# Patient Record
Sex: MALE | Race: WHITE | Employment: OTHER | URBAN - NONMETROPOLITAN AREA
[De-identification: names, ages, dates, MRNs, and addresses within clinical notes are randomized per-mention and may not be internally consistent; named-entity substitution may affect disease eponyms.]

---

## 2017-01-25 ENCOUNTER — OFFICE VISIT (OUTPATIENT)
Dept: ORTHOPEDIC SURGERY | Age: 62
End: 2017-01-25

## 2017-01-25 VITALS
HEIGHT: 69 IN | DIASTOLIC BLOOD PRESSURE: 89 MMHG | WEIGHT: 210 LBS | BODY MASS INDEX: 31.1 KG/M2 | HEART RATE: 94 BPM | SYSTOLIC BLOOD PRESSURE: 109 MMHG

## 2017-01-25 DIAGNOSIS — S46.211S BICEPS RUPTURE, DISTAL, RIGHT, SEQUELA: Primary | ICD-10-CM

## 2017-01-25 PROCEDURE — 99024 POSTOP FOLLOW-UP VISIT: CPT | Performed by: ORTHOPAEDIC SURGERY

## 2017-03-08 ENCOUNTER — OFFICE VISIT (OUTPATIENT)
Dept: ORTHOPEDIC SURGERY | Age: 62
End: 2017-03-08

## 2017-03-08 VITALS
SYSTOLIC BLOOD PRESSURE: 118 MMHG | DIASTOLIC BLOOD PRESSURE: 78 MMHG | WEIGHT: 210 LBS | HEIGHT: 69 IN | BODY MASS INDEX: 31.1 KG/M2 | HEART RATE: 96 BPM

## 2017-03-08 DIAGNOSIS — S46.211S BICEPS RUPTURE, PROXIMAL, RIGHT, SEQUELA: Primary | ICD-10-CM

## 2017-03-08 PROCEDURE — 99213 OFFICE O/P EST LOW 20 MIN: CPT | Performed by: ORTHOPAEDIC SURGERY

## 2017-03-14 ENCOUNTER — OFFICE VISIT (OUTPATIENT)
Dept: FAMILY MEDICINE CLINIC | Age: 62
End: 2017-03-14
Payer: COMMERCIAL

## 2017-03-14 VITALS
WEIGHT: 203 LBS | HEART RATE: 84 BPM | HEIGHT: 69 IN | BODY MASS INDEX: 30.07 KG/M2 | DIASTOLIC BLOOD PRESSURE: 64 MMHG | SYSTOLIC BLOOD PRESSURE: 132 MMHG

## 2017-03-14 DIAGNOSIS — S46.211D BICEPS TENDON RUPTURE, PROXIMAL, RIGHT, SUBSEQUENT ENCOUNTER: ICD-10-CM

## 2017-03-14 DIAGNOSIS — I10 ESSENTIAL HYPERTENSION: Primary | ICD-10-CM

## 2017-03-14 DIAGNOSIS — K42.9 UMBILICAL HERNIA WITHOUT OBSTRUCTION AND WITHOUT GANGRENE: ICD-10-CM

## 2017-03-14 DIAGNOSIS — S13.9XXD SPRAIN OF NECK, SUBSEQUENT ENCOUNTER: ICD-10-CM

## 2017-03-14 DIAGNOSIS — Z12.5 SCREENING PSA (PROSTATE SPECIFIC ANTIGEN): ICD-10-CM

## 2017-03-14 DIAGNOSIS — R73.01 IMPAIRED FASTING BLOOD SUGAR: ICD-10-CM

## 2017-03-14 DIAGNOSIS — N40.1 BENIGN NON-NODULAR PROSTATIC HYPERPLASIA WITH LOWER URINARY TRACT SYMPTOMS: ICD-10-CM

## 2017-03-14 PROCEDURE — G8427 DOCREV CUR MEDS BY ELIG CLIN: HCPCS | Performed by: FAMILY MEDICINE

## 2017-03-14 PROCEDURE — 1036F TOBACCO NON-USER: CPT | Performed by: FAMILY MEDICINE

## 2017-03-14 PROCEDURE — G8484 FLU IMMUNIZE NO ADMIN: HCPCS | Performed by: FAMILY MEDICINE

## 2017-03-14 PROCEDURE — 99214 OFFICE O/P EST MOD 30 MIN: CPT | Performed by: FAMILY MEDICINE

## 2017-03-14 PROCEDURE — G8417 CALC BMI ABV UP PARAM F/U: HCPCS | Performed by: FAMILY MEDICINE

## 2017-03-14 PROCEDURE — 3017F COLORECTAL CA SCREEN DOC REV: CPT | Performed by: FAMILY MEDICINE

## 2017-03-14 RX ORDER — MELOXICAM 15 MG/1
15 TABLET ORAL DAILY
Qty: 30 TABLET | Refills: 3 | Status: SHIPPED | OUTPATIENT
Start: 2017-03-14 | End: 2017-08-11 | Stop reason: ALTCHOICE

## 2017-03-14 RX ORDER — MELOXICAM 15 MG/1
15 TABLET ORAL DAILY
COMMUNITY
End: 2017-03-14 | Stop reason: SDUPTHER

## 2017-03-14 ASSESSMENT — ENCOUNTER SYMPTOMS
RESPIRATORY NEGATIVE: 1
ALLERGIC/IMMUNOLOGIC NEGATIVE: 1
GASTROINTESTINAL NEGATIVE: 1
EYES NEGATIVE: 1

## 2017-08-11 ENCOUNTER — OFFICE VISIT (OUTPATIENT)
Dept: PRIMARY CARE CLINIC | Age: 62
End: 2017-08-11
Payer: COMMERCIAL

## 2017-08-11 VITALS
HEIGHT: 69 IN | WEIGHT: 211.6 LBS | HEART RATE: 74 BPM | DIASTOLIC BLOOD PRESSURE: 74 MMHG | SYSTOLIC BLOOD PRESSURE: 126 MMHG | OXYGEN SATURATION: 98 % | TEMPERATURE: 98.3 F | BODY MASS INDEX: 31.34 KG/M2

## 2017-08-11 DIAGNOSIS — M25.512 ACUTE PAIN OF LEFT SHOULDER: ICD-10-CM

## 2017-08-11 DIAGNOSIS — S46.912A LEFT SHOULDER STRAIN, INITIAL ENCOUNTER: Primary | ICD-10-CM

## 2017-08-11 PROCEDURE — 99214 OFFICE O/P EST MOD 30 MIN: CPT | Performed by: FAMILY MEDICINE

## 2017-08-11 PROCEDURE — 3017F COLORECTAL CA SCREEN DOC REV: CPT | Performed by: FAMILY MEDICINE

## 2017-08-11 PROCEDURE — G8427 DOCREV CUR MEDS BY ELIG CLIN: HCPCS | Performed by: FAMILY MEDICINE

## 2017-08-11 PROCEDURE — G8417 CALC BMI ABV UP PARAM F/U: HCPCS | Performed by: FAMILY MEDICINE

## 2017-08-11 PROCEDURE — 1036F TOBACCO NON-USER: CPT | Performed by: FAMILY MEDICINE

## 2017-08-11 RX ORDER — NAPROXEN 500 MG/1
500 TABLET ORAL 2 TIMES DAILY WITH MEALS
Status: ON HOLD | COMMUNITY
End: 2018-07-09 | Stop reason: HOSPADM

## 2017-08-11 RX ORDER — PREDNISONE 20 MG/1
TABLET ORAL
Qty: 15 TABLET | Refills: 0 | Status: SHIPPED | OUTPATIENT
Start: 2017-08-11 | End: 2017-09-14 | Stop reason: ALTCHOICE

## 2017-08-11 ASSESSMENT — ENCOUNTER SYMPTOMS
EYES NEGATIVE: 1
GASTROINTESTINAL NEGATIVE: 1
BACK PAIN: 0
RESPIRATORY NEGATIVE: 1

## 2017-09-08 ENCOUNTER — HOSPITAL ENCOUNTER (OUTPATIENT)
Dept: LAB | Age: 62
Discharge: HOME | End: 2017-09-08
Payer: COMMERCIAL

## 2017-09-08 DIAGNOSIS — Z12.5 SCREENING PSA (PROSTATE SPECIFIC ANTIGEN): ICD-10-CM

## 2017-09-08 DIAGNOSIS — I10 ESSENTIAL HYPERTENSION: ICD-10-CM

## 2017-09-08 LAB
ABSOLUTE EOS #: 0.4 K/UL (ref 0–0.4)
ABSOLUTE LYMPH #: 3.7 K/UL (ref 1–4.8)
ABSOLUTE MONO #: 0.6 K/UL (ref 0.1–1.2)
ANION GAP SERPL CALCULATED.3IONS-SCNC: 13 MMOL/L (ref 9–17)
BASOPHILS # BLD: 1 %
BASOPHILS ABSOLUTE: 0.1 K/UL (ref 0–0.2)
BUN BLDV-MCNC: 23 MG/DL (ref 8–23)
BUN/CREAT BLD: 25 (ref 9–20)
CALCIUM SERPL-MCNC: 8.9 MG/DL (ref 8.6–10.4)
CHLORIDE BLD-SCNC: 99 MMOL/L (ref 98–107)
CO2: 23 MMOL/L (ref 20–31)
CREAT SERPL-MCNC: 0.93 MG/DL (ref 0.7–1.2)
DIFFERENTIAL TYPE: ABNORMAL
EOSINOPHILS RELATIVE PERCENT: 5 %
GFR AFRICAN AMERICAN: >60 ML/MIN
GFR NON-AFRICAN AMERICAN: >60 ML/MIN
GFR SERPL CREATININE-BSD FRML MDRD: ABNORMAL ML/MIN/{1.73_M2}
GFR SERPL CREATININE-BSD FRML MDRD: ABNORMAL ML/MIN/{1.73_M2}
GLUCOSE BLD-MCNC: 112 MG/DL (ref 70–99)
HCT VFR BLD CALC: 46.5 % (ref 41–53)
HEMOGLOBIN: 15.1 G/DL (ref 13.5–17.5)
LYMPHOCYTES # BLD: 45 %
MCH RBC QN AUTO: 27.1 PG (ref 26–34)
MCHC RBC AUTO-ENTMCNC: 32.6 G/DL (ref 31–37)
MCV RBC AUTO: 83.3 FL (ref 80–100)
MONOCYTES # BLD: 8 %
PDW BLD-RTO: 14.7 % (ref 11–14.5)
PLATELET # BLD: 294 K/UL (ref 140–450)
PLATELET ESTIMATE: ABNORMAL
PMV BLD AUTO: 8.7 FL (ref 6–12)
POTASSIUM SERPL-SCNC: 5.2 MMOL/L (ref 3.7–5.3)
PROSTATE SPECIFIC ANTIGEN: 0.57 UG/L
RBC # BLD: 5.58 M/UL (ref 4.5–5.9)
RBC # BLD: ABNORMAL 10*6/UL
SEG NEUTROPHILS: 41 %
SEGMENTED NEUTROPHILS ABSOLUTE COUNT: 3.3 K/UL (ref 1.8–7.7)
SODIUM BLD-SCNC: 135 MMOL/L (ref 135–144)
WBC # BLD: 8.1 K/UL (ref 3.5–11)
WBC # BLD: ABNORMAL 10*3/UL

## 2017-09-08 PROCEDURE — 85025 COMPLETE CBC W/AUTO DIFF WBC: CPT

## 2017-09-08 PROCEDURE — 36415 COLL VENOUS BLD VENIPUNCTURE: CPT

## 2017-09-08 PROCEDURE — G0103 PSA SCREENING: HCPCS

## 2017-09-08 PROCEDURE — 80048 BASIC METABOLIC PNL TOTAL CA: CPT

## 2017-09-10 DIAGNOSIS — I10 ESSENTIAL HYPERTENSION: ICD-10-CM

## 2017-09-11 RX ORDER — LISINOPRIL 10 MG/1
TABLET ORAL
Qty: 90 TABLET | Refills: 3 | Status: ON HOLD | OUTPATIENT
Start: 2017-09-11 | End: 2018-07-09 | Stop reason: HOSPADM

## 2017-09-14 ENCOUNTER — OFFICE VISIT (OUTPATIENT)
Dept: FAMILY MEDICINE CLINIC | Age: 62
End: 2017-09-14
Payer: COMMERCIAL

## 2017-09-14 VITALS
SYSTOLIC BLOOD PRESSURE: 128 MMHG | HEIGHT: 69 IN | WEIGHT: 211 LBS | HEART RATE: 80 BPM | DIASTOLIC BLOOD PRESSURE: 74 MMHG | BODY MASS INDEX: 31.25 KG/M2

## 2017-09-14 DIAGNOSIS — S46.211D BICEPS TENDON RUPTURE, PROXIMAL, RIGHT, SUBSEQUENT ENCOUNTER: ICD-10-CM

## 2017-09-14 DIAGNOSIS — K42.9 UMBILICAL HERNIA WITHOUT OBSTRUCTION AND WITHOUT GANGRENE: ICD-10-CM

## 2017-09-14 DIAGNOSIS — Z12.5 SCREENING PSA (PROSTATE SPECIFIC ANTIGEN): ICD-10-CM

## 2017-09-14 DIAGNOSIS — R73.01 IMPAIRED FASTING BLOOD SUGAR: ICD-10-CM

## 2017-09-14 DIAGNOSIS — N40.1 BENIGN NON-NODULAR PROSTATIC HYPERPLASIA WITH LOWER URINARY TRACT SYMPTOMS: ICD-10-CM

## 2017-09-14 DIAGNOSIS — I10 ESSENTIAL HYPERTENSION: Primary | ICD-10-CM

## 2017-09-14 DIAGNOSIS — M25.512 ACUTE PAIN OF LEFT SHOULDER: ICD-10-CM

## 2017-09-14 PROCEDURE — 1036F TOBACCO NON-USER: CPT | Performed by: FAMILY MEDICINE

## 2017-09-14 PROCEDURE — G8417 CALC BMI ABV UP PARAM F/U: HCPCS | Performed by: FAMILY MEDICINE

## 2017-09-14 PROCEDURE — 99214 OFFICE O/P EST MOD 30 MIN: CPT | Performed by: FAMILY MEDICINE

## 2017-09-14 PROCEDURE — G8427 DOCREV CUR MEDS BY ELIG CLIN: HCPCS | Performed by: FAMILY MEDICINE

## 2017-09-14 PROCEDURE — 3017F COLORECTAL CA SCREEN DOC REV: CPT | Performed by: FAMILY MEDICINE

## 2017-09-14 RX ORDER — CYCLOBENZAPRINE HCL 5 MG
TABLET ORAL
COMMUNITY
Start: 2017-06-26 | End: 2019-03-21 | Stop reason: ALTCHOICE

## 2017-09-14 RX ORDER — CYCLOBENZAPRINE HCL 10 MG
10 TABLET ORAL PRN
COMMUNITY
End: 2017-09-14 | Stop reason: SDUPTHER

## 2017-09-14 ASSESSMENT — ENCOUNTER SYMPTOMS
ALLERGIC/IMMUNOLOGIC NEGATIVE: 1
EYES NEGATIVE: 1
GASTROINTESTINAL NEGATIVE: 1
RESPIRATORY NEGATIVE: 1

## 2017-09-14 ASSESSMENT — PATIENT HEALTH QUESTIONNAIRE - PHQ9
1. LITTLE INTEREST OR PLEASURE IN DOING THINGS: 0
SUM OF ALL RESPONSES TO PHQ QUESTIONS 1-9: 0
SUM OF ALL RESPONSES TO PHQ9 QUESTIONS 1 & 2: 0
2. FEELING DOWN, DEPRESSED OR HOPELESS: 0

## 2018-02-19 ENCOUNTER — TELEPHONE (OUTPATIENT)
Dept: FAMILY MEDICINE CLINIC | Age: 63
End: 2018-02-19

## 2018-02-19 RX ORDER — OSELTAMIVIR PHOSPHATE 75 MG/1
75 CAPSULE ORAL 2 TIMES DAILY
Qty: 10 CAPSULE | Refills: 0 | Status: SHIPPED | OUTPATIENT
Start: 2018-02-19 | End: 2018-02-24

## 2018-02-19 NOTE — TELEPHONE ENCOUNTER
Per wife's chart (Rena Weisswith  1955), was given Tamiflu on 18 for influenza symptoms. This was never confirmed, as it was negative when she came in for appointment on 18.   Do you want to give him something, or have him come in?

## 2018-03-09 ENCOUNTER — HOSPITAL ENCOUNTER (OUTPATIENT)
Dept: LAB | Age: 63
Setting detail: SPECIMEN
Discharge: HOME | End: 2018-03-09
Payer: COMMERCIAL

## 2018-03-09 DIAGNOSIS — R73.01 IMPAIRED FASTING BLOOD SUGAR: ICD-10-CM

## 2018-03-09 DIAGNOSIS — I10 ESSENTIAL HYPERTENSION: ICD-10-CM

## 2018-03-09 LAB
ABSOLUTE EOS #: 0.3 K/UL (ref 0–0.4)
ABSOLUTE IMMATURE GRANULOCYTE: ABNORMAL K/UL (ref 0–0.3)
ABSOLUTE LYMPH #: 3.6 K/UL (ref 1–4.8)
ABSOLUTE MONO #: 0.6 K/UL (ref 0.1–1.2)
ANION GAP SERPL CALCULATED.3IONS-SCNC: 10 MMOL/L (ref 9–17)
BASOPHILS # BLD: 1 % (ref 0–2)
BASOPHILS ABSOLUTE: 0.1 K/UL (ref 0–0.2)
BUN BLDV-MCNC: 15 MG/DL (ref 8–23)
BUN/CREAT BLD: 19 (ref 9–20)
CALCIUM SERPL-MCNC: 9 MG/DL (ref 8.6–10.4)
CHLORIDE BLD-SCNC: 97 MMOL/L (ref 98–107)
CO2: 28 MMOL/L (ref 20–31)
CREAT SERPL-MCNC: 0.77 MG/DL (ref 0.7–1.2)
DIFFERENTIAL TYPE: ABNORMAL
EOSINOPHILS RELATIVE PERCENT: 3 % (ref 1–8)
ESTIMATED AVERAGE GLUCOSE: 120 MG/DL
GFR AFRICAN AMERICAN: >60 ML/MIN
GFR NON-AFRICAN AMERICAN: >60 ML/MIN
GFR SERPL CREATININE-BSD FRML MDRD: ABNORMAL ML/MIN/{1.73_M2}
GFR SERPL CREATININE-BSD FRML MDRD: ABNORMAL ML/MIN/{1.73_M2}
GLUCOSE BLD-MCNC: 112 MG/DL (ref 70–99)
HBA1C MFR BLD: 5.8 % (ref 4.8–5.9)
HCT VFR BLD CALC: 46 % (ref 41–53)
HEMOGLOBIN: 15.2 G/DL (ref 13.5–17.5)
IMMATURE GRANULOCYTES: ABNORMAL %
LYMPHOCYTES # BLD: 43 % (ref 15–43)
MCH RBC QN AUTO: 27 PG (ref 26–34)
MCHC RBC AUTO-ENTMCNC: 33.2 G/DL (ref 31–37)
MCV RBC AUTO: 81.5 FL (ref 80–100)
MONOCYTES # BLD: 8 % (ref 6–14)
NRBC AUTOMATED: ABNORMAL PER 100 WBC
PDW BLD-RTO: 15 % (ref 11–14.5)
PLATELET # BLD: 255 K/UL (ref 140–450)
PLATELET ESTIMATE: ABNORMAL
PMV BLD AUTO: 8.9 FL (ref 6–12)
POTASSIUM SERPL-SCNC: 5 MMOL/L (ref 3.7–5.3)
RBC # BLD: 5.64 M/UL (ref 4.5–5.9)
RBC # BLD: ABNORMAL 10*6/UL
SEG NEUTROPHILS: 45 % (ref 44–74)
SEGMENTED NEUTROPHILS ABSOLUTE COUNT: 3.8 K/UL (ref 1.8–7.7)
SODIUM BLD-SCNC: 135 MMOL/L (ref 135–144)
WBC # BLD: 8.5 K/UL (ref 3.5–11)
WBC # BLD: ABNORMAL 10*3/UL

## 2018-03-09 PROCEDURE — 36415 COLL VENOUS BLD VENIPUNCTURE: CPT

## 2018-03-09 PROCEDURE — 80048 BASIC METABOLIC PNL TOTAL CA: CPT

## 2018-03-09 PROCEDURE — 85025 COMPLETE CBC W/AUTO DIFF WBC: CPT

## 2018-03-09 PROCEDURE — 83036 HEMOGLOBIN GLYCOSYLATED A1C: CPT

## 2018-03-13 ENCOUNTER — OFFICE VISIT (OUTPATIENT)
Dept: FAMILY MEDICINE CLINIC | Age: 63
End: 2018-03-13
Payer: COMMERCIAL

## 2018-03-13 VITALS
DIASTOLIC BLOOD PRESSURE: 70 MMHG | WEIGHT: 214 LBS | SYSTOLIC BLOOD PRESSURE: 130 MMHG | HEIGHT: 69 IN | HEART RATE: 72 BPM | BODY MASS INDEX: 31.7 KG/M2

## 2018-03-13 DIAGNOSIS — M25.512 ACUTE PAIN OF LEFT SHOULDER: ICD-10-CM

## 2018-03-13 DIAGNOSIS — K42.9 UMBILICAL HERNIA WITHOUT OBSTRUCTION AND WITHOUT GANGRENE: ICD-10-CM

## 2018-03-13 DIAGNOSIS — Z12.5 SCREENING PSA (PROSTATE SPECIFIC ANTIGEN): ICD-10-CM

## 2018-03-13 DIAGNOSIS — N40.1 BENIGN NON-NODULAR PROSTATIC HYPERPLASIA WITH LOWER URINARY TRACT SYMPTOMS: ICD-10-CM

## 2018-03-13 DIAGNOSIS — S13.9XXD SPRAIN OF NECK, SUBSEQUENT ENCOUNTER: ICD-10-CM

## 2018-03-13 DIAGNOSIS — I10 ESSENTIAL HYPERTENSION: Primary | ICD-10-CM

## 2018-03-13 DIAGNOSIS — R73.01 IMPAIRED FASTING BLOOD SUGAR: ICD-10-CM

## 2018-03-13 DIAGNOSIS — F17.211 NICOTINE DEPENDENCE, CIGARETTES, IN REMISSION: ICD-10-CM

## 2018-03-13 DIAGNOSIS — S46.211D BICEPS TENDON RUPTURE, PROXIMAL, RIGHT, SUBSEQUENT ENCOUNTER: ICD-10-CM

## 2018-03-13 PROCEDURE — G8482 FLU IMMUNIZE ORDER/ADMIN: HCPCS | Performed by: FAMILY MEDICINE

## 2018-03-13 PROCEDURE — G8417 CALC BMI ABV UP PARAM F/U: HCPCS | Performed by: FAMILY MEDICINE

## 2018-03-13 PROCEDURE — 99214 OFFICE O/P EST MOD 30 MIN: CPT | Performed by: FAMILY MEDICINE

## 2018-03-13 PROCEDURE — G8427 DOCREV CUR MEDS BY ELIG CLIN: HCPCS | Performed by: FAMILY MEDICINE

## 2018-03-13 PROCEDURE — 3017F COLORECTAL CA SCREEN DOC REV: CPT | Performed by: FAMILY MEDICINE

## 2018-03-13 PROCEDURE — 1036F TOBACCO NON-USER: CPT | Performed by: FAMILY MEDICINE

## 2018-03-13 NOTE — PATIENT INSTRUCTIONS
Hospital Outpatient Visit on 03/09/2018   Component Date Value Ref Range Status    WBC 03/09/2018 8.5  3.5 - 11.0 k/uL Final    RBC 03/09/2018 5.64  4.5 - 5.9 m/uL Final    Hemoglobin 03/09/2018 15.2  13.5 - 17.5 g/dL Final    Hematocrit 03/09/2018 46.0  41 - 53 % Final    MCV 03/09/2018 81.5  80 - 100 fL Final    MCH 03/09/2018 27.0  26 - 34 pg Final    MCHC 03/09/2018 33.2  31 - 37 g/dL Final    RDW 03/09/2018 15.0* 11.0 - 14.5 % Final    Platelets 47/49/7612 255  140 - 450 k/uL Final    MPV 03/09/2018 8.9  6.0 - 12.0 fL Final    NRBC Automated 03/09/2018 NOT REPORTED  per 100 WBC Final    Differential Type 03/09/2018 NOT REPORTED   Final    Immature Granulocytes 03/09/2018 NOT REPORTED  0 % Final    Absolute Immature Granulocyte 03/09/2018 NOT REPORTED  0.00 - 0.30 k/uL Final    WBC Morphology 03/09/2018 NOT REPORTED   Final    RBC Morphology 03/09/2018 NOT REPORTED   Final    Platelet Estimate 85/32/9755 NOT REPORTED   Final    Seg Neutrophils 03/09/2018 45  44 - 74 % Final    Lymphocytes 03/09/2018 43  15 - 43 % Final    Monocytes 03/09/2018 8  6 - 14 % Final    Eosinophils % 03/09/2018 3  1 - 8 % Final    Basophils 03/09/2018 1  0 - 2 % Final    Segs Absolute 03/09/2018 3.80  1.8 - 7.7 k/uL Final    Absolute Lymph # 03/09/2018 3.60  1.0 - 4.8 k/uL Final    Absolute Mono # 03/09/2018 0.60  0.1 - 1.2 k/uL Final    Absolute Eos # 03/09/2018 0.30  0.0 - 0.4 k/uL Final    Basophils # 03/09/2018 0.10  0.0 - 0.2 k/uL Final    Comment: Performed at [facility] [address] [facility] [address] [place] [id number] [phone]      Glucose 03/09/2018 112* 70 - 99 mg/dL Final    BUN 03/09/2018 15  8 - 23 mg/dL Final    CREATININE 03/09/2018 0.77  0.70 - 1.20 mg/dL Final    Bun/Cre Ratio 03/09/2018 19  9 - 20 Final    Calcium 03/09/2018 9.0  8.6 - 10.4 mg/dL Final    Sodium 03/09/2018 135  135 - 144 mmol/L Final    Potassium 03/09/2018 5.0  3.7 - 5.3 mmol/L cancer risk. If you have other serious medical conditions (other cancers, congestive heart failure) that limit your life expectancy to less than 10 years, you should not undergo lung cancer screening with LDCT. The chance is 20%-60% that the LDCT result will show abnormalities. This would require additional testing which could include repeat imaging or even invasive procedures. Most (about 95%) of \"abnormal\" LDCT results are false in the sense that no lung cancer is ultimately found. Additionally, some (about 10%) of the cancers found would not affect your life expectancy, even if undetected and untreated. If you are still smoking, the single most important thing that you can do to reduce your risk of dying of lung cancer is to quit. For this screening to be covered by Medicare and most other insurers, strict criteria must be met. If you do not meet these criteria, but still wish to undergo LDCT testing, you will be required to sign a waiver indicating your willingness to pay for the scan.

## 2018-03-13 NOTE — PROGRESS NOTES
content normal.     /70 (Site: Right Arm, Position: Sitting, Cuff Size: Large Adult)   Pulse 72   Ht 5' 8.9\" (1.75 m)   Wt 214 lb (97.1 kg)   BMI 31.70 kg/m²       Results for orders placed or performed during the hospital encounter of 03/09/18   CBC Auto Differential   Result Value Ref Range    WBC 8.5 3.5 - 11.0 k/uL    RBC 5.64 4.5 - 5.9 m/uL    Hemoglobin 15.2 13.5 - 17.5 g/dL    Hematocrit 46.0 41 - 53 %    MCV 81.5 80 - 100 fL    MCH 27.0 26 - 34 pg    MCHC 33.2 31 - 37 g/dL    RDW 15.0 (H) 11.0 - 14.5 %    Platelets 231 557 - 078 k/uL    MPV 8.9 6.0 - 12.0 fL    NRBC Automated NOT REPORTED per 100 WBC    Differential Type NOT REPORTED     Immature Granulocytes NOT REPORTED 0 %    Absolute Immature Granulocyte NOT REPORTED 0.00 - 0.30 k/uL    WBC Morphology NOT REPORTED     RBC Morphology NOT REPORTED     Platelet Estimate NOT REPORTED     Seg Neutrophils 45 44 - 74 %    Lymphocytes 43 15 - 43 %    Monocytes 8 6 - 14 %    Eosinophils % 3 1 - 8 %    Basophils 1 0 - 2 %    Segs Absolute 3.80 1.8 - 7.7 k/uL    Absolute Lymph # 3.60 1.0 - 4.8 k/uL    Absolute Mono # 0.60 0.1 - 1.2 k/uL    Absolute Eos # 0.30 0.0 - 0.4 k/uL    Basophils # 0.10 0.0 - 0.2 k/uL   Basic Metabolic Panel   Result Value Ref Range    Glucose 112 (H) 70 - 99 mg/dL    BUN 15 8 - 23 mg/dL    CREATININE 0.77 0.70 - 1.20 mg/dL    Bun/Cre Ratio 19 9 - 20    Calcium 9.0 8.6 - 10.4 mg/dL    Sodium 135 135 - 144 mmol/L    Potassium 5.0 3.7 - 5.3 mmol/L    Chloride 97 (L) 98 - 107 mmol/L    CO2 28 20 - 31 mmol/L    Anion Gap 10 9 - 17 mmol/L    GFR Non-African American >60 >60 mL/min    GFR African American >60 >60 mL/min    GFR Comment          GFR Staging NOT REPORTED    Hemoglobin A1C   Result Value Ref Range    Hemoglobin A1C 5.8 4.8 - 5.9 %    Estimated Avg Glucose 120 mg/dL       Assessment:       Encounter Diagnoses   Name Primary?     Essential hypertension Yes    Impaired fasting blood sugar     Sprain of neck, subsequent encounter     Umbilical hernia without obstruction and without gangrene     Biceps tendon rupture, proximal, right, subsequent encounter     Benign non-nodular prostatic hyperplasia with lower urinary tract symptoms     Acute pain of left shoulder              Plan:      Htn: well controlled at present. Cont. Current dosing of Lisinopril. IFBS: low concentrated sweet diet, exercise and wt. Loss over next interval.   a1c reassuring at 5.8% last check  Cont. Same with rec. For wt. Loss over next interval.      Left neck and shoulder pain chronically since injury 1/18/14. Original shoulder and neck sprain have improved, but he has had persistent radicular pain into left axillary/inner arm, with paresthesias predominantly in fingers 2-5. Using neurontin at present. Injection trial seems to have helped at present,. MRI showing multilevel disc disease, but I suspect the foraminal impingement at the left C6-7 area on the left is responsible for the bulk of his symptoms. see 5/5/14 note for history. He has had some injection trials that have helped with the paresthesias quite a bit by report, through dr. Parviz Easton. Back to work at present. Umbilical hernia: reducible, mild to moderate with rare pain. Considering elective repair down the road. No interval issues. He is aware of incarceration risk and symptoms to report. Biceps tendon tear and rotator cuff tears on the right from [facility] claim from 10/6/15. MRI showing full thickness tear and mild retraction of the supraspinatus, shallow partial thickness tear of infraspinatus, mild subscapularis tendoniopathy, nonvisualization of the long head of the biceps tendon indicating likely rupture. .  Mild /diffuse labral degeneration. Surgery in April with mostly rotator cuff repair. Unable to re-attach long head of biceps tendon. Still was having some biceps spasms and pain, now s/p corrective surgery in November and doing much better.  Just returning to full duty at work at this time. Bph: doing a little better on saw palmetto at present. Will cont. Same. New left shoulder injury 8/7/17 pulling a heavy pallet keyla when it stopped suddenly. Negative xray in urgent care. Completed prednisone burst.  This helped, but starting to have recurrent symptoms and poor abduction at present. MRI and ortho consult as above. Has follow up with another ortho. Specialist out of [place] , Dr. Selma Hartley .  (sp.)  No information available on search of EMR and care everywhere attempt. Seen at [facility]. Low Dose CT (LDCT) Lung Screening criteria met   Age 50-69   Pack year smoking >30   Still smoking or less than 15 year since quit   No sign or symptoms of lung cancer   > 11 months since last LDCT     Risks and benefits of lung cancer screening with LDCT scans discussed:    Significance of positive screen - False-positive LDCT results often occur. 95% of all positive results do not lead to a diagnosis of cancer. Usually further imaging can resolve most false-positive results; however, some patients may require invasive procedures. Over diagnosis risk - 10% to 12% of screen-detected lung cancer cases are over diagnosedthat is, the cancer would not have been detected in the patient's lifetime without the screening. Need for follow up screens annually to continue lung cancer screening effectiveness     Risks associated with radiation from annual LDCT- Radiation exposure is about the same as for a mammogram, which is about 1/3 of the annual background radiation exposure from everyday life. Starting screening at age 54 is not likely to increase cancer risk from radiation exposure. Patients with comorbidities resulting in life expectancy of < 10 years, or that would preclude treatment of an abnormality identified on CT, should not be screened due to lack of benefit.     To obtain maximal benefit from this screening, smoking cessation and long-term abstinence from

## 2018-03-14 ENCOUNTER — TELEPHONE (OUTPATIENT)
Dept: ONCOLOGY | Age: 63
End: 2018-03-14

## 2018-04-28 ENCOUNTER — OFFICE VISIT (OUTPATIENT)
Dept: PRIMARY CARE CLINIC | Age: 63
End: 2018-04-28
Payer: COMMERCIAL

## 2018-04-28 VITALS
SYSTOLIC BLOOD PRESSURE: 128 MMHG | TEMPERATURE: 97.7 F | OXYGEN SATURATION: 97 % | BODY MASS INDEX: 31.4 KG/M2 | DIASTOLIC BLOOD PRESSURE: 68 MMHG | WEIGHT: 212 LBS | HEIGHT: 69 IN | HEART RATE: 82 BPM

## 2018-04-28 DIAGNOSIS — R05.9 COUGH: ICD-10-CM

## 2018-04-28 DIAGNOSIS — J01.41 ACUTE RECURRENT PANSINUSITIS: Primary | ICD-10-CM

## 2018-04-28 PROCEDURE — 1036F TOBACCO NON-USER: CPT | Performed by: PHYSICIAN ASSISTANT

## 2018-04-28 PROCEDURE — 3017F COLORECTAL CA SCREEN DOC REV: CPT | Performed by: PHYSICIAN ASSISTANT

## 2018-04-28 PROCEDURE — G8427 DOCREV CUR MEDS BY ELIG CLIN: HCPCS | Performed by: PHYSICIAN ASSISTANT

## 2018-04-28 PROCEDURE — 99213 OFFICE O/P EST LOW 20 MIN: CPT | Performed by: PHYSICIAN ASSISTANT

## 2018-04-28 PROCEDURE — G8417 CALC BMI ABV UP PARAM F/U: HCPCS | Performed by: PHYSICIAN ASSISTANT

## 2018-04-28 RX ORDER — BENZONATATE 200 MG/1
200 CAPSULE ORAL 3 TIMES DAILY PRN
Qty: 30 CAPSULE | Refills: 1 | Status: SHIPPED | OUTPATIENT
Start: 2018-04-28 | End: 2018-05-05

## 2018-04-28 RX ORDER — AZITHROMYCIN 250 MG/1
TABLET, FILM COATED ORAL
Qty: 1 PACKET | Refills: 0 | Status: SHIPPED | OUTPATIENT
Start: 2018-04-28 | End: 2018-05-02

## 2018-04-28 ASSESSMENT — ENCOUNTER SYMPTOMS
NAUSEA: 0
VOMITING: 0
DIARRHEA: 0
RHINORRHEA: 1
COUGH: 1
WHEEZING: 0
SORE THROAT: 0
SINUS PRESSURE: 0
CHEST TIGHTNESS: 0
SINUS PAIN: 0
SHORTNESS OF BREATH: 0

## 2018-05-18 ENCOUNTER — HOSPITAL ENCOUNTER (OUTPATIENT)
Dept: LAB | Age: 63
Setting detail: SPECIMEN
Discharge: HOME | End: 2018-05-18
Payer: COMMERCIAL

## 2018-05-18 ENCOUNTER — HOSPITAL ENCOUNTER (OUTPATIENT)
Dept: GENERAL RADIOLOGY | Age: 63
Discharge: HOME | End: 2018-05-20
Payer: COMMERCIAL

## 2018-05-18 ENCOUNTER — HOSPITAL ENCOUNTER (OUTPATIENT)
Dept: NON INVASIVE DIAGNOSTICS | Age: 63
Discharge: HOME | End: 2018-05-18
Payer: COMMERCIAL

## 2018-05-18 ENCOUNTER — OFFICE VISIT (OUTPATIENT)
Dept: FAMILY MEDICINE CLINIC | Age: 63
End: 2018-05-18
Payer: COMMERCIAL

## 2018-05-18 VITALS
WEIGHT: 212.2 LBS | BODY MASS INDEX: 31.43 KG/M2 | HEIGHT: 69 IN | HEART RATE: 83 BPM | TEMPERATURE: 97.4 F | DIASTOLIC BLOOD PRESSURE: 84 MMHG | RESPIRATION RATE: 12 BRPM | OXYGEN SATURATION: 97 % | SYSTOLIC BLOOD PRESSURE: 138 MMHG

## 2018-05-18 DIAGNOSIS — M75.102 TEAR OF LEFT ROTATOR CUFF, UNSPECIFIED TEAR EXTENT: ICD-10-CM

## 2018-05-18 DIAGNOSIS — Z01.818 PRE-OP EXAM: ICD-10-CM

## 2018-05-18 DIAGNOSIS — S46.012D STRAIN OF MUSCLE(S) AND TENDON(S) OF THE ROTATOR CUFF OF LEFT SHOULDER, SUBSEQUENT ENCOUNTER: ICD-10-CM

## 2018-05-18 DIAGNOSIS — R93.89 OPACITY NOTED ON IMAGING STUDY: ICD-10-CM

## 2018-05-18 DIAGNOSIS — R93.89 ABNORMAL CHEST X-RAY: Primary | ICD-10-CM

## 2018-05-18 DIAGNOSIS — Z01.818 PRE-OP EXAM: Primary | ICD-10-CM

## 2018-05-18 LAB
-: NORMAL
ABSOLUTE EOS #: 0.3 K/UL (ref 0–0.4)
ABSOLUTE IMMATURE GRANULOCYTE: ABNORMAL K/UL (ref 0–0.3)
ABSOLUTE LYMPH #: 5 K/UL (ref 1–4.8)
ABSOLUTE MONO #: 0.7 K/UL (ref 0.1–1.2)
ALBUMIN SERPL-MCNC: 4.2 G/DL (ref 3.5–5.2)
ALBUMIN/GLOBULIN RATIO: 1.1 (ref 1–2.5)
ALP BLD-CCNC: 140 U/L (ref 40–129)
ALT SERPL-CCNC: 16 U/L (ref 5–41)
AMORPHOUS: NORMAL
ANION GAP SERPL CALCULATED.3IONS-SCNC: 14 MMOL/L (ref 9–17)
AST SERPL-CCNC: 24 U/L
BACTERIA: NORMAL
BASOPHILS # BLD: 1 % (ref 0–2)
BASOPHILS ABSOLUTE: 0.2 K/UL (ref 0–0.2)
BILIRUB SERPL-MCNC: 0.4 MG/DL (ref 0.3–1.2)
BILIRUBIN URINE: NEGATIVE
BUN BLDV-MCNC: 18 MG/DL (ref 8–23)
BUN/CREAT BLD: 23 (ref 9–20)
CALCIUM SERPL-MCNC: 9.5 MG/DL (ref 8.6–10.4)
CASTS UA: NORMAL /LPF (ref 0–2)
CHLORIDE BLD-SCNC: 98 MMOL/L (ref 98–107)
CO2: 23 MMOL/L (ref 20–31)
COLOR: NORMAL
COMMENT UA: NORMAL
CREAT SERPL-MCNC: 0.78 MG/DL (ref 0.7–1.2)
CRYSTALS, UA: NORMAL /HPF
DIFFERENTIAL TYPE: ABNORMAL
EKG ATRIAL RATE: 89 BPM
EKG P AXIS: 56 DEGREES
EKG P-R INTERVAL: 158 MS
EKG Q-T INTERVAL: 364 MS
EKG QRS DURATION: 92 MS
EKG QTC CALCULATION (BAZETT): 442 MS
EKG R AXIS: 53 DEGREES
EKG T AXIS: 47 DEGREES
EKG VENTRICULAR RATE: 89 BPM
EOSINOPHILS RELATIVE PERCENT: 3 % (ref 1–8)
EPITHELIAL CELLS UA: NORMAL /HPF (ref 0–5)
GFR AFRICAN AMERICAN: >60 ML/MIN
GFR NON-AFRICAN AMERICAN: >60 ML/MIN
GFR SERPL CREATININE-BSD FRML MDRD: ABNORMAL ML/MIN/{1.73_M2}
GFR SERPL CREATININE-BSD FRML MDRD: ABNORMAL ML/MIN/{1.73_M2}
GLUCOSE BLD-MCNC: 101 MG/DL (ref 70–99)
GLUCOSE URINE: NEGATIVE
HCT VFR BLD CALC: 47 % (ref 41–53)
HEMOGLOBIN: 15.2 G/DL (ref 13.5–17.5)
IMMATURE GRANULOCYTES: ABNORMAL %
KETONES, URINE: NEGATIVE
LEUKOCYTE ESTERASE, URINE: NEGATIVE
LYMPHOCYTES # BLD: 43 % (ref 15–43)
MCH RBC QN AUTO: 27 PG (ref 26–34)
MCHC RBC AUTO-ENTMCNC: 32.4 G/DL (ref 31–37)
MCV RBC AUTO: 83.4 FL (ref 80–100)
MONOCYTES # BLD: 6 % (ref 6–14)
MUCUS: NORMAL
NITRITE, URINE: NEGATIVE
NRBC AUTOMATED: ABNORMAL PER 100 WBC
OTHER OBSERVATIONS UA: NORMAL
PDW BLD-RTO: 14.7 % (ref 11–14.5)
PH UA: 5.5 (ref 5–6)
PLATELET # BLD: 383 K/UL (ref 140–450)
PLATELET ESTIMATE: ABNORMAL
PMV BLD AUTO: 9.7 FL (ref 6–12)
POTASSIUM SERPL-SCNC: 4.5 MMOL/L (ref 3.7–5.3)
PROTEIN UA: NEGATIVE
RBC # BLD: 5.63 M/UL (ref 4.5–5.9)
RBC # BLD: ABNORMAL 10*6/UL
RBC UA: NORMAL /HPF (ref 0–4)
RENAL EPITHELIAL, UA: NORMAL /HPF
SEG NEUTROPHILS: 47 % (ref 44–74)
SEGMENTED NEUTROPHILS ABSOLUTE COUNT: 5.4 K/UL (ref 1.8–7.7)
SODIUM BLD-SCNC: 135 MMOL/L (ref 135–144)
SPECIFIC GRAVITY UA: 1.02 (ref 1.01–1.02)
TOTAL PROTEIN: 8 G/DL (ref 6.4–8.3)
TRICHOMONAS: NORMAL
TURBIDITY: NORMAL
URINE HGB: NEGATIVE
UROBILINOGEN, URINE: NORMAL
WBC # BLD: 12.2 K/UL (ref 3.5–11)
WBC # BLD: ABNORMAL 10*3/UL
WBC UA: NORMAL /HPF (ref 0–4)
YEAST: NORMAL

## 2018-05-18 PROCEDURE — 36415 COLL VENOUS BLD VENIPUNCTURE: CPT

## 2018-05-18 PROCEDURE — 71046 X-RAY EXAM CHEST 2 VIEWS: CPT

## 2018-05-18 PROCEDURE — 85025 COMPLETE CBC W/AUTO DIFF WBC: CPT

## 2018-05-18 PROCEDURE — 93005 ELECTROCARDIOGRAM TRACING: CPT

## 2018-05-18 PROCEDURE — 80053 COMPREHEN METABOLIC PANEL: CPT

## 2018-05-18 PROCEDURE — 81001 URINALYSIS AUTO W/SCOPE: CPT

## 2018-05-18 PROCEDURE — 99214 OFFICE O/P EST MOD 30 MIN: CPT | Performed by: NURSE PRACTITIONER

## 2018-05-18 ASSESSMENT — ENCOUNTER SYMPTOMS
COUGH: 0
ABDOMINAL PAIN: 0
VOMITING: 0
TROUBLE SWALLOWING: 0
EYES NEGATIVE: 1
CONSTIPATION: 0
ALLERGIC/IMMUNOLOGIC NEGATIVE: 1
GASTROINTESTINAL NEGATIVE: 1
CHEST TIGHTNESS: 0
SINUS PRESSURE: 0
SHORTNESS OF BREATH: 0
DIARRHEA: 0
NAUSEA: 0

## 2018-05-31 ENCOUNTER — HOSPITAL ENCOUNTER (OUTPATIENT)
Dept: CT IMAGING | Age: 63
Discharge: HOME | End: 2018-06-02
Payer: COMMERCIAL

## 2018-05-31 DIAGNOSIS — R93.89 OPACITY NOTED ON IMAGING STUDY: ICD-10-CM

## 2018-05-31 DIAGNOSIS — R93.89 ABNORMAL CHEST X-RAY: ICD-10-CM

## 2018-05-31 PROCEDURE — 71260 CT THORAX DX C+: CPT

## 2018-05-31 PROCEDURE — 6360000004 HC RX CONTRAST MEDICATION: Performed by: NURSE PRACTITIONER

## 2018-05-31 RX ADMIN — IOPAMIDOL 100 ML: 755 INJECTION, SOLUTION INTRAVENOUS at 09:08

## 2018-06-04 ENCOUNTER — OFFICE VISIT (OUTPATIENT)
Dept: CARDIOLOGY CLINIC | Age: 63
End: 2018-06-04
Payer: COMMERCIAL

## 2018-06-04 VITALS
BODY MASS INDEX: 30.96 KG/M2 | DIASTOLIC BLOOD PRESSURE: 82 MMHG | SYSTOLIC BLOOD PRESSURE: 142 MMHG | HEIGHT: 69 IN | WEIGHT: 209 LBS | HEART RATE: 106 BPM

## 2018-06-04 DIAGNOSIS — R94.31 ABNORMAL ECG: Primary | ICD-10-CM

## 2018-06-04 PROCEDURE — 99203 OFFICE O/P NEW LOW 30 MIN: CPT | Performed by: INTERNAL MEDICINE

## 2018-06-05 ENCOUNTER — TELEPHONE (OUTPATIENT)
Dept: CARDIOLOGY CLINIC | Age: 63
End: 2018-06-05

## 2018-06-05 DIAGNOSIS — J90 PLEURAL EFFUSION: Primary | ICD-10-CM

## 2018-06-21 ENCOUNTER — TELEPHONE (OUTPATIENT)
Dept: CARDIOLOGY | Age: 63
End: 2018-06-21

## 2018-06-21 LAB
LV EF: 67 %
LVEF MODALITY: NORMAL

## 2018-06-22 DIAGNOSIS — R94.31 ABNORMAL ECG: ICD-10-CM

## 2018-06-25 DIAGNOSIS — R94.39 ABNORMAL STRESS TEST: Primary | ICD-10-CM

## 2018-06-29 ENCOUNTER — TELEPHONE (OUTPATIENT)
Dept: CARDIOLOGY | Age: 63
End: 2018-06-29

## 2018-06-29 NOTE — TELEPHONE ENCOUNTER
Patient called wondering when he might hear when his heart cath is going to be scheduled. He has not heard anything from Sully Conklin.

## 2018-07-03 ENCOUNTER — HOSPITAL ENCOUNTER (INPATIENT)
Dept: CARDIAC CATH/INVASIVE PROCEDURES | Age: 63
LOS: 6 days | Discharge: HOME HEALTH | DRG: 234 | End: 2018-07-09
Attending: INTERNAL MEDICINE | Admitting: INTERNAL MEDICINE
Payer: MEDICARE

## 2018-07-03 DIAGNOSIS — Z95.1 S/P CABG (CORONARY ARTERY BYPASS GRAFT): Primary | ICD-10-CM

## 2018-07-03 DIAGNOSIS — I25.10 CAD IN NATIVE ARTERY: ICD-10-CM

## 2018-07-03 LAB
CHOLESTEROL/HDL RATIO: 4.4
CHOLESTEROL: 188 MG/DL
GFR NON-AFRICAN AMERICAN: >60 ML/MIN
GFR SERPL CREATININE-BSD FRML MDRD: >60 ML/MIN
GFR SERPL CREATININE-BSD FRML MDRD: NORMAL ML/MIN/{1.73_M2}
GLUCOSE BLD-MCNC: 99 MG/DL (ref 74–100)
HDLC SERPL-MCNC: 43 MG/DL
LDL CHOLESTEROL: 98 MG/DL (ref 0–130)
PLATELET # BLD: 327 K/UL (ref 138–453)
POC CHLORIDE: 106 MMOL/L (ref 98–107)
POC CREATININE: 0.76 MG/DL (ref 0.51–1.19)
POC HEMATOCRIT: 47 % (ref 41–53)
POC HEMOGLOBIN: 15.9 G/DL (ref 13.5–17.5)
POC POTASSIUM: 4.2 MMOL/L (ref 3.5–4.5)
POC SODIUM: 138 MMOL/L (ref 138–146)
TRIGL SERPL-MCNC: 233 MG/DL
VLDLC SERPL CALC-MCNC: ABNORMAL MG/DL (ref 1–30)

## 2018-07-03 PROCEDURE — 84295 ASSAY OF SERUM SODIUM: CPT

## 2018-07-03 PROCEDURE — 84132 ASSAY OF SERUM POTASSIUM: CPT

## 2018-07-03 PROCEDURE — 6370000000 HC RX 637 (ALT 250 FOR IP): Performed by: STUDENT IN AN ORGANIZED HEALTH CARE EDUCATION/TRAINING PROGRAM

## 2018-07-03 PROCEDURE — B2111ZZ FLUOROSCOPY OF MULTIPLE CORONARY ARTERIES USING LOW OSMOLAR CONTRAST: ICD-10-PCS | Performed by: INTERNAL MEDICINE

## 2018-07-03 PROCEDURE — 1200000000 HC SEMI PRIVATE

## 2018-07-03 PROCEDURE — 80061 LIPID PANEL: CPT

## 2018-07-03 PROCEDURE — 85014 HEMATOCRIT: CPT

## 2018-07-03 PROCEDURE — C1769 GUIDE WIRE: HCPCS

## 2018-07-03 PROCEDURE — 93458 L HRT ARTERY/VENTRICLE ANGIO: CPT | Performed by: INTERNAL MEDICINE

## 2018-07-03 PROCEDURE — B2151ZZ FLUOROSCOPY OF LEFT HEART USING LOW OSMOLAR CONTRAST: ICD-10-PCS | Performed by: INTERNAL MEDICINE

## 2018-07-03 PROCEDURE — 85049 AUTOMATED PLATELET COUNT: CPT

## 2018-07-03 PROCEDURE — 4A023N7 MEASUREMENT OF CARDIAC SAMPLING AND PRESSURE, LEFT HEART, PERCUTANEOUS APPROACH: ICD-10-PCS | Performed by: INTERNAL MEDICINE

## 2018-07-03 PROCEDURE — 36415 COLL VENOUS BLD VENIPUNCTURE: CPT

## 2018-07-03 PROCEDURE — 82565 ASSAY OF CREATININE: CPT

## 2018-07-03 PROCEDURE — 82435 ASSAY OF BLOOD CHLORIDE: CPT

## 2018-07-03 PROCEDURE — C1894 INTRO/SHEATH, NON-LASER: HCPCS

## 2018-07-03 PROCEDURE — 82947 ASSAY GLUCOSE BLOOD QUANT: CPT

## 2018-07-03 PROCEDURE — 2500000003 HC RX 250 WO HCPCS

## 2018-07-03 PROCEDURE — 6360000004 HC RX CONTRAST MEDICATION

## 2018-07-03 PROCEDURE — C1760 CLOSURE DEV, VASC: HCPCS

## 2018-07-03 PROCEDURE — C1725 CATH, TRANSLUMIN NON-LASER: HCPCS

## 2018-07-03 PROCEDURE — 2580000003 HC RX 258: Performed by: STUDENT IN AN ORGANIZED HEALTH CARE EDUCATION/TRAINING PROGRAM

## 2018-07-03 RX ORDER — SODIUM CHLORIDE 9 MG/ML
INJECTION, SOLUTION INTRAVENOUS CONTINUOUS
Status: DISCONTINUED | OUTPATIENT
Start: 2018-07-03 | End: 2018-07-06

## 2018-07-03 RX ORDER — SODIUM CHLORIDE 0.9 % (FLUSH) 0.9 %
10 SYRINGE (ML) INJECTION EVERY 12 HOURS SCHEDULED
Status: DISCONTINUED | OUTPATIENT
Start: 2018-07-03 | End: 2018-07-06

## 2018-07-03 RX ORDER — ASPIRIN 81 MG/1
81 TABLET, CHEWABLE ORAL DAILY
Status: DISCONTINUED | OUTPATIENT
Start: 2018-07-03 | End: 2018-07-06

## 2018-07-03 RX ORDER — NAPROXEN 250 MG/1
500 TABLET ORAL 2 TIMES DAILY WITH MEALS
Status: DISCONTINUED | OUTPATIENT
Start: 2018-07-03 | End: 2018-07-06

## 2018-07-03 RX ORDER — ONDANSETRON 2 MG/ML
4 INJECTION INTRAMUSCULAR; INTRAVENOUS EVERY 6 HOURS PRN
Status: DISCONTINUED | OUTPATIENT
Start: 2018-07-03 | End: 2018-07-06

## 2018-07-03 RX ORDER — SODIUM CHLORIDE 0.9 % (FLUSH) 0.9 %
10 SYRINGE (ML) INJECTION PRN
Status: DISCONTINUED | OUTPATIENT
Start: 2018-07-03 | End: 2018-07-06

## 2018-07-03 RX ORDER — ACETAMINOPHEN 325 MG/1
650 TABLET ORAL EVERY 4 HOURS PRN
Status: DISCONTINUED | OUTPATIENT
Start: 2018-07-03 | End: 2018-07-06

## 2018-07-03 RX ORDER — LISINOPRIL 10 MG/1
10 TABLET ORAL DAILY
Status: DISCONTINUED | OUTPATIENT
Start: 2018-07-03 | End: 2018-07-05

## 2018-07-03 RX ORDER — ATORVASTATIN CALCIUM 40 MG/1
40 TABLET, FILM COATED ORAL NIGHTLY
Status: DISCONTINUED | OUTPATIENT
Start: 2018-07-03 | End: 2018-07-06

## 2018-07-03 RX ORDER — CYCLOBENZAPRINE HCL 5 MG
5 TABLET ORAL 3 TIMES DAILY PRN
Status: DISCONTINUED | OUTPATIENT
Start: 2018-07-03 | End: 2018-07-06

## 2018-07-03 RX ORDER — GABAPENTIN 300 MG/1
300 CAPSULE ORAL 3 TIMES DAILY
Status: DISCONTINUED | OUTPATIENT
Start: 2018-07-03 | End: 2018-07-06

## 2018-07-03 RX ADMIN — GABAPENTIN 300 MG: 300 CAPSULE ORAL at 19:53

## 2018-07-03 RX ADMIN — Medication 10 ML: at 19:54

## 2018-07-03 RX ADMIN — SODIUM CHLORIDE: 9 INJECTION, SOLUTION INTRAVENOUS at 14:03

## 2018-07-03 RX ADMIN — DESMOPRESSIN ACETATE 40 MG: 0.2 TABLET ORAL at 19:53

## 2018-07-03 NOTE — PROCEDURES
[facility]    CARDIAC CATHETERIZATION    Date:   7/3/2018  Patient name: Rika Whipple  Date of admission:  No admission date for patient encounter. MRN:   [id number]  YOB: 1955    Operators:  Primary: Dr. Leeanne Pimentel        Pre Procedure Conscious Sedation Data:    ASA Class:    [] I [x] II [] III [] IV    Mallampati Class:  [] I [x] II [] III [] IV     Indication:  [] STEMI      [x] + Stress test  [] ACS      [] + EKG Changes  [] Non Q MI       [] Significant Risk Factors  [] Recurrent Angina             [] Diabetes Mellitus    [] New LBBB      [] Uncontrolled HTN. [] CHF / Low EF changes     [] Abnormal CTA / Ca Score    Procedure:  Access:  [x] Femoral  [] Radial  artery       [x] Right  [] Left    Procedure: After informed consent was obtained with explanation of the risks and benefits, patient was brought to the cath lab. The access area was prepped and draped in sterile fashion. 1% lidocaine was used for local block. The artery was cannulated with 6  Fr sheath with brisk arterial blood return. The side port was frequently flushed and aspirated with normal saline. Findings:    LMCA: Normal 0% stenosis. LAD: has heavy calcification in the proximal segment. The mid segment has  85% stenosis. LCx: has ostial eccentric 90% stenosis. The proximal segment has 90%  stenosis with bifurcation with OM1. The OM2 has mid 90% stenosis. The OM 3 has mid 90% stenosis. RCA: has mid-distal 99% stenosis and distal 95% stenosis. Ramus: has proximal 80% stenosis.     Coronary Tree      Dominance: Right         The LV gram was performed in the POOLE 30 position. LVEF: 55%. LV Wall Motion: Normal    Conclusions:   1 Multivessel coronary artery disease.   2 Normal LV systolic function.     Recommendation:   Medical therapy as needed.   Risk factor modification.   Routine Post Diagnostic Cath orders.   Urgent surgical CABG (required before discharge, patient stable with   medical

## 2018-07-03 NOTE — H&P
[facility]  Procedure History and Physical/Update          Patient Name:  America Perry  MRN:    [id number]  YOB: 1955  Date of evaluation:  7/3/2018       Please refer to the consult note / H&P completed by Dr. Kamron Cooper on 6/4/2018 in the medical record and note that:       [x] I have examined the patient and reviewed the H&P/Consult and there are no changes to be made to the assessment or plan. Pt is for clearance before the left shoulder arthroscopy   Stress test on 6/21 showed low duke score and no ischemic changes on EKG  But nuclear part showed inferior wall infarct with preserved EF    CATH Pertinent;  Negative for allergy to contrast, GI,  bleed in past  Former smoker, quit 11 years ago, before 2 pack a day for years. Never had cath before      Past Medical History:   Diagnosis Date    Abdominal pain 9/29/2014    Clostridium difficile diarrhea 10/2014    hospitalized in October 2014    Displacement of cervical intervertebral disc without myelopathy 6/10/2014    [facility], C6/C7     HTN (hypertension)     Impaired fasting blood sugar     Neck pain     chronic    Neuralgia, neuritis, and radiculitis, unspecified 6/10/2014    [facility], left C7     Shoulder region pain     Left   [facility] injury 01/08/2014    Sprain and strain of unspecified site of shoulder and upper arm 6/10/2014    [facility], left arm        Past Surgical History:   Procedure Laterality Date    BICEPS TENODESIS Right 11/23/2016    right proximal bicep tenodesis    OTHER SURGICAL HISTORY Left 8/12/14, 1/09/15    C7 TFE    OTHER SURGICAL HISTORY Left 09/23/2014    C7 TFE    OTHER SURGICAL HISTORY Left 7/17/2015 , 1/22/16    C7 TFE    SHOULDER ARTHROSCOPY Right 4/27/2016    scope decompressing, rotator cuff repair        reports that he quit smoking about 10 years ago. His smoking use included Cigarettes. He has a 50.00 pack-year smoking history.  He has never used smokeless tobacco. He reports that he does not

## 2018-07-03 NOTE — PROGRESS NOTES
Received post cardiac procedure to [facility] to room 10. Assessment obtained. Restrictions reviewed with patient. Post procedure pathway initiated. Right gron site soft , band aid dry and intact. No hematoma noted. Family at side. Patient without complaints. Head of bed flat with right leg straight.

## 2018-07-04 ENCOUNTER — APPOINTMENT (OUTPATIENT)
Dept: GENERAL RADIOLOGY | Age: 63
DRG: 234 | End: 2018-07-04
Attending: INTERNAL MEDICINE
Payer: MEDICARE

## 2018-07-04 LAB
ANION GAP SERPL CALCULATED.3IONS-SCNC: 11 MMOL/L (ref 9–17)
BILIRUBIN URINE: NEGATIVE
BUN BLDV-MCNC: 14 MG/DL (ref 8–23)
BUN/CREAT BLD: ABNORMAL (ref 9–20)
CALCIUM SERPL-MCNC: 8.8 MG/DL (ref 8.6–10.4)
CHLORIDE BLD-SCNC: 101 MMOL/L (ref 98–107)
CO2: 22 MMOL/L (ref 20–31)
COLOR: YELLOW
COMMENT UA: NORMAL
CREAT SERPL-MCNC: 0.67 MG/DL (ref 0.7–1.2)
GFR AFRICAN AMERICAN: >60 ML/MIN
GFR NON-AFRICAN AMERICAN: >60 ML/MIN
GFR SERPL CREATININE-BSD FRML MDRD: ABNORMAL ML/MIN/{1.73_M2}
GFR SERPL CREATININE-BSD FRML MDRD: ABNORMAL ML/MIN/{1.73_M2}
GLUCOSE BLD-MCNC: 116 MG/DL (ref 70–99)
GLUCOSE URINE: NEGATIVE
HCT VFR BLD CALC: 43.3 % (ref 40.7–50.3)
HCT VFR BLD CALC: 45 % (ref 40.7–50.3)
HEMOGLOBIN: 13.7 G/DL (ref 13–17)
HEMOGLOBIN: 14 G/DL (ref 13–17)
KETONES, URINE: NEGATIVE
LEUKOCYTE ESTERASE, URINE: NEGATIVE
MCH RBC QN AUTO: 25.4 PG (ref 25.2–33.5)
MCH RBC QN AUTO: 25.6 PG (ref 25.2–33.5)
MCHC RBC AUTO-ENTMCNC: 31.1 G/DL (ref 28.4–34.8)
MCHC RBC AUTO-ENTMCNC: 31.6 G/DL (ref 28.4–34.8)
MCV RBC AUTO: 80.3 FL (ref 82.6–102.9)
MCV RBC AUTO: 82.3 FL (ref 82.6–102.9)
MRSA, DNA, NASAL: NORMAL
NITRITE, URINE: NEGATIVE
NRBC AUTOMATED: 0 PER 100 WBC
NRBC AUTOMATED: 0 PER 100 WBC
PARTIAL THROMBOPLASTIN TIME: 26.6 SEC (ref 20.5–30.5)
PARTIAL THROMBOPLASTIN TIME: 30.2 SEC (ref 20.5–30.5)
PARTIAL THROMBOPLASTIN TIME: 33 SEC (ref 20.5–30.5)
PARTIAL THROMBOPLASTIN TIME: 34.7 SEC (ref 20.5–30.5)
PDW BLD-RTO: 14.4 % (ref 11.8–14.4)
PDW BLD-RTO: 14.4 % (ref 11.8–14.4)
PH UA: 5 (ref 5–8)
PLATELET # BLD: 325 K/UL (ref 138–453)
PLATELET # BLD: 328 K/UL (ref 138–453)
PMV BLD AUTO: 10.2 FL (ref 8.1–13.5)
PMV BLD AUTO: 10.3 FL (ref 8.1–13.5)
POTASSIUM SERPL-SCNC: 4.7 MMOL/L (ref 3.7–5.3)
PROTEIN UA: NEGATIVE
RBC # BLD: 5.39 M/UL (ref 4.21–5.77)
RBC # BLD: 5.47 M/UL (ref 4.21–5.77)
SODIUM BLD-SCNC: 134 MMOL/L (ref 135–144)
SPECIFIC GRAVITY UA: 1.02 (ref 1–1.03)
SPECIMEN DESCRIPTION: NORMAL
TURBIDITY: CLEAR
URINE HGB: NEGATIVE
UROBILINOGEN, URINE: NORMAL
WBC # BLD: 8.7 K/UL (ref 3.5–11.3)
WBC # BLD: 8.9 K/UL (ref 3.5–11.3)

## 2018-07-04 PROCEDURE — 81003 URINALYSIS AUTO W/O SCOPE: CPT

## 2018-07-04 PROCEDURE — 36415 COLL VENOUS BLD VENIPUNCTURE: CPT

## 2018-07-04 PROCEDURE — 7100000010 HC PHASE II RECOVERY - FIRST 15 MIN

## 2018-07-04 PROCEDURE — 6370000000 HC RX 637 (ALT 250 FOR IP): Performed by: STUDENT IN AN ORGANIZED HEALTH CARE EDUCATION/TRAINING PROGRAM

## 2018-07-04 PROCEDURE — 2580000003 HC RX 258: Performed by: STUDENT IN AN ORGANIZED HEALTH CARE EDUCATION/TRAINING PROGRAM

## 2018-07-04 PROCEDURE — 6360000002 HC RX W HCPCS: Performed by: INTERNAL MEDICINE

## 2018-07-04 PROCEDURE — 6370000000 HC RX 637 (ALT 250 FOR IP): Performed by: NURSE PRACTITIONER

## 2018-07-04 PROCEDURE — 1200000000 HC SEMI PRIVATE

## 2018-07-04 PROCEDURE — 85730 THROMBOPLASTIN TIME PARTIAL: CPT

## 2018-07-04 PROCEDURE — 87641 MR-STAPH DNA AMP PROBE: CPT

## 2018-07-04 PROCEDURE — 85027 COMPLETE CBC AUTOMATED: CPT

## 2018-07-04 PROCEDURE — 80048 BASIC METABOLIC PNL TOTAL CA: CPT

## 2018-07-04 PROCEDURE — 7100000011 HC PHASE II RECOVERY - ADDTL 15 MIN

## 2018-07-04 PROCEDURE — 99222 1ST HOSP IP/OBS MODERATE 55: CPT | Performed by: PHYSICIAN ASSISTANT

## 2018-07-04 PROCEDURE — 71046 X-RAY EXAM CHEST 2 VIEWS: CPT

## 2018-07-04 RX ORDER — HEPARIN SODIUM 1000 [USP'U]/ML
4000 INJECTION, SOLUTION INTRAVENOUS; SUBCUTANEOUS PRN
Status: DISCONTINUED | OUTPATIENT
Start: 2018-07-04 | End: 2018-07-06

## 2018-07-04 RX ORDER — HEPARIN SODIUM 1000 [USP'U]/ML
4000 INJECTION, SOLUTION INTRAVENOUS; SUBCUTANEOUS ONCE
Status: COMPLETED | OUTPATIENT
Start: 2018-07-04 | End: 2018-07-04

## 2018-07-04 RX ORDER — CARVEDILOL 6.25 MG/1
6.25 TABLET ORAL 2 TIMES DAILY WITH MEALS
Status: DISCONTINUED | OUTPATIENT
Start: 2018-07-04 | End: 2018-07-06

## 2018-07-04 RX ORDER — HEPARIN SODIUM 1000 [USP'U]/ML
2000 INJECTION, SOLUTION INTRAVENOUS; SUBCUTANEOUS PRN
Status: DISCONTINUED | OUTPATIENT
Start: 2018-07-04 | End: 2018-07-06

## 2018-07-04 RX ORDER — HEPARIN SODIUM 10000 [USP'U]/100ML
10.9 INJECTION, SOLUTION INTRAVENOUS CONTINUOUS
Status: DISCONTINUED | OUTPATIENT
Start: 2018-07-04 | End: 2018-07-06

## 2018-07-04 RX ORDER — DIPHENHYDRAMINE HCL 25 MG
25 TABLET ORAL EVERY 6 HOURS PRN
Status: DISCONTINUED | OUTPATIENT
Start: 2018-07-04 | End: 2018-07-06

## 2018-07-04 RX ADMIN — CYCLOBENZAPRINE HYDROCHLORIDE 5 MG: 5 TABLET, FILM COATED ORAL at 22:41

## 2018-07-04 RX ADMIN — Medication 10.9 UNITS/KG/HR: at 01:25

## 2018-07-04 RX ADMIN — GABAPENTIN 300 MG: 300 CAPSULE ORAL at 08:29

## 2018-07-04 RX ADMIN — NAPROXEN 500 MG: 250 TABLET ORAL at 17:30

## 2018-07-04 RX ADMIN — Medication 10 ML: at 08:29

## 2018-07-04 RX ADMIN — Medication 14.9 UNITS/KG/HR: at 22:42

## 2018-07-04 RX ADMIN — GABAPENTIN 300 MG: 300 CAPSULE ORAL at 17:30

## 2018-07-04 RX ADMIN — NAPROXEN 500 MG: 250 TABLET ORAL at 08:29

## 2018-07-04 RX ADMIN — HEPARIN SODIUM 4000 UNITS: 1000 INJECTION, SOLUTION INTRAVENOUS; SUBCUTANEOUS at 01:26

## 2018-07-04 RX ADMIN — HEPARIN SODIUM 2000 UNITS: 1000 INJECTION, SOLUTION INTRAVENOUS; SUBCUTANEOUS at 06:42

## 2018-07-04 RX ADMIN — CYCLOBENZAPRINE HYDROCHLORIDE 5 MG: 5 TABLET, FILM COATED ORAL at 08:29

## 2018-07-04 RX ADMIN — Medication 12.92 UNITS/KG/HR: at 17:30

## 2018-07-04 RX ADMIN — LISINOPRIL 10 MG: 10 TABLET ORAL at 08:28

## 2018-07-04 RX ADMIN — ASPIRIN 81 MG: 81 TABLET, CHEWABLE ORAL at 08:29

## 2018-07-04 RX ADMIN — CYCLOBENZAPRINE HYDROCHLORIDE 5 MG: 5 TABLET, FILM COATED ORAL at 01:30

## 2018-07-04 RX ADMIN — HEPARIN SODIUM 2000 UNITS: 1000 INJECTION, SOLUTION INTRAVENOUS; SUBCUTANEOUS at 22:42

## 2018-07-04 RX ADMIN — CARVEDILOL 6.25 MG: 6.25 TABLET, FILM COATED ORAL at 12:23

## 2018-07-04 RX ADMIN — DESMOPRESSIN ACETATE 40 MG: 0.2 TABLET ORAL at 22:41

## 2018-07-04 RX ADMIN — CARVEDILOL 6.25 MG: 6.25 TABLET, FILM COATED ORAL at 22:41

## 2018-07-04 RX ADMIN — GABAPENTIN 300 MG: 300 CAPSULE ORAL at 22:41

## 2018-07-04 ASSESSMENT — ENCOUNTER SYMPTOMS
CHEST TIGHTNESS: 0
SHORTNESS OF BREATH: 0
COUGH: 0
ABDOMINAL PAIN: 0
NAUSEA: 0
VOMITING: 0
WHEEZING: 0
DIARRHEA: 0
CONSTIPATION: 0

## 2018-07-04 NOTE — CONSULTS
PARESH Kc CNP    carvedilol (COREG) tablet 6.25 mg, 6.25 mg, Oral, BID WC, PARESH Ceballos CNP    0.9 % sodium chloride infusion, , Intravenous, Continuous, Efra Weiss MD, Last Rate: 75 mL/hr at 07/03/18 1403    aspirin chewable tablet 81 mg, 81 mg, Oral, Daily, Ximena Rendon MD, 81 mg at 07/04/18 0829    lisinopril (PRINIVIL;ZESTRIL) tablet 10 mg, 10 mg, Oral, Daily, Ximena Rendon MD, 10 mg at 07/04/18 5886    gabapentin (NEURONTIN) capsule 300 mg, 300 mg, Oral, TID, Ximena Rendon MD, 300 mg at 07/04/18 0829    cyclobenzaprine (FLEXERIL) tablet 5 mg, 5 mg, Oral, TID PRN, Ximena Rendon MD, 5 mg at 07/04/18 0829    naproxen (NAPROSYN) tablet 500 mg, 500 mg, Oral, BID WC, Ximena Rendon MD, 500 mg at 07/04/18 0829    sodium chloride flush 0.9 % injection 10 mL, 10 mL, Intravenous, 2 times per day, Ximena Rendon MD, 10 mL at 07/04/18 0829    sodium chloride flush 0.9 % injection 10 mL, 10 mL, Intravenous, PRN, Ximena Rendon MD    acetaminophen (TYLENOL) tablet 650 mg, 650 mg, Oral, Q4H PRN, Ximena Rendon MD    magnesium hydroxide (MILK OF MAGNESIA) 400 MG/5ML suspension 30 mL, 30 mL, Oral, Daily PRN, Ximena Rendon MD    ondansetron (ZOFRAN) injection 4 mg, 4 mg, Intravenous, Q6H PRN, Ximena Rendon MD    atorvastatin (LIPITOR) tablet 40 mg, 40 mg, Oral, Nightly, Ximena Rendon MD, 40 mg at 07/1955    Social Hx:    reports that he quit smoking about 10 years ago. His smoking use included Cigarettes. He has a 50.00 pack-year smoking history. He has never used smokeless tobacco.  Family Hx:  family history includes COPD in his mother; Colon Polyps in his brother; High Blood Pressure in his father. ROS:    Review of Systems   Constitutional: Negative for chills, diaphoresis, fatigue, fever and unexpected weight change. Respiratory: Negative for cough, chest tightness, shortness of breath and wheezing.     Cardiovascular: Negative for chest pain, palpitations and leg swelling. Gastrointestinal: Negative for abdominal pain, constipation, diarrhea, nausea and vomiting. Endocrine: Negative for polydipsia, polyphagia and polyuria. Genitourinary: Negative for dysuria and frequency. Musculoskeletal: Negative for arthralgias. Shoulder pain   Skin: Negative for pallor and rash. Neurological: Negative for dizziness, tremors, seizures, syncope, weakness, light-headedness and numbness. Psychiatric/Behavioral: Negative for confusion, sleep disturbance and suicidal ideas. Physical Examination    Vitals:  Vitals:    07/04/18 0824   BP: (!) 148/87   Pulse: 94   Resp:    Temp:    SpO2: 96%     Physical Exam   Constitutional: He is oriented to person, place, and time. He appears well-developed and well-nourished. No distress. HENT:   Head: Normocephalic and atraumatic. Eyes: EOM are normal. Pupils are equal, round, and reactive to light. No scleral icterus. Neck: Normal range of motion. Neck supple. No JVD present. No tracheal deviation present. No thyromegaly present. Cardiovascular: Normal rate, regular rhythm and intact distal pulses. Exam reveals no gallop and no friction rub. No murmur heard. Pulmonary/Chest: Effort normal and breath sounds normal. No respiratory distress. He has no wheezes. He has no rales. He exhibits no tenderness. Abdominal: Soft. Bowel sounds are normal. He exhibits no distension. There is no tenderness. There is no guarding. Musculoskeletal: He exhibits no edema or deformity. Neurological: He is alert and oriented to person, place, and time. No cranial nerve deficit. Skin: Skin is warm and dry. No rash noted. He is not diaphoretic. Psychiatric: He has a normal mood and affect. His behavior is normal. Judgment and thought content normal.   Nursing note and vitals reviewed.       Labs:   CBC:   Recent Labs      07/03/18   1346  07/04/18   0045  07/04/18   0559   WBC   --   8.7  8.9   HGB   --   13.7  14.0   HCT   --

## 2018-07-04 NOTE — PROGRESS NOTES
[facility]   Progress Note                   Date:   7/4/2018  Patient name: Saul Johns  Date of admission:  7/3/2018  6:29 PM  MRN:   [id number]  YOB: 1955  PCP: Anshul Rosales MD    Reason for Admission: CAD in native artery [I25.10]    Subjective:       Clinical Changes / Abnormalities: Pt seen and examined. Pt denies any chest pain or shortness of breath. Groin soft. NSR. Pt remains on heparin gtt. Medications:   Scheduled Meds:   aspirin  81 mg Oral Daily    lisinopril  10 mg Oral Daily    gabapentin  300 mg Oral TID    naproxen  500 mg Oral BID WC    sodium chloride flush  10 mL Intravenous 2 times per day    atorvastatin  40 mg Oral Nightly     Continuous Infusions:   heparin (porcine) 12.9 Units/kg/hr (07/04/18 0641)    sodium chloride 75 mL/hr at 07/03/18 1403     CBC:   Recent Labs      07/03/18   1346  07/04/18   0045  07/04/18   0559   WBC   --   8.7  8.9   HGB   --   13.7  14.0   PLT  327  328  325     BMP:    Recent Labs      07/03/18   1358  07/04/18   0559   NA   --   134*   K   --   4.7   CL   --   101   CO2   --   22   BUN   --   14   CREATININE  0.76  0.67*   GLUCOSE   --   116*     Hepatic: No results for input(s): AST, ALT, ALB, BILITOT, ALKPHOS in the last 72 hours. Troponin: No results for input(s): TROPONINI in the last 72 hours. BNP: No results for input(s): BNP in the last 72 hours. Lipids:   Recent Labs      07/03/18   1943   CHOL  188   HDL  43     INR: No results for input(s): INR in the last 72 hours. Objective:   Vitals: BP (!) 140/78   Pulse 97   Temp 97.7 °F (36.5 °C) (Oral)   Resp 21   Ht 5' 9\" (1.753 m)   Wt 206 lb (93.4 kg)   SpO2 96%   BMI 30.42 kg/m²   General appearance: alert and cooperative with exam  HEENT: Head: Normocephalic, no lesions, without obvious abnormality.   Neck: no JVD, trachea midline, no adenopathy  Lungs: Clear to auscultation  Heart: Regular rate and rhythm, s1/s2 auscultated, no

## 2018-07-05 ENCOUNTER — ANESTHESIA EVENT (OUTPATIENT)
Dept: OPERATING ROOM | Age: 63
DRG: 234 | End: 2018-07-05
Payer: MEDICARE

## 2018-07-05 LAB
EKG ATRIAL RATE: 74 BPM
EKG P AXIS: 36 DEGREES
EKG P-R INTERVAL: 164 MS
EKG Q-T INTERVAL: 420 MS
EKG QRS DURATION: 106 MS
EKG QTC CALCULATION (BAZETT): 466 MS
EKG R AXIS: 30 DEGREES
EKG T AXIS: 46 DEGREES
EKG VENTRICULAR RATE: 74 BPM
INR BLD: 0.9
LV EF: 55 %
LVEF MODALITY: NORMAL
PARTIAL THROMBOPLASTIN TIME: 51.2 SEC (ref 20.5–30.5)
PROTHROMBIN TIME: 10 SEC (ref 9–12)

## 2018-07-05 PROCEDURE — 94729 DIFFUSING CAPACITY: CPT

## 2018-07-05 PROCEDURE — 88738 HGB QUANT TRANSCUTANEOUS: CPT

## 2018-07-05 PROCEDURE — 93005 ELECTROCARDIOGRAM TRACING: CPT

## 2018-07-05 PROCEDURE — 94760 N-INVAS EAR/PLS OXIMETRY 1: CPT

## 2018-07-05 PROCEDURE — 93880 EXTRACRANIAL BILAT STUDY: CPT

## 2018-07-05 PROCEDURE — 36415 COLL VENOUS BLD VENIPUNCTURE: CPT

## 2018-07-05 PROCEDURE — 93306 TTE W/DOPPLER COMPLETE: CPT

## 2018-07-05 PROCEDURE — 94664 DEMO&/EVAL PT USE INHALER: CPT

## 2018-07-05 PROCEDURE — 94640 AIRWAY INHALATION TREATMENT: CPT

## 2018-07-05 PROCEDURE — 85610 PROTHROMBIN TIME: CPT

## 2018-07-05 PROCEDURE — 6370000000 HC RX 637 (ALT 250 FOR IP): Performed by: NURSE PRACTITIONER

## 2018-07-05 PROCEDURE — 94060 EVALUATION OF WHEEZING: CPT

## 2018-07-05 PROCEDURE — 1200000000 HC SEMI PRIVATE

## 2018-07-05 PROCEDURE — 6360000002 HC RX W HCPCS: Performed by: INTERNAL MEDICINE

## 2018-07-05 PROCEDURE — 6370000000 HC RX 637 (ALT 250 FOR IP): Performed by: STUDENT IN AN ORGANIZED HEALTH CARE EDUCATION/TRAINING PROGRAM

## 2018-07-05 PROCEDURE — 94726 PLETHYSMOGRAPHY LUNG VOLUMES: CPT

## 2018-07-05 PROCEDURE — 85730 THROMBOPLASTIN TIME PARTIAL: CPT

## 2018-07-05 PROCEDURE — 93970 EXTREMITY STUDY: CPT

## 2018-07-05 RX ADMIN — LISINOPRIL 10 MG: 10 TABLET ORAL at 10:39

## 2018-07-05 RX ADMIN — GABAPENTIN 300 MG: 300 CAPSULE ORAL at 10:40

## 2018-07-05 RX ADMIN — ASPIRIN 81 MG: 81 TABLET, CHEWABLE ORAL at 10:40

## 2018-07-05 RX ADMIN — Medication 13.7 ML/HR: at 12:11

## 2018-07-05 RX ADMIN — NAPROXEN 500 MG: 250 TABLET ORAL at 16:37

## 2018-07-05 RX ADMIN — CARVEDILOL 6.25 MG: 6.25 TABLET, FILM COATED ORAL at 10:40

## 2018-07-05 RX ADMIN — NAPROXEN 500 MG: 250 TABLET ORAL at 10:40

## 2018-07-05 RX ADMIN — DESMOPRESSIN ACETATE 40 MG: 0.2 TABLET ORAL at 22:45

## 2018-07-05 RX ADMIN — DIPHENHYDRAMINE HCL 25 MG: 25 TABLET ORAL at 23:41

## 2018-07-05 RX ADMIN — CYCLOBENZAPRINE HYDROCHLORIDE 5 MG: 5 TABLET, FILM COATED ORAL at 22:45

## 2018-07-05 RX ADMIN — GABAPENTIN 300 MG: 300 CAPSULE ORAL at 16:37

## 2018-07-05 RX ADMIN — GABAPENTIN 300 MG: 300 CAPSULE ORAL at 22:45

## 2018-07-05 RX ADMIN — CARVEDILOL 6.25 MG: 6.25 TABLET, FILM COATED ORAL at 16:37

## 2018-07-05 ASSESSMENT — PAIN SCALES - GENERAL
PAINLEVEL_OUTOF10: 0

## 2018-07-05 NOTE — ANESTHESIA PRE PROCEDURE
orders.   Urgent surgical CABG (required before discharge, patient stable with   medical management).      Signature      ----------------------------------------------------------------   Electronically signed by Zulema Carlson(Performing Physician)  Sophy Posada 07/05/2018 09:22   ----------------------------------------------------------------      Angiographic Findings      Cardiac Arteries and Lesion Findings     LMCA: Normal 0% stenosis. LAD: has heavy calcification in the proximal segment. The mid segment has  85% stenosis. LCx: has ostial eccentric 90% stenosis. The proximal segment has 90%  stenosis with bifurcation with OM1. The OM2 has mid 90% stenosis. The OM3  has mid 90% stenosis. RCA: has mid-distal 99% stenosis and distal 95% stenosis. Ramus: has proximal 80% stenosis.     Coronary Tree      Dominance: Right     LV Analysis  LV function assessed as:Normal.  Ejection Fraction  +----------------------------------------------------------------------+---+  ! Method                                                                !EF%! +----------------------------------------------------------------------+---+  ! LV gram                                                               !54 ! Anesthesia Plan      general     ASA 4       Induction: intravenous. arterial line, BIS, CVP, PA catheter and CHANI  MIPS: Postoperative ventilation. Anesthetic plan and risks discussed with patient. Use of blood products discussed with patient whom consented to blood products. Plan discussed with CRNA.                   Raisa Dorman MD   7/5/2018

## 2018-07-05 NOTE — PROGRESS NOTES
rate and rhythm, s1/s2 auscultated, no murmurs  Abdomen: soft, non-tender, bowel sounds active  Extremities: no edema  Neurologic: not done    Cardiac cath 7/3/18  LMCA: Normal 0% stenosis. LAD: has heavy calcification in the proximal segment. The mid segment has  85% stenosis. LCx: has ostial eccentric 90% stenosis. The proximal segment has 90%  stenosis with bifurcation with OM1. The OM2 has mid 90% stenosis. The OM 3 has mid 90% stenosis. RCA: has mid-distal 99% stenosis and distal 95% stenosis. Ramus: has proximal 80% stenosis.     Coronary Tree      Dominance: Right         The LV gram was performed in the POOLE 30 position. LVEF: 55%. LV Wall Motion: Normal     Conclusions:   1 Multivessel coronary artery disease.   2 Normal LV systolic function.     Recommendation:   Medical therapy as needed.   Risk factor modification.   Routine Post Diagnostic Cath orders.   Urgent surgical CABG (required before discharge, patient stable with   medical management). Discussed with Dr. Jayla Owens. Assessment / Acute Cardiac Problems:   1. CAD - MVD with cath as above  2. Hypertension    Patient Active Problem List:     Sprain and strain of unspecified site of shoulder and upper arm     Sprain of neck     Displacement of cervical intervertebral disc without myelopathy     Cervical radiculitis     HTN (hypertension)     Umbilical hernia     HTN (hypertension)     Impaired fasting blood sugar     Displacement of cervical intervertebral disc without myelopathy     Rotator cuff tear     CAD in native artery      Plan of Treatment:   1. Seen by CTS this am per patient and will go for surgery tomorrow. Remains CP free on Heparin gtt. Continue present POC. Appreciate CTS.     Electronically signed by PARESH Martinez CNP on 7/5/2018 at 8:54 AM  [address]  [phone]

## 2018-07-05 NOTE — PROGRESS NOTES
Pt seen. Chest pain free. On heparin gtt. Pre-op tests pending.  Surgery AM    Electronically signed by JENNIFER Hardy on 7/5/2018 at 12:58 PM

## 2018-07-05 NOTE — PLAN OF CARE
Attempted to pre-op pt for [facility] in am.  Pt away for testing PFT and vein mapping pt's wife states surgeon has not been in to talk with them yet. Explained  has been in cases all day and still remains in the cath lab but will be up to talk with them when he finishes.

## 2018-07-06 ENCOUNTER — ANESTHESIA (OUTPATIENT)
Dept: OPERATING ROOM | Age: 63
DRG: 234 | End: 2018-07-06
Payer: MEDICARE

## 2018-07-06 ENCOUNTER — APPOINTMENT (OUTPATIENT)
Dept: GENERAL RADIOLOGY | Age: 63
DRG: 234 | End: 2018-07-06
Attending: INTERNAL MEDICINE
Payer: MEDICARE

## 2018-07-06 VITALS — OXYGEN SATURATION: 98 % | DIASTOLIC BLOOD PRESSURE: 67 MMHG | SYSTOLIC BLOOD PRESSURE: 114 MMHG

## 2018-07-06 LAB
ALBUMIN SERPL-MCNC: 3.6 G/DL (ref 3.5–5.2)
ALBUMIN/GLOBULIN RATIO: 1.2 (ref 1–2.5)
ALLEN TEST: ABNORMAL
ALLEN TEST: NORMAL
ALP BLD-CCNC: 128 U/L (ref 40–129)
ALT SERPL-CCNC: 15 U/L (ref 5–41)
ANION GAP SERPL CALCULATED.3IONS-SCNC: 10 MMOL/L (ref 9–17)
ANION GAP SERPL CALCULATED.3IONS-SCNC: 6 MMOL/L (ref 9–17)
ANION GAP: 4 MMOL/L (ref 7–16)
AST SERPL-CCNC: 17 U/L
BILIRUB SERPL-MCNC: 0.22 MG/DL (ref 0.3–1.2)
BLOOD BANK SPECIMEN: NORMAL
BUN BLDV-MCNC: 13 MG/DL (ref 8–23)
BUN BLDV-MCNC: 20 MG/DL (ref 8–23)
BUN/CREAT BLD: ABNORMAL (ref 9–20)
BUN/CREAT BLD: ABNORMAL (ref 9–20)
CALCIUM SERPL-MCNC: 8.2 MG/DL (ref 8.6–10.4)
CALCIUM SERPL-MCNC: 8.8 MG/DL (ref 8.6–10.4)
CHLORIDE BLD-SCNC: 101 MMOL/L (ref 98–107)
CHLORIDE BLD-SCNC: 110 MMOL/L (ref 98–107)
CO2: 23 MMOL/L (ref 20–31)
CO2: 25 MMOL/L (ref 20–31)
CREAT SERPL-MCNC: 0.53 MG/DL (ref 0.7–1.2)
CREAT SERPL-MCNC: 0.88 MG/DL (ref 0.7–1.2)
ESTIMATED AVERAGE GLUCOSE: 120 MG/DL
FIBRINOGEN: 225 MG/DL (ref 140–420)
FIO2: 100
FIO2: 100
FIO2: 40
FIO2: 80
FIO2: ABNORMAL
GFR AFRICAN AMERICAN: >60 ML/MIN
GFR AFRICAN AMERICAN: >60 ML/MIN
GFR NON-AFRICAN AMERICAN: >60 ML/MIN
GFR SERPL CREATININE-BSD FRML MDRD: >60 ML/MIN
GFR SERPL CREATININE-BSD FRML MDRD: ABNORMAL ML/MIN/{1.73_M2}
GFR SERPL CREATININE-BSD FRML MDRD: NORMAL ML/MIN/{1.73_M2}
GLUCOSE BLD-MCNC: 105 MG/DL (ref 70–99)
GLUCOSE BLD-MCNC: 112 MG/DL (ref 74–100)
GLUCOSE BLD-MCNC: 112 MG/DL (ref 74–100)
GLUCOSE BLD-MCNC: 119 MG/DL (ref 70–99)
GLUCOSE BLD-MCNC: 119 MG/DL (ref 74–100)
GLUCOSE BLD-MCNC: 131 MG/DL (ref 74–100)
GLUCOSE BLD-MCNC: 131 MG/DL (ref 75–110)
GLUCOSE BLD-MCNC: 138 MG/DL (ref 74–100)
GLUCOSE BLD-MCNC: 149 MG/DL (ref 74–100)
GLUCOSE BLD-MCNC: 153 MG/DL (ref 74–100)
GLUCOSE BLD-MCNC: 159 MG/DL (ref 74–100)
GLUCOSE BLD-MCNC: 169 MG/DL (ref 74–100)
GLUCOSE BLD-MCNC: 199 MG/DL (ref 74–100)
HBA1C MFR BLD: 5.8 % (ref 4–6)
HCT VFR BLD CALC: 31.7 % (ref 40.7–50.3)
HCT VFR BLD CALC: 35.4 % (ref 40.7–50.3)
HCT VFR BLD CALC: 41.2 % (ref 40.7–50.3)
HEMOGLOBIN: 10 G/DL (ref 13–17)
HEMOGLOBIN: 11.2 G/DL (ref 13–17)
HEMOGLOBIN: 12.9 G/DL (ref 13–17)
INR BLD: 0.9
INR BLD: 1.1
INR BLD: 1.2
MAGNESIUM: 2.9 MG/DL (ref 1.6–2.6)
MCH RBC QN AUTO: 25.4 PG (ref 25.2–33.5)
MCH RBC QN AUTO: 25.8 PG (ref 25.2–33.5)
MCHC RBC AUTO-ENTMCNC: 31.3 G/DL (ref 28.4–34.8)
MCHC RBC AUTO-ENTMCNC: 31.6 G/DL (ref 28.4–34.8)
MCV RBC AUTO: 81.3 FL (ref 82.6–102.9)
MCV RBC AUTO: 81.6 FL (ref 82.6–102.9)
MODE: ABNORMAL
MODE: NORMAL
NEGATIVE BASE EXCESS, ART: 1 (ref 0–2)
NEGATIVE BASE EXCESS, ART: 2 (ref 0–2)
NEGATIVE BASE EXCESS, ART: 2 (ref 0–2)
NEGATIVE BASE EXCESS, ART: 3 (ref 0–2)
NEGATIVE BASE EXCESS, ART: 4 (ref 0–2)
NEGATIVE BASE EXCESS, ART: ABNORMAL (ref 0–2)
NEGATIVE BASE EXCESS, ART: NORMAL (ref 0–2)
NRBC AUTOMATED: 0 PER 100 WBC
NRBC AUTOMATED: 0 PER 100 WBC
O2 DEVICE/FLOW/%: ABNORMAL
O2 DEVICE/FLOW/%: NORMAL
PARTIAL THROMBOPLASTIN TIME: 25.6 SEC (ref 20.5–30.5)
PARTIAL THROMBOPLASTIN TIME: 26.3 SEC (ref 20.5–30.5)
PATIENT TEMP: ABNORMAL
PATIENT TEMP: NORMAL
PDW BLD-RTO: 14.2 % (ref 11.8–14.4)
PDW BLD-RTO: 14.3 % (ref 11.8–14.4)
PLATELET # BLD: 165 K/UL (ref 138–453)
PLATELET # BLD: 181 K/UL (ref 138–453)
PLATELET # BLD: 306 K/UL (ref 138–453)
PMV BLD AUTO: 10.5 FL (ref 8.1–13.5)
PMV BLD AUTO: 10.7 FL (ref 8.1–13.5)
POC CHLORIDE: 110 MMOL/L (ref 98–107)
POC CHLORIDE: 111 MMOL/L (ref 98–107)
POC CREATININE: 0.69 MG/DL (ref 0.51–1.19)
POC HCO3: 22.1 MMOL/L (ref 21–28)
POC HCO3: 22.8 MMOL/L (ref 21–28)
POC HCO3: 23.5 MMOL/L (ref 21–28)
POC HCO3: 23.8 MMOL/L (ref 21–28)
POC HCO3: 24.4 MMOL/L (ref 21–28)
POC HCO3: 25.5 MMOL/L (ref 21–28)
POC HCO3: 25.8 MMOL/L (ref 21–28)
POC HCO3: 26.7 MMOL/L (ref 21–28)
POC HCO3: 27.4 MMOL/L (ref 21–28)
POC HCO3: 28.6 MMOL/L (ref 21–28)
POC HEMATOCRIT: 23 % (ref 41–53)
POC HEMATOCRIT: 26 % (ref 41–53)
POC HEMATOCRIT: 27 % (ref 41–53)
POC HEMATOCRIT: 28 % (ref 41–53)
POC HEMATOCRIT: 31 % (ref 41–53)
POC HEMATOCRIT: 34 % (ref 41–53)
POC HEMATOCRIT: 36 % (ref 41–53)
POC HEMOGLOBIN: 10.7 G/DL (ref 13.5–17.5)
POC HEMOGLOBIN: 11.7 G/DL (ref 13.5–17.5)
POC HEMOGLOBIN: 12.2 G/DL (ref 13.5–17.5)
POC HEMOGLOBIN: 7.9 G/DL (ref 13.5–17.5)
POC HEMOGLOBIN: 8.8 G/DL (ref 13.5–17.5)
POC HEMOGLOBIN: 9.1 G/DL (ref 13.5–17.5)
POC HEMOGLOBIN: 9.5 G/DL (ref 13.5–17.5)
POC IONIZED CALCIUM: 0.91 MMOL/L (ref 1.15–1.33)
POC IONIZED CALCIUM: 0.95 MMOL/L (ref 1.15–1.33)
POC IONIZED CALCIUM: 1.15 MMOL/L (ref 1.15–1.33)
POC IONIZED CALCIUM: 1.17 MMOL/L (ref 1.15–1.33)
POC IONIZED CALCIUM: 1.2 MMOL/L (ref 1.15–1.33)
POC IONIZED CALCIUM: 1.21 MMOL/L (ref 1.15–1.33)
POC IONIZED CALCIUM: 1.23 MMOL/L (ref 1.15–1.33)
POC IONIZED CALCIUM: 1.38 MMOL/L (ref 1.15–1.33)
POC LACTIC ACID: 0.89 MMOL/L (ref 0.56–1.39)
POC O2 SATURATION: 100 % (ref 94–98)
POC O2 SATURATION: 93 % (ref 94–98)
POC O2 SATURATION: 95 % (ref 94–98)
POC O2 SATURATION: 96 % (ref 94–98)
POC O2 SATURATION: 97 % (ref 94–98)
POC PCO2 TEMP: ABNORMAL MM HG
POC PCO2 TEMP: NORMAL MM HG
POC PCO2: 40.3 MM HG (ref 35–48)
POC PCO2: 41.7 MM HG (ref 35–48)
POC PCO2: 42.3 MM HG (ref 35–48)
POC PCO2: 43.1 MM HG (ref 35–48)
POC PCO2: 43.3 MM HG (ref 35–48)
POC PCO2: 43.6 MM HG (ref 35–48)
POC PCO2: 43.7 MM HG (ref 35–48)
POC PCO2: 45.3 MM HG (ref 35–48)
POC PCO2: 50.4 MM HG (ref 35–48)
POC PCO2: 65.4 MM HG (ref 35–48)
POC PH TEMP: ABNORMAL
POC PH TEMP: NORMAL
POC PH: 7.25 (ref 7.35–7.45)
POC PH: 7.31 (ref 7.35–7.45)
POC PH: 7.33 (ref 7.35–7.45)
POC PH: 7.36 (ref 7.35–7.45)
POC PH: 7.37 (ref 7.35–7.45)
POC PH: 7.38 (ref 7.35–7.45)
POC PH: 7.41 (ref 7.35–7.45)
POC PH: 7.41 (ref 7.35–7.45)
POC PO2 TEMP: ABNORMAL MM HG
POC PO2 TEMP: NORMAL MM HG
POC PO2: 103.7 MM HG (ref 83–108)
POC PO2: 172.5 MM HG (ref 83–108)
POC PO2: 255.5 MM HG (ref 83–108)
POC PO2: 386.2 MM HG (ref 83–108)
POC PO2: 458.5 MM HG (ref 83–108)
POC PO2: 493.3 MM HG (ref 83–108)
POC PO2: 499.3 MM HG (ref 83–108)
POC PO2: 80.1 MM HG (ref 83–108)
POC PO2: 81 MM HG (ref 83–108)
POC PO2: 83.4 MM HG (ref 83–108)
POC POTASSIUM: 4.2 MMOL/L (ref 3.5–4.5)
POC POTASSIUM: 4.3 MMOL/L (ref 3.5–4.5)
POC POTASSIUM: 4.4 MMOL/L (ref 3.5–4.5)
POC POTASSIUM: 4.5 MMOL/L (ref 3.5–4.5)
POC POTASSIUM: 4.7 MMOL/L (ref 3.5–4.5)
POC POTASSIUM: 5.7 MMOL/L (ref 3.5–4.5)
POC SODIUM: 133 MMOL/L (ref 138–146)
POC SODIUM: 138 MMOL/L (ref 138–146)
POC SODIUM: 139 MMOL/L (ref 138–146)
POC SODIUM: 143 MMOL/L (ref 138–146)
POC SODIUM: 145 MMOL/L (ref 138–146)
POSITIVE BASE EXCESS, ART: 0 (ref 0–3)
POSITIVE BASE EXCESS, ART: 1 (ref 0–3)
POSITIVE BASE EXCESS, ART: 2 (ref 0–3)
POSITIVE BASE EXCESS, ART: ABNORMAL (ref 0–3)
POTASSIUM SERPL-SCNC: 4.1 MMOL/L (ref 3.7–5.3)
POTASSIUM SERPL-SCNC: 4.3 MMOL/L (ref 3.7–5.3)
POTASSIUM SERPL-SCNC: 4.6 MMOL/L (ref 3.7–5.3)
PROTHROMBIN TIME: 10 SEC (ref 9–12)
PROTHROMBIN TIME: 11.5 SEC (ref 9–12)
PROTHROMBIN TIME: 12.9 SEC (ref 9–12)
RBC # BLD: 4.34 M/UL (ref 4.21–5.77)
RBC # BLD: 5.07 M/UL (ref 4.21–5.77)
SAMPLE SITE: ABNORMAL
SAMPLE SITE: NORMAL
SODIUM BLD-SCNC: 134 MMOL/L (ref 135–144)
SODIUM BLD-SCNC: 141 MMOL/L (ref 135–144)
TCO2 (CALC), ART: 23 MMOL/L (ref 22–29)
TCO2 (CALC), ART: 24 MMOL/L (ref 22–29)
TCO2 (CALC), ART: 25 MMOL/L (ref 22–29)
TCO2 (CALC), ART: 25 MMOL/L (ref 22–29)
TCO2 (CALC), ART: 26 MMOL/L (ref 22–29)
TCO2 (CALC), ART: 27 MMOL/L (ref 22–29)
TCO2 (CALC), ART: 27 MMOL/L (ref 22–29)
TCO2 (CALC), ART: 28 MMOL/L (ref 22–29)
TCO2 (CALC), ART: 29 MMOL/L (ref 22–29)
TCO2 (CALC), ART: 31 MMOL/L (ref 22–29)
TOTAL PROTEIN: 6.6 G/DL (ref 6.4–8.3)
WBC # BLD: 13.5 K/UL (ref 3.5–11.3)
WBC # BLD: 8.9 K/UL (ref 3.5–11.3)

## 2018-07-06 PROCEDURE — 6360000002 HC RX W HCPCS: Performed by: NURSE PRACTITIONER

## 2018-07-06 PROCEDURE — 3700000001 HC ADD 15 MINUTES (ANESTHESIA): Performed by: THORACIC SURGERY (CARDIOTHORACIC VASCULAR SURGERY)

## 2018-07-06 PROCEDURE — 71045 X-RAY EXAM CHEST 1 VIEW: CPT

## 2018-07-06 PROCEDURE — 82803 BLOOD GASES ANY COMBINATION: CPT

## 2018-07-06 PROCEDURE — 94799 UNLISTED PULMONARY SVC/PX: CPT

## 2018-07-06 PROCEDURE — 85576 BLOOD PLATELET AGGREGATION: CPT

## 2018-07-06 PROCEDURE — P9041 ALBUMIN (HUMAN),5%, 50ML: HCPCS | Performed by: NURSE PRACTITIONER

## 2018-07-06 PROCEDURE — 2780000010 HC IMPLANT OTHER: Performed by: THORACIC SURGERY (CARDIOTHORACIC VASCULAR SURGERY)

## 2018-07-06 PROCEDURE — 06BP4ZZ EXCISION OF RIGHT SAPHENOUS VEIN, PERCUTANEOUS ENDOSCOPIC APPROACH: ICD-10-PCS | Performed by: THORACIC SURGERY (CARDIOTHORACIC VASCULAR SURGERY)

## 2018-07-06 PROCEDURE — 2580000003 HC RX 258: Performed by: THORACIC SURGERY (CARDIOTHORACIC VASCULAR SURGERY)

## 2018-07-06 PROCEDURE — 6360000002 HC RX W HCPCS: Performed by: NURSE ANESTHETIST, CERTIFIED REGISTERED

## 2018-07-06 PROCEDURE — 6360000002 HC RX W HCPCS: Performed by: THORACIC SURGERY (CARDIOTHORACIC VASCULAR SURGERY)

## 2018-07-06 PROCEDURE — 84295 ASSAY OF SERUM SODIUM: CPT

## 2018-07-06 PROCEDURE — 85347 COAGULATION TIME ACTIVATED: CPT

## 2018-07-06 PROCEDURE — 93005 ELECTROCARDIOGRAM TRACING: CPT

## 2018-07-06 PROCEDURE — 6370000000 HC RX 637 (ALT 250 FOR IP): Performed by: NURSE ANESTHETIST, CERTIFIED REGISTERED

## 2018-07-06 PROCEDURE — 2500000003 HC RX 250 WO HCPCS: Performed by: NURSE ANESTHETIST, CERTIFIED REGISTERED

## 2018-07-06 PROCEDURE — 02100Z9 BYPASS CORONARY ARTERY, ONE ARTERY FROM LEFT INTERNAL MAMMARY, OPEN APPROACH: ICD-10-PCS | Performed by: ANESTHESIOLOGY

## 2018-07-06 PROCEDURE — 86901 BLOOD TYPING SEROLOGIC RH(D): CPT

## 2018-07-06 PROCEDURE — B24BZZ4 ULTRASONOGRAPHY OF HEART WITH AORTA, TRANSESOPHAGEAL: ICD-10-PCS | Performed by: ANESTHESIOLOGY

## 2018-07-06 PROCEDURE — 83036 HEMOGLOBIN GLYCOSYLATED A1C: CPT

## 2018-07-06 PROCEDURE — 83605 ASSAY OF LACTIC ACID: CPT

## 2018-07-06 PROCEDURE — 85018 HEMOGLOBIN: CPT

## 2018-07-06 PROCEDURE — 36415 COLL VENOUS BLD VENIPUNCTURE: CPT

## 2018-07-06 PROCEDURE — 3600000008 HC SURGERY OHS BASE: Performed by: THORACIC SURGERY (CARDIOTHORACIC VASCULAR SURGERY)

## 2018-07-06 PROCEDURE — 80053 COMPREHEN METABOLIC PANEL: CPT

## 2018-07-06 PROCEDURE — 6370000000 HC RX 637 (ALT 250 FOR IP): Performed by: STUDENT IN AN ORGANIZED HEALTH CARE EDUCATION/TRAINING PROGRAM

## 2018-07-06 PROCEDURE — 85384 FIBRINOGEN ACTIVITY: CPT

## 2018-07-06 PROCEDURE — 021009W BYPASS CORONARY ARTERY, ONE ARTERY FROM AORTA WITH AUTOLOGOUS VENOUS TISSUE, OPEN APPROACH: ICD-10-PCS | Performed by: ANESTHESIOLOGY

## 2018-07-06 PROCEDURE — 94770 HC ETCO2 MONITOR DAILY: CPT

## 2018-07-06 PROCEDURE — 6370000000 HC RX 637 (ALT 250 FOR IP): Performed by: NURSE PRACTITIONER

## 2018-07-06 PROCEDURE — 6360000002 HC RX W HCPCS: Performed by: INTERNAL MEDICINE

## 2018-07-06 PROCEDURE — 80048 BASIC METABOLIC PNL TOTAL CA: CPT

## 2018-07-06 PROCEDURE — 7100000011 HC PHASE II RECOVERY - ADDTL 15 MIN

## 2018-07-06 PROCEDURE — 2100000001 HC CVICU R&B

## 2018-07-06 PROCEDURE — 84132 ASSAY OF SERUM POTASSIUM: CPT

## 2018-07-06 PROCEDURE — 86920 COMPATIBILITY TEST SPIN: CPT

## 2018-07-06 PROCEDURE — 85014 HEMATOCRIT: CPT

## 2018-07-06 PROCEDURE — 33533 CABG ARTERIAL SINGLE: CPT | Performed by: THORACIC SURGERY (CARDIOTHORACIC VASCULAR SURGERY)

## 2018-07-06 PROCEDURE — S0028 INJECTION, FAMOTIDINE, 20 MG: HCPCS | Performed by: NURSE PRACTITIONER

## 2018-07-06 PROCEDURE — 85730 THROMBOPLASTIN TIME PARTIAL: CPT

## 2018-07-06 PROCEDURE — 7100000000 HC PACU RECOVERY - FIRST 15 MIN

## 2018-07-06 PROCEDURE — 2720000010 HC SURG SUPPLY STERILE: Performed by: THORACIC SURGERY (CARDIOTHORACIC VASCULAR SURGERY)

## 2018-07-06 PROCEDURE — 85390 FIBRINOLYSINS SCREEN I&R: CPT

## 2018-07-06 PROCEDURE — 82565 ASSAY OF CREATININE: CPT

## 2018-07-06 PROCEDURE — 2500000003 HC RX 250 WO HCPCS: Performed by: NURSE PRACTITIONER

## 2018-07-06 PROCEDURE — 3600000018 HC SURGERY OHS ADDTL 15MIN: Performed by: THORACIC SURGERY (CARDIOTHORACIC VASCULAR SURGERY)

## 2018-07-06 PROCEDURE — 2580000003 HC RX 258: Performed by: NURSE PRACTITIONER

## 2018-07-06 PROCEDURE — 94762 N-INVAS EAR/PLS OXIMTRY CONT: CPT

## 2018-07-06 PROCEDURE — 85610 PROTHROMBIN TIME: CPT

## 2018-07-06 PROCEDURE — 82435 ASSAY OF BLOOD CHLORIDE: CPT

## 2018-07-06 PROCEDURE — 83735 ASSAY OF MAGNESIUM: CPT

## 2018-07-06 PROCEDURE — 86900 BLOOD TYPING SEROLOGIC ABO: CPT

## 2018-07-06 PROCEDURE — 99999 PR OFFICE/OUTPT VISIT,PROCEDURE ONLY: CPT | Performed by: THORACIC SURGERY (CARDIOTHORACIC VASCULAR SURGERY)

## 2018-07-06 PROCEDURE — 5A1221Z PERFORMANCE OF CARDIAC OUTPUT, CONTINUOUS: ICD-10-PCS | Performed by: THORACIC SURGERY (CARDIOTHORACIC VASCULAR SURGERY)

## 2018-07-06 PROCEDURE — 85027 COMPLETE CBC AUTOMATED: CPT

## 2018-07-06 PROCEDURE — 94002 VENT MGMT INPAT INIT DAY: CPT

## 2018-07-06 PROCEDURE — 85049 AUTOMATED PLATELET COUNT: CPT

## 2018-07-06 PROCEDURE — 33517 CABG ARTERY-VEIN SINGLE: CPT | Performed by: THORACIC SURGERY (CARDIOTHORACIC VASCULAR SURGERY)

## 2018-07-06 PROCEDURE — C1875 STENT, COATED/COV W/O DEL SY: HCPCS | Performed by: THORACIC SURGERY (CARDIOTHORACIC VASCULAR SURGERY)

## 2018-07-06 PROCEDURE — 3700000000 HC ANESTHESIA ATTENDED CARE: Performed by: THORACIC SURGERY (CARDIOTHORACIC VASCULAR SURGERY)

## 2018-07-06 PROCEDURE — 87086 URINE CULTURE/COLONY COUNT: CPT

## 2018-07-06 PROCEDURE — 86850 RBC ANTIBODY SCREEN: CPT

## 2018-07-06 PROCEDURE — P9045 ALBUMIN (HUMAN), 5%, 250 ML: HCPCS | Performed by: NURSE ANESTHETIST, CERTIFIED REGISTERED

## 2018-07-06 PROCEDURE — 2500000003 HC RX 250 WO HCPCS: Performed by: THORACIC SURGERY (CARDIOTHORACIC VASCULAR SURGERY)

## 2018-07-06 PROCEDURE — 2580000003 HC RX 258: Performed by: NURSE ANESTHETIST, CERTIFIED REGISTERED

## 2018-07-06 PROCEDURE — 33508 ENDOSCOPIC VEIN HARVEST: CPT | Performed by: THORACIC SURGERY (CARDIOTHORACIC VASCULAR SURGERY)

## 2018-07-06 PROCEDURE — 82947 ASSAY GLUCOSE BLOOD QUANT: CPT

## 2018-07-06 PROCEDURE — 82330 ASSAY OF CALCIUM: CPT

## 2018-07-06 RX ORDER — MAGNESIUM SULFATE 1 G/100ML
1 INJECTION INTRAVENOUS PRN
Status: DISCONTINUED | OUTPATIENT
Start: 2018-07-06 | End: 2018-07-09 | Stop reason: HOSPADM

## 2018-07-06 RX ORDER — DEXTROSE MONOHYDRATE 25 G/50ML
12.5 INJECTION, SOLUTION INTRAVENOUS PRN
Status: DISCONTINUED | OUTPATIENT
Start: 2018-07-06 | End: 2018-07-09 | Stop reason: HOSPADM

## 2018-07-06 RX ORDER — BISACODYL 10 MG
10 SUPPOSITORY, RECTAL RECTAL DAILY PRN
Status: DISCONTINUED | OUTPATIENT
Start: 2018-07-06 | End: 2018-07-09 | Stop reason: HOSPADM

## 2018-07-06 RX ORDER — ASPIRIN 81 MG/1
81 TABLET ORAL DAILY
Status: DISCONTINUED | OUTPATIENT
Start: 2018-07-06 | End: 2018-07-09 | Stop reason: HOSPADM

## 2018-07-06 RX ORDER — MEPERIDINE HYDROCHLORIDE 50 MG/ML
25 INJECTION INTRAMUSCULAR; INTRAVENOUS; SUBCUTANEOUS
Status: ACTIVE | OUTPATIENT
Start: 2018-07-06 | End: 2018-07-06

## 2018-07-06 RX ORDER — ONDANSETRON 2 MG/ML
4 INJECTION INTRAMUSCULAR; INTRAVENOUS EVERY 8 HOURS PRN
Status: DISCONTINUED | OUTPATIENT
Start: 2018-07-06 | End: 2018-07-09 | Stop reason: HOSPADM

## 2018-07-06 RX ORDER — SODIUM CHLORIDE 0.9 % (FLUSH) 0.9 %
10 SYRINGE (ML) INJECTION EVERY 12 HOURS SCHEDULED
Status: DISCONTINUED | OUTPATIENT
Start: 2018-07-06 | End: 2018-07-09 | Stop reason: HOSPADM

## 2018-07-06 RX ORDER — PROPOFOL 10 MG/ML
10 INJECTION, EMULSION INTRAVENOUS
Status: DISCONTINUED | OUTPATIENT
Start: 2018-07-06 | End: 2018-07-07

## 2018-07-06 RX ORDER — HEPARIN SODIUM 1000 [USP'U]/ML
INJECTION, SOLUTION INTRAVENOUS; SUBCUTANEOUS PRN
Status: DISCONTINUED | OUTPATIENT
Start: 2018-07-06 | End: 2018-07-06 | Stop reason: SDUPTHER

## 2018-07-06 RX ORDER — DIPHENHYDRAMINE HCL 25 MG
25 TABLET ORAL NIGHTLY PRN
Status: DISCONTINUED | OUTPATIENT
Start: 2018-07-07 | End: 2018-07-09 | Stop reason: HOSPADM

## 2018-07-06 RX ORDER — ATORVASTATIN CALCIUM 40 MG/1
40 TABLET, FILM COATED ORAL NIGHTLY
Status: DISCONTINUED | OUTPATIENT
Start: 2018-07-07 | End: 2018-07-09 | Stop reason: HOSPADM

## 2018-07-06 RX ORDER — ALBUMIN, HUMAN INJ 5% 5 %
25 SOLUTION INTRAVENOUS PRN
Status: DISCONTINUED | OUTPATIENT
Start: 2018-07-06 | End: 2018-07-09 | Stop reason: HOSPADM

## 2018-07-06 RX ORDER — SODIUM CHLORIDE 0.9 % (FLUSH) 0.9 %
10 SYRINGE (ML) INJECTION EVERY 12 HOURS SCHEDULED
Status: DISCONTINUED | OUTPATIENT
Start: 2018-07-06 | End: 2018-07-06

## 2018-07-06 RX ORDER — HYDRALAZINE HYDROCHLORIDE 20 MG/ML
5 INJECTION INTRAMUSCULAR; INTRAVENOUS EVERY 5 MIN PRN
Status: DISCONTINUED | OUTPATIENT
Start: 2018-07-06 | End: 2018-07-09 | Stop reason: HOSPADM

## 2018-07-06 RX ORDER — SODIUM CHLORIDE, SODIUM LACTATE, POTASSIUM CHLORIDE, CALCIUM CHLORIDE 600; 310; 30; 20 MG/100ML; MG/100ML; MG/100ML; MG/100ML
INJECTION, SOLUTION INTRAVENOUS CONTINUOUS PRN
Status: DISCONTINUED | OUTPATIENT
Start: 2018-07-06 | End: 2018-07-06 | Stop reason: SDUPTHER

## 2018-07-06 RX ORDER — CHLORHEXIDINE GLUCONATE 4 G/100ML
SOLUTION TOPICAL SEE ADMIN INSTRUCTIONS
Status: DISCONTINUED | OUTPATIENT
Start: 2018-07-06 | End: 2018-07-06 | Stop reason: HOSPADM

## 2018-07-06 RX ORDER — LISINOPRIL 2.5 MG/1
2.5 TABLET ORAL DAILY
Status: DISCONTINUED | OUTPATIENT
Start: 2018-07-07 | End: 2018-07-07

## 2018-07-06 RX ORDER — ASPIRIN 81 MG/1
81 TABLET ORAL
Status: DISCONTINUED | OUTPATIENT
Start: 2018-07-06 | End: 2018-07-06

## 2018-07-06 RX ORDER — NICOTINE POLACRILEX 4 MG
15 LOZENGE BUCCAL PRN
Status: DISCONTINUED | OUTPATIENT
Start: 2018-07-06 | End: 2018-07-09 | Stop reason: HOSPADM

## 2018-07-06 RX ORDER — CLOPIDOGREL BISULFATE 75 MG/1
75 TABLET ORAL DAILY
Status: DISCONTINUED | OUTPATIENT
Start: 2018-07-07 | End: 2018-07-09 | Stop reason: HOSPADM

## 2018-07-06 RX ORDER — AMIODARONE HYDROCHLORIDE 200 MG/1
200 TABLET ORAL 2 TIMES DAILY
Status: DISCONTINUED | OUTPATIENT
Start: 2018-07-06 | End: 2018-07-09 | Stop reason: HOSPADM

## 2018-07-06 RX ORDER — SODIUM CHLORIDE 9 MG/ML
INJECTION, SOLUTION INTRAVENOUS CONTINUOUS PRN
Status: DISCONTINUED | OUTPATIENT
Start: 2018-07-06 | End: 2018-07-06 | Stop reason: SDUPTHER

## 2018-07-06 RX ORDER — FENTANYL CITRATE 50 UG/ML
INJECTION, SOLUTION INTRAMUSCULAR; INTRAVENOUS PRN
Status: DISCONTINUED | OUTPATIENT
Start: 2018-07-06 | End: 2018-07-06 | Stop reason: SDUPTHER

## 2018-07-06 RX ORDER — ACETAMINOPHEN 325 MG/1
650 TABLET ORAL EVERY 4 HOURS PRN
Status: DISCONTINUED | OUTPATIENT
Start: 2018-07-06 | End: 2018-07-09 | Stop reason: HOSPADM

## 2018-07-06 RX ORDER — DEXTROSE MONOHYDRATE 25 G/50ML
INJECTION, SOLUTION INTRAVENOUS PRN
Status: DISCONTINUED | OUTPATIENT
Start: 2018-07-06 | End: 2018-07-06 | Stop reason: SDUPTHER

## 2018-07-06 RX ORDER — M-VIT,TX,IRON,MINS/CALC/FOLIC 27MG-0.4MG
1 TABLET ORAL
Status: DISCONTINUED | OUTPATIENT
Start: 2018-07-07 | End: 2018-07-09 | Stop reason: HOSPADM

## 2018-07-06 RX ORDER — FENTANYL CITRATE 50 UG/ML
50 INJECTION, SOLUTION INTRAMUSCULAR; INTRAVENOUS
Status: DISCONTINUED | OUTPATIENT
Start: 2018-07-06 | End: 2018-07-09 | Stop reason: HOSPADM

## 2018-07-06 RX ORDER — PROPOFOL 10 MG/ML
INJECTION, EMULSION INTRAVENOUS CONTINUOUS PRN
Status: DISCONTINUED | OUTPATIENT
Start: 2018-07-06 | End: 2018-07-06 | Stop reason: SDUPTHER

## 2018-07-06 RX ORDER — POTASSIUM CHLORIDE 29.8 MG/ML
20 INJECTION INTRAVENOUS PRN
Status: DISCONTINUED | OUTPATIENT
Start: 2018-07-06 | End: 2018-07-09 | Stop reason: HOSPADM

## 2018-07-06 RX ORDER — DOCUSATE SODIUM 100 MG/1
100 CAPSULE, LIQUID FILLED ORAL 2 TIMES DAILY
Status: DISCONTINUED | OUTPATIENT
Start: 2018-07-07 | End: 2018-07-09 | Stop reason: HOSPADM

## 2018-07-06 RX ORDER — PROPOFOL 10 MG/ML
INJECTION, EMULSION INTRAVENOUS PRN
Status: DISCONTINUED | OUTPATIENT
Start: 2018-07-06 | End: 2018-07-06 | Stop reason: SDUPTHER

## 2018-07-06 RX ORDER — ALBUMIN, HUMAN INJ 5% 5 %
SOLUTION INTRAVENOUS PRN
Status: DISCONTINUED | OUTPATIENT
Start: 2018-07-06 | End: 2018-07-06 | Stop reason: SDUPTHER

## 2018-07-06 RX ORDER — SODIUM CHLORIDE 9 MG/ML
INJECTION, SOLUTION INTRAVENOUS CONTINUOUS
Status: DISCONTINUED | OUTPATIENT
Start: 2018-07-06 | End: 2018-07-08

## 2018-07-06 RX ORDER — CHLORHEXIDINE GLUCONATE 0.12 MG/ML
15 RINSE ORAL 2 TIMES DAILY
Status: DISCONTINUED | OUTPATIENT
Start: 2018-07-06 | End: 2018-07-09 | Stop reason: HOSPADM

## 2018-07-06 RX ORDER — PROTAMINE SULFATE 10 MG/ML
INJECTION, SOLUTION INTRAVENOUS PRN
Status: DISCONTINUED | OUTPATIENT
Start: 2018-07-06 | End: 2018-07-06 | Stop reason: SDUPTHER

## 2018-07-06 RX ORDER — POLYETHYLENE GLYCOL 3350 17 G/17G
17 POWDER, FOR SOLUTION ORAL DAILY
Status: DISCONTINUED | OUTPATIENT
Start: 2018-07-07 | End: 2018-07-09 | Stop reason: HOSPADM

## 2018-07-06 RX ORDER — ROCURONIUM BROMIDE 10 MG/ML
INJECTION, SOLUTION INTRAVENOUS PRN
Status: DISCONTINUED | OUTPATIENT
Start: 2018-07-06 | End: 2018-07-06 | Stop reason: SDUPTHER

## 2018-07-06 RX ORDER — SODIUM CHLORIDE, SODIUM LACTATE, POTASSIUM CHLORIDE, CALCIUM CHLORIDE 600; 310; 30; 20 MG/100ML; MG/100ML; MG/100ML; MG/100ML
INJECTION, SOLUTION INTRAVENOUS CONTINUOUS
Status: DISCONTINUED | OUTPATIENT
Start: 2018-07-06 | End: 2018-07-06

## 2018-07-06 RX ORDER — SODIUM CHLORIDE 0.9 % (FLUSH) 0.9 %
10 SYRINGE (ML) INJECTION PRN
Status: DISCONTINUED | OUTPATIENT
Start: 2018-07-06 | End: 2018-07-06

## 2018-07-06 RX ORDER — CHLORHEXIDINE GLUCONATE 0.12 MG/ML
15 RINSE ORAL ONCE
Status: COMPLETED | OUTPATIENT
Start: 2018-07-06 | End: 2018-07-06

## 2018-07-06 RX ORDER — TRAMADOL HYDROCHLORIDE 50 MG/1
50 TABLET ORAL EVERY 6 HOURS PRN
Status: DISCONTINUED | OUTPATIENT
Start: 2018-07-06 | End: 2018-07-09 | Stop reason: HOSPADM

## 2018-07-06 RX ORDER — MIDAZOLAM HYDROCHLORIDE 1 MG/ML
INJECTION INTRAMUSCULAR; INTRAVENOUS PRN
Status: DISCONTINUED | OUTPATIENT
Start: 2018-07-06 | End: 2018-07-06 | Stop reason: SDUPTHER

## 2018-07-06 RX ORDER — PROTAMINE SULFATE 10 MG/ML
50 INJECTION, SOLUTION INTRAVENOUS
Status: ACTIVE | OUTPATIENT
Start: 2018-07-06 | End: 2018-07-06

## 2018-07-06 RX ORDER — FENTANYL CITRATE 50 UG/ML
25 INJECTION, SOLUTION INTRAMUSCULAR; INTRAVENOUS
Status: DISCONTINUED | OUTPATIENT
Start: 2018-07-06 | End: 2018-07-09 | Stop reason: HOSPADM

## 2018-07-06 RX ORDER — FAMOTIDINE 20 MG/1
20 TABLET, FILM COATED ORAL 2 TIMES DAILY
Status: DISCONTINUED | OUTPATIENT
Start: 2018-07-08 | End: 2018-07-09 | Stop reason: HOSPADM

## 2018-07-06 RX ORDER — CARVEDILOL 3.12 MG/1
3.12 TABLET ORAL ONCE
Status: DISCONTINUED | OUTPATIENT
Start: 2018-07-06 | End: 2018-07-06 | Stop reason: HOSPADM

## 2018-07-06 RX ORDER — AMIODARONE HYDROCHLORIDE 200 MG/1
200 TABLET ORAL 3 TIMES DAILY
Status: DISCONTINUED | OUTPATIENT
Start: 2018-07-06 | End: 2018-07-06

## 2018-07-06 RX ORDER — BACITRACIN 50000 [USP'U]/1
INJECTION, POWDER, LYOPHILIZED, FOR SOLUTION INTRAMUSCULAR PRN
Status: DISCONTINUED | OUTPATIENT
Start: 2018-07-06 | End: 2018-07-06 | Stop reason: HOSPADM

## 2018-07-06 RX ORDER — ACETAMINOPHEN 650 MG/1
650 SUPPOSITORY RECTAL EVERY 4 HOURS PRN
Status: DISCONTINUED | OUTPATIENT
Start: 2018-07-06 | End: 2018-07-09 | Stop reason: HOSPADM

## 2018-07-06 RX ORDER — LIDOCAINE HYDROCHLORIDE 10 MG/ML
INJECTION, SOLUTION EPIDURAL; INFILTRATION; INTRACAUDAL; PERINEURAL PRN
Status: DISCONTINUED | OUTPATIENT
Start: 2018-07-06 | End: 2018-07-06 | Stop reason: SDUPTHER

## 2018-07-06 RX ORDER — DEXTROSE MONOHYDRATE 50 MG/ML
100 INJECTION, SOLUTION INTRAVENOUS PRN
Status: DISCONTINUED | OUTPATIENT
Start: 2018-07-06 | End: 2018-07-09 | Stop reason: HOSPADM

## 2018-07-06 RX ORDER — SODIUM CHLORIDE 0.9 % (FLUSH) 0.9 %
10 SYRINGE (ML) INJECTION PRN
Status: DISCONTINUED | OUTPATIENT
Start: 2018-07-06 | End: 2018-07-09 | Stop reason: HOSPADM

## 2018-07-06 RX ORDER — CALCIUM CHLORIDE 100 MG/ML
INJECTION INTRAVENOUS; INTRAVENTRICULAR PRN
Status: DISCONTINUED | OUTPATIENT
Start: 2018-07-06 | End: 2018-07-06 | Stop reason: SDUPTHER

## 2018-07-06 RX ADMIN — MIDAZOLAM HYDROCHLORIDE 4 MG: 1 INJECTION, SOLUTION INTRAMUSCULAR; INTRAVENOUS at 07:16

## 2018-07-06 RX ADMIN — FAMOTIDINE 20 MG: 10 INJECTION, SOLUTION INTRAVENOUS at 20:15

## 2018-07-06 RX ADMIN — PHENYLEPHRINE HYDROCHLORIDE 200 MCG: 10 INJECTION INTRAVENOUS at 12:17

## 2018-07-06 RX ADMIN — Medication 14.9 UNITS/KG/HR: at 03:59

## 2018-07-06 RX ADMIN — AMIODARONE HYDROCHLORIDE 0.5 MG/MIN: 50 INJECTION, SOLUTION INTRAVENOUS at 17:38

## 2018-07-06 RX ADMIN — ALBUMIN (HUMAN) 12.5 G: 12.5 INJECTION, SOLUTION INTRAVENOUS at 12:33

## 2018-07-06 RX ADMIN — SODIUM CHLORIDE 0.4 MCG/KG/HR: 9 INJECTION, SOLUTION INTRAVENOUS at 17:21

## 2018-07-06 RX ADMIN — AMIODARONE HYDROCHLORIDE 200 MG: 200 TABLET ORAL at 06:22

## 2018-07-06 RX ADMIN — FENTANYL CITRATE 50 MCG: 50 INJECTION, SOLUTION INTRAMUSCULAR; INTRAVENOUS at 08:45

## 2018-07-06 RX ADMIN — DEXTROSE 1 MG/MIN: 5 SOLUTION INTRAVENOUS at 08:34

## 2018-07-06 RX ADMIN — FENTANYL CITRATE 100 MCG: 50 INJECTION, SOLUTION INTRAMUSCULAR; INTRAVENOUS at 08:13

## 2018-07-06 RX ADMIN — HEPARIN SODIUM 33000 UNITS: 1000 INJECTION, SOLUTION INTRAVENOUS; SUBCUTANEOUS at 09:21

## 2018-07-06 RX ADMIN — PROTAMINE SULFATE 250 MG: 10 INJECTION, SOLUTION INTRAVENOUS at 12:02

## 2018-07-06 RX ADMIN — SODIUM CHLORIDE: 900 INJECTION, SOLUTION INTRAVENOUS at 08:00

## 2018-07-06 RX ADMIN — Medication 2 G: at 12:00

## 2018-07-06 RX ADMIN — PHENYLEPHRINE HYDROCHLORIDE 200 MCG: 10 INJECTION INTRAVENOUS at 13:09

## 2018-07-06 RX ADMIN — EPINEPHRINE 0.02 MCG/KG/MIN: 1 INJECTION PARENTERAL at 11:37

## 2018-07-06 RX ADMIN — INSULIN HUMAN 5 UNITS: 100 INJECTION, SOLUTION PARENTERAL at 11:40

## 2018-07-06 RX ADMIN — AMIODARONE HYDROCHLORIDE 200 MG: 200 TABLET ORAL at 20:15

## 2018-07-06 RX ADMIN — PHENYLEPHRINE HYDROCHLORIDE 200 MCG: 10 INJECTION INTRAVENOUS at 12:03

## 2018-07-06 RX ADMIN — PHENYLEPHRINE HYDROCHLORIDE 200 MCG: 10 INJECTION INTRAVENOUS at 13:02

## 2018-07-06 RX ADMIN — PROPOFOL 150 MG: 10 INJECTION, EMULSION INTRAVENOUS at 07:21

## 2018-07-06 RX ADMIN — LIDOCAINE HYDROCHLORIDE 50 MG: 10 INJECTION, SOLUTION EPIDURAL; INFILTRATION; INTRACAUDAL; PERINEURAL at 07:21

## 2018-07-06 RX ADMIN — SODIUM BICARBONATE 50 MEQ: 84 INJECTION, SOLUTION INTRAVENOUS at 11:35

## 2018-07-06 RX ADMIN — INSULIN HUMAN 10 UNITS: 100 INJECTION, SOLUTION PARENTERAL at 11:20

## 2018-07-06 RX ADMIN — FAMOTIDINE 20 MG: 10 INJECTION, SOLUTION INTRAVENOUS at 15:29

## 2018-07-06 RX ADMIN — DEXTROSE MONOHYDRATE 12.5 G: 25 INJECTION, SOLUTION INTRAVENOUS at 11:20

## 2018-07-06 RX ADMIN — PHENYLEPHRINE HYDROCHLORIDE 50 MCG/MIN: 10 INJECTION INTRAVENOUS at 10:16

## 2018-07-06 RX ADMIN — PROPOFOL 15 MCG/KG/MIN: 10 INJECTION, EMULSION INTRAVENOUS at 12:50

## 2018-07-06 RX ADMIN — SODIUM CHLORIDE 2000 MG: 0.9 INJECTION, SOLUTION INTRAVENOUS at 08:09

## 2018-07-06 RX ADMIN — PHENYLEPHRINE HYDROCHLORIDE 200 MCG: 10 INJECTION INTRAVENOUS at 12:19

## 2018-07-06 RX ADMIN — PHENYLEPHRINE HYDROCHLORIDE 200 MCG: 10 INJECTION INTRAVENOUS at 11:59

## 2018-07-06 RX ADMIN — ROCURONIUM BROMIDE 50 MG: 10 INJECTION INTRAVENOUS at 12:19

## 2018-07-06 RX ADMIN — SODIUM CHLORIDE, POTASSIUM CHLORIDE, SODIUM LACTATE AND CALCIUM CHLORIDE: 600; 310; 30; 20 INJECTION, SOLUTION INTRAVENOUS at 07:59

## 2018-07-06 RX ADMIN — MUPIROCIN: 20 OINTMENT TOPICAL at 15:30

## 2018-07-06 RX ADMIN — ALBUMIN (HUMAN) 12.5 G: 12.5 INJECTION, SOLUTION INTRAVENOUS at 12:27

## 2018-07-06 RX ADMIN — FENTANYL CITRATE 250 MCG: 50 INJECTION, SOLUTION INTRAMUSCULAR; INTRAVENOUS at 09:36

## 2018-07-06 RX ADMIN — PHENYLEPHRINE HYDROCHLORIDE 200 MCG: 10 INJECTION INTRAVENOUS at 12:28

## 2018-07-06 RX ADMIN — HEPARIN SODIUM 10000 UNITS: 1000 INJECTION, SOLUTION INTRAVENOUS; SUBCUTANEOUS at 09:33

## 2018-07-06 RX ADMIN — SODIUM CHLORIDE, POTASSIUM CHLORIDE, SODIUM LACTATE AND CALCIUM CHLORIDE: 600; 310; 30; 20 INJECTION, SOLUTION INTRAVENOUS at 08:26

## 2018-07-06 RX ADMIN — FENTANYL CITRATE 50 MCG: 50 INJECTION INTRAMUSCULAR; INTRAVENOUS at 17:43

## 2018-07-06 RX ADMIN — Medication 2 G: at 20:13

## 2018-07-06 RX ADMIN — FENTANYL CITRATE 100 MCG: 50 INJECTION, SOLUTION INTRAMUSCULAR; INTRAVENOUS at 09:30

## 2018-07-06 RX ADMIN — ROCURONIUM BROMIDE 50 MG: 10 INJECTION INTRAVENOUS at 07:21

## 2018-07-06 RX ADMIN — Medication 2 G: at 08:00

## 2018-07-06 RX ADMIN — Medication 0.05 MCG/KG/MIN: at 07:45

## 2018-07-06 RX ADMIN — PROPOFOL 20 MCG/KG/MIN: 10 INJECTION, EMULSION INTRAVENOUS at 15:33

## 2018-07-06 RX ADMIN — SODIUM BICARBONATE 50 MEQ: 84 INJECTION, SOLUTION INTRAVENOUS at 12:32

## 2018-07-06 RX ADMIN — FENTANYL CITRATE 100 MCG: 50 INJECTION, SOLUTION INTRAMUSCULAR; INTRAVENOUS at 09:50

## 2018-07-06 RX ADMIN — SODIUM CHLORIDE, POTASSIUM CHLORIDE, SODIUM LACTATE AND CALCIUM CHLORIDE: 600; 310; 30; 20 INJECTION, SOLUTION INTRAVENOUS at 07:13

## 2018-07-06 RX ADMIN — FENTANYL CITRATE 50 MCG: 50 INJECTION, SOLUTION INTRAMUSCULAR; INTRAVENOUS at 08:50

## 2018-07-06 RX ADMIN — FENTANYL CITRATE 150 MCG: 50 INJECTION, SOLUTION INTRAMUSCULAR; INTRAVENOUS at 12:52

## 2018-07-06 RX ADMIN — DEXTROSE MONOHYDRATE 150 MG: 50 INJECTION, SOLUTION INTRAVENOUS at 08:17

## 2018-07-06 RX ADMIN — ONDANSETRON 4 MG: 2 INJECTION, SOLUTION INTRAMUSCULAR; INTRAVENOUS at 20:20

## 2018-07-06 RX ADMIN — ASPIRIN 81 MG: 81 TABLET, CHEWABLE ORAL at 06:23

## 2018-07-06 RX ADMIN — SODIUM CHLORIDE 4 UNITS/HR: 9 INJECTION, SOLUTION INTRAVENOUS at 11:20

## 2018-07-06 RX ADMIN — TRAMADOL HYDROCHLORIDE 50 MG: 50 TABLET, FILM COATED ORAL at 20:20

## 2018-07-06 RX ADMIN — CHLORHEXIDINE GLUCONATE 15 ML: 1.2 RINSE ORAL at 15:29

## 2018-07-06 RX ADMIN — CHLORHEXIDINE GLUCONATE 15 ML: 1.2 RINSE ORAL at 20:15

## 2018-07-06 RX ADMIN — Medication 10 ML: at 20:14

## 2018-07-06 RX ADMIN — SODIUM CHLORIDE, POTASSIUM CHLORIDE, SODIUM LACTATE AND CALCIUM CHLORIDE: 600; 310; 30; 20 INJECTION, SOLUTION INTRAVENOUS at 08:21

## 2018-07-06 RX ADMIN — FENTANYL CITRATE 150 MCG: 50 INJECTION, SOLUTION INTRAMUSCULAR; INTRAVENOUS at 07:21

## 2018-07-06 RX ADMIN — SODIUM CHLORIDE, POTASSIUM CHLORIDE, SODIUM LACTATE AND CALCIUM CHLORIDE: 600; 310; 30; 20 INJECTION, SOLUTION INTRAVENOUS at 10:00

## 2018-07-06 RX ADMIN — CHLORHEXIDINE GLUCONATE 15 ML: 1.2 RINSE ORAL at 06:23

## 2018-07-06 RX ADMIN — FENTANYL CITRATE 50 MCG: 50 INJECTION, SOLUTION INTRAMUSCULAR; INTRAVENOUS at 08:40

## 2018-07-06 RX ADMIN — CALCIUM CHLORIDE 1 G: 100 INJECTION INTRAVENOUS; INTRAVENTRICULAR at 13:09

## 2018-07-06 RX ADMIN — SODIUM BICARBONATE 25 MEQ: 84 INJECTION, SOLUTION INTRAVENOUS at 12:29

## 2018-07-06 RX ADMIN — ROCURONIUM BROMIDE 50 MG: 10 INJECTION INTRAVENOUS at 09:36

## 2018-07-06 RX ADMIN — SODIUM CHLORIDE, POTASSIUM CHLORIDE, SODIUM LACTATE AND CALCIUM CHLORIDE: 600; 310; 30; 20 INJECTION, SOLUTION INTRAVENOUS at 03:55

## 2018-07-06 ASSESSMENT — PULMONARY FUNCTION TESTS
PIF_VALUE: 21
PIF_VALUE: 21
PIF_VALUE: 1
PIF_VALUE: 0
PIF_VALUE: 1
PIF_VALUE: 1
PIF_VALUE: 19
PIF_VALUE: 18
PIF_VALUE: 17
PIF_VALUE: 17
PIF_VALUE: 18
PIF_VALUE: 19
PIF_VALUE: 26
PIF_VALUE: 19
PIF_VALUE: 19
PIF_VALUE: 1
PIF_VALUE: 19
PIF_VALUE: 20
PIF_VALUE: 16
PIF_VALUE: 18
PIF_VALUE: 1
PIF_VALUE: 18
PIF_VALUE: 1
PIF_VALUE: 18
PIF_VALUE: 19
PIF_VALUE: 19
PIF_VALUE: 20
PIF_VALUE: 1
PIF_VALUE: 19
PIF_VALUE: 1
PIF_VALUE: 18
PIF_VALUE: 12
PIF_VALUE: 15
PIF_VALUE: 21
PIF_VALUE: 20
PIF_VALUE: 17
PIF_VALUE: 1
PIF_VALUE: 18
PIF_VALUE: 19
PIF_VALUE: 19
PIF_VALUE: 1
PIF_VALUE: 18
PIF_VALUE: 1
PIF_VALUE: 19
PIF_VALUE: 18
PIF_VALUE: 1
PIF_VALUE: 1
PIF_VALUE: 19
PIF_VALUE: 18
PIF_VALUE: 1
PIF_VALUE: 16
PIF_VALUE: 1
PIF_VALUE: 20
PIF_VALUE: 18
PIF_VALUE: 1
PIF_VALUE: 0
PIF_VALUE: 19
PIF_VALUE: 18
PIF_VALUE: 1
PIF_VALUE: 19
PIF_VALUE: 1
PIF_VALUE: 19
PIF_VALUE: 18
PIF_VALUE: 1
PIF_VALUE: 1
PIF_VALUE: 17
PIF_VALUE: 18
PIF_VALUE: 25
PIF_VALUE: 20
PIF_VALUE: 18
PIF_VALUE: 26
PIF_VALUE: 20
PIF_VALUE: 19
PIF_VALUE: 0
PIF_VALUE: 27
PIF_VALUE: 18
PIF_VALUE: 1
PIF_VALUE: 19
PIF_VALUE: 1
PIF_VALUE: 18
PIF_VALUE: 19
PIF_VALUE: 27
PIF_VALUE: 26
PIF_VALUE: 20
PIF_VALUE: 19
PIF_VALUE: 21
PIF_VALUE: 18
PIF_VALUE: 19
PIF_VALUE: 19
PIF_VALUE: 20
PIF_VALUE: 17
PIF_VALUE: 18
PIF_VALUE: 1
PIF_VALUE: 18
PIF_VALUE: 19
PIF_VALUE: 18
PIF_VALUE: 19
PIF_VALUE: 26
PIF_VALUE: 19
PIF_VALUE: 18
PIF_VALUE: 1
PIF_VALUE: 20
PIF_VALUE: 1
PIF_VALUE: 20
PIF_VALUE: 1
PIF_VALUE: 17
PIF_VALUE: 17
PIF_VALUE: 21
PIF_VALUE: 26
PIF_VALUE: 19
PIF_VALUE: 1
PIF_VALUE: 18
PIF_VALUE: 20
PIF_VALUE: 20
PIF_VALUE: 19
PIF_VALUE: 10
PIF_VALUE: 1
PIF_VALUE: 18
PIF_VALUE: 18
PIF_VALUE: 3
PIF_VALUE: 26
PIF_VALUE: 18
PIF_VALUE: 18
PIF_VALUE: 1
PIF_VALUE: 0
PIF_VALUE: 17
PIF_VALUE: 18
PIF_VALUE: 18
PIF_VALUE: 1
PIF_VALUE: 19
PIF_VALUE: 26
PIF_VALUE: 26
PIF_VALUE: 24
PIF_VALUE: 0
PIF_VALUE: 20
PIF_VALUE: 1
PIF_VALUE: 1
PIF_VALUE: 17
PIF_VALUE: 20
PIF_VALUE: 0
PIF_VALUE: 19
PIF_VALUE: 17
PIF_VALUE: 26
PIF_VALUE: 1
PIF_VALUE: 1
PIF_VALUE: 18
PIF_VALUE: 1
PIF_VALUE: 26
PIF_VALUE: 19
PIF_VALUE: 18
PIF_VALUE: 18
PIF_VALUE: 19
PIF_VALUE: 1
PIF_VALUE: 25
PIF_VALUE: 1
PIF_VALUE: 19
PIF_VALUE: 26
PIF_VALUE: 19
PIF_VALUE: 0
PIF_VALUE: 27
PIF_VALUE: 0
PIF_VALUE: 20
PIF_VALUE: 1
PIF_VALUE: 19
PIF_VALUE: 19
PIF_VALUE: 18
PIF_VALUE: 0
PIF_VALUE: 3
PIF_VALUE: 1
PIF_VALUE: 19
PIF_VALUE: 19
PIF_VALUE: 21
PIF_VALUE: 1
PIF_VALUE: 19
PIF_VALUE: 12
PIF_VALUE: 18
PIF_VALUE: 18
PIF_VALUE: 1
PIF_VALUE: 18
PIF_VALUE: 19
PIF_VALUE: 19
PIF_VALUE: 1
PIF_VALUE: 1
PIF_VALUE: 19
PIF_VALUE: 19
PIF_VALUE: 0
PIF_VALUE: 1
PIF_VALUE: 19
PIF_VALUE: 18
PIF_VALUE: 19
PIF_VALUE: 20
PIF_VALUE: 18
PIF_VALUE: 1
PIF_VALUE: 26
PIF_VALUE: 0
PIF_VALUE: 25
PIF_VALUE: 20
PIF_VALUE: 1
PIF_VALUE: 18
PIF_VALUE: 18
PIF_VALUE: 1
PIF_VALUE: 1
PIF_VALUE: 19
PIF_VALUE: 18
PIF_VALUE: 19
PIF_VALUE: 23
PIF_VALUE: 1
PIF_VALUE: 19
PIF_VALUE: 19
PIF_VALUE: 17
PIF_VALUE: 18
PIF_VALUE: 1
PIF_VALUE: 20
PIF_VALUE: 19
PIF_VALUE: 18
PIF_VALUE: 19
PIF_VALUE: 20
PIF_VALUE: 21
PIF_VALUE: 19
PIF_VALUE: 17
PIF_VALUE: 1
PIF_VALUE: 17
PIF_VALUE: 23
PIF_VALUE: 1
PIF_VALUE: 19
PIF_VALUE: 18
PIF_VALUE: 20
PIF_VALUE: 1
PIF_VALUE: 1
PIF_VALUE: 19
PIF_VALUE: 21
PIF_VALUE: 26
PIF_VALUE: 18
PIF_VALUE: 19
PIF_VALUE: 20
PIF_VALUE: 19
PIF_VALUE: 1
PIF_VALUE: 17
PIF_VALUE: 19
PIF_VALUE: 19
PIF_VALUE: 17
PIF_VALUE: 21
PIF_VALUE: 19
PIF_VALUE: 19
PIF_VALUE: 0
PIF_VALUE: 0
PIF_VALUE: 18
PIF_VALUE: 26
PIF_VALUE: 17
PIF_VALUE: 1
PIF_VALUE: 26
PIF_VALUE: 1
PIF_VALUE: 20
PIF_VALUE: 1
PIF_VALUE: 16
PIF_VALUE: 1
PIF_VALUE: 1
PIF_VALUE: 19
PIF_VALUE: 19
PIF_VALUE: 1
PIF_VALUE: 21
PIF_VALUE: 19
PIF_VALUE: 18
PIF_VALUE: 18
PIF_VALUE: 1
PIF_VALUE: 19
PIF_VALUE: 18
PIF_VALUE: 19
PIF_VALUE: 18
PIF_VALUE: 18
PIF_VALUE: 21
PIF_VALUE: 18
PIF_VALUE: 19
PIF_VALUE: 17
PIF_VALUE: 20
PIF_VALUE: 17
PIF_VALUE: 1
PIF_VALUE: 18
PIF_VALUE: 26
PIF_VALUE: 19
PIF_VALUE: 20
PIF_VALUE: 21
PIF_VALUE: 21
PIF_VALUE: 1
PIF_VALUE: 0
PIF_VALUE: 20
PIF_VALUE: 19
PIF_VALUE: 18
PIF_VALUE: 0
PIF_VALUE: 20
PIF_VALUE: 1
PIF_VALUE: 18
PIF_VALUE: 1
PIF_VALUE: 18
PIF_VALUE: 17
PIF_VALUE: 17
PIF_VALUE: 0
PIF_VALUE: 20
PIF_VALUE: 26
PIF_VALUE: 1
PIF_VALUE: 26
PIF_VALUE: 1
PIF_VALUE: 18
PIF_VALUE: 19
PIF_VALUE: 1
PIF_VALUE: 25
PIF_VALUE: 1
PIF_VALUE: 18
PIF_VALUE: 1
PIF_VALUE: 18
PIF_VALUE: 21
PIF_VALUE: 18
PIF_VALUE: 22
PIF_VALUE: 1
PIF_VALUE: 19
PIF_VALUE: 25
PIF_VALUE: 1
PIF_VALUE: 19
PIF_VALUE: 1
PIF_VALUE: 20
PIF_VALUE: 1
PIF_VALUE: 25
PIF_VALUE: 1
PIF_VALUE: 19
PIF_VALUE: 1
PIF_VALUE: 1
PIF_VALUE: 19
PIF_VALUE: 0
PIF_VALUE: 1
PIF_VALUE: 1
PIF_VALUE: 17
PIF_VALUE: 1
PIF_VALUE: 19
PIF_VALUE: 1
PIF_VALUE: 17
PIF_VALUE: 19
PIF_VALUE: 26
PIF_VALUE: 0
PIF_VALUE: 18
PIF_VALUE: 1
PIF_VALUE: 21
PIF_VALUE: 1
PIF_VALUE: 20
PIF_VALUE: 19
PIF_VALUE: 1
PIF_VALUE: 22
PIF_VALUE: 24
PIF_VALUE: 18
PIF_VALUE: 19
PIF_VALUE: 17
PIF_VALUE: 1
PIF_VALUE: 18
PIF_VALUE: 19
PIF_VALUE: 17
PIF_VALUE: 18
PIF_VALUE: 19
PIF_VALUE: 19
PIF_VALUE: 1
PIF_VALUE: 24
PIF_VALUE: 18
PIF_VALUE: 19

## 2018-07-06 ASSESSMENT — PAIN SCALES - GENERAL
PAINLEVEL_OUTOF10: 0
PAINLEVEL_OUTOF10: 3

## 2018-07-06 NOTE — ANESTHESIA POSTPROCEDURE EVALUATION
Department of Anesthesiology  Postprocedure Note    Patient: Scott Atwood  MRN: [id number]  YOB: 1955  Date of evaluation: 7/6/2018  Time:  3:55 PM     Procedure Summary     Date:  07/06/18 Room / Location:  [address] [place] [address] 20 / [address] [place] [address]    Anesthesia Start:  0713 Anesthesia Stop:  1330    Procedure:  CABG CORONARY ARTERY BYPASS, SWAN KACEY, CHANI  ([facility]# [id number]) (N/A ) Diagnosis:  (CAD)    Surgeon:  Meghann Rodriguez MD Responsible Provider:  Raúl Trammell MD    Anesthesia Type:  general ASA Status:  4          Anesthesia Type: general    Jazlyn Phase I:      Jazlyn Phase II:      Last vitals: Reviewed and per EMR flowsheets.        Anesthesia Post Evaluation    Patient location during evaluation: ICU  Patient participation: complete - patient cannot participate  Level of consciousness: responsive to light touch  Pain score: 0  Airway patency: patent  Nausea & Vomiting: no vomiting and no nausea  Complications: no  Cardiovascular status: hemodynamically stable  Respiratory status: acceptable  Hydration status: stable

## 2018-07-06 NOTE — ANESTHESIA PRE PROCEDURE
TID PARESH Sawant CNP   200 mg at 07/06/18 1465    mupirocin (BACTROBAN) 2 % ointment   Nasal BID PARESH Sawant CNP        chlorhexidine (HIBICLENS) 4 % liquid   Topical See Admin Instructions PARESH Sawant CNP        carvedilol (COREG) tablet 3.125 mg  3.125 mg Oral Once PARESH Sawant CNP        methylPREDNISolone sodium (SOLU-MEDROL) 2,000 mg in sodium chloride 0.9 % 250 mL IVPB  2,000 mg Intravenous Once Bal Perez MD        heparin (porcine) injection 4,000 Units  4,000 Units Intravenous PRN Pollo Sarmiento MD        heparin (porcine) injection 2,000 Units  2,000 Units Intravenous PRN Pollo Sarmiento MD   2,000 Units at 07/04/18 2242    heparin 25,000 units in dextrose 5% 250 mL infusion  10.9 Units/kg/hr Intravenous Continuous Pollo Sarmiento MD 13.7 mL/hr at 07/06/18 0359 14.9 Units/kg/hr at 07/06/18 0359    diphenhydrAMINE (BENADRYL) tablet 25 mg  25 mg Oral Q6H PRN PARESH Lane CNP   25 mg at 07/05/18 2341    carvedilol (COREG) tablet 6.25 mg  6.25 mg Oral BID  PARESH Lane CNP   6.25 mg at 07/05/18 1637    0.9 % sodium chloride infusion   Intravenous Continuous Pieter Casillas MD   Stopped at 07/05/18 0287    aspirin chewable tablet 81 mg  81 mg Oral Daily Zaki Sunshine MD   81 mg at 07/06/18 0045    gabapentin (NEURONTIN) capsule 300 mg  300 mg Oral TID Zaki Sunshine MD   300 mg at 07/05/18 2245    cyclobenzaprine (FLEXERIL) tablet 5 mg  5 mg Oral TID PRN Zaki Sunshine MD   5 mg at 07/05/18 2245    naproxen (NAPROSYN) tablet 500 mg  500 mg Oral BID  Zaki Sunshine MD   500 mg at 07/05/18 1637    sodium chloride flush 0.9 % injection 10 mL  10 mL Intravenous 2 times per day Zaki Sunshine MD   10 mL at 07/04/18 0829    sodium chloride flush 0.9 % injection 10 mL  10 mL Intravenous PRN Zaki Sunshine MD        acetaminophen (TYLENOL) tablet 650 mg  650 mg Oral Q4H PRN Zaki Sunshine MD        magnesium hydroxide Social History   Substance Use Topics    Smoking status: Former Smoker     Packs/day: 2.00     Years: 25.00     Types: Cigarettes     Quit date: 1/4/2008    Smokeless tobacco: Never Used      Comment: Bryce Hamm RRT 11/23/16    Alcohol use No                                Counseling given: Not Answered      Vital Signs (Current): There were no vitals filed for this visit.                                            BP Readings from Last 3 Encounters:   07/06/18 120/80   06/04/18 (!) 142/82   05/18/18 138/84       NPO Status:                                                                                 BMI:   Wt Readings from Last 3 Encounters:   07/06/18 205 lb 11 oz (93.3 kg)   06/04/18 209 lb (94.8 kg)   05/18/18 212 lb 3.2 oz (96.3 kg)     There is no height or weight on file to calculate BMI.    CBC:   Lab Results   Component Value Date    WBC 8.9 07/06/2018    RBC 5.07 07/06/2018    HGB 12.9 07/06/2018    HCT 41.2 07/06/2018    MCV 81.3 07/06/2018    RDW 14.3 07/06/2018     07/06/2018       CMP:   Lab Results   Component Value Date     07/06/2018    K 4.1 07/06/2018     07/06/2018    CO2 23 07/06/2018    BUN 20 07/06/2018    CREATININE 0.88 07/06/2018    GFRAA >60 07/06/2018    LABGLOM >60 07/06/2018    GLUCOSE 105 07/06/2018    PROT 6.6 07/06/2018    CALCIUM 8.8 07/06/2018    BILITOT 0.22 07/06/2018    ALKPHOS 128 07/06/2018    AST 17 07/06/2018    ALT 15 07/06/2018       POC Tests:   Recent Labs      07/03/18   1358   POCGLU  99   POCNA  138   POCK  4.2   POCCL  106   POCHEMO  15.9   POCHCT  47       Coags:   Lab Results   Component Value Date    PROTIME 10.0 07/06/2018    INR 0.9 07/06/2018    APTT 51.2 07/05/2018       HCG (If Applicable): No results found for: PREGTESTUR, PREGSERUM, HCG, HCGQUANT     ABGs: No results found for: PHART, PO2ART, BAE4IMF, XKV7RRF, BEART, K6XXHSCA     Type & Screen (If Applicable):  No results found for: LABABO, [address]    Anesthesia Evaluation  Patient summary reviewed no history of anesthetic complications:   Airway: Mallampati: III       Mouth opening: > = 3 FB Dental:    (+) edentulous, upper dentures and lower dentures      Pulmonary:normal exam  breath sounds clear to auscultation                             Cardiovascular:  Exercise tolerance: good (>4 METS),   (+) hypertension:, CAD:, hyperlipidemia        Rhythm: regular  Rate: normal           Beta Blocker:  Dose within 24 Hrs         Neuro/Psych:   (+) neuromuscular disease:,              ROS comment: Chronic neck pain GI/Hepatic/Renal: Neg GI/Hepatic/Renal ROS            Endo/Other: Negative Endo/Other ROS                    Abdominal:   (+) obese,         Vascular: negative vascular ROS. Conclusions      Procedure Summary      Multivessel coronary artery disease.   Normal LV systolic function.      Recommendations      Medical therapy as needed.   Risk factor modification.   Routine Post Diagnostic Cath orders.   Urgent surgical CABG (required before discharge, patient stable with   medical management).      Signature      ----------------------------------------------------------------   Electronically signed by Joesph Carlson(Performing Physician)   on 07/05/2018 09:22   ----------------------------------------------------------------      Angiographic Findings      Cardiac Arteries and Lesion Findings     LMCA: Normal 0% stenosis. LAD: has heavy calcification in the proximal segment. The mid segment has  85% stenosis. LCx: has ostial eccentric 90% stenosis. The proximal segment has 90%  stenosis with bifurcation with OM1. The OM2 has mid 90% stenosis. The OM3  has mid 90% stenosis. RCA: has mid-distal 99% stenosis and distal 95% stenosis.     Ramus: has proximal 80% stenosis.     Coronary Tree      Dominance: Right     LV Analysis  LV function assessed as:Normal.  Ejection

## 2018-07-06 NOTE — PROGRESS NOTES
Writer called back to OR to update Dr. Shelly Nagy on Last ABG, pulm mechanics. Order rec'd for extubation.  Pt. Extubated per usual fashion and placed on 6 L NC.

## 2018-07-06 NOTE — ANESTHESIA PROCEDURE NOTES
Device not present. Left Atrium:  Size normal.  Spontaneous echo contrast not present. Thrombus not present. Tumor not present. Device not present. Left atrial appendage normal.      Septa    Atrial Septum:  Intra-atrial septal morphology normal.      Ventricular Septum:  Intra-ventricular septum morphology hypertrophy.       Diastolic Function Measurements:  Diastolic Dysfunction Grade= I (Delayed relaxation)  E= ms  A= ms  E/A Ratio=   DT= ms  S/D=  IVRT=    Other Findings  Pericardium:  normal  Pleural Effusion:  none  Pulmonary Arteries:  normal  Pulmonary Venous Flow:  normal    Anesthesia Information  Performed Personally  Anesthesiologist:  Rupal Nevarez      Echocardiogram Comments:       Off pump, EF 50%  Aorta no dissection, ascending aorta measured 4.0 cm

## 2018-07-06 NOTE — PROGRESS NOTES
Mechanics performed:     R 12, Vt 570 mL, MV 8.3 L, VC 1592 mL, NIF - 41     Patient is alert and oriented.

## 2018-07-06 NOTE — PROGRESS NOTES
MECHANICAL VENTILATION     [x]  PROVIDE OPTIMAL VENTILATION  [x]   ASSESS FOR EXTUBATION READINESS  [x]   ASSESS FOR WEANING READINESS  [x]  EXTUBATE AS TOLERATED  [x]  IMPLEMENT ADULT MECHANICAL VENTILATION PROTOCOL  [x]  MAINTAIN ADEQUATE OXYGENATION  [x]  PERFORM SPONTANEOUS WEANING TRIAL AS TOLERATED

## 2018-07-06 NOTE — PRE-PROCEDURE INSTRUCTIONS
CVICU Pre-op teaching done with pt's spouse in waiting room     OR date: July 6, 2018   Procedure Planned: Cabg  Surgeon: Paz Meza    Family present: spouse    Pt nickname:  Deni    CONTACT PERSONS :    #1 Beverly Shirley [phone]    #2 N/A      Content of Class  Waiting room                                  Chest tubes                                  General Routines  Visiting hours                                  Invasive lines                                 Incentive spirometer  ETT/Wean                                      Pacemaker wires                          C&DB  Hibiclens shower                            Discharge plans                              Post-[facility]    DISCHARGE PLANNING: goal is to go home will think about VNS    Living Arrangements: lives with spouse in one level home with two steps in the front door 5 steps into the back door    Will there be someone at home to assist you at discharge: yes wife is retired and will be home    : discharge planning    Face sheet verified address ph# and PCp Dr. Varun Mercado as correct pt works fulltime drives a truck and works in the [employer]. Quit smoking 11 years ago did smoke from age 10 until 45 yoa 2+packs a day she also states he has some respiratory issues from his job.

## 2018-07-07 ENCOUNTER — APPOINTMENT (OUTPATIENT)
Dept: GENERAL RADIOLOGY | Age: 63
DRG: 234 | End: 2018-07-07
Attending: INTERNAL MEDICINE
Payer: MEDICARE

## 2018-07-07 LAB
ANION GAP SERPL CALCULATED.3IONS-SCNC: 7 MMOL/L (ref 9–17)
BUN BLDV-MCNC: 14 MG/DL (ref 8–23)
BUN/CREAT BLD: ABNORMAL (ref 9–20)
CALCIUM SERPL-MCNC: 7.6 MG/DL (ref 8.6–10.4)
CHLORIDE BLD-SCNC: 105 MMOL/L (ref 98–107)
CO2: 22 MMOL/L (ref 20–31)
CREAT SERPL-MCNC: 0.7 MG/DL (ref 0.7–1.2)
CULTURE: NO GROWTH
EKG ATRIAL RATE: 73 BPM
EKG P AXIS: -2 DEGREES
EKG P-R INTERVAL: 160 MS
EKG Q-T INTERVAL: 414 MS
EKG QRS DURATION: 84 MS
EKG QTC CALCULATION (BAZETT): 456 MS
EKG R AXIS: -26 DEGREES
EKG T AXIS: -19 DEGREES
EKG VENTRICULAR RATE: 73 BPM
GFR AFRICAN AMERICAN: >60 ML/MIN
GFR NON-AFRICAN AMERICAN: >60 ML/MIN
GFR SERPL CREATININE-BSD FRML MDRD: ABNORMAL ML/MIN/{1.73_M2}
GFR SERPL CREATININE-BSD FRML MDRD: ABNORMAL ML/MIN/{1.73_M2}
GLUCOSE BLD-MCNC: 110 MG/DL (ref 75–110)
GLUCOSE BLD-MCNC: 110 MG/DL (ref 75–110)
GLUCOSE BLD-MCNC: 113 MG/DL (ref 75–110)
GLUCOSE BLD-MCNC: 113 MG/DL (ref 75–110)
GLUCOSE BLD-MCNC: 117 MG/DL (ref 75–110)
GLUCOSE BLD-MCNC: 119 MG/DL (ref 70–99)
GLUCOSE BLD-MCNC: 124 MG/DL (ref 75–110)
GLUCOSE BLD-MCNC: 124 MG/DL (ref 75–110)
GLUCOSE BLD-MCNC: 159 MG/DL (ref 75–110)
GLUCOSE BLD-MCNC: 166 MG/DL (ref 75–110)
GLUCOSE BLD-MCNC: 95 MG/DL (ref 75–110)
HCT VFR BLD CALC: 31.6 % (ref 40.7–50.3)
HEMOGLOBIN: 10.1 G/DL (ref 13–17)
INR BLD: 1.1
Lab: NORMAL
MAGNESIUM: 2.2 MG/DL (ref 1.6–2.6)
MCH RBC QN AUTO: 25.8 PG (ref 25.2–33.5)
MCHC RBC AUTO-ENTMCNC: 32 G/DL (ref 28.4–34.8)
MCV RBC AUTO: 80.8 FL (ref 82.6–102.9)
NRBC AUTOMATED: 0 PER 100 WBC
PDW BLD-RTO: 14.4 % (ref 11.8–14.4)
PLATELET # BLD: 190 K/UL (ref 138–453)
PMV BLD AUTO: 11 FL (ref 8.1–13.5)
POTASSIUM SERPL-SCNC: 3.9 MMOL/L (ref 3.7–5.3)
POTASSIUM SERPL-SCNC: 4.2 MMOL/L (ref 3.7–5.3)
PROTHROMBIN TIME: 11.2 SEC (ref 9–12)
RBC # BLD: 3.91 M/UL (ref 4.21–5.77)
SODIUM BLD-SCNC: 134 MMOL/L (ref 135–144)
SPECIMEN DESCRIPTION: NORMAL
STATUS: NORMAL
WBC # BLD: 15.7 K/UL (ref 3.5–11.3)

## 2018-07-07 PROCEDURE — S0028 INJECTION, FAMOTIDINE, 20 MG: HCPCS | Performed by: NURSE PRACTITIONER

## 2018-07-07 PROCEDURE — 97116 GAIT TRAINING THERAPY: CPT

## 2018-07-07 PROCEDURE — G8987 SELF CARE CURRENT STATUS: HCPCS

## 2018-07-07 PROCEDURE — 99024 POSTOP FOLLOW-UP VISIT: CPT | Performed by: NURSE PRACTITIONER

## 2018-07-07 PROCEDURE — 97110 THERAPEUTIC EXERCISES: CPT

## 2018-07-07 PROCEDURE — G8988 SELF CARE GOAL STATUS: HCPCS

## 2018-07-07 PROCEDURE — 82947 ASSAY GLUCOSE BLOOD QUANT: CPT

## 2018-07-07 PROCEDURE — 85027 COMPLETE CBC AUTOMATED: CPT

## 2018-07-07 PROCEDURE — 97535 SELF CARE MNGMENT TRAINING: CPT

## 2018-07-07 PROCEDURE — 83735 ASSAY OF MAGNESIUM: CPT

## 2018-07-07 PROCEDURE — 2580000003 HC RX 258: Performed by: THORACIC SURGERY (CARDIOTHORACIC VASCULAR SURGERY)

## 2018-07-07 PROCEDURE — 94762 N-INVAS EAR/PLS OXIMTRY CONT: CPT

## 2018-07-07 PROCEDURE — 97162 PT EVAL MOD COMPLEX 30 MIN: CPT

## 2018-07-07 PROCEDURE — 6370000000 HC RX 637 (ALT 250 FOR IP): Performed by: NURSE PRACTITIONER

## 2018-07-07 PROCEDURE — 85610 PROTHROMBIN TIME: CPT

## 2018-07-07 PROCEDURE — 2500000003 HC RX 250 WO HCPCS: Performed by: NURSE PRACTITIONER

## 2018-07-07 PROCEDURE — 6360000002 HC RX W HCPCS: Performed by: NURSE PRACTITIONER

## 2018-07-07 PROCEDURE — 97166 OT EVAL MOD COMPLEX 45 MIN: CPT

## 2018-07-07 PROCEDURE — 2140000001 HC CVICU INTERMEDIATE R&B

## 2018-07-07 PROCEDURE — G8979 MOBILITY GOAL STATUS: HCPCS

## 2018-07-07 PROCEDURE — G8978 MOBILITY CURRENT STATUS: HCPCS

## 2018-07-07 PROCEDURE — 71045 X-RAY EXAM CHEST 1 VIEW: CPT

## 2018-07-07 PROCEDURE — 84132 ASSAY OF SERUM POTASSIUM: CPT

## 2018-07-07 PROCEDURE — 6360000002 HC RX W HCPCS: Performed by: THORACIC SURGERY (CARDIOTHORACIC VASCULAR SURGERY)

## 2018-07-07 PROCEDURE — 36415 COLL VENOUS BLD VENIPUNCTURE: CPT

## 2018-07-07 PROCEDURE — 2580000003 HC RX 258: Performed by: NURSE PRACTITIONER

## 2018-07-07 PROCEDURE — 80048 BASIC METABOLIC PNL TOTAL CA: CPT

## 2018-07-07 RX ORDER — POTASSIUM CHLORIDE 7.45 MG/ML
10 INJECTION INTRAVENOUS PRN
Status: DISCONTINUED | OUTPATIENT
Start: 2018-07-07 | End: 2018-07-09 | Stop reason: HOSPADM

## 2018-07-07 RX ORDER — POTASSIUM CHLORIDE 20MEQ/15ML
40 LIQUID (ML) ORAL PRN
Status: DISCONTINUED | OUTPATIENT
Start: 2018-07-07 | End: 2018-07-09 | Stop reason: HOSPADM

## 2018-07-07 RX ORDER — GABAPENTIN 300 MG/1
300 CAPSULE ORAL 3 TIMES DAILY
Status: DISCONTINUED | OUTPATIENT
Start: 2018-07-07 | End: 2018-07-09 | Stop reason: HOSPADM

## 2018-07-07 RX ORDER — POTASSIUM CHLORIDE 20 MEQ/1
40 TABLET, EXTENDED RELEASE ORAL PRN
Status: DISCONTINUED | OUTPATIENT
Start: 2018-07-07 | End: 2018-07-09 | Stop reason: HOSPADM

## 2018-07-07 RX ADMIN — Medication 2 G: at 13:06

## 2018-07-07 RX ADMIN — GABAPENTIN 300 MG: 300 CAPSULE ORAL at 21:51

## 2018-07-07 RX ADMIN — METOPROLOL TARTRATE 25 MG: 25 TABLET ORAL at 08:55

## 2018-07-07 RX ADMIN — GABAPENTIN 300 MG: 300 CAPSULE ORAL at 16:05

## 2018-07-07 RX ADMIN — ENOXAPARIN SODIUM 40 MG: 100 INJECTION SUBCUTANEOUS at 08:54

## 2018-07-07 RX ADMIN — Medication 10 ML: at 22:00

## 2018-07-07 RX ADMIN — ASPIRIN 81 MG: 81 TABLET, COATED ORAL at 08:54

## 2018-07-07 RX ADMIN — FENTANYL CITRATE 50 MCG: 50 INJECTION INTRAMUSCULAR; INTRAVENOUS at 08:18

## 2018-07-07 RX ADMIN — TRAMADOL HYDROCHLORIDE 50 MG: 50 TABLET, FILM COATED ORAL at 16:07

## 2018-07-07 RX ADMIN — AMIODARONE HYDROCHLORIDE 0.5 MG/MIN: 50 INJECTION, SOLUTION INTRAVENOUS at 22:01

## 2018-07-07 RX ADMIN — CLOPIDOGREL 75 MG: 75 TABLET, FILM COATED ORAL at 08:54

## 2018-07-07 RX ADMIN — METOPROLOL TARTRATE 25 MG: 25 TABLET ORAL at 21:51

## 2018-07-07 RX ADMIN — Medication 10 ML: at 08:20

## 2018-07-07 RX ADMIN — POTASSIUM CHLORIDE 20 MEQ: 29.8 INJECTION, SOLUTION INTRAVENOUS at 00:56

## 2018-07-07 RX ADMIN — MUPIROCIN: 20 OINTMENT TOPICAL at 21:51

## 2018-07-07 RX ADMIN — DOCUSATE SODIUM 100 MG: 100 CAPSULE ORAL at 21:51

## 2018-07-07 RX ADMIN — FAMOTIDINE 20 MG: 10 INJECTION, SOLUTION INTRAVENOUS at 08:56

## 2018-07-07 RX ADMIN — INSULIN LISPRO 1 UNITS: 100 INJECTION, SOLUTION INTRAVENOUS; SUBCUTANEOUS at 22:13

## 2018-07-07 RX ADMIN — FENTANYL CITRATE 50 MCG: 50 INJECTION INTRAMUSCULAR; INTRAVENOUS at 12:52

## 2018-07-07 RX ADMIN — DESMOPRESSIN ACETATE 40 MG: 0.2 TABLET ORAL at 21:51

## 2018-07-07 RX ADMIN — DOCUSATE SODIUM 100 MG: 100 CAPSULE ORAL at 08:54

## 2018-07-07 RX ADMIN — INSULIN LISPRO 2 UNITS: 100 INJECTION, SOLUTION INTRAVENOUS; SUBCUTANEOUS at 18:11

## 2018-07-07 RX ADMIN — POLYETHYLENE GLYCOL 3350 17 G: 17 POWDER, FOR SOLUTION ORAL at 16:05

## 2018-07-07 RX ADMIN — DIPHENHYDRAMINE HCL 25 MG: 25 TABLET ORAL at 21:51

## 2018-07-07 RX ADMIN — TRAMADOL HYDROCHLORIDE 50 MG: 50 TABLET, FILM COATED ORAL at 23:11

## 2018-07-07 RX ADMIN — TRAMADOL HYDROCHLORIDE 50 MG: 50 TABLET, FILM COATED ORAL at 04:11

## 2018-07-07 RX ADMIN — Medication 2 G: at 20:25

## 2018-07-07 RX ADMIN — MULTIPLE VITAMINS W/ MINERALS TAB 1 TABLET: TAB at 08:55

## 2018-07-07 RX ADMIN — GABAPENTIN 300 MG: 300 CAPSULE ORAL at 08:54

## 2018-07-07 RX ADMIN — Medication 2 G: at 04:13

## 2018-07-07 RX ADMIN — AMIODARONE HYDROCHLORIDE 0.5 MG/MIN: 50 INJECTION, SOLUTION INTRAVENOUS at 10:12

## 2018-07-07 ASSESSMENT — PAIN DESCRIPTION - PAIN TYPE
TYPE: SURGICAL PAIN
TYPE: SURGICAL PAIN;ACUTE PAIN
TYPE: ACUTE PAIN;SURGICAL PAIN

## 2018-07-07 ASSESSMENT — PAIN DESCRIPTION - FREQUENCY
FREQUENCY: CONTINUOUS
FREQUENCY: INTERMITTENT

## 2018-07-07 ASSESSMENT — PAIN SCALES - GENERAL
PAINLEVEL_OUTOF10: 3
PAINLEVEL_OUTOF10: 9
PAINLEVEL_OUTOF10: 5
PAINLEVEL_OUTOF10: 10
PAINLEVEL_OUTOF10: 4
PAINLEVEL_OUTOF10: 4
PAINLEVEL_OUTOF10: 9
PAINLEVEL_OUTOF10: 9
PAINLEVEL_OUTOF10: 6

## 2018-07-07 ASSESSMENT — PAIN DESCRIPTION - DESCRIPTORS: DESCRIPTORS: CONSTANT

## 2018-07-07 ASSESSMENT — PAIN DESCRIPTION - ORIENTATION
ORIENTATION: MID

## 2018-07-07 ASSESSMENT — PAIN DESCRIPTION - LOCATION
LOCATION: CHEST
LOCATION: INCISION;CHEST

## 2018-07-07 ASSESSMENT — PAIN DESCRIPTION - PROGRESSION: CLINICAL_PROGRESSION: GRADUALLY WORSENING

## 2018-07-07 ASSESSMENT — PAIN DESCRIPTION - ONSET: ONSET: ON-GOING

## 2018-07-07 NOTE — PROGRESS NOTES
Dr. Franca Dueñas at bedside, notified of CVP not properly reading, stated okay to not transduce CVP.

## 2018-07-07 NOTE — PLAN OF CARE
Problem: Falls - Risk of:  Goal: Will remain free from falls  Will remain free from falls   Outcome: Met This Shift  Pt. Free from falls at this time. Bed in lowest position, 2/4 side rails raised, brake set. Fall sign posted, and slip-resistant socks on. Needs addressed, and call light within reach. Will continue to monitor. Goal: Absence of physical injury  Absence of physical injury   Outcome: Met This Shift      Problem: Risk for Impaired Skin Integrity  Goal: Tissue integrity - skin and mucous membranes  Structural intactness and normal physiological function of skin and  mucous membranes. Outcome: Met This Shift  Skin remains without evidence of further breakdown. Interventions are used to prevent further, or any, skin breakdown as charted in the Skin Integrity flowsheet. RN will continue to monitor. Problem: Activity Intolerance:  Goal: Able to perform prescribed physical activity  Able to perform prescribed physical activity   Outcome: Met This Shift  Pt. Ambulate don the unit twice this shift and gets up to the chair and back to bed with ease. Problem: Anxiety:  Goal: Level of anxiety will decrease  Level of anxiety will decrease   Outcome: Met This Shift  Pt. Has not verbalized having anxiety this shift. Problem: Pain:  Goal: Pain level will decrease  Pain level will decrease   Outcome: Ongoing  Pain scale 0-10, or CPOT if patient is ventilated, is used to assess patient comfort. Pain medications are administered as ordered by physician. RN will continue to monitor pain.

## 2018-07-07 NOTE — PROGRESS NOTES
assessment of BUE strength d/t sternal precautions- observed at least 3/5 strength during functional mobility      Sensation  Overall Sensation Status: WFL  Bed mobility  Scooting: Independent (in chair)  Comment: unable to assess bed mobilty - pt seated in chair upon arrival and returned to chair following session  Transfers  Sit to Stand: Contact guard assistance  Stand to sit: Contact guard assistance  Stand Pivot Transfers: Contact guard assistance  Comment: transfers completed using RW  Ambulation  Ambulation?: Yes  Ambulation 1  Surface: level tile  Device: Rolling Walker  Other Apparatus: O2 (IV pole)  Assistance: Contact guard assistance  Quality of Gait: decreased karthik initially that normalized throughout ambulation, increased lateral sway   Distance: 150ft   Comments: pt requires min VCs for RW navigation/management through obstacles in [place] d/t never using RW before   Stairs/Curb  Stairs?: No     Balance  Posture: Good  Sitting - Static: Good  Sitting - Dynamic: Good  Standing - Static: Good  Standing - Dynamic: Good;-  Comments: standing balance assessed with RW      Educated patient on sternal precautions  Assessment   Body structures, Functions, Activity limitations: Decreased functional mobility ; Decreased balance;Decreased endurance  Assessment: Pt ambulated 150ft this date with RW and CGA, performed transfers with RW and CGA. Pt states that he was very active prior to surgery- very motivated to do well in therapy and return home. Pending ability to navigate 2 stairs, pt is likely safe to return home with family support and assist prn.  Pt would benefit from continued acute physical therapy to improve endurance, balance and overall functional mobility while in hospital.   Prognosis: Good  Decision Making: Medium Complexity  Patient Education: PT POC, sternal precautions   Barriers to Learning: none  REQUIRES PT FOLLOW UP: Yes  Activity Tolerance  Activity Tolerance: Patient Tolerated treatment well Plan   Plan  Times per week: 2x daily/ 7x per week   Times per day: Twice a day  Current Treatment Recommendations: Balance Training, Functional Mobility Training, Stair training, Gait Training, Endurance Training, Transfer Training, Safety Education & Training, Patient/Caregiver Education & Training  Safety Devices  Type of devices: Call light within reach, Gait belt, Left in chair, Nurse notified  Restraints  Initially in place: No    G-Code  PT G-Codes  Functional Assessment Tool Used: [place] AM-PAC  Score: 15  Functional Limitation: Mobility: Walking and moving around  Mobility: Walking and Moving Around Current Status (): At least 40 percent but less than 60 percent impaired, limited or restricted  Mobility: Walking and Moving Around Goal Status ():  At least 20 percent but less than 40 percent impaired, limited or restricted    AM-PAC Score     AM-PAC Inpatient Mobility without Stair Climbing Raw Score : 15  AM-PAC Inpatient without Stair Climbing T-Scale Score : 43.03  Mobility Inpatient CMS 0-100% Score: 47.43  Mobility Inpatient without Stair CMS G-Code Modifier : CK       Goals  Short term goals  Time Frame for Short term goals: 14 visits  Short term goal 1: Pt will complete bed mobility with mod (I)  Short term goal 2: Pt will perform sit <> stand transfers using least restrictive device   Short term goal 3: Pt will ambulate 200ft using least restrictive device and supervision  Short term goal 4: Pt will navigate up/down 2 stairs using 1 handrail and supervision in order to enter home safely  Short term goal 5: Pt will demonstrate good balance in dynamic standing in order to reduce risk of falls  Long term goals  Time Frame for Long term goals : Pt will be independent in cardiac UE/LE exercises  Patient Goals   Patient goals : to decrease pain, return home     Therapy Time   Individual Concurrent Group Co-treatment   Time In 0804         Time Out 0842         Minutes 38         Timed Code

## 2018-07-07 NOTE — BRIEF OP NOTE
Brief Postoperative Note  ______________________________________________________________    Patient: Scott Atwood  YOB: 1955  MRN: [id number]  Date of Procedure: 7/6/2018    Pre-Op Diagnosis: CAD    Post-Op Diagnosis: Same       Procedure(s):  CABG CORONARY ARTERY BYPASS, CHANI ASHRAF  ([facility]# [id number])    Anesthesia: General    Surgeon(s):  Meghann Rodriguez MD    Staff:  First Assistant: JENNIFER Candelaria  Relief Scrub: Maritza Correa RN  Scrub Person First: Kayla Rivera RN  Scrub Person Second: Bhargav Newton  Physician Assistant: JENNIFER Ponce     Estimated Blood Loss: 425 (units unknown) mL    Complications: None    Specimens:   ID Type Source Tests Collected by Time Destination   1 :  Urine Bladder URINE CULTURE Heber Belcher RN 7/6/2018 5457    2 :  Blood CSF/Blood PLATELET COUNT, FIBRINOGEN, PROTIME-INR, APTT, HEMOGLOBIN AND HEMATOCRIT, BLOOD Meghann Rodriguez MD 7/6/2018 1225        Implants:  * No implants in log *      Drains:   Chest Tube 1 Anterior Mediastinal (Active)   Suction -20 cm H2O 7/6/2018  4:00 PM   Chest Tube Airleak No 7/6/2018  4:00 PM   Drainage Description Sanguinous 7/6/2018  4:00 PM   Dressing Status Clean;Dry; Intact 7/6/2018  4:00 PM   Dressing Type Dry dressing 7/6/2018  4:00 PM   Dressing Change Due 07/07/18 7/6/2018  4:00 PM   Site Assessment Not assessed 7/6/2018  4:00 PM   Surrounding Skin Unable to view 7/6/2018  4:00 PM   Patency Intervention Tip/Tilt;Stripped;Milked 7/6/2018  4:00 PM   Output (ml) 5 ml 7/6/2018  6:00 PM       Chest Tube 1 Left Pleural (Active)   Suction -20 cm H2O 7/6/2018  4:00 PM   Chest Tube Airleak No 7/6/2018  4:00 PM   Drainage Description Sanguinous 7/6/2018  4:00 PM   Dressing Status Clean;Dry; Intact 7/6/2018  4:00 PM   Dressing Type Dry dressing 7/6/2018  4:00 PM   Dressing Change Due 07/07/18 7/6/2018  4:00 PM   Site Assessment Not assessed 7/6/2018  4:00 PM   Surrounding Skin Unable to view 7/6/2018  4:00 PM

## 2018-07-07 NOTE — PROGRESS NOTES
guard assistance;Setup  UE Bathing: Setup;Minimal assistance  LE Bathing: Setup;Maximum assistance  UE Dressing: Setup;Minimal assistance (to don second gown )  LE Dressing: Setup;Maximum assistance (to don socks )  Toileting: Maximum assistance (catheter in place)  Additional Comments: Pt up in chair on arrival. Educated in proper pursed lipped breathing and sternal precautions with good return, Completed sit > stand transfer and functional mobility into [place]. Pt demo slight SOB but no LOB during functional mobility. Pt returned to chair, call light in reach and RN notified on therapist exit. Tone RUE  RUE Tone: Normotonic  Tone LUE  LUE Tone: Normotonic  Coordination  Movements Are Fluid And Coordinated: Yes     Bed mobility  Comment: Pt up in chair on arrival, returned to chair on exit   Transfers  Stand Step Transfers: Contact guard assistance  Sit to stand: Contact guard assistance  Stand to sit: Contact guard assistance  Transfer Comments: use of RW. Verbal cues for hand placement on RW. Cognition  Overall Cognitive Status: WFL  Perception  Overall Perceptual Status: WFL     Sensation  Overall Sensation Status: WFL (pt denies numbness/ tingling )      LUE AROM (degrees)  LUE AROM : WFL  LUE General AROM: within sternal precautions- shoulder flexion to 90 degrees   RUE AROM (degrees)  RUE AROM : WFL  RUE General AROM: within sternal precautions- shoulder flexion to 90 degrees   LUE Strength  Gross LUE Strength: WFL  LUE Strength Comment: within sternal precautions  RUE Strength  Gross RUE Strength: WFL  RUE Strength Comment: within sternal precautions    Assessment   Performance deficits / Impairments: Decreased functional mobility ; Decreased endurance;Decreased ADL status; Decreased high-level IADLs  Assessment: Pt would benefit from continued acute care OT to address moderate deficits in ADL/ functional activities, endurance and functional mobility following surgery.  Pt reports good support system at home   Prognosis: Good  Decision Making: Medium Complexity  Patient Education: OT POC, discharge planning, safety, sternal precautions   REQUIRES OT FOLLOW UP: Yes  Activity Tolerance  Activity Tolerance: Patient Tolerated treatment well  Safety Devices  Safety Devices in place: Yes  Type of devices: Left in chair;Nurse notified;Call light within reach;Gait belt;Patient at risk for falls  Restraints  Initially in place: No         Plan   Plan  Times per week: 4-5x/wk   Current Treatment Recommendations: Endurance Training, Functional Mobility Training, Safety Education & Training, Self-Care / ADL, Patient/Caregiver Education & Training    G-Code  OT G-codes  Functional Assessment Tool Used: [facility]  Score: 17/24  Functional Limitation: Self care  Self Care Current Status (): At least 40 percent but less than 60 percent impaired, limited or restricted  Self Care Goal Status (): At least 20 percent but less than 40 percent impaired, limited or restricted    AM-PAC Score  AM-[facility] Inpatient Daily Activity Raw Score: 17  AM-PAC Inpatient ADL T-Scale Score : 37.26  ADL Inpatient CMS 0-100% Score: 50.11  ADL Inpatient CMS G-Code Modifier : CK  How much help from another person does the pt currently need? Unable A Lot A Little None   1. Putting on and taking off regular lower body clothing? 1      2      3       4   2. Bathing (including washing, rinsing, drying)? 1      2      3      4   3. Toileting, which includes using toilet, bedpan, or urinal?      1      2        3      4   4. Putting on and taking off regular upper body clothing? 1      2      3       4   5. Taking care of personal grooming such as brushing teeth? 1      2      3      4   6. Eating meals? 1      2       3       4     1. Unable = Total/Dependent Assist  2. A Lot = Maximum/Moderate Assist  3. A Little = Minimum/Contact Guard Assist/Supervision  4.  None= Modified [place]/Independent    Raw Score Scale Score Scale

## 2018-07-07 NOTE — PROGRESS NOTES
[facility]  Daily Progress Note    Surgeon: Dr. Amparo Almonte  S/P :  Coronary artery bypass x 2   POD#: 1  EF: 50 %      Subjective:  Mr. Linh Rivera feels well today with no acute complaints. Pain is controlled. OOBTC. Last BMs prior to surgery. Taking clear liquids without difficulty. Denies chest pain or shortness of breath. Plan of care reviewed and questions answered. Physical Exam  Vital Signs: /77   Pulse 82   Temp 98.4 °F (36.9 °C) (Oral)   Resp 13   Ht 5' 9\" (1.753 m)   Wt 217 lb 13 oz (98.8 kg)   SpO2 96%   BMI 32.17 kg/m²  O2 Flow Rate (L/min): 3 L/min   Admit Weight: Weight: 212 lb (96.2 kg)   WEIGHTWeight: 217 lb 13 oz (98.8 kg)     General: alert and oriented to person, place and time. Up in chair, No apparent distress. Heart:Normal S1 and S2.  Regular rhythm. No murmurs, gallops, or rubs. Pacing Wires Yes   Lungs: clear to auscultation bilaterally  Abdomen: soft, non tender, non distended, BSx4  Extremities: negative  Wounds: clean and dry, healing appropriately.       Scheduled Meds:    sodium chloride flush  10 mL Intravenous 2 times per day    ceFAZolin (ANCEF) IVPB  2 g Intravenous Q8H    docusate sodium  100 mg Oral BID    polyethylene glycol  17 g Oral Daily    [START ON 7/8/2018] famotidine  20 mg Oral BID    famotidine (PEPCID) injection  20 mg Intravenous BID    metoprolol tartrate  25 mg Oral BID    lisinopril  2.5 mg Oral Daily    amiodarone  200 mg Oral BID    chlorhexidine  15 mL Mouth/Throat BID    mupirocin   Nasal BID    therapeutic multivitamin-minerals  1 tablet Oral Daily with breakfast    atorvastatin  40 mg Oral Nightly    enoxaparin  40 mg Subcutaneous Daily    aspirin  81 mg Oral Daily    clopidogrel  75 mg Oral Daily    insulin lispro  0-12 Units Subcutaneous TID     insulin lispro  0-6 Units Subcutaneous Nightly     Continuous Infusions:    sodium chloride 75 mL/hr at 07/06/18 1324    insulin (HUMAN R) non-weight based infusion 1.59 Units/hr (07/07/18 0459)    dextrose      propofol Stopped (07/06/18 1730)    amiodarone 450mg/250ml D5W infusion 0.5 mg/min (07/06/18 1738)    norepinephrine Stopped (07/06/18 2024)    dexmedetomidine (PRECEDEX) IV infusion Stopped (07/06/18 1806)       Data:  CBC:   Recent Labs      07/06/18   0439  07/06/18   1225  07/06/18   1414  07/07/18   0509   WBC  8.9   --   13.5*  15.7*   HGB  12.9*  10.0*  11.2*  10.1*   HCT  41.2  31.7*  35.4*  31.6*   MCV  81.3*   --   81.6*  80.8*   PLT  306  165  181  190     BMP:   Recent Labs      07/06/18   0439  07/06/18   1347  07/06/18   1414  07/06/18   1739  07/07/18   0004  07/07/18   0509   NA  134*   --   141   --    --   134*   K  4.1   --   4.3  4.6  4.2  3.9   CL  101   --   110*   --    --   105   CO2  23   --   25   --    --   22   BUN  20   --   13   --    --   14   CREATININE  0.88  0.69  0.53*   --    --   0.70     PT/INR:   Recent Labs      07/06/18   1225  07/06/18   1414  07/07/18   0509   PROTIME  12.9*  11.5  11.2   INR  1.2  1.1  1.1     APTT:   Recent Labs      07/05/18   0454  07/06/18   1225  07/06/18   1414   APTT  51.2*  26.3  25.6       Chest X-Ray:  7/7/18:   Improved aeration of the left lung base.  Chronic opacities in the right lung   base and pleural thickening and/or pleural fluid, similar compared to the   preoperative exam.             I/O:  I/O last 3 completed shifts: In: 6159 [I.V.:5159; Blood:1000]  Out: 8959 [Urine:3040; Blood:425; Chest Tube:355]      Assessment & Plan:   Patient Active Problem List   Diagnosis    Sprain and strain of unspecified site of shoulder and upper arm    Sprain of neck    Displacement of cervical intervertebral disc without myelopathy    Cervical radiculitis    HTN (hypertension)    Umbilical hernia    HTN (hypertension)    Impaired fasting blood sugar    Displacement of cervical intervertebral disc without myelopathy    Rotator cuff tear    CAD in native artery     1.  S/p CABG x 2   POD 1   D/C CT, art line, mancia,central line   Change amiodarone gtt to PO, continue insulin gtt for 24 hours post ICU admit, then transition to AC/HS sliding scale   Cardiac Rehab  2. ABLA - Hgb 12.9 to 10.1, stable, continue to monitor with labs    DVT ppx: Lovenox & SCD's while stationary  Patient is on BB, ASA, Statin therapy per protocol   Plan for discharge home when medically stable    The above recommendations including medications and orders were discussed and agreed upon with Dr. Nelida Morel, the attending on service for the cardiothoracic surgery group today.      Cristy Sheth, APRN, CNP

## 2018-07-07 NOTE — PROGRESS NOTES
Physical Therapy  Facility/Department: [facility]  Daily Treatment Note  NAME: Willie Bah  : 1955  MRN: [id number]    Date of Service: 2018    Discharge Recommendations:  Home with assist PRN   PT Equipment Recommendations  Other: TBD-likely nothing    Patient Diagnosis(es): The encounter diagnosis was CAD in native artery. has a past medical history of Abdominal pain; Clostridium difficile diarrhea; Displacement of cervical intervertebral disc without myelopathy; HTN (hypertension); Impaired fasting blood sugar; Neck pain; Neuralgia, neuritis, and radiculitis, unspecified; Shoulder region pain; and Sprain and strain of unspecified site of shoulder and upper arm. has a past surgical history that includes other surgical history (Left, 14, 1/09/15); other surgical history (Left, 2014); other surgical history (Left, 2015 , 16); Shoulder arthroscopy (Right, 2016); and Biceps Tenodesis (Right, 2016). Restrictions  Restrictions/Precautions  Restrictions/Precautions: Cardiac, Surgical Protocols  Required Braces or Orthoses?: No  Position Activity Restriction  Sternal Precautions: No Pushing, No Pulling, 5# Lifting Restrictions  Other position/activity restrictions: ambulate pt. OK'd per RN to eval and treat pt. Subjective   General  Response To Previous Treatment: Patient with no complaints from previous session. Family / Caregiver Present: No  Subjective  Subjective: Pt up in chair upon arrival and agreeable to therapy.    General Comment  Comments: pt returned to chair with BLE's elevated and call light in reach           Orientation  Orientation  Overall Orientation Status: Within Normal Limits  Objective      Transfers  Sit to Stand: Contact guard assistance  Stand to sit: Contact guard assistance  Stand Pivot Transfers: Contact guard assistance  Ambulation 1  Surface: level tile  Device: Rolling Walker  Other Apparatus: O2  Quality of Gait: decreased karthik initially that normalized throughout ambulation, increased lateral sway   Distance: 180 feet x1  Comments: Notes bilat LE stiffness with AMB. Other exercises  Other exercises?: Yes  Other exercises 1: seated Bilat UE AROM per cardiac protocol x10 reps  Other exercises 2: seated Bilat LE AROM per cardiac protocol x10 reps                        Assessment   Body structures, Functions, Activity limitations: Decreased functional mobility ; Decreased balance;Decreased endurance  Assessment: Pt ambulated 150ft this date with RW and CGA, performed transfers with RW and CGA. Pt states that he was very active prior to surgery- very motivated to do well in therapy and return home. Pending ability to navigate 2 stairs, pt is likely safe to return home with family support and assist prn. Pt would benefit from continued acute physical therapy to improve endurance, balance and overall functional mobility while in hospital.   Prognosis: Good  Patient Education: PT POC, sternal precautions   Activity Tolerance  Activity Tolerance: Patient Tolerated treatment well     G-Code  PT G-Codes  Functional Assessment Tool Used: Gallagher AM-PAC  Score: 15  Functional Limitation: Mobility: Walking and moving around  Mobility: Walking and Moving Around Current Status (): At least 40 percent but less than 60 percent impaired, limited or restricted  Mobility: Walking and Moving Around Goal Status ():  At least 20 percent but less than 40 percent impaired, limited or restricted  OutComes Score                                                    AM-PAC Score             Goals  Short term goals  Time Frame for Short term goals: 14 visits  Short term goal 1: Pt will complete bed mobility with mod (I)  Short term goal 2: Pt will perform sit <> stand transfers using least restrictive device   Short term goal 3: Pt will ambulate 200ft using least restrictive device and supervision  Short term goal 4: Pt will navigate up/down 2 stairs using 1 handrail and supervision in order to enter home safely  Short term goal 5: Pt will demonstrate good balance in dynamic standing in order to reduce risk of falls  Long term goals  Time Frame for Long term goals : Pt will be independent in cardiac UE/LE exercises  Patient Goals   Patient goals : to decrease pain, return home    Plan    Plan  Times per week: 2x daily/ 7x per week   Times per day: Twice a day  Current Treatment Recommendations: Balance Training, Functional Mobility Training, Stair training, Gait Training, Endurance Training, Transfer Training, Safety Education & Training, Patient/Caregiver Education & Training  Safety Devices  Type of devices: Call light within reach, Gait belt, Left in chair, Nurse notified  Restraints  Initially in place: No     Therapy Time   Individual Concurrent Group Co-treatment   Time In 1255         Time Out 1320         Minutes 25         Timed Code Treatment Minutes: Satnam Crooks 484, PTA

## 2018-07-07 NOTE — PROGRESS NOTES
[facility]   Progress Note                   Date:   7/7/2018  Patient name: Saul Johns  Date of admission:  7/3/2018  6:29 PM  MRN:   [id number]  YOB: 1955  PCP: Anshul Rosales MD    Reason for Admission: chest pain    Subjective:       Clinical Changes / Abnormalities:  Post op#1  Off the pressors and extubated same day. Stayed in SR with HR in 80 to 90. C/c of chest pain sec to post op.   Sitting up in chair      Medications:   Scheduled Meds:   gabapentin  300 mg Oral TID    sodium chloride flush  10 mL Intravenous 2 times per day    ceFAZolin (ANCEF) IVPB  2 g Intravenous Q8H    docusate sodium  100 mg Oral BID    polyethylene glycol  17 g Oral Daily    [START ON 7/8/2018] famotidine  20 mg Oral BID    metoprolol tartrate  25 mg Oral BID    amiodarone  200 mg Oral BID    chlorhexidine  15 mL Mouth/Throat BID    mupirocin   Nasal BID    therapeutic multivitamin-minerals  1 tablet Oral Daily with breakfast    atorvastatin  40 mg Oral Nightly    enoxaparin  40 mg Subcutaneous Daily    aspirin  81 mg Oral Daily    clopidogrel  75 mg Oral Daily    insulin lispro  0-12 Units Subcutaneous TID WC    insulin lispro  0-6 Units Subcutaneous Nightly     Continuous Infusions:   sodium chloride 75 mL/hr at 07/06/18 1324    insulin (HUMAN R) non-weight based infusion 1.59 Units/hr (07/07/18 0459)    dextrose      amiodarone 450mg/250ml D5W infusion 0.5 mg/min (07/07/18 1012)     CBC:   Recent Labs      07/06/18   0439  07/06/18   1225  07/06/18   1414  07/07/18   0509   WBC  8.9   --   13.5*  15.7*   HGB  12.9*  10.0*  11.2*  10.1*   PLT  306  165  181  190     BMP:    Recent Labs      07/06/18   0439  07/06/18   1347  07/06/18   1414  07/06/18   1739  07/07/18   0004  07/07/18   0509   NA  134*   --   141   --    --   134*   K  4.1   --   4.3  4.6  4.2  3.9   CL  101   --   110*   --    --   105   CO2  23   --   25   --    --   22   BUN  20   --   13   --    --

## 2018-07-08 ENCOUNTER — APPOINTMENT (OUTPATIENT)
Dept: GENERAL RADIOLOGY | Age: 63
DRG: 234 | End: 2018-07-08
Attending: INTERNAL MEDICINE
Payer: MEDICARE

## 2018-07-08 LAB
ABO/RH: NORMAL
ANION GAP SERPL CALCULATED.3IONS-SCNC: 10 MMOL/L (ref 9–17)
ANTIBODY SCREEN: NEGATIVE
ARM BAND NUMBER: NORMAL
BLD PROD TYP BPU: NORMAL
BLD PROD TYP BPU: NORMAL
BUN BLDV-MCNC: 16 MG/DL (ref 8–23)
BUN/CREAT BLD: ABNORMAL (ref 9–20)
CALCIUM SERPL-MCNC: 8.2 MG/DL (ref 8.6–10.4)
CHLORIDE BLD-SCNC: 104 MMOL/L (ref 98–107)
CO2: 25 MMOL/L (ref 20–31)
CREAT SERPL-MCNC: 0.76 MG/DL (ref 0.7–1.2)
CROSSMATCH RESULT: NORMAL
CROSSMATCH RESULT: NORMAL
DISPENSE STATUS BLOOD BANK: NORMAL
DISPENSE STATUS BLOOD BANK: NORMAL
EXPIRATION DATE: NORMAL
GFR AFRICAN AMERICAN: >60 ML/MIN
GFR NON-AFRICAN AMERICAN: >60 ML/MIN
GFR SERPL CREATININE-BSD FRML MDRD: ABNORMAL ML/MIN/{1.73_M2}
GFR SERPL CREATININE-BSD FRML MDRD: ABNORMAL ML/MIN/{1.73_M2}
GLUCOSE BLD-MCNC: 105 MG/DL (ref 75–110)
GLUCOSE BLD-MCNC: 124 MG/DL (ref 70–99)
GLUCOSE BLD-MCNC: 124 MG/DL (ref 75–110)
GLUCOSE BLD-MCNC: 141 MG/DL (ref 75–110)
GLUCOSE BLD-MCNC: 150 MG/DL (ref 75–110)
HCT VFR BLD CALC: 32.9 % (ref 40.7–50.3)
HEMOGLOBIN: 10.2 G/DL (ref 13–17)
INR BLD: 1
MAGNESIUM: 2.4 MG/DL (ref 1.6–2.6)
MCH RBC QN AUTO: 25.9 PG (ref 25.2–33.5)
MCHC RBC AUTO-ENTMCNC: 31 G/DL (ref 28.4–34.8)
MCV RBC AUTO: 83.5 FL (ref 82.6–102.9)
NRBC AUTOMATED: 0 PER 100 WBC
PDW BLD-RTO: 15.2 % (ref 11.8–14.4)
PLATELET # BLD: 230 K/UL (ref 138–453)
PMV BLD AUTO: 11.1 FL (ref 8.1–13.5)
POTASSIUM SERPL-SCNC: 4.5 MMOL/L (ref 3.7–5.3)
PROTHROMBIN TIME: 10.6 SEC (ref 9–12)
RBC # BLD: 3.94 M/UL (ref 4.21–5.77)
SODIUM BLD-SCNC: 139 MMOL/L (ref 135–144)
TRANSFUSION STATUS: NORMAL
TRANSFUSION STATUS: NORMAL
UNIT DIVISION: 0
UNIT DIVISION: 0
UNIT NUMBER: NORMAL
UNIT NUMBER: NORMAL
WBC # BLD: 20.1 K/UL (ref 3.5–11.3)

## 2018-07-08 PROCEDURE — 80048 BASIC METABOLIC PNL TOTAL CA: CPT

## 2018-07-08 PROCEDURE — 85027 COMPLETE CBC AUTOMATED: CPT

## 2018-07-08 PROCEDURE — 94762 N-INVAS EAR/PLS OXIMTRY CONT: CPT

## 2018-07-08 PROCEDURE — 6370000000 HC RX 637 (ALT 250 FOR IP): Performed by: NURSE PRACTITIONER

## 2018-07-08 PROCEDURE — 6360000002 HC RX W HCPCS: Performed by: STUDENT IN AN ORGANIZED HEALTH CARE EDUCATION/TRAINING PROGRAM

## 2018-07-08 PROCEDURE — 71045 X-RAY EXAM CHEST 1 VIEW: CPT

## 2018-07-08 PROCEDURE — 97110 THERAPEUTIC EXERCISES: CPT

## 2018-07-08 PROCEDURE — 85610 PROTHROMBIN TIME: CPT

## 2018-07-08 PROCEDURE — 82947 ASSAY GLUCOSE BLOOD QUANT: CPT

## 2018-07-08 PROCEDURE — 97116 GAIT TRAINING THERAPY: CPT

## 2018-07-08 PROCEDURE — 2140000001 HC CVICU INTERMEDIATE R&B

## 2018-07-08 PROCEDURE — 6360000002 HC RX W HCPCS: Performed by: NURSE PRACTITIONER

## 2018-07-08 PROCEDURE — 83735 ASSAY OF MAGNESIUM: CPT

## 2018-07-08 PROCEDURE — 99024 POSTOP FOLLOW-UP VISIT: CPT | Performed by: NURSE PRACTITIONER

## 2018-07-08 PROCEDURE — 2580000003 HC RX 258: Performed by: NURSE PRACTITIONER

## 2018-07-08 RX ORDER — FUROSEMIDE 10 MG/ML
20 INJECTION INTRAMUSCULAR; INTRAVENOUS DAILY
Status: DISCONTINUED | OUTPATIENT
Start: 2018-07-08 | End: 2018-07-09

## 2018-07-08 RX ADMIN — METOPROLOL TARTRATE 25 MG: 25 TABLET ORAL at 09:03

## 2018-07-08 RX ADMIN — FAMOTIDINE 20 MG: 20 TABLET, FILM COATED ORAL at 20:56

## 2018-07-08 RX ADMIN — ENOXAPARIN SODIUM 40 MG: 100 INJECTION SUBCUTANEOUS at 09:43

## 2018-07-08 RX ADMIN — FUROSEMIDE 20 MG: 10 INJECTION, SOLUTION INTRAVENOUS at 09:06

## 2018-07-08 RX ADMIN — ASPIRIN 81 MG: 81 TABLET, COATED ORAL at 09:04

## 2018-07-08 RX ADMIN — DESMOPRESSIN ACETATE 40 MG: 0.2 TABLET ORAL at 20:56

## 2018-07-08 RX ADMIN — CLOPIDOGREL 75 MG: 75 TABLET, FILM COATED ORAL at 09:04

## 2018-07-08 RX ADMIN — AMIODARONE HYDROCHLORIDE 200 MG: 200 TABLET ORAL at 09:03

## 2018-07-08 RX ADMIN — CHLORHEXIDINE GLUCONATE 15 ML: 1.2 RINSE ORAL at 20:57

## 2018-07-08 RX ADMIN — MULTIPLE VITAMINS W/ MINERALS TAB 1 TABLET: TAB at 09:04

## 2018-07-08 RX ADMIN — GABAPENTIN 300 MG: 300 CAPSULE ORAL at 09:04

## 2018-07-08 RX ADMIN — POLYETHYLENE GLYCOL 3350 17 G: 17 POWDER, FOR SOLUTION ORAL at 14:36

## 2018-07-08 RX ADMIN — AMIODARONE HYDROCHLORIDE 200 MG: 200 TABLET ORAL at 20:56

## 2018-07-08 RX ADMIN — Medication 10 ML: at 09:06

## 2018-07-08 RX ADMIN — DOCUSATE SODIUM 100 MG: 100 CAPSULE ORAL at 09:03

## 2018-07-08 RX ADMIN — DOCUSATE SODIUM 100 MG: 100 CAPSULE ORAL at 20:56

## 2018-07-08 RX ADMIN — MUPIROCIN: 20 OINTMENT TOPICAL at 20:57

## 2018-07-08 RX ADMIN — FAMOTIDINE 20 MG: 20 TABLET, FILM COATED ORAL at 09:04

## 2018-07-08 RX ADMIN — METOPROLOL TARTRATE 25 MG: 25 TABLET ORAL at 20:56

## 2018-07-08 RX ADMIN — GABAPENTIN 300 MG: 300 CAPSULE ORAL at 20:56

## 2018-07-08 RX ADMIN — GABAPENTIN 300 MG: 300 CAPSULE ORAL at 14:35

## 2018-07-08 RX ADMIN — Medication 2 G: at 04:00

## 2018-07-08 RX ADMIN — TRAMADOL HYDROCHLORIDE 50 MG: 50 TABLET, FILM COATED ORAL at 21:03

## 2018-07-08 ASSESSMENT — PAIN SCALES - GENERAL: PAINLEVEL_OUTOF10: 4

## 2018-07-08 NOTE — PROGRESS NOTES
Nutrition Education    Type and Reason for Visit: Consult, Patient Education    Patient stated understanding to all information provided. Discussed and reviewed educational handout w/ both pt and wife present. Both are receptive to making dietary changes at home for heart health. Handouts left at bedside. All questions answered at this time. · Verbally reviewed following information with pt and wife. · Written educational materials provided. · Contact name and number provided. · Refer to Patient Education activity for more details.     Electronically signed by Melodie Lepe DTR on 7/8/18 at 2:45 PM    Contact Number: [phone]

## 2018-07-08 NOTE — PROGRESS NOTES
11.2*  10.1*   HCT  41.2  31.7*  35.4*  31.6*   MCV  81.3*   --   81.6*  80.8*   PLT  306  165  181  190     BMP:   Recent Labs      07/06/18   0439  07/06/18   1347  07/06/18   1414  07/06/18   1739  07/07/18   0004  07/07/18   0509   NA  134*   --   141   --    --   134*   K  4.1   --   4.3  4.6  4.2  3.9   CL  101   --   110*   --    --   105   CO2  23   --   25   --    --   22   BUN  20   --   13   --    --   14   CREATININE  0.88  0.69  0.53*   --    --   0.70     PT/INR:   Recent Labs      07/06/18   1225  07/06/18   1414  07/07/18   0509   PROTIME  12.9*  11.5  11.2   INR  1.2  1.1  1.1     APTT:   Recent Labs      07/06/18   1225  07/06/18   1414   APTT  26.3  25.6         I/O:  I/O last 3 completed shifts: In: 2086 [P.O.:1320; I.V.:766]  Out: 1005 [Urine:925; Chest Tube:80]      Assessment & Plan:   Patient Active Problem List   Diagnosis    Sprain and strain of unspecified site of shoulder and upper arm    Sprain of neck    Displacement of cervical intervertebral disc without myelopathy    Cervical radiculitis    HTN (hypertension)    Umbilical hernia    HTN (hypertension)    Impaired fasting blood sugar    Displacement of cervical intervertebral disc without myelopathy    Rotator cuff tear    CAD in native artery     1. S/p CABG x 2              POD 2              Clip wires              Not diabetic, BS stable, will stop insulin coverage              Cardiac Rehab  2. ABLA - Hgb 12.9 to 10.1, stable, continue to monitor with labs     DVT ppx: Lovenox & SCD's while stationary  Patient is on BB, ASA, Statin therapy per protocol   Plan for discharge home when medically stable    The above recommendations including medications and orders were discussed and agreed upon with Dr. Yu Stuart the attending on service for the cardiothoracic surgery group today.      Andrea Ye, APRN, CNP

## 2018-07-08 NOTE — PROGRESS NOTES
daily/ 7x per week   Times per day: Twice a day  Specific instructions for Next Treatment: AMBULATE W/O AD  Current Treatment Recommendations: Balance Training, Functional Mobility Training, Stair training, Gait Training, Endurance Training, Transfer Training, Safety Education & Training, Patient/Caregiver Education & Training  Safety Devices  Type of devices: Call light within reach, Gait belt, Left in chair, Nurse notified  Restraints  Initially in place: No     Therapy Time   Individual Concurrent Group Co-treatment   Time In 0826         Time Out 0900         Minutes PEPITO Coronel

## 2018-07-08 NOTE — PROGRESS NOTES
[facility]   Progress Note                   Date:   7/8/2018  Patient name: Angelica Bansal  Date of admission:  7/3/2018  6:29 PM  MRN:   [id number]  YOB: 1955  PCP: Bryan Nassar MD    Reason for Admission: chest pain    Subjective:       Clinical Changes / Abnormalities:  Post op#2  Off the pressors and extubated same day. Has stayed in SR through out night with HR in 80 to 90s  Has been on nasal canula oxygen.   Gets short of breath easily with walking around      Medications:   Scheduled Meds:   gabapentin  300 mg Oral TID    sodium chloride flush  10 mL Intravenous 2 times per day    ceFAZolin (ANCEF) IVPB  2 g Intravenous Q8H    docusate sodium  100 mg Oral BID    polyethylene glycol  17 g Oral Daily    famotidine  20 mg Oral BID    metoprolol tartrate  25 mg Oral BID    amiodarone  200 mg Oral BID    chlorhexidine  15 mL Mouth/Throat BID    mupirocin   Nasal BID    therapeutic multivitamin-minerals  1 tablet Oral Daily with breakfast    atorvastatin  40 mg Oral Nightly    enoxaparin  40 mg Subcutaneous Daily    aspirin  81 mg Oral Daily    clopidogrel  75 mg Oral Daily    insulin lispro  0-12 Units Subcutaneous TID WC    insulin lispro  0-6 Units Subcutaneous Nightly     Continuous Infusions:   sodium chloride 75 mL/hr at 07/06/18 1324    insulin (HUMAN R) non-weight based infusion Stopped (07/07/18 1300)    dextrose      amiodarone 450mg/250ml D5W infusion 0.5 mg/min (07/07/18 2201)     CBC:   Recent Labs      07/06/18   0439  07/06/18   1225  07/06/18   1414  07/07/18   0509   WBC  8.9   --   13.5*  15.7*   HGB  12.9*  10.0*  11.2*  10.1*   PLT  306  165  181  190     BMP:    Recent Labs      07/06/18   0439  07/06/18   1347  07/06/18   1414  07/06/18   1739  07/07/18   0004  07/07/18   0509   NA  134*   --   141   --    --   134*   K  4.1   --   4.3  4.6  4.2  3.9   CL  101   --   110*   --    --   105   CO2  23   --   25   --    --   22   BUN 20   --   13   --    --   14   CREATININE  0.88  0.69  0.53*   --    --   0.70   GLUCOSE  105*   --   119*   --    --   119*     Hepatic:   Recent Labs      07/06/18   0439   AST  17   ALT  15   BILITOT  0.22*   ALKPHOS  128     Troponin: No results for input(s): TROPONINI in the last 72 hours. BNP: No results for input(s): BNP in the last 72 hours. Lipids: No results for input(s): CHOL, HDL in the last 72 hours. Invalid input(s): LDLCALCU  INR:   Recent Labs      07/06/18   1225  07/06/18   1414  07/07/18   0509   INR  1.2  1.1  1.1       Objective:   Vitals: /63   Pulse 79   Temp 98 °F (36.7 °C) (Oral)   Resp 16   Ht 5' 9\" (1.753 m)   Wt 220 lb 3.8 oz (99.9 kg)   SpO2 98%   BMI 32.52 kg/m²   General appearance: alert and cooperative with exam  HEENT: Head: Normocephalic, no lesions, without obvious abnormality. Neck: JVP is not raised  Lungs: basal rales present. Heart: regular rate and rhythm, S1, S2 normal,Pericardial rub  Abdomen: soft, non-tender; bowel sounds normal; no masses,  no organomegaly  Extremities: extremities normal, atraumatic, no cyanosis or edema  Neurologic: Mental status: Alert, oriented, thought content appropriate    Surgical dressings & Chest  tubes in place    EKG: SR     7/3/18  LMCA: Normal 0% stenosis. LAD: has heavy calcification in the proximal segment. The mid segment has  85% stenosis. LCx: has ostial eccentric 90% stenosis. The proximal segment has 90%  stenosis with bifurcation with OM1. The OM2 has mid 90% stenosis. The OM 3 has mid 90% stenosis. RCA: has mid-distal 99% stenosis and distal 95% stenosis. Ramus: has proximal 80% stenosis.     Coronary Tree      Dominance: Right         The LV gram was performed in the POOLE 30 position. LVEF: 55%. LV Wall Motion: Normal     7/5/18  Left ventricle is normal in size. Global left ventricular systolic function  is normal.  Estimated ejection fraction is 55 % .   Mildly increased septal wall thickness. No significant valvular regurgitation or stenosis seen. Mildly dilated aortic annulus (4.0 cm) and ascending aorta (4.25 cm)  Grade I (mild) left ventricular diastolic dysfunction. Assessment / Acute Cardiac Problems:   1. CAD S/P CABG( LIMA-LAD, R SVG- RCA) OM too small for the grafts( 7/6/18)  2. Ischemic cardiomyopathy with preserved EF  3. Hypertension  4. Pericardial rub  5. BPH  6. Post op anemia  7. Leukocytosis  8. Aortic aneurysm    Patient Active Problem List:     Sprain and strain of unspecified site of shoulder and upper arm     Sprain of neck     Displacement of cervical intervertebral disc without myelopathy     Cervical radiculitis     HTN (hypertension)     Umbilical hernia     HTN (hypertension)     Impaired fasting blood sugar     Displacement of cervical intervertebral disc without myelopathy     Rotator cuff tear     CAD in native artery      Plan of Treatment:     1 Post op care per CT Surgery  2 will recommend asa 81 mg.lipitor 40 mg. plavix 75 mg.  3 will recommend adding lopressor 25 mg BID. 4.will recommend lasix 20 mg IV. Stop the amiodarone gtt and switch to PO amiodarone 200 BID    Electronically signed by Enoch Parikh MD on 7/8/2018 at 7:55 AM  [facility]  Attending Physician Statement  I have discussed the care of the patient, including pertinent history and exam findings, with the resident. I have seen and examined the patient and the key elements of all parts of the encounter have been performed by me. I agree with the assessment, plan and orders as documented by the resident.   Watch I/O   Needs to follow up for aortic aneurysm     Dedrick Ordaz MD

## 2018-07-09 ENCOUNTER — APPOINTMENT (OUTPATIENT)
Dept: GENERAL RADIOLOGY | Age: 63
DRG: 234 | End: 2018-07-09
Attending: INTERNAL MEDICINE
Payer: MEDICARE

## 2018-07-09 ENCOUNTER — TELEPHONE (OUTPATIENT)
Dept: FAMILY MEDICINE CLINIC | Age: 63
End: 2018-07-09

## 2018-07-09 VITALS
DIASTOLIC BLOOD PRESSURE: 56 MMHG | TEMPERATURE: 98.2 F | HEART RATE: 85 BPM | OXYGEN SATURATION: 98 % | BODY MASS INDEX: 32.88 KG/M2 | HEIGHT: 69 IN | WEIGHT: 222 LBS | SYSTOLIC BLOOD PRESSURE: 100 MMHG | RESPIRATION RATE: 14 BRPM

## 2018-07-09 LAB
ANION GAP SERPL CALCULATED.3IONS-SCNC: 8 MMOL/L (ref 9–17)
BUN BLDV-MCNC: 18 MG/DL (ref 8–23)
BUN/CREAT BLD: ABNORMAL (ref 9–20)
CALCIUM SERPL-MCNC: 8 MG/DL (ref 8.6–10.4)
CHLORIDE BLD-SCNC: 103 MMOL/L (ref 98–107)
CO2: 25 MMOL/L (ref 20–31)
CREAT SERPL-MCNC: 0.77 MG/DL (ref 0.7–1.2)
GFR AFRICAN AMERICAN: >60 ML/MIN
GFR NON-AFRICAN AMERICAN: >60 ML/MIN
GFR SERPL CREATININE-BSD FRML MDRD: ABNORMAL ML/MIN/{1.73_M2}
GFR SERPL CREATININE-BSD FRML MDRD: ABNORMAL ML/MIN/{1.73_M2}
GLUCOSE BLD-MCNC: 88 MG/DL (ref 75–110)
GLUCOSE BLD-MCNC: 95 MG/DL (ref 70–99)
HCT VFR BLD CALC: 33.5 % (ref 40.7–50.3)
HEMOGLOBIN: 10.3 G/DL (ref 13–17)
INR BLD: 0.9
MAGNESIUM: 2.4 MG/DL (ref 1.6–2.6)
MCH RBC QN AUTO: 25.8 PG (ref 25.2–33.5)
MCHC RBC AUTO-ENTMCNC: 30.7 G/DL (ref 28.4–34.8)
MCV RBC AUTO: 84 FL (ref 82.6–102.9)
NRBC AUTOMATED: 0.3 PER 100 WBC
PDW BLD-RTO: 15.4 % (ref 11.8–14.4)
PLATELET # BLD: 220 K/UL (ref 138–453)
PMV BLD AUTO: 11.3 FL (ref 8.1–13.5)
POC POTASSIUM: 7.2 MMOL/L (ref 3.5–4.5)
POC POTASSIUM: 7.5 MMOL/L (ref 3.5–4.5)
POTASSIUM SERPL-SCNC: 4.5 MMOL/L (ref 3.7–5.3)
PROTHROMBIN TIME: 9.6 SEC (ref 9–12)
RBC # BLD: 3.99 M/UL (ref 4.21–5.77)
SODIUM BLD-SCNC: 136 MMOL/L (ref 135–144)
WBC # BLD: 16 K/UL (ref 3.5–11.3)

## 2018-07-09 PROCEDURE — 71045 X-RAY EXAM CHEST 1 VIEW: CPT

## 2018-07-09 PROCEDURE — 80048 BASIC METABOLIC PNL TOTAL CA: CPT

## 2018-07-09 PROCEDURE — 82947 ASSAY GLUCOSE BLOOD QUANT: CPT

## 2018-07-09 PROCEDURE — 97116 GAIT TRAINING THERAPY: CPT

## 2018-07-09 PROCEDURE — 83735 ASSAY OF MAGNESIUM: CPT

## 2018-07-09 PROCEDURE — 99024 POSTOP FOLLOW-UP VISIT: CPT | Performed by: NURSE PRACTITIONER

## 2018-07-09 PROCEDURE — 6370000000 HC RX 637 (ALT 250 FOR IP): Performed by: NURSE PRACTITIONER

## 2018-07-09 PROCEDURE — 36415 COLL VENOUS BLD VENIPUNCTURE: CPT

## 2018-07-09 PROCEDURE — 85610 PROTHROMBIN TIME: CPT

## 2018-07-09 PROCEDURE — 97110 THERAPEUTIC EXERCISES: CPT

## 2018-07-09 PROCEDURE — 6360000002 HC RX W HCPCS: Performed by: NURSE PRACTITIONER

## 2018-07-09 PROCEDURE — 85027 COMPLETE CBC AUTOMATED: CPT

## 2018-07-09 RX ORDER — FUROSEMIDE 40 MG/1
40 TABLET ORAL DAILY
Qty: 30 TABLET | Refills: 3 | Status: SHIPPED | OUTPATIENT
Start: 2018-07-09 | End: 2018-09-20 | Stop reason: SDUPTHER

## 2018-07-09 RX ORDER — PSEUDOEPHEDRINE HCL 30 MG
100 TABLET ORAL 2 TIMES DAILY
Qty: 60 CAPSULE | Refills: 0 | Status: SHIPPED | OUTPATIENT
Start: 2018-07-09 | End: 2018-07-27 | Stop reason: ALTCHOICE

## 2018-07-09 RX ORDER — CLOPIDOGREL BISULFATE 75 MG/1
75 TABLET ORAL DAILY
Qty: 30 TABLET | Refills: 3 | Status: SHIPPED | OUTPATIENT
Start: 2018-07-10 | End: 2018-07-18 | Stop reason: SDUPTHER

## 2018-07-09 RX ORDER — TRAMADOL HYDROCHLORIDE 50 MG/1
50 TABLET ORAL EVERY 6 HOURS PRN
Qty: 28 TABLET | Refills: 0 | Status: SHIPPED | OUTPATIENT
Start: 2018-07-09 | End: 2018-07-19

## 2018-07-09 RX ORDER — FUROSEMIDE 40 MG/1
40 TABLET ORAL DAILY
Status: DISCONTINUED | OUTPATIENT
Start: 2018-07-09 | End: 2018-07-09 | Stop reason: HOSPADM

## 2018-07-09 RX ORDER — ACETAMINOPHEN 325 MG/1
650 TABLET ORAL EVERY 6 HOURS PRN
Qty: 120 TABLET | Refills: 3 | COMMUNITY
Start: 2018-07-09

## 2018-07-09 RX ORDER — AMIODARONE HYDROCHLORIDE 200 MG/1
200 TABLET ORAL 2 TIMES DAILY
Qty: 30 TABLET | Refills: 3 | Status: SHIPPED | OUTPATIENT
Start: 2018-07-09 | End: 2018-07-18 | Stop reason: SDUPTHER

## 2018-07-09 RX ORDER — ATORVASTATIN CALCIUM 40 MG/1
40 TABLET, FILM COATED ORAL NIGHTLY
Qty: 30 TABLET | Refills: 3 | Status: SHIPPED | OUTPATIENT
Start: 2018-07-09 | End: 2018-09-20 | Stop reason: SDUPTHER

## 2018-07-09 RX ORDER — POTASSIUM CHLORIDE 20 MEQ/1
20 TABLET, EXTENDED RELEASE ORAL 2 TIMES DAILY
Status: DISCONTINUED | OUTPATIENT
Start: 2018-07-09 | End: 2018-07-09 | Stop reason: HOSPADM

## 2018-07-09 RX ORDER — POTASSIUM CHLORIDE 20 MEQ/1
20 TABLET, EXTENDED RELEASE ORAL DAILY
Qty: 30 TABLET | Refills: 3 | Status: SHIPPED | OUTPATIENT
Start: 2018-07-09 | End: 2018-09-20 | Stop reason: SDUPTHER

## 2018-07-09 RX ADMIN — ENOXAPARIN SODIUM 40 MG: 100 INJECTION SUBCUTANEOUS at 08:34

## 2018-07-09 RX ADMIN — METOPROLOL TARTRATE 25 MG: 25 TABLET ORAL at 08:33

## 2018-07-09 RX ADMIN — MUPIROCIN: 20 OINTMENT TOPICAL at 10:34

## 2018-07-09 RX ADMIN — FAMOTIDINE 20 MG: 20 TABLET, FILM COATED ORAL at 08:34

## 2018-07-09 RX ADMIN — CLOPIDOGREL 75 MG: 75 TABLET, FILM COATED ORAL at 08:33

## 2018-07-09 RX ADMIN — GABAPENTIN 300 MG: 300 CAPSULE ORAL at 08:33

## 2018-07-09 RX ADMIN — BISACODYL 10 MG: 10 SUPPOSITORY RECTAL at 10:51

## 2018-07-09 RX ADMIN — CHLORHEXIDINE GLUCONATE 15 ML: 1.2 RINSE ORAL at 08:34

## 2018-07-09 RX ADMIN — ASPIRIN 81 MG: 81 TABLET, COATED ORAL at 08:33

## 2018-07-09 RX ADMIN — TRAMADOL HYDROCHLORIDE 50 MG: 50 TABLET, FILM COATED ORAL at 08:34

## 2018-07-09 RX ADMIN — MULTIPLE VITAMINS W/ MINERALS TAB 1 TABLET: TAB at 08:33

## 2018-07-09 RX ADMIN — AMIODARONE HYDROCHLORIDE 200 MG: 200 TABLET ORAL at 08:33

## 2018-07-09 RX ADMIN — POTASSIUM CHLORIDE 20 MEQ: 1500 TABLET, EXTENDED RELEASE ORAL at 10:33

## 2018-07-09 RX ADMIN — GABAPENTIN 300 MG: 300 CAPSULE ORAL at 14:16

## 2018-07-09 RX ADMIN — POLYETHYLENE GLYCOL 3350 17 G: 17 POWDER, FOR SOLUTION ORAL at 08:34

## 2018-07-09 RX ADMIN — TRAMADOL HYDROCHLORIDE 50 MG: 50 TABLET, FILM COATED ORAL at 14:16

## 2018-07-09 RX ADMIN — DOCUSATE SODIUM 100 MG: 100 CAPSULE ORAL at 08:33

## 2018-07-09 RX ADMIN — FUROSEMIDE 40 MG: 40 TABLET ORAL at 10:34

## 2018-07-09 ASSESSMENT — ENCOUNTER SYMPTOMS
CHEST TIGHTNESS: 0
EYE DISCHARGE: 0
ABDOMINAL PAIN: 0
COUGH: 0
NAUSEA: 0
EYE REDNESS: 0
VOMITING: 0
CONSTIPATION: 0
SHORTNESS OF BREATH: 0
TROUBLE SWALLOWING: 0

## 2018-07-09 ASSESSMENT — PAIN DESCRIPTION - ORIENTATION: ORIENTATION: MID

## 2018-07-09 ASSESSMENT — PAIN DESCRIPTION - PROGRESSION: CLINICAL_PROGRESSION: GRADUALLY IMPROVING

## 2018-07-09 ASSESSMENT — PAIN SCALES - GENERAL
PAINLEVEL_OUTOF10: 2
PAINLEVEL_OUTOF10: 3
PAINLEVEL_OUTOF10: 4

## 2018-07-09 ASSESSMENT — PAIN DESCRIPTION - PAIN TYPE: TYPE: SURGICAL PAIN

## 2018-07-09 ASSESSMENT — PAIN DESCRIPTION - LOCATION: LOCATION: CHEST

## 2018-07-09 NOTE — DISCHARGE SUMMARY
History   Problem Relation Age of Onset    High Blood Pressure Father     Colon Polyps Brother     COPD Mother      Social History     Social History    Marital status:      Spouse name: Tomas Ruff Number of children: 2    Years of education: N/A     Occupational History          [employer] [address]     Social History Main Topics    Smoking status: Former Smoker     Packs/day: 2.00     Years: 25.00     Types: Cigarettes     Quit date: 1/4/2008    Smokeless tobacco: Never Used      Comment: Hany Trinidad RRT 11/23/16    Alcohol use No    Drug use: No    Sexual activity: Not on file     Other Topics Concern    Not on file     Social History Narrative    No narrative on file          Medication List      START taking these medications    acetaminophen 325 MG tablet  Commonly known as:  TYLENOL  Take 2 tablets by mouth every 6 hours as needed for Pain     amiodarone 200 MG tablet  Commonly known as:  CORDARONE  Take 1 tablet by mouth 2 times daily     atorvastatin 40 MG tablet  Commonly known as:  LIPITOR  Take 1 tablet by mouth nightly     clopidogrel 75 MG tablet  Commonly known as:  PLAVIX  Take 1 tablet by mouth daily     docusate 100 MG Caps  Commonly known as:  COLACE, DULCOLAX  Take 100 mg by mouth 2 times daily     furosemide 40 MG tablet  Commonly known as:  LASIX  Take 1 tablet by mouth daily     metoprolol tartrate 25 MG tablet  Commonly known as:  LOPRESSOR  Take 1 tablet by mouth 2 times daily     potassium chloride 20 MEQ extended release tablet  Commonly known as:  KLOR-CON M  Take 1 tablet by mouth daily     traMADol 50 MG tablet  Commonly known as:  ULTRAM  Take 1 tablet by mouth every 6 hours as needed for Pain for up to 10 days. Shannon Regalado         CHANGE how you take these medications    gabapentin 300 MG capsule  Commonly known as:  NEURONTIN  Take 1 capsule by mouth 3 times daily  What changed:  when to take this        CONTINUE taking these medications    aspirin 81 MG tablet cyclobenzaprine 5 MG tablet  Commonly known as:  FLEXERIL        STOP taking these medications    lisinopril 10 MG tablet  Commonly known as:  PRINIVIL;ZESTRIL     naproxen 500 MG tablet  Commonly known as:  NAPROSYN     SAW PALMETTO (SERENOA REPENS) PO           Where to Get Your Medications      These medications were sent to [facility], [place] [address]    Phone:  [phone]   · amiodarone 200 MG tablet  · atorvastatin 40 MG tablet  · clopidogrel 75 MG tablet  · docusate 100 MG Caps  · furosemide 40 MG tablet  · metoprolol tartrate 25 MG tablet  · potassium chloride 20 MEQ extended release tablet  · traMADol 50 MG tablet     You can get these medications from any pharmacy    You don't need a prescription for these medications  · acetaminophen 325 MG tablet           Data:    CBC:   Recent Labs      07/07/18   0509  07/08/18   0911  07/09/18   0447   WBC  15.7*  20.1*  16.0*   HGB  10.1*  10.2*  10.3*   HCT  31.6*  32.9*  33.5*   MCV  80.8*  83.5  84.0   PLT  190  230  220     BMP:   Recent Labs      07/07/18   0509  07/08/18   0911  07/09/18   0447   NA  134*  139  136   K  3.9  4.5  4.5   CL  105  104  103   CO2  22  25  25   BUN  14  16  18   CREATININE  0.70  0.76  0.77   MG  2.2  2.4  2.4     Accucheck Glucoses:   Recent Labs      07/07/18   2134  07/08/18   0634  07/08/18   1118  07/08/18   1610  07/09/18   0704   POCGLU  141*  124*  150*  105  88     Cardiac Enzymes: No results for input(s): CKTOTAL, CKMB, CKMBINDEX, TROPONINI in the last 72 hours.   PTT/PT/INR:   Recent Labs      07/07/18   0509  07/08/18   0911  07/09/18   0447   PROTIME  11.2  10.6  9.6   INR  1.1  1.0  0.9     Recent Labs      07/06/18   1225  07/06/18   1414   APTT  26.3  25.6     Liver Profile:  Lab Results   Component Value Date    AST 17 07/06/2018    ALT 15 07/06/2018    BILIDIR 0.09 09/29/2014    BILITOT 0.22 07/06/2018    ALKPHOS 128 07/06/2018     Lab Results   Component Value Date    CHOL 188 07/03/2018    HDL 43 07/03/2018    TRIG 233 07/03/2018     TSH:   Lab Results   Component Value Date    TSH 1.87 09/30/2014     UA:   Lab Results   Component Value Date    COLORU YELLOW 07/04/2018    PHUR 5.0 07/04/2018    WBCUA 0 TO 4 05/18/2018    RBCUA None 05/18/2018    MUCUS NOT REPORTED 05/18/2018    TRICHOMONAS NOT REPORTED 05/18/2018    YEAST NOT REPORTED 05/18/2018    BACTERIA None 05/18/2018    SPECGRAV 1.022 07/04/2018    LEUKOCYTESUR NEGATIVE 07/04/2018    UROBILINOGEN Normal 07/04/2018    BILIRUBINUR NEGATIVE 07/04/2018    GLUCOSEU NEGATIVE 07/04/2018    AMORPHOUS NOT REPORTED 05/18/2018       Problem List Items Addressed This Visit     CAD in native artery    Relevant Medications    aspirin 81 MG tablet    heparin (porcine) injection 4,000 Units (Completed)    amiodarone (CORDARONE) 150 mg in dextrose 5 % 100 mL bolus (Completed)    metoprolol tartrate (LOPRESSOR) tablet 25 mg    amiodarone (CORDARONE) tablet 200 mg    hydrALAZINE (APRESOLINE) injection 5 mg    atorvastatin (LIPITOR) tablet 40 mg    enoxaparin (LOVENOX) injection 40 mg    aspirin EC tablet 81 mg    furosemide (LASIX) tablet 40 mg (Start on 7/9/2018  9:15 AM)    amiodarone (CORDARONE) 200 MG tablet    atorvastatin (LIPITOR) 40 MG tablet    metoprolol tartrate (LOPRESSOR) 25 MG tablet    furosemide (LASIX) 40 MG tablet      Other Visit Diagnoses     S/P CABG (coronary artery bypass graft)    -  Primary    Relevant Medications    traMADol (ULTRAM) 50 MG tablet            Discharge Plan:  Follow up with CT Surgery  in 1 week. Call office at [phone] for any problems. Follow up with PCP and cardiology in 1-2 weeks. No ACE due to low BP  Patient was discharged on Aspirin, Plavix, BB, and Statin therapy per protocol.       Zoran Garcia CNP  Phone: [phone]

## 2018-07-09 NOTE — PLAN OF CARE
DISCHARGE INSTRUCTIONS    Discharge instruction given: to pt and his spouse at bedside, questions answered    Reviewed Your Care After Open Heart Surgery book  Supplies needed  Activity instructions  Incisional Care  Weight and temperature   Pain Medication  Justin Hose  Incentive spirometer   Smoking Cessation  When to call your Doctor  Follow up appointments  Call Primary Care Doctor if feelings of depression persist  Reinforced pt to participate in outpt cardiac rehabilitation program  Cardiac rehab referral faxed with face sheet, op note and H&P to [facility] dressing removed open to air clean dry and intact  Chest tube sites redressed steri strips as they were removed with the dressing clean dry intact  Rt lower leg incision dressing removed sm amt ss drainage dressing reapplied instructed to change bid and as needed and to leave off once drainage stops.     Verbalized understanding of all discharge instructions

## 2018-07-09 NOTE — PROGRESS NOTES
Dr. Armando Collins at bedside. Update on patient's status. Notified of loss of IV access with multiple attempts. MD aware and ok with no IV access. No further orders at this time. Will continue to monitor.

## 2018-07-09 NOTE — PROGRESS NOTES
Physical Therapy  Facility/Department: [facility] CAR 1  Daily Treatment Note  NAME: Gilda Cardoso  : 1955  MRN: [id number]    Date of Service: 2018    Discharge Recommendations:  Home with assist PRN      Patient Diagnosis(es): The encounter diagnosis was CAD in native artery. has a past medical history of Abdominal pain; Clostridium difficile diarrhea; Displacement of cervical intervertebral disc without myelopathy; HTN (hypertension); Impaired fasting blood sugar; Neck pain; Neuralgia, neuritis, and radiculitis, unspecified; Shoulder region pain; and Sprain and strain of unspecified site of shoulder and upper arm. has a past surgical history that includes other surgical history (Left, 14, 1/09/15); other surgical history (Left, 2014); other surgical history (Left, 2015 , 16); Shoulder arthroscopy (Right, 2016); and Biceps Tenodesis (Right, 2016). Restrictions  Restrictions/Precautions  Restrictions/Precautions: Cardiac, Surgical Protocols, General Precautions  Required Braces or Orthoses?: No  Position Activity Restriction  Sternal Precautions: No Pushing, No Pulling, 5# Lifting Restrictions  Other position/activity restrictions: up with assist.  Subjective   General  Chart Reviewed: Yes  Response To Previous Treatment: Patient with no complaints from previous session. Family / Caregiver Present: No  Subjective  Subjective: alert, sitting on EOB  General Comment  Comments: Pt. was left in his chair after RX. Pain Assessment  Clinical Progression: Gradually improving  Orientation  Orientation  Overall Orientation Status: Within Normal Limits  Objective   Transfers  Sit to Stand: Supervision  Stand to sit: Supervision  Ambulation  Ambulation?: Yes  Ambulation 1  Surface: level tile  Device: No Device  Assistance: Stand by assistance  Quality of Gait: steady, good pace.   Distance: 350'  Comments: very mild SOB after stairs  Stairs/Curb  Stairs?: Yes  Stairs  # Steps devices: Left in chair, Call light within reach, Nurse notified  Restraints  Initially in place: No     Therapy Time   Individual Concurrent Group Co-treatment   Time In 0812         Time Out 0840         Minutes Yvonne 1923, PTA

## 2018-07-10 LAB
POC ANGLE TEG W HEP: 69.4 DEG (ref 59–74)
POC ANGLE TEG W HEP: 71.8 DEG (ref 59–74)
POC ANGLE TEG: 65.6 DEG (ref 59–74)
POC EPL TEG W/HEP: ABNORMAL % (ref 0–15)
POC EPL TEG W/HEP: NORMAL % (ref 0–15)
POC EPL TEG: ABNORMAL % (ref 0–15)
POC KINETICS TEG W HEP: 1.3 MIN (ref 1–3)
POC KINETICS TEG W HEP: 1.4 MIN (ref 1–3)
POC KINETICS TEG: 1.8 MIN (ref 1–3)
POC LY30(LYSIS) TEG W HEP: ABNORMAL % (ref 0–8)
POC LY30(LYSIS) TEG W HEP: NORMAL % (ref 0–8)
POC LY30(LYSIS) TEG: ABNORMAL % (ref 0–8)
POC MA(MAX CLOT) TEG: 62.1 MM (ref 55–74)
POC MAX CLOT TEG W HEP: 70.3 MM (ref 55–74)
POC MAX CLOT TEG W HEP: 75.4 MM (ref 55–74)
POC REACTION TIME TEG W HEP: 6.2 MIN (ref 4–9)
POC REACTION TIME TEG W HEP: 7.1 MIN (ref 4–9)
POC REACTION TIME TEG: 9.2 MIN (ref 4–9)
TEG COMMENT: ABNORMAL
TEG COMMENT: ABNORMAL
TEG COMMENT: NORMAL

## 2018-07-10 NOTE — PROCEDURES
[address] [facility]                                PULMONARY FUNCTION    PATIENT NAME: Russel Rider                 :        1955  MED REC NO:   [id number]                             ROOM:       1012  ACCOUNT NO:   [de-identified]                           ADMIT DATE: 2018  PROVIDER:     Marina Zhao    DATE OF PROCEDURE:  2018    Spirometry shows stage II obstructive ventilatory defect, which does not  improve with bronchodilator therapy. FEV1 is 2.55 L. Lung volume shows  minimal air trapping. Diffusion capacity is decreased at 73% predicted. Airway resistance and specific conductance are normal.  Flow-volume loop is  showing an obstructive ventilatory defect. IMPRESSION:  Stage II COPD with emphysema. Even though there is lack of  response to bronchodilator in the test, clinical improvement with  bronchodilator therapy cannot be excluded.         Ruth Hernandez    D: 2018 12:02:08       T: 2018 13:00:12     [email or web address]  Job#: [id number]     Doc#: [id number]    CC:

## 2018-07-13 NOTE — OP NOTE
[facility]                                 [facility] RE[facility]    PATIENT NAME: Russel Rider                 :        1955  MED REC NO:   [id number]                             ROOM:       4418  ACCOUNT NO:   [de-identified]                           ADMIT DATE: 2018  PROVIDER:     Jose Garvey MD    DATE OF PROCEDURE:  2018    PRIMARY ATTENDING SURGEON:  Jose Garvey MD    OTHER ASSISTANTS:  Include Benson Wilburn PA-C; Stacia Thompson PA-C    PREOPERATIVE DIAGNOSIS:  Multivessel coronary artery disease. POSTOPERATIVE DIAGNOSIS:  Multivessel coronary artery disease. PROCEDURES:  Median sternotomy, aorto-right atrial cardiopulmonary bypass,  CABG x2 with LIMA to the LAD and reverse saphenous vein graft-1 to the RCA,  endoscopic vein harvesting from right lower extremity. COMPLICATIONS:  None. CONDITION:  Stable. DISPOSITION:  To CVICU. ANESTHESIA:  General endotracheal.    ESTIMATED BLOOD LOSS:  Not applicable. INDICATIONS FOR SURGERY:  The patient is a 18-year-old man, who I met as an  inpatient as a consultation following catheterization revealing multivessel  coronary artery disease. He was thought to be a good CABG secondary to his  age and his coronary anatomy, and we agreed and planned surgery for him on  the morning of 2018. FINDINGS AT SURGERY:  Despite re-positioning of the cross-clamp, I had a  hard time maintaining an adequate diastolic arrest on the patient. There  appeared to be a lot of collateral flow, which was providing washout during  the last portion of the RCA graft as well as during the portion of the LAD  graft. Secondary to this, I used a shunt to fill the LAD graft while  ultimately giving continuous cold blood, which provided adequate diastolic  arrest for the LIMA to LAD anastomosis.   At the conclusion of the case, we  had 2 good bypass grafts, one to the RCA, proximal to the bifurcation, and  one of the skeletonized LIMA on the mid LAD. Although I intended on  grafting the patient's OM, when I looked at them and scratched it out under  direct vision, they were too small to graft in my opinion. SURGERY IN DETAIL:  The patient was identified in his bed in the  preoperative holding area. He was taken to the operating room, where he  was induced for general endotracheal anesthesia. This included an  uneventful endotracheal intubation and the appropriate placement of lines  and access for cardiac surgery. A time-out was performed as he was prepped  and draped in normal sterile fashion. The operation then began with a  simultaneous median sternotomy and takedown of the skeletonized LIMA artery  as well as endoscopic harvesting of the vein from the right lower  extremity. At the conclusion of this, when the conduit was adequate and  harvested, the pericardium was opened, and a pericardial well was created. A preliminary dissection was performed in order to achieve aorto-right  atrial cardiopulmonary bypass, which was achieved with excellent flows and  drainage after appropriate heparinization. A cardioplegia needle/root vent  was placed in the ascending aorta, the aorta was cross-clamped, and a dose  of cold [place] solution was given, which initially achieved an adequate  and sustained diastolic arrest.  I looked about the heart and decided to  start with the RCA. I opened the right coronary artery in the  atrioventricular groove, proximal to its bifurcation, and it was a  good-sized target and accepted the vein graft well. When I was finishing  this anastomosis, the heart started to beat again and we gave an additional  dose of cardioplegia. I also adjusted the cross-clamp. Satisfied with  this, I moved to the OMs and found that they were too small to graft.   I  just went to the LAD and performed a skeletonized LIMA to LAD anastomosis  to the mid LAD. It was a good-sized vessel and was a good target. During  this anastomosis, there were continued difficulties achieving an adequate  arrest and ultimately we settled on continuous cold blood for cardioplegic  solution, which I used a shunt to finish the LIMA to the LAD. This was  successful, and both grafts were patent at the conclusion of the case by  flow probe analysis. The cross-clamp was removed, the heart was deaired,  and the patient returned to normal sinus rhythm. V-wire was placed. Ultimately, he was able to be weaned from cardiopulmonary bypass without  difficulty on minimal inotropic support. Secondary to this, we  discontinued bypass, administered protamine, and decannulated. Hemostasis  was achieved, and chest tubes were placed. The sternum was closed with  stainless steel wires in a standard fashion. The remainder of the sternum  was closed using the normal surgical sterile technique. The patient  tolerated the procedure well and was transported to the CVICU in stable  condition. PETERSON Marrufo MD    D: 07/12/2018 13:40:21       T: 07/12/2018 14:38:10     [email or web address]  Job#: [id number]     Doc#: [id number]    CC:

## 2018-07-17 ENCOUNTER — TELEPHONE (OUTPATIENT)
Dept: CARDIOTHORACIC SURGERY | Age: 63
End: 2018-07-17

## 2018-07-17 DIAGNOSIS — Z95.1 S/P CABG X 2: Primary | ICD-10-CM

## 2018-07-18 ENCOUNTER — OFFICE VISIT (OUTPATIENT)
Dept: CARDIOTHORACIC SURGERY | Age: 63
End: 2018-07-18

## 2018-07-18 ENCOUNTER — HOSPITAL ENCOUNTER (OUTPATIENT)
Dept: GENERAL RADIOLOGY | Age: 63
Discharge: HOME | End: 2018-07-20
Payer: MEDICARE

## 2018-07-18 ENCOUNTER — HOSPITAL ENCOUNTER (OUTPATIENT)
Age: 63
Discharge: HOME | End: 2018-07-20
Payer: MEDICARE

## 2018-07-18 VITALS
SYSTOLIC BLOOD PRESSURE: 120 MMHG | BODY MASS INDEX: 29.92 KG/M2 | HEART RATE: 82 BPM | WEIGHT: 202 LBS | DIASTOLIC BLOOD PRESSURE: 78 MMHG | HEIGHT: 69 IN | TEMPERATURE: 98.5 F

## 2018-07-18 DIAGNOSIS — Z95.1 S/P CABG X 2: Primary | ICD-10-CM

## 2018-07-18 DIAGNOSIS — Z95.1 S/P CABG X 2: ICD-10-CM

## 2018-07-18 PROCEDURE — 71046 X-RAY EXAM CHEST 2 VIEWS: CPT

## 2018-07-18 PROCEDURE — 99024 POSTOP FOLLOW-UP VISIT: CPT | Performed by: PHYSICIAN ASSISTANT

## 2018-07-18 RX ORDER — AMIODARONE HYDROCHLORIDE 100 MG/1
100 TABLET ORAL 2 TIMES DAILY
Qty: 60 TABLET | Refills: 2 | Status: SHIPPED | OUTPATIENT
Start: 2018-07-18 | End: 2018-09-20 | Stop reason: ALTCHOICE

## 2018-07-18 RX ORDER — DOXYCYCLINE HYCLATE 100 MG
100 TABLET ORAL 2 TIMES DAILY
Qty: 14 TABLET | Refills: 0 | Status: SHIPPED | OUTPATIENT
Start: 2018-07-18 | End: 2018-07-25

## 2018-07-18 ASSESSMENT — ENCOUNTER SYMPTOMS
DIARRHEA: 0
CHEST TIGHTNESS: 0
NAUSEA: 0
COUGH: 0
SHORTNESS OF BREATH: 0
WHEEZING: 0
VOMITING: 0
CONSTIPATION: 0
ABDOMINAL PAIN: 0

## 2018-07-18 NOTE — PROGRESS NOTES
[facility]  Office Visit    Patient's Name/Date of Birth: Sil Parkinson / 1955 (1 y.o.)    Date: July 18, 2018     Chief Complaint   Patient presents with   Cristal Kaminski Post-Op Check     7/6/18 CABG **CXR**       Subjective:  Mr. Lior Mai is a 70 y.o.  male s/p CABG on 7/6/18. he feels well today. Pain is controlled. Patient denies any fever or chills. Complaining of dry cough. Denies chest pain or shortness of breath. Plan of care reviewed and questions answered. Review of Systems   Constitutional: Negative for chills, diaphoresis, fatigue, fever and unexpected weight change. Respiratory: Negative for cough, chest tightness, shortness of breath and wheezing. Cardiovascular: Negative for chest pain, palpitations and leg swelling. Gastrointestinal: Negative for abdominal pain, constipation, diarrhea, nausea and vomiting. Endocrine: Negative for polydipsia, polyphagia and polyuria. Genitourinary: Negative for dysuria and frequency. Musculoskeletal: Negative for arthralgias. Skin: Negative for pallor and rash. Neurological: Negative for dizziness, tremors, seizures, syncope, weakness, light-headedness and numbness. Psychiatric/Behavioral: Negative for confusion, sleep disturbance and suicidal ideas. Physical Exam  Vitals:    07/18/18 1310   BP: 120/78   Pulse: 82   Temp: 98.5 °F (36.9 °C)     Physical Exam   Constitutional: He is oriented to person, place, and time. He appears well-developed and well-nourished. No distress. HENT:   Head: Normocephalic and atraumatic. Eyes: EOM are normal. Pupils are equal, round, and reactive to light. Neck: Normal range of motion. Neck supple. No JVD present. No tracheal deviation present. Cardiovascular: Normal rate, regular rhythm, normal heart sounds and intact distal pulses. Exam reveals no gallop and no friction rub. No murmur heard.   Pulmonary/Chest: Effort normal and breath sounds normal. No respiratory distress. He has no wheezes. He has no rales. Abdominal: Soft. Bowel sounds are normal. He exhibits no distension and no mass. There is no tenderness. Musculoskeletal: Normal range of motion. He exhibits no edema or deformity. Neurological: He is alert and oriented to person, place, and time. No cranial nerve deficit. Skin: Skin is warm and dry. Capillary refill takes less than 2 seconds. He is not diaphoretic. Psychiatric: He has a normal mood and affect. His behavior is normal. Judgment and thought content normal.   Nursing note and vitals reviewed. Surgical Wounds: Erythema present on the lower third of the MS incision that is nontender. Erythema and tenderness present on the lower SVG incision. Incisions dry, no drainage present, no warmth and healing well    Current Medications:    Current Outpatient Prescriptions:     aspirin 81 MG tablet, daily, Disp: , Rfl:     amiodarone (PACERONE) 100 MG tablet, Take 1 tablet by mouth 2 times daily, Disp: 60 tablet, Rfl: 2    doxycycline hyclate (VIBRA-TABS) 100 MG tablet, Take 1 tablet by mouth 2 times daily for 7 days, Disp: 14 tablet, Rfl: 0    traMADol (ULTRAM) 50 MG tablet, Take 1 tablet by mouth every 6 hours as needed for Pain for up to 10 days. ., Disp: 28 tablet, Rfl: 0    acetaminophen (TYLENOL) 325 MG tablet, Take 2 tablets by mouth every 6 hours as needed for Pain, Disp: 120 tablet, Rfl: 3    atorvastatin (LIPITOR) 40 MG tablet, Take 1 tablet by mouth nightly, Disp: 30 tablet, Rfl: 3    metoprolol tartrate (LOPRESSOR) 25 MG tablet, Take 1 tablet by mouth 2 times daily, Disp: 60 tablet, Rfl: 3    furosemide (LASIX) 40 MG tablet, Take 1 tablet by mouth daily, Disp: 30 tablet, Rfl: 3    docusate sodium (COLACE, DULCOLAX) 100 MG CAPS, Take 100 mg by mouth 2 times daily, Disp: 60 capsule, Rfl: 0    potassium chloride (KLOR-CON M) 20 MEQ extended release tablet, Take 1 tablet by mouth daily, Disp: 30 tablet, Rfl: 3   cyclobenzaprine (FLEXERIL) 5 MG tablet, , Disp: , Rfl:     gabapentin (NEURONTIN) 300 MG capsule, Take 1 capsule by mouth 3 times daily (Patient taking differently: Take 300 mg by mouth 2 times daily .), Disp: 90 capsule, Rfl: 11    Past Surgical History:   Procedure Laterality Date    BICEPS TENODESIS Right 11/23/2016    right proximal bicep tenodesis    OTHER SURGICAL HISTORY Left 8/12/14, 1/09/15    C7 TFE    OTHER SURGICAL HISTORY Left 09/23/2014    C7 TFE    OTHER SURGICAL HISTORY Left 7/17/2015 , 1/22/16    C7 TFE    AL CABG, ARTERY-VEIN, SINGLE N/A 7/6/2018    CABG CORONARY ARTERY BYPASS, GARY ERAZO CHANI  (CSI# [id number]) performed by Chaya Diaz MD at [facility] Right 4/27/2016    scope decompressing, rotator cuff repair       Social Hx: reports that he quit smoking about 10 years ago. His smoking use included Cigarettes. He has a 50.00 pack-year smoking history. He has never used smokeless tobacco.    1. Assessment & Plan: Status post CABG patient progressing well. Decrease amiodarone 100 twice a day  2. SVG site infection - doxycycline for 7 days. Patient to call office if increasing erythema, drainage, swelling, fever, chills. Otherwise patient does not need to follow-up. 3. Follow-up with primary care and cardiology for further management  4. Follow up with Dr. Etelvina Bland as needed    The above recommendations including medications and orders were discussed and agreed upon with .     Electronically signed by JENNIFER Payne on 7/18/2018 at 1:35 PM

## 2018-07-27 ENCOUNTER — OFFICE VISIT (OUTPATIENT)
Dept: CARDIOLOGY CLINIC | Age: 63
End: 2018-07-27
Payer: MEDICARE

## 2018-07-27 VITALS
DIASTOLIC BLOOD PRESSURE: 82 MMHG | HEIGHT: 69 IN | BODY MASS INDEX: 29.62 KG/M2 | WEIGHT: 200 LBS | SYSTOLIC BLOOD PRESSURE: 130 MMHG | HEART RATE: 70 BPM

## 2018-07-27 DIAGNOSIS — I25.10 CAD IN NATIVE ARTERY: ICD-10-CM

## 2018-07-27 DIAGNOSIS — Z95.1 S/P CABG (CORONARY ARTERY BYPASS GRAFT): ICD-10-CM

## 2018-07-27 PROCEDURE — 99213 OFFICE O/P EST LOW 20 MIN: CPT | Performed by: INTERNAL MEDICINE

## 2018-07-27 PROCEDURE — 93000 ELECTROCARDIOGRAM COMPLETE: CPT | Performed by: INTERNAL MEDICINE

## 2018-07-27 RX ORDER — CLOPIDOGREL BISULFATE 75 MG/1
75 TABLET ORAL DAILY
COMMUNITY
End: 2018-10-30 | Stop reason: SDUPTHER

## 2018-07-27 RX ORDER — LORATADINE 10 MG/1
10 CAPSULE, LIQUID FILLED ORAL PRN
COMMUNITY
End: 2019-03-21 | Stop reason: ALTCHOICE

## 2018-07-27 NOTE — PROGRESS NOTES
(NEURONTIN) 300 MG capsule Take 1 capsule by mouth 3 times daily  Patient taking differently: Take 300 mg by mouth 2 times daily . 8/24/15  Yes Luke Ruiz MD   loratadine (CLARITIN) 10 MG capsule Take 10 mg by mouth as needed    Historical Provider, MD   cyclobenzaprine (FLEXERIL) 5 MG tablet  6/26/17   Historical Provider, MD       Allergies:  Vicodin [hydrocodone-acetaminophen]    Social History:   reports that he quit smoking about 10 years ago. His smoking use included Cigarettes. He has a 50.00 pack-year smoking history. He has never used smokeless tobacco. He reports that he does not drink alcohol or use drugs. REVIEW OF SYSTEMS:  CONSTITUTIONAL:NEGATIVE  HEENT:NEG  Cardiovascular: No chest pain, No dyspnea on exertion, No palpitations. Lower extremity edema: No  RESPIRATORY: neg  GASTROINTESTINAL:  negative  GENITOURINARY:  negative  INTEGUMENT:  negative  MUSCULOSKELETAL:  positive for  pain  NEUROLOGICAL:  negative    PHYSICAL EXAM:      /82   Pulse 70   Ht 5' 9\" (1.753 m)   Wt 200 lb (90.7 kg)   BMI 29.53 kg/m²    HEENT: PERRL, no cervical lymphadenopathy. No masses palpable. Cardiovascular:  · The apical impulse is not displaced  · Heart  Sounds:RRR, S4  · Jugular venous pulsation Normal  · The carotid upstroke is NL  · Peripheral pulses are symmetrical and full  Respiratory: Good respiratory effort. On auscultation: clear to auscultation bilaterally  Abdomen:  · No masses or tenderness  · Bowel sounds present  Extremities:  ·  No Cyanosis or Clubbing  ·  Lower extremity edema: No  Skin: Warm and dry    Cardiac data:    EKG: SR, INFERIOR INFARCT. Labs:     CBC: No results for input(s): WBC, HGB, HCT, PLT in the last 72 hours. BMP: No results for input(s): NA, K, CO2, BUN, CREATININE, LABGLOM, GLUCOSE in the last 72 hours. PT/INR: No results for input(s): PROTIME, INR in the last 72 hours.   FASTING LIPID PANEL:  Lab Results   Component Value Date    HDL 43 07/03/2018    TRIG

## 2018-08-03 ENCOUNTER — TELEPHONE (OUTPATIENT)
Dept: CARDIOLOGY CLINIC | Age: 63
End: 2018-08-03

## 2018-08-03 NOTE — TELEPHONE ENCOUNTER
Pt called stating he is having trouble sleeping and wants to know if it would be ok to take a sleep aid OTC? Any suggestions what he could use with his current cardiac meds? Pt states he is not having pain he is just restless and if he wakes its hard to go back to sleep.     [phone]

## 2018-09-13 ENCOUNTER — HOSPITAL ENCOUNTER (OUTPATIENT)
Dept: LAB | Age: 63
Setting detail: SPECIMEN
Discharge: HOME | End: 2018-09-13
Payer: MEDICARE

## 2018-09-13 DIAGNOSIS — Z12.5 SCREENING PSA (PROSTATE SPECIFIC ANTIGEN): ICD-10-CM

## 2018-09-13 DIAGNOSIS — R73.01 IMPAIRED FASTING BLOOD SUGAR: ICD-10-CM

## 2018-09-13 DIAGNOSIS — I10 ESSENTIAL HYPERTENSION: ICD-10-CM

## 2018-09-13 LAB
ABSOLUTE EOS #: 0.2 K/UL (ref 0–0.4)
ABSOLUTE IMMATURE GRANULOCYTE: ABNORMAL K/UL (ref 0–0.3)
ABSOLUTE LYMPH #: 2.8 K/UL (ref 1–4.8)
ABSOLUTE MONO #: 0.6 K/UL (ref 0.1–1.2)
ANION GAP SERPL CALCULATED.3IONS-SCNC: 12 MMOL/L (ref 9–17)
BASOPHILS # BLD: 1 % (ref 0–2)
BASOPHILS ABSOLUTE: 0.1 K/UL (ref 0–0.2)
BUN BLDV-MCNC: 11 MG/DL (ref 8–23)
BUN/CREAT BLD: 14 (ref 9–20)
CALCIUM SERPL-MCNC: 8.9 MG/DL (ref 8.6–10.4)
CHLORIDE BLD-SCNC: 103 MMOL/L (ref 98–107)
CHOLESTEROL/HDL RATIO: 3.3
CHOLESTEROL: 127 MG/DL
CO2: 25 MMOL/L (ref 20–31)
CREAT SERPL-MCNC: 0.78 MG/DL (ref 0.7–1.2)
DIFFERENTIAL TYPE: ABNORMAL
EOSINOPHILS RELATIVE PERCENT: 3 % (ref 1–8)
GFR AFRICAN AMERICAN: >60 ML/MIN
GFR NON-AFRICAN AMERICAN: >60 ML/MIN
GFR SERPL CREATININE-BSD FRML MDRD: ABNORMAL ML/MIN/{1.73_M2}
GFR SERPL CREATININE-BSD FRML MDRD: ABNORMAL ML/MIN/{1.73_M2}
GLUCOSE BLD-MCNC: 104 MG/DL (ref 70–99)
HCT VFR BLD CALC: 42.9 % (ref 41–53)
HDLC SERPL-MCNC: 38 MG/DL
HEMOGLOBIN: 14 G/DL (ref 13.5–17.5)
IMMATURE GRANULOCYTES: ABNORMAL %
LDL CHOLESTEROL: 49 MG/DL (ref 0–130)
LYMPHOCYTES # BLD: 37 % (ref 15–43)
MCH RBC QN AUTO: 25.5 PG (ref 26–34)
MCHC RBC AUTO-ENTMCNC: 32.6 G/DL (ref 31–37)
MCV RBC AUTO: 78.2 FL (ref 80–100)
MONOCYTES # BLD: 7 % (ref 6–14)
NRBC AUTOMATED: ABNORMAL PER 100 WBC
PDW BLD-RTO: 17.2 % (ref 11–14.5)
PLATELET # BLD: 293 K/UL (ref 140–450)
PLATELET ESTIMATE: ABNORMAL
PMV BLD AUTO: 8.6 FL (ref 6–12)
POTASSIUM SERPL-SCNC: 4.4 MMOL/L (ref 3.7–5.3)
PROSTATE SPECIFIC ANTIGEN: 0.46 UG/L
RBC # BLD: 5.49 M/UL (ref 4.5–5.9)
RBC # BLD: ABNORMAL 10*6/UL
SEG NEUTROPHILS: 52 % (ref 44–74)
SEGMENTED NEUTROPHILS ABSOLUTE COUNT: 3.8 K/UL (ref 1.8–7.7)
SODIUM BLD-SCNC: 140 MMOL/L (ref 135–144)
TRIGL SERPL-MCNC: 201 MG/DL
VLDLC SERPL CALC-MCNC: ABNORMAL MG/DL (ref 1–30)
WBC # BLD: 7.4 K/UL (ref 3.5–11)
WBC # BLD: ABNORMAL 10*3/UL

## 2018-09-13 PROCEDURE — 85025 COMPLETE CBC W/AUTO DIFF WBC: CPT

## 2018-09-13 PROCEDURE — 83036 HEMOGLOBIN GLYCOSYLATED A1C: CPT

## 2018-09-13 PROCEDURE — 80061 LIPID PANEL: CPT

## 2018-09-13 PROCEDURE — 80048 BASIC METABOLIC PNL TOTAL CA: CPT

## 2018-09-13 PROCEDURE — G0103 PSA SCREENING: HCPCS

## 2018-09-13 PROCEDURE — 36415 COLL VENOUS BLD VENIPUNCTURE: CPT

## 2018-09-14 LAB
ESTIMATED AVERAGE GLUCOSE: 120 MG/DL
HBA1C MFR BLD: 5.8 % (ref 4.8–5.9)

## 2018-09-20 ENCOUNTER — OFFICE VISIT (OUTPATIENT)
Dept: FAMILY MEDICINE CLINIC | Age: 63
End: 2018-09-20
Payer: MEDICARE

## 2018-09-20 VITALS
DIASTOLIC BLOOD PRESSURE: 72 MMHG | BODY MASS INDEX: 29.47 KG/M2 | HEART RATE: 72 BPM | HEIGHT: 69 IN | SYSTOLIC BLOOD PRESSURE: 124 MMHG | WEIGHT: 199 LBS

## 2018-09-20 DIAGNOSIS — R73.01 IMPAIRED FASTING BLOOD SUGAR: ICD-10-CM

## 2018-09-20 DIAGNOSIS — I25.10 CORONARY ARTERY DISEASE INVOLVING NATIVE CORONARY ARTERY OF NATIVE HEART WITHOUT ANGINA PECTORIS: Primary | ICD-10-CM

## 2018-09-20 DIAGNOSIS — I10 ESSENTIAL HYPERTENSION: ICD-10-CM

## 2018-09-20 DIAGNOSIS — Z12.5 SCREENING PSA (PROSTATE SPECIFIC ANTIGEN): ICD-10-CM

## 2018-09-20 DIAGNOSIS — M75.102 TEAR OF LEFT ROTATOR CUFF, UNSPECIFIED TEAR EXTENT: ICD-10-CM

## 2018-09-20 DIAGNOSIS — E78.00 PURE HYPERCHOLESTEROLEMIA: ICD-10-CM

## 2018-09-20 PROCEDURE — 1101F PT FALLS ASSESS-DOCD LE1/YR: CPT | Performed by: FAMILY MEDICINE

## 2018-09-20 PROCEDURE — G8427 DOCREV CUR MEDS BY ELIG CLIN: HCPCS | Performed by: FAMILY MEDICINE

## 2018-09-20 PROCEDURE — 99214 OFFICE O/P EST MOD 30 MIN: CPT | Performed by: FAMILY MEDICINE

## 2018-09-20 PROCEDURE — 4040F PNEUMOC VAC/ADMIN/RCVD: CPT | Performed by: FAMILY MEDICINE

## 2018-09-20 PROCEDURE — 3017F COLORECTAL CA SCREEN DOC REV: CPT | Performed by: FAMILY MEDICINE

## 2018-09-20 PROCEDURE — G8417 CALC BMI ABV UP PARAM F/U: HCPCS | Performed by: FAMILY MEDICINE

## 2018-09-20 PROCEDURE — 1123F ACP DISCUSS/DSCN MKR DOCD: CPT | Performed by: FAMILY MEDICINE

## 2018-09-20 PROCEDURE — G8598 ASA/ANTIPLAT THER USED: HCPCS | Performed by: FAMILY MEDICINE

## 2018-09-20 PROCEDURE — 1036F TOBACCO NON-USER: CPT | Performed by: FAMILY MEDICINE

## 2018-09-20 ASSESSMENT — PATIENT HEALTH QUESTIONNAIRE - PHQ9
1. LITTLE INTEREST OR PLEASURE IN DOING THINGS: 0
SUM OF ALL RESPONSES TO PHQ QUESTIONS 1-9: 0
SUM OF ALL RESPONSES TO PHQ9 QUESTIONS 1 & 2: 0
SUM OF ALL RESPONSES TO PHQ QUESTIONS 1-9: 0
2. FEELING DOWN, DEPRESSED OR HOPELESS: 0

## 2018-09-20 NOTE — PROGRESS NOTES
2018     Erick Fallon (:  1955) is a 70 y.o. male, here for evaluation of the following medical concerns:    [facility]   Scheduled follow up for recent CAD diagnosis. He was undergoing work up for rotator cuff surgery. His ekg had some changes concerning for inferior-posterior infarct. He underwent a stress test at [facility], which was also concerning for inferior irreversible defect inferiorly. Ef 67%. Cath. 18 with multivessel cad. He went to surgery the next day 18 with 2 vessel CABG. Had a little SVG site infection treated with antibiotics for a week. S/P CABG (LIMA-LAD, R SVG- RCA) OM too small for the grafts) ( 18). Feeling well at present. He doesn't have any chest pain or palpitations of concern. Has not had the shoulder repair completed. [facility] claim injury. Review of Systems   Constitutional: Negative. HENT: Negative. Eyes: Negative. Respiratory: Negative. Cardiovascular: Negative. Negative for chest pain and palpitations. Gastrointestinal: Negative. Endocrine: Negative. Genitourinary: Negative. Musculoskeletal: Positive for arthralgias (left shoulder). Skin: Negative. Allergic/Immunologic: Negative. Neurological: Negative. Hematological: Negative. Psychiatric/Behavioral: Negative. Prior to Visit Medications    Medication Sig Taking?  Authorizing Provider   clopidogrel (PLAVIX) 75 MG tablet Take 75 mg by mouth daily Yes Historical Provider, MD   loratadine (CLARITIN) 10 MG capsule Take 10 mg by mouth as needed Yes Historical Provider, MD   aspirin 81 MG tablet daily Yes Historical Provider, MD   acetaminophen (TYLENOL) 325 MG tablet Take 2 tablets by mouth every 6 hours as needed for Pain Yes PARESH Lind CNP   atorvastatin (LIPITOR) 40 MG tablet Take 1 tablet by mouth nightly Yes PARESH Lind CNP   metoprolol tartrate (LOPRESSOR) 25 MG tablet Take 1 tablet by mouth 2 times daily Yes Sj Santacruz PARESH Joseph CNP   furosemide (LASIX) 40 MG tablet Take 1 tablet by mouth daily Yes PARESH Goldsmith CNP   potassium chloride (KLOR-CON M) 20 MEQ extended release tablet Take 1 tablet by mouth daily Yes PARESH Goldsmith CNP   gabapentin (NEURONTIN) 300 MG capsule Take 1 capsule by mouth 3 times daily  Patient taking differently: Take 300 mg by mouth 2 times daily . Yes Clearance MD Cheli   cyclobenzaprine (FLEXERIL) 5 MG tablet   Historical Provider, MD        Social History   Substance Use Topics    Smoking status: Former Smoker     Packs/day: 2.00     Years: 25.00     Types: Cigarettes     Quit date: 1/4/2008    Smokeless tobacco: Never Used      Comment: Kendall Grief RRT 11/23/16    Alcohol use No        Vitals:    09/20/18 1547   BP: 124/72   Site: Right Upper Arm   Position: Sitting   Cuff Size: Large Adult   Pulse: 72   Weight: 199 lb (90.3 kg)   Height: 5' 9\" (1.753 m)     Estimated body mass index is 29.39 kg/m² as calculated from the following:    Height as of this encounter: 5' 9\" (1.753 m). Weight as of this encounter: 199 lb (90.3 kg). Physical Exam   Constitutional: He is oriented to person, place, and time. He appears well-developed and well-nourished. No distress. HENT:   Head: Normocephalic and atraumatic. Right Ear: External ear normal.   Left Ear: External ear normal.   Mouth/Throat: Oropharynx is clear and moist. No oropharyngeal exudate. Eyes: Conjunctivae and EOM are normal. No scleral icterus. Neck: Neck supple. No thyromegaly present. Cardiovascular: Normal rate, regular rhythm, normal heart sounds and intact distal pulses. No murmur heard. Pulmonary/Chest: Effort normal and breath sounds normal. No respiratory distress. He has no wheezes. He exhibits laceration (incison well healed, no issues at present). Abdominal: Soft. Bowel sounds are normal. He exhibits no distension. There is no tenderness. There is no rebound. A hernia is present.  Hernia confirmed

## 2018-09-21 RX ORDER — FUROSEMIDE 40 MG/1
40 TABLET ORAL DAILY
Qty: 30 TABLET | Refills: 5 | Status: SHIPPED | OUTPATIENT
Start: 2018-09-21 | End: 2018-10-30 | Stop reason: ALTCHOICE

## 2018-09-21 RX ORDER — POTASSIUM CHLORIDE 20 MEQ/1
20 TABLET, EXTENDED RELEASE ORAL DAILY
Qty: 30 TABLET | Refills: 5 | Status: SHIPPED | OUTPATIENT
Start: 2018-09-21 | End: 2018-10-30 | Stop reason: ALTCHOICE

## 2018-09-21 RX ORDER — ATORVASTATIN CALCIUM 40 MG/1
40 TABLET, FILM COATED ORAL NIGHTLY
Qty: 30 TABLET | Refills: 5 | Status: SHIPPED | OUTPATIENT
Start: 2018-09-21 | End: 2018-10-30 | Stop reason: SDUPTHER

## 2018-09-21 ASSESSMENT — ENCOUNTER SYMPTOMS
GASTROINTESTINAL NEGATIVE: 1
EYES NEGATIVE: 1
ALLERGIC/IMMUNOLOGIC NEGATIVE: 1
RESPIRATORY NEGATIVE: 1

## 2018-10-12 ENCOUNTER — PATIENT MESSAGE (OUTPATIENT)
Dept: FAMILY MEDICINE CLINIC | Age: 63
End: 2018-10-12

## 2018-10-12 NOTE — TELEPHONE ENCOUNTER
From: Rick Teran  To: Chanell Wade MD  Sent: 10/12/2018 2:37 PM EDT  Subject: Non-Urgent Medical Question    just wanted to let you know i received Pneumococcal Conjugate Vaccine 13-valent on 10-6-2018

## 2018-10-22 ENCOUNTER — PATIENT MESSAGE (OUTPATIENT)
Dept: FAMILY MEDICINE CLINIC | Age: 63
End: 2018-10-22

## 2018-10-30 ENCOUNTER — OFFICE VISIT (OUTPATIENT)
Dept: CARDIOLOGY CLINIC | Age: 63
End: 2018-10-30
Payer: MEDICARE

## 2018-10-30 VITALS
DIASTOLIC BLOOD PRESSURE: 76 MMHG | HEART RATE: 76 BPM | HEIGHT: 69 IN | BODY MASS INDEX: 30.07 KG/M2 | SYSTOLIC BLOOD PRESSURE: 126 MMHG | WEIGHT: 203 LBS

## 2018-10-30 DIAGNOSIS — Z95.1 S/P CABG (CORONARY ARTERY BYPASS GRAFT): Primary | ICD-10-CM

## 2018-10-30 PROCEDURE — 99213 OFFICE O/P EST LOW 20 MIN: CPT | Performed by: INTERNAL MEDICINE

## 2018-10-30 RX ORDER — CLOPIDOGREL BISULFATE 75 MG/1
75 TABLET ORAL DAILY
Qty: 30 TABLET | Refills: 5 | Status: SHIPPED | OUTPATIENT
Start: 2018-10-30 | End: 2019-04-30 | Stop reason: SDUPTHER

## 2018-10-30 RX ORDER — ATORVASTATIN CALCIUM 40 MG/1
40 TABLET, FILM COATED ORAL NIGHTLY
Qty: 30 TABLET | Refills: 5 | Status: SHIPPED | OUTPATIENT
Start: 2018-10-30 | End: 2019-04-30 | Stop reason: SDUPTHER

## 2018-10-30 RX ORDER — TRAMADOL HYDROCHLORIDE 50 MG/1
1 TABLET ORAL NIGHTLY
COMMUNITY
Start: 2018-09-27 | End: 2020-04-14

## 2018-10-30 NOTE — PROGRESS NOTES
Today's Date: 10/30/2018  Patient Name: Sharon Brewer  Patient's age: 70 y.o., 1955          The patient is a 70 y.o.  male is in the office for f/u, Patient has been doing well cardiac wise, no new cardiac complaints. He denies angina, PND/Orthopnea. He might need rotator cuff surgery. Past Medical History:   has a past medical history of Abdominal pain; CAD (coronary artery disease); Clostridium difficile diarrhea; Displacement of cervical intervertebral disc without myelopathy; HTN (hypertension); Hyperlipidemia; Impaired fasting blood sugar; Neck pain; Neuralgia, neuritis, and radiculitis, unspecified; Shoulder region pain; and Sprain and strain of unspecified site of shoulder and upper arm. Past Surgical History:   has a past surgical history that includes other surgical history (Left, 8/12/14, 1/09/15); other surgical history (Left, 09/23/2014); other surgical history (Left, 7/17/2015 , 1/22/16); Shoulder arthroscopy (Right, 4/27/2016); Biceps Tenodesis (Right, 11/23/2016); and pr cabg, artery-vein, single (N/A, 7/6/2018). Home Medications:    Prior to Admission medications    Medication Sig Start Date End Date Taking? Authorizing Provider   traMADol (ULTRAM) 50 MG tablet Take 1 tablet by mouth nightly. . 9/27/18  Yes Historical Provider, MD   atorvastatin (LIPITOR) 40 MG tablet Take 1 tablet by mouth nightly 9/21/18  Yes Pio Dennis MD   metoprolol tartrate (LOPRESSOR) 25 MG tablet Take 1 tablet by mouth 2 times daily 9/21/18  Yes Pio Dennis MD   clopidogrel (PLAVIX) 75 MG tablet Take 75 mg by mouth daily   Yes Historical Provider, MD   aspirin 81 MG tablet daily   Yes Historical Provider, MD   acetaminophen (TYLENOL) 325 MG tablet Take 2 tablets by mouth every 6 hours as needed for Pain 7/9/18  Yes PARESH Joseph CNP   cyclobenzaprine (FLEXERIL) 5 MG tablet  6/26/17  Yes Historical Provider, MD   gabapentin (NEURONTIN) 300 MG capsule Take 1 capsule by mouth 3 times daily  Patient taking differently: Take 300 mg by mouth 2 times daily . 8/24/15  Yes Britney Amador MD   loratadine (CLARITIN) 10 MG capsule Take 10 mg by mouth as needed    Historical Provider, MD       Allergies:  Vicodin [hydrocodone-acetaminophen]    Social History:   reports that he quit smoking about 10 years ago. His smoking use included Cigarettes. He has a 50.00 pack-year smoking history. He has never used smokeless tobacco. He reports that he does not drink alcohol or use drugs. REVIEW OF SYSTEMS:  CONSTITUTIONAL:NEGATIVE  HEENT:NEG  Cardiovascular: No chest pain, Yes dyspnea on exertion, No palpitations. Lower extremity edema: No  RESPIRATORY: neg  GASTROINTESTINAL:  negative  GENITOURINARY:  negative  INTEGUMENT:  negative  MUSCULOSKELETAL:  positive for  pain  NEUROLOGICAL:  negative    PHYSICAL EXAM:      /76   Pulse 76   Ht 5' 9\" (1.753 m)   Wt 203 lb (92.1 kg)   BMI 29.98 kg/m²    HEENT: PERRL, no cervical lymphadenopathy. No masses palpable. Cardiovascular:  · The apical impulse is not displaced  · Heart  Sounds:RRR, S4  · Jugular venous pulsation Normal  · The carotid upstroke is NL  · Peripheral pulses are symmetrical and full  Respiratory: Good respiratory effort. On auscultation: clear to auscultation bilaterally  Abdomen:  · No masses or tenderness  · Bowel sounds present  Extremities:  ·  No Cyanosis or Clubbing  ·  Lower extremity edema: No  Skin: Warm and dry    Cardiac data:      Labs:     CBC: No results for input(s): WBC, HGB, HCT, PLT in the last 72 hours. BMP: No results for input(s): NA, K, CO2, BUN, CREATININE, LABGLOM, GLUCOSE in the last 72 hours. PT/INR: No results for input(s): PROTIME, INR in the last 72 hours. FASTING LIPID PANEL:  Lab Results   Component Value Date    HDL 38 09/13/2018    TRIG 201 09/13/2018     LIVER PROFILE:No results for input(s): AST, ALT, LABALBU in the last 72 hours.     IMPRESSION:    CAD: 3 VESSEL  S/P

## 2019-01-14 ENCOUNTER — TELEPHONE (OUTPATIENT)
Dept: CARDIOLOGY | Age: 64
End: 2019-01-14

## 2019-01-28 ENCOUNTER — TELEPHONE (OUTPATIENT)
Dept: CARDIOLOGY | Age: 64
End: 2019-01-28

## 2019-01-30 ENCOUNTER — PATIENT MESSAGE (OUTPATIENT)
Dept: CARDIOLOGY CLINIC | Age: 64
End: 2019-01-30

## 2019-03-04 ENCOUNTER — TELEPHONE (OUTPATIENT)
Dept: FAMILY MEDICINE CLINIC | Age: 64
End: 2019-03-04

## 2019-03-04 DIAGNOSIS — E78.5 HYPERLIPIDEMIA, UNSPECIFIED HYPERLIPIDEMIA TYPE: Primary | ICD-10-CM

## 2019-03-04 DIAGNOSIS — Z00.00 ROUTINE GENERAL MEDICAL EXAMINATION AT A HEALTH CARE FACILITY: ICD-10-CM

## 2019-03-15 ENCOUNTER — HOSPITAL ENCOUNTER (OUTPATIENT)
Dept: LAB | Age: 64
Discharge: HOME | End: 2019-03-15
Payer: MEDICARE

## 2019-03-15 DIAGNOSIS — E78.5 HYPERLIPIDEMIA, UNSPECIFIED HYPERLIPIDEMIA TYPE: ICD-10-CM

## 2019-03-15 LAB
CHOLESTEROL, FASTING: 103 MG/DL
CHOLESTEROL/HDL RATIO: 2.3
HDLC SERPL-MCNC: 44 MG/DL
LDL CHOLESTEROL: 32 MG/DL (ref 0–130)
TRIGLYCERIDE, FASTING: 137 MG/DL
VLDLC SERPL CALC-MCNC: NORMAL MG/DL (ref 1–30)

## 2019-03-15 PROCEDURE — 80061 LIPID PANEL: CPT

## 2019-03-15 PROCEDURE — 36415 COLL VENOUS BLD VENIPUNCTURE: CPT

## 2019-03-21 ENCOUNTER — OFFICE VISIT (OUTPATIENT)
Dept: FAMILY MEDICINE CLINIC | Age: 64
End: 2019-03-21
Payer: MEDICARE

## 2019-03-21 VITALS
DIASTOLIC BLOOD PRESSURE: 72 MMHG | WEIGHT: 203.04 LBS | BODY MASS INDEX: 30.07 KG/M2 | HEIGHT: 69 IN | HEART RATE: 72 BPM | SYSTOLIC BLOOD PRESSURE: 134 MMHG

## 2019-03-21 DIAGNOSIS — I25.10 CORONARY ARTERY DISEASE INVOLVING NATIVE CORONARY ARTERY OF NATIVE HEART WITHOUT ANGINA PECTORIS: Primary | ICD-10-CM

## 2019-03-21 DIAGNOSIS — E78.00 PURE HYPERCHOLESTEROLEMIA: ICD-10-CM

## 2019-03-21 DIAGNOSIS — I10 ESSENTIAL HYPERTENSION: ICD-10-CM

## 2019-03-21 DIAGNOSIS — K42.9 UMBILICAL HERNIA WITHOUT OBSTRUCTION AND WITHOUT GANGRENE: ICD-10-CM

## 2019-03-21 DIAGNOSIS — M75.102 TEAR OF LEFT ROTATOR CUFF, UNSPECIFIED TEAR EXTENT: ICD-10-CM

## 2019-03-21 DIAGNOSIS — R73.01 IMPAIRED FASTING BLOOD SUGAR: ICD-10-CM

## 2019-03-21 DIAGNOSIS — Z12.5 SCREENING PSA (PROSTATE SPECIFIC ANTIGEN): ICD-10-CM

## 2019-03-21 PROCEDURE — G8417 CALC BMI ABV UP PARAM F/U: HCPCS | Performed by: FAMILY MEDICINE

## 2019-03-21 PROCEDURE — 99214 OFFICE O/P EST MOD 30 MIN: CPT | Performed by: FAMILY MEDICINE

## 2019-03-21 PROCEDURE — 1101F PT FALLS ASSESS-DOCD LE1/YR: CPT | Performed by: FAMILY MEDICINE

## 2019-03-21 PROCEDURE — 4040F PNEUMOC VAC/ADMIN/RCVD: CPT | Performed by: FAMILY MEDICINE

## 2019-03-21 PROCEDURE — 3017F COLORECTAL CA SCREEN DOC REV: CPT | Performed by: FAMILY MEDICINE

## 2019-03-21 PROCEDURE — 1036F TOBACCO NON-USER: CPT | Performed by: FAMILY MEDICINE

## 2019-03-21 PROCEDURE — G8482 FLU IMMUNIZE ORDER/ADMIN: HCPCS | Performed by: FAMILY MEDICINE

## 2019-03-21 PROCEDURE — G8427 DOCREV CUR MEDS BY ELIG CLIN: HCPCS | Performed by: FAMILY MEDICINE

## 2019-03-21 PROCEDURE — 1123F ACP DISCUSS/DSCN MKR DOCD: CPT | Performed by: FAMILY MEDICINE

## 2019-03-21 PROCEDURE — G8598 ASA/ANTIPLAT THER USED: HCPCS | Performed by: FAMILY MEDICINE

## 2019-03-21 ASSESSMENT — ENCOUNTER SYMPTOMS
GASTROINTESTINAL NEGATIVE: 1
ALLERGIC/IMMUNOLOGIC NEGATIVE: 1
RESPIRATORY NEGATIVE: 1
EYES NEGATIVE: 1

## 2019-03-21 ASSESSMENT — PATIENT HEALTH QUESTIONNAIRE - PHQ9
1. LITTLE INTEREST OR PLEASURE IN DOING THINGS: 0
2. FEELING DOWN, DEPRESSED OR HOPELESS: 0
SUM OF ALL RESPONSES TO PHQ9 QUESTIONS 1 & 2: 0
SUM OF ALL RESPONSES TO PHQ QUESTIONS 1-9: 0
SUM OF ALL RESPONSES TO PHQ QUESTIONS 1-9: 0

## 2019-04-30 ENCOUNTER — OFFICE VISIT (OUTPATIENT)
Dept: CARDIOLOGY CLINIC | Age: 64
End: 2019-04-30
Payer: MEDICARE

## 2019-04-30 ENCOUNTER — TELEPHONE (OUTPATIENT)
Dept: CARDIOLOGY | Age: 64
End: 2019-04-30

## 2019-04-30 VITALS
SYSTOLIC BLOOD PRESSURE: 128 MMHG | DIASTOLIC BLOOD PRESSURE: 86 MMHG | WEIGHT: 215 LBS | HEART RATE: 68 BPM | HEIGHT: 69 IN | BODY MASS INDEX: 31.84 KG/M2

## 2019-04-30 DIAGNOSIS — Z95.1 S/P CABG X 3: Primary | ICD-10-CM

## 2019-04-30 DIAGNOSIS — I10 ESSENTIAL HYPERTENSION: ICD-10-CM

## 2019-04-30 DIAGNOSIS — I25.10 CORONARY ARTERY DISEASE INVOLVING NATIVE CORONARY ARTERY OF NATIVE HEART WITHOUT ANGINA PECTORIS: ICD-10-CM

## 2019-04-30 DIAGNOSIS — E78.5 DYSLIPIDEMIA: ICD-10-CM

## 2019-04-30 DIAGNOSIS — R06.02 SHORTNESS OF BREATH: ICD-10-CM

## 2019-04-30 PROCEDURE — G8417 CALC BMI ABV UP PARAM F/U: HCPCS | Performed by: INTERNAL MEDICINE

## 2019-04-30 PROCEDURE — G8427 DOCREV CUR MEDS BY ELIG CLIN: HCPCS | Performed by: INTERNAL MEDICINE

## 2019-04-30 PROCEDURE — 1123F ACP DISCUSS/DSCN MKR DOCD: CPT | Performed by: INTERNAL MEDICINE

## 2019-04-30 PROCEDURE — 99214 OFFICE O/P EST MOD 30 MIN: CPT | Performed by: INTERNAL MEDICINE

## 2019-04-30 PROCEDURE — G8598 ASA/ANTIPLAT THER USED: HCPCS | Performed by: INTERNAL MEDICINE

## 2019-04-30 PROCEDURE — 1036F TOBACCO NON-USER: CPT | Performed by: INTERNAL MEDICINE

## 2019-04-30 PROCEDURE — 4040F PNEUMOC VAC/ADMIN/RCVD: CPT | Performed by: INTERNAL MEDICINE

## 2019-04-30 PROCEDURE — 3017F COLORECTAL CA SCREEN DOC REV: CPT | Performed by: INTERNAL MEDICINE

## 2019-04-30 RX ORDER — ATORVASTATIN CALCIUM 40 MG/1
40 TABLET, FILM COATED ORAL NIGHTLY
Qty: 90 TABLET | Refills: 3 | Status: SHIPPED | OUTPATIENT
Start: 2019-04-30 | End: 2019-04-30 | Stop reason: SDUPTHER

## 2019-04-30 RX ORDER — CLOPIDOGREL BISULFATE 75 MG/1
75 TABLET ORAL DAILY
Qty: 90 TABLET | Refills: 3 | Status: SHIPPED | OUTPATIENT
Start: 2019-04-30 | End: 2019-10-29 | Stop reason: ALTCHOICE

## 2019-04-30 RX ORDER — CLOPIDOGREL BISULFATE 75 MG/1
75 TABLET ORAL DAILY
Qty: 90 TABLET | Refills: 3 | Status: SHIPPED | OUTPATIENT
Start: 2019-04-30 | End: 2019-04-30 | Stop reason: SDUPTHER

## 2019-04-30 RX ORDER — ATORVASTATIN CALCIUM 40 MG/1
40 TABLET, FILM COATED ORAL NIGHTLY
Qty: 90 TABLET | Refills: 3 | Status: SHIPPED | OUTPATIENT
Start: 2019-04-30 | End: 2020-01-23 | Stop reason: SDUPTHER

## 2019-04-30 RX ORDER — GABAPENTIN 400 MG/1
1 CAPSULE ORAL 2 TIMES DAILY
Refills: 11 | COMMUNITY
Start: 2019-04-11 | End: 2021-04-23 | Stop reason: ALTCHOICE

## 2019-04-30 NOTE — TELEPHONE ENCOUNTER
Pt called stating he had forgotten to let the doctor know today that his wife has changed the pharmacy they use to [address] [place] [address]. Pt will need a 1 month script for the metoprolol to [facility] so he doesn't run out before the new scripts come from [address] [place] [address].   Pt also would like to get all his meds refill to [facility] as well    Last 4/30/2019  Next

## 2019-04-30 NOTE — PROGRESS NOTES
Today's Date: 4/30/2019  Patient Name: Evita Moon  Patient's age: 70 y.o., 1955      HPI and CC:    The patient is a 70 y.o.  male is in the office for f/u, Patient has been doing well cardiac wise, no new cardiac complaints. He denies angina, PND/Orthopnea. He might need rotator cuff surgery. Past Medical History:   has a past medical history of Abdominal pain, CAD (coronary artery disease), Clostridium difficile diarrhea, Displacement of cervical intervertebral disc without myelopathy, HTN (hypertension), Hyperlipidemia, Impaired fasting blood sugar, Neck pain, Neuralgia, neuritis, and radiculitis, unspecified, Shoulder region pain, and Sprain and strain of unspecified site of shoulder and upper arm. Past Surgical History:   has a past surgical history that includes other surgical history (Left, 8/12/14, 1/09/15); other surgical history (Left, 09/23/2014); other surgical history (Left, 7/17/2015 , 1/22/16); Shoulder arthroscopy (Right, 4/27/2016); Biceps Tenodesis (Right, 11/23/2016); pr cabg, artery-vein, single (N/A, 7/6/2018); and Rotator cuff repair (Left, 01/29/2019). Home Medications:    Prior to Admission medications    Medication Sig Start Date End Date Taking? Authorizing Provider   traMADol (ULTRAM) 50 MG tablet Take 1 tablet by mouth nightly. . 9/27/18  Yes Historical Provider, MD   atorvastatin (LIPITOR) 40 MG tablet Take 1 tablet by mouth nightly 10/30/18  Yes Saulo Whalen MD   metoprolol tartrate (LOPRESSOR) 25 MG tablet Take 1 tablet by mouth 2 times daily 10/30/18  Yes Saulo Whalen MD   clopidogrel (PLAVIX) 75 MG tablet Take 1 tablet by mouth daily 10/30/18  Yes Saulo Whalen MD   aspirin 81 MG tablet daily   Yes Historical Provider, MD   acetaminophen (TYLENOL) 325 MG tablet Take 2 tablets by mouth every 6 hours as needed for Pain 7/9/18  Yes Terry Render, APRN - CNP   gabapentin (NEURONTIN) 400 MG capsule Take 1 capsule by radiculitis    HTN (hypertension)    Umbilical hernia    HTN (hypertension)    Impaired fasting blood sugar    Displacement of cervical intervertebral disc without myelopathy    Rotator cuff tear    CAD in native artery    S/P CABG (coronary artery bypass graft)    CAD (coronary artery disease)    Hyperlipidemia       IMPRESSION &  RECOMMENDATIONS:  CAD: 3 VESSEL  S/P CABG (LIMA-LAD, R SVG- RCA) OM too small for the grafts) ( 7/6/18) STABLE  HTN- stable well controlled. HLP: MIXED- stable on meds  PRESERVED LV SYSTOLIC FUNCTION- asymptomatic  Did well with ROTATOR CUFF SURGERY. REGULAR EXERCISE  CALORIE RESTRICTION  WEIGHT LOSS  CONTINUE CURRENT TREATMENT    RTC 6  MONTHS      Thank you for allowing me to participate in the care of this patient, please do not hesitate to call if you have any questions. Hira Bhatti, [id number] [facility]  [facility]. [facility]  [phone]

## 2019-04-30 NOTE — TELEPHONE ENCOUNTER
Prescriptions re-done, sent to [facility] through the office note from today. I called [facility] to cancel all but 30 days of metoprolol. Pt aware.

## 2019-05-22 ENCOUNTER — OFFICE VISIT (OUTPATIENT)
Dept: PRIMARY CARE CLINIC | Age: 64
End: 2019-05-22
Payer: MEDICARE

## 2019-05-22 VITALS
SYSTOLIC BLOOD PRESSURE: 130 MMHG | WEIGHT: 209 LBS | TEMPERATURE: 98.2 F | OXYGEN SATURATION: 94 % | DIASTOLIC BLOOD PRESSURE: 80 MMHG | HEIGHT: 69 IN | BODY MASS INDEX: 30.96 KG/M2 | HEART RATE: 72 BPM | RESPIRATION RATE: 16 BRPM

## 2019-05-22 DIAGNOSIS — J20.9 ACUTE BRONCHITIS, UNSPECIFIED ORGANISM: Primary | ICD-10-CM

## 2019-05-22 PROCEDURE — 1123F ACP DISCUSS/DSCN MKR DOCD: CPT | Performed by: FAMILY MEDICINE

## 2019-05-22 PROCEDURE — G8417 CALC BMI ABV UP PARAM F/U: HCPCS | Performed by: FAMILY MEDICINE

## 2019-05-22 PROCEDURE — 99214 OFFICE O/P EST MOD 30 MIN: CPT | Performed by: FAMILY MEDICINE

## 2019-05-22 PROCEDURE — G8427 DOCREV CUR MEDS BY ELIG CLIN: HCPCS | Performed by: FAMILY MEDICINE

## 2019-05-22 PROCEDURE — 3017F COLORECTAL CA SCREEN DOC REV: CPT | Performed by: FAMILY MEDICINE

## 2019-05-22 PROCEDURE — 4040F PNEUMOC VAC/ADMIN/RCVD: CPT | Performed by: FAMILY MEDICINE

## 2019-05-22 PROCEDURE — G8598 ASA/ANTIPLAT THER USED: HCPCS | Performed by: FAMILY MEDICINE

## 2019-05-22 PROCEDURE — 1036F TOBACCO NON-USER: CPT | Performed by: FAMILY MEDICINE

## 2019-05-22 RX ORDER — PREDNISONE 20 MG/1
TABLET ORAL
Qty: 10 TABLET | Refills: 0 | Status: SHIPPED | OUTPATIENT
Start: 2019-05-22 | End: 2019-10-01 | Stop reason: ALTCHOICE

## 2019-05-22 RX ORDER — AZITHROMYCIN 250 MG/1
TABLET, FILM COATED ORAL
Qty: 1 PACKET | Refills: 1 | Status: SHIPPED | OUTPATIENT
Start: 2019-05-22 | End: 2019-10-01 | Stop reason: ALTCHOICE

## 2019-05-22 ASSESSMENT — ENCOUNTER SYMPTOMS
CONSTIPATION: 0
EYE DISCHARGE: 0
ABDOMINAL PAIN: 0
COUGH: 1
TROUBLE SWALLOWING: 0
EYE REDNESS: 0
RHINORRHEA: 1
SINUS COMPLAINT: 1
NAUSEA: 0
SHORTNESS OF BREATH: 0
SINUS PRESSURE: 0
WHEEZING: 0
VOMITING: 0
SORE THROAT: 0
DIARRHEA: 0
SINUS PAIN: 0

## 2019-05-22 NOTE — PROGRESS NOTES
2019     Lidia Ornelas (:  1955) is a 75 y.o. male, here for evaluation of the following medical concerns:    Sinus Problem   This is a new problem. The current episode started in the past 7 days. The problem has been gradually worsening since onset. There has been no fever. Associated symptoms include congestion, coughing and sneezing. Pertinent negatives include no chills, ear pain (ringing in ears), headaches, neck pain, shortness of breath, sinus pressure or sore throat. (Cough is getting deeper and productive. Getting some body aches. Feels tired and run down) Treatments tried: over the counter daytime/nighttime capsule. The treatment provided moderate relief. Did review patient's med list, allergies, social history,pmhx and pshx today as noted in the record. Review of Systems   Constitutional: Positive for fatigue. Negative for chills and fever. HENT: Positive for congestion, postnasal drip, rhinorrhea and sneezing. Negative for ear pain (ringing in ears), sinus pressure, sinus pain, sore throat and trouble swallowing. Eyes: Negative for discharge and redness. Respiratory: Positive for cough. Negative for shortness of breath and wheezing. Cardiovascular: Negative for chest pain. Gastrointestinal: Negative for abdominal pain, constipation, diarrhea, nausea and vomiting. Musculoskeletal: Negative for arthralgias, myalgias and neck pain. Skin: Negative for rash and wound. Allergic/Immunologic: Negative for environmental allergies. Neurological: Negative for dizziness, weakness, light-headedness and headaches. Hematological: Negative for adenopathy. Psychiatric/Behavioral: Negative. Prior to Visit Medications    Medication Sig Taking? Authorizing Provider   gabapentin (NEURONTIN) 400 MG capsule Take 1 capsule by mouth 2 times daily.  Yes Historical Provider, MD   atorvastatin (LIPITOR) 40 MG tablet Take 1 tablet by mouth nightly Yes Pauly Gipson DO heart sounds. Pulmonary/Chest: Effort normal and breath sounds normal. No respiratory distress. He has no wheezes. Musculoskeletal: He exhibits no edema. Lymphadenopathy:     He has cervical adenopathy. Neurological: He is alert and oriented to person, place, and time. Skin: Skin is warm and dry. No rash noted. He is not diaphoretic. Psychiatric: He has a normal mood and affect. His behavior is normal. Judgment and thought content normal.   Nursing note and vitals reviewed. ASSESSMENT/PLAN:  Encounter Diagnosis   Name Primary?  Acute bronchitis, unspecified organism Yes     Orders Placed This Encounter   Medications    azithromycin (ZITHROMAX Z-JEN) 250 MG tablet     Sig: Take as directed     Dispense:  1 packet     Refill:  1    predniSONE (DELTASONE) 20 MG tablet     Si bid for 5 days     Dispense:  10 tablet     Refill:  0     Tylenol/Motrin prn    Increase fluids and rest    Return  if no improvement in symptoms or if any further symptoms arise. An electronic signature was used to authenticate this note.     --Wayne Sampson DO on 2019 at 2:44 PM

## 2019-09-24 ENCOUNTER — HOSPITAL ENCOUNTER (OUTPATIENT)
Dept: LAB | Age: 64
Discharge: HOME | End: 2019-09-24
Payer: MEDICARE

## 2019-09-24 DIAGNOSIS — R73.01 IMPAIRED FASTING BLOOD SUGAR: ICD-10-CM

## 2019-09-24 DIAGNOSIS — Z12.5 SCREENING PSA (PROSTATE SPECIFIC ANTIGEN): ICD-10-CM

## 2019-09-24 DIAGNOSIS — I10 ESSENTIAL HYPERTENSION: ICD-10-CM

## 2019-09-24 DIAGNOSIS — E78.00 PURE HYPERCHOLESTEROLEMIA: ICD-10-CM

## 2019-09-24 LAB
ABSOLUTE EOS #: 0.2 K/UL (ref 0–0.4)
ABSOLUTE IMMATURE GRANULOCYTE: ABNORMAL K/UL (ref 0–0.3)
ABSOLUTE LYMPH #: 2.4 K/UL (ref 1–4.8)
ABSOLUTE MONO #: 0.5 K/UL (ref 0.1–1.2)
ALBUMIN SERPL-MCNC: 4.1 G/DL (ref 3.5–5.2)
ALBUMIN/GLOBULIN RATIO: 1.4 (ref 1–2.5)
ALP BLD-CCNC: 150 U/L (ref 40–129)
ALT SERPL-CCNC: 16 U/L (ref 5–41)
ANION GAP SERPL CALCULATED.3IONS-SCNC: 13 MMOL/L (ref 9–17)
AST SERPL-CCNC: 18 U/L
BASOPHILS # BLD: 1 % (ref 0–2)
BASOPHILS ABSOLUTE: 0.1 K/UL (ref 0–0.2)
BILIRUB SERPL-MCNC: 0.5 MG/DL (ref 0.3–1.2)
BUN BLDV-MCNC: 15 MG/DL (ref 8–23)
BUN/CREAT BLD: 21 (ref 9–20)
CALCIUM SERPL-MCNC: 9.2 MG/DL (ref 8.6–10.4)
CHLORIDE BLD-SCNC: 103 MMOL/L (ref 98–107)
CHOLESTEROL/HDL RATIO: 2.9
CHOLESTEROL: 114 MG/DL
CO2: 24 MMOL/L (ref 20–31)
CREAT SERPL-MCNC: 0.7 MG/DL (ref 0.7–1.2)
DIFFERENTIAL TYPE: ABNORMAL
EOSINOPHILS RELATIVE PERCENT: 3 % (ref 1–8)
ESTIMATED AVERAGE GLUCOSE: 128 MG/DL
GFR AFRICAN AMERICAN: >60 ML/MIN
GFR NON-AFRICAN AMERICAN: >60 ML/MIN
GFR SERPL CREATININE-BSD FRML MDRD: ABNORMAL ML/MIN/{1.73_M2}
GFR SERPL CREATININE-BSD FRML MDRD: ABNORMAL ML/MIN/{1.73_M2}
GLUCOSE BLD-MCNC: 109 MG/DL (ref 70–99)
HBA1C MFR BLD: 6.1 % (ref 4.8–5.9)
HCT VFR BLD CALC: 46.9 % (ref 41–53)
HDLC SERPL-MCNC: 39 MG/DL
HEMOGLOBIN: 15.4 G/DL (ref 13.5–17.5)
IMMATURE GRANULOCYTES: ABNORMAL %
LDL CHOLESTEROL: 42 MG/DL (ref 0–130)
LYMPHOCYTES # BLD: 35 % (ref 15–43)
MCH RBC QN AUTO: 26.9 PG (ref 26–34)
MCHC RBC AUTO-ENTMCNC: 32.8 G/DL (ref 31–37)
MCV RBC AUTO: 82 FL (ref 80–100)
MONOCYTES # BLD: 8 % (ref 6–14)
NRBC AUTOMATED: ABNORMAL PER 100 WBC
PDW BLD-RTO: 14.7 % (ref 11–14.5)
PLATELET # BLD: 263 K/UL (ref 140–450)
PLATELET ESTIMATE: ABNORMAL
PMV BLD AUTO: 9 FL (ref 6–12)
POTASSIUM SERPL-SCNC: 4.3 MMOL/L (ref 3.7–5.3)
PROSTATE SPECIFIC ANTIGEN: 0.45 UG/L
RBC # BLD: 5.72 M/UL (ref 4.5–5.9)
RBC # BLD: ABNORMAL 10*6/UL
SEG NEUTROPHILS: 53 % (ref 44–74)
SEGMENTED NEUTROPHILS ABSOLUTE COUNT: 3.7 K/UL (ref 1.8–7.7)
SODIUM BLD-SCNC: 140 MMOL/L (ref 135–144)
TOTAL PROTEIN: 7.1 G/DL (ref 6.4–8.3)
TRIGL SERPL-MCNC: 166 MG/DL
VLDLC SERPL CALC-MCNC: ABNORMAL MG/DL (ref 1–30)
WBC # BLD: 6.9 K/UL (ref 3.5–11)
WBC # BLD: ABNORMAL 10*3/UL

## 2019-09-24 PROCEDURE — 36415 COLL VENOUS BLD VENIPUNCTURE: CPT

## 2019-09-24 PROCEDURE — 80061 LIPID PANEL: CPT

## 2019-09-24 PROCEDURE — 83036 HEMOGLOBIN GLYCOSYLATED A1C: CPT

## 2019-09-24 PROCEDURE — G0103 PSA SCREENING: HCPCS

## 2019-09-24 PROCEDURE — 85025 COMPLETE CBC W/AUTO DIFF WBC: CPT

## 2019-09-24 PROCEDURE — 80053 COMPREHEN METABOLIC PANEL: CPT

## 2019-10-01 ENCOUNTER — OFFICE VISIT (OUTPATIENT)
Dept: FAMILY MEDICINE CLINIC | Age: 64
End: 2019-10-01
Payer: MEDICARE

## 2019-10-01 ENCOUNTER — TELEPHONE (OUTPATIENT)
Dept: FAMILY MEDICINE CLINIC | Age: 64
End: 2019-10-01

## 2019-10-01 VITALS
SYSTOLIC BLOOD PRESSURE: 126 MMHG | WEIGHT: 213 LBS | HEART RATE: 68 BPM | BODY MASS INDEX: 31.55 KG/M2 | DIASTOLIC BLOOD PRESSURE: 70 MMHG | HEIGHT: 69 IN

## 2019-10-01 DIAGNOSIS — I10 ESSENTIAL HYPERTENSION: ICD-10-CM

## 2019-10-01 DIAGNOSIS — R73.01 IMPAIRED FASTING BLOOD SUGAR: ICD-10-CM

## 2019-10-01 DIAGNOSIS — Z87.891 SMOKING HISTORY: Primary | ICD-10-CM

## 2019-10-01 DIAGNOSIS — Z12.5 SCREENING PSA (PROSTATE SPECIFIC ANTIGEN): ICD-10-CM

## 2019-10-01 DIAGNOSIS — K42.9 UMBILICAL HERNIA WITHOUT OBSTRUCTION AND WITHOUT GANGRENE: ICD-10-CM

## 2019-10-01 DIAGNOSIS — E78.00 PURE HYPERCHOLESTEROLEMIA: ICD-10-CM

## 2019-10-01 DIAGNOSIS — M75.102 TEAR OF LEFT ROTATOR CUFF, UNSPECIFIED TEAR EXTENT, UNSPECIFIED WHETHER TRAUMATIC: ICD-10-CM

## 2019-10-01 DIAGNOSIS — I25.10 CORONARY ARTERY DISEASE INVOLVING NATIVE CORONARY ARTERY OF NATIVE HEART WITHOUT ANGINA PECTORIS: Primary | ICD-10-CM

## 2019-10-01 PROCEDURE — G8484 FLU IMMUNIZE NO ADMIN: HCPCS | Performed by: FAMILY MEDICINE

## 2019-10-01 PROCEDURE — 1036F TOBACCO NON-USER: CPT | Performed by: FAMILY MEDICINE

## 2019-10-01 PROCEDURE — 3017F COLORECTAL CA SCREEN DOC REV: CPT | Performed by: FAMILY MEDICINE

## 2019-10-01 PROCEDURE — 4040F PNEUMOC VAC/ADMIN/RCVD: CPT | Performed by: FAMILY MEDICINE

## 2019-10-01 PROCEDURE — 99214 OFFICE O/P EST MOD 30 MIN: CPT | Performed by: FAMILY MEDICINE

## 2019-10-01 PROCEDURE — G8427 DOCREV CUR MEDS BY ELIG CLIN: HCPCS | Performed by: FAMILY MEDICINE

## 2019-10-01 PROCEDURE — 1123F ACP DISCUSS/DSCN MKR DOCD: CPT | Performed by: FAMILY MEDICINE

## 2019-10-01 PROCEDURE — G8417 CALC BMI ABV UP PARAM F/U: HCPCS | Performed by: FAMILY MEDICINE

## 2019-10-01 PROCEDURE — G8598 ASA/ANTIPLAT THER USED: HCPCS | Performed by: FAMILY MEDICINE

## 2019-10-01 ASSESSMENT — ENCOUNTER SYMPTOMS
EYES NEGATIVE: 1
ALLERGIC/IMMUNOLOGIC NEGATIVE: 1
GASTROINTESTINAL NEGATIVE: 1
RESPIRATORY NEGATIVE: 1

## 2019-10-01 ASSESSMENT — PATIENT HEALTH QUESTIONNAIRE - PHQ9
SUM OF ALL RESPONSES TO PHQ9 QUESTIONS 1 & 2: 0
2. FEELING DOWN, DEPRESSED OR HOPELESS: 0
SUM OF ALL RESPONSES TO PHQ QUESTIONS 1-9: 0
1. LITTLE INTEREST OR PLEASURE IN DOING THINGS: 0
SUM OF ALL RESPONSES TO PHQ QUESTIONS 1-9: 0

## 2019-10-11 ENCOUNTER — HOSPITAL ENCOUNTER (OUTPATIENT)
Dept: CT IMAGING | Age: 64
Discharge: HOME | End: 2019-10-13
Payer: MEDICARE

## 2019-10-11 DIAGNOSIS — Z87.891 SMOKING HISTORY: ICD-10-CM

## 2019-10-11 PROCEDURE — G0297 LDCT FOR LUNG CA SCREEN: HCPCS

## 2019-10-14 ENCOUNTER — OFFICE VISIT (OUTPATIENT)
Dept: FAMILY MEDICINE CLINIC | Age: 64
End: 2019-10-14
Payer: MEDICARE

## 2019-10-14 VITALS
HEART RATE: 72 BPM | OXYGEN SATURATION: 97 % | SYSTOLIC BLOOD PRESSURE: 128 MMHG | WEIGHT: 216 LBS | BODY MASS INDEX: 31.88 KG/M2 | DIASTOLIC BLOOD PRESSURE: 70 MMHG

## 2019-10-14 DIAGNOSIS — Z23 NEED FOR VACCINATION AGAINST STREPTOCOCCUS PNEUMONIAE: ICD-10-CM

## 2019-10-14 DIAGNOSIS — I10 ESSENTIAL HYPERTENSION: ICD-10-CM

## 2019-10-14 DIAGNOSIS — Z12.11 COLON CANCER SCREENING: ICD-10-CM

## 2019-10-14 DIAGNOSIS — K42.9 UMBILICAL HERNIA WITHOUT OBSTRUCTION AND WITHOUT GANGRENE: Primary | ICD-10-CM

## 2019-10-14 DIAGNOSIS — M54.2 NECK PAIN: ICD-10-CM

## 2019-10-14 DIAGNOSIS — Z23 NEEDS FLU SHOT: ICD-10-CM

## 2019-10-14 PROCEDURE — G8427 DOCREV CUR MEDS BY ELIG CLIN: HCPCS | Performed by: FAMILY MEDICINE

## 2019-10-14 PROCEDURE — G8598 ASA/ANTIPLAT THER USED: HCPCS | Performed by: FAMILY MEDICINE

## 2019-10-14 PROCEDURE — 3017F COLORECTAL CA SCREEN DOC REV: CPT | Performed by: FAMILY MEDICINE

## 2019-10-14 PROCEDURE — 99213 OFFICE O/P EST LOW 20 MIN: CPT | Performed by: FAMILY MEDICINE

## 2019-10-14 PROCEDURE — 1036F TOBACCO NON-USER: CPT | Performed by: FAMILY MEDICINE

## 2019-10-14 PROCEDURE — G8484 FLU IMMUNIZE NO ADMIN: HCPCS | Performed by: FAMILY MEDICINE

## 2019-10-14 PROCEDURE — 1123F ACP DISCUSS/DSCN MKR DOCD: CPT | Performed by: FAMILY MEDICINE

## 2019-10-14 PROCEDURE — G8417 CALC BMI ABV UP PARAM F/U: HCPCS | Performed by: FAMILY MEDICINE

## 2019-10-14 PROCEDURE — 4040F PNEUMOC VAC/ADMIN/RCVD: CPT | Performed by: FAMILY MEDICINE

## 2019-10-14 PROCEDURE — 90732 PPSV23 VACC 2 YRS+ SUBQ/IM: CPT | Performed by: FAMILY MEDICINE

## 2019-10-14 PROCEDURE — G0009 ADMIN PNEUMOCOCCAL VACCINE: HCPCS | Performed by: FAMILY MEDICINE

## 2019-10-14 ASSESSMENT — ENCOUNTER SYMPTOMS
RESPIRATORY NEGATIVE: 1
ABDOMINAL PAIN: 1

## 2019-10-25 ENCOUNTER — INITIAL CONSULT (OUTPATIENT)
Dept: SURGERY | Age: 64
End: 2019-10-25
Payer: MEDICARE

## 2019-10-25 VITALS
DIASTOLIC BLOOD PRESSURE: 80 MMHG | BODY MASS INDEX: 31.7 KG/M2 | TEMPERATURE: 97.9 F | OXYGEN SATURATION: 99 % | WEIGHT: 214 LBS | HEART RATE: 70 BPM | HEIGHT: 69 IN | SYSTOLIC BLOOD PRESSURE: 138 MMHG

## 2019-10-25 DIAGNOSIS — K42.9 UMBILICAL HERNIA WITHOUT OBSTRUCTION AND WITHOUT GANGRENE: ICD-10-CM

## 2019-10-25 DIAGNOSIS — K43.9 EPIGASTRIC HERNIA: Primary | ICD-10-CM

## 2019-10-25 DIAGNOSIS — Z12.11 COLON CANCER SCREENING: ICD-10-CM

## 2019-10-25 DIAGNOSIS — R19.5 POSITIVE COLORECTAL CANCER SCREENING USING COLOGUARD TEST: Primary | ICD-10-CM

## 2019-10-25 PROCEDURE — G8427 DOCREV CUR MEDS BY ELIG CLIN: HCPCS | Performed by: SURGERY

## 2019-10-25 PROCEDURE — 3017F COLORECTAL CA SCREEN DOC REV: CPT | Performed by: SURGERY

## 2019-10-25 PROCEDURE — 4040F PNEUMOC VAC/ADMIN/RCVD: CPT | Performed by: SURGERY

## 2019-10-25 PROCEDURE — G8417 CALC BMI ABV UP PARAM F/U: HCPCS | Performed by: SURGERY

## 2019-10-25 PROCEDURE — 99203 OFFICE O/P NEW LOW 30 MIN: CPT | Performed by: SURGERY

## 2019-10-25 PROCEDURE — 1123F ACP DISCUSS/DSCN MKR DOCD: CPT | Performed by: SURGERY

## 2019-10-25 PROCEDURE — G8598 ASA/ANTIPLAT THER USED: HCPCS | Performed by: SURGERY

## 2019-10-25 PROCEDURE — G8484 FLU IMMUNIZE NO ADMIN: HCPCS | Performed by: SURGERY

## 2019-10-25 PROCEDURE — 1036F TOBACCO NON-USER: CPT | Performed by: SURGERY

## 2019-10-25 RX ORDER — METHYLPREDNISOLONE 4 MG/1
TABLET ORAL
Refills: 0 | COMMUNITY
Start: 2019-10-23 | End: 2019-10-29

## 2019-10-25 ASSESSMENT — ENCOUNTER SYMPTOMS
CHEST TIGHTNESS: 0
ABDOMINAL DISTENTION: 1
TROUBLE SWALLOWING: 0
NAUSEA: 0
VOICE CHANGE: 0
CHOKING: 0
WHEEZING: 0
VOMITING: 0
SHORTNESS OF BREATH: 0
CONSTIPATION: 0
BACK PAIN: 0
DIARRHEA: 0
EYES NEGATIVE: 1
COUGH: 0
BLOOD IN STOOL: 0
SORE THROAT: 0
ABDOMINAL PAIN: 1

## 2019-10-28 ENCOUNTER — TELEPHONE (OUTPATIENT)
Dept: SURGERY | Age: 64
End: 2019-10-28

## 2019-10-29 ENCOUNTER — OFFICE VISIT (OUTPATIENT)
Dept: CARDIOLOGY CLINIC | Age: 64
End: 2019-10-29
Payer: MEDICARE

## 2019-10-29 VITALS
DIASTOLIC BLOOD PRESSURE: 88 MMHG | BODY MASS INDEX: 30.35 KG/M2 | WEIGHT: 212 LBS | HEIGHT: 70 IN | HEART RATE: 64 BPM | SYSTOLIC BLOOD PRESSURE: 136 MMHG

## 2019-10-29 DIAGNOSIS — I25.10 CORONARY ARTERY DISEASE INVOLVING NATIVE CORONARY ARTERY OF NATIVE HEART WITHOUT ANGINA PECTORIS: Primary | ICD-10-CM

## 2019-10-29 PROCEDURE — G8484 FLU IMMUNIZE NO ADMIN: HCPCS | Performed by: INTERNAL MEDICINE

## 2019-10-29 PROCEDURE — G8427 DOCREV CUR MEDS BY ELIG CLIN: HCPCS | Performed by: INTERNAL MEDICINE

## 2019-10-29 PROCEDURE — 4040F PNEUMOC VAC/ADMIN/RCVD: CPT | Performed by: INTERNAL MEDICINE

## 2019-10-29 PROCEDURE — 99214 OFFICE O/P EST MOD 30 MIN: CPT | Performed by: INTERNAL MEDICINE

## 2019-10-29 PROCEDURE — G8598 ASA/ANTIPLAT THER USED: HCPCS | Performed by: INTERNAL MEDICINE

## 2019-10-29 PROCEDURE — 1036F TOBACCO NON-USER: CPT | Performed by: INTERNAL MEDICINE

## 2019-10-29 PROCEDURE — G8417 CALC BMI ABV UP PARAM F/U: HCPCS | Performed by: INTERNAL MEDICINE

## 2019-10-29 PROCEDURE — 1123F ACP DISCUSS/DSCN MKR DOCD: CPT | Performed by: INTERNAL MEDICINE

## 2019-10-29 PROCEDURE — 3017F COLORECTAL CA SCREEN DOC REV: CPT | Performed by: INTERNAL MEDICINE

## 2019-11-04 ENCOUNTER — OFFICE VISIT (OUTPATIENT)
Dept: FAMILY MEDICINE CLINIC | Age: 64
End: 2019-11-04
Payer: MEDICARE

## 2019-11-04 VITALS
WEIGHT: 215 LBS | TEMPERATURE: 97.1 F | BODY MASS INDEX: 31.29 KG/M2 | DIASTOLIC BLOOD PRESSURE: 78 MMHG | SYSTOLIC BLOOD PRESSURE: 124 MMHG | HEART RATE: 55 BPM | OXYGEN SATURATION: 99 %

## 2019-11-04 DIAGNOSIS — J43.8 OTHER EMPHYSEMA (HCC): ICD-10-CM

## 2019-11-04 DIAGNOSIS — R05.9 COUGH: Primary | ICD-10-CM

## 2019-11-04 PROCEDURE — G8427 DOCREV CUR MEDS BY ELIG CLIN: HCPCS | Performed by: FAMILY MEDICINE

## 2019-11-04 PROCEDURE — 1123F ACP DISCUSS/DSCN MKR DOCD: CPT | Performed by: FAMILY MEDICINE

## 2019-11-04 PROCEDURE — 3017F COLORECTAL CA SCREEN DOC REV: CPT | Performed by: FAMILY MEDICINE

## 2019-11-04 PROCEDURE — G8417 CALC BMI ABV UP PARAM F/U: HCPCS | Performed by: FAMILY MEDICINE

## 2019-11-04 PROCEDURE — 4040F PNEUMOC VAC/ADMIN/RCVD: CPT | Performed by: FAMILY MEDICINE

## 2019-11-04 PROCEDURE — G8926 SPIRO NO PERF OR DOC: HCPCS | Performed by: FAMILY MEDICINE

## 2019-11-04 PROCEDURE — G8484 FLU IMMUNIZE NO ADMIN: HCPCS | Performed by: FAMILY MEDICINE

## 2019-11-04 PROCEDURE — 1036F TOBACCO NON-USER: CPT | Performed by: FAMILY MEDICINE

## 2019-11-04 PROCEDURE — 99213 OFFICE O/P EST LOW 20 MIN: CPT | Performed by: FAMILY MEDICINE

## 2019-11-04 PROCEDURE — 3023F SPIROM DOC REV: CPT | Performed by: FAMILY MEDICINE

## 2019-11-04 PROCEDURE — G8598 ASA/ANTIPLAT THER USED: HCPCS | Performed by: FAMILY MEDICINE

## 2019-11-04 RX ORDER — ALBUTEROL SULFATE 90 UG/1
2 AEROSOL, METERED RESPIRATORY (INHALATION) EVERY 6 HOURS PRN
Qty: 1 INHALER | Refills: 3 | Status: SHIPPED | OUTPATIENT
Start: 2019-11-04 | End: 2020-01-14

## 2019-11-04 RX ORDER — PREDNISONE 10 MG/1
TABLET ORAL
Qty: 20 TABLET | Refills: 0 | Status: ON HOLD | OUTPATIENT
Start: 2019-11-04 | End: 2019-12-10 | Stop reason: ALTCHOICE

## 2019-11-04 RX ORDER — AMOXICILLIN 500 MG/1
500 CAPSULE ORAL 3 TIMES DAILY
Qty: 30 CAPSULE | Refills: 0 | Status: SHIPPED | OUTPATIENT
Start: 2019-11-04 | End: 2019-11-14

## 2019-11-04 ASSESSMENT — ENCOUNTER SYMPTOMS
COUGH: 1
SHORTNESS OF BREATH: 0
GASTROINTESTINAL NEGATIVE: 1

## 2019-11-05 ENCOUNTER — TELEPHONE (OUTPATIENT)
Dept: FAMILY MEDICINE CLINIC | Age: 64
End: 2019-11-05

## 2019-11-05 RX ORDER — FLUTICASONE FUROATE AND VILANTEROL 100; 25 UG/1; UG/1
1 POWDER RESPIRATORY (INHALATION) DAILY
Qty: 3 EACH | Refills: 3 | Status: SHIPPED | OUTPATIENT
Start: 2019-11-05 | End: 2020-01-17 | Stop reason: SDUPTHER

## 2019-11-26 ENCOUNTER — TELEPHONE (OUTPATIENT)
Dept: SURGERY | Age: 64
End: 2019-11-26

## 2019-11-26 DIAGNOSIS — R19.5 POSITIVE OCCULT STOOL BLOOD TEST: Primary | ICD-10-CM

## 2019-12-09 PROBLEM — Z12.11 COLON CANCER SCREENING: Status: ACTIVE | Noted: 2019-12-09

## 2019-12-10 ENCOUNTER — ANESTHESIA (OUTPATIENT)
Dept: OPERATING ROOM | Age: 64
End: 2019-12-10
Payer: MEDICARE

## 2019-12-10 ENCOUNTER — HOSPITAL ENCOUNTER (OUTPATIENT)
Age: 64
Setting detail: OUTPATIENT SURGERY
Discharge: HOME | End: 2019-12-10
Attending: SURGERY | Admitting: SURGERY
Payer: MEDICARE

## 2019-12-10 ENCOUNTER — ANESTHESIA EVENT (OUTPATIENT)
Dept: OPERATING ROOM | Age: 64
End: 2019-12-10
Payer: MEDICARE

## 2019-12-10 VITALS
HEIGHT: 69 IN | BODY MASS INDEX: 30.78 KG/M2 | TEMPERATURE: 97 F | DIASTOLIC BLOOD PRESSURE: 77 MMHG | SYSTOLIC BLOOD PRESSURE: 144 MMHG | WEIGHT: 207.8 LBS | OXYGEN SATURATION: 96 % | HEART RATE: 66 BPM | RESPIRATION RATE: 16 BRPM

## 2019-12-10 VITALS — DIASTOLIC BLOOD PRESSURE: 71 MMHG | SYSTOLIC BLOOD PRESSURE: 132 MMHG | OXYGEN SATURATION: 96 %

## 2019-12-10 PROBLEM — R19.5 POSITIVE COLORECTAL CANCER SCREENING USING DNA-BASED STOOL TEST: Status: ACTIVE | Noted: 2019-12-10

## 2019-12-10 PROCEDURE — 2500000003 HC RX 250 WO HCPCS: Performed by: NURSE ANESTHETIST, CERTIFIED REGISTERED

## 2019-12-10 PROCEDURE — 6360000002 HC RX W HCPCS: Performed by: NURSE ANESTHETIST, CERTIFIED REGISTERED

## 2019-12-10 PROCEDURE — 2709999900 HC NON-CHARGEABLE SUPPLY: Performed by: SURGERY

## 2019-12-10 PROCEDURE — 3609010600 HC COLONOSCOPY POLYPECTOMY SNARE/COLD BIOPSY: Performed by: SURGERY

## 2019-12-10 PROCEDURE — 2580000003 HC RX 258: Performed by: SURGERY

## 2019-12-10 PROCEDURE — 45385 COLONOSCOPY W/LESION REMOVAL: CPT | Performed by: SURGERY

## 2019-12-10 PROCEDURE — 88305 TISSUE EXAM BY PATHOLOGIST: CPT

## 2019-12-10 PROCEDURE — 7100000010 HC PHASE II RECOVERY - FIRST 15 MIN: Performed by: SURGERY

## 2019-12-10 PROCEDURE — 3700000001 HC ADD 15 MINUTES (ANESTHESIA): Performed by: SURGERY

## 2019-12-10 PROCEDURE — 45380 COLONOSCOPY AND BIOPSY: CPT | Performed by: SURGERY

## 2019-12-10 PROCEDURE — 7100000011 HC PHASE II RECOVERY - ADDTL 15 MIN: Performed by: SURGERY

## 2019-12-10 PROCEDURE — 3700000000 HC ANESTHESIA ATTENDED CARE: Performed by: SURGERY

## 2019-12-10 RX ORDER — PROPOFOL 10 MG/ML
INJECTION, EMULSION INTRAVENOUS PRN
Status: DISCONTINUED | OUTPATIENT
Start: 2019-12-10 | End: 2019-12-10 | Stop reason: SDUPTHER

## 2019-12-10 RX ORDER — LIDOCAINE HYDROCHLORIDE 40 MG/ML
INJECTION, SOLUTION RETROBULBAR; TOPICAL PRN
Status: DISCONTINUED | OUTPATIENT
Start: 2019-12-10 | End: 2019-12-10 | Stop reason: SDUPTHER

## 2019-12-10 RX ORDER — SODIUM CHLORIDE, SODIUM LACTATE, POTASSIUM CHLORIDE, CALCIUM CHLORIDE 600; 310; 30; 20 MG/100ML; MG/100ML; MG/100ML; MG/100ML
INJECTION, SOLUTION INTRAVENOUS CONTINUOUS
Status: DISCONTINUED | OUTPATIENT
Start: 2019-12-10 | End: 2019-12-10 | Stop reason: HOSPADM

## 2019-12-10 RX ADMIN — LIDOCAINE HYDROCHLORIDE 80 MG: 40 INJECTION, SOLUTION RETROBULBAR; TOPICAL at 09:22

## 2019-12-10 RX ADMIN — SODIUM CHLORIDE, POTASSIUM CHLORIDE, SODIUM LACTATE AND CALCIUM CHLORIDE: 600; 310; 30; 20 INJECTION, SOLUTION INTRAVENOUS at 07:59

## 2019-12-10 RX ADMIN — PROPOFOL 500 MG: 10 INJECTION, EMULSION INTRAVENOUS at 09:22

## 2019-12-10 RX ADMIN — SODIUM CHLORIDE, POTASSIUM CHLORIDE, SODIUM LACTATE AND CALCIUM CHLORIDE: 600; 310; 30; 20 INJECTION, SOLUTION INTRAVENOUS at 09:17

## 2019-12-10 ASSESSMENT — PAIN DESCRIPTION - LOCATION
LOCATION: ABDOMEN
LOCATION: ABDOMEN

## 2019-12-10 ASSESSMENT — PAIN DESCRIPTION - DESCRIPTORS
DESCRIPTORS: DISCOMFORT
DESCRIPTORS: DISCOMFORT

## 2019-12-10 ASSESSMENT — PAIN SCALES - GENERAL
PAINLEVEL_OUTOF10: 6
PAINLEVEL_OUTOF10: 4
PAINLEVEL_OUTOF10: 0

## 2019-12-10 ASSESSMENT — PAIN - FUNCTIONAL ASSESSMENT: PAIN_FUNCTIONAL_ASSESSMENT: 0-10

## 2019-12-12 LAB — SURGICAL PATHOLOGY REPORT: NORMAL

## 2019-12-13 ENCOUNTER — INITIAL CONSULT (OUTPATIENT)
Dept: SURGERY | Age: 64
End: 2019-12-13
Payer: MEDICARE

## 2019-12-13 ENCOUNTER — HOSPITAL ENCOUNTER (OUTPATIENT)
Dept: NON INVASIVE DIAGNOSTICS | Age: 64
Discharge: HOME | End: 2019-12-13
Payer: MEDICARE

## 2019-12-13 ENCOUNTER — TELEPHONE (OUTPATIENT)
Dept: SURGERY | Age: 64
End: 2019-12-13

## 2019-12-13 ENCOUNTER — HOSPITAL ENCOUNTER (OUTPATIENT)
Dept: LAB | Age: 64
Discharge: HOME | End: 2019-12-13
Payer: MEDICARE

## 2019-12-13 VITALS
SYSTOLIC BLOOD PRESSURE: 134 MMHG | TEMPERATURE: 97.6 F | HEART RATE: 66 BPM | BODY MASS INDEX: 31.7 KG/M2 | RESPIRATION RATE: 16 BRPM | DIASTOLIC BLOOD PRESSURE: 66 MMHG | WEIGHT: 214 LBS | HEIGHT: 69 IN

## 2019-12-13 DIAGNOSIS — R19.5 POSITIVE COLORECTAL CANCER SCREENING USING COLOGUARD TEST: ICD-10-CM

## 2019-12-13 DIAGNOSIS — R31.0 GROSS HEMATURIA: ICD-10-CM

## 2019-12-13 DIAGNOSIS — K43.9 EPIGASTRIC HERNIA: ICD-10-CM

## 2019-12-13 DIAGNOSIS — K42.9 UMBILICAL HERNIA WITHOUT OBSTRUCTION AND WITHOUT GANGRENE: Primary | ICD-10-CM

## 2019-12-13 DIAGNOSIS — Z01.818 PRE-OP TESTING: ICD-10-CM

## 2019-12-13 DIAGNOSIS — K63.5 HYPERPLASTIC POLYP OF SIGMOID COLON: ICD-10-CM

## 2019-12-13 LAB
-: NORMAL
AMORPHOUS: NORMAL
BACTERIA: NORMAL
BILIRUBIN URINE: NEGATIVE
CASTS UA: NORMAL /LPF (ref 0–2)
COLOR: ABNORMAL
COMMENT UA: ABNORMAL
CRYSTALS, UA: NORMAL /HPF
EKG ATRIAL RATE: 61 BPM
EKG P AXIS: 50 DEGREES
EKG P-R INTERVAL: 176 MS
EKG Q-T INTERVAL: 422 MS
EKG QRS DURATION: 104 MS
EKG QTC CALCULATION (BAZETT): 424 MS
EKG R AXIS: 45 DEGREES
EKG T AXIS: 58 DEGREES
EKG VENTRICULAR RATE: 61 BPM
EPITHELIAL CELLS UA: NORMAL /HPF (ref 0–5)
GLUCOSE URINE: NEGATIVE
KETONES, URINE: NEGATIVE
LEUKOCYTE ESTERASE, URINE: NEGATIVE
MUCUS: NORMAL
NITRITE, URINE: NEGATIVE
OTHER OBSERVATIONS UA: NORMAL
PH UA: 6 (ref 5–6)
PROTEIN UA: ABNORMAL
RBC UA: >100 /HPF (ref 0–4)
RENAL EPITHELIAL, UA: NORMAL /HPF
SPECIFIC GRAVITY UA: 1.01 (ref 1.01–1.02)
TRICHOMONAS: NORMAL
TURBIDITY: ABNORMAL
URINE HGB: ABNORMAL
UROBILINOGEN, URINE: NORMAL
WBC UA: NORMAL /HPF (ref 0–4)
YEAST: NORMAL

## 2019-12-13 PROCEDURE — 4040F PNEUMOC VAC/ADMIN/RCVD: CPT | Performed by: SURGERY

## 2019-12-13 PROCEDURE — 93005 ELECTROCARDIOGRAM TRACING: CPT

## 2019-12-13 PROCEDURE — G8482 FLU IMMUNIZE ORDER/ADMIN: HCPCS | Performed by: SURGERY

## 2019-12-13 PROCEDURE — 1123F ACP DISCUSS/DSCN MKR DOCD: CPT | Performed by: SURGERY

## 2019-12-13 PROCEDURE — 1036F TOBACCO NON-USER: CPT | Performed by: SURGERY

## 2019-12-13 PROCEDURE — G8598 ASA/ANTIPLAT THER USED: HCPCS | Performed by: SURGERY

## 2019-12-13 PROCEDURE — 3017F COLORECTAL CA SCREEN DOC REV: CPT | Performed by: SURGERY

## 2019-12-13 PROCEDURE — 81001 URINALYSIS AUTO W/SCOPE: CPT

## 2019-12-13 PROCEDURE — 99213 OFFICE O/P EST LOW 20 MIN: CPT | Performed by: SURGERY

## 2019-12-13 PROCEDURE — G8427 DOCREV CUR MEDS BY ELIG CLIN: HCPCS | Performed by: SURGERY

## 2019-12-13 PROCEDURE — G8417 CALC BMI ABV UP PARAM F/U: HCPCS | Performed by: SURGERY

## 2019-12-13 NOTE — TELEPHONE ENCOUNTER
[facility]    Pre-Operative Evaluation/Consultation    Name:  Kelsey Jcakson                                         Age:  75 y.o. MRN:  [id number]       :  1955   Date:  2019         Sex: male    There were no encounter diagnoses. Surgeon:  Dr. Daniel Orozco  Procedure (Planned):  Robotic assisted Laparoscopic Umbilical Hernia Repair   Date Scheduled surgery: 2019    Attending : No att. providers found    Primary Physician: Jackye Claude  Cardiologist: El Tanner    Type of Anesthesia Requested: General    Patient Medical history:   Allergies   Allergen Reactions    Vicodin [Hydrocodone-Acetaminophen] Other (See Comments)     Stomach upset     Patient Active Problem List   Diagnosis    Sprain and strain of unspecified site of shoulder and upper arm    Sprain of neck    Displacement of cervical intervertebral disc without myelopathy    Cervical radiculitis    Umbilical hernia    HTN (hypertension)    Impaired fasting blood sugar    Displacement of cervical intervertebral disc without myelopathy    Rotator cuff tear    CAD in native artery    S/P CABG (coronary artery bypass graft)    CAD (coronary artery disease)    Hyperlipidemia    Other emphysema ([address]    Colon cancer screening    Positive colorectal cancer screening using DNA-based stool test     Past Medical History:   Diagnosis Date    Abdominal pain 2014    CAD (coronary artery disease)     Clostridium difficile diarrhea 10/2014    hospitalized in 2014    Displacement of cervical intervertebral disc without myelopathy 6/10/2014    BWC, C6/C7     HTN (hypertension)     Hyperlipidemia     Impaired fasting blood sugar     Neck pain     chronic    Neuralgia, neuritis, and radiculitis, unspecified 6/10/2014    BWC, left C7     Shoulder region pain     Left   BWC injury 2014    Sprain and strain of unspecified site of shoulder and upper arm 6/10/2014    BWC, left arm      Past Surgical History:   Procedure Laterality Date    BICEPS TENODESIS Right 2016    right proximal bicep tenodesis    COLONOSCOPY N/A 12/10/2019    COLONOSCOPY POLYPECTOMY SNARE/COLD BIOPSY performed by Samantha Driscoll MD at [address] [place] [address] Left 14, 1/09/15    C7 TFE    OTHER SURGICAL HISTORY Left 2014    C7 TFE    OTHER SURGICAL HISTORY Left 2015 , 16    C7 TFE    OR CABG, ARTERY-VEIN, SINGLE N/A 2018    CABG CORONARY ARTERY BYPASS, GARY ERAZO, CHANI  (CSI# [id number]) performed by Ramakrishna Long MD at [place] [address] Left 2019    2015- Right    SHOULDER ARTHROSCOPY Right 2016    scope decompressing, rotator cuff repair     Social History     Tobacco Use    Smoking status: Former Smoker     Packs/day: 2.00     Years: 25.00     Pack years: 50.00     Types: Cigarettes     Last attempt to quit: 2008     Years since quittin.9    Smokeless tobacco: Never Used    Tobacco comment: Meghann Anders RRT 16   Substance Use Topics    Alcohol use: No     Alcohol/week: 0.0 standard drinks    Drug use: No     Medications:  Current Outpatient Medications   Medication Sig Dispense Refill    fluticasone-vilanterol (BREO ELLIPTA) 100-25 MCG/INH AEPB inhaler Inhale 1 puff into the lungs daily 3 each 3    albuterol sulfate HFA (VENTOLIN HFA) 108 (90 Base) MCG/ACT inhaler Inhale 2 puffs into the lungs every 6 hours as needed for Wheezing 1 Inhaler 3    gabapentin (NEURONTIN) 400 MG capsule Take 1 capsule by mouth 2 times daily. 11    atorvastatin (LIPITOR) 40 MG tablet Take 1 tablet by mouth nightly 90 tablet 3    metoprolol tartrate (LOPRESSOR) 25 MG tablet Take 1 tablet by mouth 2 times daily 180 tablet 3    traMADol (ULTRAM) 50 MG tablet Take 1 tablet by mouth nightly. Cristine Moreira aspirin 81 MG tablet daily      acetaminophen (TYLENOL) 325 MG tablet Take 2 tablets by mouth every 6 hours as needed for Pain 120 tablet 3     No current facility-administered medications for this visit. Scheduled Meds:  Continuous Infusions:  PRN Meds:. Prior to Admission medications    Medication Sig Start Date End Date Taking? Authorizing Provider   fluticasone-vilanterol (BREO ELLIPTA) 100-25 MCG/INH AEPB inhaler Inhale 1 puff into the lungs daily 11/5/19   Dennys Fisher MD   albuterol sulfate HFA (VENTOLIN HFA) 108 (90 Base) MCG/ACT inhaler Inhale 2 puffs into the lungs every 6 hours as needed for Wheezing 11/4/19   Dennys Fisher MD   gabapentin (NEURONTIN) 400 MG capsule Take 1 capsule by mouth 2 times daily. 4/11/19   Historical Provider, MD   atorvastatin (LIPITOR) 40 MG tablet Take 1 tablet by mouth nightly 4/30/19   Pauly Gipson, DO   metoprolol tartrate (LOPRESSOR) 25 MG tablet Take 1 tablet by mouth 2 times daily 4/30/19   Pauly Gipson, DO   traMADol (ULTRAM) 50 MG tablet Take 1 tablet by mouth nightly. . 9/27/18   Historical Provider, MD   aspirin 81 MG tablet daily    Historical Provider, MD   acetaminophen (TYLENOL) 325 MG tablet Take 2 tablets by mouth every 6 hours as needed for Pain 7/9/18   PARESH Stevens - CNP     Vital Signs (Current) [unfilled]    Weight:   Wt Readings from Last 1 Encounters:   12/13/19 214 lb (97.1 kg)     Height:   Ht Readings from Last 1 Encounters:   12/13/19 5' 9.02\" (1.753 m)      BMI:  There is no height or weight on file to calculate BMI. Estimated body mass index is 31.59 kg/m² as calculated from the following:    Height as of an earlier encounter on 12/13/19: 5' 9.02\" (1.753 m). Weight as of an earlier encounter on 12/13/19: 214 lb (97.1 kg). body mass index is unknown because there is no height or weight on file. Cardiac Clearance: None   Medical Clearance:None   Appointment for surgery Clearance scheduled for:None     Preoperative Testing:   These are the current and completed labs:  CBC:   Lab Results   Component Value Date    WBC 6.9 09/24/2019    RBC 5.72 09/24/2019    HGB 15.4 09/24/2019 HCT 46.9 09/24/2019    MCV 82.0 09/24/2019    RDW 14.7 09/24/2019     09/24/2019     CMP:   Lab Results   Component Value Date     09/24/2019    K 4.3 09/24/2019     09/24/2019    CO2 24 09/24/2019    BUN 15 09/24/2019    CREATININE 0.70 09/24/2019    GFRAA >60 09/24/2019    LABGLOM >60 09/24/2019    GLUCOSE 109 09/24/2019    PROT 7.1 09/24/2019    CALCIUM 9.2 09/24/2019    BILITOT 0.50 09/24/2019    ALKPHOS 150 09/24/2019    AST 18 09/24/2019    ALT 16 09/24/2019     POC Tests: No results for input(s): POCGLU, POCNA, POCK, POCCL, POCBUN, POCHEMO, POCHCT in the last 72 hours.   Coags    Lab Results   Component Value Date    PROTIME 9.6 07/09/2018    INR 0.9 07/09/2018    APTT 25.6 43/34/5615     HCG (If Applicable) No results found for: PREGTESTUR, PREGSERUM, HCG, HCGQUANT   ABGs No results found for: PHART, PO2ART, GUE2GVY, TEZ0RSD, BEART, J7BYKWUC   Type & Screen (If Applicable)  No results found for: Jamar Pearce    Additional ordered pre-operative testing:  [x]CBC    []ABG      [x] BMP   []URINALYSIS   []CMP    []HCG   []COAGS PT/INR  []T&C  []LFTs   []TYPE AND SCREEN    [] EKG  [x] Chest X-Ray  [] Other Radiology    [] Sent to Hospitalist None  [] Sent to Anesthesia for your review: CRNA popol   [] Additional Orders: None     Comments:None   Requests: No Special requests    Signed: Josey Miller LPN 62/76/4346 50:35 AM

## 2019-12-16 ENCOUNTER — OFFICE VISIT (OUTPATIENT)
Dept: UROLOGY | Age: 64
End: 2019-12-16
Payer: MEDICARE

## 2019-12-16 VITALS
SYSTOLIC BLOOD PRESSURE: 116 MMHG | RESPIRATION RATE: 16 BRPM | DIASTOLIC BLOOD PRESSURE: 74 MMHG | HEART RATE: 71 BPM | OXYGEN SATURATION: 93 % | HEIGHT: 69 IN | BODY MASS INDEX: 31.71 KG/M2 | WEIGHT: 214.07 LBS

## 2019-12-16 DIAGNOSIS — R31.9 HEMATURIA, UNSPECIFIED TYPE: Primary | ICD-10-CM

## 2019-12-16 DIAGNOSIS — R31.0 GROSS HEMATURIA: ICD-10-CM

## 2019-12-16 PROCEDURE — 99204 OFFICE O/P NEW MOD 45 MIN: CPT | Performed by: UROLOGY

## 2019-12-16 PROCEDURE — 3017F COLORECTAL CA SCREEN DOC REV: CPT | Performed by: UROLOGY

## 2019-12-16 PROCEDURE — G8427 DOCREV CUR MEDS BY ELIG CLIN: HCPCS | Performed by: UROLOGY

## 2019-12-16 PROCEDURE — G8417 CALC BMI ABV UP PARAM F/U: HCPCS | Performed by: UROLOGY

## 2019-12-16 PROCEDURE — G8482 FLU IMMUNIZE ORDER/ADMIN: HCPCS | Performed by: UROLOGY

## 2019-12-16 PROCEDURE — 4040F PNEUMOC VAC/ADMIN/RCVD: CPT | Performed by: UROLOGY

## 2019-12-16 PROCEDURE — 1036F TOBACCO NON-USER: CPT | Performed by: UROLOGY

## 2019-12-16 PROCEDURE — G8598 ASA/ANTIPLAT THER USED: HCPCS | Performed by: UROLOGY

## 2019-12-16 PROCEDURE — 1123F ACP DISCUSS/DSCN MKR DOCD: CPT | Performed by: UROLOGY

## 2019-12-17 ENCOUNTER — ANESTHESIA EVENT (OUTPATIENT)
Dept: OPERATING ROOM | Age: 64
End: 2019-12-17
Payer: MEDICARE

## 2019-12-18 ENCOUNTER — ANESTHESIA (OUTPATIENT)
Dept: OPERATING ROOM | Age: 64
End: 2019-12-18
Payer: MEDICARE

## 2019-12-18 ENCOUNTER — HOSPITAL ENCOUNTER (OUTPATIENT)
Age: 64
Setting detail: OUTPATIENT SURGERY
Discharge: HOME | End: 2019-12-18
Attending: SURGERY | Admitting: SURGERY
Payer: MEDICARE

## 2019-12-18 VITALS
TEMPERATURE: 98.3 F | RESPIRATION RATE: 18 BRPM | SYSTOLIC BLOOD PRESSURE: 136 MMHG | HEART RATE: 79 BPM | HEIGHT: 69 IN | OXYGEN SATURATION: 94 % | BODY MASS INDEX: 31.31 KG/M2 | DIASTOLIC BLOOD PRESSURE: 71 MMHG | WEIGHT: 211.4 LBS

## 2019-12-18 VITALS
DIASTOLIC BLOOD PRESSURE: 65 MMHG | RESPIRATION RATE: 2 BRPM | TEMPERATURE: 99.1 F | SYSTOLIC BLOOD PRESSURE: 144 MMHG | OXYGEN SATURATION: 92 %

## 2019-12-18 DIAGNOSIS — K43.9 EPIGASTRIC HERNIA: ICD-10-CM

## 2019-12-18 DIAGNOSIS — K42.9 UMBILICAL HERNIA WITHOUT OBSTRUCTION AND WITHOUT GANGRENE: Primary | ICD-10-CM

## 2019-12-18 PROCEDURE — 49652 PR LAP, VENTRAL HERNIA REPAIR,REDUCIBLE: CPT | Performed by: SURGERY

## 2019-12-18 PROCEDURE — 2709999900 HC NON-CHARGEABLE SUPPLY: Performed by: SURGERY

## 2019-12-18 PROCEDURE — 7100000010 HC PHASE II RECOVERY - FIRST 15 MIN: Performed by: SURGERY

## 2019-12-18 PROCEDURE — 3600000019 HC SURGERY ROBOT ADDTL 15MIN: Performed by: SURGERY

## 2019-12-18 PROCEDURE — 3700000000 HC ANESTHESIA ATTENDED CARE: Performed by: SURGERY

## 2019-12-18 PROCEDURE — 3700000001 HC ADD 15 MINUTES (ANESTHESIA): Performed by: SURGERY

## 2019-12-18 PROCEDURE — 2580000003 HC RX 258: Performed by: SURGERY

## 2019-12-18 PROCEDURE — C1781 MESH (IMPLANTABLE): HCPCS | Performed by: SURGERY

## 2019-12-18 PROCEDURE — 7100000001 HC PACU RECOVERY - ADDTL 15 MIN: Performed by: SURGERY

## 2019-12-18 PROCEDURE — 6360000002 HC RX W HCPCS: Performed by: SURGERY

## 2019-12-18 PROCEDURE — 7100000011 HC PHASE II RECOVERY - ADDTL 15 MIN: Performed by: SURGERY

## 2019-12-18 PROCEDURE — S2900 ROBOTIC SURGICAL SYSTEM: HCPCS | Performed by: SURGERY

## 2019-12-18 PROCEDURE — 3600000009 HC SURGERY ROBOT BASE: Performed by: SURGERY

## 2019-12-18 PROCEDURE — 2500000003 HC RX 250 WO HCPCS: Performed by: NURSE ANESTHETIST, CERTIFIED REGISTERED

## 2019-12-18 PROCEDURE — 6360000002 HC RX W HCPCS: Performed by: NURSE ANESTHETIST, CERTIFIED REGISTERED

## 2019-12-18 PROCEDURE — 6370000000 HC RX 637 (ALT 250 FOR IP): Performed by: SURGERY

## 2019-12-18 PROCEDURE — 7100000000 HC PACU RECOVERY - FIRST 15 MIN: Performed by: SURGERY

## 2019-12-18 PROCEDURE — 6370000000 HC RX 637 (ALT 250 FOR IP): Performed by: NURSE ANESTHETIST, CERTIFIED REGISTERED

## 2019-12-18 PROCEDURE — 2500000003 HC RX 250 WO HCPCS: Performed by: SURGERY

## 2019-12-18 DEVICE — MESH SURG W4XL6IN ELLIPSE SEPRA TECHNOLOGY VENTRALIGHT: Type: IMPLANTABLE DEVICE | Site: UMBILICAL | Status: FUNCTIONAL

## 2019-12-18 RX ORDER — DEXAMETHASONE SODIUM PHOSPHATE 10 MG/ML
INJECTION INTRAMUSCULAR; INTRAVENOUS PRN
Status: DISCONTINUED | OUTPATIENT
Start: 2019-12-18 | End: 2019-12-18 | Stop reason: SDUPTHER

## 2019-12-18 RX ORDER — KETOROLAC TROMETHAMINE 10 MG/1
10 TABLET, FILM COATED ORAL 4 TIMES DAILY
Qty: 20 TABLET | Refills: 0 | Status: SHIPPED | OUTPATIENT
Start: 2019-12-18 | End: 2019-12-18 | Stop reason: HOSPADM

## 2019-12-18 RX ORDER — MORPHINE SULFATE 2 MG/ML
2 INJECTION, SOLUTION INTRAMUSCULAR; INTRAVENOUS EVERY 5 MIN PRN
Status: DISCONTINUED | OUTPATIENT
Start: 2019-12-18 | End: 2019-12-18 | Stop reason: HOSPADM

## 2019-12-18 RX ORDER — OXYCODONE HYDROCHLORIDE 5 MG/1
TABLET ORAL PRN
Status: DISCONTINUED | OUTPATIENT
Start: 2019-12-18 | End: 2019-12-18 | Stop reason: SDUPTHER

## 2019-12-18 RX ORDER — HYDROCODONE BITARTRATE AND ACETAMINOPHEN 5; 325 MG/1; MG/1
2 TABLET ORAL PRN
Status: DISCONTINUED | OUTPATIENT
Start: 2019-12-18 | End: 2019-12-18

## 2019-12-18 RX ORDER — NAPROXEN 375 MG/1
375 TABLET ORAL 2 TIMES DAILY WITH MEALS
Qty: 30 TABLET | Refills: 1 | Status: SHIPPED | OUTPATIENT
Start: 2019-12-18 | End: 2020-04-14

## 2019-12-18 RX ORDER — LIDOCAINE HYDROCHLORIDE 20 MG/ML
INJECTION, SOLUTION EPIDURAL; INFILTRATION; INTRACAUDAL; PERINEURAL PRN
Status: DISCONTINUED | OUTPATIENT
Start: 2019-12-18 | End: 2019-12-18 | Stop reason: SDUPTHER

## 2019-12-18 RX ORDER — DIPHENHYDRAMINE HYDROCHLORIDE 50 MG/ML
INJECTION INTRAMUSCULAR; INTRAVENOUS PRN
Status: DISCONTINUED | OUTPATIENT
Start: 2019-12-18 | End: 2019-12-18 | Stop reason: SDUPTHER

## 2019-12-18 RX ORDER — SODIUM CHLORIDE, SODIUM LACTATE, POTASSIUM CHLORIDE, CALCIUM CHLORIDE 600; 310; 30; 20 MG/100ML; MG/100ML; MG/100ML; MG/100ML
INJECTION, SOLUTION INTRAVENOUS CONTINUOUS PRN
Status: DISCONTINUED | OUTPATIENT
Start: 2019-12-18 | End: 2019-12-18

## 2019-12-18 RX ORDER — ONDANSETRON 2 MG/ML
4 INJECTION INTRAMUSCULAR; INTRAVENOUS
Status: DISCONTINUED | OUTPATIENT
Start: 2019-12-18 | End: 2019-12-18 | Stop reason: HOSPADM

## 2019-12-18 RX ORDER — PROPOFOL 10 MG/ML
INJECTION, EMULSION INTRAVENOUS PRN
Status: DISCONTINUED | OUTPATIENT
Start: 2019-12-18 | End: 2019-12-18 | Stop reason: SDUPTHER

## 2019-12-18 RX ORDER — HYDROCODONE BITARTRATE AND ACETAMINOPHEN 5; 325 MG/1; MG/1
1 TABLET ORAL EVERY 6 HOURS PRN
Qty: 20 TABLET | Refills: 0 | Status: SHIPPED | OUTPATIENT
Start: 2019-12-18 | End: 2019-12-18 | Stop reason: HOSPADM

## 2019-12-18 RX ORDER — TRAMADOL HYDROCHLORIDE 50 MG/1
50 TABLET ORAL EVERY 4 HOURS PRN
Qty: 30 TABLET | Refills: 0 | Status: SHIPPED | OUTPATIENT
Start: 2019-12-18 | End: 2019-12-25

## 2019-12-18 RX ORDER — MIDAZOLAM HYDROCHLORIDE 1 MG/ML
INJECTION INTRAMUSCULAR; INTRAVENOUS PRN
Status: DISCONTINUED | OUTPATIENT
Start: 2019-12-18 | End: 2019-12-18 | Stop reason: SDUPTHER

## 2019-12-18 RX ORDER — KETOROLAC TROMETHAMINE 30 MG/ML
15 INJECTION, SOLUTION INTRAMUSCULAR; INTRAVENOUS ONCE
Status: DISCONTINUED | OUTPATIENT
Start: 2019-12-18 | End: 2019-12-18 | Stop reason: HOSPADM

## 2019-12-18 RX ORDER — EPHEDRINE SULFATE/0.9% NACL/PF 50 MG/5 ML
SYRINGE (ML) INTRAVENOUS PRN
Status: DISCONTINUED | OUTPATIENT
Start: 2019-12-18 | End: 2019-12-18 | Stop reason: SDUPTHER

## 2019-12-18 RX ORDER — MORPHINE SULFATE 2 MG/ML
1 INJECTION, SOLUTION INTRAMUSCULAR; INTRAVENOUS EVERY 5 MIN PRN
Status: DISCONTINUED | OUTPATIENT
Start: 2019-12-18 | End: 2019-12-18 | Stop reason: HOSPADM

## 2019-12-18 RX ORDER — FENTANYL CITRATE 50 UG/ML
INJECTION, SOLUTION INTRAMUSCULAR; INTRAVENOUS PRN
Status: DISCONTINUED | OUTPATIENT
Start: 2019-12-18 | End: 2019-12-18 | Stop reason: SDUPTHER

## 2019-12-18 RX ORDER — SODIUM CHLORIDE, SODIUM LACTATE, POTASSIUM CHLORIDE, CALCIUM CHLORIDE 600; 310; 30; 20 MG/100ML; MG/100ML; MG/100ML; MG/100ML
INJECTION, SOLUTION INTRAVENOUS CONTINUOUS
Status: DISCONTINUED | OUTPATIENT
Start: 2019-12-18 | End: 2019-12-18 | Stop reason: HOSPADM

## 2019-12-18 RX ORDER — ROCURONIUM BROMIDE 10 MG/ML
INJECTION, SOLUTION INTRAVENOUS PRN
Status: DISCONTINUED | OUTPATIENT
Start: 2019-12-18 | End: 2019-12-18 | Stop reason: SDUPTHER

## 2019-12-18 RX ORDER — HYDROCODONE BITARTRATE AND ACETAMINOPHEN 5; 325 MG/1; MG/1
1 TABLET ORAL PRN
Status: DISCONTINUED | OUTPATIENT
Start: 2019-12-18 | End: 2019-12-18

## 2019-12-18 RX ORDER — ONDANSETRON 2 MG/ML
INJECTION INTRAMUSCULAR; INTRAVENOUS PRN
Status: DISCONTINUED | OUTPATIENT
Start: 2019-12-18 | End: 2019-12-18 | Stop reason: SDUPTHER

## 2019-12-18 RX ORDER — KETOROLAC TROMETHAMINE 30 MG/ML
INJECTION, SOLUTION INTRAMUSCULAR; INTRAVENOUS PRN
Status: DISCONTINUED | OUTPATIENT
Start: 2019-12-18 | End: 2019-12-18 | Stop reason: SDUPTHER

## 2019-12-18 RX ORDER — TRAMADOL HYDROCHLORIDE 50 MG/1
50 TABLET ORAL ONCE
Status: COMPLETED | OUTPATIENT
Start: 2019-12-18 | End: 2019-12-18

## 2019-12-18 RX ADMIN — Medication 2 G: at 13:52

## 2019-12-18 RX ADMIN — Medication 10 MG: at 14:06

## 2019-12-18 RX ADMIN — PROPOFOL 10 MG: 10 INJECTION, EMULSION INTRAVENOUS at 15:46

## 2019-12-18 RX ADMIN — ROCURONIUM BROMIDE 50 MG: 10 INJECTION, SOLUTION INTRAVENOUS at 13:37

## 2019-12-18 RX ADMIN — FENTANYL CITRATE 50 MCG: 50 INJECTION, SOLUTION INTRAMUSCULAR; INTRAVENOUS at 15:42

## 2019-12-18 RX ADMIN — ROCURONIUM BROMIDE 5 MG: 10 INJECTION, SOLUTION INTRAVENOUS at 15:07

## 2019-12-18 RX ADMIN — Medication 10 MG: at 14:03

## 2019-12-18 RX ADMIN — SODIUM CHLORIDE, POTASSIUM CHLORIDE, SODIUM LACTATE AND CALCIUM CHLORIDE: 600; 310; 30; 20 INJECTION, SOLUTION INTRAVENOUS at 12:42

## 2019-12-18 RX ADMIN — DIPHENHYDRAMINE HYDROCHLORIDE 15 MG: 50 INJECTION, SOLUTION INTRAMUSCULAR; INTRAVENOUS at 13:52

## 2019-12-18 RX ADMIN — FENTANYL CITRATE 50 MCG: 50 INJECTION, SOLUTION INTRAMUSCULAR; INTRAVENOUS at 13:31

## 2019-12-18 RX ADMIN — ROCURONIUM BROMIDE 5 MG: 10 INJECTION, SOLUTION INTRAVENOUS at 15:24

## 2019-12-18 RX ADMIN — ROCURONIUM BROMIDE 20 MG: 10 INJECTION, SOLUTION INTRAVENOUS at 14:12

## 2019-12-18 RX ADMIN — ROCURONIUM BROMIDE 5 MG: 10 INJECTION, SOLUTION INTRAVENOUS at 15:55

## 2019-12-18 RX ADMIN — SODIUM CHLORIDE, POTASSIUM CHLORIDE, SODIUM LACTATE AND CALCIUM CHLORIDE: 600; 310; 30; 20 INJECTION, SOLUTION INTRAVENOUS at 14:18

## 2019-12-18 RX ADMIN — MIDAZOLAM HYDROCHLORIDE 1 MG: 1 INJECTION, SOLUTION INTRAMUSCULAR; INTRAVENOUS at 13:31

## 2019-12-18 RX ADMIN — ROCURONIUM BROMIDE 10 MG: 10 INJECTION, SOLUTION INTRAVENOUS at 16:09

## 2019-12-18 RX ADMIN — MIDAZOLAM HYDROCHLORIDE 1 MG: 1 INJECTION, SOLUTION INTRAMUSCULAR; INTRAVENOUS at 13:37

## 2019-12-18 RX ADMIN — FENTANYL CITRATE 50 MCG: 50 INJECTION, SOLUTION INTRAMUSCULAR; INTRAVENOUS at 15:53

## 2019-12-18 RX ADMIN — KETOROLAC TROMETHAMINE 15 MG: 30 INJECTION, SOLUTION INTRAMUSCULAR; INTRAVENOUS at 16:22

## 2019-12-18 RX ADMIN — Medication 10 MG: at 14:00

## 2019-12-18 RX ADMIN — ONDANSETRON 4 MG: 2 INJECTION INTRAMUSCULAR; INTRAVENOUS at 13:52

## 2019-12-18 RX ADMIN — ROCURONIUM BROMIDE 5 MG: 10 INJECTION, SOLUTION INTRAVENOUS at 15:40

## 2019-12-18 RX ADMIN — SODIUM CHLORIDE, POTASSIUM CHLORIDE, SODIUM LACTATE AND CALCIUM CHLORIDE: 600; 310; 30; 20 INJECTION, SOLUTION INTRAVENOUS at 16:07

## 2019-12-18 RX ADMIN — FENTANYL CITRATE 50 MCG: 50 INJECTION, SOLUTION INTRAMUSCULAR; INTRAVENOUS at 13:37

## 2019-12-18 RX ADMIN — TRAMADOL HYDROCHLORIDE 50 MG: 50 TABLET, FILM COATED ORAL at 18:41

## 2019-12-18 RX ADMIN — SUGAMMADEX 200 MG: 100 INJECTION, SOLUTION INTRAVENOUS at 16:49

## 2019-12-18 RX ADMIN — LIDOCAINE HYDROCHLORIDE 100 MG: 20 INJECTION, SOLUTION EPIDURAL; INFILTRATION; INTRACAUDAL; PERINEURAL at 13:37

## 2019-12-18 RX ADMIN — DEXAMETHASONE SODIUM PHOSPHATE 10 MG: 10 INJECTION INTRAMUSCULAR; INTRAVENOUS at 13:52

## 2019-12-18 RX ADMIN — OXYCODONE HYDROCHLORIDE 10 MG: 5 TABLET ORAL at 12:51

## 2019-12-18 RX ADMIN — PROPOFOL 180 MG: 10 INJECTION, EMULSION INTRAVENOUS at 13:37

## 2019-12-18 RX ADMIN — SODIUM CHLORIDE, POTASSIUM CHLORIDE, SODIUM LACTATE AND CALCIUM CHLORIDE: 600; 310; 30; 20 INJECTION, SOLUTION INTRAVENOUS at 13:50

## 2019-12-18 RX ADMIN — LIDOCAINE HYDROCHLORIDE 50 MG: 20 INJECTION, SOLUTION EPIDURAL; INFILTRATION; INTRACAUDAL; PERINEURAL at 15:20

## 2019-12-18 ASSESSMENT — PULMONARY FUNCTION TESTS
PIF_VALUE: 33
PIF_VALUE: 3
PIF_VALUE: 33
PIF_VALUE: 30
PIF_VALUE: 35
PIF_VALUE: 22
PIF_VALUE: 33
PIF_VALUE: 33
PIF_VALUE: 25
PIF_VALUE: 33
PIF_VALUE: 35
PIF_VALUE: 34
PIF_VALUE: 34
PIF_VALUE: 33
PIF_VALUE: 35
PIF_VALUE: 20
PIF_VALUE: 15
PIF_VALUE: 30
PIF_VALUE: 31
PIF_VALUE: 30
PIF_VALUE: 33
PIF_VALUE: 34
PIF_VALUE: 30
PIF_VALUE: 35
PIF_VALUE: 34
PIF_VALUE: 33
PIF_VALUE: 35
PIF_VALUE: 33
PIF_VALUE: 34
PIF_VALUE: 23
PIF_VALUE: 2
PIF_VALUE: 34
PIF_VALUE: 32
PIF_VALUE: 23
PIF_VALUE: 26
PIF_VALUE: 2
PIF_VALUE: 34
PIF_VALUE: 34
PIF_VALUE: 26
PIF_VALUE: 30
PIF_VALUE: 31
PIF_VALUE: 23
PIF_VALUE: 34
PIF_VALUE: 32
PIF_VALUE: 26
PIF_VALUE: 34
PIF_VALUE: 34
PIF_VALUE: 31
PIF_VALUE: 35
PIF_VALUE: 29
PIF_VALUE: 33
PIF_VALUE: 35
PIF_VALUE: 23
PIF_VALUE: 17
PIF_VALUE: 33
PIF_VALUE: 2
PIF_VALUE: 35
PIF_VALUE: 32
PIF_VALUE: 33
PIF_VALUE: 30
PIF_VALUE: 29
PIF_VALUE: 30
PIF_VALUE: 35
PIF_VALUE: 32
PIF_VALUE: 31
PIF_VALUE: 35
PIF_VALUE: 23
PIF_VALUE: 32
PIF_VALUE: 34
PIF_VALUE: 25
PIF_VALUE: 28
PIF_VALUE: 31
PIF_VALUE: 1
PIF_VALUE: 30
PIF_VALUE: 34
PIF_VALUE: 35
PIF_VALUE: 23
PIF_VALUE: 35
PIF_VALUE: 35
PIF_VALUE: 34
PIF_VALUE: 34
PIF_VALUE: 30
PIF_VALUE: 12
PIF_VALUE: 33
PIF_VALUE: 34
PIF_VALUE: 24
PIF_VALUE: 34
PIF_VALUE: 25
PIF_VALUE: 34
PIF_VALUE: 18
PIF_VALUE: 33
PIF_VALUE: 34
PIF_VALUE: 33
PIF_VALUE: 35
PIF_VALUE: 34
PIF_VALUE: 35
PIF_VALUE: 32
PIF_VALUE: 2
PIF_VALUE: 33
PIF_VALUE: 35
PIF_VALUE: 33
PIF_VALUE: 34
PIF_VALUE: 33
PIF_VALUE: 34
PIF_VALUE: 33
PIF_VALUE: 34
PIF_VALUE: 32
PIF_VALUE: 33
PIF_VALUE: 33
PIF_VALUE: 1
PIF_VALUE: 2
PIF_VALUE: 26
PIF_VALUE: 30
PIF_VALUE: 34
PIF_VALUE: 1
PIF_VALUE: 34
PIF_VALUE: 34
PIF_VALUE: 31
PIF_VALUE: 31
PIF_VALUE: 23
PIF_VALUE: 30
PIF_VALUE: 35
PIF_VALUE: 23
PIF_VALUE: 32
PIF_VALUE: 30
PIF_VALUE: 35
PIF_VALUE: 34
PIF_VALUE: 30
PIF_VALUE: 3
PIF_VALUE: 24
PIF_VALUE: 30
PIF_VALUE: 33
PIF_VALUE: 33
PIF_VALUE: 36
PIF_VALUE: 35
PIF_VALUE: 33
PIF_VALUE: 33
PIF_VALUE: 22
PIF_VALUE: 33
PIF_VALUE: 34
PIF_VALUE: 35
PIF_VALUE: 33
PIF_VALUE: 34
PIF_VALUE: 2
PIF_VALUE: 23
PIF_VALUE: 25
PIF_VALUE: 34
PIF_VALUE: 34
PIF_VALUE: 31
PIF_VALUE: 34
PIF_VALUE: 33
PIF_VALUE: 31
PIF_VALUE: 34
PIF_VALUE: 23
PIF_VALUE: 34
PIF_VALUE: 35
PIF_VALUE: 34
PIF_VALUE: 34
PIF_VALUE: 36
PIF_VALUE: 33
PIF_VALUE: 19
PIF_VALUE: 34
PIF_VALUE: 34
PIF_VALUE: 35
PIF_VALUE: 34
PIF_VALUE: 32
PIF_VALUE: 35
PIF_VALUE: 33
PIF_VALUE: 35
PIF_VALUE: 23
PIF_VALUE: 33
PIF_VALUE: 24
PIF_VALUE: 23
PIF_VALUE: 31
PIF_VALUE: 33
PIF_VALUE: 30
PIF_VALUE: 30
PIF_VALUE: 33
PIF_VALUE: 34
PIF_VALUE: 34
PIF_VALUE: 31
PIF_VALUE: 27
PIF_VALUE: 31
PIF_VALUE: 33
PIF_VALUE: 34
PIF_VALUE: 28
PIF_VALUE: 22
PIF_VALUE: 1
PIF_VALUE: 18
PIF_VALUE: 31
PIF_VALUE: 35
PIF_VALUE: 31
PIF_VALUE: 20
PIF_VALUE: 3
PIF_VALUE: 19
PIF_VALUE: 34
PIF_VALUE: 33
PIF_VALUE: 24
PIF_VALUE: 35
PIF_VALUE: 32
PIF_VALUE: 33
PIF_VALUE: 34
PIF_VALUE: 35
PIF_VALUE: 35

## 2019-12-18 ASSESSMENT — PAIN DESCRIPTION - ORIENTATION
ORIENTATION: MID
ORIENTATION: MID
ORIENTATION: RIGHT
ORIENTATION: MID

## 2019-12-18 ASSESSMENT — PAIN SCALES - GENERAL
PAINLEVEL_OUTOF10: 7
PAINLEVEL_OUTOF10: 5
PAINLEVEL_OUTOF10: 7
PAINLEVEL_OUTOF10: 5
PAINLEVEL_OUTOF10: 4
PAINLEVEL_OUTOF10: 5
PAINLEVEL_OUTOF10: 4
PAINLEVEL_OUTOF10: 0
PAINLEVEL_OUTOF10: 5
PAINLEVEL_OUTOF10: 8
PAINLEVEL_OUTOF10: 0
PAINLEVEL_OUTOF10: 0
PAINLEVEL_OUTOF10: 7

## 2019-12-18 ASSESSMENT — PAIN DESCRIPTION - PAIN TYPE
TYPE: SURGICAL PAIN

## 2019-12-18 ASSESSMENT — PAIN DESCRIPTION - LOCATION
LOCATION: ABDOMEN;UMBILICUS
LOCATION: UMBILICUS
LOCATION: ABDOMEN;UMBILICUS
LOCATION: ABDOMEN
LOCATION: UMBILICUS
LOCATION: ABDOMEN;UMBILICUS
LOCATION: ABDOMEN;UMBILICUS

## 2019-12-18 ASSESSMENT — PAIN DESCRIPTION - DESCRIPTORS
DESCRIPTORS: SORE

## 2019-12-18 ASSESSMENT — PAIN - FUNCTIONAL ASSESSMENT: PAIN_FUNCTIONAL_ASSESSMENT: 0-10

## 2019-12-18 ASSESSMENT — PAIN SCALES - WONG BAKER: WONGBAKER_NUMERICALRESPONSE: 2

## 2019-12-27 ENCOUNTER — OFFICE VISIT (OUTPATIENT)
Dept: SURGERY | Age: 64
End: 2019-12-27

## 2019-12-27 VITALS
SYSTOLIC BLOOD PRESSURE: 120 MMHG | DIASTOLIC BLOOD PRESSURE: 82 MMHG | WEIGHT: 211.8 LBS | HEIGHT: 69 IN | HEART RATE: 72 BPM | BODY MASS INDEX: 31.37 KG/M2 | TEMPERATURE: 97.4 F

## 2019-12-27 DIAGNOSIS — K43.9 EPIGASTRIC HERNIA: ICD-10-CM

## 2019-12-27 DIAGNOSIS — K42.9 UMBILICAL HERNIA WITHOUT OBSTRUCTION AND WITHOUT GANGRENE: Primary | ICD-10-CM

## 2019-12-27 PROCEDURE — 99024 POSTOP FOLLOW-UP VISIT: CPT | Performed by: SURGERY

## 2020-01-08 ENCOUNTER — TELEPHONE (OUTPATIENT)
Dept: SURGERY | Age: 65
End: 2020-01-08

## 2020-01-08 PROBLEM — Z12.11 COLON CANCER SCREENING: Status: RESOLVED | Noted: 2019-12-09 | Resolved: 2020-01-08

## 2020-01-08 NOTE — TELEPHONE ENCOUNTER
Patient called the office this morning. Patient had a lap hernia surgery with Dr Marlyn Goncalves on December 18, 2019. Patient has noticed a hard lump the size of a walnut on one of his incisions. The lump does not hurt.   Please call back @ #[phone]

## 2020-01-14 ENCOUNTER — OFFICE VISIT (OUTPATIENT)
Dept: SURGERY | Age: 65
End: 2020-01-14
Payer: MEDICARE

## 2020-01-14 ENCOUNTER — HOSPITAL ENCOUNTER (OUTPATIENT)
Dept: ULTRASOUND IMAGING | Age: 65
Discharge: HOME | End: 2020-01-16
Payer: MEDICARE

## 2020-01-14 VITALS
WEIGHT: 212 LBS | HEIGHT: 69 IN | HEART RATE: 68 BPM | BODY MASS INDEX: 31.4 KG/M2 | SYSTOLIC BLOOD PRESSURE: 128 MMHG | DIASTOLIC BLOOD PRESSURE: 74 MMHG | TEMPERATURE: 97.1 F | OXYGEN SATURATION: 96 %

## 2020-01-14 PROCEDURE — 76705 ECHO EXAM OF ABDOMEN: CPT

## 2020-01-14 PROCEDURE — 99214 OFFICE O/P EST MOD 30 MIN: CPT | Performed by: SURGERY

## 2020-01-14 PROCEDURE — 99024 POSTOP FOLLOW-UP VISIT: CPT | Performed by: SURGERY

## 2020-01-16 ENCOUNTER — HOSPITAL ENCOUNTER (OUTPATIENT)
Age: 65
Setting detail: SPECIMEN
Discharge: HOME | End: 2020-01-16
Payer: MEDICARE

## 2020-01-16 LAB
CREAT SERPL-MCNC: 0.7 MG/DL (ref 0.7–1.2)
GFR AFRICAN AMERICAN: >60 ML/MIN
GFR NON-AFRICAN AMERICAN: >60 ML/MIN
GFR SERPL CREATININE-BSD FRML MDRD: NORMAL ML/MIN/{1.73_M2}
GFR SERPL CREATININE-BSD FRML MDRD: NORMAL ML/MIN/{1.73_M2}

## 2020-01-16 PROCEDURE — 82565 ASSAY OF CREATININE: CPT

## 2020-01-16 PROCEDURE — 36415 COLL VENOUS BLD VENIPUNCTURE: CPT

## 2020-01-17 RX ORDER — FLUTICASONE FUROATE AND VILANTEROL 100; 25 UG/1; UG/1
1 POWDER RESPIRATORY (INHALATION) DAILY
Qty: 3 EACH | Refills: 3 | Status: SHIPPED | OUTPATIENT
Start: 2020-01-17 | End: 2020-01-28 | Stop reason: SDUPTHER

## 2020-01-17 NOTE — TELEPHONE ENCOUNTER
Shey Pleitez is requesting a refill on the following medication(s):  Requested Prescriptions      No prescriptions requested or ordered in this encounter       Last Visit Date (If Applicable):  92/4/2847    Next Visit Date:    4/14/2020

## 2020-01-22 ENCOUNTER — TELEPHONE (OUTPATIENT)
Dept: CARDIOLOGY | Age: 65
End: 2020-01-22

## 2020-01-22 ENCOUNTER — HOSPITAL ENCOUNTER (OUTPATIENT)
Dept: CT IMAGING | Age: 65
Discharge: HOME | End: 2020-01-24
Payer: MEDICARE

## 2020-01-22 PROCEDURE — 6360000004 HC RX CONTRAST MEDICATION: Performed by: UROLOGY

## 2020-01-22 PROCEDURE — 74178 CT ABD&PLV WO CNTR FLWD CNTR: CPT

## 2020-01-22 RX ORDER — ATORVASTATIN CALCIUM 40 MG/1
40 TABLET, FILM COATED ORAL NIGHTLY
Qty: 90 TABLET | Refills: 3 | Status: CANCELLED | OUTPATIENT
Start: 2020-01-22

## 2020-01-22 RX ADMIN — IOPAMIDOL 120 ML: 755 INJECTION, SOLUTION INTRAVENOUS at 14:12

## 2020-01-22 NOTE — TELEPHONE ENCOUNTER
Pt came in today and asked for a refill of his simvastatin  Please send into [address] [place] [address] Dr mail delivery.

## 2020-01-23 RX ORDER — ATORVASTATIN CALCIUM 40 MG/1
40 TABLET, FILM COATED ORAL NIGHTLY
Qty: 90 TABLET | Refills: 3 | Status: SHIPPED | OUTPATIENT
Start: 2020-01-23 | End: 2020-04-23 | Stop reason: SDUPTHER

## 2020-01-23 NOTE — TELEPHONE ENCOUNTER
I only see the atorvastatin 40 mg from our last visit in Oct.  I spoke to pt. He confirmed Atorvastatin not simvastatin.

## 2020-01-28 RX ORDER — FLUTICASONE FUROATE AND VILANTEROL 100; 25 UG/1; UG/1
1 POWDER RESPIRATORY (INHALATION) DAILY
Qty: 3 EACH | Refills: 3 | Status: SHIPPED | OUTPATIENT
Start: 2020-01-28 | End: 2020-07-10 | Stop reason: ALTCHOICE

## 2020-02-17 ENCOUNTER — PROCEDURE VISIT (OUTPATIENT)
Dept: UROLOGY | Age: 65
End: 2020-02-17
Payer: MEDICARE

## 2020-02-17 VITALS
SYSTOLIC BLOOD PRESSURE: 118 MMHG | WEIGHT: 212 LBS | OXYGEN SATURATION: 96 % | HEIGHT: 70 IN | RESPIRATION RATE: 16 BRPM | BODY MASS INDEX: 30.35 KG/M2 | HEART RATE: 63 BPM | DIASTOLIC BLOOD PRESSURE: 70 MMHG

## 2020-02-17 PROCEDURE — 3017F COLORECTAL CA SCREEN DOC REV: CPT | Performed by: UROLOGY

## 2020-02-17 PROCEDURE — 1036F TOBACCO NON-USER: CPT | Performed by: UROLOGY

## 2020-02-17 PROCEDURE — G8427 DOCREV CUR MEDS BY ELIG CLIN: HCPCS | Performed by: UROLOGY

## 2020-02-17 PROCEDURE — G8417 CALC BMI ABV UP PARAM F/U: HCPCS | Performed by: UROLOGY

## 2020-02-17 PROCEDURE — 4040F PNEUMOC VAC/ADMIN/RCVD: CPT | Performed by: UROLOGY

## 2020-02-17 PROCEDURE — 52000 CYSTOURETHROSCOPY: CPT | Performed by: UROLOGY

## 2020-02-17 PROCEDURE — 99214 OFFICE O/P EST MOD 30 MIN: CPT | Performed by: UROLOGY

## 2020-02-17 PROCEDURE — G8482 FLU IMMUNIZE ORDER/ADMIN: HCPCS | Performed by: UROLOGY

## 2020-02-17 PROCEDURE — 1123F ACP DISCUSS/DSCN MKR DOCD: CPT | Performed by: UROLOGY

## 2020-02-17 SDOH — HEALTH STABILITY: MENTAL HEALTH: HOW OFTEN DO YOU HAVE A DRINK CONTAINING ALCOHOL?: NEVER

## 2020-02-17 NOTE — PROGRESS NOTES
09/08/2017       Last total testosterone:  No results found for: TESTOSTERONE    Urinalysis today:  No results found for this visit on 02/17/20.     Last BUN and creatinine:  Lab Results   Component Value Date    BUN 15 09/24/2019     Lab Results   Component Value Date    CREATININE 0.70 01/16/2020       Additional Lab/Culture results: none    Imaging Reviewed during this Office Visit:   imaging independently reviewed by Yuridia Roman MD and radiology report verified demonstrating     CT - possible bladder mass      PAST MEDICAL, FAMILY AND SOCIAL HISTORY:  Past Medical History:   Diagnosis Date    Abdominal pain 9/29/2014    CAD (coronary artery disease)     Clostridium difficile diarrhea 10/2014    hospitalized in October 2014    Displacement of cervical intervertebral disc without myelopathy 6/10/2014    [facility], C6/C7     HTN (hypertension)     Hyperlipidemia     Impaired fasting blood sugar     Neck pain     chronic    Neuralgia, neuritis, and radiculitis, unspecified 6/10/2014    [facility], left C7     Shoulder region pain     Left   [facility] injury 01/08/2014    Sprain and strain of unspecified site of shoulder and upper arm 6/10/2014    [facility], left arm      Past Surgical History:   Procedure Laterality Date    BICEPS TENODESIS Right 11/23/2016    right proximal bicep tenodesis    COLONOSCOPY N/A 12/10/2019    COLONOSCOPY POLYPECTOMY SNARE/COLD BIOPSY performed by Valentin Lawson MD at [address] 3/9/2020    CYSTO TURBT performed by Dipak Fernandez MD at [address] [place] [address] Left 8/12/14, 1/09/15    C7 TFE    OTHER SURGICAL HISTORY Left 09/23/2014    C7 TFE    OTHER SURGICAL HISTORY Left 7/17/2015 , 1/22/16    C7 TFE    [place] CABG, ARTERY-VEIN, SINGLE N/A 7/6/2018    CABG CORONARY ARTERY BYPASS, GARY ERAZO CHANI  ([facility]# [id number]) performed by Sumaya Lundy MD at [place] [address] Left 01/29/2019    2015- Right    SHOULDER ARTHROSCOPY Right 4/27/2016    scope decompressing, rotator cuff repair    UMBILICAL HERNIA REPAIR N/A 2019    Laparoscopic Robotic Assisted Umbilical Hernia Repair W/ MESH performed by Benjamín Gong MD at [facility] OR     Family History   Problem Relation Age of Onset    High Blood Pressure Father     Colon Polyps Brother     COPD Mother      Outpatient Medications Marked as Taking for the 20 encounter (Procedure visit) with Franck Barton MD   Medication Sig Dispense Refill    fluticasone-vilanterol (BREO ELLIPTA) 100-25 MCG/INH AEPB inhaler Inhale 1 puff into the lungs daily 3 each 3    atorvastatin (LIPITOR) 40 MG tablet Take 1 tablet by mouth nightly 90 tablet 3    naproxen (NAPROSYN) 375 MG tablet Take 1 tablet by mouth 2 times daily (with meals) Take regularly for 5 days than twice a day as needed. 30 tablet 1    gabapentin (NEURONTIN) 400 MG capsule Take 1 capsule by mouth 2 times daily. 11    metoprolol tartrate (LOPRESSOR) 25 MG tablet Take 1 tablet by mouth 2 times daily 180 tablet 3    traMADol (ULTRAM) 50 MG tablet Take 1 tablet by mouth nightly. Yuli Guard [DISCONTINUED] aspirin 81 MG tablet daily      acetaminophen (TYLENOL) 325 MG tablet Take 2 tablets by mouth every 6 hours as needed for Pain 120 tablet 3       Vicodin [hydrocodone-acetaminophen]  Social History     Tobacco Use   Smoking Status Former Smoker    Packs/day: 2.00    Years: 25.00    Pack years: 50.00    Types: Cigarettes    Start date: 1975    Last attempt to quit: 2008    Years since quittin.2   Smokeless Tobacco Never Used   Tobacco Comment    S John Hendricks RRT 16      (If patient a smoker, smoking cessation counseling offered)   Social History     Substance and Sexual Activity   Alcohol Use No    Alcohol/week: 0.0 standard drinks    Frequency: Never    Binge frequency: Never       REVIEW OF SYSTEMS:  Constitutional: negative  Eyes: negative  Respiratory: negative  Cardiovascular: negative  Gastrointestinal: negative  Genitourinary: see HPI  Musculoskeletal: negative  Skin: negative   Neurological: negative  Hematological/Lymphatic: negative  Psychological: negative      Physical Exam:    This a 75 y.o. male  Vitals:    02/17/20 1203   BP: 118/70   Pulse: 63   Resp: 16   SpO2: 96%     Body mass index is 30.42 kg/m². Constitutional: Patient in no acute distress;           Assessment and Plan        1. Gross hematuria    2. Bladder mass               Plan:      Nocturia x 2- bothersome LUTS. Will follow for now  Cystoscopy TURBT [facility]      Prescriptions Ordered:  No orders of the defined types were placed in this encounter. Orders Placed:  No orders of the defined types were placed in this encounter.            Kevin Baer MD

## 2020-02-18 ENCOUNTER — TELEPHONE (OUTPATIENT)
Dept: UROLOGY | Age: 65
End: 2020-02-18

## 2020-03-09 ENCOUNTER — ANESTHESIA (OUTPATIENT)
Dept: OPERATING ROOM | Age: 65
End: 2020-03-09
Payer: MEDICARE

## 2020-03-09 ENCOUNTER — HOSPITAL ENCOUNTER (OUTPATIENT)
Age: 65
Setting detail: OUTPATIENT SURGERY
Discharge: HOME | End: 2020-03-09
Attending: UROLOGY | Admitting: UROLOGY
Payer: MEDICARE

## 2020-03-09 ENCOUNTER — ANESTHESIA EVENT (OUTPATIENT)
Dept: OPERATING ROOM | Age: 65
End: 2020-03-09
Payer: MEDICARE

## 2020-03-09 VITALS
HEIGHT: 69 IN | OXYGEN SATURATION: 95 % | HEART RATE: 60 BPM | RESPIRATION RATE: 18 BRPM | TEMPERATURE: 98.3 F | DIASTOLIC BLOOD PRESSURE: 89 MMHG | BODY MASS INDEX: 31.07 KG/M2 | WEIGHT: 209.8 LBS | SYSTOLIC BLOOD PRESSURE: 168 MMHG

## 2020-03-09 VITALS
TEMPERATURE: 96.4 F | RESPIRATION RATE: 6 BRPM | SYSTOLIC BLOOD PRESSURE: 155 MMHG | OXYGEN SATURATION: 99 % | DIASTOLIC BLOOD PRESSURE: 93 MMHG

## 2020-03-09 PROCEDURE — 3700000000 HC ANESTHESIA ATTENDED CARE: Performed by: UROLOGY

## 2020-03-09 PROCEDURE — 2580000003 HC RX 258: Performed by: UROLOGY

## 2020-03-09 PROCEDURE — 3600000002 HC SURGERY LEVEL 2 BASE: Performed by: UROLOGY

## 2020-03-09 PROCEDURE — 3700000001 HC ADD 15 MINUTES (ANESTHESIA): Performed by: UROLOGY

## 2020-03-09 PROCEDURE — 7100000011 HC PHASE II RECOVERY - ADDTL 15 MIN: Performed by: UROLOGY

## 2020-03-09 PROCEDURE — 7100000010 HC PHASE II RECOVERY - FIRST 15 MIN: Performed by: UROLOGY

## 2020-03-09 PROCEDURE — 2709999900 HC NON-CHARGEABLE SUPPLY: Performed by: UROLOGY

## 2020-03-09 PROCEDURE — 88307 TISSUE EXAM BY PATHOLOGIST: CPT

## 2020-03-09 PROCEDURE — 7100000001 HC PACU RECOVERY - ADDTL 15 MIN: Performed by: UROLOGY

## 2020-03-09 PROCEDURE — 6360000002 HC RX W HCPCS: Performed by: UROLOGY

## 2020-03-09 PROCEDURE — 7100000000 HC PACU RECOVERY - FIRST 15 MIN: Performed by: UROLOGY

## 2020-03-09 PROCEDURE — 3600000012 HC SURGERY LEVEL 2 ADDTL 15MIN: Performed by: UROLOGY

## 2020-03-09 PROCEDURE — 2500000003 HC RX 250 WO HCPCS: Performed by: NURSE ANESTHETIST, CERTIFIED REGISTERED

## 2020-03-09 PROCEDURE — 6360000002 HC RX W HCPCS: Performed by: NURSE ANESTHETIST, CERTIFIED REGISTERED

## 2020-03-09 RX ORDER — PROPOFOL 10 MG/ML
INJECTION, EMULSION INTRAVENOUS PRN
Status: DISCONTINUED | OUTPATIENT
Start: 2020-03-09 | End: 2020-03-09 | Stop reason: SDUPTHER

## 2020-03-09 RX ORDER — ONDANSETRON 2 MG/ML
INJECTION INTRAMUSCULAR; INTRAVENOUS PRN
Status: DISCONTINUED | OUTPATIENT
Start: 2020-03-09 | End: 2020-03-09 | Stop reason: SDUPTHER

## 2020-03-09 RX ORDER — SODIUM CHLORIDE, SODIUM LACTATE, POTASSIUM CHLORIDE, CALCIUM CHLORIDE 600; 310; 30; 20 MG/100ML; MG/100ML; MG/100ML; MG/100ML
INJECTION, SOLUTION INTRAVENOUS CONTINUOUS
Status: DISCONTINUED | OUTPATIENT
Start: 2020-03-09 | End: 2020-03-09 | Stop reason: HOSPADM

## 2020-03-09 RX ORDER — MIDAZOLAM HYDROCHLORIDE 1 MG/ML
INJECTION INTRAMUSCULAR; INTRAVENOUS PRN
Status: DISCONTINUED | OUTPATIENT
Start: 2020-03-09 | End: 2020-03-09 | Stop reason: SDUPTHER

## 2020-03-09 RX ORDER — DEXAMETHASONE SODIUM PHOSPHATE 10 MG/ML
INJECTION INTRAMUSCULAR; INTRAVENOUS PRN
Status: DISCONTINUED | OUTPATIENT
Start: 2020-03-09 | End: 2020-03-09 | Stop reason: SDUPTHER

## 2020-03-09 RX ORDER — LIDOCAINE HYDROCHLORIDE 20 MG/ML
INJECTION, SOLUTION EPIDURAL; INFILTRATION; INTRACAUDAL; PERINEURAL PRN
Status: DISCONTINUED | OUTPATIENT
Start: 2020-03-09 | End: 2020-03-09 | Stop reason: SDUPTHER

## 2020-03-09 RX ORDER — ROCURONIUM BROMIDE 10 MG/ML
INJECTION, SOLUTION INTRAVENOUS PRN
Status: DISCONTINUED | OUTPATIENT
Start: 2020-03-09 | End: 2020-03-09 | Stop reason: SDUPTHER

## 2020-03-09 RX ORDER — SODIUM CHLORIDE 0.9 % (FLUSH) 0.9 %
10 SYRINGE (ML) INJECTION PRN
Status: DISCONTINUED | OUTPATIENT
Start: 2020-03-09 | End: 2020-03-09 | Stop reason: HOSPADM

## 2020-03-09 RX ORDER — CEPHALEXIN 500 MG/1
500 CAPSULE ORAL 3 TIMES DAILY
Qty: 9 CAPSULE | Refills: 0 | Status: SHIPPED | OUTPATIENT
Start: 2020-03-09 | End: 2020-03-12

## 2020-03-09 RX ORDER — SODIUM CHLORIDE 0.9 % (FLUSH) 0.9 %
10 SYRINGE (ML) INJECTION EVERY 12 HOURS SCHEDULED
Status: DISCONTINUED | OUTPATIENT
Start: 2020-03-09 | End: 2020-03-09 | Stop reason: HOSPADM

## 2020-03-09 RX ORDER — FENTANYL CITRATE 50 UG/ML
INJECTION, SOLUTION INTRAMUSCULAR; INTRAVENOUS PRN
Status: DISCONTINUED | OUTPATIENT
Start: 2020-03-09 | End: 2020-03-09 | Stop reason: SDUPTHER

## 2020-03-09 RX ORDER — HYDROCODONE BITARTRATE AND ACETAMINOPHEN 5; 325 MG/1; MG/1
1 TABLET ORAL EVERY 4 HOURS PRN
Qty: 18 TABLET | Refills: 0 | Status: SHIPPED | OUTPATIENT
Start: 2020-03-09 | End: 2020-03-12

## 2020-03-09 RX ADMIN — MIDAZOLAM HYDROCHLORIDE 2 MG: 1 INJECTION, SOLUTION INTRAMUSCULAR; INTRAVENOUS at 13:58

## 2020-03-09 RX ADMIN — FENTANYL CITRATE 50 MCG: 50 INJECTION, SOLUTION INTRAMUSCULAR; INTRAVENOUS at 14:35

## 2020-03-09 RX ADMIN — Medication 2 G: at 13:58

## 2020-03-09 RX ADMIN — ROCURONIUM BROMIDE 30 MG: 10 INJECTION, SOLUTION INTRAVENOUS at 14:00

## 2020-03-09 RX ADMIN — LIDOCAINE HYDROCHLORIDE 100 MG: 20 INJECTION, SOLUTION EPIDURAL; INFILTRATION; INTRACAUDAL; PERINEURAL at 14:00

## 2020-03-09 RX ADMIN — PROPOFOL 150 MG: 10 INJECTION, EMULSION INTRAVENOUS at 14:00

## 2020-03-09 RX ADMIN — ONDANSETRON 4 MG: 2 INJECTION INTRAMUSCULAR; INTRAVENOUS at 14:28

## 2020-03-09 RX ADMIN — SODIUM CHLORIDE, SODIUM LACTATE, POTASSIUM CHLORIDE, AND CALCIUM CHLORIDE: .6; .31; .03; .02 INJECTION, SOLUTION INTRAVENOUS at 13:09

## 2020-03-09 RX ADMIN — FENTANYL CITRATE 50 MCG: 50 INJECTION, SOLUTION INTRAMUSCULAR; INTRAVENOUS at 14:00

## 2020-03-09 RX ADMIN — SUGAMMADEX 190 MG: 100 INJECTION, SOLUTION INTRAVENOUS at 14:32

## 2020-03-09 RX ADMIN — DEXAMETHASONE SODIUM PHOSPHATE 10 MG: 10 INJECTION INTRAMUSCULAR; INTRAVENOUS at 14:10

## 2020-03-09 ASSESSMENT — PULMONARY FUNCTION TESTS
PIF_VALUE: 20
PIF_VALUE: 17
PIF_VALUE: 20
PIF_VALUE: 21
PIF_VALUE: 27
PIF_VALUE: 20
PIF_VALUE: 19
PIF_VALUE: 20
PIF_VALUE: 8
PIF_VALUE: 20
PIF_VALUE: 20
PIF_VALUE: 3
PIF_VALUE: 27
PIF_VALUE: 20
PIF_VALUE: 21
PIF_VALUE: 7
PIF_VALUE: 21
PIF_VALUE: 21
PIF_VALUE: 23
PIF_VALUE: 23
PIF_VALUE: 20
PIF_VALUE: 20
PIF_VALUE: 18
PIF_VALUE: 21
PIF_VALUE: 41
PIF_VALUE: 19
PIF_VALUE: 20
PIF_VALUE: 8
PIF_VALUE: 20
PIF_VALUE: 8
PIF_VALUE: 19
PIF_VALUE: 21
PIF_VALUE: 26
PIF_VALUE: 21
PIF_VALUE: 21
PIF_VALUE: 23
PIF_VALUE: 22
PIF_VALUE: 8

## 2020-03-09 ASSESSMENT — PAIN SCALES - GENERAL
PAINLEVEL_OUTOF10: 0

## 2020-03-09 ASSESSMENT — PAIN - FUNCTIONAL ASSESSMENT: PAIN_FUNCTIONAL_ASSESSMENT: 0-10

## 2020-03-09 ASSESSMENT — LIFESTYLE VARIABLES: SMOKING_STATUS: 1

## 2020-03-09 NOTE — ANESTHESIA PRE PROCEDURE
Department of Anesthesiology  Preprocedure Note       Name:  Joel Pascual   Age:  75 y.o.  :  1955                                          MRN:  [id number]         Date:  3/9/2020      Surgeon: Lilia Tang):  Savage Montemayor MD    Procedure: CYSTO TURBT (N/A )    Medications prior to admission:   Prior to Admission medications    Medication Sig Start Date End Date Taking? Authorizing Provider   fluticasone-vilanterol (BREO ELLIPTA) 100-25 MCG/INH AEPB inhaler Inhale 1 puff into the lungs daily 20  Yes Anyi Mathew MD   atorvastatin (LIPITOR) 40 MG tablet Take 1 tablet by mouth nightly 20  Yes James Donohue MD   gabapentin (NEURONTIN) 400 MG capsule Take 1 capsule by mouth 2 times daily. 19  Yes Historical Provider, MD   metoprolol tartrate (LOPRESSOR) 25 MG tablet Take 1 tablet by mouth 2 times daily 19  Yes Pauly Gipson DO   traMADol (ULTRAM) 50 MG tablet Take 1 tablet by mouth nightly. . 18  Yes Historical Provider, MD   naproxen (NAPROSYN) 375 MG tablet Take 1 tablet by mouth 2 times daily (with meals) Take regularly for 5 days than twice a day as needed. 19   Janet Carroll MD   aspirin 81 MG tablet daily    Historical Provider, MD   acetaminophen (TYLENOL) 325 MG tablet Take 2 tablets by mouth every 6 hours as needed for Pain 18   Lencho Guzmán, APRN - CNP       Current medications:    Current Facility-Administered Medications   Medication Dose Route Frequency Provider Last Rate Last Dose    sodium chloride flush 0.9 % injection 10 mL  10 mL Intravenous 2 times per day Savage Montemayor MD        sodium chloride flush 0.9 % injection 10 mL  10 mL Intravenous PRN Savage Montemayor MD        ceFAZolin (ANCEF) 2 g in sterile water 20 mL IV syringe  2 g Intravenous On Call to [facility], MD        lactated ringers infusion   Intravenous Continuous Savage Montemayor  mL/hr at 20 1309         Allergies:     Allergies   Allergen Reactions    Vicodin [Hydrocodone-Acetaminophen] Nausea Only     Stomach upset       Problem List:    Patient Active Problem List   Diagnosis Code    Sprain and strain of unspecified site of shoulder and upper arm [id number]    Sprain of neck S13. 9XXA    Displacement of cervical intervertebral disc without myelopathy M50.20    Cervical radiculitis K41.24    Umbilical hernia K94.2    HTN (hypertension) I10    Impaired fasting blood sugar R73.01    Displacement of cervical intervertebral disc without myelopathy M50.20    Rotator cuff tear M75.100    CAD in native artery I25.10    S/P CABG (coronary artery bypass graft) Z95.1    CAD (coronary artery disease) I25.10    Hyperlipidemia E78.5    Other emphysema (HCC) J43.8    Positive colorectal cancer screening using DNA-based stool test R19.5    Epigastric hernia K43.9       Past Medical History:        Diagnosis Date    Abdominal pain 9/29/2014    CAD (coronary artery disease)     Clostridium difficile diarrhea 10/2014    hospitalized in October 2014    Displacement of cervical intervertebral disc without myelopathy 6/10/2014    C, C6/C7     HTN (hypertension)     Hyperlipidemia     Impaired fasting blood sugar     Neck pain     chronic    Neuralgia, neuritis, and radiculitis, unspecified 6/10/2014    [facility], left C7     Shoulder region pain     Left   [facility] injury 01/08/2014    Sprain and strain of unspecified site of shoulder and upper arm 6/10/2014    [facility], left arm        Past Surgical History:        Procedure Laterality Date    BICEPS TENODESIS Right 11/23/2016    right proximal bicep tenodesis    COLONOSCOPY N/A 12/10/2019    COLONOSCOPY POLYPECTOMY SNARE/COLD BIOPSY performed by Raul German MD at [address] [place] [address] Left 8/12/14, 1/09/15    C7 TFE    OTHER SURGICAL HISTORY Left 09/23/2014    C7 TFE    OTHER SURGICAL HISTORY Left 7/17/2015 , 1/22/16    C7 TFE    [place] CABG, ARTERY-VEIN, SINGLE N/A 7/6/2018    CABG CORONARY ARTERY BYPASS, CHANI Mcguire  ([facility]# [id number]) performed by Donita Wilson MD at [facility] Left 2019    2015- Right    SHOULDER ARTHROSCOPY Right 2016    scope decompressing, rotator cuff repair    UMBILICAL HERNIA REPAIR N/A 2019    Laparoscopic Robotic Assisted Umbilical Hernia Repair W/ MESH performed by Nona Jackson MD at [facility] History:    Social History     Tobacco Use    Smoking status: Former Smoker     Packs/day: 2.00     Years: 25.00     Pack years: 50.00     Types: Cigarettes     Start date: 1975     Last attempt to quit: 2008     Years since quittin.1    Smokeless tobacco: Never Used    Tobacco comment: Debbie Johns RRT 16   Substance Use Topics    Alcohol use: No     Alcohol/week: 0.0 standard drinks     Frequency: Never     Binge frequency: Never                                Counseling given: Not Answered  Comment: Debbie Johns RRT 16      Vital Signs (Current):   Vitals:    20 1300   BP: (!) 145/85   Pulse: 62   Resp: 18   Temp: 35.9 °C (96.7 °F)   TempSrc: Temporal   SpO2: 97%   Weight: 209 lb 12.8 oz (95.2 kg)   Height: 5' 9\" (1.753 m)                                              BP Readings from Last 3 Encounters:   20 (!) 145/85   20 118/70   20 128/74       NPO Status: Time of last liquid consumption:                         Time of last solid consumption:                         Date of last liquid consumption: 20                        Date of last solid food consumption: 20    BMI:   Wt Readings from Last 3 Encounters:   20 209 lb 12.8 oz (95.2 kg)   20 212 lb (96.2 kg)   20 212 lb (96.2 kg)     Body mass index is 30.98 kg/m².     CBC:   Lab Results   Component Value Date    WBC 6.9 2019    RBC 5.72 2019    HGB 15.4 2019    HCT 46.9 2019    MCV 82.0 2019    RDW 14.7 2019     2019       CMP:   Lab Results Component Value Date     09/24/2019    K 4.3 09/24/2019     09/24/2019    CO2 24 09/24/2019    BUN 15 09/24/2019    CREATININE 0.70 01/16/2020    GFRAA >60 01/16/2020    LABGLOM >60 01/16/2020    GLUCOSE 109 09/24/2019    PROT 7.1 09/24/2019    CALCIUM 9.2 09/24/2019    BILITOT 0.50 09/24/2019    ALKPHOS 150 09/24/2019    AST 18 09/24/2019    ALT 16 09/24/2019       POC Tests: No results for input(s): POCGLU, POCNA, POCK, POCCL, POCBUN, POCHEMO, POCHCT in the last 72 hours. Coags:   Lab Results   Component Value Date    PROTIME 9.6 07/09/2018    INR 0.9 07/09/2018    APTT 25.6 07/06/2018       HCG (If Applicable): No results found for: PREGTESTUR, PREGSERUM, HCG, HCGQUANT     ABGs: No results found for: PHART, PO2ART, UKI0QSI, IQC7VBA, BEART, N0OYKJCH     Type & Screen (If Applicable):  No results found for: [facility]    Anesthesia Evaluation  Patient summary reviewed and Nursing notes reviewed no history of anesthetic complications:   Airway: Mallampati: II  TM distance: >3 FB   Neck ROM: full  Mouth opening: > = 3 FB Dental: normal exam         Pulmonary:normal exam  breath sounds clear to auscultation  (+) COPD:  current smoker                           Cardiovascular:    (+) hypertension:, CAD:, CABG/stent:, hyperlipidemia        Rhythm: regular  Rate: normal           Beta Blocker:  Dose within 24 Hrs         Neuro/Psych:   (+) neuromuscular disease:,             GI/Hepatic/Renal:   (+) morbid obesity          Endo/Other: Negative Endo/Other ROS             Pt had no PAT visit       Abdominal:       Abdomen: soft. Vascular: negative vascular ROS. Anesthesia Plan      general     ASA 3       Induction: intravenous. MIPS: Postoperative opioids intended and Prophylactic antiemetics administered. Anesthetic plan and risks discussed with patient. Plan discussed with attending, surgical team and CRNA.                   Ed Mendoza APRN - CRNA 3/9/2020

## 2020-03-09 NOTE — H&P
Veronica Moncada MD  History and Physical    Patient:  Pérez Moreno  MRN: [id number]  YOB: 1955    HISTORY OF PRESENT ILLNESS:     The patient is a 75 y.o. male who presents with bladder tumor. Here for procedure. Patient's old records, notes and chart reviewed and summarized above. Veronica Moncada MD independently reviewed the images and verified the radiology reports from:    Ct Urogram    Result Date: 2020  EXAMINATION: CT UROGRAM 2020 1:59 pm TECHNIQUE: CT of the abdomen and pelvis was performed before and after the administration of intravenous contrast as per CT urogram protocol. Multiplanar reformatted images as well as MIP urogram images are provided for review. Dose modulation, iterative reconstruction, and/or weight based adjustment of the mA/kV was utilized to reduce the radiation dose to as low as reasonably achievable. COMPARISON: 2014 HISTORY: ORDERING SYSTEM PROVIDED HISTORY: Hematuria, unspecified type TECHNOLOGIST PROVIDED HISTORY: Reason for Exam: visible hematuria in 2019, hernia surgery  in 2019 FINDINGS: Lower Chest: There is a new trace complex right pleural effusion with peripheral enhancement and associated right basilar atelectasis. Mild emphysema involves the bilateral lungs. Status post median sternotomy and CABG. Organs: The liver, spleen, pancreas, adrenal glands and kidneys are unremarkable. There is no hydronephrosis, obstructive uropathy, renal mass or excretory ureteral filling defect. GI/Bowel: There is no bowel obstruction. The appendix is not visualized. Pelvis: Within the left posterolateral aspect of the bladder wall, there are 2 partially calcified soft tissue lesions, the larger of which measures 5 mm. Peritoneum/Retroperitoneum: There is no evidence of free fluid or adenopathy. Status post recent ventral hernia repair with mesh.   A thin fluid collection with small amount of air within the fascial layer likely represents a hematoma/seroma rather than abscess. In addition, there is a small to moderate fat containing hernia within the left anterior upper quadrant. Moderate atherosclerosis involves the ectatic abdominal aorta and bilateral common iliac arteries. Bones/Soft Tissues: Degenerative changes involve the thoracolumbar spine and bilateral hips. No change in the grade 1 anterolisthesis of L5 on S1 due to bilateral L5 pars defects. 1. 2 partially calcified soft tissue lesions within the left posterolateral aspect of the bladder. The differential diagnosis includes polyps versus malignancy; correlate with cystoscopy.  2. New trace right complex pleural effusion         Past Medical History:    Past Medical History:   Diagnosis Date    Abdominal pain 9/29/2014    CAD (coronary artery disease)     Clostridium difficile diarrhea 10/2014    hospitalized in October 2014    Displacement of cervical intervertebral disc without myelopathy 6/10/2014    [facility], C6/C7     HTN (hypertension)     Hyperlipidemia     Impaired fasting blood sugar     Neck pain     chronic    Neuralgia, neuritis, and radiculitis, unspecified 6/10/2014    [facility], left C7     Shoulder region pain     Left   [facility] injury 01/08/2014    Sprain and strain of unspecified site of shoulder and upper arm 6/10/2014    [facility], left arm        Past Surgical History:    Past Surgical History:   Procedure Laterality Date    BICEPS TENODESIS Right 11/23/2016    right proximal bicep tenodesis    COLONOSCOPY N/A 12/10/2019    COLONOSCOPY POLYPECTOMY SNARE/COLD BIOPSY performed by Nyaeli De La O MD at [address] [place] [address] Left 8/12/14, 1/09/15    C7 TFE    OTHER SURGICAL HISTORY Left 09/23/2014    C7 TFE    OTHER SURGICAL HISTORY Left 7/17/2015 , 1/22/16    C7 TFE    [place] CABG, ARTERY-VEIN, SINGLE N/A 7/6/2018    CABG CORONARY ARTERY BYPASS, GARY ERAZO CHANI  ([facility]# [id number]) performed by Maria C Membreno MD at [address] normal.  Bladder non-tender and not distended. LABS:   No results for input(s): WBC, HGB, HCT, MCV, PLT in the last 72 hours. No results for input(s): NA, K, CL, CO2, PHOS, BUN, CREATININE in the last 72 hours. Invalid input(s): CA  Lab Results   Component Value Date    PSA 0.45 09/24/2019    PSA 0.46 09/13/2018    PSA 0.57 09/08/2017         Urinalysis: No results for input(s): COLORU, PHUR, LABCAST, WBCUA, RBCUA, MUCUS, TRICHOMONAS, YEAST, BACTERIA, CLARITYU, SPECGRAV, LEUKOCYTESUR, UROBILINOGEN, BILIRUBINUR, BLOODU in the last 72 hours.     Invalid input(s): NITRATE, GLUCOSEUKETONESUAMORPHOUS     -----------------------------------------------------------------      Assessment and Plan     Impression:    Patient Active Problem List   Diagnosis    Sprain and strain of unspecified site of shoulder and upper arm    Sprain of neck    Displacement of cervical intervertebral disc without myelopathy    Cervical radiculitis    Umbilical hernia    HTN (hypertension)    Impaired fasting blood sugar    Displacement of cervical intervertebral disc without myelopathy    Rotator cuff tear    CAD in native artery    S/P CABG (coronary artery bypass graft)    CAD (coronary artery disease)    Hyperlipidemia    [facility] ([facility]    Positive colorectal cancer screening using DNA-based stool test    Epigastric hernia       Plan:     Consent obtained; cysto turbt in OR today    Harish Francisco MD  11:14 AM 3/9/2020

## 2020-03-09 NOTE — ANESTHESIA POSTPROCEDURE EVALUATION
Department of Anesthesiology  Postprocedure Note    Patient: Andreas An  MRN: [id number]  YOB: 1955  Date of evaluation: 3/9/2020  Time:  2:41 PM     Procedure Summary     Date:  03/09/20 Room / Location:  [address] [place] [address]    Anesthesia Start:  3856 Anesthesia Stop:  2866    Procedure:  CYSTO TURBT (N/A ) Diagnosis:  (bladder lesion)    Surgeon:  Jennifer Wyman MD Responsible Provider:  PARESH Mckeon CRNA    Anesthesia Type:  general ASA Status:  3          Anesthesia Type: general    Jazlyn Phase I: Jazlyn Score: 10    Jazlyn Phase II:      Last vitals: Reviewed and per EMR flowsheets.        Anesthesia Post Evaluation    Patient location during evaluation: PACU  Patient participation: complete - patient participated  Level of consciousness: awake, awake and alert and responsive to light touch  Pain score: 0  Airway patency: patent  Nausea & Vomiting: no nausea and no vomiting  Complications: no  Cardiovascular status: blood pressure returned to baseline and hemodynamically stable  Respiratory status: acceptable  Hydration status: euvolemic

## 2020-03-10 ENCOUNTER — NURSE ONLY (OUTPATIENT)
Dept: UROLOGY | Age: 65
End: 2020-03-10
Payer: MEDICARE

## 2020-03-10 PROCEDURE — 99212 OFFICE O/P EST SF 10 MIN: CPT

## 2020-03-10 NOTE — PROGRESS NOTES
Patient present today for mancia removal post Cysto turbt, yellow urine noted in drainage bag, 30ml balloon deflated and 22fr mancia catheter removed without difficulty. Encouraged patient to drink plenty of fluids, expect to see blood in urine, it may burn with urination for couple of days, patient is to follow up in one week to go over results on 3/16/2020, patient verbalized understanding.

## 2020-03-10 NOTE — OP NOTE
catheter  catheter was inserted. The patient tolerated the procedure well and was sent to PACU for postoperative monitoring. DISPOSITION:  The patient was discharged home in stable condition      Follow up: Will arrange appropriate follow up for catheter removal tomorrow.

## 2020-03-11 LAB — SURGICAL PATHOLOGY REPORT: NORMAL

## 2020-03-16 ENCOUNTER — OFFICE VISIT (OUTPATIENT)
Dept: UROLOGY | Age: 65
End: 2020-03-16
Payer: MEDICARE

## 2020-03-16 ENCOUNTER — TELEPHONE (OUTPATIENT)
Dept: WOUND CARE | Age: 65
End: 2020-03-16

## 2020-03-16 VITALS
RESPIRATION RATE: 16 BRPM | DIASTOLIC BLOOD PRESSURE: 72 MMHG | HEART RATE: 61 BPM | WEIGHT: 212 LBS | OXYGEN SATURATION: 96 % | BODY MASS INDEX: 31.4 KG/M2 | HEIGHT: 69 IN | SYSTOLIC BLOOD PRESSURE: 116 MMHG

## 2020-03-16 PROCEDURE — 1123F ACP DISCUSS/DSCN MKR DOCD: CPT | Performed by: UROLOGY

## 2020-03-16 PROCEDURE — G8427 DOCREV CUR MEDS BY ELIG CLIN: HCPCS | Performed by: UROLOGY

## 2020-03-16 PROCEDURE — G8417 CALC BMI ABV UP PARAM F/U: HCPCS | Performed by: UROLOGY

## 2020-03-16 PROCEDURE — 99213 OFFICE O/P EST LOW 20 MIN: CPT

## 2020-03-16 PROCEDURE — 99214 OFFICE O/P EST MOD 30 MIN: CPT | Performed by: UROLOGY

## 2020-03-16 PROCEDURE — G8482 FLU IMMUNIZE ORDER/ADMIN: HCPCS | Performed by: UROLOGY

## 2020-03-16 PROCEDURE — 3017F COLORECTAL CA SCREEN DOC REV: CPT | Performed by: UROLOGY

## 2020-03-16 PROCEDURE — 4040F PNEUMOC VAC/ADMIN/RCVD: CPT | Performed by: UROLOGY

## 2020-03-16 PROCEDURE — 1036F TOBACCO NON-USER: CPT | Performed by: UROLOGY

## 2020-03-16 RX ORDER — TAMSULOSIN HYDROCHLORIDE 0.4 MG/1
0.4 CAPSULE ORAL DAILY
Qty: 30 CAPSULE | Refills: 11 | Status: SHIPPED | OUTPATIENT
Start: 2020-03-16 | End: 2020-04-24 | Stop reason: SDUPTHER

## 2020-03-16 NOTE — PROGRESS NOTES
Displacement of cervical intervertebral disc without myelopathy 6/10/2014    [facility], C6/C7     HTN (hypertension)     Hyperlipidemia     Impaired fasting blood sugar     Neck pain     chronic    Neuralgia, neuritis, and radiculitis, unspecified 6/10/2014    [facility], left C7     Shoulder region pain     Left   [facility] injury 01/08/2014    Sprain and strain of unspecified site of shoulder and upper arm 6/10/2014    [facility], left arm      Past Surgical History:   Procedure Laterality Date    BICEPS TENODESIS Right 11/23/2016    right proximal bicep tenodesis    COLONOSCOPY N/A 12/10/2019    COLONOSCOPY POLYPECTOMY SNARE/COLD BIOPSY performed by Clyde Ayala MD at [address] 3/9/2020    CYSTO TURBT performed by Marianna Harkins MD at [facility] [address] Left 8/12/14, 1/09/15    C7 TFE    OTHER SURGICAL HISTORY Left 09/23/2014    C7 TFE    OTHER SURGICAL HISTORY Left 7/17/2015 , 1/22/16    C7 TFE    SC CABG, ARTERY-VEIN, SINGLE N/A 7/6/2018    CABG CORONARY ARTERY BYPASS, GARY ERAZO, CHANI  (CSI# [id number]) performed by Gilford Ivans, MD at [place] [address] Left 01/29/2019    2015- Right    SHOULDER ARTHROSCOPY Right 4/27/2016    scope decompressing, rotator cuff repair    UMBILICAL HERNIA REPAIR N/A 12/18/2019    Laparoscopic Robotic Assisted Umbilical Hernia Repair W/ MESH performed by Clyde Ayala MD at [facility] OR     Family History   Problem Relation Age of Onset    High Blood Pressure Father     Colon Polyps Brother     COPD Mother      Outpatient Medications Marked as Taking for the 3/16/20 encounter (Office Visit) with Marianna Harkins MD   Medication Sig Dispense Refill    tamsulosin (FLOMAX) 0.4 MG capsule Take 1 capsule by mouth daily 30 capsule 11    fluticasone-vilanterol (BREO ELLIPTA) 100-25 MCG/INH AEPB inhaler Inhale 1 puff into the lungs daily 3 each 3    atorvastatin (LIPITOR) 40 MG tablet Take 1 tablet by mouth nightly 90 tablet 3    naproxen (NAPROSYN) 375 MG tablet Take 1 tablet by mouth 2 times daily (with meals) Take regularly for 5 days than twice a day as needed. 30 tablet 1    gabapentin (NEURONTIN) 400 MG capsule Take 1 capsule by mouth 2 times daily. 11    metoprolol tartrate (LOPRESSOR) 25 MG tablet Take 1 tablet by mouth 2 times daily 180 tablet 3    traMADol (ULTRAM) 50 MG tablet Take 1 tablet by mouth nightly. Albertinaemily Pelaez acetaminophen (TYLENOL) 325 MG tablet Take 2 tablets by mouth every 6 hours as needed for Pain 120 tablet 3       Vicodin [hydrocodone-acetaminophen]  Social History     Tobacco Use   Smoking Status Former Smoker    Packs/day: 2.00    Years: 25.00    Pack years: 50.00    Types: Cigarettes    Start date: 1975    Last attempt to quit: 2008    Years since quittin.2   Smokeless Tobacco Never Used   Tobacco Comment    S  Friday RRT 16      (If patient a smoker, smoking cessation counseling offered)   Social History     Substance and Sexual Activity   Alcohol Use No    Alcohol/week: 0.0 standard drinks    Frequency: Never    Binge frequency: Never       REVIEW OF SYSTEMS:  Constitutional: negative  Eyes: negative  Respiratory: negative  Cardiovascular: negative  Gastrointestinal: negative  Genitourinary: see HPI  Musculoskeletal: negative  Skin: negative   Neurological: negative  Hematological/Lymphatic: negative  Psychological: negative      Physical Exam:    This a 75 y.o. male  Vitals:    20 1212   BP: 116/72   Pulse: 61   Resp: 16   SpO2: 96%     Body mass index is 31.29 kg/m². Constitutional: Patient in no acute distress;     Assessment and Plan        1. Dysuria    2. Malignant neoplasm of urinary bladder, unspecified site ([facility] [address]    3.  Benign localized prostatic hyperplasia with lower urinary tract symptoms (LUTS)               Plan:      Follow up three months with cysto  Start flomax for LUTS   Urine culture order for dysuria    Prescriptions Ordered:  Orders Placed This Encounter   Medications    tamsulosin (FLOMAX) 0.4 MG capsule     Sig: Take 1 capsule by mouth daily     Dispense:  30 capsule     Refill:  11      Orders Placed:  Orders Placed This Encounter   Procedures    Culture, Urine     Standing Status:   Future     Standing Expiration Date:   3/16/2021     Order Specific Question:   Specify (ex-cath, midstream, cysto, etc)?      Answer:   midstream            Kevin Baer MD

## 2020-03-17 ENCOUNTER — HOSPITAL ENCOUNTER (OUTPATIENT)
Age: 65
Setting detail: SPECIMEN
Discharge: HOME | End: 2020-03-17
Payer: MEDICARE

## 2020-03-17 PROCEDURE — 87086 URINE CULTURE/COLONY COUNT: CPT

## 2020-03-18 LAB
CULTURE: NO GROWTH
Lab: NORMAL
SPECIMEN DESCRIPTION: NORMAL

## 2020-03-26 ENCOUNTER — TELEPHONE (OUTPATIENT)
Dept: UROLOGY | Age: 65
End: 2020-03-26

## 2020-03-26 NOTE — TELEPHONE ENCOUNTER
Patient called and stated that he had dropped off a urine sample at [facility] and would like the results.  Pharm is [facility] in [place]

## 2020-04-14 ENCOUNTER — VIRTUAL VISIT (OUTPATIENT)
Dept: FAMILY MEDICINE CLINIC | Age: 65
End: 2020-04-14
Payer: MEDICARE

## 2020-04-14 VITALS
SYSTOLIC BLOOD PRESSURE: 127 MMHG | BODY MASS INDEX: 31.59 KG/M2 | DIASTOLIC BLOOD PRESSURE: 65 MMHG | WEIGHT: 214 LBS | TEMPERATURE: 96.8 F

## 2020-04-14 PROBLEM — C67.9 UROTHELIAL CARCINOMA OF BLADDER (HCC): Status: ACTIVE | Noted: 2020-04-14

## 2020-04-14 PROCEDURE — 4040F PNEUMOC VAC/ADMIN/RCVD: CPT | Performed by: FAMILY MEDICINE

## 2020-04-14 PROCEDURE — 1123F ACP DISCUSS/DSCN MKR DOCD: CPT | Performed by: FAMILY MEDICINE

## 2020-04-14 PROCEDURE — 3017F COLORECTAL CA SCREEN DOC REV: CPT | Performed by: FAMILY MEDICINE

## 2020-04-14 PROCEDURE — G0438 PPPS, INITIAL VISIT: HCPCS | Performed by: FAMILY MEDICINE

## 2020-04-14 RX ORDER — TRAMADOL HYDROCHLORIDE 50 MG/1
50 TABLET ORAL NIGHTLY
Qty: 30 TABLET | Refills: 0
Start: 2020-04-14 | End: 2020-05-14

## 2020-04-14 ASSESSMENT — PATIENT HEALTH QUESTIONNAIRE - PHQ9
1. LITTLE INTEREST OR PLEASURE IN DOING THINGS: 0
SUM OF ALL RESPONSES TO PHQ QUESTIONS 1-9: 0
SUM OF ALL RESPONSES TO PHQ9 QUESTIONS 1 & 2: 0
2. FEELING DOWN, DEPRESSED OR HOPELESS: 0
SUM OF ALL RESPONSES TO PHQ QUESTIONS 1-9: 0

## 2020-04-14 NOTE — PATIENT INSTRUCTIONS
Patient information: Weight loss treatments    INTRODUCTION -- Obesity is a major international problem, and Americans are among the heaviest people in the world. The percentage of obese people in the [facility] has risen steadily. Many people find that although they initially lose weight by dieting, they quickly regain the weight after the diet ends. Because it so hard to keep weight off over time, it is important to have as much information and support as possible before starting a diet. You are most likely to be successful in losing weight and keeping it off when you believe that your body weight can be controlled. STARTING A WEIGHT LOSS PROGRAM -- Some people like to talk to their doctor or nurse to get help choosing the best plan, monitoring progress, and getting advice and support along the way. To know what treatment (or combination of treatments) will work best, determine your body mass index (BMI) and waist circumference (measurement). The BMI is calculated from your height and weight. A person with a BMI between 25 and 29.9 is considered overweight   A person with a BMI of 30 or greater is considered to be obese  A waist circumference greater than 35 inches (88 cm) in women and 40 inches (102 cm) in men increases the risk of obesity-related complications, such as heart disease and diabetes. People who are obese and who have a larger waist size may need more aggressive weight loss treatment than others. Talk to your doctor or nurse for advice. Types of treatment -- Based on your measurements and your medical history, your doctor or nurse can determine what combination of weight loss treatments would work best for you. Treatments may include changes in lifestyle, exercise, dieting, and, in some cases, weight loss medicines or weight loss surgery. Weight loss surgery, also called bariatric surgery, is reserved for people with severe obesity who have not responded to other weight loss treatments. Chew your food a certain number of times   Get up and stop eating every few minutes  What happens after you eat -- Rewarding yourself for good eating behaviors can help you to develop better habits. This is not a reward for weight loss; instead, it is a reward for changing unhealthy behaviors. Do not use food as a reward. Some people find money, clothing, or personal care (eg, a hair cut, manicure, or massage) to be effective rewards. Treat yourself immediately after making better eating choices to reinforce the value of the good behavior. You need to have clear behavior goals, and you must have a time frame for reaching your goals. Reward small changes along the way to your final goal.  Other factors that contribute to successful weight loss -- Changing your behavior involves more than just changing unhealthy eating habits; it also involves finding people around you to support your weight loss, reducing stress, and learning to be strong when tempted by food. Establish a \"flavia\" system -- Having a friend or family member available to provide support and reinforce good behavior is very helpful. The support person needs to understand your goals. Learn to be strong -- Learning to be strong when tempted by food is an important part of losing weight. As an example, you will need to learn how to say \"no\" and continue to say no when urged to eat at parties and social gatherings. Develop strategies for events before you go, such as eating before you go or taking low-calorie snacks and drinks with you. Develop a support system -- Having a support system is helpful when losing weight. This is why many commercial groups are successful. Family support is also essential; if your family does not support your efforts to lose weight, this can slow your progress or even keep you from losing weight.    Positive thinking -- People often have conversations with themselves in their head; these conversations can be positive or negative. If you eat a piece of cake that was not planned, you may respond by thinking, \"Oh, you stupid idiot, you've blown your diet! \" and as a result, you may eat more cake. A positive thought for the same event could be, \"Well, I ate cake when it was not on my plan. Now I should do something to get back on track. \" A positive approach is much more likely to be successful than a negative one. Reduce stress -- Although stress is a part of everyday life, it can trigger uncontrolled eating in some people. It is important to find a way to get through these difficult times without eating or by eating low-calorie food, like raw vegetables. It may be helpful to imagine a relaxing place that allows you to temporarily escape from stress. With deep breaths and closed eyes, you can imagine this relaxing place for a few minutes. Self-help programs -- Self-help programs like [employer] Watchers®, Overeaters Anonymous®, and Take Off Anapsis)© work for some people. As with all weight loss programs, you are most likely to be successful with these plans if you make long-term changes in how you eat. CHOOSING A DIET -- A calorie is a unit of energy found in food. Your body needs calories to function. The goal of any diet is to burn up more calories than you eat. How quickly you lose weight depends upon several factors, such as your age, gender, and starting weight. Older people have a slower metabolism than young people, so they lose weight more slowly. Men lose more weight than women of similar height and weight when dieting because they use more energy. People who are extremely overweight lose weight more quickly than those who are only mildly overweight. Try not to drink alcohol or drinks with added sugar, and most sweets (candy, cakes, cookies), since they rarely contain important nutrients.   Portion-controlled diets -- One simple way to diet is to buy packaged foods, like frozen low-calorie meals or for people who have not been able to lose weight with diet and exercise who have a:  BMI of 30 or more    BMI between 27 and 29.9 and have other medical problems, such as diabetes, high cholesterol, or high blood pressure  Two weight loss medicines are approved in the [place] for long-term use. These are sibutramine and orlistat. Other weight loss medicines (phentermine, diethylpropion) are available but are only approved for short-term use (up to 12 weeks). Sibutramine -- Sibutramine (Meridia®, Reductil®) is a medicine that reduces your appetite. In people who take the medicine for one year, the average weight loss is 10 percent of the initial body weight (5 percent more than those who took a placebo treatment). Side effects of sibutramine include insomnia, dry mouth, and constipation. Increases in blood pressure can occur. Therefore, blood pressure is usually monitored during treatment. There is no evidence that sibutramine causes heart or lung problems (like dexfenfluramine and fenfluramine (Phen/Fen)). However, experts agree that sibutramine should not used by people with coronary heart disease, heart failure, uncontrolled hypertension, stroke, irregular heart rhythms, or peripheral vascular disease (poor circulation in the legs). Orlistat -- Orlistat (Xenical® 120 mg capsules) is a medicine that reduces the amount of fat your body absorbs from the foods you eat. A lower-dose version is now available without a prescription (Peter® 60 mg capsules) in many countries, including the [place]. The medicine is recommended three times per day, taken with a meal; you can skip a dose if you skip a meal or if the meal contains no fat. After one year of treatment with orlistat, the average weight loss is approximately 8 to 10 percent of initial body weight (4 percent more than in those who took a placebo). Cholesterol levels often improve, and blood pressure sometimes falls.  In people with diabetes, orlistat recommended. SHOULD I HAVE SURGERY TO LOSE WEIGHT? -- Weight loss surgery is recommended ONLY for people with one of the following:  Severe obesity (body mass index above 40) (calculator 1 and calculator 2) who have not responded to diet, exercise, or weight loss medicines   Body mass index between 35 and 40, along with a serious medical problem (including diabetes, severe joint pain, or sleep apnea) that would improve with weight loss  You should be sure that you understand the potential risks and benefits of weight loss surgery. You must be motivated and willing to make lifelong changes in how you eat to reach and maintain a healthier weight after surgery. You must also be realistic about weight loss after surgery (see 'Effectiveness of weight loss surgery' below). PREPARING FOR WEIGHT LOSS SURGERY -- Most people who have weight loss surgery will meet with several specialists before surgery is scheduled. This often includes a dietitian, mental health counselor, a doctor who specializes in care of obese people, and a surgeon who performs weight loss surgery (bariatric surgeon). You may need to work with these providers for several weeks or months before surgery. The nutritionist will explain what and how much you will be able to eat after surgery. You may also need to lose a small amount of weight before surgery. The mental health specialist will help you to cope with stress and other factors that can make it harder to lose weight or trigger you to eat   The medical doctor will determine whether you need other tests, counseling, or treatment before surgery. He or she might also help you begin a medical weight loss program so that you can lose some weight before surgery. The bariatric surgeon will meet with you to discuss the surgeries available to treat obesity. He or she will also make sure you are a good candidate for surgery.    TYPES OF WEIGHT LOSS SURGERY -- There are several types of weight loss variety of complications can occur with weight loss surgery. The risks of surgery depend upon which surgery you have and any medical problems you had before surgery. Some of the more common early surgical complications (one to six weeks after surgery) include:  Bleeding   Infection   Blockage or tear in the bowels   Need for further surgery  Important medical complications after surgery can include blood clots in the legs or lungs, heart attack, pneumonia, and urinary tract infection. Complications are less likely when surgery is performed in centers that are experienced in weight loss surgery. In general, centers with experience in weight loss surgery have:  Board-certified doctors and surgeons   A team of support staff (dietitians, counselors, nurses)   Long-term follow-up after surgery   Hospital staff experienced with the care of weight loss patients. This includes nurses who are trained in the care of patients immediately after surgery and anesthesiologists who are experienced in caring for the morbidly obese. EFFECTIVENESS OF WEIGHT LOSS SURGERY -- The goal of weight loss surgery is to reduce the risk of illness or death associated with obesity. Weight loss surgery can also help you to feel and look better, reduce the amount of money you spend on medicines, and cut down on sick days. As an example, weight loss surgery can improve health problems related to obesity (diabetes, high blood pressure, high cholesterol, sleep apnea) to the point that you need less or no medicine. Finally, weight loss surgery might reduce your risk of developing heart disease, cancer, and certain infections. AFTER WEIGHT LOSS SURGERY -- You will need to stay in the hospital until your team feels that it is safe for you to leave (on average, one to three days). Do not drive if you are taking prescription pain medicine.  Begin exercising as soon as possible once you have healed; most weight loss centers will design an exercise related to your medical problem. This article will be updated as needed every four months on our Web site ([email or web address])      [address] a [facility]       As we get older, changes in balance, gait, strength, vision, hearing, and cognition make even the most youthful senior more prone to accidents. Falls are one of the leading health risks for older people. This increased risk of falling is related to:   · Aging process (eg, decreased muscle strength, slowed reflexes)   · Higher incidence of chronic health problems (eg, arthritis, diabetes) that may limit mobility, agility or sensory awareness   · Side effects of medicine (eg, dizziness, blurred vision)especially medicines like prescription pain medicines and drugs used to treat mental health conditions   Depending on the brittleness of your bones, the consequences of a fall can be serious and long lasting. Home Life   Research by the Association of Aging [facility]) shows that some home accidents among older adults can be prevented by making simple lifestyle changes and basic modifications and repairs to the home environment. Here are some lifestyle changes that experts recommend:   · Have your hearing and vision checked regularly. Be sure to wear prescription glasses that are right for you. · Speak to your doctor or pharmacist about the possible side effects of your medicines. A number of medicines can cause dizziness. · If you have problems with sleep, talk to your doctor. · Limit your intake of alcohol. · If necessary, use a cane or walker to help maintain your balance. · Wear supportive, rubber-soled shoes, even at home. If you live in a region that gets wintry weather, you may want to put special cleats on your shoes to prevent you from slipping on the snow and ice. · Exercise regularly to help maintain muscle tone, agility, and balance. · Always hold the banister when going up or down stairs.  Also, use  bars when getting in or out of the bath or shower, or using the toilet. · To avoid dizziness, get up slowly from a lying down position. Sit up first, dangling your legs for a minute or two before rising to a standing position. Overall Home Safety Check   According to the Consumer Product Safety Commision's \"Older Consumer Home Safety Checklist,\" it is important to check for potential hazards in each room. And remember, proper lighting is an essential factor in home safety. If you cannot see clearly, you are more likely to fall. Important questions to ask yourself include:   · Are lamp, electric, extension, and telephone cords placed out of the flow of traffic and maintained in good condition? Have frayed cords been replaced? · Are all small rugs and runners slip resistant? If not, you can secure them to the floor with a special double-sided carpet tape. · Are smoke detectors properly locatedone on every floor of your home and one outside of every sleeping area? Are they in good working order? Are batteries replaced at least once a year? · Do you have a well-maintained carbon monoxide detector outside every sleeping are in your home? · Does your furniture layout leave plenty of space to maneuver between and around chairs, tables, beds, and sofas? · Are hallways, stairs and passages between rooms well lit? Can you reach a lamp without getting out of bed? · Are floor surfaces well maintained? Shag rugs, high-pile carpeting, tile floors, and polished wood floors can be particularly slippery. Stairs should always have handrails and be carpeted or fitted with a non-skid tread. · Is your telephone easily reachable. Is the cord safely tucked away? Room by Room   According to the Association of Aging, bathrooms and jeronimo are the two most potentially hazardous rooms in your home.    In the Kitchen    · Be sure your stove is in proper working order and always make sure burners and the oven are off before you go out or go to mattress covers and pillows, and NEVER smoke in bed. · Keep a phone next to the bed that is programmed to dial 911 at the push of a button. If you have a chronic condition, you may want to sign on with an automatic call-in service. Typically the system includes a small pendant that connects directly to an emergency medical voice-response system. You should also make arrangements to stay in contact with someonefriend, neighbor, family memberon a regular schedule. Fire Prevention   According to the [employer]). (Smoke Alarms for Every) [address] [place] [address], senior citizens are one of the two highest risk groups for death and serious injuries due to residential fires. · When cooking, wear short-sleeved items, never a bulky long-sleeved robe. · The [facility]'s Safety Checklist for Older Consumers emphasizes the importance of checking basements, garages, workshops and storage areas for fire hazards, such as volatile liquids, piles of old rags or clothing and overloaded circuits. · Never smoke in bed or when lying down on a couch or recliner chair. · Small portable electric or kerosene heaters are responsible for many home fires and should be used cautiously if at all. If you do use one, be sure to keep them away from flammable materials. · In case of fire, make sure you have a pre-established emergency exit plan. · Have a professional check your fireplace and other fuel-burning appliances yearly. Helping [employer]   Baby boomers entering the flores years will continue to see the development of new products to help older adults live safely and independently in spite of age-related changes. Making Life More Livable  , by Edmund Jacques, lists over 1,000 products for \"living well in the mature years,\" such as bathing and mobility aids, household security devices, ergonomically designed knives and peelers, and faucet valves and knobs for temperature control.  Medical supply stores and organizations are

## 2020-04-14 NOTE — PROGRESS NOTES
referrals ordered. Positive Risk Factor Screenings with Interventions:     Health Habits/Nutrition:     Body mass index is 31.59 kg/m². Health Habits/Nutrition Interventions:  · Reviewed the need for improved diet and fitness    Personalized Preventive Plan   Current Health Maintenance Status  Immunization History   Administered Date(s) Administered    DTaP 10/15/2016    DTaP vaccine 10/15/2016    Fluvirin 4 years and over 11/03/2015    Influenza Vaccine, unspecified formulation 11/03/2015, 10/15/2016    Influenza Virus Vaccine 11/03/2015    Influenza Whole 11/14/2012    Influenza, High Dose (Fluzone 65 yrs and older) 09/23/2017, 09/23/2017, 10/06/2018, 11/18/2019    Influenza, Quadv, IM, PF (6 mo and older Fluzone, Flulaval, Fluarix, and 3 yrs and older Afluria) 09/23/2017    Influenza, Triv, inactivated, subunit, adjuvanted, IM (Fluad 65 yrs and older) 10/14/2018    Pneumococcal Conjugate 13-valent ([id number]) 10/06/2018    Pneumococcal Polysaccharide ([id number]) 10/14/2019    Tdap (Boostrix, Adacel) 10/22/2016    Zoster Live (Zostavax) 11/03/2015        Health Maintenance   Topic Date Due    Annual Wellness Visit (AWV)  05/29/2019    Hepatitis C screen  12/31/2099 (Originally 1955)    A1C test (Diabetic or Prediabetic)  09/24/2020    Lipid screen  09/24/2020    Low dose CT lung screening  10/11/2020    DTaP/Tdap/Td vaccine (3 - Td) 10/22/2026    Colon cancer screen colonoscopy  12/10/2029    Flu vaccine  Completed    Pneumococcal 65+ years Vaccine  Completed    AAA screen  Completed    Hepatitis A vaccine  Aged Out    Hepatitis B vaccine  Aged Out    Hib vaccine  Aged Out    Meningococcal (ACWY) vaccine  Aged Out     Recommendations for [place] Due: see orders and patient instructions/AVS.  .   Recommended screening schedule for the next 5-10 years is provided to the patient in written form: see Patient Becky Jones was seen today for medicare awv.    Diagnoses and all orders for this visit:    Urothelial carcinoma of bladder ([facility] [address]          Is up to date with routine health care  Reviewed eye and dental care  Reviewed home safety  Discussed the benefit of healthy diet and fitness  Finds breo has been causing some hoarseness and will stop and see how this evolves  Reviewed corona virus safety  Advanced directives in chart  As long as well follow up 6 months for htn check and labs      Shawna Hernandez is a 75 y.o. male being evaluated by a Virtual Visit (video visit) encounter to address concerns as mentioned above. A caregiver was present when appropriate. Due to this being a TeleHealth encounter (During MIACO-86 public health emergency), evaluation of the following organ systems was limited: Vitals/Constitutional/EENT/Resp/CV/GI//MS/Neuro/Skin/Heme-Lymph-Imm. Pursuant to the emergency declaration under the [address] [place] [address], [address] [place] [address] authority and the [employer] and [employer] Act, this Virtual Visit was conducted with patient's (and/or legal guardian's) consent, to reduce the patient's risk of exposure to COVID-19 and provide necessary medical care. The patient (and/or legal guardian) has also been advised to contact this office for worsening conditions or problems, and seek emergency medical treatment and/or call 911 if deemed necessary. Patient identification was verified at the start of the visit: Yes    Total time spent for this encounter: Not billed by time    Services were provided through a video synchronous discussion virtually to substitute for in-person clinic visit. Patient and provider were located at their individual homes. --Kevin Hendricks MD on 4/27/2020 at 11:07 AM    An electronic signature was used to authenticate this note.

## 2020-04-14 NOTE — TELEPHONE ENCOUNTER
[facility]  Patient    Pt called and needs refill sent to [facility] he has enough until the 30th

## 2020-04-23 RX ORDER — ATORVASTATIN CALCIUM 40 MG/1
40 TABLET, FILM COATED ORAL NIGHTLY
Qty: 90 TABLET | Refills: 3 | Status: SHIPPED | OUTPATIENT
Start: 2020-04-23 | End: 2021-01-07 | Stop reason: SDUPTHER

## 2020-04-24 RX ORDER — TAMSULOSIN HYDROCHLORIDE 0.4 MG/1
0.4 CAPSULE ORAL DAILY
Qty: 90 CAPSULE | Refills: 3 | Status: SHIPPED | OUTPATIENT
Start: 2020-04-24 | End: 2020-06-22 | Stop reason: SDUPTHER

## 2020-06-22 ENCOUNTER — PROCEDURE VISIT (OUTPATIENT)
Dept: UROLOGY | Age: 65
End: 2020-06-22
Payer: MEDICARE

## 2020-06-22 VITALS
DIASTOLIC BLOOD PRESSURE: 72 MMHG | HEART RATE: 61 BPM | SYSTOLIC BLOOD PRESSURE: 122 MMHG | BODY MASS INDEX: 31.59 KG/M2 | OXYGEN SATURATION: 96 % | HEIGHT: 69 IN

## 2020-06-22 PROCEDURE — 52000 CYSTOURETHROSCOPY: CPT | Performed by: UROLOGY

## 2020-06-22 RX ORDER — TAMSULOSIN HYDROCHLORIDE 0.4 MG/1
0.4 CAPSULE ORAL DAILY
Qty: 90 CAPSULE | Refills: 3 | Status: SHIPPED | OUTPATIENT
Start: 2020-06-22 | End: 2021-08-05

## 2020-06-22 NOTE — PROGRESS NOTES
Cystoscopy Operative Note    Patient:  Celena Stuart  MRN: [id number]  YOB: 1955    Date: 06/22/20  Surgeon: Crystal Connors MD  Anesthesia: Urethral 2% Xylocaine   Indications:     Bladder Cancer  Onset was  Months ago  Overall, the problem(s) are new  Severity is described as mild-moderate. Associated Symptoms: No dysuria, + gross hematuria. Current Pain Severity: 0    Here with pathology results. Low grade noninvasive    BPH- worsening LUTS      Position: Supine    Findings:   The patient was prepped and draped in the usual sterile fashion. The flexible cystoscope was advanced through the urethra and into the bladder. The bladder was thoroughly inspected and the following was noted:    Residual Urine: Minimal  Urethra: No abnormalities of the urethra are noted. Prostate: mod lateral lobe hypertrophy  Bladder: No tumors or CIS noted. Dystrophic calcification over left lateral wall  No bladder diverticulum. Mild trabeculation noted. Ureters: Clear efflux from both ureters. Orifices with normal configuration and location. The cystoscope was removed. The patient tolerated the procedure well.   Cystoscopy in nine months

## 2020-06-25 ENCOUNTER — TELEPHONE (OUTPATIENT)
Dept: SURGERY | Age: 65
End: 2020-06-25

## 2020-06-26 ENCOUNTER — HOSPITAL ENCOUNTER (OUTPATIENT)
Dept: LAB | Age: 65
Discharge: HOME | End: 2020-06-26
Payer: MEDICARE

## 2020-06-26 PROCEDURE — 87086 URINE CULTURE/COLONY COUNT: CPT

## 2020-06-26 PROCEDURE — 87186 SC STD MICRODIL/AGAR DIL: CPT

## 2020-06-26 PROCEDURE — 87088 URINE BACTERIA CULTURE: CPT

## 2020-06-27 LAB
CULTURE: ABNORMAL
Lab: ABNORMAL
SPECIMEN DESCRIPTION: ABNORMAL

## 2020-06-29 ENCOUNTER — TELEPHONE (OUTPATIENT)
Dept: UROLOGY | Age: 65
End: 2020-06-29

## 2020-06-29 RX ORDER — NITROFURANTOIN 25; 75 MG/1; MG/1
100 CAPSULE ORAL 2 TIMES DAILY
Qty: 14 CAPSULE | Refills: 0 | Status: SHIPPED | OUTPATIENT
Start: 2020-06-29 | End: 2020-07-06

## 2020-06-29 RX ORDER — CEPHALEXIN 500 MG/1
500 CAPSULE ORAL 3 TIMES DAILY
Qty: 21 CAPSULE | Refills: 0 | Status: SHIPPED | OUTPATIENT
Start: 2020-06-29 | End: 2020-07-06

## 2020-07-06 ENCOUNTER — TELEPHONE (OUTPATIENT)
Dept: CARDIOLOGY | Age: 65
End: 2020-07-06

## 2020-07-06 NOTE — TELEPHONE ENCOUNTER
Pt called stating he accidentally took and extra metoprolol 25 mg BID this morning about 1 1/2 hr ago. Pt denies feeling dizzy or light headed. Please advise if there is anything that the pt needs to do. Pt was advised to go to the er if sx develop before he hears back from the office.     [phone]    Last Appt:  Visit date not found  Next Appt:   7/30/2020  Med verified in [address] [place] [address] Rd

## 2020-07-06 NOTE — TELEPHONE ENCOUNTER
I spoke to pt. His BP now is 122/77. He is feeling fine. He will drink an extra glass of water this morning. He also knows he could eat a handful of salty nuts if needed. He will check his BP later today prior to taking second dose. If SBP <110, he could hold and let us know. Otherwise, he will continue to take as ordered, one tab bid. Await Dr. Susy Lopez advise.

## 2020-07-06 NOTE — TELEPHONE ENCOUNTER
Extra dose of metoprolol should be too bad. Can hold evening dose, so next dose tomorrow. If dizziness, low BP, call/go to nearest ER.

## 2020-07-06 NOTE — TELEPHONE ENCOUNTER
Pt returned call to ask if he had all the info. Writer informed pt to skip this evenings dose and then to continue as normal with tomorrows routine. Pt advised to go to or call er if his BP drops or he feels SOB or dizzy. Pt understood and stated he feels fine.

## 2020-07-06 NOTE — TELEPHONE ENCOUNTER
LM for pt to call. LM notifying pt of Dr. Ninoska Mckeon order to hold evening dose tonight, asking him to return call to verify.

## 2020-07-10 ENCOUNTER — OFFICE VISIT (OUTPATIENT)
Dept: FAMILY MEDICINE CLINIC | Age: 65
End: 2020-07-10
Payer: MEDICARE

## 2020-07-10 ENCOUNTER — HOSPITAL ENCOUNTER (OUTPATIENT)
Age: 65
Setting detail: SPECIMEN
Discharge: HOME | End: 2020-07-10
Payer: MEDICARE

## 2020-07-10 VITALS
DIASTOLIC BLOOD PRESSURE: 80 MMHG | WEIGHT: 216.2 LBS | RESPIRATION RATE: 14 BRPM | OXYGEN SATURATION: 96 % | BODY MASS INDEX: 31.91 KG/M2 | TEMPERATURE: 97.5 F | SYSTOLIC BLOOD PRESSURE: 148 MMHG | HEART RATE: 68 BPM

## 2020-07-10 LAB
BILIRUBIN, POC: NORMAL
BLOOD URINE, POC: NORMAL
CLARITY, POC: NORMAL
COLOR, POC: YELLOW
GLUCOSE URINE, POC: NORMAL
KETONES, POC: NORMAL
LEUKOCYTE EST, POC: NORMAL
NITRITE, POC: NORMAL
PH, POC: 6
PROTEIN, POC: NORMAL
SPECIFIC GRAVITY, POC: 1.01
UROBILINOGEN, POC: 0.2

## 2020-07-10 PROCEDURE — G8427 DOCREV CUR MEDS BY ELIG CLIN: HCPCS | Performed by: NURSE PRACTITIONER

## 2020-07-10 PROCEDURE — 87088 URINE BACTERIA CULTURE: CPT

## 2020-07-10 PROCEDURE — 1036F TOBACCO NON-USER: CPT | Performed by: NURSE PRACTITIONER

## 2020-07-10 PROCEDURE — 87086 URINE CULTURE/COLONY COUNT: CPT | Performed by: NURSE PRACTITIONER

## 2020-07-10 PROCEDURE — 87086 URINE CULTURE/COLONY COUNT: CPT

## 2020-07-10 PROCEDURE — 81002 URINALYSIS NONAUTO W/O SCOPE: CPT | Performed by: NURSE PRACTITIONER

## 2020-07-10 PROCEDURE — 99214 OFFICE O/P EST MOD 30 MIN: CPT | Performed by: NURSE PRACTITIONER

## 2020-07-10 PROCEDURE — 1123F ACP DISCUSS/DSCN MKR DOCD: CPT | Performed by: NURSE PRACTITIONER

## 2020-07-10 PROCEDURE — 99211 OFF/OP EST MAY X REQ PHY/QHP: CPT

## 2020-07-10 PROCEDURE — G8417 CALC BMI ABV UP PARAM F/U: HCPCS | Performed by: NURSE PRACTITIONER

## 2020-07-10 PROCEDURE — 87186 SC STD MICRODIL/AGAR DIL: CPT

## 2020-07-10 PROCEDURE — 3017F COLORECTAL CA SCREEN DOC REV: CPT | Performed by: NURSE PRACTITIONER

## 2020-07-10 PROCEDURE — 4040F PNEUMOC VAC/ADMIN/RCVD: CPT | Performed by: NURSE PRACTITIONER

## 2020-07-10 RX ORDER — TRAMADOL HYDROCHLORIDE 50 MG/1
TABLET ORAL
Status: ON HOLD | COMMUNITY
Start: 2020-06-24 | End: 2021-01-23 | Stop reason: HOSPADM

## 2020-07-10 RX ORDER — CIPROFLOXACIN 500 MG/1
500 TABLET, FILM COATED ORAL 2 TIMES DAILY
Qty: 28 TABLET | Refills: 0 | Status: SHIPPED | OUTPATIENT
Start: 2020-07-10 | End: 2020-07-24

## 2020-07-10 ASSESSMENT — ENCOUNTER SYMPTOMS
VOMITING: 0
NAUSEA: 0
RECTAL PAIN: 0

## 2020-07-10 NOTE — PATIENT INSTRUCTIONS
each time. · To relieve pain, take a hot bath or lay a heating pad (set on low) over your lower belly or genital area. Never go to sleep with a heating pad in place. To help prevent UTIs  · Drink plenty of fluids, enough so that your urine is light yellow or clear like water. If you have kidney, heart, or liver disease and have to limit fluids, talk with your doctor before you increase the amount of fluids you drink. · Urinate when you have the urge. Do not hold your urine for a long time. Urinate before you go to sleep. · Keep your penis clean. Catheter care  If you have a drainage tube (catheter) in place, the following steps will help you care for it. · Always wash your hands before and after touching your catheter. · Check the area around the urethra for inflammation or signs of infection. Signs of infection include irritated, swollen, red, or tender skin, or pus around the catheter. · Clean the area around the catheter with soap and water two times a day. Dry with a clean towel afterward. · Do not apply powder or lotion to the skin around the catheter. To empty the urine collection bag   · Wash your hands with soap and water. · Without touching the drain spout, remove the spout from its sleeve at the bottom of the collection bag. Open the valve on the spout. · Let the urine flow out of the bag and into the toilet or a container. Do not let the tubing or drain spout touch anything. · After you empty the bag, clean the end of the drain spout with tissue and water. Close the valve and put the drain spout back into its sleeve at the bottom of the collection bag. · Wash your hands with soap and water. When should you call for help? Call your doctor now or seek immediate medical care if:  · Symptoms such as a fever, chills, nausea, or vomiting get worse or happen for the first time. · You have new pain in your back just below your rib cage. This is called flank pain.   · There is new blood or pus in your urine. · You are not able to take or keep down your antibiotics. Watch closely for changes in your health, and be sure to contact your doctor if:  · You are not getting better after taking an antibiotic for 2 days. · Your symptoms go away but then come back. Where can you learn more? Go to https://chpepiceweb.[email or web address] org and sign in to your [employer] account. Enter O761 in the [employer] CONSIGNMENT box to learn more about \"Urinary Tract Infections in Men: Care Instructions. \"     If you do not have an account, please click on the \"Sign Up Now\" link. Current as of: August 22, 2019               Content Version: 12.5  © 1426-7992 Healthwise, Incorporated. Care instructions adapted under license by [facility] ([facility]). If you have questions about a medical condition or this instruction, always ask your healthcare professional. [address] any warranty or liability for your use of this information.

## 2020-07-10 NOTE — PROGRESS NOTES
[address], APRN-CNP  [address]  Phone:  [phone]  Fax:  [phone]  Pro Vasquez is a 77 y.o. male who presents today for his medical conditions/complaints as noted below. Charlann Collet Wensink c/o of Dysuria (Started again yesterday afternoon, he was on ATB from Dr. Sebas Boyer which he took the last one on Monday. )      HPI:     Dysuria    This is a recurrent (Ran out of antibiotics on Monday.) problem. The current episode started yesterday. The problem occurs every urination. The problem has been rapidly worsening. The quality of the pain is described as burning. Associated symptoms include frequency and urgency. Pertinent negatives include no chills, discharge, flank pain, hematuria, nausea, possible pregnancy, sweats or vomiting. Treatments tried: ran out of antibiotics. His past medical history is significant for a urological procedure.        Wt Readings from Last 3 Encounters:   07/10/20 216 lb 3.2 oz (98.1 kg)   04/14/20 214 lb (97.1 kg)   03/16/20 212 lb (96.2 kg)       Temp Readings from Last 3 Encounters:   07/10/20 97.5 °F (36.4 °C) (Temporal)   04/14/20 96.8 °F (36 °C)   03/09/20 98.3 °F (36.8 °C)       BP Readings from Last 3 Encounters:   07/10/20 (!) 148/80   06/22/20 122/72   04/14/20 127/65       Pulse Readings from Last 3 Encounters:   07/10/20 68   06/22/20 61   03/16/20 61              Past Medical History:   Diagnosis Date    Abdominal pain 9/29/2014    CAD (coronary artery disease)     Clostridium difficile diarrhea 10/2014    hospitalized in October 2014    Displacement of cervical intervertebral disc without myelopathy 6/10/2014    BWC, C6/C7     HTN (hypertension)     Hyperlipidemia     Impaired fasting blood sugar     Neck pain     chronic    Neuralgia, neuritis, and radiculitis, unspecified 6/10/2014    BWC, left C7     Shoulder region pain     Left   [facility] injury 01/08/2014    Sprain and strain of tartrate (LOPRESSOR) 25 MG tablet Take 1 tablet by mouth 2 times daily 180 tablet 3    gabapentin (NEURONTIN) 400 MG capsule Take 1 capsule by mouth 2 times daily. 11    acetaminophen (TYLENOL) 325 MG tablet Take 2 tablets by mouth every 6 hours as needed for Pain 120 tablet 3    traMADol (ULTRAM) 50 MG tablet TAKE 1 TABLET BY MOUTH AT NIGHT AS NEEDED FOR PAIN       No current facility-administered medications for this visit. Allergies   Allergen Reactions    Vicodin [Hydrocodone-Acetaminophen] Nausea Only     Stomach upset       No exam data present    Subjective:      Review of Systems   Constitutional: Negative for chills and fever. Gastrointestinal: Negative for nausea, rectal pain and vomiting. Genitourinary: Positive for dysuria, frequency and urgency. Negative for discharge, flank pain, hematuria, scrotal swelling and testicular pain. Objective:     BP (!) 148/80 (Site: Right Upper Arm, Position: Sitting, Cuff Size: Medium Adult)   Pulse 68   Temp 97.5 °F (36.4 °C) (Temporal)   Resp 14   Wt 216 lb 3.2 oz (98.1 kg)   SpO2 96%   BMI 31.91 kg/m²     Physical Exam  Constitutional:       General: He is not in acute distress. Appearance: Normal appearance. He is not ill-appearing, toxic-appearing or diaphoretic. HENT:      Head: Normocephalic. Pulmonary:      Effort: Pulmonary effort is normal.   Abdominal:      Tenderness: There is no abdominal tenderness. There is no right CVA tenderness or left CVA tenderness. Skin:     General: Skin is warm and dry. Neurological:      General: No focal deficit present. Mental Status: He is alert and oriented to person, place, and time. Psychiatric:         Mood and Affect: Mood normal.         Assessment:      Diagnosis Orders   1. Complicated UTI (urinary tract infection)  ciprofloxacin (CIPRO) 500 MG tablet   2.  Dysuria  POCT Urinalysis no Micro    Culture, Urine     Results for POC orders placed in visit on 07/10/20   POCT Urinalysis no Micro   Result Value Ref Range    Color, UA yellow     Clarity, UA cloudy     Glucose, UA POC neg     Bilirubin, UA neg     Ketones, UA neg     Spec Grav, UA 1.015     Blood, UA POC 1+     pH, UA 6.0     Protein, UA POC neg     Urobilinogen, UA 0.2     Leukocytes, UA 3+     Nitrite, UA neg                Plan:       Cipro for UTI as directed  Eat yogurt twice a day two hours after the antibiotic  Follow up with primary care provider in 1 to 2 days if needed. Patient will be contacted upon receipt of final culture and sensitivity. Any additions or changes to medications or the plan of care will be made at that time. Patient Instructions     Patient Education       Cipro for UTI as directed  Eat yogurt twice a day two hours after the antibiotic  Follow up with primary care provider in 1 to 2 days if needed. Patient will be contacted upon receipt of final culture and sensitivity. Any additions or changes to medications or the plan of care will be made at that time. Urinary Tract Infections in Men: Care Instructions  Your Care Instructions     A urinary tract infection, or UTI, is a general term for an infection anywhere between the kidneys and the tip of the penis. UTIs can also be a result of a prostate problem. Most cause pain or burning when you urinate. Most UTIs are caused by bacteria and can be cured with antibiotics. It is important to complete your treatment so that the infection does not get worse. Follow-up care is a key part of your treatment and safety. Be sure to make and go to all appointments, and call your doctor if you are having problems. It's also a good idea to know your test results and keep a list of the medicines you take. How can you care for yourself at home? · Take your antibiotics as prescribed. Do not stop taking them just because you feel better. You need to take the full course of antibiotics. · Take your medicines exactly as prescribed.  Your doctor may have prescribed a medicine, such as phenazopyridine (Pyridium), to help relieve pain when you urinate. This turns your urine orange. You may stop taking it when your symptoms get better. But be sure to take all of your antibiotics, which treat the infection. · Drink extra water for the next day or two. This will help make the urine less concentrated and help wash out the bacteria causing the infection. (If you have kidney, heart, or liver disease and have to limit your fluids, talk with your doctor before you increase your fluid intake.)  · Avoid drinks that are carbonated or have caffeine. They can irritate the bladder. · Urinate often. Try to empty your bladder each time. · To relieve pain, take a hot bath or lay a heating pad (set on low) over your lower belly or genital area. Never go to sleep with a heating pad in place. To help prevent UTIs  · Drink plenty of fluids, enough so that your urine is light yellow or clear like water. If you have kidney, heart, or liver disease and have to limit fluids, talk with your doctor before you increase the amount of fluids you drink. · Urinate when you have the urge. Do not hold your urine for a long time. Urinate before you go to sleep. · Keep your penis clean. Catheter care  If you have a drainage tube (catheter) in place, the following steps will help you care for it. · Always wash your hands before and after touching your catheter. · Check the area around the urethra for inflammation or signs of infection. Signs of infection include irritated, swollen, red, or tender skin, or pus around the catheter. · Clean the area around the catheter with soap and water two times a day. Dry with a clean towel afterward. · Do not apply powder or lotion to the skin around the catheter. To empty the urine collection bag   · Wash your hands with soap and water. · Without touching the drain spout, remove the spout from its sleeve at the bottom of the collection bag.  Open the valve on the spout. · Let the urine flow out of the bag and into the toilet or a container. Do not let the tubing or drain spout touch anything. · After you empty the bag, clean the end of the drain spout with tissue and water. Close the valve and put the drain spout back into its sleeve at the bottom of the collection bag. · Wash your hands with soap and water. When should you call for help? Call your doctor now or seek immediate medical care if:  · Symptoms such as a fever, chills, nausea, or vomiting get worse or happen for the first time. · You have new pain in your back just below your rib cage. This is called flank pain. · There is new blood or pus in your urine. · You are not able to take or keep down your antibiotics. Watch closely for changes in your health, and be sure to contact your doctor if:  · You are not getting better after taking an antibiotic for 2 days. · Your symptoms go away but then come back. Where can you learn more? Go to [email or web address]://[email or web address].[employer]. org and sign in to your [employer] account. Enter Q951 in the [employer] box to learn more about \"Urinary Tract Infections in Men: Care Instructions. \"     If you do not have an account, please click on the \"Sign Up Now\" link. Current as of: August 22, 2019               Content Version: 12.5  © 0537-9720 Healthwise, Incorporated. Care instructions adapted under license by [facility] ([facility]). If you have questions about a medical condition or this instruction, always ask your healthcare professional. [place] [address] any warranty or liability for your use of this information. Patient/Caregiver instructed on use, benefit, and side effects of prescribed medications. All patient/parent/caregiver questions answered. Patient/parent/caregiver voiced understanding. Reviewed health maintenance. Instructed to continue current medications, diet and exercise. Patient agreed with treatment plan. Follow up as directed.            Electronically signed by PARESH Felix NP on7/10/2020

## 2020-07-12 LAB
CULTURE: ABNORMAL
Lab: ABNORMAL
SPECIMEN DESCRIPTION: ABNORMAL

## 2020-07-30 ENCOUNTER — OFFICE VISIT (OUTPATIENT)
Dept: CARDIOLOGY | Age: 65
End: 2020-07-30
Payer: MEDICARE

## 2020-07-30 VITALS
SYSTOLIC BLOOD PRESSURE: 148 MMHG | WEIGHT: 216 LBS | DIASTOLIC BLOOD PRESSURE: 74 MMHG | BODY MASS INDEX: 31.99 KG/M2 | HEIGHT: 69 IN | HEART RATE: 70 BPM

## 2020-07-30 PROCEDURE — 99214 OFFICE O/P EST MOD 30 MIN: CPT | Performed by: INTERNAL MEDICINE

## 2020-07-30 PROCEDURE — 1123F ACP DISCUSS/DSCN MKR DOCD: CPT | Performed by: INTERNAL MEDICINE

## 2020-07-30 PROCEDURE — 4040F PNEUMOC VAC/ADMIN/RCVD: CPT | Performed by: INTERNAL MEDICINE

## 2020-07-30 PROCEDURE — 3017F COLORECTAL CA SCREEN DOC REV: CPT | Performed by: INTERNAL MEDICINE

## 2020-07-30 PROCEDURE — 93010 ELECTROCARDIOGRAM REPORT: CPT | Performed by: INTERNAL MEDICINE

## 2020-07-30 PROCEDURE — G8427 DOCREV CUR MEDS BY ELIG CLIN: HCPCS | Performed by: INTERNAL MEDICINE

## 2020-07-30 PROCEDURE — 1036F TOBACCO NON-USER: CPT | Performed by: INTERNAL MEDICINE

## 2020-07-30 PROCEDURE — G8417 CALC BMI ABV UP PARAM F/U: HCPCS | Performed by: INTERNAL MEDICINE

## 2020-07-30 PROCEDURE — 93005 ELECTROCARDIOGRAM TRACING: CPT | Performed by: INTERNAL MEDICINE

## 2020-07-30 NOTE — PROGRESS NOTES
Today's Date: 7/30/2020  Patient Name: James Adler  Patient's age: 77 y.o., 1955          CC: the patient is a 77 y.o.  male is in the office for CAD. Patient has been doing well cardiac wise, no new cardiac complaints. hE denies angina, PND/Orthopnea. Past Medical History:   has a past medical history of Abdominal pain, CAD (coronary artery disease), Clostridium difficile diarrhea, Displacement of cervical intervertebral disc without myelopathy, HTN (hypertension), Hyperlipidemia, Impaired fasting blood sugar, Neck pain, Neuralgia, neuritis, and radiculitis, unspecified, Shoulder region pain, and Sprain and strain of unspecified site of shoulder and upper arm. Past Surgical History:   has a past surgical history that includes other surgical history (Left, 8/12/14, 1/09/15); other surgical history (Left, 09/23/2014); other surgical history (Left, 7/17/2015 , 1/22/16); Shoulder arthroscopy (Right, 4/27/2016); Biceps Tenodesis (Right, 11/23/2016); pr cabg, artery-vein, single (N/A, 7/6/2018); Rotator cuff repair (Left, 01/29/2019); Colonoscopy (N/A, 12/10/2019); Umbilical hernia repair (N/A, 12/18/2019); and Cystoscopy (N/A, 3/9/2020). Home Medications:    Prior to Admission medications    Medication Sig Start Date End Date Taking? Authorizing Provider   traMADol (ULTRAM) 50 MG tablet TAKE 1 TABLET BY MOUTH AT NIGHT AS NEEDED FOR PAIN 6/24/20  Yes Historical Provider, MD   tamsulosin (FLOMAX) 0.4 MG capsule Take 1 capsule by mouth daily 6/22/20  Yes Mika Dolan MD   atorvastatin (LIPITOR) 40 MG tablet Take 1 tablet by mouth nightly 4/23/20  Yes Pauly Gipson DO   metoprolol tartrate (LOPRESSOR) 25 MG tablet Take 1 tablet by mouth 2 times daily 4/23/20  Yes Pauly Gipson DO   gabapentin (NEURONTIN) 400 MG capsule Take 1 capsule by mouth 2 times daily.  4/11/19  Yes Historical Provider, MD   acetaminophen (TYLENOL) 325 MG tablet Take 2 tablets by mouth every 6 the last 72 hours. FASTING LIPID PANEL:  Lab Results   Component Value Date    HDL 39 09/24/2019    TRIG 166 09/24/2019     LIVER PROFILE:No results for input(s): AST, ALT, LABALBU in the last 72 hours. Assessment:    MVCAD S/P CABG (LIMA-LAD, R SVG- RCA) OM too small for the grafts) ( 7/6/18) STABLE  HTN  HPL  Obesity  Patient Active Problem List   Diagnosis    Cervical radiculitis    Umbilical hernia    HTN (hypertension)    Impaired fasting blood sugar    Displacement of cervical intervertebral disc without myelopathy    Rotator cuff tear    CAD in native artery    S/P CABG (coronary artery bypass graft)    CAD (coronary artery disease)    Hyperlipidemia    Other emphysema (HCC)    Positive colorectal cancer screening using DNA-based stool test    Epigastric hernia    Urothelial carcinoma of bladder ([facility] [address]       Recommendations:  Ambulatory BP monitoring.   FLP  REGULAR EXERCISE  CALORIE RESTRICTION  WEIGHT LOSS  CONTINUE CURRENT TREATMENT    [facility] [address], [address] Cardiology Consult           [phone]

## 2020-10-19 ENCOUNTER — OFFICE VISIT (OUTPATIENT)
Dept: FAMILY MEDICINE CLINIC | Age: 65
End: 2020-10-19
Payer: MEDICARE

## 2020-10-19 VITALS
BODY MASS INDEX: 32.67 KG/M2 | HEART RATE: 67 BPM | WEIGHT: 221.2 LBS | RESPIRATION RATE: 16 BRPM | OXYGEN SATURATION: 97 % | DIASTOLIC BLOOD PRESSURE: 60 MMHG | SYSTOLIC BLOOD PRESSURE: 132 MMHG

## 2020-10-19 PROCEDURE — 1123F ACP DISCUSS/DSCN MKR DOCD: CPT | Performed by: FAMILY MEDICINE

## 2020-10-19 PROCEDURE — 99214 OFFICE O/P EST MOD 30 MIN: CPT

## 2020-10-19 PROCEDURE — G8926 SPIRO NO PERF OR DOC: HCPCS | Performed by: FAMILY MEDICINE

## 2020-10-19 PROCEDURE — 4040F PNEUMOC VAC/ADMIN/RCVD: CPT | Performed by: FAMILY MEDICINE

## 2020-10-19 PROCEDURE — 3017F COLORECTAL CA SCREEN DOC REV: CPT | Performed by: FAMILY MEDICINE

## 2020-10-19 PROCEDURE — G0296 VISIT TO DETERM LDCT ELIG: HCPCS | Performed by: FAMILY MEDICINE

## 2020-10-19 PROCEDURE — G8427 DOCREV CUR MEDS BY ELIG CLIN: HCPCS | Performed by: FAMILY MEDICINE

## 2020-10-19 PROCEDURE — 1036F TOBACCO NON-USER: CPT | Performed by: FAMILY MEDICINE

## 2020-10-19 PROCEDURE — 3023F SPIROM DOC REV: CPT | Performed by: FAMILY MEDICINE

## 2020-10-19 PROCEDURE — G8482 FLU IMMUNIZE ORDER/ADMIN: HCPCS | Performed by: FAMILY MEDICINE

## 2020-10-19 PROCEDURE — G8417 CALC BMI ABV UP PARAM F/U: HCPCS | Performed by: FAMILY MEDICINE

## 2020-10-19 PROCEDURE — 99214 OFFICE O/P EST MOD 30 MIN: CPT | Performed by: FAMILY MEDICINE

## 2020-10-19 ASSESSMENT — PATIENT HEALTH QUESTIONNAIRE - PHQ9
SUM OF ALL RESPONSES TO PHQ QUESTIONS 1-9: 1
2. FEELING DOWN, DEPRESSED OR HOPELESS: 1
SUM OF ALL RESPONSES TO PHQ QUESTIONS 1-9: 1
SUM OF ALL RESPONSES TO PHQ QUESTIONS 1-9: 1
1. LITTLE INTEREST OR PLEASURE IN DOING THINGS: 0
SUM OF ALL RESPONSES TO PHQ9 QUESTIONS 1 & 2: 1

## 2020-10-19 ASSESSMENT — ENCOUNTER SYMPTOMS
SHORTNESS OF BREATH: 1
BLURRED VISION: 0
ORTHOPNEA: 0
COUGH: 0
GASTROINTESTINAL NEGATIVE: 1

## 2020-10-19 NOTE — PROGRESS NOTES
Subjective:      Patient ID: Cassandra Bautista is a 77 y.o. male. Was found to have a low grade bladder tumor. Had some problems with infections. Currently he is doing well. Has occasional problems with breathing with activity but found breo caused him to lose his voice     Hypertension   This is a chronic problem. The current episode started more than 1 year ago. The problem has been resolved since onset. The problem is controlled. Associated symptoms include shortness of breath. Pertinent negatives include no anxiety, blurred vision, chest pain, headaches, malaise/fatigue, neck pain, orthopnea, palpitations, peripheral edema, PND or sweats. There are no associated agents to hypertension. Risk factors for coronary artery disease include dyslipidemia, male gender and obesity. Past treatments include beta blockers. The current treatment provides moderate improvement. Compliance problems include diet. Review of Systems   Constitutional: Negative. Negative for malaise/fatigue. HENT: Negative. Eyes: Negative for blurred vision. Respiratory: Positive for shortness of breath. Negative for cough. Cardiovascular: Negative for chest pain, palpitations, orthopnea and PND. Gastrointestinal: Negative. Genitourinary: Positive for decreased urine volume. Musculoskeletal: Positive for arthralgias. Negative for neck pain. Skin: Negative. Neurological: Negative. Negative for headaches. Hematological: Negative. Psychiatric/Behavioral: Negative. Objective:   Physical Exam  Vitals signs and nursing note reviewed. Constitutional:       Appearance: Normal appearance. HENT:      Head: Normocephalic and atraumatic. Right Ear: Tympanic membrane normal.      Left Ear: Tympanic membrane normal.   Cardiovascular:      Rate and Rhythm: Normal rate and regular rhythm. Heart sounds: No murmur.    Pulmonary:      Effort: Pulmonary effort is normal.      Breath sounds: Rales (scant) present. Abdominal:      Palpations: Abdomen is soft. Tenderness: There is no abdominal tenderness. Musculoskeletal:      Right lower leg: No edema. Left lower leg: No edema. Skin:     General: Skin is warm and dry. Neurological:      General: No focal deficit present. Mental Status: He is alert and oriented to person, place, and time. Psychiatric:         Mood and Affect: Mood normal.         Assessment:      Hypertension  Hyperlipidemia  Copd  Bladder cancer   Elevated blood sugar       Plan:      Labs per orders  At this time will continue current care for his copd  Lung cancer screening  Had flu shot  To start vitamin d 3 2000 iu a day  To follow with consultants    Tres Morales received counseling on the following healthy behaviors: nutrition and exercise  Reviewed prior labs and health maintenance  Continue current medications, diet and exercise. Discussed use, benefit, and side effects of prescribed medications. Barriers to medication compliance addressed. Patient given educational materials - see patient instructions  Was a self-tracking handout given in paper form or via SEAT [employer]? Yes    Requested Prescriptions      No prescriptions requested or ordered in this encounter       All patient questions answered. Patient voiced understanding. Quality Measures    Body mass index is 32.67 kg/m². Elevated. Weight control planned discussed Healthy diet and regular exercise. BP: 132/60. Blood pressure is normal. Treatment plan consists of No treatment change needed. Fall Risk 4/14/2020 10/1/2019 3/21/2019 9/20/2018   2 or more falls in past year? no no no no   Fall with injury in past year? no no no no     The patient does not have a history of falls. I did not - not indicated , complete a risk assessment for falls.  A plan of care for falls No Treatment plan indicated    Lab Results   Component Value Date    LDLCHOLESTEROL 42 09/24/2019    (goal LDL reduction with dx if diabetes is 50% LDL reduction)    PHQ Scores 10/19/2020 4/14/2020 10/1/2019 3/21/2019 9/20/2018 9/14/2017 9/13/2016   PHQ2 Score 1 0 0 0 0 0 0   PHQ9 Score 1 0 0 0 0 0 0     Interpretation of Total Score Depression Severity: 1-4 = Minimal depression, 5-9 = Mild depression, 10-14 = Moderate depression, 15-19 = Moderately severe depression, 20-27 = Severe depression            Vaibhav Padilla MD  Low Dose CT (LDCT) Lung Screening criteria met   Age 50-69   Pack year smoking >30   Still smoking or less than 15 year since quit   No sign or symptoms of lung cancer   > 11 months since last LDCT     Risks and benefits of lung cancer screening with LDCT scans discussed:    Significance of positive screen - False-positive LDCT results often occur. 95% of all positive results do not lead to a diagnosis of cancer. Usually further imaging can resolve most false-positive results; however, some patients may require invasive procedures. Over diagnosis risk - 10% to 12% of screen-detected lung cancer cases are over diagnosed--that is, the cancer would not have been detected in the patient's lifetime without the screening. Need for follow up screens annually to continue lung cancer screening effectiveness     Risks associated with radiation from annual LDCT- Radiation exposure is about the same as for a mammogram, which is about 1/3 of the annual background radiation exposure from everyday life. Starting screening at age 54 is not likely to increase cancer risk from radiation exposure. Patients with comorbidities resulting in life expectancy of < 10 years, or that would preclude treatment of an abnormality identified on CT, should not be screened due to lack of benefit.     To obtain maximal benefit from this screening, smoking cessation and long-term abstinence from smoking is critical

## 2020-10-19 NOTE — PATIENT INSTRUCTIONS
What is lung cancer screening? Lung cancer screening is a way in which doctors check the lungs for early signs of cancer in people who have no symptoms of lung cancer. A low-dose CT scan uses much less radiation than a normal CT scan and shows a more detailed image of the lungs than a standard X-ray. The goal of lung cancer screening is to find cancer early, before it has a chance to grow, spread, or cause problems. One large study found that smokers who were screened with low-dose CT scans were less likely to die of lung cancer than those who were screened with standard X-ray. Below is a summary of the things you need to know regarding screening for lung cancer with low-dose computed tomography (LDCT). This is a screening program that involves routine annual screening with LDCT studies of the lung. The LDCTs are done using low-dose radiation that is not thought to increase your cancer risk. If you have other serious medical conditions (other cancers, congestive heart failure) that limit your life expectancy to less than 10 years, you should not undergo lung cancer screening with LDCT. The chance is 20%-60% that the LDCT result will show abnormalities. This would require additional testing which could include repeat imaging or even invasive procedures. Most (about 95%) of \"abnormal\" LDCT results are false in the sense that no lung cancer is ultimately found. Additionally, some (about 10%) of the cancers found would not affect your life expectancy, even if undetected and untreated. If you are still smoking, the single most important thing that you can do to reduce your risk of dying of lung cancer is to quit. For this screening to be covered by Medicare and most other insurers, strict criteria must be met. If you do not meet these criteria, but still wish to undergo LDCT testing, you will be required to sign a waiver indicating your willingness to pay for the scan.       Vitamin d 3 2000 iu a day  Patient Education        Eating Healthy Foods: Care Instructions  Your Care Instructions     Eating healthy foods can help lower your risk for disease. Healthy food gives you energy and keeps your heart strong, your brain active, your muscles working, and your bones strong. A healthy diet includes a variety of foods from the basic food groups: grains, vegetables, fruits, milk and milk products, and meat and beans. Some people may eat more of their favorite foods from only one food group and, as a result, miss getting the nutrients they need. So, it is important to pay attention not only to what you eat but also to what you are missing from your diet. You can eat a healthy, balanced diet by making a few small changes. Follow-up care is a key part of your treatment and safety. Be sure to make and go to all appointments, and call your doctor if you are having problems. It's also a good idea to know your test results and keep a list of the medicines you take. How can you care for yourself at home? Look at what you eat  · Keep a food diary for a week or two and record everything you eat or drink. Track the number of servings you eat from each food group. · For a balanced diet every day, eat a variety of:  ? 6 or more ounce-equivalents of grains, such as cereals, breads, crackers, rice, or pasta, every day. An ounce-equivalent is 1 slice of bread, 1 cup of ready-to-eat cereal, or ½ cup of cooked rice, cooked pasta, or cooked cereal.  ? 2½ cups of vegetables, especially:  § Dark-green vegetables such as broccoli and spinach. § Orange vegetables such as carrots and sweet potatoes. § Dry beans (such as alonzo and kidney beans) and peas (such as lentils). ? 2 cups of fresh, frozen, or canned fruit. A small apple or 1 banana or orange equals 1 cup. ? 3 cups of nonfat or low-fat milk, yogurt, or other milk products. ? 5½ ounces of meat and beans, such as chicken, fish, lean meat, beans, nuts, and seeds.  One egg, 1 tablespoon of peanut butter, ½ ounce nuts or seeds, or ¼ cup of cooked beans equals 1 ounce of meat. · Learn how to read food labels for serving sizes and ingredients. Fast-food and convenience-food meals often contain few or no fruits or vegetables. Make sure you eat some fruits and vegetables to make the meal more nutritious. · Look at your food diary. For each food group, add up what you have eaten and then divide the total by the number of days. This will give you an idea of how much you are eating from each food group. See if you can find some ways to change your diet to make it more healthy. Start small  · Do not try to make dramatic changes to your diet all at once. You might feel that you are missing out on your favorite foods and then be more likely to fail. · Start slowly, and gradually change your habits. Try some of the following:  ? Use whole wheat bread instead of white bread. ? Use nonfat or low-fat milk instead of whole milk. ? Eat brown rice instead of white rice, and eat whole wheat pasta instead of white-flour pasta. ? Try low-fat cheeses and low-fat yogurt. ? Add more fruits and vegetables to meals and have them for snacks. ? Add lettuce, tomato, cucumber, and onion to sandwiches. ? Add fruit to yogurt and cereal.  Enjoy food  · You can still eat your favorite foods. You just may need to eat less of them. If your favorite foods are high in fat, salt, and sugar, limit how often you eat them, but do not cut them out entirely. · Eat a wide variety of foods. Make healthy choices when eating out  · The type of restaurant you choose can help you make healthy choices. Even fast-food chains are now offering more low-fat or healthier choices on the menu. · Choose smaller portions, or take half of your meal home. · When eating out, try:  ? A veggie pizza with a whole wheat crust or grilled chicken (instead of sausage or pepperoni).   ? Pasta with roasted vegetables, grilled chicken, or marinara sauce instead of cream sauce. ? A vegetable wrap or grilled chicken wrap. ? Broiled or poached food instead of fried or breaded items. Make healthy choices easy  · Buy packaged, prewashed, ready-to-eat fresh vegetables and fruits, such as baby carrots, salad mixes, and chopped or shredded broccoli and cauliflower. · Buy packaged, presliced fruits, such as melon or pineapple. · Choose 100% fruit or vegetable juice instead of soda. Limit juice intake to 4 to 6 oz (½ to ¾ cup) a day. · Blend low-fat yogurt, fruit juice, and canned or frozen fruit to make a smoothie for breakfast or a snack. Where can you learn more? Go to https://Appistry.AdAdapted. org and sign in to your [employer] account. Enter N475 in the [employer] box to learn more about \"Eating Healthy Foods: Care Instructions. \"     If you do not have an account, please click on the \"Sign Up Now\" link. Current as of: August 22, 2019               Content Version: 12.6  © 0403-6800 [employer]. Care instructions adapted under license by [facility] ([facility]). If you have questions about a medical condition or this instruction, always ask your healthcare professional. [address] any warranty or liability for your use of this information. Patient Education        Learning About Physical Activity  What is physical activity? Physical activity is any kind of activity that gets your body moving. The types of physical activity that can help you get fit and stay healthy include:  · Aerobic or \"cardio\" activities that make your heart beat faster and make you breathe harder, such as brisk walking, riding a bike, or running. Aerobic activities strengthen your heart and lungs and build up your endurance. · Strength training activities that make your muscles work against, or \"resist,\" something, such as lifting weights or doing push-ups.  These activities help tone and strengthen your stronger. For example, you can:  · Do push-ups or sit-ups, which use your own body weight as resistance. · Lift weights or dumbbells or use stretch bands at home or in a gym or community center. Stretch your muscles often. Stretching will help you as you become more active. It can help you stay flexible, loosen tight muscles, and avoid injury. It can also help improve your balance and posture and can be a great way to relax. Be sure to stretch the muscles you'll be using when you work out. It's best to warm your muscles slightly before you stretch them. Walk or do some other light aerobic activity for a few minutes, and then start stretching. When you stretch your muscles:  · Do it slowly. Stretching is not about going fast or making sudden movements. · Don't push or bounce during a stretch. · Hold each stretch for at least 15 to 30 seconds, if you can. You should feel a stretch in the muscle, but not pain. · Breathe out as you do the stretch. Then breathe in as you hold the stretch. Don't hold your breath. If you're worried about how more activity might affect your health, have a checkup before you start. Follow any special advice your doctor gives you for getting a smart start. Where can you learn more? Go to https://[employer].Kingspoke. org and sign in to your [employer] account. Enter C453 in the [employer] box to learn more about \"Learning About Physical Activity. \"     If you do not have an account, please click on the \"Sign Up Now\" link. Current as of: January 16, 2020               Content Version: 12.6  © 0425-1104 [employer]. Care instructions adapted under license by [facility] ([facility]). If you have questions about a medical condition or this instruction, always ask your healthcare professional. [address] any warranty or liability for your use of this information.

## 2020-10-21 ENCOUNTER — TELEPHONE (OUTPATIENT)
Dept: ONCOLOGY | Age: 65
End: 2020-10-21

## 2020-10-23 ENCOUNTER — HOSPITAL ENCOUNTER (OUTPATIENT)
Dept: CT IMAGING | Age: 65
Discharge: HOME | End: 2020-10-25
Payer: MEDICARE

## 2020-10-23 ENCOUNTER — HOSPITAL ENCOUNTER (OUTPATIENT)
Dept: LAB | Age: 65
Discharge: HOME | End: 2020-10-23
Payer: MEDICARE

## 2020-10-23 LAB
-: ABNORMAL
ABSOLUTE EOS #: 0.24 K/UL (ref 0–0.44)
ABSOLUTE IMMATURE GRANULOCYTE: 0.06 K/UL (ref 0–0.3)
ABSOLUTE LYMPH #: 3.47 K/UL (ref 1.1–3.7)
ABSOLUTE MONO #: 0.7 K/UL (ref 0.1–1.2)
ALBUMIN SERPL-MCNC: 4.1 G/DL (ref 3.5–5.2)
ALBUMIN/GLOBULIN RATIO: 1.6 (ref 1–2.5)
ALP BLD-CCNC: 122 U/L (ref 40–129)
ALT SERPL-CCNC: 17 U/L (ref 5–41)
AMORPHOUS: ABNORMAL
ANION GAP SERPL CALCULATED.3IONS-SCNC: 9 MMOL/L (ref 9–17)
AST SERPL-CCNC: 19 U/L
BACTERIA: ABNORMAL
BASOPHILS # BLD: 1 % (ref 0–2)
BASOPHILS ABSOLUTE: 0.1 K/UL (ref 0–0.2)
BILIRUB SERPL-MCNC: 0.35 MG/DL (ref 0.3–1.2)
BILIRUBIN URINE: NEGATIVE
BUN BLDV-MCNC: 12 MG/DL (ref 8–23)
BUN/CREAT BLD: 15 (ref 9–20)
CALCIUM SERPL-MCNC: 9.3 MG/DL (ref 8.6–10.4)
CASTS UA: ABNORMAL /LPF (ref 0–2)
CHLORIDE BLD-SCNC: 105 MMOL/L (ref 98–107)
CHOLESTEROL/HDL RATIO: 2.8
CHOLESTEROL: 110 MG/DL
CO2: 25 MMOL/L (ref 20–31)
COLOR: NORMAL
COMMENT UA: NORMAL
CREAT SERPL-MCNC: 0.81 MG/DL (ref 0.7–1.2)
CRYSTALS, UA: ABNORMAL /HPF
DIFFERENTIAL TYPE: ABNORMAL
EOSINOPHILS RELATIVE PERCENT: 3 % (ref 1–4)
EPITHELIAL CELLS UA: ABNORMAL /HPF (ref 0–5)
ESTIMATED AVERAGE GLUCOSE: 123 MG/DL
GFR AFRICAN AMERICAN: >60 ML/MIN
GFR NON-AFRICAN AMERICAN: >60 ML/MIN
GFR SERPL CREATININE-BSD FRML MDRD: ABNORMAL ML/MIN/{1.73_M2}
GFR SERPL CREATININE-BSD FRML MDRD: ABNORMAL ML/MIN/{1.73_M2}
GLUCOSE BLD-MCNC: 112 MG/DL (ref 70–99)
GLUCOSE URINE: NEGATIVE
HBA1C MFR BLD: 5.9 % (ref 4.8–5.9)
HCT VFR BLD CALC: 46.1 % (ref 40.7–50.3)
HDLC SERPL-MCNC: 39 MG/DL
HEMOGLOBIN: 14.8 G/DL (ref 13–17)
IMMATURE GRANULOCYTES: 1 %
KETONES, URINE: NEGATIVE
LDL CHOLESTEROL: 39 MG/DL (ref 0–130)
LEUKOCYTE ESTERASE, URINE: NEGATIVE
LYMPHOCYTES # BLD: 43 % (ref 24–43)
MCH RBC QN AUTO: 27.1 PG (ref 25.2–33.5)
MCHC RBC AUTO-ENTMCNC: 32.1 G/DL (ref 25.2–33.5)
MCV RBC AUTO: 84.3 FL (ref 82.6–102.9)
MONOCYTES # BLD: 9 % (ref 3–12)
MUCUS: ABNORMAL
NITRITE, URINE: NEGATIVE
NRBC AUTOMATED: 0 PER 100 WBC
OTHER OBSERVATIONS UA: ABNORMAL
PDW BLD-RTO: 13.4 % (ref 11.8–14.4)
PH UA: 5.5 (ref 5–6)
PLATELET # BLD: 228 K/UL (ref 138–453)
PLATELET ESTIMATE: ABNORMAL
PMV BLD AUTO: 10.4 FL (ref 8.1–13.5)
POTASSIUM SERPL-SCNC: 4.5 MMOL/L (ref 3.7–5.3)
PROSTATE SPECIFIC ANTIGEN: 0.41 UG/L
PROTEIN UA: NEGATIVE
RBC # BLD: 5.47 M/UL (ref 4.21–5.77)
RBC # BLD: ABNORMAL 10*6/UL
RBC UA: ABNORMAL /HPF (ref 0–4)
RENAL EPITHELIAL, UA: ABNORMAL /HPF
SEG NEUTROPHILS: 43 % (ref 36–65)
SEGMENTED NEUTROPHILS ABSOLUTE COUNT: 3.6 K/UL (ref 1.5–8.1)
SODIUM BLD-SCNC: 139 MMOL/L (ref 135–144)
SPECIFIC GRAVITY UA: 1.01 (ref 1.01–1.02)
TOTAL PROTEIN: 6.6 G/DL (ref 6.4–8.3)
TRICHOMONAS: ABNORMAL
TRIGL SERPL-MCNC: 161 MG/DL
TURBIDITY: NORMAL
URINE HGB: NEGATIVE
UROBILINOGEN, URINE: NORMAL
VLDLC SERPL CALC-MCNC: ABNORMAL MG/DL (ref 1–30)
WBC # BLD: 8.2 K/UL (ref 3.5–11.3)
WBC # BLD: ABNORMAL 10*3/UL
WBC UA: ABNORMAL /HPF (ref 0–4)
YEAST: ABNORMAL

## 2020-10-23 PROCEDURE — G0297 LDCT FOR LUNG CA SCREEN: HCPCS

## 2020-10-23 PROCEDURE — G0103 PSA SCREENING: HCPCS

## 2020-10-23 PROCEDURE — 81001 URINALYSIS AUTO W/SCOPE: CPT

## 2020-10-23 PROCEDURE — 36415 COLL VENOUS BLD VENIPUNCTURE: CPT

## 2020-10-23 PROCEDURE — 80061 LIPID PANEL: CPT

## 2020-10-23 PROCEDURE — 83036 HEMOGLOBIN GLYCOSYLATED A1C: CPT

## 2020-10-23 PROCEDURE — 85025 COMPLETE CBC W/AUTO DIFF WBC: CPT

## 2020-10-23 PROCEDURE — 80053 COMPREHEN METABOLIC PANEL: CPT

## 2020-10-27 ENCOUNTER — TELEPHONE (OUTPATIENT)
Dept: CARDIOLOGY | Age: 65
End: 2020-10-27

## 2020-10-27 NOTE — TELEPHONE ENCOUNTER
Pt called, per Dr Chio Pineda request, to report his labs from his PCP were completed and in Epic for cardio review.     Last Appt:  7/30/2020  Next Appt:   2/4/2021  Med verified in [id number]

## 2020-11-03 PROBLEM — I25.10 CAD (CORONARY ARTERY DISEASE): Status: RESOLVED | Noted: 2020-11-03 | Resolved: 2020-11-03

## 2020-11-04 ENCOUNTER — INITIAL CONSULT (OUTPATIENT)
Dept: SURGERY | Age: 65
End: 2020-11-04
Payer: MEDICARE

## 2020-11-04 ENCOUNTER — TELEPHONE (OUTPATIENT)
Dept: SURGERY | Age: 65
End: 2020-11-04

## 2020-11-04 ENCOUNTER — HOSPITAL ENCOUNTER (OUTPATIENT)
Dept: NON INVASIVE DIAGNOSTICS | Age: 65
Discharge: HOME | End: 2020-11-04
Payer: MEDICARE

## 2020-11-04 VITALS
DIASTOLIC BLOOD PRESSURE: 62 MMHG | BODY MASS INDEX: 31.87 KG/M2 | SYSTOLIC BLOOD PRESSURE: 126 MMHG | OXYGEN SATURATION: 95 % | HEIGHT: 69 IN | HEART RATE: 83 BPM | WEIGHT: 215.2 LBS

## 2020-11-04 PROCEDURE — 99215 OFFICE O/P EST HI 40 MIN: CPT

## 2020-11-04 PROCEDURE — 93005 ELECTROCARDIOGRAM TRACING: CPT

## 2020-11-04 PROCEDURE — 4040F PNEUMOC VAC/ADMIN/RCVD: CPT | Performed by: SURGERY

## 2020-11-04 PROCEDURE — 99214 OFFICE O/P EST MOD 30 MIN: CPT | Performed by: SURGERY

## 2020-11-04 PROCEDURE — G8427 DOCREV CUR MEDS BY ELIG CLIN: HCPCS | Performed by: SURGERY

## 2020-11-04 PROCEDURE — 1123F ACP DISCUSS/DSCN MKR DOCD: CPT | Performed by: SURGERY

## 2020-11-04 PROCEDURE — G8482 FLU IMMUNIZE ORDER/ADMIN: HCPCS | Performed by: SURGERY

## 2020-11-04 PROCEDURE — G8417 CALC BMI ABV UP PARAM F/U: HCPCS | Performed by: SURGERY

## 2020-11-04 PROCEDURE — 1036F TOBACCO NON-USER: CPT | Performed by: SURGERY

## 2020-11-04 PROCEDURE — 3017F COLORECTAL CA SCREEN DOC REV: CPT | Performed by: SURGERY

## 2020-11-04 RX ORDER — ASPIRIN 81 MG/1
81 TABLET, CHEWABLE ORAL DAILY
COMMUNITY

## 2020-11-04 RX ORDER — ASCORBIC ACID 500 MG
250 TABLET ORAL 2 TIMES DAILY
COMMUNITY

## 2020-11-04 RX ORDER — MULTIVIT-MIN/IRON/FOLIC ACID/K 18-600-40
2000 CAPSULE ORAL DAILY
COMMUNITY

## 2020-11-04 NOTE — PATIENT INSTRUCTIONS
Follow pre-op instruction that was given in office for hernia repair on 11-19-20.   Hold Asprin and Vitamin D and C for 1 week prior procedure

## 2020-11-04 NOTE — PROGRESS NOTES
Minerva Sheppard is a 77 y.o. male who presents today for evaluation of swelling of the abdominal wall and had a prior laparoscopic port site. Patient underwent a robotic ventral hernia repair in December of last year. Approximately a month out he presented back to the office with a swelling in the right abdominal wall at the superior port site. Drainage was attempted without success as there was concern for a hematoma/seroma. He was sent for an ultrasound at that time that was read as possible phlegmon versus hematoma. It seems that there was no further follow-up after that and no further treatment. Since that time the patient states that he is continued to have a swelling at that area and it has become slightly larger over the year. Due to it now being large enough that he hits it with his arm when he is walking he decided to seek medical attention. He denies any changes in bowel movements. No nausea or vomiting. He does report that it is slowly enlarging in size but it does not seem that he notices any significant enlargement with activity or Valsalva. No overlying skin changes. Denies any signs of infection.     Past Medical History:   Diagnosis Date    Abdominal pain 9/29/2014    CAD (coronary artery disease)     Clostridium difficile diarrhea 10/2014    hospitalized in October 2014    Displacement of cervical intervertebral disc without myelopathy 6/10/2014    [facility], C6/C7     HTN (hypertension)     Hyperlipidemia     Impaired fasting blood sugar     Neck pain     chronic    Neuralgia, neuritis, and radiculitis, unspecified 6/10/2014    [facility], left C7     Shoulder region pain     Left   [facility] injury 01/08/2014    Sprain and strain of unspecified site of shoulder and upper arm 6/10/2014    [facility], left arm        Past Surgical History:   Procedure Laterality Date    BICEPS TENODESIS Right 11/23/2016    right proximal bicep tenodesis    COLONOSCOPY N/A 12/10/2019    COLONOSCOPY POLYPECTOMY SNARE/COLD BIOPSY performed by Fredi Doyle MD at [address] [place] [address] N/A 3/9/2020    CYSTO TURBT performed by Gaurav Florez MD at [facility] Left 8/12/14, 1/09/15    C7 TFE    OTHER SURGICAL HISTORY Left 09/23/2014    C7 TFE    OTHER SURGICAL HISTORY Left 7/17/2015 , 1/22/16    C7 TFE    [place] CABG, ARTERY-VEIN, SINGLE N/A 7/6/2018    CABG CORONARY ARTERY BYPASS, GARY ERAZO CHANI  ([facility]# [id number]) performed by Fer Ruiz MD at [place] [address] Left 01/29/2019    2015- Right    SHOULDER ARTHROSCOPY Right 4/27/2016    scope decompressing, rotator cuff repair    UMBILICAL HERNIA REPAIR N/A 12/18/2019    Laparoscopic Robotic Assisted Umbilical Hernia Repair W/ MESH performed by Fredi Doyle MD at [facility]       Current Outpatient Medications   Medication Sig Dispense Refill    traMADol (ULTRAM) 50 MG tablet TAKE 1 TABLET BY MOUTH AT NIGHT AS NEEDED FOR PAIN      tamsulosin (FLOMAX) 0.4 MG capsule Take 1 capsule by mouth daily 90 capsule 3    atorvastatin (LIPITOR) 40 MG tablet Take 1 tablet by mouth nightly 90 tablet 3    metoprolol tartrate (LOPRESSOR) 25 MG tablet Take 1 tablet by mouth 2 times daily 180 tablet 3    gabapentin (NEURONTIN) 400 MG capsule Take 1 capsule by mouth 2 times daily. 11    acetaminophen (TYLENOL) 325 MG tablet Take 2 tablets by mouth every 6 hours as needed for Pain 120 tablet 3     No current facility-administered medications for this visit.         Allergies   Allergen Reactions    Vicodin [Hydrocodone-Acetaminophen] Nausea Only     Stomach upset       Family History   Problem Relation Age of Onset    High Blood Pressure Father     Colon Polyps Brother     COPD Mother        Social History     Socioeconomic History    Marital status:      Spouse name: Alyssa Woodard Number of children: 2    Years of education: Not on file    Highest education level: Not on file   Occupational History     Comment: [employer]   Social Needs    Financial resource strain: Not on file    Food insecurity     Worry: Not on file     Inability: Not on file   [employer] needs     Medical: Not on file     Non-medical: Not on file   Tobacco Use    Smoking status: Former Smoker     Packs/day: 2.00     Years: 25.00     Pack years: 50.00     Types: Cigarettes     Start date: 1975     Last attempt to quit: 2008     Years since quittin.8    Smokeless tobacco: Never Used    Tobacco comment: [employer] [facility] [address]   Substance and Sexual Activity    Alcohol use: No     Alcohol/week: 0.0 standard drinks     Frequency: Never     Binge frequency: Never    Drug use: No    Sexual activity: Yes     Partners: Female   Lifestyle    Physical activity     Days per week: Not on file     Minutes per session: Not on file    Stress: Not on file   Relationships    Social connections     Talks on phone: Not on file     Gets together: Not on file     Attends [employer] service: Not on file     Active member of club or organization: Not on file     Attends meetings of clubs or organizations: Not on file     Relationship status: Not on file    Intimate partner violence     Fear of current or ex partner: Not on file     Emotionally abused: Not on file     Physically abused: Not on file     Forced sexual activity: Not on file   Other Topics Concern    Not on file   Social History Narrative    Not on file       ROS:   Review of Systems - General ROS: negative  Psychological ROS: negative  Ophthalmic ROS: negative  ENT ROS: negative  Respiratory ROS: no cough, shortness of breath, or wheezing  Cardiovascular ROS: no chest pain or dyspnea on exertion  Gastrointestinal ROS: per hpi  Genito-Urinary ROS: no dysuria, trouble voiding, or hematuria  Musculoskeletal ROS: negative      Objective   Vitals:    20 1455   BP: 126/62   Pulse: 83   SpO2: 95%     General:in no apparent distress and well developed and well nourished  Eyes: No gross abnormalities. Ears, Nose, Throat: hearing grossly normal bilaterally  Neck: neck supple and non tender without mass  Lungs: clear to auscultation without wheezes or rales   Heart: S1S2, no mumurs, RRR  Abdomen: Soft, nondistended, nontender, bowel sounds present. At the left abdominal wall immediately at the area of the superior port scar there is a palpable lump measuring approximately 5 cm x 4 cm. With palpation I am able to reduce this lump but it soon recurs with Valsalva. It does seem that there is a hernia defect on palpation approximately 1-1/2 to 2 cm in size. Extremity: negative  Neuro: CN II-XII grossly intact      Patient had CT urogram in January of this year. I have reviewed these images and it is demonstrating a port site hernia at the left abdominal wall with fat present. Assessment     1. Incisional hernia at prior laparoscopic port site      Plan     1. After evaluation and review of imaging today I have discussed with the patient that it seems that he has a hernia present on imaging done previously. No further work-up is needed at this time as we are able to make the diagnosis on that imaging. I have discussed with him management of this including surgical and nonsurgical options and he is desiring to proceed with surgery. I have discussed with him open repair versus laparoscopic/robotic repair and based on its location and size I think that there is no reason that we could do this as an open procedure. Risk of hernia repair including bleeding, infection, scarring, pain, damage to surrounding structures, need for further surgery, hernia recurrence, mesh complications and anesthesia risk were discussed and consent is obtained. 2.  Patient has had recent lab work and CT chest.  I will obtain a new EKG and will obtain cardiac clearance for surgery. 3.  Plan for follow-up after surgery for postop care.     Electronically signed by Ariel Vences DO on 11/4/2020 at 3:27 PM      (Please note that portions of this note were completed with a voice recognition program.  Efforts were made to edit the dictations but occasionally words are mis-transcribed.)

## 2020-11-04 NOTE — TELEPHONE ENCOUNTER
[facility]    Pre-Operative Evaluation/Consultation    Name:  Kelsie Adam                                         Age:  77 y.o. MRN:  [id number]       :  1955   Date:  2020         Sex: male    There were no encounter diagnoses. Surgeon:  Dr. Milo Mallory  Procedure (Planned):  Open Repair Incisional Hernia  Date Scheduled surgery: 20    Attending : No att. providers found    Primary Physician: Maria Teresa Woo  Cardiologist:     Type of Anesthesia Requested: General    Patient Medical history:   Allergies   Allergen Reactions    Vicodin [Hydrocodone-Acetaminophen] Nausea Only     Stomach upset     Patient Active Problem List   Diagnosis    Cervical radiculitis    Umbilical hernia    HTN (hypertension)    Impaired fasting blood sugar    Displacement of cervical intervertebral disc without myelopathy    Rotator cuff tear    CAD in native artery    S/P CABG (coronary artery bypass graft)    Hyperlipidemia    Other emphysema ([facility] [address]    Positive colorectal cancer screening using DNA-based stool test    Epigastric hernia    Urothelial carcinoma of bladder ([facility] [address]     Past Medical History:   Diagnosis Date    Abdominal pain 2014    CAD (coronary artery disease)     Clostridium difficile diarrhea 10/2014    hospitalized in 2014    Displacement of cervical intervertebral disc without myelopathy 6/10/2014    BWC, C6/C7     HTN (hypertension)     Hyperlipidemia     Impaired fasting blood sugar     Neck pain     chronic    Neuralgia, neuritis, and radiculitis, unspecified 6/10/2014    BWC, left C7     Shoulder region pain     Left   [facility] injury 2014    Sprain and strain of unspecified site of shoulder and upper arm 6/10/2014    BW, left arm      Past Surgical History:   Procedure Laterality Date    BICEPS TENODESIS Right 2016    right proximal bicep tenodesis    COLONOSCOPY N/A 12/10/2019    COLONOSCOPY POLYPECTOMY SNARE/COLD BIOPSY performed by Ila Rossi MD at [address] [place] [address] N/A 3/9/2020    CYSTO TURBT performed by Esdras Elaine MD at [place] [address] Left 14, 1/09/15    C7 TFE    OTHER SURGICAL HISTORY Left 2014    C7 TFE    OTHER SURGICAL HISTORY Left 2015 , 16    C7 TFE    [place] CABG, ARTERY-VEIN, SINGLE N/A 2018    CABG CORONARY ARTERY BYPASS, GARY ERAZO, CHANI  ([facility]# [id number]) performed by Emperatriz Sandoval MD at [address] [place] [address] Left 2019    2015- Right    SHOULDER ARTHROSCOPY Right 2016    scope decompressing, rotator cuff repair    UMBILICAL HERNIA REPAIR N/A 2019    Laparoscopic Robotic Assisted Umbilical Hernia Repair W/ MESH performed by Ila Rossi MD at [address] [place] [address] History     Tobacco Use    Smoking status: Former Smoker     Packs/day: 2.00     Years: 25.00     Pack years: 50.00     Types: Cigarettes     Start date: 1975     Last attempt to quit: 2008     Years since quittin.8    Smokeless tobacco: Never Used    Tobacco comment: Lonny Gibson RRT 16   Substance Use Topics    Alcohol use: No     Alcohol/week: 0.0 standard drinks     Frequency: Never     Binge frequency: Never    Drug use: No     Medications:  Current Outpatient Medications   Medication Sig Dispense Refill    aspirin 81 MG chewable tablet Take 81 mg by mouth daily      Cholecalciferol (VITAMIN D) 50 MCG (2000 UT) CAPS capsule Take 2,000 Units by mouth daily      vitamin C (ASCORBIC ACID) 500 MG tablet Take 250 mg by mouth 2 times daily      traMADol (ULTRAM) 50 MG tablet TAKE 1 TABLET BY MOUTH AT NIGHT AS NEEDED FOR PAIN      tamsulosin (FLOMAX) 0.4 MG capsule Take 1 capsule by mouth daily 90 capsule 3    atorvastatin (LIPITOR) 40 MG tablet Take 1 tablet by mouth nightly 90 tablet 3    metoprolol tartrate (LOPRESSOR) 25 MG tablet Take 1 tablet by mouth 2 times daily 180 tablet 3    gabapentin (NEURONTIN) 400 MG capsule Take 1 capsule by mouth 2 times daily. 11    acetaminophen (TYLENOL) 325 MG tablet Take 2 tablets by mouth every 6 hours as needed for Pain 120 tablet 3     No current facility-administered medications for this visit. Scheduled Meds:  Continuous Infusions:  PRN Meds:. Prior to Admission medications    Medication Sig Start Date End Date Taking? Authorizing Provider   aspirin 81 MG chewable tablet Take 81 mg by mouth daily    Historical Provider, MD   Cholecalciferol (VITAMIN D) 50 MCG (2000 UT) CAPS capsule Take 2,000 Units by mouth daily    Historical Provider, MD   vitamin C (ASCORBIC ACID) 500 MG tablet Take 250 mg by mouth 2 times daily    Historical Provider, MD   traMADol (ULTRAM) 50 MG tablet TAKE 1 TABLET BY MOUTH AT NIGHT AS NEEDED FOR PAIN 6/24/20   Historical Provider, MD   tamsulosin (FLOMAX) 0.4 MG capsule Take 1 capsule by mouth daily 6/22/20   Alejandro Coley MD   atorvastatin (LIPITOR) 40 MG tablet Take 1 tablet by mouth nightly 4/23/20   Pauly Gipson, DO   metoprolol tartrate (LOPRESSOR) 25 MG tablet Take 1 tablet by mouth 2 times daily 4/23/20   Pauly Gipson,    gabapentin (NEURONTIN) 400 MG capsule Take 1 capsule by mouth 2 times daily. 4/11/19   Historical Provider, MD   acetaminophen (TYLENOL) 325 MG tablet Take 2 tablets by mouth every 6 hours as needed for Pain 7/9/18   PARESH Alvarez - CNP     Vital Signs (Current) [unfilled]    Weight:   Wt Readings from Last 1 Encounters:   11/04/20 215 lb 3.2 oz (97.6 kg)     Height:   Ht Readings from Last 1 Encounters:   11/04/20 5' 9\" (1.753 m)      BMI:  There is no height or weight on file to calculate BMI. Estimated body mass index is 31.78 kg/m² as calculated from the following:    Height as of an earlier encounter on 11/4/20: 5' 9\" (1.753 m). Weight as of an earlier encounter on 11/4/20: 215 lb 3.2 oz (97.6 kg). body mass index is unknown because there is no height or weight on file.     Cardiac Clearance: Dr. Puma Robertson Clearance:None   Appointment for surgery Clearance scheduled for:None     Preoperative Testing: These are the current and completed labs:  CBC:   Lab Results   Component Value Date    WBC 8.2 10/23/2020    RBC 5.47 10/23/2020    HGB 14.8 10/23/2020    HCT 46.1 10/23/2020    MCV 84.3 10/23/2020    RDW 13.4 10/23/2020     10/23/2020     CMP:   Lab Results   Component Value Date     10/23/2020    K 4.5 10/23/2020     10/23/2020    CO2 25 10/23/2020    BUN 12 10/23/2020    CREATININE 0.81 10/23/2020    GFRAA >60 10/23/2020    LABGLOM >60 10/23/2020    GLUCOSE 112 10/23/2020    PROT 6.6 10/23/2020    CALCIUM 9.3 10/23/2020    BILITOT 0.35 10/23/2020    ALKPHOS 122 10/23/2020    AST 19 10/23/2020    ALT 17 10/23/2020     POC Tests: No results for input(s): POCGLU, POCNA, POCK, POCCL, POCBUN, POCHEMO, POCHCT in the last 72 hours.   Coags    Lab Results   Component Value Date    PROTIME 9.6 07/09/2018    INR 0.9 07/09/2018    APTT 25.6 85/31/2180     HCG (If Applicable) No results found for: PREGTESTUR, PREGSERUM, HCG, HCGQUANT   ABGs No results found for: PHART, PO2ART, DEN4DNB, WYS6TEF, BEART, X2NETQDG   Type & Screen (If Applicable)  No results found for: Gerilyn Semen    Additional ordered pre-operative testing:  []CBC    []ABG      [] BMP   []URINALYSIS   []CMP    []HCG   []COAGS PT/INR  []T&C  []LFTs   []TYPE AND SCREEN    [x] EKG  [] Chest X-Ray  [] Other Radiology    [] Sent to Hospitalist None  [x] Sent to Anesthesia for your review: None   [x] Additional Orders: EKG     Comments:None   Requests: None    Signed: Marlene Kaplan LPN 22/2/1277 8:44 PM;

## 2020-11-04 NOTE — PROGRESS NOTES
Pre-op instruction given to patient during office visit regarding open repair incisional hernia. Verbalized understanding. Explain to hold ASA and Vitamin D and C one week prior. Sent cardiac clearance note.   Patient is getting EKG done today

## 2020-11-05 LAB
EKG ATRIAL RATE: 78 BPM
EKG P AXIS: 49 DEGREES
EKG P-R INTERVAL: 186 MS
EKG Q-T INTERVAL: 390 MS
EKG QRS DURATION: 98 MS
EKG QTC CALCULATION (BAZETT): 444 MS
EKG R AXIS: 30 DEGREES
EKG T AXIS: 10 DEGREES
EKG VENTRICULAR RATE: 78 BPM

## 2020-11-05 NOTE — TELEPHONE ENCOUNTER
Patient made aware that Cardiologist suggest to hold ASA for 5 days prior to procedure. Patient verbalized understanding.

## 2020-11-06 ENCOUNTER — PRE-PROCEDURE TELEPHONE (OUTPATIENT)
Dept: PREADMISSION TESTING | Age: 65
End: 2020-11-06

## 2020-11-13 ENCOUNTER — HOSPITAL ENCOUNTER (OUTPATIENT)
Dept: PREADMISSION TESTING | Age: 65
Setting detail: SPECIMEN
Discharge: HOME | End: 2020-11-17
Payer: MEDICARE

## 2020-11-13 PROCEDURE — U0003 INFECTIOUS AGENT DETECTION BY NUCLEIC ACID (DNA OR RNA); SEVERE ACUTE RESPIRATORY SYNDROME CORONAVIRUS 2 (SARS-COV-2) (CORONAVIRUS DISEASE [COVID-19]), AMPLIFIED PROBE TECHNIQUE, MAKING USE OF HIGH THROUGHPUT TECHNOLOGIES AS DESCRIBED BY CMS-2020-01-R: HCPCS

## 2020-11-15 LAB — SARS-COV-2, NAA: NOT DETECTED

## 2020-11-19 ENCOUNTER — ANESTHESIA (OUTPATIENT)
Dept: OPERATING ROOM | Age: 65
End: 2020-11-19
Payer: MEDICARE

## 2020-11-19 ENCOUNTER — HOSPITAL ENCOUNTER (OUTPATIENT)
Age: 65
Setting detail: OUTPATIENT SURGERY
Discharge: HOME | End: 2020-11-19
Attending: SURGERY | Admitting: SURGERY
Payer: MEDICARE

## 2020-11-19 ENCOUNTER — APPOINTMENT (OUTPATIENT)
Dept: GENERAL RADIOLOGY | Age: 65
End: 2020-11-19
Attending: SURGERY
Payer: MEDICARE

## 2020-11-19 ENCOUNTER — ANESTHESIA EVENT (OUTPATIENT)
Dept: OPERATING ROOM | Age: 65
End: 2020-11-19
Payer: MEDICARE

## 2020-11-19 VITALS
SYSTOLIC BLOOD PRESSURE: 114 MMHG | BODY MASS INDEX: 31.96 KG/M2 | OXYGEN SATURATION: 91 % | WEIGHT: 215.8 LBS | DIASTOLIC BLOOD PRESSURE: 63 MMHG | HEART RATE: 75 BPM | HEIGHT: 69 IN | TEMPERATURE: 97.8 F | RESPIRATION RATE: 18 BRPM

## 2020-11-19 VITALS
DIASTOLIC BLOOD PRESSURE: 77 MMHG | RESPIRATION RATE: 10 BRPM | OXYGEN SATURATION: 96 % | SYSTOLIC BLOOD PRESSURE: 149 MMHG | TEMPERATURE: 96.9 F

## 2020-11-19 LAB
ABSOLUTE EOS #: 0.11 K/UL (ref 0–0.44)
ABSOLUTE IMMATURE GRANULOCYTE: 0.09 K/UL (ref 0–0.3)
ABSOLUTE LYMPH #: 3.21 K/UL (ref 1.1–3.7)
ABSOLUTE MONO #: 0.4 K/UL (ref 0.1–1.2)
ALBUMIN SERPL-MCNC: 3.7 G/DL (ref 3.5–5.2)
ALBUMIN/GLOBULIN RATIO: 1.5 (ref 1–2.5)
ALP BLD-CCNC: 126 U/L (ref 40–129)
ALT SERPL-CCNC: 19 U/L (ref 5–41)
ANION GAP SERPL CALCULATED.3IONS-SCNC: 10 MMOL/L (ref 9–17)
AST SERPL-CCNC: 20 U/L
BASOPHILS # BLD: 1 % (ref 0–2)
BASOPHILS ABSOLUTE: 0.09 K/UL (ref 0–0.2)
BILIRUB SERPL-MCNC: 0.36 MG/DL (ref 0.3–1.2)
BILIRUBIN DIRECT: 0.09 MG/DL
BILIRUBIN, INDIRECT: 0.27 MG/DL (ref 0–1)
BUN BLDV-MCNC: 10 MG/DL (ref 8–23)
CALCIUM SERPL-MCNC: 8.7 MG/DL (ref 8.6–10.4)
CHLORIDE BLD-SCNC: 105 MMOL/L (ref 98–107)
CO2: 24 MMOL/L (ref 20–31)
CREAT SERPL-MCNC: 0.82 MG/DL (ref 0.7–1.2)
DIFFERENTIAL TYPE: ABNORMAL
EOSINOPHILS RELATIVE PERCENT: 1 % (ref 1–4)
FIO2: 36
GFR AFRICAN AMERICAN: >60 ML/MIN
GFR NON-AFRICAN AMERICAN: >60 ML/MIN
GFR SERPL CREATININE-BSD FRML MDRD: ABNORMAL ML/MIN/{1.73_M2}
GFR SERPL CREATININE-BSD FRML MDRD: ABNORMAL ML/MIN/{1.73_M2}
GLUCOSE BLD-MCNC: 116 MG/DL (ref 70–99)
HCT VFR BLD CALC: 49.3 % (ref 40.7–50.3)
HEMOGLOBIN: 15.3 G/DL (ref 13–17)
IMMATURE GRANULOCYTES: 1 %
LYMPHOCYTES # BLD: 27 % (ref 24–43)
MAGNESIUM: 1.9 MG/DL (ref 1.6–2.6)
MCH RBC QN AUTO: 27 PG (ref 25.2–33.5)
MCHC RBC AUTO-ENTMCNC: 31 G/DL (ref 25.2–33.5)
MCV RBC AUTO: 87.1 FL (ref 82.6–102.9)
MONOCYTES # BLD: 3 % (ref 3–12)
NEGATIVE BASE EXCESS, ART: 4 (ref 0–2)
NRBC AUTOMATED: 0 PER 100 WBC
O2 DEVICE/FLOW/%: ABNORMAL
PATIENT TEMP: ABNORMAL
PDW BLD-RTO: 13.9 % (ref 11.8–14.4)
PLATELET # BLD: 220 K/UL (ref 138–453)
PLATELET ESTIMATE: ABNORMAL
PMV BLD AUTO: 10.5 FL (ref 8.1–13.5)
POC HCO3: 21.6 MMOL/L (ref 22–27)
POC O2 SATURATION: 92 %
POC PCO2 TEMP: ABNORMAL MM HG
POC PCO2: 38 MM HG (ref 32–45)
POC PH TEMP: ABNORMAL
POC PH: 7.36 (ref 7.35–7.45)
POC PO2 TEMP: ABNORMAL MM HG
POC PO2: 65 MM HG (ref 75–95)
POSITIVE BASE EXCESS, ART: ABNORMAL (ref 0–2)
POTASSIUM SERPL-SCNC: 4.6 MMOL/L (ref 3.7–5.3)
POTASSIUM SERPL-SCNC: 5.5 MMOL/L (ref 3.7–5.3)
RBC # BLD: 5.66 M/UL (ref 4.21–5.77)
RBC # BLD: ABNORMAL 10*6/UL
SEG NEUTROPHILS: 67 % (ref 36–65)
SEGMENTED NEUTROPHILS ABSOLUTE COUNT: 7.89 K/UL (ref 1.5–8.1)
SODIUM BLD-SCNC: 139 MMOL/L (ref 135–144)
TCO2 (CALC), ART: 23 MMOL/L (ref 23–28)
TOTAL PROTEIN: 6.2 G/DL (ref 6.4–8.3)
TROPONIN INTERP: ABNORMAL
TROPONIN T: ABNORMAL NG/ML
TROPONIN, HIGH SENSITIVITY: 27 NG/L (ref 0–22)
WBC # BLD: 11.8 K/UL (ref 3.5–11.3)
WBC # BLD: ABNORMAL 10*3/UL

## 2020-11-19 PROCEDURE — 7100000010 HC PHASE II RECOVERY - FIRST 15 MIN: Performed by: SURGERY

## 2020-11-19 PROCEDURE — 6370000000 HC RX 637 (ALT 250 FOR IP): Performed by: SURGERY

## 2020-11-19 PROCEDURE — 83735 ASSAY OF MAGNESIUM: CPT

## 2020-11-19 PROCEDURE — 2580000003 HC RX 258: Performed by: NURSE ANESTHETIST, CERTIFIED REGISTERED

## 2020-11-19 PROCEDURE — 3600000012 HC SURGERY LEVEL 2 ADDTL 15MIN: Performed by: SURGERY

## 2020-11-19 PROCEDURE — 6360000002 HC RX W HCPCS: Performed by: INTERNAL MEDICINE

## 2020-11-19 PROCEDURE — 85025 COMPLETE CBC W/AUTO DIFF WBC: CPT

## 2020-11-19 PROCEDURE — 2709999900 HC NON-CHARGEABLE SUPPLY: Performed by: SURGERY

## 2020-11-19 PROCEDURE — 7100000001 HC PACU RECOVERY - ADDTL 15 MIN: Performed by: SURGERY

## 2020-11-19 PROCEDURE — 93005 ELECTROCARDIOGRAM TRACING: CPT | Performed by: INTERNAL MEDICINE

## 2020-11-19 PROCEDURE — 2580000003 HC RX 258: Performed by: SURGERY

## 2020-11-19 PROCEDURE — 99225 PR SBSQ OBSERVATION CARE/DAY 25 MINUTES: CPT | Performed by: INTERNAL MEDICINE

## 2020-11-19 PROCEDURE — C1781 MESH (IMPLANTABLE): HCPCS | Performed by: SURGERY

## 2020-11-19 PROCEDURE — 84132 ASSAY OF SERUM POTASSIUM: CPT

## 2020-11-19 PROCEDURE — 7100000011 HC PHASE II RECOVERY - ADDTL 15 MIN: Performed by: SURGERY

## 2020-11-19 PROCEDURE — 49561 PR REPAIR INCISIONAL HERNIA,STRANG: CPT | Performed by: SURGERY

## 2020-11-19 PROCEDURE — 3600000002 HC SURGERY LEVEL 2 BASE: Performed by: SURGERY

## 2020-11-19 PROCEDURE — 36600 WITHDRAWAL OF ARTERIAL BLOOD: CPT

## 2020-11-19 PROCEDURE — 3700000000 HC ANESTHESIA ATTENDED CARE: Performed by: SURGERY

## 2020-11-19 PROCEDURE — 6360000002 HC RX W HCPCS: Performed by: NURSE ANESTHETIST, CERTIFIED REGISTERED

## 2020-11-19 PROCEDURE — 71045 X-RAY EXAM CHEST 1 VIEW: CPT

## 2020-11-19 PROCEDURE — 84484 ASSAY OF TROPONIN QUANT: CPT

## 2020-11-19 PROCEDURE — 82248 BILIRUBIN DIRECT: CPT

## 2020-11-19 PROCEDURE — 36415 COLL VENOUS BLD VENIPUNCTURE: CPT

## 2020-11-19 PROCEDURE — 94640 AIRWAY INHALATION TREATMENT: CPT

## 2020-11-19 PROCEDURE — 6360000002 HC RX W HCPCS: Performed by: SURGERY

## 2020-11-19 PROCEDURE — 7100000000 HC PACU RECOVERY - FIRST 15 MIN: Performed by: SURGERY

## 2020-11-19 PROCEDURE — 6370000000 HC RX 637 (ALT 250 FOR IP): Performed by: INTERNAL MEDICINE

## 2020-11-19 PROCEDURE — 3700000001 HC ADD 15 MINUTES (ANESTHESIA): Performed by: SURGERY

## 2020-11-19 PROCEDURE — 2500000003 HC RX 250 WO HCPCS: Performed by: NURSE ANESTHETIST, CERTIFIED REGISTERED

## 2020-11-19 PROCEDURE — 82803 BLOOD GASES ANY COMBINATION: CPT

## 2020-11-19 PROCEDURE — 80053 COMPREHEN METABOLIC PANEL: CPT

## 2020-11-19 PROCEDURE — 2500000003 HC RX 250 WO HCPCS: Performed by: SURGERY

## 2020-11-19 DEVICE — PATCH HERN M DIA2.5IN CIR W/ STRP SEPRA TECHNOLOGY ABSRB: Type: IMPLANTABLE DEVICE | Site: ABDOMEN | Status: FUNCTIONAL

## 2020-11-19 RX ORDER — MORPHINE SULFATE 2 MG/ML
2 INJECTION, SOLUTION INTRAMUSCULAR; INTRAVENOUS EVERY 5 MIN PRN
Status: DISCONTINUED | OUTPATIENT
Start: 2020-11-19 | End: 2020-11-19 | Stop reason: HOSPADM

## 2020-11-19 RX ORDER — DOCUSATE SODIUM 100 MG/1
100 CAPSULE, LIQUID FILLED ORAL 2 TIMES DAILY
Qty: 60 CAPSULE | Refills: 0 | Status: SHIPPED | OUTPATIENT
Start: 2020-11-19 | End: 2020-12-19

## 2020-11-19 RX ORDER — IPRATROPIUM BROMIDE AND ALBUTEROL SULFATE 2.5; .5 MG/3ML; MG/3ML
1 SOLUTION RESPIRATORY (INHALATION) ONCE
Status: COMPLETED | OUTPATIENT
Start: 2020-11-19 | End: 2020-11-19

## 2020-11-19 RX ORDER — ONDANSETRON 2 MG/ML
INJECTION INTRAMUSCULAR; INTRAVENOUS PRN
Status: DISCONTINUED | OUTPATIENT
Start: 2020-11-19 | End: 2020-11-19 | Stop reason: SDUPTHER

## 2020-11-19 RX ORDER — OXYCODONE HYDROCHLORIDE AND ACETAMINOPHEN 5; 325 MG/1; MG/1
1 TABLET ORAL EVERY 6 HOURS PRN
Qty: 20 TABLET | Refills: 0 | Status: SHIPPED | OUTPATIENT
Start: 2020-11-19 | End: 2020-11-24

## 2020-11-19 RX ORDER — MIDAZOLAM HYDROCHLORIDE 1 MG/ML
INJECTION INTRAMUSCULAR; INTRAVENOUS PRN
Status: DISCONTINUED | OUTPATIENT
Start: 2020-11-19 | End: 2020-11-19 | Stop reason: SDUPTHER

## 2020-11-19 RX ORDER — SODIUM CHLORIDE, SODIUM LACTATE, POTASSIUM CHLORIDE, CALCIUM CHLORIDE 600; 310; 30; 20 MG/100ML; MG/100ML; MG/100ML; MG/100ML
INJECTION, SOLUTION INTRAVENOUS CONTINUOUS
Status: DISCONTINUED | OUTPATIENT
Start: 2020-11-19 | End: 2020-11-19 | Stop reason: HOSPADM

## 2020-11-19 RX ORDER — ROCURONIUM BROMIDE 10 MG/ML
INJECTION, SOLUTION INTRAVENOUS PRN
Status: DISCONTINUED | OUTPATIENT
Start: 2020-11-19 | End: 2020-11-19 | Stop reason: SDUPTHER

## 2020-11-19 RX ORDER — MORPHINE SULFATE 2 MG/ML
1 INJECTION, SOLUTION INTRAMUSCULAR; INTRAVENOUS EVERY 5 MIN PRN
Status: DISCONTINUED | OUTPATIENT
Start: 2020-11-19 | End: 2020-11-19 | Stop reason: HOSPADM

## 2020-11-19 RX ORDER — FENTANYL CITRATE 50 UG/ML
INJECTION, SOLUTION INTRAMUSCULAR; INTRAVENOUS PRN
Status: DISCONTINUED | OUTPATIENT
Start: 2020-11-19 | End: 2020-11-19 | Stop reason: SDUPTHER

## 2020-11-19 RX ORDER — SODIUM POLYSTYRENE SULFONATE 15 G/60ML
15 SUSPENSION ORAL; RECTAL ONCE
Status: COMPLETED | OUTPATIENT
Start: 2020-11-19 | End: 2020-11-19

## 2020-11-19 RX ORDER — NEOSTIGMINE METHYLSULFATE 1 MG/ML
INJECTION, SOLUTION INTRAVENOUS PRN
Status: DISCONTINUED | OUTPATIENT
Start: 2020-11-19 | End: 2020-11-19 | Stop reason: SDUPTHER

## 2020-11-19 RX ORDER — LIDOCAINE HYDROCHLORIDE 20 MG/ML
INJECTION, SOLUTION EPIDURAL; INFILTRATION; INTRACAUDAL; PERINEURAL PRN
Status: DISCONTINUED | OUTPATIENT
Start: 2020-11-19 | End: 2020-11-19 | Stop reason: SDUPTHER

## 2020-11-19 RX ORDER — FUROSEMIDE 10 MG/ML
20 INJECTION INTRAMUSCULAR; INTRAVENOUS ONCE
Status: COMPLETED | OUTPATIENT
Start: 2020-11-19 | End: 2020-11-19

## 2020-11-19 RX ORDER — SODIUM CHLORIDE 0.9 % (FLUSH) 0.9 %
10 SYRINGE (ML) INJECTION EVERY 12 HOURS SCHEDULED
Status: DISCONTINUED | OUTPATIENT
Start: 2020-11-19 | End: 2020-11-19 | Stop reason: HOSPADM

## 2020-11-19 RX ORDER — SODIUM CHLORIDE 0.9 % (FLUSH) 0.9 %
10 SYRINGE (ML) INJECTION PRN
Status: DISCONTINUED | OUTPATIENT
Start: 2020-11-19 | End: 2020-11-19 | Stop reason: HOSPADM

## 2020-11-19 RX ORDER — ONDANSETRON 2 MG/ML
4 INJECTION INTRAMUSCULAR; INTRAVENOUS
Status: DISCONTINUED | OUTPATIENT
Start: 2020-11-19 | End: 2020-11-19 | Stop reason: HOSPADM

## 2020-11-19 RX ORDER — GLYCOPYRROLATE 1 MG/5 ML
SYRINGE (ML) INTRAVENOUS PRN
Status: DISCONTINUED | OUTPATIENT
Start: 2020-11-19 | End: 2020-11-19 | Stop reason: SDUPTHER

## 2020-11-19 RX ORDER — PROPOFOL 10 MG/ML
INJECTION, EMULSION INTRAVENOUS PRN
Status: DISCONTINUED | OUTPATIENT
Start: 2020-11-19 | End: 2020-11-19 | Stop reason: SDUPTHER

## 2020-11-19 RX ORDER — OXYCODONE HYDROCHLORIDE AND ACETAMINOPHEN 5; 325 MG/1; MG/1
1 TABLET ORAL PRN
Status: COMPLETED | OUTPATIENT
Start: 2020-11-19 | End: 2020-11-19

## 2020-11-19 RX ORDER — DEXAMETHASONE SODIUM PHOSPHATE 4 MG/ML
INJECTION, SOLUTION INTRA-ARTICULAR; INTRALESIONAL; INTRAMUSCULAR; INTRAVENOUS; SOFT TISSUE PRN
Status: DISCONTINUED | OUTPATIENT
Start: 2020-11-19 | End: 2020-11-19 | Stop reason: SDUPTHER

## 2020-11-19 RX ORDER — OXYCODONE HYDROCHLORIDE AND ACETAMINOPHEN 5; 325 MG/1; MG/1
2 TABLET ORAL PRN
Status: COMPLETED | OUTPATIENT
Start: 2020-11-19 | End: 2020-11-19

## 2020-11-19 RX ADMIN — Medication 3 MG: at 08:42

## 2020-11-19 RX ADMIN — SODIUM CHLORIDE, POTASSIUM CHLORIDE, SODIUM LACTATE AND CALCIUM CHLORIDE: 600; 310; 30; 20 INJECTION, SOLUTION INTRAVENOUS at 08:42

## 2020-11-19 RX ADMIN — OXYCODONE HYDROCHLORIDE AND ACETAMINOPHEN 1 TABLET: 5; 325 TABLET ORAL at 13:06

## 2020-11-19 RX ADMIN — SODIUM CHLORIDE, POTASSIUM CHLORIDE, SODIUM LACTATE AND CALCIUM CHLORIDE: 600; 310; 30; 20 INJECTION, SOLUTION INTRAVENOUS at 07:02

## 2020-11-19 RX ADMIN — DEXAMETHASONE SODIUM PHOSPHATE 4 MG: 4 INJECTION, SOLUTION INTRAMUSCULAR; INTRAVENOUS at 08:42

## 2020-11-19 RX ADMIN — Medication 0.2 MG: at 08:08

## 2020-11-19 RX ADMIN — LIDOCAINE HYDROCHLORIDE 100 MG: 20 INJECTION, SOLUTION EPIDURAL; INFILTRATION; INTRACAUDAL; PERINEURAL at 07:41

## 2020-11-19 RX ADMIN — PHENYLEPHRINE HYDROCHLORIDE 50 MCG/MIN: 10 INJECTION INTRAVENOUS at 07:55

## 2020-11-19 RX ADMIN — SODIUM CHLORIDE, POTASSIUM CHLORIDE, SODIUM LACTATE AND CALCIUM CHLORIDE: 600; 310; 30; 20 INJECTION, SOLUTION INTRAVENOUS at 08:12

## 2020-11-19 RX ADMIN — SODIUM POLYSTYRENE SULFONATE 15 G: 15 SUSPENSION ORAL; RECTAL at 11:23

## 2020-11-19 RX ADMIN — SODIUM CHLORIDE, POTASSIUM CHLORIDE, SODIUM LACTATE AND CALCIUM CHLORIDE: 600; 310; 30; 20 INJECTION, SOLUTION INTRAVENOUS at 09:45

## 2020-11-19 RX ADMIN — Medication 2 G: at 07:50

## 2020-11-19 RX ADMIN — Medication 0.4 MG: at 08:42

## 2020-11-19 RX ADMIN — PHENYLEPHRINE HYDROCHLORIDE 300 MCG: 10 INJECTION INTRAVENOUS at 07:41

## 2020-11-19 RX ADMIN — ROCURONIUM BROMIDE 10 MG: 10 INJECTION, SOLUTION INTRAVENOUS at 08:20

## 2020-11-19 RX ADMIN — ROCURONIUM BROMIDE 40 MG: 10 INJECTION, SOLUTION INTRAVENOUS at 07:41

## 2020-11-19 RX ADMIN — FUROSEMIDE 20 MG: 10 INJECTION, SOLUTION INTRAMUSCULAR; INTRAVENOUS at 10:18

## 2020-11-19 RX ADMIN — MORPHINE SULFATE 2 MG: 2 INJECTION, SOLUTION INTRAMUSCULAR; INTRAVENOUS at 09:35

## 2020-11-19 RX ADMIN — FENTANYL CITRATE 100 MCG: 50 INJECTION, SOLUTION INTRAMUSCULAR; INTRAVENOUS at 07:34

## 2020-11-19 RX ADMIN — IPRATROPIUM BROMIDE AND ALBUTEROL SULFATE 1 AMPULE: .5; 3 SOLUTION RESPIRATORY (INHALATION) at 09:29

## 2020-11-19 RX ADMIN — MIDAZOLAM HYDROCHLORIDE 2 MG: 1 INJECTION, SOLUTION INTRAMUSCULAR; INTRAVENOUS at 07:29

## 2020-11-19 RX ADMIN — ONDANSETRON 4 MG: 2 INJECTION INTRAMUSCULAR; INTRAVENOUS at 08:42

## 2020-11-19 RX ADMIN — MORPHINE SULFATE 2 MG: 2 INJECTION, SOLUTION INTRAMUSCULAR; INTRAVENOUS at 09:25

## 2020-11-19 RX ADMIN — PROPOFOL 100 MG: 10 INJECTION, EMULSION INTRAVENOUS at 07:41

## 2020-11-19 ASSESSMENT — PAIN DESCRIPTION - FREQUENCY
FREQUENCY: CONTINUOUS

## 2020-11-19 ASSESSMENT — PULMONARY FUNCTION TESTS
PIF_VALUE: 20
PIF_VALUE: 34
PIF_VALUE: 25
PIF_VALUE: 22
PIF_VALUE: 10
PIF_VALUE: 20
PIF_VALUE: 21
PIF_VALUE: 25
PIF_VALUE: 22
PIF_VALUE: 3
PIF_VALUE: 23
PIF_VALUE: 19
PIF_VALUE: 24
PIF_VALUE: 20
PIF_VALUE: 44
PIF_VALUE: 21
PIF_VALUE: 19
PIF_VALUE: 19
PIF_VALUE: 2
PIF_VALUE: 22
PIF_VALUE: 21
PIF_VALUE: 26
PIF_VALUE: 21
PIF_VALUE: 2
PIF_VALUE: 21
PIF_VALUE: 32
PIF_VALUE: 22
PIF_VALUE: 19
PIF_VALUE: 25
PIF_VALUE: 15
PIF_VALUE: 25
PIF_VALUE: 26
PIF_VALUE: 22
PIF_VALUE: 24
PIF_VALUE: 2
PIF_VALUE: 19
PIF_VALUE: 26
PIF_VALUE: 20
PIF_VALUE: 29
PIF_VALUE: 24
PIF_VALUE: 21
PIF_VALUE: 20
PIF_VALUE: 24
PIF_VALUE: 2
PIF_VALUE: 21
PIF_VALUE: 2
PIF_VALUE: 25
PIF_VALUE: 20
PIF_VALUE: 25
PIF_VALUE: 21
PIF_VALUE: 22
PIF_VALUE: 20
PIF_VALUE: 21
PIF_VALUE: 25
PIF_VALUE: 19
PIF_VALUE: 25
PIF_VALUE: 21
PIF_VALUE: 20
PIF_VALUE: 22
PIF_VALUE: 25
PIF_VALUE: 44
PIF_VALUE: 25
PIF_VALUE: 26
PIF_VALUE: 21
PIF_VALUE: 26
PIF_VALUE: 34
PIF_VALUE: 21
PIF_VALUE: 16
PIF_VALUE: 44
PIF_VALUE: 25
PIF_VALUE: 21

## 2020-11-19 ASSESSMENT — PAIN SCALES - GENERAL
PAINLEVEL_OUTOF10: 6
PAINLEVEL_OUTOF10: 10
PAINLEVEL_OUTOF10: 10
PAINLEVEL_OUTOF10: 5
PAINLEVEL_OUTOF10: 6
PAINLEVEL_OUTOF10: 10

## 2020-11-19 ASSESSMENT — PAIN DESCRIPTION - PAIN TYPE
TYPE: SURGICAL PAIN

## 2020-11-19 ASSESSMENT — PAIN DESCRIPTION - LOCATION
LOCATION: ABDOMEN

## 2020-11-19 ASSESSMENT — COPD QUESTIONNAIRES: CAT_SEVERITY: MILD

## 2020-11-19 ASSESSMENT — PAIN DESCRIPTION - DESCRIPTORS: DESCRIPTORS: TINGLING

## 2020-11-19 ASSESSMENT — PAIN DESCRIPTION - ORIENTATION
ORIENTATION: LEFT;UPPER

## 2020-11-19 ASSESSMENT — PAIN - FUNCTIONAL ASSESSMENT: PAIN_FUNCTIONAL_ASSESSMENT: 0-10

## 2020-11-19 NOTE — ANESTHESIA POSTPROCEDURE EVALUATION
Department of Anesthesiology  Postprocedure Note    Patient: Crystal Ortiz  MRN: [id number]  YOB: 1955  Date of evaluation: 11/19/2020  Time:  9:50 AM     Procedure Summary     Date:  11/19/20 Room / Location:  [address] [place] [address]    Anesthesia Start:  3209 Anesthesia Stop:  4338    Procedure:  Open Incisional Hernia Repair W/ MESH (N/A ) Diagnosis:  (incisional hernia without obstruction or gangrene)    Surgeon:  Kay Garcia DO Responsible Provider:  PARESH Garcia CRNA    Anesthesia Type:  general ASA Status:  3          Anesthesia Type: general    Jazlyn Phase I: Jazlyn Score: 7    Jazlyn Phase II:      Last vitals: Reviewed and per EMR flowsheets. Anesthesia Post Evaluation    Patient location during evaluation: PACU  Patient participation: complete - patient participated  Level of consciousness: awake and alert  Pain score: 0  Airway patency: patent  Nausea & Vomiting: no nausea and no vomiting  Complications: no  Cardiovascular status: blood pressure returned to baseline  Respiratory status: nasal cannula and acceptable  Hydration status: euvolemic  Comments: Pt still on O2. Referred to Dr Delvis Ross for management of postoperative oxygent ation issues. Pt states that he has this issue with every anesthetic.

## 2020-11-19 NOTE — ANESTHESIA PRE PROCEDURE
Department of Anesthesiology  Preprocedure Note       Name:  Rebecca Yañez   Age:  77 y.o.  :  1955                                          MRN:  [id number]         Date:  2020      Surgeon: Michelle Cleary):  Marty Pineda DO    Procedure: Procedure(s):  Open Incisional Hernia Repair    Medications prior to admission:   Prior to Admission medications    Medication Sig Start Date End Date Taking? Authorizing Provider   traMADol (ULTRAM) 50 MG tablet TAKE 1 TABLET BY MOUTH AT NIGHT AS NEEDED FOR PAIN 20  Yes Historical Provider, MD   tamsulosin (FLOMAX) 0.4 MG capsule Take 1 capsule by mouth daily 20  Yes Raymundo Hui MD   atorvastatin (LIPITOR) 40 MG tablet Take 1 tablet by mouth nightly 20  Yes Pauly Gipson DO   metoprolol tartrate (LOPRESSOR) 25 MG tablet Take 1 tablet by mouth 2 times daily 20  Yes Pauly Gipson DO   gabapentin (NEURONTIN) 400 MG capsule Take 1 capsule by mouth 2 times daily.  19  Yes Historical Provider, MD   acetaminophen (TYLENOL) 325 MG tablet Take 2 tablets by mouth every 6 hours as needed for Pain 18  Yes PARESH Varela - CNP   aspirin 81 MG chewable tablet Take 81 mg by mouth daily    Historical Provider, MD   Cholecalciferol (VITAMIN D) 50 MCG (2000 UT) CAPS capsule Take 2,000 Units by mouth daily    Historical Provider, MD   vitamin C (ASCORBIC ACID) 500 MG tablet Take 250 mg by mouth 2 times daily    Historical Provider, MD       Current medications:    Current Facility-Administered Medications   Medication Dose Route Frequency Provider Last Rate Last Dose    lactated ringers infusion   Intravenous Continuous Marty Pineda  mL/hr at 20 0702      sodium chloride flush 0.9 % injection 10 mL  10 mL Intravenous 2 times per day Marty Pineda DO        sodium chloride flush 0.9 % injection 10 mL  10 mL Intravenous PRN Marty Pineda DO        ceFAZolin (ANCEF) 2 g in sterile water 20 mL IV syringe  2 g Intravenous On Call to 7301 Latha Garcia DO           Allergies:     Allergies   Allergen Reactions    Vicodin [Hydrocodone-Acetaminophen] Nausea Only     Stomach upset       Problem List:    Patient Active Problem List   Diagnosis Code    Cervical radiculitis X35.35    Umbilical hernia G50.7    HTN (hypertension) I10    Impaired fasting blood sugar R73.01    Displacement of cervical intervertebral disc without myelopathy M50.20    Rotator cuff tear M75.100    CAD in native artery I25.10    S/P CABG (coronary artery bypass graft) Z95.1    Hyperlipidemia E78.5    Other emphysema ([facility] [address] J43.8    Positive colorectal cancer screening using DNA-based stool test R19.5    Epigastric hernia K43.9    Urothelial carcinoma of bladder ([facility] [address] C67.9       Past Medical History:        Diagnosis Date    Abdominal pain 9/29/2014    CAD (coronary artery disease)     Clostridium difficile diarrhea 10/2014    hospitalized in October 2014    Displacement of cervical intervertebral disc without myelopathy 6/10/2014    BWC, C6/C7     HTN (hypertension)     Hyperlipidemia     Impaired fasting blood sugar     Neck pain     chronic    Neuralgia, neuritis, and radiculitis, unspecified 6/10/2014    BWC, left C7     Shoulder region pain     Left   C injury 01/08/2014    Sprain and strain of unspecified site of shoulder and upper arm 6/10/2014    [facility], left arm        Past Surgical History:        Procedure Laterality Date    BICEPS TENODESIS Right 11/23/2016    right proximal bicep tenodesis    COLONOSCOPY N/A 12/10/2019    COLONOSCOPY POLYPECTOMY SNARE/COLD BIOPSY performed by Justa Jon MD at [address] [place] [address] N/A 3/9/2020    CYSTO TURBT performed by Vena Duane, MD at [address] [place] [address] Left 8/12/14, 1/09/15    C7 TFE    OTHER SURGICAL HISTORY Left 09/23/2014    C7 TFE    OTHER SURGICAL HISTORY Left 7/17/2015 , 1/22/16    C7 TFE    [place] CABG, ARTERY-VEIN, SINGLE N/A 7/6/2018    CABG CORONARY ARTERY BYPASS, CHANI ASHRAF  ([facility]# [id number]) performed by Low Multani MD at [address] Left 2019    2015- Right    SHOULDER ARTHROSCOPY Right 2016    scope decompressing, rotator cuff repair    UMBILICAL HERNIA REPAIR N/A 2019    Laparoscopic Robotic Assisted Umbilical Hernia Repair W/ MESH performed by Tianna Simpson MD at [place] [address] History:    Social History     Tobacco Use    Smoking status: Former Smoker     Packs/day: 2.00     Years: 25.00     Pack years: 50.00     Types: Cigarettes     Start date: 1975     Last attempt to quit: 2008     Years since quittin.8    Smokeless tobacco: Never Used    Tobacco comment: Rick Craigsandip RRT 16   Substance Use Topics    Alcohol use: No     Alcohol/week: 0.0 standard drinks     Frequency: Never     Binge frequency: Never                                Counseling given: Not Answered  Comment: Rick Goff RRT 16      Vital Signs (Current):   Vitals:    20 0654   BP: 123/68   Pulse: 56   Resp: 16   Temp: 36.1 °C (97 °F)   TempSrc: Temporal   SpO2: 97%   Weight: 215 lb 12.8 oz (97.9 kg)   Height: 5' 9\" (1.753 m)                                              BP Readings from Last 3 Encounters:   20 123/68   20 126/62   10/19/20 132/60       NPO Status: Time of last liquid consumption: 0445(sips with medication)                        Time of last solid consumption: 1900                        Date of last liquid consumption: 20                        Date of last solid food consumption: 20    BMI:   Wt Readings from Last 3 Encounters:   20 215 lb 12.8 oz (97.9 kg)   20 215 lb 3.2 oz (97.6 kg)   10/19/20 221 lb 3.2 oz (100.3 kg)     Body mass index is 31.87 kg/m².     CBC:   Lab Results   Component Value Date    WBC 8.2 10/23/2020    RBC 5.47 10/23/2020    HGB 14.8 10/23/2020    HCT 46.1 10/23/2020    MCV 84.3 10/23/2020    RDW 13.4 10/23/2020  10/23/2020       CMP:   Lab Results   Component Value Date     10/23/2020    K 4.5 10/23/2020     10/23/2020    CO2 25 10/23/2020    BUN 12 10/23/2020    CREATININE 0.81 10/23/2020    GFRAA >60 10/23/2020    LABGLOM >60 10/23/2020    GLUCOSE 112 10/23/2020    PROT 6.6 10/23/2020    CALCIUM 9.3 10/23/2020    BILITOT 0.35 10/23/2020    ALKPHOS 122 10/23/2020    AST 19 10/23/2020    ALT 17 10/23/2020       POC Tests: No results for input(s): POCGLU, POCNA, POCK, POCCL, POCBUN, POCHEMO, POCHCT in the last 72 hours. Coags:   Lab Results   Component Value Date    PROTIME 9.6 07/09/2018    INR 0.9 07/09/2018    APTT 25.6 07/06/2018       HCG (If Applicable): No results found for: PREGTESTUR, PREGSERUM, HCG, HCGQUANT     ABGs: No results found for: PHART, PO2ART, LHR4UPI, DCK6CYH, BEART, S1FQZOEM     Type & Screen (If Applicable):  No results found for: LABABO, LABRH    Drug/Infectious Status (If Applicable):  No results found for: HIV, HEPCAB    COVID-19 Screening (If Applicable):   Lab Results   Component Value Date    COVID19 Not Detected 11/13/2020         Anesthesia Evaluation  Patient summary reviewed and Nursing notes reviewed no history of anesthetic complications:   Airway: Mallampati: II  TM distance: >3 FB   Neck ROM: full  Mouth opening: > = 3 FB Dental:    (+) upper dentures and lower dentures      Pulmonary: breath sounds clear to auscultation  (+) COPD: mild,                             Cardiovascular:  Exercise tolerance: good (>4 METS),   (+) hypertension:, CAD:, CABG/stent:, hyperlipidemia      ECG reviewed  Rhythm: regular  Rate: normal      Cleared by cardiology     Beta Blocker:  Dose within 24 Hrs         Neuro/Psych:   (+) neuromuscular disease:,             GI/Hepatic/Renal: Neg GI/Hepatic/Renal ROS            Endo/Other: Negative Endo/Other ROS                    Abdominal:           Vascular: negative vascular ROS.                                      Anesthesia Plan      general     ASA 3       Induction: intravenous. MIPS: Postoperative opioids intended and Prophylactic antiemetics administered. Anesthetic plan and risks discussed with patient.       Plan discussed with surgical team.                  PARESH Rojas - JOAN   11/19/2020

## 2020-11-19 NOTE — ADDENDUM NOTE
Addendum  created 11/19/20 1309 by PARESH Tanner CRNA    Clinical Note Signed, Delete clinical note, Pend clinical note

## 2020-11-19 NOTE — OP NOTE
[address]                 [address] [place] [address]                                OPERATIVE REPORT    PATIENT NAME: Chay Caballero                 :        1955  MED REC NO:   [id number]                             ROOM:  ACCOUNT NO:   [de-identified]                           ADMIT DATE: 2020  PROVIDER:     Maribel Hill    DATE OF PROCEDURE:  2020    SURGEON:  Dr. Maribel Hill. ASSISTANT:  JENNIFER Perez student. PREOPERATIVE DIAGNOSIS:  Incisional hernia of left abdominal wall. POSTOPERATIVE DIAGNOSIS:  Incisional hernia of left abdominal wall. PROCEDURE:  Incisional hernia repair with mesh. ANESTHESIA:  General.    ESTIMATED BLOOD LOSS: 15 mL. FLUIDS:  2000 mL of crystalloid. COMPLICATIONS:  None. SPECIMENS:  None. INDICATIONS FOR PROCEDURE:  The patient is a 69-year-old gentleman, who  was presenting to my office with complaints of a left upper quadrant  bulge. The patient underwent a robotic-assisted laparoscopic umbilical  hernia repair approximately 1 year ago, and soon after surgery had been  noticing a bulge in the left upper quadrant. He had been seen back by  the original surgeon and had an ultrasound that at that time showed a  possible phlegmon versus hematoma. Decision was made to monitor. Unfortunately, the bulge persisted without any improvement and then  COVID-19 occurred and the patient decided to just monitor  conservatively. However, more recently, he states that he has noticed  that the bulge gets slightly bigger in size with activity and he was  referred back to Surgery for evaluation of possible hernia. Fortunately, the patient had a CT urogram performed, which did image the  abdominal wall in this area and did confirm a small likely port-site  hernia here. After discussion in the office, the patient did wish to  proceed with repair.   Based on its location and size, I felt that the  best option would just be a primary repair with possible mesh placement. Prior to the day of the procedure, risks, benefits, and alternative were  explained and consent was obtained. DESCRIPTION OF PROCEDURE:  The patient was brought to the operative  suite, placed on the operative table in the supine position. Monitoring  devices and SCD cuffs were placed. General endotracheal anesthesia was  induced. After induction of anesthesia, time-out was performed and  correct patient and procedure were verified. The patient's abdomen was  prepped and draped in the sterile fashion. Prior to the time of  incision, the patient did receive preoperative antibiotics in accordance  with SCIP protocol. The area at the left upper quadrant and the area of  the bulge was identified and this was in the area of a prior port  placement as evidenced by scar. A transverse incision was marked out  for approximately 4.5 to 5 cm. The skin was anesthetized with local  anesthetic of 1% lidocaine with epinephrine and 0.25% Marcaine plain in  1:1 ratio. Through the anesthetized region of the skin, an incision was  made using 15 blade and dissection was carried down through the  subcutaneous tissue to the level of the anterior fascia. During this  dissection, we did encounter fat that appeared consistent with omental  fat. As we got down to the muscle layer, it was evident that the hernia  defect did have some omentum coming through it and there was some  scarring of the omentum to the edges of the hernia defect. Careful  dissection using electrocautery was used to lyse the scarring of the  omentum to the abdominal wall and once we had this done, I was able to  easily reduce the omentum back into the abdominal cavity. With the  hernia defect completely exposed, it appeared to be approximately 1.5 cm  in size in its widest diameter.   Because of its location, the thinness  of the abdominal wall, and the size of the hernia,

## 2020-11-19 NOTE — H&P
Anjel Sher is a 77 y.o. male who presents today for evaluation of swelling of the abdominal wall and had a prior laparoscopic port site. Patient underwent a robotic ventral hernia repair in December of last year. Approximately a month out he presented back to the office with a swelling in the right abdominal wall at the superior port site. Drainage was attempted without success as there was concern for a hematoma/seroma. He was sent for an ultrasound at that time that was read as possible phlegmon versus hematoma. It seems that there was no further follow-up after that and no further treatment. Since that time the patient states that he is continued to have a swelling at that area and it has become slightly larger over the year. Due to it now being large enough that he hits it with his arm when he is walking he decided to seek medical attention. He denies any changes in bowel movements. No nausea or vomiting. He does report that it is slowly enlarging in size but it does not seem that he notices any significant enlargement with activity or Valsalva. No overlying skin changes.   Denies any signs of infection.     Past Medical History        Past Medical History:   Diagnosis Date    Abdominal pain 9/29/2014    CAD (coronary artery disease)      Clostridium difficile diarrhea 10/2014     hospitalized in October 2014    Displacement of cervical intervertebral disc without myelopathy 6/10/2014     [facility], C6/C7     HTN (hypertension)      Hyperlipidemia      Impaired fasting blood sugar      Neck pain       chronic    Neuralgia, neuritis, and radiculitis, unspecified 6/10/2014     [facility], left C7     Shoulder region pain       Left   [facility] injury 01/08/2014    Sprain and strain of unspecified site of shoulder and upper arm 6/10/2014     [facility], left arm            Past Surgical History         Past Surgical History:   Procedure Laterality Date    BICEPS TENODESIS Right 11/23/2016     right proximal bicep tenodesis    COLONOSCOPY N/A 12/10/2019     COLONOSCOPY POLYPECTOMY SNARE/COLD BIOPSY performed by Xochitl Vance MD at [address] N/A 3/9/2020     CYSTO TURBT performed by Saint Bran, MD at [address] [facility] Left 8/12/14, 1/09/15     C7 TFE    OTHER SURGICAL HISTORY Left 09/23/2014     C7 TFE    OTHER SURGICAL HISTORY Left 7/17/2015 , 1/22/16     C7 TFE    [place] CABG, ARTERY-VEIN, SINGLE N/A 7/6/2018     CABG CORONARY ARTERY BYPASS, GARY ERAZO, CHANI  (CSI# [id number]) performed by Sherryle Citizen, MD at [place] [address] Left 01/29/2019 2015- Right    SHOULDER ARTHROSCOPY Right 4/27/2016     scope decompressing, rotator cuff repair    UMBILICAL HERNIA REPAIR N/A 12/18/2019     Laparoscopic Robotic Assisted Umbilical Hernia Repair W/ MESH performed by Xochitl Vance MD at [address] [facility]           Current Facility-Administered Medications          Current Outpatient Medications   Medication Sig Dispense Refill    traMADol (ULTRAM) 50 MG tablet TAKE 1 TABLET BY MOUTH AT NIGHT AS NEEDED FOR PAIN        tamsulosin (FLOMAX) 0.4 MG capsule Take 1 capsule by mouth daily 90 capsule 3    atorvastatin (LIPITOR) 40 MG tablet Take 1 tablet by mouth nightly 90 tablet 3    metoprolol tartrate (LOPRESSOR) 25 MG tablet Take 1 tablet by mouth 2 times daily 180 tablet 3    gabapentin (NEURONTIN) 400 MG capsule Take 1 capsule by mouth 2 times daily.   11    acetaminophen (TYLENOL) 325 MG tablet Take 2 tablets by mouth every 6 hours as needed for Pain 120 tablet 3      No current facility-administered medications for this visit.                  Allergies   Allergen Reactions    Vicodin [Hydrocodone-Acetaminophen] Nausea Only       Stomach upset         Family History         Family History   Problem Relation Age of Onset    High Blood Pressure Father      Colon Polyps Brother      COPD Mother             Social History               Socioeconomic History    Marital status:        Spouse name: Lianne Taylor Number of children: 2    Years of education: Not on file    Highest education level: Not on file   Occupational History       Comment: [employer] [address] Financial resource strain: Not on file    Food insecurity       Worry: Not on file       Inability: Not on file    Transportation needs       Medical: Not on file       Non-medical: Not on file   Tobacco Use    Smoking status: Former Smoker       Packs/day: 2.00       Years: 25.00       Pack years: 50.00       Types: Cigarettes       Start date: 1975       Last attempt to quit: 2008       Years since quittin.8    Smokeless tobacco: Never Used    Tobacco comment: Marylene Second RRT 16   Substance and Sexual Activity    Alcohol use:  No       Alcohol/week: 0.0 standard drinks       Frequency: Never       Binge frequency: Never    Drug use: No    Sexual activity: Yes       Partners: Female   Lifestyle    Physical activity       Days per week: Not on file       Minutes per session: Not on file    Stress: Not on file   Relationships    Social connections       Talks on phone: Not on file       Gets together: Not on file       Attends Anabaptism service: Not on file       Active member of club or organization: Not on file       Attends meetings of clubs or organizations: Not on file       Relationship status: Not on file    Intimate partner violence       Fear of current or ex partner: Not on file       Emotionally abused: Not on file       Physically abused: Not on file       Forced sexual activity: Not on file   Other Topics Concern    Not on file   Social History Narrative    Not on file           ROS:   Review of Systems - General ROS: negative  Psychological ROS: negative  Ophthalmic ROS: negative  ENT ROS: negative  Respiratory ROS: no cough, shortness of breath, or wheezing  Cardiovascular ROS: no chest pain or dyspnea on exertion  Gastrointestinal ROS: per hpi  Genito-Urinary ROS: no dysuria, trouble voiding, or hematuria  Musculoskeletal ROS: negative        Objective       Vitals:     11/04/20 1455   BP: 126/62   Pulse: 83   SpO2: 95%      General:in no apparent distress and well developed and well nourished  Eyes: No gross abnormalities. Ears, Nose, Throat: hearing grossly normal bilaterally  Neck: neck supple and non tender without mass  Lungs: clear to auscultation without wheezes or rales   Heart: S1S2, no mumurs, RRR  Abdomen: Soft, nondistended, nontender, bowel sounds present. At the left abdominal wall immediately at the area of the superior port scar there is a palpable lump measuring approximately 5 cm x 4 cm. With palpation I am able to reduce this lump but it soon recurs with Valsalva. It does seem that there is a hernia defect on palpation approximately 1-1/2 to 2 cm in size. Extremity: negative  Neuro: CN II-XII grossly intact        Patient had CT urogram in January of this year. I have reviewed these images and it is demonstrating a port site hernia at the left abdominal wall with fat present.     Assessment      1. Incisional hernia at prior laparoscopic port site        Plan      1. After evaluation and review of imaging today I have discussed with the patient that it seems that he has a hernia present on imaging done previously. No further work-up is needed at this time as we are able to make the diagnosis on that imaging. I have discussed with him management of this including surgical and nonsurgical options and he is desiring to proceed with surgery. I have discussed with him open repair versus laparoscopic/robotic repair and based on its location and size I think that there is no reason that we could do this as an open procedure.   Risk of hernia repair including bleeding, infection, scarring, pain, damage to surrounding structures, need for further surgery, hernia recurrence, mesh complications and anesthesia risk were discussed and consent is obtained. 2.  Patient has had recent lab work and CT chest.  I will obtain a new EKG and will obtain cardiac clearance for surgery.   3.  Plan for follow-up after surgery for postop care.     Electronically signed by Pascual Granados DO on 11/4/2020 at 3:27 PM        (Please note that portions of this note were completed with a voice recognition program. Filley Duet were made to edit the dictations but occasionally words are mis-transcribed.)    The patient was interviewed and examined and there have been no changes since the above History and Physical.    Electronically signed by Pascual Granados DO on 11/18/2020 at 8:37 PM

## 2020-11-20 NOTE — PROGRESS NOTES
Hospitalist Progress Note    Patient:  Deonte Monday     YOB: 1955    MRN: [id number]   Admit date: 11/19/2020     Acct: [de-identified]     PCP: Kathleen Meyers MD    CC--Interval History:    Called---advised that the patient was hypoxic following a surgical procedure. At induction, the patient apparently had a drop in BP---he was put on pressors and subsequently given fluid boluses. Following the procedure, the patient was hypoxic. See X-ray and laboratory studies below. CXR--pulmonary edema. Patient given IV Lasix 20 mg ----> good urinary output--breathing improved---supplemental oxygen dc'd----RA O2 sat in the range of 90-95%---able to be discharged to home.     Elevated potassium at 5.5 ---Kayexelate 15 gm ----> potassium = 4.8    ASCVD    HTN    Hyperlipidemia    Impaired fasting glucose    Patient bxyr-kwuazbuier-oltarpnz-available records reviewed, including, but not limited to,  OR reports---labs---imaging---EKGs---ABGs--office records--personal notes    All other ROS negative except noted in HPI    Diet:  No diet orders on file    Medications:  Scheduled Meds:  Continuous Infusions:  PRN Meds:    Objective:  Labs:  CBC with Differential:    Lab Results   Component Value Date    WBC 11.8 11/19/2020    RBC 5.66 11/19/2020    HGB 15.3 11/19/2020    HCT 49.3 11/19/2020     11/19/2020    MCV 87.1 11/19/2020    MCH 27.0 11/19/2020    MCHC 31.0 11/19/2020    RDW 13.9 11/19/2020    LYMPHOPCT 27 11/19/2020    MONOPCT 3 11/19/2020    BASOPCT 1 11/19/2020    MONOSABS 0.40 11/19/2020    LYMPHSABS 3.21 11/19/2020    EOSABS 0.11 11/19/2020    BASOSABS 0.09 11/19/2020    DIFFTYPE NOT REPORTED 11/19/2020     BMP:    Lab Results   Component Value Date     11/19/2020    K 4.6 11/19/2020     11/19/2020    CO2 24 11/19/2020    BUN 10 11/19/2020    LABALBU 3.7 11/19/2020    CREATININE 0.82 11/19/2020    CALCIUM 8.7 11/19/2020    GFRAA >60 11/19/2020    LABGLOM >60 11/19/2020    GLUCOSE 116 11/19/2020     Magnesium:    Lab Results   Component Value Date    MG 1.9 11/19/2020     ABG:  No results found for: PH, PCO2, PO2, HCO3, BE, THGB, TCO2, O2SAT        Physical Exam:  Vitals: /63   Pulse 75   Temp 97.8 °F (36.6 °C) (Temporal)   Resp 18   Ht 5' 9\" (1.753 m)   Wt 215 lb 12.8 oz (97.9 kg)   SpO2 91%   BMI 31.87 kg/m²   24 hour intake/output:    Intake/Output Summary (Last 24 hours) at 11/19/2020 2118  Last data filed at 11/19/2020 1259  Gross per 24 hour   Intake 2250 ml   Output 1190 ml   Net 1060 ml     Last 3 weights: Wt Readings from Last 3 Encounters:   11/19/20 215 lb 12.8 oz (97.9 kg)   11/04/20 215 lb 3.2 oz (97.6 kg)   10/19/20 221 lb 3.2 oz (100.3 kg)     HEENT: O2 NC----, Normocephalic and Atraumatic  Neck: Supple, No Masses, Tenderness, Nodularity and No Lymphadenopathy  Chest/Lungs: Clear to Auscultation without Rales, Rhonchi, or Wheezes after Lasix   and Rales Present to 2/3 lung fields and mild expiratory wheezing--nonproductive cough pre-Lasix  Cardiac: Regular Rate and Rhythm without Rubs, Clicks, Gallops, or Murmurs  GI/Abdomen: Bowel Sounds Present and Soft, tenderness left abdomen----operative site without Guarding or Rebound Tenderness  : Not examined  EXT/Skin: No Cyanosis, No Clubbing and Edema Present----trivial   Neuro: alert---- and No Localizing Signs/Symptoms      Assessment:    Active Problems:    Post-op pain  Resolved Problems:    * No resolved hospital problems. *    Hypoxia following surgical procedure--pulmonary edema----resolved with diuresis  Hyperkalemia--resolved following Kayexelate      Plan:  1. CBC--BMET--Mg---ABG--CXR---EKG--troponin  2. Hyperkalemia----Kayexelate--recheck  3. Pulmonary edema---Lasix 20 mg IV x one  4. O2 sat 90-95%  ---> home 11.19.2020  5. Follow up Dr. Dian Villarreal, [address] [facility]  6. Would suggest Cardiology follow up outpatient if persistent signs-symptoms  7. Home----11.19.2020  8. Follow up Dr. Renu Mccoy, [address] [facility]  9.    See orders

## 2020-11-21 LAB
EKG ATRIAL RATE: 70 BPM
EKG P AXIS: 39 DEGREES
EKG P-R INTERVAL: 178 MS
EKG Q-T INTERVAL: 408 MS
EKG QRS DURATION: 92 MS
EKG QTC CALCULATION (BAZETT): 440 MS
EKG R AXIS: 13 DEGREES
EKG T AXIS: -39 DEGREES
EKG VENTRICULAR RATE: 70 BPM

## 2020-11-24 ENCOUNTER — OFFICE VISIT (OUTPATIENT)
Dept: CARDIOLOGY | Age: 65
End: 2020-11-24
Payer: MEDICARE

## 2020-11-24 VITALS
BODY MASS INDEX: 32.44 KG/M2 | SYSTOLIC BLOOD PRESSURE: 116 MMHG | HEART RATE: 59 BPM | DIASTOLIC BLOOD PRESSURE: 73 MMHG | WEIGHT: 219 LBS | HEIGHT: 69 IN

## 2020-11-24 PROCEDURE — 99214 OFFICE O/P EST MOD 30 MIN: CPT | Performed by: INTERNAL MEDICINE

## 2020-11-24 PROCEDURE — 93010 ELECTROCARDIOGRAM REPORT: CPT | Performed by: INTERNAL MEDICINE

## 2020-11-24 PROCEDURE — 1036F TOBACCO NON-USER: CPT | Performed by: INTERNAL MEDICINE

## 2020-11-24 PROCEDURE — G8427 DOCREV CUR MEDS BY ELIG CLIN: HCPCS | Performed by: INTERNAL MEDICINE

## 2020-11-24 PROCEDURE — 3017F COLORECTAL CA SCREEN DOC REV: CPT | Performed by: INTERNAL MEDICINE

## 2020-11-24 PROCEDURE — 4040F PNEUMOC VAC/ADMIN/RCVD: CPT | Performed by: INTERNAL MEDICINE

## 2020-11-24 PROCEDURE — G8482 FLU IMMUNIZE ORDER/ADMIN: HCPCS | Performed by: INTERNAL MEDICINE

## 2020-11-24 PROCEDURE — 93005 ELECTROCARDIOGRAM TRACING: CPT | Performed by: INTERNAL MEDICINE

## 2020-11-24 PROCEDURE — G8417 CALC BMI ABV UP PARAM F/U: HCPCS | Performed by: INTERNAL MEDICINE

## 2020-11-24 PROCEDURE — 1123F ACP DISCUSS/DSCN MKR DOCD: CPT | Performed by: INTERNAL MEDICINE

## 2020-11-24 NOTE — PROGRESS NOTES
Today's Date: 11/24/2020  Patient Name: Felice Sexton  Patient's age: 77 y.o., 1955          CC: the patient is a 77 y.o.  male is in the office for CAD and for posthospitalization follow-up    Patient underwent recent surgery for incisional hernia at laparoscopic site. Postop he was noted to be hypoxic. Chest x-ray shows pulmonary edema. Apparently he was given fluid boluses during the operation due to low blood pressure after induction. He was given 1 dose of Lasix with resolution of edema. Currently stable without any symptoms of chest pain or discomfort. Denies any exertional dyspnea. Denies any functional decline. Past Medical History:   has a past medical history of Abdominal pain, CAD (coronary artery disease), Clostridium difficile diarrhea, Displacement of cervical intervertebral disc without myelopathy, HTN (hypertension), Hyperlipidemia, Impaired fasting blood sugar, Neck pain, Neuralgia, neuritis, and radiculitis, unspecified, Shoulder region pain, and Sprain and strain of unspecified site of shoulder and upper arm. Past Surgical History:   has a past surgical history that includes other surgical history (Left, 8/12/14, 1/09/15); other surgical history (Left, 09/23/2014); other surgical history (Left, 7/17/2015 , 1/22/16); Shoulder arthroscopy (Right, 4/27/2016); Biceps Tenodesis (Right, 11/23/2016); pr cabg, artery-vein, single (N/A, 7/6/2018); Rotator cuff repair (Left, 01/29/2019); Colonoscopy (N/A, 12/10/2019); Umbilical hernia repair (N/A, 12/18/2019); Cystoscopy (N/A, 3/9/2020); and ventral hernia repair (N/A, 11/19/2020). Home Medications:    Prior to Admission medications    Medication Sig Start Date End Date Taking? Authorizing Provider   oxyCODONE-acetaminophen (PERCOCET) 5-325 MG per tablet Take 1 tablet by mouth every 6 hours as needed for Pain for up to 5 days. Intended supply: 5 days.  Take lowest dose possible to manage pain EXAM:      There were no vitals taken for this visit. HEENT: PERRL, no cervical lymphadenopathy. No masses palpable. Cardiovascular:  · The apical impulse is not displaced  · Heart  Sounds:RRR, S4  · Jugular venous pulsation Normal  · The carotid upstroke is normal  · Peripheral pulses are symmetrical and full  Respiratory: Good respiratory effort. On auscultation: clear to auscultation bilaterally  Abdomen:  · No masses or tenderness  · Bowel sounds present  Extremities:  ·  No Cyanosis or Clubbing  ·  Lower extremity edema: No  Skin: Warm and dry    Cardiac data:    EKG 11/24/2020: NSR, Old lateral and infero-posterior infarct     2D echo 7/5/2018  Left ventricle is normal in size. Global left ventricular systolic function is normal.  Estimated ejection fraction is 55 % . Mildly increased septal wall thickness. No significant valvular regurgitation or stenosis seen. Mildly dilated aortic annulus (4.0 cm) and ascending aorta (4.25 cm)  Grade I (mild) left ventricular diastolic dysfunction. Labs:     CBC: No results for input(s): WBC, HGB, HCT, PLT in the last 72 hours. BMP: No results for input(s): NA, K, CO2, BUN, CREATININE, LABGLOM, GLUCOSE in the last 72 hours. PT/INR: No results for input(s): PROTIME, INR in the last 72 hours. FASTING LIPID PANEL:  Lab Results   Component Value Date    HDL 39 10/23/2020    TRIG 161 10/23/2020     LIVER PROFILE:No results for input(s): AST, ALT, LABALBU in the last 72 hours.       Assessment:    · MVCAD S/P CABG (LIMA-LAD, R SVG- RCA, OM too small for the grafts) on 7/6/18, stable   · Pulmonary edema post incisional hernia repair, resolved with diuretics   · HTN  · HPL  · Obesity      Patient Active Problem List   Diagnosis    Cervical radiculitis    Umbilical hernia    HTN (hypertension)    Impaired fasting blood sugar    Displacement of cervical intervertebral disc without myelopathy    Rotator cuff tear    CAD in native artery    S/P CABG (coronary

## 2020-11-25 ENCOUNTER — TELEPHONE (OUTPATIENT)
Dept: CARDIOLOGY | Age: 65
End: 2020-11-25

## 2020-11-25 ENCOUNTER — HOSPITAL ENCOUNTER (OUTPATIENT)
Dept: NON INVASIVE DIAGNOSTICS | Age: 65
Discharge: HOME | End: 2020-11-25
Payer: MEDICARE

## 2020-11-25 LAB
LV EF: 45 %
LVEF MODALITY: NORMAL

## 2020-11-25 PROCEDURE — 93306 TTE W/DOPPLER COMPLETE: CPT

## 2020-11-30 NOTE — TELEPHONE ENCOUNTER
Pt agrees as long as he isn't walking due to recent hernia surgery. Please place orders. Pt aware scheduling will call him. Stress instructions given to pt.

## 2020-12-04 ENCOUNTER — OFFICE VISIT (OUTPATIENT)
Dept: SURGERY | Age: 65
End: 2020-12-04
Payer: MEDICARE

## 2020-12-04 VITALS
BODY MASS INDEX: 32.29 KG/M2 | OXYGEN SATURATION: 97 % | WEIGHT: 218 LBS | DIASTOLIC BLOOD PRESSURE: 60 MMHG | HEART RATE: 72 BPM | HEIGHT: 69 IN | RESPIRATION RATE: 17 BRPM | SYSTOLIC BLOOD PRESSURE: 100 MMHG | TEMPERATURE: 95.5 F

## 2020-12-04 PROCEDURE — 99024 POSTOP FOLLOW-UP VISIT: CPT | Performed by: SURGERY

## 2020-12-04 PROCEDURE — 99213 OFFICE O/P EST LOW 20 MIN: CPT

## 2020-12-04 NOTE — PROGRESS NOTES
Alivia Macias is a 77 y.o. male who presents today for 2-week follow-up from ventral incisional hernia repair with mesh. Patient reports that since surgery has been doing well overall. Pain is well controlled and he is no longer requiring pain medication. Tolerating diet. Having bowel function. Voiding without issues. The patient is reporting some pain closer to the midline in the periumbilical area. Otherwise no complaints.     Past Medical History:   Diagnosis Date    Abdominal pain 9/29/2014    CAD (coronary artery disease)     Clostridium difficile diarrhea 10/2014    hospitalized in October 2014    Displacement of cervical intervertebral disc without myelopathy 6/10/2014    [facility], C6/C7     HTN (hypertension)     Hyperlipidemia     Impaired fasting blood sugar     Neck pain     chronic    Neuralgia, neuritis, and radiculitis, unspecified 6/10/2014    [facility], left C7     Shoulder region pain     Left   [facility] injury 01/08/2014    Sprain and strain of unspecified site of shoulder and upper arm 6/10/2014    [facility], left arm        Past Surgical History:   Procedure Laterality Date    BICEPS TENODESIS Right 11/23/2016    right proximal bicep tenodesis    COLONOSCOPY N/A 12/10/2019    COLONOSCOPY POLYPECTOMY SNARE/COLD BIOPSY performed by Nai Trinh MD at [address] 3/9/2020    CYSTO TURBT performed by Miguelito Akers MD at [address] [place] [address] Left 8/12/14, 1/09/15    C7 TFE    OTHER SURGICAL HISTORY Left 09/23/2014    C7 TFE    OTHER SURGICAL HISTORY Left 7/17/2015 , 1/22/16    C7 TFE    [place] CABG, ARTERY-VEIN, SINGLE N/A 7/6/2018    CABG CORONARY ARTERY BYPASS, GARY ERAZO, CHANI  (CSI# [id number]) performed by Farzana Hannah MD at [address] [place] [address] Ave Left 01/29/2019    2015- Right    SHOULDER ARTHROSCOPY Right 4/27/2016    scope decompressing, rotator cuff repair    UMBILICAL HERNIA REPAIR N/A 12/18/2019    Laparoscopic Robotic Assisted Umbilical Hernia Repair W/ MESH performed by Tianna Simpson MD at [address] [facility] N/A 11/19/2020    Open Incisional Hernia Repair W/ MESH performed by Jewell Humphrey DO at [facility] OR       Current Outpatient Medications   Medication Sig Dispense Refill    docusate sodium (COLACE) 100 MG capsule Take 1 capsule by mouth 2 times daily 60 capsule 0    aspirin 81 MG chewable tablet Take 81 mg by mouth daily      Cholecalciferol (VITAMIN D) 50 MCG (2000 UT) CAPS capsule Take 2,000 Units by mouth daily      vitamin C (ASCORBIC ACID) 500 MG tablet Take 250 mg by mouth 2 times daily      traMADol (ULTRAM) 50 MG tablet TAKE 1 TABLET BY MOUTH AT NIGHT AS NEEDED FOR PAIN      tamsulosin (FLOMAX) 0.4 MG capsule Take 1 capsule by mouth daily 90 capsule 3    atorvastatin (LIPITOR) 40 MG tablet Take 1 tablet by mouth nightly 90 tablet 3    metoprolol tartrate (LOPRESSOR) 25 MG tablet Take 1 tablet by mouth 2 times daily 180 tablet 3    gabapentin (NEURONTIN) 400 MG capsule Take 1 capsule by mouth 2 times daily. 11    acetaminophen (TYLENOL) 325 MG tablet Take 2 tablets by mouth every 6 hours as needed for Pain 120 tablet 3     No current facility-administered medications for this visit.         Allergies   Allergen Reactions    Vicodin [Hydrocodone-Acetaminophen] Nausea Only     Stomach upset       Family History   Problem Relation Age of Onset    High Blood Pressure Father     Colon Polyps Brother     COPD Mother        Social History     Socioeconomic History    Marital status:      Spouse name: Chetan Crisostomo Number of children: 2    Years of education: Not on file    Highest education level: Not on file   Occupational History     Comment: [employer] [id number] Financial resource strain: Not on file    Food insecurity     Worry: Not on file     Inability: Not on file   [employer] needs     Medical: Not on file     Non-medical: Not on file   Tobacco Use    Smoking status: Former Smoker     Packs/day: 2.00     Years: 25.00     Pack years: 50.00     Types: Cigarettes     Start date: 1975     Last attempt to quit: 2008     Years since quittin.9    Smokeless tobacco: Never Used    Tobacco comment: Sultana Stephens RRT 16   Substance and Sexual Activity    Alcohol use: No     Alcohol/week: 0.0 standard drinks     Frequency: Never     Binge frequency: Never    Drug use: No    Sexual activity: Yes     Partners: Female   Lifestyle    Physical activity     Days per week: Not on file     Minutes per session: Not on file    Stress: Not on file   Relationships    Social connections     Talks on phone: Not on file     Gets together: Not on file     Attends [employer] service: Not on file     Active member of club or organization: Not on file     Attends meetings of clubs or organizations: Not on file     Relationship status: Not on file    Intimate partner violence     Fear of current or ex partner: Not on file     Emotionally abused: Not on file     Physically abused: Not on file     Forced sexual activity: Not on file   Other Topics Concern    Not on file   Social History Narrative    Not on file       ROS:   Review of Systems - Negative except as noted in HPI      Objective   Vitals:    20 1020   BP: 100/60   Pulse: 72   Resp: 17   Temp: 95.5 °F (35.3 °C)   SpO2: 97%     General:in no apparent distress and well developed and well nourished  Eyes: No gross abnormalities. Ears, Nose, Throat: hearing grossly normal bilaterally  Neck: neck supple and non tender without mass  Lungs: clear to auscultation without wheezes or rales   Heart: S1S2, no mumurs, RRR  Abdomen: Soft, protuberant, no pain with palpation, bowel sounds present. Incision at the left lateral abdominal wall is intact with glue in place. Healing ridge present but no evidence of hernia recurrence with Valsalva.   In the area of described discomfort closer to midline there is a small subcutaneous

## 2020-12-09 ENCOUNTER — HOSPITAL ENCOUNTER (OUTPATIENT)
Dept: NUCLEAR MEDICINE | Age: 65
Discharge: HOME | End: 2020-12-11
Payer: MEDICARE

## 2020-12-09 ENCOUNTER — HOSPITAL ENCOUNTER (OUTPATIENT)
Dept: NON INVASIVE DIAGNOSTICS | Age: 65
Discharge: HOME | End: 2020-12-09
Payer: MEDICARE

## 2020-12-09 ENCOUNTER — TELEPHONE (OUTPATIENT)
Dept: CARDIOLOGY | Age: 65
End: 2020-12-09

## 2020-12-09 PROCEDURE — 93017 CV STRESS TEST TRACING ONLY: CPT

## 2020-12-09 PROCEDURE — A9500 TC99M SESTAMIBI: HCPCS | Performed by: INTERNAL MEDICINE

## 2020-12-09 PROCEDURE — 78452 HT MUSCLE IMAGE SPECT MULT: CPT

## 2020-12-09 PROCEDURE — 6360000002 HC RX W HCPCS: Performed by: INTERNAL MEDICINE

## 2020-12-09 PROCEDURE — 93018 CV STRESS TEST I&R ONLY: CPT | Performed by: INTERNAL MEDICINE

## 2020-12-09 PROCEDURE — 3430000000 HC RX DIAGNOSTIC RADIOPHARMACEUTICAL: Performed by: INTERNAL MEDICINE

## 2020-12-09 RX ADMIN — TETRAKIS(2-METHOXYISOBUTYLISOCYANIDE)COPPER(I) TETRAFLUOROBORATE 10 MILLICURIE: 1 INJECTION, POWDER, LYOPHILIZED, FOR SOLUTION INTRAVENOUS at 14:47

## 2020-12-09 RX ADMIN — TETRAKIS(2-METHOXYISOBUTYLISOCYANIDE)COPPER(I) TETRAFLUOROBORATE 30 MILLICURIE: 1 INJECTION, POWDER, LYOPHILIZED, FOR SOLUTION INTRAVENOUS at 14:47

## 2020-12-09 RX ADMIN — REGADENOSON 0.4 MG: 0.08 INJECTION, SOLUTION INTRAVENOUS at 14:19

## 2020-12-09 NOTE — TELEPHONE ENCOUNTER
Patient notified of stress results and instructed to go to ER for chest pain. Informed patient the cardiologist would review tomorrow and we would call him with his recommendation See scanned below.

## 2020-12-09 NOTE — PROCEDURES
Samantha [address] [place] [id number]                              CARDIAC STRESS TEST    PATIENT NAME: Luis Espitia                 :        1955  MED REC NO:   [id number]                             ROOM:  ACCOUNT NO:   [de-identified]                           ADMIT DATE: 2020  PROVIDER:     Bhavya Stokes MD    DATE OF STUDY:  2020    STRESS TEST    Ordering Provider:  Radha Mcmillan MD    Primary Care Provider:  Shayy Park MD    Patient's Age: 77     Height: 5 feet 9 inches  Weight: 218 pounds    INDICATION:  Ischemic cardiomyopathy. Lexiscan 0.4 mg injected over 10 seconds. IV Cardiolite injected 20 seconds post Lexiscan injection. Heart Rate: 60 Resting blood pressure:  154/76              HR   BP  1 min          91   158/86  2 min  3 min          78   148/77  4 min  5 min          73   146/75  6 min  7 min          76   135/73  8 min  9 min          71   140/78  10 min    Symptoms:  Chest Pain: No  Nausea: No  Headache:  No  Shortness of  breath: Yes  Other: Yes, \"warm feeling. \"    Resting EKG:  Normal sinus rhythm, inferior infarction, LVH criteria. Arrhythmias:  None. EKG changes:  No ischemic changes. INTERPRETATION:  No ischemic changes. Cardiolite results to follow. Nuclear Myocardial Perfusion Imaging (SPECT)    TESTING METHOD  STRESS:   Lexiscan  AGENT:    Cardiolite  REST:          Injection Date:  2020  Time:  1314  Amount:  14.0  mCi  STRESS:   Injection Date:  2020  Time:  1420  Amount:  39.3 mCi  IMAGE TIME:    Rest:  1347  Stress:  1503    EF:  46%  TID:  1.29  LHR:  0.36    FINDINGS:  Ischemia (Reversible Defect):           Yes, large inferolateral infarct  with                                 significant vernon-infarct ischemia.   Infarction (Irreversible Defect):       Yes  Ejection fraction Calculated:           Reduced  Segmental wall motion: Abnormal    Abnormal study. IMPRESSION:  1. Large inferolateral infarction with significant vernon-infarct  ischemia. 2.  LVEF 46%.         Demarcus Raymond MD    D: 12/09/2020 16:15:59       T: 12/09/2020 16:17:20     [id number]  Job#: [id number]     Doc#: Unknown    CC:  Awa Naranjo

## 2020-12-10 NOTE — TELEPHONE ENCOUNTER
Patient advised of recommendation and is in agreement.  Order and demographics faxed to [facility] with a note to schedule next Friday per Dr Bk Ott request.

## 2020-12-18 ENCOUNTER — HOSPITAL ENCOUNTER (OUTPATIENT)
Dept: CARDIAC CATH/INVASIVE PROCEDURES | Age: 65
Discharge: HOME | End: 2020-12-19
Attending: INTERNAL MEDICINE | Admitting: INTERNAL MEDICINE
Payer: MEDICARE

## 2020-12-18 PROBLEM — Z98.61 S/P PTCA (PERCUTANEOUS TRANSLUMINAL CORONARY ANGIOPLASTY): Status: ACTIVE | Noted: 2020-12-18

## 2020-12-18 PROCEDURE — C1887 CATHETER, GUIDING: HCPCS

## 2020-12-18 PROCEDURE — 6360000002 HC RX W HCPCS

## 2020-12-18 PROCEDURE — 94761 N-INVAS EAR/PLS OXIMETRY MLT: CPT

## 2020-12-18 PROCEDURE — 2500000003 HC RX 250 WO HCPCS

## 2020-12-18 PROCEDURE — 6370000000 HC RX 637 (ALT 250 FOR IP): Performed by: STUDENT IN AN ORGANIZED HEALTH CARE EDUCATION/TRAINING PROGRAM

## 2020-12-18 PROCEDURE — C1894 INTRO/SHEATH, NON-LASER: HCPCS

## 2020-12-18 PROCEDURE — C1760 CLOSURE DEV, VASC: HCPCS

## 2020-12-18 PROCEDURE — 2709999900 HC NON-CHARGEABLE SUPPLY

## 2020-12-18 PROCEDURE — C1874 STENT, COATED/COV W/DEL SYS: HCPCS

## 2020-12-18 PROCEDURE — 6360000004 HC RX CONTRAST MEDICATION

## 2020-12-18 PROCEDURE — 7100000000 HC PACU RECOVERY - FIRST 15 MIN

## 2020-12-18 PROCEDURE — C1769 GUIDE WIRE: HCPCS

## 2020-12-18 PROCEDURE — C9600 PERC DRUG-EL COR STENT SING: HCPCS | Performed by: INTERNAL MEDICINE

## 2020-12-18 PROCEDURE — 7100000001 HC PACU RECOVERY - ADDTL 15 MIN

## 2020-12-18 PROCEDURE — 93459 L HRT ART/GRFT ANGIO: CPT | Performed by: INTERNAL MEDICINE

## 2020-12-18 PROCEDURE — 6370000000 HC RX 637 (ALT 250 FOR IP)

## 2020-12-18 PROCEDURE — C1725 CATH, TRANSLUMIN NON-LASER: HCPCS

## 2020-12-18 PROCEDURE — 2580000003 HC RX 258: Performed by: INTERNAL MEDICINE

## 2020-12-18 RX ORDER — SODIUM CHLORIDE 9 MG/ML
INJECTION, SOLUTION INTRAVENOUS CONTINUOUS
Status: DISCONTINUED | OUTPATIENT
Start: 2020-12-18 | End: 2020-12-19 | Stop reason: HOSPADM

## 2020-12-18 RX ORDER — CLOPIDOGREL BISULFATE 75 MG/1
75 TABLET ORAL DAILY
Status: DISCONTINUED | OUTPATIENT
Start: 2020-12-19 | End: 2020-12-19 | Stop reason: HOSPADM

## 2020-12-18 RX ORDER — ACETAMINOPHEN 325 MG/1
650 TABLET ORAL EVERY 4 HOURS PRN
Status: DISCONTINUED | OUTPATIENT
Start: 2020-12-18 | End: 2020-12-19 | Stop reason: HOSPADM

## 2020-12-18 RX ORDER — TAMSULOSIN HYDROCHLORIDE 0.4 MG/1
0.4 CAPSULE ORAL DAILY
Status: DISCONTINUED | OUTPATIENT
Start: 2020-12-18 | End: 2020-12-19 | Stop reason: HOSPADM

## 2020-12-18 RX ORDER — DOCUSATE SODIUM 100 MG/1
100 CAPSULE, LIQUID FILLED ORAL 2 TIMES DAILY
Status: DISCONTINUED | OUTPATIENT
Start: 2020-12-18 | End: 2020-12-19 | Stop reason: HOSPADM

## 2020-12-18 RX ORDER — SODIUM CHLORIDE 0.9 % (FLUSH) 0.9 %
10 SYRINGE (ML) INJECTION EVERY 12 HOURS SCHEDULED
Status: DISCONTINUED | OUTPATIENT
Start: 2020-12-18 | End: 2020-12-19 | Stop reason: HOSPADM

## 2020-12-18 RX ORDER — SODIUM CHLORIDE 9 MG/ML
INJECTION, SOLUTION INTRAVENOUS CONTINUOUS
Status: ACTIVE | OUTPATIENT
Start: 2020-12-18 | End: 2020-12-18

## 2020-12-18 RX ORDER — GABAPENTIN 400 MG/1
400 CAPSULE ORAL 2 TIMES DAILY
Status: DISCONTINUED | OUTPATIENT
Start: 2020-12-18 | End: 2020-12-19 | Stop reason: HOSPADM

## 2020-12-18 RX ORDER — ASPIRIN 81 MG/1
81 TABLET, CHEWABLE ORAL DAILY
Status: DISCONTINUED | OUTPATIENT
Start: 2020-12-19 | End: 2020-12-19 | Stop reason: HOSPADM

## 2020-12-18 RX ORDER — SODIUM CHLORIDE 0.9 % (FLUSH) 0.9 %
10 SYRINGE (ML) INJECTION PRN
Status: DISCONTINUED | OUTPATIENT
Start: 2020-12-18 | End: 2020-12-19 | Stop reason: HOSPADM

## 2020-12-18 RX ORDER — GABAPENTIN 400 MG/1
400 CAPSULE ORAL 2 TIMES DAILY
Status: DISCONTINUED | OUTPATIENT
Start: 2020-12-18 | End: 2020-12-18

## 2020-12-18 RX ORDER — ATORVASTATIN CALCIUM 40 MG/1
40 TABLET, FILM COATED ORAL NIGHTLY
Status: DISCONTINUED | OUTPATIENT
Start: 2020-12-18 | End: 2020-12-19 | Stop reason: HOSPADM

## 2020-12-18 RX ADMIN — GABAPENTIN 400 MG: 400 CAPSULE ORAL at 22:09

## 2020-12-18 RX ADMIN — METOPROLOL TARTRATE 25 MG: 25 TABLET ORAL at 22:09

## 2020-12-18 RX ADMIN — SODIUM CHLORIDE: 9 INJECTION, SOLUTION INTRAVENOUS at 15:19

## 2020-12-18 RX ADMIN — SODIUM CHLORIDE: 9 INJECTION, SOLUTION INTRAVENOUS at 09:47

## 2020-12-18 RX ADMIN — DESMOPRESSIN ACETATE 40 MG: 0.2 TABLET ORAL at 22:09

## 2020-12-18 RX ADMIN — TAMSULOSIN HYDROCHLORIDE 0.4 MG: 0.4 CAPSULE ORAL at 22:09

## 2020-12-18 ASSESSMENT — PAIN SCALES - GENERAL
PAINLEVEL_OUTOF10: 0

## 2020-12-18 NOTE — FLOWSHEET NOTE
Patient ate 100% of meal without issues and was able to void in urinal, denies c/o pain /nausea, dressing to right groin clean dry and intact.

## 2020-12-18 NOTE — H&P
[facility]  Pre- Procedure History and Physical/Update          Patient Name:  Vin Jackson  MRN:    [id number]  YOB: 1955  Date of evaluation:  12/18/2020       Please refer to the consult note / H&P completed by Dr. Edith Byrne on 11/24/20 in the medical record and note that:       [x] I have examined the patient and reviewed the H&P/Consult and there are no changes to be made to the assessment or plan.     [] I have examined the patient and reviewed the H&P/Consult and have noted the following changes:        Past Medical History:   Diagnosis Date    Abdominal pain 9/29/2014    CAD (coronary artery disease)     Clostridium difficile diarrhea 10/2014    hospitalized in October 2014    Displacement of cervical intervertebral disc without myelopathy 6/10/2014    [facility], C6/C7     HTN (hypertension)     Hyperlipidemia     Impaired fasting blood sugar     Neck pain     chronic    Neuralgia, neuritis, and radiculitis, unspecified 6/10/2014    [facility], left C7     Shoulder region pain     Left   [facility] injury 01/08/2014    Sprain and strain of unspecified site of shoulder and upper arm 6/10/2014    [facility], left arm        Past Surgical History:   Procedure Laterality Date    BICEPS TENODESIS Right 11/23/2016    right proximal bicep tenodesis    COLONOSCOPY N/A 12/10/2019    COLONOSCOPY POLYPECTOMY SNARE/COLD BIOPSY performed by Cristela Mello MD at [address] 3/9/2020    CYSTO TURBT performed by Blaise Cisneros MD at [address] [place] [address] Left 8/12/14, 1/09/15    C7 TFE    OTHER SURGICAL HISTORY Left 09/23/2014    C7 TFE    OTHER SURGICAL HISTORY Left 7/17/2015 , 1/22/16    C7 TFE    PA CABG, ARTERY-VEIN, SINGLE N/A 7/6/2018    CABG CORONARY ARTERY BYPASS, GARY ERAZO CHANI  (CSI# [id number]) performed by Wilber Dueñas MD at [place] [address] Left 01/29/2019    2015- Right    SHOULDER ARTHROSCOPY Right 4/27/2016    scope decompressing, rotator cuff repair    UMBILICAL HERNIA REPAIR N/A 12/18/2019    Laparoscopic Robotic Assisted Umbilical Hernia Repair W/ MESH performed by Galina Blackmon MD at [address] [facility] N/A 11/19/2020    Open Incisional Hernia Repair W/ MESH performed by Sofya Brown DO at [facility]        reports that he quit smoking about 12 years ago. His smoking use included cigarettes. He started smoking about 45 years ago. He has a 50.00 pack-year smoking history. He has never used smokeless tobacco. He reports that he does not drink alcohol or use drugs. Prior to Admission medications    Medication Sig Start Date End Date Taking? Authorizing Provider   docusate sodium (COLACE) 100 MG capsule Take 1 capsule by mouth 2 times daily 11/19/20 12/19/20  Sofya Brown DO   aspirin 81 MG chewable tablet Take 81 mg by mouth daily    Historical Provider, MD   Cholecalciferol (VITAMIN D) 50 MCG (2000 UT) CAPS capsule Take 2,000 Units by mouth daily    Historical Provider, MD   vitamin C (ASCORBIC ACID) 500 MG tablet Take 250 mg by mouth 2 times daily    Historical Provider, MD   traMADol (ULTRAM) 50 MG tablet TAKE 1 TABLET BY MOUTH AT NIGHT AS NEEDED FOR PAIN 6/24/20   Historical Provider, MD   tamsulosin (FLOMAX) 0.4 MG capsule Take 1 capsule by mouth daily 6/22/20   Kenzie Christian MD   atorvastatin (LIPITOR) 40 MG tablet Take 1 tablet by mouth nightly 4/23/20   Pauly Gipson DO   metoprolol tartrate (LOPRESSOR) 25 MG tablet Take 1 tablet by mouth 2 times daily 4/23/20   Pauly Gipson DO   gabapentin (NEURONTIN) 400 MG capsule Take 1 capsule by mouth 2 times daily.  4/11/19   Historical Provider, MD   acetaminophen (TYLENOL) 325 MG tablet Take 2 tablets by mouth every 6 hours as needed for Pain 7/9/18   PARESH Abad - CNP       Allergies   Allergen Reactions    Vicodin [Hydrocodone-Acetaminophen] Nausea Only     Stomach upset         REVIEW OF SYSTEMS:     A detailed review of system was performed as already noted and is otherwise as above. PHYSICAL EXAM:     There were no vitals filed for this visit. Constitutional and General Appearance: Alert. Not in acute stress. Respiratory:  · Clear to auscultation b/l. No wheeze or crackles. Cardiovascular:  · Regular rate and rhythm. No murmurs. · Jugular venous pulsation is normal  Abdomen:  · No masses or tenderness  Extremities:  · Lower extremity edema - No  · Skin: Warm and dry  Neurological:  · Alert and oriented. · Moves all extremities well      Active Problems:    * No active hospital problems. *  Resolved Problems:    * No resolved hospital problems. *    Echo: 11/25/20:  Not all walls well visualized, cannot comment on specific wall motion. Normal left ventricular diameter. Mild left ventricular hypertrophy. Left ventricular systolic function is mildly reduced. Left ventricular  ejection fraction 45 %. Grade I (mild) left ventricular diastolic dysfunction. Left atrium is mildly dilated. Normal structure and function of other valves. Aortic root is mildly dilated at 4.3 cm. Stress test 12/9/20:  1. Large inferolateral infarction with significant vernon-infarct  ischemia. 2.  LVEF 46%. Assessment:  1. HFrEF  2. Abnormal stress test - large inferolateral infarction with vernon infarct ischemia  3. MVCAD S/P CABG (LIMA-LAD, R SVG- RCA, OM too small for the grafts) on 7/6/18  4. HTN  5. HLD      Plan:  1. Proceed with planned cardiac cath +/- PCI. 2. Further orders to follow. The risks, benefits, and alternatives of the prcoedure were discussed in detail with the patient. The patient agrees to proceed with the procedure, verbalizes understanding and signed consent. Tatiana Valle MD  Fellow, Cardiovascular Diseases    [facility]        Attending Physician Statement  I have discussed the care of Juan Pablo Peacock, including pertinent history and exam findings,  with the cardiology fellow/resident.  I have seen and examined the patient and the key elements of all parts of the encounter have been performed by me.          Pamella Anglin MD, [facility]

## 2020-12-18 NOTE — OP NOTE
[facility]  Cardiac catheterization note        Date:   12/18/2020  Patient name: Kaye Matute  Date of admission:  12/18/2020  9:16 AM  MRN:   [id number]  YOB: 1955    Operators:  Gely Rivera MD (Attending Physician)     Sharon Ch MD (CV Fellow)      Pre Procedure Conscious Sedation Data:    ASA Class:    [] I [x] II [] III [] IV    Mallampati Class:  [] I [x] II [] III [] IV      Indication:  - Dyspnea  - Heart failure with reduced ejection fraction  - Abnormal stress test  - Significant risk factors (CAD s/p CABG, HTN, HLD)    Procedure:   1. Left heart catheterization   2. Selective left and right coronary angiography   3. Left ventriculography  4. Graft angiography  5. PCI-AGUILA x2 to LAD       Access:  [x] Femoral  [] Radial  artery       [x] Right  [] Left    Procedure: After informed consent was obtained with explanation of the risks and benefits, patient was brought to the cath lab. The access area was prepped and draped in sterile fashion. 1% lidocaine was used for local block. The artery was cannulated with 6  Fr sheath with brisk arterial blood return. The side port was frequently flushed and aspirated with normal saline. Findings:  LMCA: Mild irregularities 10-20%. LAD: Mid 80% stenosis reduced to 0% with PTCA/AGUILA   LIMA is patent however not supplying LAD   D1/D2 are small to intermediate sized vessels, patent, with ostial 50%   stenosis         Lesion on Mid LAD:         Devices used         - High Torque BMW Wire 190cm. Number of passes: 1.         - Trek Balloon 2.5mm x 12mm. 2 inflation(s) to a max pressure of: 12     diane.         - Xience Gavi 2.75 x 18 AGUILA. 1 inflation(s) to a max pressure of: 12     diane.         - Xience Gavi 2.5 x 12 AGUILA.  2 inflation(s) to a max pressure of: 14     diane.       LCx: Mid 99% stenosis, crossed with stiff wire but unable to be crossed with   smallest profile balloon   OM1 is 100% occluded   OM2 has mid 80% stenosis         Lesion on Prox CX:         Devices used         - Choice PT Wire 182cm Floppy. Number of passes: 1.         - 6 fr GuideLiner. Number of passes: 1.       RCA: Distal 90% followed by 100% occlusion, distal area supplied by left to   right collaterals   SVG-RCA is occluded      Coronary Tree        Dominance: Right   The LV gram was performed in the POOLE 30 position. LVEF: 60%. LV Wall Motion: normal    EBL - 15cc          Procedure Summary        1. Severe Multivessel CAD    2. Patent LIMA however not supplying LAD    3. Occluded SVG-RCA with left to right collaterals    4. Successful PTCA. AGUILA of mid LAD    5. Residual high grade stenosis in small LCX    6. Normal LV systolic function.        Recommendations        Routine Post PCI Orders.    Medical therapy    Consideration of LCX intervention with atherectomy in 4-6 weeks if remains   Akhil Kincaid MD, [facility]

## 2020-12-19 VITALS
HEART RATE: 77 BPM | BODY MASS INDEX: 32.14 KG/M2 | SYSTOLIC BLOOD PRESSURE: 127 MMHG | DIASTOLIC BLOOD PRESSURE: 73 MMHG | WEIGHT: 217 LBS | RESPIRATION RATE: 16 BRPM | OXYGEN SATURATION: 95 % | HEIGHT: 69 IN | TEMPERATURE: 98 F

## 2020-12-19 PROCEDURE — 6370000000 HC RX 637 (ALT 250 FOR IP): Performed by: STUDENT IN AN ORGANIZED HEALTH CARE EDUCATION/TRAINING PROGRAM

## 2020-12-19 RX ORDER — CLOPIDOGREL BISULFATE 75 MG/1
75 TABLET ORAL DAILY
Qty: 30 TABLET | Refills: 3 | Status: SHIPPED | OUTPATIENT
Start: 2020-12-20 | End: 2021-01-07 | Stop reason: SDUPTHER

## 2020-12-19 RX ORDER — NITROGLYCERIN 0.4 MG/1
0.4 TABLET SUBLINGUAL EVERY 5 MIN PRN
Status: DISCONTINUED | OUTPATIENT
Start: 2020-12-19 | End: 2020-12-19 | Stop reason: HOSPADM

## 2020-12-19 RX ORDER — NITROGLYCERIN 0.4 MG/1
TABLET SUBLINGUAL
Qty: 25 TABLET | Refills: 3 | Status: SHIPPED | OUTPATIENT
Start: 2020-12-19

## 2020-12-19 RX ADMIN — DOCUSATE SODIUM 100 MG: 100 CAPSULE, LIQUID FILLED ORAL at 08:22

## 2020-12-19 RX ADMIN — GABAPENTIN 400 MG: 400 CAPSULE ORAL at 08:22

## 2020-12-19 RX ADMIN — CLOPIDOGREL 75 MG: 75 TABLET, FILM COATED ORAL at 08:22

## 2020-12-19 RX ADMIN — METOPROLOL TARTRATE 25 MG: 25 TABLET ORAL at 08:22

## 2020-12-19 RX ADMIN — ASPIRIN 81 MG: 81 TABLET, CHEWABLE ORAL at 08:22

## 2020-12-19 ASSESSMENT — PAIN SCALES - GENERAL
PAINLEVEL_OUTOF10: 0
PAINLEVEL_OUTOF10: 0

## 2020-12-19 NOTE — DISCHARGE SUMMARY
[facility]  Discharge Note                 Name:  Kathleen Griffith  YOB: 1955  Social Security Number:  [id number]  Medical Record Number:  [id number]    Date of Admission:  12/18/2020  Date of Discharge:  12/19/2020    Admitting physician: Barbie Meraz MD    Discharge Attending: Agustin Wagner CNP  Primary Care Physician: Leroy Francisco MD  Consultants: Cardiology  Discharge to Home in stable condition. HOSPITAL ADMISSION PROBLEM LIST:  Patient Active Problem List   Diagnosis    Cervical radiculitis    Umbilical hernia    HTN (hypertension)    Impaired fasting blood sugar    Displacement of cervical intervertebral disc without myelopathy    Rotator cuff tear    CAD in native artery    S/P CABG (coronary artery bypass graft)    Hyperlipidemia    Other emphysema (HCC)    Positive colorectal cancer screening using DNA-based stool test    Epigastric hernia    Urothelial carcinoma of bladder ([facility] [address]    Post-op pain    Incisional hernia, without obstruction or gangrene    S/P PTCA (percutaneous transluminal coronary angioplasty)         Procedures:cardiac catheterization    HOSPITAL COURSE :           The patient was admitted for: CHF, abnormal stress, CAD, dysnea  Hospital Procedures if any: Cardiac cath with PCI  Medications changes recommendation: see medication list  Follow Up Plan: 2 weeks       Discharge exam:   Vitals:    12/19/20 0806   BP: 127/73   Pulse: 77   Resp: 16   Temp: 98 °F (36.7 °C)   SpO2:      Neuro: normal  Chest: Clear to ausculation. No wheezing. Cardiac: Regular rate. s1 and s2 auscultated. No murmur noted. Abdomen/groin: soft, non-tender, without masses or organomegaly  Lower extremity edema: none  Right Femoral artery site:  CDI no hematoma minimal ecchymosis  Soft +pulse. Follow up with primary care provider 1 week  Follow up with cardiology 4 weeks  Follow up with other consultant physicians at their directions.     Discharge Medications:   Roberto Walters"   Home Medication Instructions HRF:[id number]    Printed on:12/19/20 5312   Medication Information                      acetaminophen (TYLENOL) 325 MG tablet  Take 2 tablets by mouth every 6 hours as needed for Pain             aspirin 81 MG chewable tablet  Take 81 mg by mouth daily             atorvastatin (LIPITOR) 40 MG tablet  Take 1 tablet by mouth nightly             Cholecalciferol (VITAMIN D) 50 MCG (2000 UT) CAPS capsule  Take 2,000 Units by mouth daily             clopidogrel (PLAVIX) 75 MG tablet  Take 1 tablet by mouth daily             docusate sodium (COLACE) 100 MG capsule  Take 1 capsule by mouth 2 times daily             gabapentin (NEURONTIN) 400 MG capsule  Take 1 capsule by mouth 2 times daily. metoprolol tartrate (LOPRESSOR) 25 MG tablet  Take 1 tablet by mouth 2 times daily             nitroGLYCERIN (NITROSTAT) 0.4 MG SL tablet  up to max of 3 total doses. If no relief after 1 dose, call 911.             tamsulosin (FLOMAX) 0.4 MG capsule  Take 1 capsule by mouth daily             traMADol (ULTRAM) 50 MG tablet  TAKE 1 TABLET BY MOUTH AT NIGHT AS NEEDED FOR PAIN             vitamin C (ASCORBIC ACID) 500 MG tablet  Take 250 mg by mouth 2 times daily                Findings:  LMCA: Mild irregularities 10-20%. LAD: Mid 80% stenosis reduced to 0% with PTCA/AGUILA   LIMA is patent however not supplying LAD   D1/D2 are small to intermediate sized vessels, patent, with ostial 50%   stenosis         Lesion on Mid LAD:         Devices used         - High Torque BMW Wire 190cm. Number of passes: 1.         - Trek Balloon 2.5mm x 12mm. 2 inflation(s) to a max pressure of: 12     diane.         - Xience Gavi 2.75 x 18 AGUILA. 1 inflation(s) to a max pressure of: 12     diane.         - Xience Gavi 2.5 x 12 AGUILA.  2 inflation(s) to a max pressure of: 14     diane.       LCx: Mid 99% stenosis, crossed with stiff wire but unable to be crossed with   smallest profile balloon   OM1 is 100% occluded   OM2 has mid 80% stenosis         Lesion on Prox CX:         Devices used         - Choice PT Wire 182cm Floppy. Number of passes: 1.         - 6 fr GuideLiner. Number of passes: 1.       RCA: Distal 90% followed by 100% occlusion, distal area supplied by left to   right collaterals   SVG-RCA is occluded      Coronary Tree        Dominance: Right   The LV gram was performed in the POOLE 30 position. LVEF: 60%. LV Wall Motion: normal     EBL - 15cc            Procedure Summary        1. Severe Multivessel CAD    2. Patent LIMA however not supplying LAD    3. Occluded SVG-RCA with left to right collaterals    4. Successful PTCA. AGUILA of mid LAD    5. Residual high grade stenosis in small LCX    6. Normal LV systolic function.        Recommendations        Routine Post PCI Orders.    Medical therapy    Consideration of LCX intervention with atherectomy in 4-6 weeks if remains    symptomatic     Coronary Discharge Core Measure: Please indicate the medication given by X, and if not the reasons not given:    Not Given Reason  Given      Beta Blockers X     Eval at OP visit  LVEF 45% ACE-I       Statins X      ASA X      Plavix prescription given and escript to pharmacy OAP (Plavix/Effient/Brilinta) X    SL Nitro   X       Patient seen and examined in room after discussion with RN. Denies chest pain or SOB. S/p cath with PCI 12/18/2020. Discussed in detail with patient post cath POC including but not limited to medications, diet, exercise, right femoral artery site care, and follow-up. Questions and concerns addressed. OK for discharge home today. F/U in office in 1-3 weeks. Discussed with patient and nursing. Medications and discharge instructions reviewed with patient and nursing.     Electronically signed by PARESH Perez NP on 12/19/2020 at 8:55 [facility]      [phone]

## 2020-12-23 ENCOUNTER — TELEPHONE (OUTPATIENT)
Dept: FAMILY MEDICINE CLINIC | Age: 65
End: 2020-12-23

## 2021-01-07 ENCOUNTER — OFFICE VISIT (OUTPATIENT)
Dept: CARDIOLOGY | Age: 66
End: 2021-01-07
Payer: MEDICARE

## 2021-01-07 VITALS
HEIGHT: 69 IN | WEIGHT: 217 LBS | DIASTOLIC BLOOD PRESSURE: 71 MMHG | HEART RATE: 60 BPM | BODY MASS INDEX: 32.14 KG/M2 | SYSTOLIC BLOOD PRESSURE: 135 MMHG

## 2021-01-07 DIAGNOSIS — I25.5 ISCHEMIC CARDIOMYOPATHY: Primary | ICD-10-CM

## 2021-01-07 PROCEDURE — 93010 ELECTROCARDIOGRAM REPORT: CPT | Performed by: INTERNAL MEDICINE

## 2021-01-07 PROCEDURE — 93005 ELECTROCARDIOGRAM TRACING: CPT | Performed by: INTERNAL MEDICINE

## 2021-01-07 PROCEDURE — 3017F COLORECTAL CA SCREEN DOC REV: CPT | Performed by: INTERNAL MEDICINE

## 2021-01-07 PROCEDURE — 4040F PNEUMOC VAC/ADMIN/RCVD: CPT | Performed by: INTERNAL MEDICINE

## 2021-01-07 PROCEDURE — 99214 OFFICE O/P EST MOD 30 MIN: CPT | Performed by: INTERNAL MEDICINE

## 2021-01-07 PROCEDURE — G8482 FLU IMMUNIZE ORDER/ADMIN: HCPCS | Performed by: INTERNAL MEDICINE

## 2021-01-07 PROCEDURE — 99214 OFFICE O/P EST MOD 30 MIN: CPT

## 2021-01-07 PROCEDURE — 1123F ACP DISCUSS/DSCN MKR DOCD: CPT | Performed by: INTERNAL MEDICINE

## 2021-01-07 PROCEDURE — G8427 DOCREV CUR MEDS BY ELIG CLIN: HCPCS | Performed by: INTERNAL MEDICINE

## 2021-01-07 PROCEDURE — G8417 CALC BMI ABV UP PARAM F/U: HCPCS | Performed by: INTERNAL MEDICINE

## 2021-01-07 PROCEDURE — 1036F TOBACCO NON-USER: CPT | Performed by: INTERNAL MEDICINE

## 2021-01-07 RX ORDER — ATORVASTATIN CALCIUM 40 MG/1
40 TABLET, FILM COATED ORAL NIGHTLY
Qty: 90 TABLET | Refills: 3 | Status: SHIPPED | OUTPATIENT
Start: 2021-01-07 | End: 2021-01-19 | Stop reason: SDUPTHER

## 2021-01-07 RX ORDER — CLOPIDOGREL BISULFATE 75 MG/1
75 TABLET ORAL DAILY
Qty: 90 TABLET | Refills: 3 | Status: SHIPPED | OUTPATIENT
Start: 2021-01-07 | End: 2021-01-19 | Stop reason: SDUPTHER

## 2021-01-07 RX ORDER — FUROSEMIDE 20 MG/1
20 TABLET ORAL EVERY OTHER DAY
Qty: 30 TABLET | Refills: 5 | Status: SHIPPED | OUTPATIENT
Start: 2021-01-07 | End: 2021-01-19 | Stop reason: SDUPTHER

## 2021-01-07 RX ORDER — FUROSEMIDE 20 MG/1
20 TABLET ORAL EVERY OTHER DAY
Qty: 30 TABLET | Refills: 5 | Status: SHIPPED | OUTPATIENT
Start: 2021-01-07 | End: 2021-01-07 | Stop reason: SDUPTHER

## 2021-01-07 NOTE — PATIENT INSTRUCTIONS
Your Procedure Will Be Scheduled at:      [facility]    [address] [place], [address] [facility] [address]   (located across from [facility])    Located on the main floor of the [facility]. Report to our reception desk by the [employer]. Parking is free. Handicap parking is available by the main entrance. You may also park in [place] on Level [address] and enter building on the bridge to [facility]. Take elevator to the main floor. · Our  will call you to schedule your procedure within a week. If you do not receive a phone call, please call the  directly at [phone] and leave a message, or call [place] office at [phone]. Date: Jan 22, 2021am___________________    Arrival Time: 8:30am______________________    Instructions      · Bring Photo I.D. and insurance cards. · Bring all Medications in the bottles. · Pack an overnight bag in case you are required to spend the night. · You will need someone to drive you home. That will be the only person allowed with you due to Covid -19 restrictions. · Nothing to eat or drink after midnight. You are to take your Medications, along with your Cardiac and/or Blood Pressure Medications, with small sips of water on the morning of your Procedure. · If you need additional directions please call [phone]. · If you have any questions please feel free to call the [address] [place] [address] 7 office at [phone].

## 2021-01-07 NOTE — PROGRESS NOTES
nitroGLYCERIN (NITROSTAT) 0.4 MG SL tablet up to max of 3 total doses. If no relief after 1 dose, call 911. 12/19/20  Yes Vesta Cm APRN - NP   clopidogrel (PLAVIX) 75 MG tablet Take 1 tablet by mouth daily 12/20/20  Yes Earnestine Angulo APRN - NP   aspirin 81 MG chewable tablet Take 81 mg by mouth daily   Yes Historical Provider, MD   Cholecalciferol (VITAMIN D) 50 MCG (2000 UT) CAPS capsule Take 2,000 Units by mouth daily   Yes Historical Provider, MD   vitamin C (ASCORBIC ACID) 500 MG tablet Take 250 mg by mouth 2 times daily   Yes Historical Provider, MD   traMADol (ULTRAM) 50 MG tablet TAKE 1 TABLET BY MOUTH AT NIGHT AS NEEDED FOR PAIN 6/24/20  Yes Historical Provider, MD   tamsulosin (FLOMAX) 0.4 MG capsule Take 1 capsule by mouth daily 6/22/20  Yes Inna Murray MD   atorvastatin (LIPITOR) 40 MG tablet Take 1 tablet by mouth nightly 4/23/20  Yes Pauly Gipson DO   metoprolol tartrate (LOPRESSOR) 25 MG tablet Take 1 tablet by mouth 2 times daily 4/23/20  Yes Pauly Gipson DO   gabapentin (NEURONTIN) 400 MG capsule Take 1 capsule by mouth 2 times daily. 4/11/19  Yes Historical Provider, MD   acetaminophen (TYLENOL) 325 MG tablet Take 2 tablets by mouth every 6 hours as needed for Pain 7/9/18  Yes Madisyn Hickey APRN - CNP       Allergies:  Vicodin [hydrocodone-acetaminophen]    Social History:   reports that he quit smoking about 13 years ago. His smoking use included cigarettes. He started smoking about 46 years ago. He has a 50.00 pack-year smoking history. He has never used smokeless tobacco. He reports that he does not drink alcohol or use drugs. Family History:    Family History   Problem Relation Age of Onset    High Blood Pressure Father     Colon Polyps Brother     COPD Mother        REVIEW OF SYSTEMS:  CONSTITUTIONAL:NEGATIVE  HEENT:NEG  Cardiovascular: No chest pain, +ve dyspnea on exertion, No palpitations.  Lower extremity edema: No  RESPIRATORY: NEG  GASTROINTESTINAL:  negative GENITOURINARY:  negative  INTEGUMENT:  negative  MUSCULOSKELETAL:  positive for  pain  NEUROLOGICAL:  negative    PHYSICAL EXAM:      Pulse 60   Ht 5' 9\" (1.753 m)   Wt 217 lb (98.4 kg)   BMI 32.05 kg/m²    HEENT: PERRL, no cervical lymphadenopathy. No masses palpable. Cardiovascular:  · The apical impulse is not displaced  · Heart  Sounds:RRR, S4  · Jugular venous pulsation Normal  · The carotid upstroke is normal  · Peripheral pulses are symmetrical and full  Respiratory: Good respiratory effort. On auscultation: clear to auscultation bilaterally  Abdomen:  · No masses or tenderness  · Bowel sounds present  Extremities:  ·  No Cyanosis or Clubbing  ·  Lower extremity edema: No  Skin: Warm and dry    Right groin: soft, non tender and without hematoma. Cardiac data:    EKG 11/24/2020: NSR, Old lateral and infero-posterior infarct     TTE 11/15/2020  Not all walls well visualized, cannot comment on specific wall motion. Normal left ventricular diameter. Mild left ventricular hypertrophy. Left ventricular systolic function is mildly reduced. Left ventricular  ejection fraction 45 %. Grade I (mild) left ventricular diastolic dysfunction. Left atrium is mildly dilated. Normal structure and function of other valves. Aortic root is mildly dilated at 4.3 cm. Nuclear stress test 12/9/2020  1. Large inferolateral infarction with significant vernon-infarct ischemia. 2.  LVEF 46%. Cardiac cath 12/18/2020   1. Severe Multivessel CAD   2. Patent LIMA however distally occluded and not supplying LAD   3. Occluded SVG-RCA with left to right Collaterals   4. Successful PTCA. AGUILA of mid LAD   5. Residual high grade stenosis in small LCX   6. Normal LV systolic function. Labs:     CBC: No results for input(s): WBC, HGB, HCT, PLT in the last 72 hours. BMP: No results for input(s): NA, K, CO2, BUN, CREATININE, LABGLOM, GLUCOSE in the last 72 hours. PT/INR: No results for input(s): PROTIME, INR in the last 72 hours. FASTING LIPID PANEL:  Lab Results   Component Value Date    HDL 39 10/23/2020    TRIG 161 10/23/2020     LIVER PROFILE:No results for input(s): AST, ALT, LABALBU in the last 72 hours. Assessment:    · MVCAD S/P CABG (LIMA-LAD, R SVG- RCA, OM too small for the grafts) on 7/6/18, repeat angiogram 12/18/2020 showed distally occluded LIMA and SVG, post PCI-LAD. Unable to cross LCX lesion with balloon. · Mild ischemic CMP, LVEF 45%  · Pulmonary edema post incisional hernia repair, resolved with diuretics   · HTN  · HPL  · Obesity        Patient Active Problem List   Diagnosis    Cervical radiculitis    Umbilical hernia    HTN (hypertension)    Impaired fasting blood sugar    Displacement of cervical intervertebral disc without myelopathy    Rotator cuff tear    CAD in native artery    S/P CABG (coronary artery bypass graft)    Hyperlipidemia    Other emphysema (HCC)    Positive colorectal cancer screening using DNA-based stool test    Epigastric hernia    Urothelial carcinoma of bladder ([facility] [address]    Post-op pain    Incisional hernia, without obstruction or gangrene    S/P PTCA (percutaneous transluminal coronary angioplasty)       Recommendations:  Continue aspirin, plavix, and high intensity statin for secondary prophylaxis  Start lasix 20 mg every other day   Re-attempt at PCI of LCX in 2 weeks   Risks, benefits and alternatives of procedure discussed with him, he verbalizes understanding and willing to proceed. Aggressive risk factor modification discussed with patient. Routine follow-up in 3 months or earlier if needed.       Jessica Garcia [facility]           [phone]

## 2021-01-19 ENCOUNTER — TELEPHONE (OUTPATIENT)
Dept: CARDIOLOGY | Age: 66
End: 2021-01-19

## 2021-01-19 RX ORDER — FUROSEMIDE 20 MG/1
20 TABLET ORAL EVERY OTHER DAY
Qty: 45 TABLET | Refills: 3 | Status: SHIPPED | OUTPATIENT
Start: 2021-01-19 | End: 2021-02-18

## 2021-01-19 RX ORDER — ATORVASTATIN CALCIUM 40 MG/1
40 TABLET, FILM COATED ORAL NIGHTLY
Qty: 90 TABLET | Refills: 3 | Status: SHIPPED | OUTPATIENT
Start: 2021-01-19 | End: 2022-02-14

## 2021-01-19 RX ORDER — CLOPIDOGREL BISULFATE 75 MG/1
75 TABLET ORAL DAILY
Qty: 90 TABLET | Refills: 3 | Status: SHIPPED | OUTPATIENT
Start: 2021-01-19 | End: 2022-04-11

## 2021-01-19 NOTE — TELEPHONE ENCOUNTER
Pt called and would like all his scripts moved to [facility]. For 90 day supply. Pt states he has had problems with mail order. Pt plavix is lost in the mail so he needs it now and would like to go ahead and have new scripts for the others as well. Please [facility].     Last Appt:  1/7/2021  Next Appt:   2/18/2021  Med verified in [id number] [facility]

## 2021-01-22 ENCOUNTER — HOSPITAL ENCOUNTER (INPATIENT)
Dept: CARDIAC CATH/INVASIVE PROCEDURES | Age: 66
LOS: 1 days | Discharge: HOME | DRG: 247 | End: 2021-01-23
Attending: INTERNAL MEDICINE | Admitting: INTERNAL MEDICINE
Payer: MEDICARE

## 2021-01-22 DIAGNOSIS — Z98.890 S/P CARDIAC CATH: ICD-10-CM

## 2021-01-22 DIAGNOSIS — I25.10 CAD IN NATIVE ARTERY: ICD-10-CM

## 2021-01-22 LAB
GFR NON-AFRICAN AMERICAN: >60 ML/MIN
GFR SERPL CREATININE-BSD FRML MDRD: >60 ML/MIN
GFR SERPL CREATININE-BSD FRML MDRD: NORMAL ML/MIN/{1.73_M2}
GLUCOSE BLD-MCNC: 106 MG/DL (ref 74–100)
PLATELET # BLD: 215 K/UL (ref 138–453)
POC CHLORIDE: 107 MMOL/L (ref 98–107)
POC CREATININE: 0.8 MG/DL (ref 0.51–1.19)
POC HEMATOCRIT: 44 % (ref 41–53)
POC HEMOGLOBIN: 15 G/DL (ref 13.5–17.5)
POC POTASSIUM: 4 MMOL/L (ref 3.5–4.5)
POC SODIUM: 141 MMOL/L (ref 138–146)

## 2021-01-22 PROCEDURE — 6360000002 HC RX W HCPCS

## 2021-01-22 PROCEDURE — 6370000000 HC RX 637 (ALT 250 FOR IP)

## 2021-01-22 PROCEDURE — C1769 GUIDE WIRE: HCPCS

## 2021-01-22 PROCEDURE — C1874 STENT, COATED/COV W/DEL SYS: HCPCS

## 2021-01-22 PROCEDURE — 82947 ASSAY GLUCOSE BLOOD QUANT: CPT

## 2021-01-22 PROCEDURE — C1894 INTRO/SHEATH, NON-LASER: HCPCS

## 2021-01-22 PROCEDURE — 2060000002 HC BURN ICU INTERMEDIATE R&B

## 2021-01-22 PROCEDURE — 2709999900 HC NON-CHARGEABLE SUPPLY

## 2021-01-22 PROCEDURE — B2111ZZ FLUOROSCOPY OF MULTIPLE CORONARY ARTERIES USING LOW OSMOLAR CONTRAST: ICD-10-PCS | Performed by: INTERNAL MEDICINE

## 2021-01-22 PROCEDURE — C9601 PERC DRUG-EL COR STENT BRAN: HCPCS | Performed by: INTERNAL MEDICINE

## 2021-01-22 PROCEDURE — 82565 ASSAY OF CREATININE: CPT

## 2021-01-22 PROCEDURE — 6360000004 HC RX CONTRAST MEDICATION

## 2021-01-22 PROCEDURE — 85049 AUTOMATED PLATELET COUNT: CPT

## 2021-01-22 PROCEDURE — 82435 ASSAY OF BLOOD CHLORIDE: CPT

## 2021-01-22 PROCEDURE — C1887 CATHETER, GUIDING: HCPCS

## 2021-01-22 PROCEDURE — 84132 ASSAY OF SERUM POTASSIUM: CPT

## 2021-01-22 PROCEDURE — 84295 ASSAY OF SERUM SODIUM: CPT

## 2021-01-22 PROCEDURE — 6370000000 HC RX 637 (ALT 250 FOR IP): Performed by: STUDENT IN AN ORGANIZED HEALTH CARE EDUCATION/TRAINING PROGRAM

## 2021-01-22 PROCEDURE — 4A023N7 MEASUREMENT OF CARDIAC SAMPLING AND PRESSURE, LEFT HEART, PERCUTANEOUS APPROACH: ICD-10-PCS | Performed by: INTERNAL MEDICINE

## 2021-01-22 PROCEDURE — C9600 PERC DRUG-EL COR STENT SING: HCPCS | Performed by: INTERNAL MEDICINE

## 2021-01-22 PROCEDURE — 85014 HEMATOCRIT: CPT

## 2021-01-22 PROCEDURE — C1725 CATH, TRANSLUMIN NON-LASER: HCPCS

## 2021-01-22 PROCEDURE — 2500000003 HC RX 250 WO HCPCS

## 2021-01-22 PROCEDURE — 027136Z DILATION OF CORONARY ARTERY, TWO ARTERIES WITH THREE DRUG-ELUTING INTRALUMINAL DEVICES, PERCUTANEOUS APPROACH: ICD-10-PCS | Performed by: INTERNAL MEDICINE

## 2021-01-22 PROCEDURE — 2580000003 HC RX 258: Performed by: STUDENT IN AN ORGANIZED HEALTH CARE EDUCATION/TRAINING PROGRAM

## 2021-01-22 RX ORDER — SODIUM CHLORIDE 9 MG/ML
INJECTION, SOLUTION INTRAVENOUS CONTINUOUS
Status: DISCONTINUED | OUTPATIENT
Start: 2021-01-22 | End: 2021-01-22

## 2021-01-22 RX ORDER — NITROGLYCERIN 0.4 MG/1
0.4 TABLET SUBLINGUAL EVERY 5 MIN PRN
Status: DISCONTINUED | OUTPATIENT
Start: 2021-01-22 | End: 2021-01-23 | Stop reason: HOSPADM

## 2021-01-22 RX ORDER — ONDANSETRON 2 MG/ML
4 INJECTION INTRAMUSCULAR; INTRAVENOUS EVERY 6 HOURS PRN
Status: DISCONTINUED | OUTPATIENT
Start: 2021-01-22 | End: 2021-01-23 | Stop reason: HOSPADM

## 2021-01-22 RX ORDER — CLOPIDOGREL 300 MG/1
300 TABLET, FILM COATED ORAL ONCE
Status: DISCONTINUED | OUTPATIENT
Start: 2021-01-22 | End: 2021-01-23 | Stop reason: HOSPADM

## 2021-01-22 RX ORDER — ASPIRIN 81 MG/1
81 TABLET, CHEWABLE ORAL DAILY
Status: DISCONTINUED | OUTPATIENT
Start: 2021-01-22 | End: 2021-01-23 | Stop reason: HOSPADM

## 2021-01-22 RX ORDER — ACETAMINOPHEN 650 MG/1
650 SUPPOSITORY RECTAL EVERY 6 HOURS PRN
Status: DISCONTINUED | OUTPATIENT
Start: 2021-01-22 | End: 2021-01-23 | Stop reason: HOSPADM

## 2021-01-22 RX ORDER — SODIUM CHLORIDE 0.9 % (FLUSH) 0.9 %
10 SYRINGE (ML) INJECTION EVERY 12 HOURS SCHEDULED
Status: DISCONTINUED | OUTPATIENT
Start: 2021-01-22 | End: 2021-01-23 | Stop reason: HOSPADM

## 2021-01-22 RX ORDER — ATORVASTATIN CALCIUM 40 MG/1
40 TABLET, FILM COATED ORAL NIGHTLY
Status: DISCONTINUED | OUTPATIENT
Start: 2021-01-22 | End: 2021-01-23 | Stop reason: HOSPADM

## 2021-01-22 RX ORDER — ACETAMINOPHEN 325 MG/1
650 TABLET ORAL EVERY 6 HOURS PRN
Status: DISCONTINUED | OUTPATIENT
Start: 2021-01-22 | End: 2021-01-23 | Stop reason: HOSPADM

## 2021-01-22 RX ORDER — TAMSULOSIN HYDROCHLORIDE 0.4 MG/1
0.4 CAPSULE ORAL DAILY
Status: DISCONTINUED | OUTPATIENT
Start: 2021-01-22 | End: 2021-01-23 | Stop reason: HOSPADM

## 2021-01-22 RX ORDER — POLYETHYLENE GLYCOL 3350 17 G/17G
17 POWDER, FOR SOLUTION ORAL DAILY PRN
Status: DISCONTINUED | OUTPATIENT
Start: 2021-01-22 | End: 2021-01-23 | Stop reason: HOSPADM

## 2021-01-22 RX ORDER — CLOPIDOGREL BISULFATE 75 MG/1
75 TABLET ORAL DAILY
Status: DISCONTINUED | OUTPATIENT
Start: 2021-01-23 | End: 2021-01-23 | Stop reason: HOSPADM

## 2021-01-22 RX ORDER — PROMETHAZINE HYDROCHLORIDE 12.5 MG/1
12.5 TABLET ORAL EVERY 6 HOURS PRN
Status: DISCONTINUED | OUTPATIENT
Start: 2021-01-22 | End: 2021-01-23 | Stop reason: HOSPADM

## 2021-01-22 RX ORDER — SODIUM CHLORIDE 0.9 % (FLUSH) 0.9 %
10 SYRINGE (ML) INJECTION PRN
Status: DISCONTINUED | OUTPATIENT
Start: 2021-01-22 | End: 2021-01-23 | Stop reason: HOSPADM

## 2021-01-22 RX ADMIN — Medication 10 ML: at 21:28

## 2021-01-22 RX ADMIN — SODIUM CHLORIDE: 9 INJECTION, SOLUTION INTRAVENOUS at 12:54

## 2021-01-22 RX ADMIN — SODIUM CHLORIDE: 9 INJECTION, SOLUTION INTRAVENOUS at 09:08

## 2021-01-22 RX ADMIN — METOPROLOL TARTRATE 25 MG: 25 TABLET ORAL at 21:28

## 2021-01-22 RX ADMIN — DESMOPRESSIN ACETATE 40 MG: 0.2 TABLET ORAL at 21:28

## 2021-01-22 NOTE — OP NOTE
[facility]  Cardiac catheterization note        Date:   1/22/2021  Patient name: Shruti Rhodes  Date of admission:  1/22/2021  8:39 AM  MRN:   [id number]  YOB: 1955    Operators:  Lamont Heard MD (Attending Physician)     Pre Procedure Conscious Sedation Data:    ASA Class:    [] I [x] II [] III [] IV    Mallampati Class:  [] I [x] II [] III [] IV      Indication:  Coronary lesion > 50%     Procedure:   1. Left heart catheterization   2. Selective left coronary angiography   3. PCI of LCX/OM2 with AGUILA       Access:  [] Femoral  [x] Radial  artery       [x] Right  [] Left    Procedure: After informed consent was obtained with explanation of the risks and benefits, patient was brought to the cath lab. The access area was prepped and draped in sterile fashion. 1% lidocaine was used for local block. The artery was cannulated with 6  Fr sheath with brisk arterial blood return. The side port was frequently flushed and aspirated with normal saline. Findings:  LMCA: Mild irregularities 10-20%. LAD: Patent mid stents with mild disease   50% stenosis at edge of previous stent     LCx: Ostial 70%, mid 99% stenosis, reduced to 0% with PTCA. AGUILA (2.75 x 28 mm) post dilated with a 3.0 NC balloon   OM1 is small and patent with diffuse disease   OM is large, proximal 70% stenosis, reduced to 0% with PTCA/AGUILA x 2       Conclusions:  Successful staged PCI to Left circumflex and OM2   Patent LAD stents with mild non-obstructive disease     Estimated blood loss 5 ml     Recommendation:  Routine Post Stent Orders.    Medical therapy   Aggressive risk factor modification           Marleen Morrow MD, [facility] [place]

## 2021-01-22 NOTE — DISCHARGE SUMMARY
[facility]  Discharge Note                 Name:  Lalo Samano  YOB: 1955  Social Security Number:  [id number]  Medical Record Number:  [id number]    Date of Admission:  1/22/2021  Date of Discharge:  1/23/2021    Admitting physician: Hughie Kanner, MD    Discharge Attending: Mitchell Trinidad CNP  Primary Care Physician: Jacob Rodriguez MD  Consultants: Cardiology  Discharge to [address] [place] [address] LIST:  Patient Active Problem List   Diagnosis    Cervical radiculitis    Umbilical hernia    HTN (hypertension)    Impaired fasting blood sugar    Displacement of cervical intervertebral disc without myelopathy    Rotator cuff tear    CAD in native artery    S/P CABG (coronary artery bypass graft)    Hyperlipidemia    Other emphysema ([facility] [address]    Positive colorectal cancer screening using DNA-based stool test    Epigastric hernia    Urothelial carcinoma of bladder ([facility] [address]    Post-op pain    Incisional hernia, without obstruction or gangrene    S/P PTCA (percutaneous transluminal coronary angioplasty)    S/P cardiac cath         Procedures:cardiac catheterization    HOSPITAL COURSE :           The patient was admitted for: [facility] Procedures if any:  CARDIAC CATH   Medications changes recommendation:  See list   Follow Up Plan:  2 weeks     CARDIAC CATH 1/22/21  Findings:  LMCA: Mild irregularities 10-20%. LAD: Patent mid stents with mild disease   50% stenosis at edge of previous stent     LCx: Ostial 70%, mid 99% stenosis, reduced to 0% with PTCA. AGUILA (2.75 x 28   mm) post dilated with a 3.0 NC balloon   OM1 is small and patent with diffuse disease   OM is large, proximal 70% stenosis, reduced to 0% with PTCA/AGUILA x 2       Lesion on 2nd Ob Michelle: Proximal subsection. 75% stenosis 30 mm length     reduced to 0%. Pre procedure KELLY III flow was noted.  Post Procedure KELLY   III flow was present. Good runoff was present. The lesion was diagnosed     as Moderate Risk (B).       Devices used         - Trek Balloon 2.5mm x 20mm. 4 inflation(s) to a max pressure of: 14     diane.         - Runthrough NS. Number of passes: 1.         - Mini Trek Balloon 2.0mm x 12mm. 1 inflation(s) to a max pressure of:     14 diane.         - Mini Trek Balloon 2.0mm x 12mm. 1 inflation(s) to a max pressure of:     14 diane.         - Mini Trek Balloon 2.0mm x 12mm. 1 inflation(s) to a max pressure of:     14 diane.         - Runthrough NS. Number of passes: 1.         - 6 fr GuideLiner. Number of passes: 1.         - NC Trek 2.98v31gj Balloon. 3 inflation(s) to a max pressure of: 14     diane.         - Xience Gavi 2.75 x 38 AGUILA. 1 inflation(s) to a max pressure of: 12     diane.         - Xience Gavi 2.5 x 15 AGUILA. 1 inflation(s) to a max pressure of: 12     diane.         - NC Trek 3.0x20mm Balloon. 2 inflation(s) to a max pressure of: 20     diane.         Lesion on Prox CX: Proximal subsection. 99% stenosis 20 mm length reduced     to 0%. Pre procedure KELLY III flow was noted. Post Procedure KELLY III     flow was present. Good runoff was present. The lesion was diagnosed as     Moderate Risk (B).       Devices used         - Xience Gavi 2.75 x 23 AGUILA. 1 inflation(s) to a max pressure of: 14     diane.         - NC Trek 3.0x20mm Balloon. 1 inflation(s) to a max pressure of: 21     diane.        Coronary Tree : Right               Conclusions:     Successful staged PCI to Left circumflex and OM2   Patent LAD stents with mild non-obstructive disease         Estimated blood loss 5 ml      Recommendation:  Successful staged PCI to Left circumflex and OM2   Patent LAD stents with mild non-obstructive disease   Discharge exam:   Vitals:    01/23/21 0256   BP: 126/81   Pulse: 80   Resp: 16   Temp: 98.3 °F (36.8 °C)   SpO2: 94%     Neuro: normal  Chest: Clear to ausculation. No wheezing. Cardiac: Regular rate. s1 and s2 auscultated. No murmur noted. Abdomen/groin: soft, non-tender, without masses or organomegaly  Lower extremity edema: none     Follow up with primary care provider 1 week  Follow up with cardiology 4 weeks  Follow up with other consultant physicians at their directions. Discharge Medications:   Saul Soriano"   Home Medication Instructions FRR:[id number]    Printed on:01/23/21 2358   Medication Information                      acetaminophen (TYLENOL) 325 MG tablet  Take 2 tablets by mouth every 6 hours as needed for Pain             aspirin 81 MG chewable tablet  Take 81 mg by mouth daily             atorvastatin (LIPITOR) 40 MG tablet  Take 1 tablet by mouth nightly             Cholecalciferol (VITAMIN D) 50 MCG (2000 UT) CAPS capsule  Take 2,000 Units by mouth daily             clopidogrel (PLAVIX) 75 MG tablet  Take 1 tablet by mouth daily             furosemide (LASIX) 20 MG tablet  Take 1 tablet by mouth every other day             gabapentin (NEURONTIN) 400 MG capsule  Take 1 capsule by mouth 2 times daily. metoprolol tartrate (LOPRESSOR) 25 MG tablet  Take 1 tablet by mouth 2 times daily             nitroGLYCERIN (NITROSTAT) 0.4 MG SL tablet  up to max of 3 total doses. If no relief after 1 dose, call 911.             tamsulosin (FLOMAX) 0.4 MG capsule  Take 1 capsule by mouth daily             vitamin C (ASCORBIC ACID) 500 MG tablet  Take 250 mg by mouth 2 times daily                  Coronary Discharge Core Measure: Please indicate the medication given by X, and if not the reasons not given:    Not Given Reason  Given      Beta Blockers x     On hold due to low BP  ACE-I       Statins X      ASA X       OAP (Plavix/Effient/Brilinta) x    SL Nitro   X         Pt seen and examined. Pt resting in bed. He denies any chest pain or shortness of breath. Radial site soft. Discussed with patient and nursing. Medications and discharge instructions reviewed with patient and nursing. Pt verbalizes understanding regarding medications and activity restrictions. Script for plavix given.   Will follow up in 2 weeks in [place]       Electronically signed by PARESH Cobb CNP on 1/23/2021 at 8:34 AM  [facility]      [phone]

## 2021-01-22 NOTE — H&P
[facility]  Procedure History and Physical Update          Patient Name: Irina Winters  MRN:    [id number]  YOB: 1955  Date of evaluation:  1/22/2021    Procedure:    Elective PCI     Indication for procedure:       Please refer to the office note completed by Dr. Teresa Garcia  on 01/07/2020 in the medical record and note that:    [x] I have examined the patient and reviewed the H&P/Consult and there are no changes to be made to the assessment or plan. [] I have examined the patient and reviewed the H&P/Consult and have noted the following changes:    Past Medical History:   Diagnosis Date    Abdominal pain 09/29/2014    Abnormal cardiovascular stress test 12/09/2020    Large inferolateral infarction with significant vernon-infarct ischemia.  CAD (coronary artery disease)     Clostridium difficile diarrhea 10/2014    hospitalized in October 2014    Displacement of cervical intervertebral disc without myelopathy 06/10/2014    [facility], C6/C7     HTN (hypertension)     Hyperlipidemia     Impaired fasting blood sugar     Neck pain     chronic    Neuralgia, neuritis, and radiculitis, unspecified 06/10/2014    [facility], left C7     Shoulder region pain     Left   [facility] injury 01/08/2014    Sprain and strain of unspecified site of shoulder and upper arm 06/10/2014    [facility], left arm        Past Surgical History:   Procedure Laterality Date    BICEPS TENODESIS Right 11/23/2016    right proximal bicep tenodesis    COLONOSCOPY N/A 12/10/2019    COLONOSCOPY POLYPECTOMY SNARE/COLD BIOPSY performed by Stephanie Uribe MD at [address] [place] [address]  12/18/2020    Successful PTCA. AGUILA of mid LAD / Patent LIMA however distally occluded and not supplying LAD /Residual high grade stenosis in small LCX    CORONARY ANGIOPLASTY WITH STENT PLACEMENT  01/22/2021    CYSTOSCOPY N/A 03/09/2020    CYSTO TURBT performed by Nelson Ornelas MD at [address] [place] [address]  OTHER SURGICAL HISTORY Left 8/12/14, 1/09/15    [facility]    OTHER SURGICAL HISTORY Left 09/23/2014    [facility]    OTHER SURGICAL HISTORY Left 7/17/2015 , 1/22/16    [facility]    [place] CABG, ARTERY-VEIN, SINGLE N/A 07/06/2018    CABG CORONARY ARTERY BYPASS, GARY ERAZO, CHANI  (CSI# [id number]) performed by Sophy Almaraz MD at [facility] CABG (LIMA-LAD, R SVG- RCA, OM too small for the grafts) on 7/6/18,    ROTATOR CUFF REPAIR Left 01/29/2019 2015- Right    SHOULDER ARTHROSCOPY Right 04/27/2016    scope decompressing, rotator cuff repair    TRANSESOPHAGEAL ECHOCARDIOGRAM  12/45/5386    UMBILICAL HERNIA REPAIR N/A 12/18/2019    Laparoscopic Robotic Assisted Umbilical Hernia Repair W/ MESH performed by Isela Rudd MD at [address] [facility] N/A 11/19/2020    Open Incisional Hernia Repair W/ MESH performed by Jeri Hansen DO at [facility] OR       Family History   Problem Relation Age of Onset    High Blood Pressure Father     Colon Polyps Brother     COPD Mother        Allergies   Allergen Reactions    Vicodin [Hydrocodone-Acetaminophen] Nausea Only     Stomach upset       Prior to Admission medications    Medication Sig Start Date End Date Taking?  Authorizing Provider   atorvastatin (LIPITOR) 40 MG tablet Take 1 tablet by mouth nightly 1/19/21  Yes Pauly Gipson DO   clopidogrel (PLAVIX) 75 MG tablet Take 1 tablet by mouth daily 1/19/21  Yes Pauly Gipson DO   metoprolol tartrate (LOPRESSOR) 25 MG tablet Take 1 tablet by mouth 2 times daily 1/19/21  Yes Pauly Gipson DO   furosemide (LASIX) 20 MG tablet Take 1 tablet by mouth every other day 1/19/21  Yes Pauly Gipson DO   aspirin 81 MG chewable tablet Take 81 mg by mouth daily   Yes Historical Provider, MD   Cholecalciferol (VITAMIN D) 50 MCG (2000 UT) CAPS capsule Take 2,000 Units by mouth daily   Yes Historical Provider, MD   vitamin C (ASCORBIC ACID) 500 MG tablet Take 250 mg by mouth 2 times daily   Yes Historical Provider, MD traMADol (ULTRAM) 50 MG tablet TAKE 1 TABLET BY MOUTH AT NIGHT AS NEEDED FOR PAIN 6/24/20  Yes Historical Provider, MD   tamsulosin (FLOMAX) 0.4 MG capsule Take 1 capsule by mouth daily 6/22/20  Yes Awa Crow MD   gabapentin (NEURONTIN) 400 MG capsule Take 1 capsule by mouth 2 times daily. 4/11/19  Yes Historical Provider, MD   acetaminophen (TYLENOL) 325 MG tablet Take 2 tablets by mouth every 6 hours as needed for Pain 7/9/18  Yes Carine Saenz APRN - CNP   nitroGLYCERIN (NITROSTAT) 0.4 MG SL tablet up to max of 3 total doses. If no relief after 1 dose, call 911. 12/19/20   PARESH Grimes - BERTRAND         Vitals:    01/22/21 0904   BP: 108/61   Pulse: 65   Resp: 16   Temp: 97.9 °F (36.6 °C)   SpO2: 96%       Constitutional and General Appearance:   alert, cooperative, no distress and appears stated age  HEENT:  · PERRL, EOMI  Respiratory:  · Normal excursion and expansion without use of accessory muscles  · Resp Auscultation:  Good respiratory effort. No for increased work of breathing. On auscultation: clear to auscultation bilaterally  Cardiovascular:  · Regular rate and rhythm. · S1/S2  · No murmurs. · The apical impulse is not displaced  Abdomen:  · Soft  · Bowel sounds present  · Non-tender to palpation  Extremities:  · No cyanosis or clubbing  · Lower extremity edema:         ECHO- 11/25/2020   Not all walls well visualized, cannot comment on specific wall motion. Normal left ventricular diameter. Mild left ventricular hypertrophy. Left ventricular systolic function is mildly reduced. Left ventricular  ejection fraction 45 %. Grade I (mild) left ventricular diastolic dysfunction. Left atrium is mildly dilated. Normal structure and function of other valves.   Aortic root is mildly dilated at 4.3 cm.    · MVCAD S/P CABG (LIMA-LAD, R SVG- RCA, OM too small for the grafts) on 7/6/18, repeat angiogram 12/18/2020 showed distally occluded LIMA and SVG, post PCI-LAD. Unable to cross LCX lesion with balloon. · Mild ischemic CMP, LVEF 45%  · Pulmonary edema post incisional hernia repair, resolved with diuretics   · HTN  · HPL  · Obesity              Plan:  · Proceed with planned procedure. - PCI of LCx  · Further orders to follow. Risks, benefits, and alternatives of cardiac catheterization were discussed, in detail, with patient. Risks include, but not limited to, bleeding, requiring blood transfusion, vascular complication requiring surgery, renal failure with need of dialysis, CVA, MI, death and anesthesia complications including intubation were discussed. Patient verbalized understanding and agreed to proceed with the procedure understanding the above risks and alternatives to the procedure.         Saintclair Amen, MD   Fellow, [address] [facility]

## 2021-01-23 VITALS
TEMPERATURE: 97.6 F | SYSTOLIC BLOOD PRESSURE: 124 MMHG | OXYGEN SATURATION: 95 % | BODY MASS INDEX: 31.99 KG/M2 | RESPIRATION RATE: 16 BRPM | HEART RATE: 87 BPM | HEIGHT: 69 IN | WEIGHT: 216 LBS | DIASTOLIC BLOOD PRESSURE: 76 MMHG

## 2021-01-23 LAB
ABSOLUTE EOS #: 0.2 K/UL (ref 0–0.44)
ABSOLUTE IMMATURE GRANULOCYTE: 0.06 K/UL (ref 0–0.3)
ABSOLUTE LYMPH #: 2.95 K/UL (ref 1.1–3.7)
ABSOLUTE MONO #: 0.72 K/UL (ref 0.1–1.2)
ANION GAP SERPL CALCULATED.3IONS-SCNC: 9 MMOL/L (ref 9–17)
BASOPHILS # BLD: 1 % (ref 0–2)
BASOPHILS ABSOLUTE: 0.1 K/UL (ref 0–0.2)
BUN BLDV-MCNC: 10 MG/DL (ref 8–23)
BUN/CREAT BLD: ABNORMAL (ref 9–20)
CALCIUM SERPL-MCNC: 8.7 MG/DL (ref 8.6–10.4)
CHLORIDE BLD-SCNC: 107 MMOL/L (ref 98–107)
CO2: 21 MMOL/L (ref 20–31)
CREAT SERPL-MCNC: 0.64 MG/DL (ref 0.7–1.2)
DIFFERENTIAL TYPE: ABNORMAL
EOSINOPHILS RELATIVE PERCENT: 2 % (ref 1–4)
GFR AFRICAN AMERICAN: >60 ML/MIN
GFR NON-AFRICAN AMERICAN: >60 ML/MIN
GFR SERPL CREATININE-BSD FRML MDRD: ABNORMAL ML/MIN/{1.73_M2}
GFR SERPL CREATININE-BSD FRML MDRD: ABNORMAL ML/MIN/{1.73_M2}
GLUCOSE BLD-MCNC: 96 MG/DL (ref 70–99)
HCT VFR BLD CALC: 46 % (ref 40.7–50.3)
HEMOGLOBIN: 14.8 G/DL (ref 13–17)
IMMATURE GRANULOCYTES: 1 %
LYMPHOCYTES # BLD: 32 % (ref 24–43)
MCH RBC QN AUTO: 27 PG (ref 25.2–33.5)
MCHC RBC AUTO-ENTMCNC: 32.2 G/DL (ref 28.4–34.8)
MCV RBC AUTO: 83.8 FL (ref 82.6–102.9)
MONOCYTES # BLD: 8 % (ref 3–12)
NRBC AUTOMATED: 0 PER 100 WBC
PDW BLD-RTO: 14.5 % (ref 11.8–14.4)
PLATELET # BLD: 232 K/UL (ref 138–453)
PLATELET ESTIMATE: ABNORMAL
PMV BLD AUTO: 11.1 FL (ref 8.1–13.5)
POTASSIUM SERPL-SCNC: 4.4 MMOL/L (ref 3.7–5.3)
RBC # BLD: 5.49 M/UL (ref 4.21–5.77)
RBC # BLD: ABNORMAL 10*6/UL
SEG NEUTROPHILS: 56 % (ref 36–65)
SEGMENTED NEUTROPHILS ABSOLUTE COUNT: 5.25 K/UL (ref 1.5–8.1)
SODIUM BLD-SCNC: 137 MMOL/L (ref 135–144)
WBC # BLD: 9.3 K/UL (ref 3.5–11.3)
WBC # BLD: ABNORMAL 10*3/UL

## 2021-01-23 PROCEDURE — 85025 COMPLETE CBC W/AUTO DIFF WBC: CPT

## 2021-01-23 PROCEDURE — 36415 COLL VENOUS BLD VENIPUNCTURE: CPT

## 2021-01-23 PROCEDURE — 2580000003 HC RX 258: Performed by: STUDENT IN AN ORGANIZED HEALTH CARE EDUCATION/TRAINING PROGRAM

## 2021-01-23 PROCEDURE — 6370000000 HC RX 637 (ALT 250 FOR IP): Performed by: STUDENT IN AN ORGANIZED HEALTH CARE EDUCATION/TRAINING PROGRAM

## 2021-01-23 PROCEDURE — 80048 BASIC METABOLIC PNL TOTAL CA: CPT

## 2021-01-23 RX ADMIN — ASPIRIN 81 MG: 81 TABLET, CHEWABLE ORAL at 08:51

## 2021-01-23 RX ADMIN — Medication 10 ML: at 08:51

## 2021-01-23 RX ADMIN — METOPROLOL TARTRATE 25 MG: 25 TABLET ORAL at 08:50

## 2021-01-23 RX ADMIN — CLOPIDOGREL 75 MG: 75 TABLET, FILM COATED ORAL at 08:50

## 2021-01-26 ENCOUNTER — TELEPHONE (OUTPATIENT)
Dept: FAMILY MEDICINE CLINIC | Age: 66
End: 2021-01-26

## 2021-01-26 NOTE — TELEPHONE ENCOUNTER
[facility] Transitions Initial Follow Up Call    Outreach made within 2 business days of discharge: No    Patient: Vikki Zaldivar Patient : 1955   MRN: [id number]  Reason for Admission: There are no discharge diagnoses documented for the most recent discharge. Discharge Date: 21       Spoke with: patient    Discharge department/facility: [facility] Interactive Patient Contact:  Was patient able to fill all prescriptions: Yes  Was patient instructed to bring all medications to the follow-up visit: Yes  Is patient taking all medications as directed in the discharge summary? Yes  Does patient understand their discharge instructions: Yes  Does patient have questions or concerns that need addressed prior to 7-14 day follow up office visit: no  Patient had scheduled cardiac cath and two stents inserted.     Scheduled appointment with Cardiology within 7-14 days    Follow Up  Future Appointments   Date Time Provider Melchor Mg   2021 10:45 AM MD CLEMENTE Santana [facility]   3/22/2021  9:00 AM MD JOANNA Becker DP   2021  9:40 AM MD ANDREA Zabala DP   2021  8:30 AM MD Mark Santana LPN

## 2021-02-18 ENCOUNTER — OFFICE VISIT (OUTPATIENT)
Dept: CARDIOLOGY | Age: 66
End: 2021-02-18
Payer: MEDICARE

## 2021-02-18 VITALS
WEIGHT: 217 LBS | HEIGHT: 69 IN | SYSTOLIC BLOOD PRESSURE: 107 MMHG | BODY MASS INDEX: 32.14 KG/M2 | DIASTOLIC BLOOD PRESSURE: 63 MMHG | HEART RATE: 65 BPM

## 2021-02-18 DIAGNOSIS — I25.5 ISCHEMIC CARDIOMYOPATHY: Primary | ICD-10-CM

## 2021-02-18 DIAGNOSIS — I25.10 CORONARY ARTERY DISEASE INVOLVING NATIVE CORONARY ARTERY OF NATIVE HEART WITHOUT ANGINA PECTORIS: ICD-10-CM

## 2021-02-18 PROCEDURE — 93005 ELECTROCARDIOGRAM TRACING: CPT | Performed by: INTERNAL MEDICINE

## 2021-02-18 PROCEDURE — 93010 ELECTROCARDIOGRAM REPORT: CPT | Performed by: INTERNAL MEDICINE

## 2021-02-18 PROCEDURE — G8427 DOCREV CUR MEDS BY ELIG CLIN: HCPCS | Performed by: INTERNAL MEDICINE

## 2021-02-18 PROCEDURE — 1111F DSCHRG MED/CURRENT MED MERGE: CPT | Performed by: INTERNAL MEDICINE

## 2021-02-18 PROCEDURE — 3017F COLORECTAL CA SCREEN DOC REV: CPT | Performed by: INTERNAL MEDICINE

## 2021-02-18 PROCEDURE — 99214 OFFICE O/P EST MOD 30 MIN: CPT | Performed by: INTERNAL MEDICINE

## 2021-02-18 PROCEDURE — G8417 CALC BMI ABV UP PARAM F/U: HCPCS | Performed by: INTERNAL MEDICINE

## 2021-02-18 PROCEDURE — 1036F TOBACCO NON-USER: CPT | Performed by: INTERNAL MEDICINE

## 2021-02-18 PROCEDURE — G8482 FLU IMMUNIZE ORDER/ADMIN: HCPCS | Performed by: INTERNAL MEDICINE

## 2021-02-18 PROCEDURE — 4040F PNEUMOC VAC/ADMIN/RCVD: CPT | Performed by: INTERNAL MEDICINE

## 2021-02-18 PROCEDURE — 1123F ACP DISCUSS/DSCN MKR DOCD: CPT | Performed by: INTERNAL MEDICINE

## 2021-02-18 RX ORDER — FUROSEMIDE 20 MG/1
20 TABLET ORAL PRN
Qty: 45 TABLET | Refills: 3 | Status: SHIPPED | OUTPATIENT
Start: 2021-02-18 | End: 2022-05-16

## 2021-02-18 NOTE — PROGRESS NOTES
di                       Today's Date: 2/18/2021  Patient Name: Rocío Oliveros  Patient's age: 77 y.o., 1955          CC: the patient is a 77 y.o.  male is in the office for CAD and for post left heart PCI follow-up. He is feeling much better after his staged PCI. No chest pain or discomfort. His breathing has improved. Yesterday he shoveled the snow for at least 1 hour and he felt great after that. No dyspnea on exertion. No lower extremity edema or palpitations. Still taking Lasix every other day    Past Medical History:   has a past medical history of Abdominal pain, Abnormal cardiovascular stress test, CAD (coronary artery disease), Clostridium difficile diarrhea, Displacement of cervical intervertebral disc without myelopathy, HTN (hypertension), Hyperlipidemia, Impaired fasting blood sugar, Neck pain, Neuralgia, neuritis, and radiculitis, unspecified, Shoulder region pain, and Sprain and strain of unspecified site of shoulder and upper arm. Past Surgical History:   has a past surgical history that includes other surgical history (Left, 8/12/14, 1/09/15); other surgical history (Left, 09/23/2014); other surgical history (Left, 7/17/2015 , 1/22/16); Shoulder arthroscopy (Right, 04/27/2016); Biceps Tenodesis (Right, 11/23/2016); pr cabg, artery-vein, single (N/A, 07/06/2018); Rotator cuff repair (Left, 01/29/2019); Colonoscopy (N/A, 12/10/2019); Umbilical hernia repair (N/A, 12/18/2019); Cystoscopy (N/A, 03/09/2020); ventral hernia repair (N/A, 11/19/2020); Coronary angioplasty with stent (12/18/2020); Coronary angioplasty with stent (01/22/2021); and transesophageal echocardiogram (11/15/2020). Home Medications:    Prior to Admission medications    Medication Sig Start Date End Date Taking?  Authorizing Provider   atorvastatin (LIPITOR) 40 MG tablet Take 1 tablet by mouth nightly 1/19/21  Yes Pauly Gipson, DO   clopidogrel (PLAVIX) 75 MG tablet Take 1 tablet by mouth daily 1/19/21  Yes Pauly Gipson, DO   metoprolol tartrate (LOPRESSOR) 25 MG tablet Take 1 tablet by mouth 2 times daily 1/19/21  Yes Pauly Gipson, DO   furosemide (LASIX) 20 MG tablet Take 1 tablet by mouth every other day 1/19/21  Yes Pauly Gipson, DO   nitroGLYCERIN (NITROSTAT) 0.4 MG SL tablet up to max of 3 total doses. If no relief after 1 dose, call 911. 12/19/20  Yes PARESH Romero NP   aspirin 81 MG chewable tablet Take 81 mg by mouth daily   Yes Historical Provider, MD   Cholecalciferol (VITAMIN D) 50 MCG (2000 UT) CAPS capsule Take 2,000 Units by mouth daily   Yes Historical Provider, MD   vitamin C (ASCORBIC ACID) 500 MG tablet Take 250 mg by mouth 2 times daily   Yes Historical Provider, MD   tamsulosin (FLOMAX) 0.4 MG capsule Take 1 capsule by mouth daily 6/22/20  Yes Inna Murray MD   gabapentin (NEURONTIN) 400 MG capsule Take 1 capsule by mouth 2 times daily. 4/11/19  Yes Historical Provider, MD   acetaminophen (TYLENOL) 325 MG tablet Take 2 tablets by mouth every 6 hours as needed for Pain 7/9/18  Yes PARESH Chong - CNP       Allergies:  Vicodin [hydrocodone-acetaminophen]    Social History:   reports that he quit smoking about 13 years ago. His smoking use included cigarettes. He started smoking about 46 years ago. He has a 50.00 pack-year smoking history. He has never used smokeless tobacco. He reports that he does not drink alcohol or use drugs. Family History:    Family History   Problem Relation Age of Onset    High Blood Pressure Father     Colon Polyps Brother     COPD Mother        REVIEW OF SYSTEMS:  CONSTITUTIONAL:NEGATIVE  HEENT:NEG  Cardiovascular: No chest pain, negative for dyspnea on exertion, No palpitations.  Lower extremity edema: No  RESPIRATORY: NEG  GASTROINTESTINAL:  negative  GENITOURINARY:  negative  INTEGUMENT:  negative  MUSCULOSKELETAL:  positive for  pain  NEUROLOGICAL:  negative    PHYSICAL EXAM:      /63   Pulse 65   Ht 5' 9\" (1.753 m)   Wt 217 lb (98.4 kg)   BMI 32.05 kg/m²    HEENT: PERRL, no cervical lymphadenopathy. No masses palpable. Cardiovascular:  · The apical impulse is not displaced  · Heart  Sounds:RRR, S4  · Jugular venous pulsation Normal  · The carotid upstroke is normal  · Peripheral pulses are symmetrical and full  Respiratory: Good respiratory effort. On auscultation: clear to auscultation bilaterally  Abdomen:  · No masses or tenderness  · Bowel sounds present  Extremities:  ·  No Cyanosis or Clubbing  ·  Lower extremity edema: No  Skin: Warm and dry    Right groin: soft, non tender and without hematoma. Cardiac data:    EKG 2/18/2021, normal sinus rhythm, old lateral and inferoposterior infarct    TTE 11/15/2020  Not all walls well visualized, cannot comment on specific wall motion. Normal left ventricular diameter. Mild left ventricular hypertrophy. Left ventricular systolic function is mildly reduced. Left ventricular  ejection fraction 45 %. Grade I (mild) left ventricular diastolic dysfunction. Left atrium is mildly dilated. Normal structure and function of other valves. Aortic root is mildly dilated at 4.3 cm. Nuclear stress test 12/9/2020  1. Large inferolateral infarction with significant vernon-infarct ischemia. 2.  LVEF 46%. Cardiac cath 12/18/2020   1. Severe Multivessel CAD   2. Patent LIMA however distally occluded and not supplying LAD   3. Occluded SVG-RCA with left to right Collaterals   4. Successful PTCA. AGUILA of mid LAD   5. Residual high grade stenosis in small LCX   6. Normal LV systolic function. Cardiac cath 1/22/21  Successful staged PCI to Left circumflex and OM2  Patent LAD stents with mild non-obstructive disease      Labs:     CBC: No results for input(s): WBC, HGB, HCT, PLT in the last 72 hours. BMP: No results for input(s): NA, K, CO2, BUN, CREATININE, LABGLOM, GLUCOSE in the last 72 hours. PT/INR: No results for input(s): PROTIME, INR in the last 72 hours.   FASTING LIPID PANEL:  Lab Results   Component Value Date    HDL 39 10/23/2020    TRIG 161 10/23/2020     LIVER PROFILE:No results for input(s): AST, ALT, LABALBU in the last 72 hours. Assessment:    · MVCAD S/P CABG (LIMA-LAD, R SVG- RCA, OM too small for the grafts) on 7/6/18, repeat angiogram 12/18/2020 showed distally occluded LIMA and SVG, post PCI-LAD and staged PCI-LCX/OM  · Mild ischemic CMP, LVEF 45%  · Pulmonary edema post incisional hernia repair, resolved with diuretics   · HTN  · HPL  · Obesity        Patient Active Problem List   Diagnosis    Cervical radiculitis    Umbilical hernia    HTN (hypertension)    Impaired fasting blood sugar    Displacement of cervical intervertebral disc without myelopathy    Rotator cuff tear    CAD in native artery    S/P CABG (coronary artery bypass graft)    Hyperlipidemia    Other emphysema (HCC)    Positive colorectal cancer screening using DNA-based stool test    Epigastric hernia    Urothelial carcinoma of bladder ([address]    Post-op pain    Incisional hernia, without obstruction or gangrene    S/P PTCA (percutaneous transluminal coronary angioplasty)    S/P cardiac cath       Recommendations:  Continue aspirin, plavix, and high intensity statin for secondary prophylaxis  Switch to Lasix on as-needed basis  Patient to continue low-salt diet and fluid restriction  Aggressive risk factor modification discussed with patient. Routine follow-up in 6 months or earlier if needed.       Jessica Paulino [address] [facility]           [phone]

## 2021-03-15 ENCOUNTER — PROCEDURE VISIT (OUTPATIENT)
Dept: UROLOGY | Age: 66
End: 2021-03-15
Payer: MEDICARE

## 2021-03-15 VITALS
HEART RATE: 74 BPM | BODY MASS INDEX: 32.14 KG/M2 | RESPIRATION RATE: 16 BRPM | DIASTOLIC BLOOD PRESSURE: 68 MMHG | SYSTOLIC BLOOD PRESSURE: 110 MMHG | OXYGEN SATURATION: 95 % | HEIGHT: 69 IN | WEIGHT: 217 LBS

## 2021-03-15 DIAGNOSIS — C67.9 MALIGNANT NEOPLASM OF URINARY BLADDER, UNSPECIFIED SITE (HCC): Primary | ICD-10-CM

## 2021-03-15 PROCEDURE — 52000 CYSTOURETHROSCOPY: CPT | Performed by: UROLOGY

## 2021-03-15 NOTE — PROGRESS NOTES
Cystoscopy Operative Note    Patient:  Melany Don  MRN: [id number]  YOB: 1955    Date: 03/15/21  Surgeon: Shaun Berger MD  Anesthesia: Urethral 2% Xylocaine   Indications:     Bladder Cancer  Here with pathology results. Low grade noninvasive    BPH- worsening LUTS      Position: Supine    Findings:   The patient was prepped and draped in the usual sterile fashion. The flexible cystoscope was advanced through the urethra and into the bladder. The bladder was thoroughly inspected and the following was noted:    Residual Urine: Minimal  Urethra: No abnormalities of the urethra are noted. Prostate: mod lateral lobe hypertrophy+  Bladder: No tumors or CIS noted. Dystrophic calcification over left lateral wall  No bladder diverticulum. Mild trabeculation noted. Ureters: Clear efflux from both ureters. Orifices with normal configuration and location. The cystoscope was removed. The patient tolerated the procedure well.   Mild LUTS not bohtered  Cystoscopy in one year

## 2021-04-20 NOTE — PROGRESS NOTES
[facility]  [address] [address]  Dept: [phone]  Dept Fax: [phone]  Loc: [phone]     Visit Date:  4/23/2021    Patient:  Sadie eMndez  YOB: 1955    HPI:     Chief Complaint   Patient presents with   Stella Angulo Doctor     previous pcp dr Lady Laws Other     allergies are bad       HPI    Patient who had a s/p open incisional hernia repair with mesh in December 2020 at which time he had some cardiac issues, 2 heart caths were done over the winter, conclusion January 22 cardiology noted that there was a successful staged PCI to the left circumflex and OM 2, patent LAD stents with mild nonobstructive disease, patient was informed that previous bypasses were open    Bladder cancer, stable and annual cysto will be repeated with close monitoring by urology     Chronic Emphysema like changes on CT, reviewed , patient states no cough or SOB    CAD, S/P cardia cath and CABG  He continues to see cardiology Dr. Chloé Ledezma and urology Dr. Jenna Soto    Patient is feeling well today, energetic, no chest pain, no shortness of breath  takinf lasix as needed per cardiology instruction  Medications  Prior to Visit Medications    Medication Sig Taking? Authorizing Provider   furosemide (LASIX) 20 MG tablet Take 1 tablet by mouth as needed (FOR LE SWELLING) Yes Drucilla Harada, MD   atorvastatin (LIPITOR) 40 MG tablet Take 1 tablet by mouth nightly Yes Pauly Gipson, DO   clopidogrel (PLAVIX) 75 MG tablet Take 1 tablet by mouth daily Yes Pauly Gipson, DO   metoprolol tartrate (LOPRESSOR) 25 MG tablet Take 1 tablet by mouth 2 times daily Yes Pauly Gipson, DO   nitroGLYCERIN (NITROSTAT) 0.4 MG SL tablet up to max of 3 total doses. If no relief after 1 dose, call 911.  Yes PARESH Ellis NP   aspirin 81 MG chewable tablet Take 81 mg by mouth daily Yes Historical Provider, MD Cholecalciferol (VITAMIN D) 50 MCG (2000 UT) CAPS capsule Take 2,000 Units by mouth daily Yes Historical Provider, MD   vitamin C (ASCORBIC ACID) 500 MG tablet Take 250 mg by mouth 2 times daily Yes Historical Provider, MD   tamsulosin (FLOMAX) 0.4 MG capsule Take 1 capsule by mouth daily Yes Vira Eller MD   gabapentin (NEURONTIN) 400 MG capsule Take 1 capsule by mouth 2 times daily. Yes Historical Provider, MD   acetaminophen (TYLENOL) 325 MG tablet Take 2 tablets by mouth every 6 hours as needed for Pain Yes PARESH Peraza - HELENA        The patient is allergic to vicodin [hydrocodone-acetaminophen]. Past Medical History  Clive Rivera  has a past medical history of Abdominal pain, Abnormal cardiovascular stress test, CAD (coronary artery disease), Clostridium difficile diarrhea, Displacement of cervical intervertebral disc without myelopathy, HTN (hypertension), Hyperlipidemia, Impaired fasting blood sugar, Neck pain, Neuralgia, neuritis, and radiculitis, unspecified, Shoulder region pain, and Sprain and strain of unspecified site of shoulder and upper arm. Past Surgical History  The patient  has a past surgical history that includes other surgical history (Left, 8/12/14, 1/09/15); other surgical history (Left, 09/23/2014); other surgical history (Left, 7/17/2015 , 1/22/16); Shoulder arthroscopy (Right, 04/27/2016); Biceps Tenodesis (Right, 11/23/2016); pr cabg, artery-vein, single (N/A, 07/06/2018); Rotator cuff repair (Left, 01/29/2019); Colonoscopy (N/A, 12/10/2019); Umbilical hernia repair (N/A, 12/18/2019); Cystoscopy (N/A, 03/09/2020); ventral hernia repair (N/A, 11/19/2020); Coronary angioplasty with stent (12/18/2020); Coronary angioplasty with stent (01/22/2021); and transesophageal echocardiogram (11/15/2020). Family History  This patient's family history includes COPD in his mother; Colon Polyps in his brother; High Blood Pressure in his father.     Social History  Clive Rivera  reports that he quit smoking about 13 years ago. His smoking use included cigarettes. He started smoking about 46 years ago. He has a 50.00 pack-year smoking history. He has never used smokeless tobacco. He reports that he does not drink alcohol or use drugs. Health Maintenance:    Health Maintenance   Topic Date Due    Annual Wellness Visit (AWV)  04/15/2021    Hepatitis C screen  12/31/2099 (Originally 1955)    A1C test (Diabetic or Prediabetic)  10/23/2021    Lipid screen  10/23/2021    Low dose CT lung screening  10/23/2021    Potassium monitoring  01/23/2022    Creatinine monitoring  01/23/2022    DTaP/Tdap/Td vaccine (3 - Td) 10/22/2026    Colon cancer screen colonoscopy  12/10/2029    Flu vaccine  Completed    Pneumococcal 65+ years Vaccine  Completed    COVID-19 Vaccine  Completed    AAA screen  Completed    Hepatitis A vaccine  Aged Out    Hepatitis B vaccine  Aged Out    Hib vaccine  Aged Out    Meningococcal (ACWY) vaccine  Aged Out       Subjective:      Review of Systems   Constitutional: Negative for chills, fatigue and fever. HENT: Negative for congestion. Respiratory: Negative for cough, shortness of breath and wheezing. Cardiovascular: Negative for chest pain, palpitations and leg swelling. Gastrointestinal: Negative for abdominal pain. Neurological: Negative for dizziness. Psychiatric/Behavioral: Negative for agitation. Objective:     /78 (Site: Right Upper Arm, Position: Sitting)   Pulse 64   Wt 216 lb (98 kg)   SpO2 95%   BMI 31.90 kg/m²     Physical Exam  Vitals signs and nursing note reviewed. Constitutional:       Appearance: Normal appearance. He is normal weight. He is not ill-appearing. HENT:      Right Ear: Tympanic membrane normal.      Left Ear: Tympanic membrane normal.   Eyes:      Pupils: Pupils are equal, round, and reactive to light. Cardiovascular:      Rate and Rhythm: Normal rate and regular rhythm.       Heart sounds: Normal heart sounds, S1 normal and S2 normal.   Pulmonary:      Effort: Pulmonary effort is normal.      Breath sounds: Normal breath sounds. Abdominal:      General: Bowel sounds are normal.      Palpations: Abdomen is soft. Musculoskeletal:      Right lower leg: No edema. Left lower leg: No edema. Skin:     General: Skin is warm. Capillary Refill: Capillary refill takes less than 2 seconds. Neurological:      Mental Status: He is alert and oriented to person, place, and time. Psychiatric:         Attention and Perception: Attention normal.         Mood and Affect: Mood normal.         Behavior: Behavior normal. Behavior is cooperative. Thought Content: Thought content normal.         Judgment: Judgment normal.         Labs Reviewed 4/23/2021:    Lab Results   Component Value Date    WBC 9.3 01/23/2021    HGB 14.8 01/23/2021    HCT 46.0 01/23/2021     01/23/2021    CHOL 110 10/23/2020    TRIG 161 (H) 10/23/2020    HDL 39 (L) 10/23/2020    ALT 19 11/19/2020    AST 20 11/19/2020     01/23/2021    K 4.4 01/23/2021     01/23/2021    CREATININE 0.64 (L) 01/23/2021    BUN 10 01/23/2021    CO2 21 01/23/2021    TSH 1.87 09/30/2014    PSA 0.41 10/23/2020    INR 0.9 07/09/2018    LABA1C 5.9 10/23/2020       Assessment/Plan      1. Essential hypertension  Patient to continue to follow up with cardiology   - CBC Auto Differential; Future  - Comprehensive Metabolic Panel; Future    2. Other emphysema ([facility] [address]  Annual CT screenings encouraged as previously noted     3. CAD in native artery      4. Pure hypercholesterolemia    - TSH without Reflex; Future    5. S/P cardiac cath  Stable, feeling well    6. Impaired fasting blood sugar  I have reviewed historical vital signs, lab work, and most recent patient encounter. Discussion of healthful lifestyle,  time allotted for questions and answer.   Back in 4 months with an office visit and labs as appropriate  - Hemoglobin A1C; Future  - TSH without Reflex;

## 2021-04-23 ENCOUNTER — OFFICE VISIT (OUTPATIENT)
Dept: FAMILY MEDICINE CLINIC | Age: 66
End: 2021-04-23
Payer: MEDICARE

## 2021-04-23 VITALS
BODY MASS INDEX: 31.9 KG/M2 | OXYGEN SATURATION: 95 % | WEIGHT: 216 LBS | SYSTOLIC BLOOD PRESSURE: 118 MMHG | HEART RATE: 64 BPM | DIASTOLIC BLOOD PRESSURE: 78 MMHG

## 2021-04-23 DIAGNOSIS — Z12.5 PROSTATE CANCER SCREENING: ICD-10-CM

## 2021-04-23 DIAGNOSIS — E78.00 PURE HYPERCHOLESTEROLEMIA: ICD-10-CM

## 2021-04-23 DIAGNOSIS — I10 ESSENTIAL HYPERTENSION: Primary | ICD-10-CM

## 2021-04-23 DIAGNOSIS — R73.01 IMPAIRED FASTING BLOOD SUGAR: ICD-10-CM

## 2021-04-23 DIAGNOSIS — I25.10 CAD IN NATIVE ARTERY: ICD-10-CM

## 2021-04-23 DIAGNOSIS — J43.8 OTHER EMPHYSEMA (HCC): ICD-10-CM

## 2021-04-23 DIAGNOSIS — Z98.890 S/P CARDIAC CATH: ICD-10-CM

## 2021-04-23 PROCEDURE — G8417 CALC BMI ABV UP PARAM F/U: HCPCS | Performed by: NURSE PRACTITIONER

## 2021-04-23 PROCEDURE — 1123F ACP DISCUSS/DSCN MKR DOCD: CPT | Performed by: NURSE PRACTITIONER

## 2021-04-23 PROCEDURE — 3017F COLORECTAL CA SCREEN DOC REV: CPT | Performed by: NURSE PRACTITIONER

## 2021-04-23 PROCEDURE — 3023F SPIROM DOC REV: CPT | Performed by: NURSE PRACTITIONER

## 2021-04-23 PROCEDURE — G8926 SPIRO NO PERF OR DOC: HCPCS | Performed by: NURSE PRACTITIONER

## 2021-04-23 PROCEDURE — 99212 OFFICE O/P EST SF 10 MIN: CPT | Performed by: NURSE PRACTITIONER

## 2021-04-23 PROCEDURE — 4040F PNEUMOC VAC/ADMIN/RCVD: CPT | Performed by: NURSE PRACTITIONER

## 2021-04-23 PROCEDURE — G8427 DOCREV CUR MEDS BY ELIG CLIN: HCPCS | Performed by: NURSE PRACTITIONER

## 2021-04-23 PROCEDURE — 1036F TOBACCO NON-USER: CPT | Performed by: NURSE PRACTITIONER

## 2021-04-23 PROCEDURE — 99214 OFFICE O/P EST MOD 30 MIN: CPT | Performed by: NURSE PRACTITIONER

## 2021-04-23 RX ORDER — GABAPENTIN 100 MG/1
200 CAPSULE ORAL 2 TIMES DAILY
Qty: 120 CAPSULE | Refills: 2 | Status: SHIPPED | OUTPATIENT
Start: 2021-04-23 | End: 2021-08-05

## 2021-04-23 RX ORDER — FLUTICASONE PROPIONATE 50 MCG
2 SPRAY, SUSPENSION (ML) NASAL DAILY
Qty: 1 BOTTLE | Refills: 5 | Status: SHIPPED | OUTPATIENT
Start: 2021-04-23 | End: 2022-04-25

## 2021-04-23 ASSESSMENT — PATIENT HEALTH QUESTIONNAIRE - PHQ9
2. FEELING DOWN, DEPRESSED OR HOPELESS: 0
1. LITTLE INTEREST OR PLEASURE IN DOING THINGS: 0
SUM OF ALL RESPONSES TO PHQ QUESTIONS 1-9: 0
SUM OF ALL RESPONSES TO PHQ QUESTIONS 1-9: 0

## 2021-04-26 PROBLEM — Z12.5 PROSTATE CANCER SCREENING: Status: ACTIVE | Noted: 2021-04-26

## 2021-04-26 ASSESSMENT — ENCOUNTER SYMPTOMS
WHEEZING: 0
SHORTNESS OF BREATH: 0
COUGH: 0
ABDOMINAL PAIN: 0

## 2021-05-03 RX ORDER — TAMSULOSIN HYDROCHLORIDE 0.4 MG/1
0.4 CAPSULE ORAL DAILY
Qty: 90 CAPSULE | Refills: 3 | Status: SHIPPED | OUTPATIENT
Start: 2021-05-03 | End: 2021-08-05 | Stop reason: SDUPTHER

## 2021-05-26 PROBLEM — Z12.5 PROSTATE CANCER SCREENING: Status: RESOLVED | Noted: 2021-04-26 | Resolved: 2021-05-26

## 2021-07-23 ENCOUNTER — TELEPHONE (OUTPATIENT)
Dept: CARDIOLOGY | Age: 66
End: 2021-07-23

## 2021-07-23 NOTE — TELEPHONE ENCOUNTER
I spoke to pt. /68 today which is not really different. The only recent change was he had lower extremity edema and took his lasix 4 days in a row, which is longer than usual dosing. The edema has improved now and he is off the lasix (uses prn). Pt does have compression socks. I encouraged him to use them. Also, pt walks 2 miles every morning and usually could get his heart rate up to 100, but now he is only recording it from 60-70. Increased fatigue--He can fall asleep just sitting down. Next appt with Dr. Jose Contreras is 8/5. Pt will wait for 's review. In the meantime, he knows and agrees to use ER for any symptoms. See Mercedes's note below as well.

## 2021-07-23 NOTE — TELEPHONE ENCOUNTER
Pt called to ask if his metoprolol could be too high of a dose for him. Pt is is concerned since he is feeling tired, fatigued,like he is slowing down and his BP has been low. Please advise.     [phone]    Last Appt:  2/18/2021  Next Appt:   8/5/2021  Med verified in Epic

## 2021-08-05 ENCOUNTER — OFFICE VISIT (OUTPATIENT)
Dept: CARDIOLOGY | Age: 66
End: 2021-08-05
Payer: MEDICARE

## 2021-08-05 VITALS
HEART RATE: 62 BPM | SYSTOLIC BLOOD PRESSURE: 134 MMHG | DIASTOLIC BLOOD PRESSURE: 78 MMHG | HEIGHT: 69 IN | WEIGHT: 206 LBS | BODY MASS INDEX: 30.51 KG/M2

## 2021-08-05 DIAGNOSIS — I25.10 CORONARY ARTERY DISEASE INVOLVING NATIVE CORONARY ARTERY OF NATIVE HEART WITHOUT ANGINA PECTORIS: Primary | ICD-10-CM

## 2021-08-05 PROCEDURE — G8427 DOCREV CUR MEDS BY ELIG CLIN: HCPCS | Performed by: INTERNAL MEDICINE

## 2021-08-05 PROCEDURE — 93010 ELECTROCARDIOGRAM REPORT: CPT | Performed by: INTERNAL MEDICINE

## 2021-08-05 PROCEDURE — G8417 CALC BMI ABV UP PARAM F/U: HCPCS | Performed by: INTERNAL MEDICINE

## 2021-08-05 PROCEDURE — 1036F TOBACCO NON-USER: CPT | Performed by: INTERNAL MEDICINE

## 2021-08-05 PROCEDURE — 4040F PNEUMOC VAC/ADMIN/RCVD: CPT | Performed by: INTERNAL MEDICINE

## 2021-08-05 PROCEDURE — 99214 OFFICE O/P EST MOD 30 MIN: CPT | Performed by: INTERNAL MEDICINE

## 2021-08-05 PROCEDURE — 3017F COLORECTAL CA SCREEN DOC REV: CPT | Performed by: INTERNAL MEDICINE

## 2021-08-05 PROCEDURE — 1123F ACP DISCUSS/DSCN MKR DOCD: CPT | Performed by: INTERNAL MEDICINE

## 2021-08-05 PROCEDURE — 93005 ELECTROCARDIOGRAM TRACING: CPT | Performed by: INTERNAL MEDICINE

## 2021-08-05 RX ORDER — TAMSULOSIN HYDROCHLORIDE 0.4 MG/1
CAPSULE ORAL
Qty: 30 CAPSULE | Refills: 0 | Status: SHIPPED | OUTPATIENT
Start: 2021-08-05 | End: 2021-08-06

## 2021-08-05 NOTE — PROGRESS NOTES
di                       Today's Date: 8/5/2021  Patient Name: María Seymour  Patient's age: 74 y.o., 1955          CC: the patient is a 74 y.o.  male is in the office for CAD   Overall pretty stable from cardiac standpoint with no recent chest pain or angina. No significant dyspnea on exertion. Able to walk 2 miles on daily basis without any exertional dyspnea. Also able to walk few blocks on incline without any issues. Last month, he had few days of feeling extremely tired and lethargic. His blood pressure and heart rate was running on lower side. He also complained of some congestion in the lungs. Restarted Lasix after which his symptoms improved. No lower extremity edema  No orthopnea palpitations      Past Medical History:   has a past medical history of Abdominal pain, Abnormal cardiovascular stress test, CAD (coronary artery disease), Clostridium difficile diarrhea, Displacement of cervical intervertebral disc without myelopathy, HTN (hypertension), Hyperlipidemia, Impaired fasting blood sugar, Neck pain, Neuralgia, neuritis, and radiculitis, unspecified, Shoulder region pain, and Sprain and strain of unspecified site of shoulder and upper arm. Past Surgical History:   has a past surgical history that includes other surgical history (Left, 8/12/14, 1/09/15); other surgical history (Left, 09/23/2014); other surgical history (Left, 7/17/2015 , 1/22/16); Shoulder arthroscopy (Right, 04/27/2016); Biceps Tenodesis (Right, 11/23/2016); pr cabg, artery-vein, single (N/A, 07/06/2018); Rotator cuff repair (Left, 01/29/2019); Colonoscopy (N/A, 12/10/2019); Umbilical hernia repair (N/A, 12/18/2019); Cystoscopy (N/A, 03/09/2020); ventral hernia repair (N/A, 11/19/2020); Coronary angioplasty with stent (12/18/2020); Coronary angioplasty with stent (01/22/2021); and transesophageal echocardiogram (11/15/2020).      Home Medications:    Prior to Admission medications    Medication Sig Start Date End Date Taking? Authorizing Provider   metoprolol tartrate (LOPRESSOR) 25 MG tablet Take 0.5 tablets by mouth 2 times daily 8/5/21  Yes Davina Cueto MD   fluticasone (FLONASE) 50 MCG/ACT nasal spray 2 sprays by Each Nostril route daily 4/23/21  Yes PARESH Brady CNP   furosemide (LASIX) 20 MG tablet Take 1 tablet by mouth as needed (FOR LE SWELLING) 2/18/21  Yes Davina Cueto MD   atorvastatin (LIPITOR) 40 MG tablet Take 1 tablet by mouth nightly 1/19/21  Yes Pauly Gipson,    clopidogrel (PLAVIX) 75 MG tablet Take 1 tablet by mouth daily 1/19/21  Yes Pauly Gipson DO   nitroGLYCERIN (NITROSTAT) 0.4 MG SL tablet up to max of 3 total doses. If no relief after 1 dose, call 911. 12/19/20  Yes PARESH Cortés - NP   aspirin 81 MG chewable tablet Take 81 mg by mouth daily   Yes Historical Provider, MD   Cholecalciferol (VITAMIN D) 50 MCG (2000 UT) CAPS capsule Take 2,000 Units by mouth daily   Yes Historical Provider, MD   vitamin C (ASCORBIC ACID) 500 MG tablet Take 250 mg by mouth 2 times daily   Yes Historical Provider, MD   tamsulosin (FLOMAX) 0.4 MG capsule Take 1 capsule by mouth daily 6/22/20  Yes De Mcgee MD   acetaminophen (TYLENOL) 325 MG tablet Take 2 tablets by mouth every 6 hours as needed for Pain 7/9/18  Yes PARESH Candelaria - CNP       Allergies:  Vicodin [hydrocodone-acetaminophen]    Social History:   reports that he quit smoking about 13 years ago. His smoking use included cigarettes. He started smoking about 46 years ago. He has a 50.00 pack-year smoking history. He has never used smokeless tobacco. He reports that he does not drink alcohol and does not use drugs. Family History:    Family History   Problem Relation Age of Onset    High Blood Pressure Father     Colon Polyps Brother     COPD Mother        REVIEW OF SYSTEMS:  CONSTITUTIONAL:NEGATIVE  HEENT:NEG  Cardiovascular: No chest pain, negative for dyspnea on exertion, No palpitations.  Lower extremity edema: No  RESPIRATORY: NEG  GASTROINTESTINAL:  negative  GENITOURINARY:  negative  INTEGUMENT:  negative  MUSCULOSKELETAL:  positive for  pain  NEUROLOGICAL:  negative    PHYSICAL EXAM:      /78   Pulse 62   Ht 5' 9\" (1.753 m)   Wt 206 lb (93.4 kg)   BMI 30.42 kg/m²    HEENT: PERRL, no cervical lymphadenopathy. No masses palpable. Cardiovascular:  · The apical impulse is not displaced  · Heart  Sounds:RRR, S4  · Jugular venous pulsation Normal  · The carotid upstroke is normal  · Peripheral pulses are symmetrical and full  Respiratory: Good respiratory effort. On auscultation: clear to auscultation bilaterally  Abdomen:  · No masses or tenderness  · Bowel sounds present  Extremities:  ·  No Cyanosis or Clubbing  ·  Lower extremity edema: Trace bilateral  Skin: Warm and dry    Right groin: soft, non tender and without hematoma. Cardiac data:    EKG 8/5/21: Normal sinus rhythm, old inferoposterior and lateral infarct. No new change from before    TTE 11/15/2020  Not all walls well visualized, cannot comment on specific wall motion. Normal left ventricular diameter. Mild left ventricular hypertrophy. Left ventricular systolic function is mildly reduced. Left ventricular  ejection fraction 45 %. Grade I (mild) left ventricular diastolic dysfunction. Left atrium is mildly dilated. Normal structure and function of other valves. Aortic root is mildly dilated at 4.3 cm. Nuclear stress test 12/9/2020  1. Large inferolateral infarction with significant vernon-infarct ischemia. 2.  LVEF 46%. Cardiac cath 12/18/2020   1. Severe Multivessel CAD   2. Patent LIMA however distally occluded and not supplying LAD   3. Occluded SVG-RCA with left to right Collaterals   4. Successful PTCA. AGUILA of mid LAD   5. Residual high grade stenosis in small LCX   6. Normal LV systolic function.       Cardiac cath 1/22/21  Successful staged PCI to Left circumflex and OM2  Patent LAD stents with mild non-obstructive

## 2021-08-06 ENCOUNTER — TELEPHONE (OUTPATIENT)
Dept: UROLOGY | Age: 66
End: 2021-08-06

## 2021-08-06 ENCOUNTER — TELEPHONE (OUTPATIENT)
Dept: CARDIOLOGY | Age: 66
End: 2021-08-06

## 2021-08-06 ENCOUNTER — HOSPITAL ENCOUNTER (OUTPATIENT)
Dept: NON INVASIVE DIAGNOSTICS | Age: 66
Discharge: HOME | End: 2021-08-06
Payer: MEDICARE

## 2021-08-06 DIAGNOSIS — I25.10 CORONARY ARTERY DISEASE INVOLVING NATIVE CORONARY ARTERY OF NATIVE HEART WITHOUT ANGINA PECTORIS: ICD-10-CM

## 2021-08-06 DIAGNOSIS — C67.9 MALIGNANT NEOPLASM OF URINARY BLADDER, UNSPECIFIED SITE (HCC): Primary | ICD-10-CM

## 2021-08-06 DIAGNOSIS — N40.0 BENIGN PROSTATIC HYPERPLASIA WITHOUT LOWER URINARY TRACT SYMPTOMS: ICD-10-CM

## 2021-08-06 PROCEDURE — 93308 TTE F-UP OR LMTD: CPT

## 2021-08-06 RX ORDER — TAMSULOSIN HYDROCHLORIDE 0.4 MG/1
0.4 CAPSULE ORAL DAILY
Qty: 90 CAPSULE | Refills: 3 | Status: SHIPPED | OUTPATIENT
Start: 2021-08-06 | End: 2022-08-29

## 2021-08-06 NOTE — TELEPHONE ENCOUNTER
Pt notified of echo results by phone. He states understanding and agreement. No complaints stated.   Has routine follow up scheduled in Feb.

## 2021-08-06 NOTE — TELEPHONE ENCOUNTER
Patient called the office requesting a refill of Flomax 0.4 mg capsules. Patient states he was told by the pharmacy this was declined. Writer called pharmacy to verify the prescription refill was received yesterday. Writer informed patient and he verbalized understanding.

## 2021-08-06 NOTE — TELEPHONE ENCOUNTER
Please address echo results in Dr. Murtaza Woods absence. Thank you.     Last Appt:  8/5/2021  Next Appt:   2/10/22

## 2021-10-11 ENCOUNTER — HOSPITAL ENCOUNTER (OUTPATIENT)
Dept: LAB | Age: 66
Discharge: HOME | End: 2021-10-11
Payer: MEDICARE

## 2021-10-11 DIAGNOSIS — I10 ESSENTIAL HYPERTENSION: ICD-10-CM

## 2021-10-11 DIAGNOSIS — Z12.5 PROSTATE CANCER SCREENING: ICD-10-CM

## 2021-10-11 DIAGNOSIS — R73.01 IMPAIRED FASTING BLOOD SUGAR: ICD-10-CM

## 2021-10-11 DIAGNOSIS — E78.00 PURE HYPERCHOLESTEROLEMIA: ICD-10-CM

## 2021-10-11 LAB
ABSOLUTE EOS #: 0.41 K/UL (ref 0–0.44)
ABSOLUTE IMMATURE GRANULOCYTE: 0.06 K/UL (ref 0–0.3)
ABSOLUTE LYMPH #: 3.03 K/UL (ref 1.1–3.7)
ABSOLUTE MONO #: 0.61 K/UL (ref 0.1–1.2)
ALBUMIN SERPL-MCNC: 4.2 G/DL (ref 3.5–5.2)
ALBUMIN/GLOBULIN RATIO: 1.6 (ref 1–2.5)
ALP BLD-CCNC: 136 U/L (ref 40–129)
ALT SERPL-CCNC: 18 U/L (ref 5–41)
ANION GAP SERPL CALCULATED.3IONS-SCNC: 7 MMOL/L (ref 9–17)
AST SERPL-CCNC: 17 U/L
BASOPHILS # BLD: 1 % (ref 0–2)
BASOPHILS ABSOLUTE: 0.05 K/UL (ref 0–0.2)
BILIRUB SERPL-MCNC: 0.58 MG/DL (ref 0.3–1.2)
BUN BLDV-MCNC: 14 MG/DL (ref 8–23)
BUN/CREAT BLD: 19 (ref 9–20)
CALCIUM SERPL-MCNC: 9.1 MG/DL (ref 8.6–10.4)
CHLORIDE BLD-SCNC: 107 MMOL/L (ref 98–107)
CO2: 26 MMOL/L (ref 20–31)
CREAT SERPL-MCNC: 0.73 MG/DL (ref 0.7–1.2)
DIFFERENTIAL TYPE: ABNORMAL
EOSINOPHILS RELATIVE PERCENT: 5 % (ref 1–4)
ESTIMATED AVERAGE GLUCOSE: 111 MG/DL
GFR AFRICAN AMERICAN: >60 ML/MIN
GFR NON-AFRICAN AMERICAN: >60 ML/MIN
GFR SERPL CREATININE-BSD FRML MDRD: ABNORMAL ML/MIN/{1.73_M2}
GFR SERPL CREATININE-BSD FRML MDRD: ABNORMAL ML/MIN/{1.73_M2}
GLUCOSE BLD-MCNC: 115 MG/DL (ref 70–99)
HBA1C MFR BLD: 5.5 % (ref 4–6)
HCT VFR BLD CALC: 46.5 % (ref 40.7–50.3)
HEMOGLOBIN: 15.1 G/DL (ref 13–17)
IMMATURE GRANULOCYTES: 1 %
LYMPHOCYTES # BLD: 38 % (ref 24–43)
MCH RBC QN AUTO: 27.6 PG (ref 25.2–33.5)
MCHC RBC AUTO-ENTMCNC: 32.5 G/DL (ref 25.2–33.5)
MCV RBC AUTO: 85 FL (ref 82.6–102.9)
MONOCYTES # BLD: 8 % (ref 3–12)
NRBC AUTOMATED: 0 PER 100 WBC
PDW BLD-RTO: 14.2 % (ref 11.8–14.4)
PLATELET # BLD: 227 K/UL (ref 138–453)
PLATELET ESTIMATE: ABNORMAL
PMV BLD AUTO: 10.2 FL (ref 8.1–13.5)
POTASSIUM SERPL-SCNC: 4.6 MMOL/L (ref 3.7–5.3)
PROSTATE SPECIFIC ANTIGEN: 0.28 UG/L
RBC # BLD: 5.47 M/UL (ref 4.21–5.77)
RBC # BLD: ABNORMAL 10*6/UL
SEG NEUTROPHILS: 47 % (ref 36–65)
SEGMENTED NEUTROPHILS ABSOLUTE COUNT: 3.82 K/UL (ref 1.5–8.1)
SODIUM BLD-SCNC: 140 MMOL/L (ref 135–144)
TOTAL PROTEIN: 6.9 G/DL (ref 6.4–8.3)
TSH SERPL DL<=0.05 MIU/L-ACNC: 3.71 MIU/L (ref 0.3–5)
WBC # BLD: 8 K/UL (ref 3.5–11.3)
WBC # BLD: ABNORMAL 10*3/UL

## 2021-10-11 PROCEDURE — 80053 COMPREHEN METABOLIC PANEL: CPT

## 2021-10-11 PROCEDURE — 83036 HEMOGLOBIN GLYCOSYLATED A1C: CPT

## 2021-10-11 PROCEDURE — 36415 COLL VENOUS BLD VENIPUNCTURE: CPT

## 2021-10-11 PROCEDURE — G0103 PSA SCREENING: HCPCS

## 2021-10-11 PROCEDURE — 84443 ASSAY THYROID STIM HORMONE: CPT

## 2021-10-11 PROCEDURE — 85025 COMPLETE CBC W/AUTO DIFF WBC: CPT

## 2021-10-15 ENCOUNTER — IMMUNIZATION (OUTPATIENT)
Dept: LAB | Age: 66
End: 2021-10-15
Payer: MEDICARE

## 2021-10-15 PROCEDURE — 90694 VACC AIIV4 NO PRSRV 0.5ML IM: CPT | Performed by: FAMILY MEDICINE

## 2021-10-15 PROCEDURE — PBSHW INFLUENZA, QUADV, ADJUVANTED, 65 YRS +, IM, PF, PREFILL SYR, 0.5ML (FLUAD): Performed by: FAMILY MEDICINE

## 2021-10-22 ENCOUNTER — OFFICE VISIT (OUTPATIENT)
Dept: FAMILY MEDICINE CLINIC | Age: 66
End: 2021-10-22
Payer: MEDICARE

## 2021-10-22 VITALS
SYSTOLIC BLOOD PRESSURE: 116 MMHG | BODY MASS INDEX: 29.78 KG/M2 | RESPIRATION RATE: 20 BRPM | HEART RATE: 65 BPM | TEMPERATURE: 96.8 F | WEIGHT: 208 LBS | HEIGHT: 70 IN | DIASTOLIC BLOOD PRESSURE: 62 MMHG | OXYGEN SATURATION: 97 %

## 2021-10-22 DIAGNOSIS — I10 PRIMARY HYPERTENSION: ICD-10-CM

## 2021-10-22 DIAGNOSIS — I25.10 CAD IN NATIVE ARTERY: ICD-10-CM

## 2021-10-22 DIAGNOSIS — Z00.00 ROUTINE GENERAL MEDICAL EXAMINATION AT A HEALTH CARE FACILITY: Primary | ICD-10-CM

## 2021-10-22 DIAGNOSIS — J43.8 OTHER EMPHYSEMA (HCC): ICD-10-CM

## 2021-10-22 DIAGNOSIS — E78.00 PURE HYPERCHOLESTEROLEMIA: ICD-10-CM

## 2021-10-22 PROCEDURE — G0463 HOSPITAL OUTPT CLINIC VISIT: HCPCS | Performed by: NURSE PRACTITIONER

## 2021-10-22 PROCEDURE — G8484 FLU IMMUNIZE NO ADMIN: HCPCS | Performed by: NURSE PRACTITIONER

## 2021-10-22 PROCEDURE — 3017F COLORECTAL CA SCREEN DOC REV: CPT | Performed by: NURSE PRACTITIONER

## 2021-10-22 PROCEDURE — 1123F ACP DISCUSS/DSCN MKR DOCD: CPT | Performed by: NURSE PRACTITIONER

## 2021-10-22 PROCEDURE — G0439 PPPS, SUBSEQ VISIT: HCPCS | Performed by: NURSE PRACTITIONER

## 2021-10-22 PROCEDURE — 99397 PER PM REEVAL EST PAT 65+ YR: CPT | Performed by: NURSE PRACTITIONER

## 2021-10-22 PROCEDURE — 4040F PNEUMOC VAC/ADMIN/RCVD: CPT | Performed by: NURSE PRACTITIONER

## 2021-10-22 SDOH — ECONOMIC STABILITY: FOOD INSECURITY: WITHIN THE PAST 12 MONTHS, THE FOOD YOU BOUGHT JUST DIDN'T LAST AND YOU DIDN'T HAVE MONEY TO GET MORE.: NEVER TRUE

## 2021-10-22 SDOH — ECONOMIC STABILITY: FOOD INSECURITY: WITHIN THE PAST 12 MONTHS, YOU WORRIED THAT YOUR FOOD WOULD RUN OUT BEFORE YOU GOT MONEY TO BUY MORE.: NEVER TRUE

## 2021-10-22 ASSESSMENT — ENCOUNTER SYMPTOMS
SHORTNESS OF BREATH: 0
ABDOMINAL PAIN: 0
ABDOMINAL DISTENTION: 0
BLOOD IN STOOL: 0
COUGH: 0
WHEEZING: 0
CONSTIPATION: 0

## 2021-10-22 ASSESSMENT — SOCIAL DETERMINANTS OF HEALTH (SDOH): HOW HARD IS IT FOR YOU TO PAY FOR THE VERY BASICS LIKE FOOD, HOUSING, MEDICAL CARE, AND HEATING?: NOT VERY HARD

## 2021-10-22 ASSESSMENT — PATIENT HEALTH QUESTIONNAIRE - PHQ9
SUM OF ALL RESPONSES TO PHQ QUESTIONS 1-9: 0
SUM OF ALL RESPONSES TO PHQ9 QUESTIONS 1 & 2: 0
SUM OF ALL RESPONSES TO PHQ QUESTIONS 1-9: 0
SUM OF ALL RESPONSES TO PHQ QUESTIONS 1-9: 0
2. FEELING DOWN, DEPRESSED OR HOPELESS: 0
1. LITTLE INTEREST OR PLEASURE IN DOING THINGS: 0

## 2021-10-22 ASSESSMENT — LIFESTYLE VARIABLES
HOW OFTEN DO YOU HAVE A DRINK CONTAINING ALCOHOL: 0
AUDIT TOTAL SCORE: INCOMPLETE
AUDIT-C TOTAL SCORE: INCOMPLETE
HOW OFTEN DO YOU HAVE A DRINK CONTAINING ALCOHOL: NEVER

## 2021-10-22 NOTE — PATIENT INSTRUCTIONS
Personalized Preventive Plan for Gita Valdez - 10/22/2021  Medicare offers a range of preventive health benefits. Some of the tests and screenings are paid in full while other may be subject to a deductible, co-insurance, and/or copay. Some of these benefits include a comprehensive review of your medical history including lifestyle, illnesses that may run in your family, and various assessments and screenings as appropriate. After reviewing your medical record and screening and assessments performed today your provider may have ordered immunizations, labs, imaging, and/or referrals for you. A list of these orders (if applicable) as well as your Preventive Care list are included within your After Visit Summary for your review. Other Preventive Recommendations:    · A preventive eye exam performed by an eye specialist is recommended every 1-2 years to screen for glaucoma; cataracts, macular degeneration, and other eye disorders. · A preventive dental visit is recommended every 6 months. · Try to get at least 150 minutes of exercise per week or 10,000 steps per day on a pedometer . · Order or download the FREE \"Exercise & Physical Activity: Your Everyday Guide\" from [employer]. Call 4-871.905.2820 or search [employer] online. · You need 9799-8924 mg of calcium and 1338-9511 IU of vitamin D per day. It is possible to meet your calcium requirement with diet alone, but a vitamin D supplement is usually necessary to meet this goal.  · When exposed to the sun, use a sunscreen that protects against both UVA and UVB radiation with an SPF of 30 or greater. Reapply every 2 to 3 hours or after sweating, drying off with a towel, or swimming. · Always wear a seat belt when traveling in a car. Always wear a helmet when riding a bicycle or motorcycle.

## 2021-10-22 NOTE — PROGRESS NOTES
Medicare Annual Wellness Visit  Name: Britney Rm Date: 10/22/2021   MRN: [id number] Sex: Male   Age: 74 y.o. Ethnicity: Non- / Non    : 1955 Race: White (non-)      Lurene Lanes is here for Medicare AWV (Hypertension, Emphysema, Hyperlipidemia- last lipids 10/23/2020)  wellness exam and chronic conditions     open incisional hernia repair with mesh in   History of heart caths and stents, CAD, stable and chronic        Bladder cancer, stable and annual cysto will be repeated with close monitoring by urology , flomax continue, clean exam in spring     Chronic Emphysema like changes on CT, reviewed , patient states no cough or SOB     CAD, S/P cardia cath and CABG  He continues to see cardiology Dr. Leda Robledo and urology Dr. Xenia Nina     Patient is feeling well today,      no chest pain, no shortness of breath  taking lasix as needed per cardiology instructionnings for behavioral, psychosocial and functional/safety risks, and cognitive dysfunction are all negative except as indicated below. These results, as well as other patient data from the [address] form, are documented in Flowsheets linked to this Encounter. Allergies   Allergen Reactions    Vicodin [Hydrocodone-Acetaminophen] Nausea Only     Stomach upset         Prior to Visit Medications    Medication Sig Taking? Authorizing Provider   tamsulosin (FLOMAX) 0.4 MG capsule Take 1 capsule by mouth daily Yes Carmen Bryson MD   metoprolol tartrate (LOPRESSOR) 25 MG tablet Take 0.5 tablets by mouth 2 times daily Yes Constanza Avila MD   furosemide (LASIX) 20 MG tablet Take 1 tablet by mouth as needed (FOR LE SWELLING) Yes Constanza Avila MD   atorvastatin (LIPITOR) 40 MG tablet Take 1 tablet by mouth nightly Yes Pauly Gipson,    clopidogrel (PLAVIX) 75 MG tablet Take 1 tablet by mouth daily Yes Pauly Gipson DO   nitroGLYCERIN (NITROSTAT) 0.4 MG SL tablet up to max of 3 total doses.  If no relief after 1 dose, call 911. Yes PARESH Sims NP   aspirin 81 MG chewable tablet Take 81 mg by mouth daily Yes Historical Provider, MD   Cholecalciferol (VITAMIN D) 50 MCG (2000 UT) CAPS capsule Take 2,000 Units by mouth daily Yes Historical Provider, MD   vitamin C (ASCORBIC ACID) 500 MG tablet Take 250 mg by mouth 2 times daily Yes Historical Provider, MD   acetaminophen (TYLENOL) 325 MG tablet Take 2 tablets by mouth every 6 hours as needed for Pain Yes PARESH Whitman CNP   fluticasone (FLONASE) 50 MCG/ACT nasal spray 2 sprays by Each Nostril route daily  Patient not taking: Reported on 10/22/2021  PARESH Reynolds CNP         Past Medical History:   Diagnosis Date    Abdominal pain 09/29/2014    Abnormal cardiovascular stress test 12/09/2020    Large inferolateral infarction with significant vernon-infarct ischemia.  CAD (coronary artery disease)     Clostridium difficile diarrhea 10/2014    hospitalized in October 2014    Displacement of cervical intervertebral disc without myelopathy 06/10/2014    [facility], C6/C7     HTN (hypertension)     Hyperlipidemia     Impaired fasting blood sugar     Neck pain     chronic    Neuralgia, neuritis, and radiculitis, unspecified 06/10/2014    [facility], left C7     Shoulder region pain     Left   [facility] injury 01/08/2014    Sprain and strain of unspecified site of shoulder and upper arm 06/10/2014    [facility], left arm        Past Surgical History:   Procedure Laterality Date    BICEPS TENODESIS Right 11/23/2016    right proximal bicep tenodesis    COLONOSCOPY N/A 12/10/2019    COLONOSCOPY POLYPECTOMY SNARE/COLD BIOPSY performed by Shavon Bell MD at [address] [facility] [place] 2]  12/18/2020    Successful PTCA. AGUILA of mid LAD / Patent LIMA however distally occluded and not supplying LAD /Residual high grade stenosis in small LCX    CORONARY ANGIOPLASTY WITH STENT PLACEMENT  01/22/2021    CYSTOSCOPY N/A 03/09/2020    CYSTO TURBT performed by Ryan Yepez MD at [facility] 6 Left 8/12/14, 1/09/15    C7 TFE    OTHER SURGICAL HISTORY Left 09/23/2014    C7 TFE    OTHER SURGICAL HISTORY Left 7/17/2015 , 1/22/16    C7 TFE    CT CABG, ARTERY-VEIN, SINGLE N/A 07/06/2018    CABG CORONARY ARTERY BYPASS, GARY KACEY, CHANI  (CSI# [id number]) performed by Coco Arnold MD at [address] CABG (LIMA-LAD, R SVG- RCA, OM too small for the grafts) on 7/6/18,    ROTATOR CUFF REPAIR Left 01/29/2019 2015- Right    SHOULDER ARTHROSCOPY Right 04/27/2016    scope decompressing, rotator cuff repair    TRANSESOPHAGEAL ECHOCARDIOGRAM  48/83/2401    UMBILICAL HERNIA REPAIR N/A 12/18/2019    Laparoscopic Robotic Assisted Umbilical Hernia Repair W/ MESH performed by Sandra Lobo MD at [facility] [address] N/A 11/19/2020    Open Incisional Hernia Repair W/ MESH performed by Abbi Portillo DO at [address] [facility] OR         Family History   Problem Relation Age of Onset    High Blood Pressure Father     Colon Polyps Brother     COPD Mother        CareTeam (Including outside providers/suppliers regularly involved in providing care):   Patient Care Team:  PARESH Diego CNP as PCP - General (Nurse Practitioner)  PARESH Diego CNP [facility] P[facility] - [facility]  Sandra oLbo MD (General Surgery)  Ryan Yepez MD as Consulting Physician (Urology)  Sapphire John DO as Consulting Physician (Cardiology)  Kofi Rausch MD (Physical Medicine and Rehab)    Wt Readings from Last 3 Encounters:   10/22/21 208 lb (94.3 kg)   08/05/21 206 lb (93.4 kg)   04/23/21 216 lb (98 kg)     Vitals:    10/22/21 1021   BP: 116/62   Site: Right Upper Arm   Position: Sitting   Cuff Size: Medium Adult   Pulse: 65   Resp: 20   Temp: 96.8 °F (36 °C)   SpO2: 97%   Weight: 208 lb (94.3 kg)   Height: 5' 9.8\" (1.773 m)     Body mass index is 30.02 kg/m².     Based upon direct observation of the patient, evaluation of cognition reveals recent and remote memory intact. Review of Systems   Constitutional: Negative for chills, fatigue and fever. HENT: Negative for congestion. Respiratory: Negative for cough, shortness of breath and wheezing. Cardiovascular: Negative for chest pain, palpitations and leg swelling. Gastrointestinal: Negative for abdominal distention, abdominal pain, blood in stool and constipation. Genitourinary: Negative for difficulty urinating. Musculoskeletal: Negative for arthralgias. Neurological: Negative for dizziness. Psychiatric/Behavioral: Negative for agitation. General Appearance: alert and oriented to person, place and time, well developed and well- nourished, in no acute distress  Skin: warm and dry, no rash or erythema  Head: normocephalic and atraumatic  Eyes: pupils equal, round, and reactive to light, extraocular eye movements intact, conjunctivae normal  ENT: tympanic membrane, external ear and ear canal normal bilaterally, nose without deformity, nasal mucosa and turbinates normal without polyps  Neck: supple and non-tender without mass, no thyromegaly or thyroid nodules, no cervical lymphadenopathy  Pulmonary/Chest: clear to auscultation bilaterally- no wheezes, rales or rhonchi, normal air movement, no respiratory distress  Cardiovascular: normal rate, regular rhythm, normal S1 and S2, no murmurs, rubs, clicks, or gallops, distal pulses intact, no carotid bruits  Abdomen: soft, non-tender, non-distended, normal bowel sounds, no masses or organomegaly  Extremities: no cyanosis, clubbing or edema  Musculoskeletal: normal range of motion, no joint swelling, deformity or tenderness  Neurologic: reflexes normal and symmetric, no cranial nerve deficit, gait, coordination and speech normal    Patient's complete Health Risk Assessment and screening values have been reviewed and are found in Flowsheets.  The following problems were reviewed today and where indicated follow up appointments were made and/or referrals ordered.     Positive Risk Factor Screenings with Interventions:           Health Habits/Nutrition:  Health Habits/Nutrition  Do you exercise for at least 20 minutes 2-3 times per week?: Yes  Have you lost any weight without trying in the past 3 months?: (!) Yes  Do you eat only one meal per day?: No  Have you seen the dentist within the past year?: (!) No  Body mass index: (!) 30.01  Health Habits/Nutrition Interventions:  · dentures and [employer]    Hearing/Vision:  No exam data present  Hearing/Vision  Do you or your family notice any trouble with your hearing that hasn't been managed with hearing aids?: No  Do you have difficulty driving, watching TV, or doing any of your daily activities because of your eyesight?: No  Have you had an eye exam within the past year?: (!) No  Hearing/Vision Interventions:  · Vision concerns:  patient encouraged to make appointment with his/her eye specialist  · [facility]    Safety:  Safety  Do you have working smoke detectors?: Yes  Have all throw rugs been removed or fastened?: (!) No  Do you have non-slip mats or surfaces in all bathtubs/showers?: Yes  Do all of your stairways have a railing or banister?: Yes  Are your doorways, halls and stairs free of clutter?: Yes  Do you always fasten your seatbelt when you are in a car?: Yes  Safety Interventions:  · Patient declines any further evaluation/treatment for this issue   · Helping to care for wife getting around well, just had rotator cuff surgery      Personalized Preventive Plan   Current Health Maintenance Status  Immunization History   Administered Date(s) Administered    COVID-19, Pfizer, PF, 30mcg/0.3mL 02/23/2021, 03/16/2021, 10/07/2021    DTaP 10/15/2016    DTaP vaccine 10/15/2016    Fluvirin 4 years and over 11/03/2015    Influenza Vaccine, unspecified formulation 11/03/2015, 10/15/2016    Influenza Virus Vaccine 11/03/2015    Influenza Whole 11/14/2012    Influenza, High Dose (Fluzone 65 yrs and older) 09/23/2017, 09/23/2017, 10/06/2018, 11/18/2019, 10/12/2020    Influenza, High-dose, Karla Ode, 65 yrs +, IM (Fluzone) 10/06/2020    Influenza, Quadv, IM, PF (6 mo and older Fluzone, Flulaval, Fluarix, and 3 yrs and older Afluria) 09/23/2017    Influenza, Quadv, adjuvanted, 65 yrs +, IM, PF (Fluad) 10/15/2021    Influenza, Triv, inactivated, subunit, adjuvanted, IM (Fluad 65 yrs and older) 10/14/2018    Pneumococcal Conjugate 13-valent ([id number]) 10/06/2018    Pneumococcal Polysaccharide ([id number]) 10/14/2019    Tdap (Boostrix, Adacel) 10/22/2016    Zoster Live (Zostavax) 11/03/2015        Health Maintenance   Topic Date Due    Annual Wellness Visit (AWV)  04/15/2021    Lipid screen  10/23/2021    Hepatitis C screen  12/31/2099 (Originally 1955)    Low dose CT lung screening  10/23/2021    A1C test (Diabetic or Prediabetic)  10/11/2022    Potassium monitoring  10/11/2022    Creatinine monitoring  10/11/2022    DTaP/Tdap/Td vaccine (3 - Td or Tdap) 10/22/2026    Colon cancer screen colonoscopy  12/10/2029    Flu vaccine  Completed    Pneumococcal 65+ years Vaccine  Completed    COVID-19 Vaccine  Completed    AAA screen  Completed    Hepatitis A vaccine  Aged Out    Hepatitis B vaccine  Aged Out    Hib vaccine  Aged Out    Meningococcal (ACWY) vaccine  Aged Out     Recommendations for [employer] Due: see orders and patient instructions/AVS.  . Recommended screening schedule for the next 5-10 years is provided to the patient in written form: see Patient Viji Cheng was seen today for medicare awv. Diagnoses and all orders for this visit:    Routine general medical examination at a health care facility    Primary hypertension    Other emphysema ([facility] [address]    CAD in native artery  -     Lipid Panel; Future    Pure hypercholesterolemia  -     Lipid Panel;  Future           Prevention and wellness discussed , UTD Flu and COVID vaccines       I have reviewed historical vital signs, lab work, and most recent patient encounter. Discussion of healthful lifestyle, low-sodium diet, cardiovascular activity on a routine basis emphasized today, time allotted for questions and answer.   Back in 6months with an office visit and labs as appropriate

## 2021-10-26 ENCOUNTER — HOSPITAL ENCOUNTER (OUTPATIENT)
Dept: LAB | Age: 66
Discharge: HOME | End: 2021-10-26
Payer: MEDICARE

## 2021-10-26 DIAGNOSIS — I25.10 CAD IN NATIVE ARTERY: ICD-10-CM

## 2021-10-26 DIAGNOSIS — E78.00 PURE HYPERCHOLESTEROLEMIA: ICD-10-CM

## 2021-10-26 LAB
CHOLESTEROL/HDL RATIO: 2.6
CHOLESTEROL: 115 MG/DL
HDLC SERPL-MCNC: 44 MG/DL
LDL CHOLESTEROL: 47 MG/DL (ref 0–130)
TRIGL SERPL-MCNC: 121 MG/DL
VLDLC SERPL CALC-MCNC: NORMAL MG/DL (ref 1–30)

## 2021-10-26 PROCEDURE — 80061 LIPID PANEL: CPT

## 2021-10-26 PROCEDURE — 36415 COLL VENOUS BLD VENIPUNCTURE: CPT

## 2021-11-01 DIAGNOSIS — Z87.891 PERSONAL HISTORY OF TOBACCO USE: ICD-10-CM

## 2021-11-01 DIAGNOSIS — Z12.2 ENCOUNTER FOR SCREENING FOR LUNG CANCER: Primary | ICD-10-CM

## 2021-11-02 ENCOUNTER — TELEPHONE (OUTPATIENT)
Dept: ONCOLOGY | Age: 66
End: 2021-11-02

## 2021-11-02 ENCOUNTER — TELEPHONE (OUTPATIENT)
Dept: FAMILY MEDICINE CLINIC | Age: 66
End: 2021-11-02

## 2021-11-02 NOTE — LETTER
11/2/2021        Dragan Win  [id number] [address]    Dear Dragan Win:    Your healthcare provider has ordered a low dose CT scan of the chest for lung cancer screening. You will find enclosed, information about CT lung screening. Please review the statement of understanding, you will be asked to sign a copy of this at the time of your CT scan    If you have not already been contacted to make the appointment for your scan, please call our scheduling department at [phone]    Keep in mind that CT lung screening does not take the place of smoking cessation. If you are a current smoker, you will find enclosed smoking cessation resources. Please do not hesitate to contact me if you have any questions or concerns.     [address] [place] [address],      [facility] Lung Screening Program  [phone]

## 2021-11-02 NOTE — TELEPHONE ENCOUNTER
PT called asking why he has CT lung screen and a Low Dose chest CT. PT doesn't remember having 2 different CT's done for his annual screening. PT's is just wondering why there is 2 scans?

## 2021-11-09 ENCOUNTER — HOSPITAL ENCOUNTER (OUTPATIENT)
Dept: CT IMAGING | Age: 66
Discharge: HOME | End: 2021-11-11
Payer: MEDICARE

## 2021-11-09 ENCOUNTER — TELEPHONE (OUTPATIENT)
Dept: FAMILY MEDICINE CLINIC | Age: 66
End: 2021-11-09

## 2021-11-09 DIAGNOSIS — R91.1 LUNG NODULE: Primary | ICD-10-CM

## 2021-11-09 DIAGNOSIS — Z87.891 PERSONAL HISTORY OF TOBACCO USE: ICD-10-CM

## 2021-11-09 PROCEDURE — 71271 CT THORAX LUNG CANCER SCR C-: CPT

## 2021-11-09 NOTE — TELEPHONE ENCOUNTER
Suspicious changes in lung and 1 new nodule on CT warrants referral to [facility] [address] now please, please help patient to get appointments now        Patient notified of CT results

## 2021-11-15 ENCOUNTER — TELEPHONE (OUTPATIENT)
Dept: CASE MANAGEMENT | Age: 66
End: 2021-11-15

## 2021-11-15 NOTE — TELEPHONE ENCOUNTER
Lung Navigator reviewing chart and recent Lung Screening. Pt. Referred to [facility] in [place] 11/16  and Dr. Rylan Connors to see 12/1. Plan to follow.

## 2021-11-16 ENCOUNTER — OFFICE VISIT (OUTPATIENT)
Dept: ONCOLOGY | Age: 66
End: 2021-11-16
Payer: MEDICARE

## 2021-11-16 VITALS
HEIGHT: 69 IN | OXYGEN SATURATION: 97 % | WEIGHT: 212 LBS | DIASTOLIC BLOOD PRESSURE: 62 MMHG | SYSTOLIC BLOOD PRESSURE: 110 MMHG | BODY MASS INDEX: 31.4 KG/M2 | HEART RATE: 65 BPM

## 2021-11-16 DIAGNOSIS — Z95.1 S/P CABG (CORONARY ARTERY BYPASS GRAFT): ICD-10-CM

## 2021-11-16 DIAGNOSIS — Z87.891 HISTORY OF TOBACCO USE: ICD-10-CM

## 2021-11-16 DIAGNOSIS — R91.8 LUNG MASS: Primary | ICD-10-CM

## 2021-11-16 DIAGNOSIS — C67.9 UROTHELIAL CARCINOMA OF BLADDER (HCC): ICD-10-CM

## 2021-11-16 PROCEDURE — G8484 FLU IMMUNIZE NO ADMIN: HCPCS | Performed by: INTERNAL MEDICINE

## 2021-11-16 PROCEDURE — G8427 DOCREV CUR MEDS BY ELIG CLIN: HCPCS | Performed by: INTERNAL MEDICINE

## 2021-11-16 PROCEDURE — 99204 OFFICE O/P NEW MOD 45 MIN: CPT | Performed by: INTERNAL MEDICINE

## 2021-11-16 PROCEDURE — G8417 CALC BMI ABV UP PARAM F/U: HCPCS | Performed by: INTERNAL MEDICINE

## 2021-11-16 NOTE — PROGRESS NOTES
Lurene Lanes                                                                                                                  11/16/2021  MRN:   [id number]  YOB: 1955  PCP:                           PARESH Calle CNP  Referring Physician: No ref. provider found  Treating Physician Name: Brina Cleaning MD      Reason for consultation:  Chief Complaint   Patient presents with    New Patient     Lung Nodule   Patient presents to the clinic to establish care and for further work-up and evaluation. Current problems:  Right lung apex mass, 1.8 cm  Severe emphysema  Coronary artery s/p CABG  History of tobacco dependence  History of nonmuscle invasive urothelial cancer of bladder    Active and recent treatments:  CT PET    Summary of Case/History:    Lurene Lanes a 74 y.o.male is a patient with right lung apex mass noted on CT lung screen. Presents to the clinic to establish care and for further work-up and evaluation. Patient has significant history of tobacco dependence more than 35-year pack per day history. Patient quit about 15 years ago. Patient is retired and used to work as a . Patient has been having annual screening lung CT. Last lung CT from October 2020 was unremarkable. Most recent CT lung screen from November 2021 shows a 1.8 cm nodular density in the right lung apex. Additionally also showed severe emphysema throughout the lungs. There was also stable small right pleural effusion noted. Patient subsequently was referred to oncology service as well as pulmonary team.  He sees pulmonary team to establish care on 12/1/2021. Patient denies any hemoptysis. Denies any weight loss. Denies any chest pain. Past Medical History:   Past Medical History:   Diagnosis Date    Abdominal pain 09/29/2014    Abnormal cardiovascular stress test 12/09/2020    Large inferolateral infarction with significant vernon-infarct ischemia.     CAD (coronary artery disease)     Clostridium difficile diarrhea 10/2014    hospitalized in October 2014    Displacement of cervical intervertebral disc without myelopathy 06/10/2014    [facility], C6/C7     HTN (hypertension)     Hyperlipidemia     Impaired fasting blood sugar     Neck pain     chronic    Neuralgia, neuritis, and radiculitis, unspecified 06/10/2014    BW, left C7     Shoulder region pain     Left   [facility] injury 01/08/2014    Sprain and strain of unspecified site of shoulder and upper arm 06/10/2014    [facility], left arm        Past Surgical History:     Past Surgical History:   Procedure Laterality Date    BICEPS TENODESIS Right 11/23/2016    right proximal bicep tenodesis    COLONOSCOPY N/A 12/10/2019    COLONOSCOPY POLYPECTOMY SNARE/COLD BIOPSY performed by Karson Thomas MD at [place] [address]  12/18/2020    Successful PTCA. AGUILA of mid LAD / Patent LIMA however distally occluded and not supplying LAD /Residual high grade stenosis in small LCX    CORONARY ANGIOPLASTY WITH STENT PLACEMENT  01/22/2021    CYSTOSCOPY N/A 03/09/2020    CYSTO TURBT performed by Jasson Cyr MD at [facility] Left 8/12/14, 1/09/15    C7 TFE    OTHER SURGICAL HISTORY Left 09/23/2014    C7 TFE    OTHER SURGICAL HISTORY Left 7/17/2015 , 1/22/16    C7 TFE    AK CABG, ARTERY-VEIN, SINGLE N/A 07/06/2018    CABG CORONARY ARTERY BYPASS, GARY ERAZO, CHANI  (CSI# [id number]) performed by Makenna Meade MD at [facility] [place] CABG (LIMA-LAD, R SVG- RCA, OM too small for the grafts) on 7/6/18,    ROTATOR CUFF REPAIR Left 01/29/2019 2015- Right    SHOULDER ARTHROSCOPY Right 04/27/2016    scope decompressing, rotator cuff repair    TRANSESOPHAGEAL ECHOCARDIOGRAM  20/91/9856    UMBILICAL HERNIA REPAIR N/A 12/18/2019    Laparoscopic Robotic Assisted Umbilical Hernia Repair W/ MESH performed by Karson Thomas MD at [id number] [place] [address] N/A 11/19/2020    Open Incisional Hernia Repair W/ MESH performed by Gurjit Hernández DO at [facility] OR       Patient Family Social History:     Social History     Socioeconomic History    Marital status:      Spouse name: Dirk Carballo Number of children: 2    Years of education: None    Highest education level: None   Occupational History     Comment: [employer]   Tobacco Use    Smoking status: Former Smoker     Packs/day: 2.00     Years: 25.00     Pack years: 50.00     Types: Cigarettes     Start date: 1975     Quit date: 2008     Years since quittin.8    Smokeless tobacco: Never Used    Tobacco comment: Chester Jhaveri RRT 16   Vaping Use    Vaping Use: Never used   Substance and Sexual Activity    Alcohol use: No     Alcohol/week: 0.0 standard drinks    Drug use: No    Sexual activity: Yes     Partners: Female   Other Topics Concern    None   Social History Narrative    None     Social Determinants of Health     Financial Resource Strain: Low Risk     Difficulty of Paying Living Expenses: Not very hard   Food Insecurity: No Food Insecurity    Worried About Running Out of Food in the Last Year: Never true    Abundio of Food in the Last Year: Never true   Transportation Needs:     Lack of Transportation (Medical): Not on file    Lack of Transportation (Non-Medical):  Not on file   Physical Activity:     Days of Exercise per Week: Not on file    Minutes of Exercise per Session: Not on file   Stress:     Feeling of Stress : Not on file   Social Connections:     Frequency of Communication with Friends and Family: Not on file    Frequency of Social Gatherings with Friends and Family: Not on file    Attends Evangelical Services: Not on file    Active Member of Clubs or Organizations: Not on file    Attends Club or Organization Meetings: Not on file    Marital Status: Not on file   Intimate Partner Violence:     Fear of Current or Ex-Partner: Not on file    Emotionally Abused: Not on file    Physically Abused: Not on file    Sexually Abused: Not on file   Housing Stability:     Unable to Pay for Housing in the Last Year: Not on file    Number of Places Lived in the Last Year: Not on file    Unstable Housing in the Last Year: Not on file     Family History   Problem Relation Age of Onset    High Blood Pressure Father     Colon Polyps Brother     COPD Mother      Current Medications:     Current Outpatient Medications   Medication Sig Dispense Refill    tamsulosin (FLOMAX) 0.4 MG capsule Take 1 capsule by mouth daily 90 capsule 3    metoprolol tartrate (LOPRESSOR) 25 MG tablet Take 0.5 tablets by mouth 2 times daily 180 tablet 3    fluticasone (FLONASE) 50 MCG/ACT nasal spray 2 sprays by Each Nostril route daily 1 Bottle 5    furosemide (LASIX) 20 MG tablet Take 1 tablet by mouth as needed (FOR LE SWELLING) 45 tablet 3    atorvastatin (LIPITOR) 40 MG tablet Take 1 tablet by mouth nightly 90 tablet 3    clopidogrel (PLAVIX) 75 MG tablet Take 1 tablet by mouth daily 90 tablet 3    aspirin 81 MG chewable tablet Take 81 mg by mouth daily      Cholecalciferol (VITAMIN D) 50 MCG (2000 UT) CAPS capsule Take 2,000 Units by mouth daily      vitamin C (ASCORBIC ACID) 500 MG tablet Take 250 mg by mouth 2 times daily      acetaminophen (TYLENOL) 325 MG tablet Take 2 tablets by mouth every 6 hours as needed for Pain 120 tablet 3    nitroGLYCERIN (NITROSTAT) 0.4 MG SL tablet up to max of 3 total doses. If no relief after 1 dose, call 911. (Patient not taking: Reported on 11/16/2021) 25 tablet 3     No current facility-administered medications for this visit. Allergies:   Vicodin [hydrocodone-acetaminophen]    Review of Systems:    Constitutional: No fever or chills.  No night sweats, no weight loss   Eyes: No eye discharge, double vision, or eye pain   HEENT: negative for sore mouth, sore throat, hoarseness and voice change   Respiratory: negative for cough , sputum, dyspnea, wheezing, hemoptysis, chest pain Cardiovascular: negative for chest pain, dyspnea, palpitations, orthopnea, PND   Gastrointestinal: negative for nausea, vomiting, diarrhea, constipation, abdominal pain, Dysphagia, hematemesis and hematochezia   Genitourinary: negative for frequency, dysuria, nocturia, urinary incontinence, and hematuria   Integument: negative for rash, skin lesions, bruises.    Hematologic/Lymphatic: negative for easy bruising, bleeding, lymphadenopathy, or petechiae   Endocrine: negative for heat or cold intolerance,weight changes, change in bowel habits and hair loss   Musculoskeletal: negative for myalgias, arthralgias, pain, joint swelling,and bone pain   Neurological: negative for headaches, dizziness, seizures, weakness, numbness        Physical Exam:    Vitals: /62   Pulse 65   Ht 5' 9\" (1.753 m)   Wt 212 lb (96.2 kg)   SpO2 97%   BMI 31.31 kg/m²   General appearance - well appearing, no in pain or distress  Mental status - AAO X3  Eyes - pupils equal and reactive, extraocular eye movements intact  Mouth - mucous membranes moist, pharynx normal without lesions  Neck - supple, no significant adenopathy  Lymphatics - no palpable lymphadenopathy, no hepatosplenomegaly  Chest - clear to auscultation, no wheezes, rales or rhonchi, symmetric air entry  Heart - normal rate, regular rhythm, normal S1, S2, no murmurs  Abdomen - soft, nontender, nondistended, no masses or organomegaly  Neurological - alert, oriented, normal speech, no focal findings or movement disorder noted  Extremities - peripheral pulses normal, no pedal edema, no clubbing or cyanosis  Skin - normal coloration and turgor, no rashes, no suspicious skin lesions noted       DATA:    Results for orders placed or performed during the hospital encounter of 10/26/21   Lipid Panel   Result Value Ref Range    Cholesterol 115 <200 mg/dL    HDL 44 >40 mg/dL    LDL Cholesterol 47 0 - 130 mg/dL    Chol/HDL Ratio 2.6 <5    Triglycerides 121 <150 mg/dL    VLDL NOT REPORTED 1 - 30 mg/dL       CT Lung Screen (Annual)    Result Date: 2021  EXAMINATION: LOW DOSE SCREENING CT OF THE CHEST WITHOUT CONTRAST 2021 2:27 pm TECHNIQUE: Low dose lung cancer screening CT of the chest was performed without the administration of intravenous contrast.  Multiplanar reformatted images are provided for review. Dose modulation, iterative reconstruction, and/or weight based adjustment of the mA/kV was utilized to reduce the radiation dose to as low as reasonably achievable. Field-of-view: 32 cm Dose Length Product: 87.22 mGy CTDlvol: 2.30 mGy COMPARISON: 10/23/2020 HISTORY: Screening. Patient Age: 75 y/o Number of pack years of smokin pack years If no longer smoking, number of years since cessation:  13.8 years Other symptoms:  None. Additional history: ORDERING SYSTEM PROVIDED HISTORY: Personal history of tobacco use TECHNOLOGIST PROVIDED HISTORY: Age: Patient is 74 y.o. Smoking History: Social History Tobacco Use Smoking status: Former Smoker Packs/day: 2.00 Years: 25.00 Pack years: 50 Types: Cigarettes Start date: 1975 Quit date: 2008 Years since quittin.8 Smokeless tobacco: Never Used Tobacco comment: Wilberto Ceja [facility] 16 Vaping Use Vaping Use: Never used Alcohol use: No Alcohol/week: 0.0 standard drinks Drug use: No Pack years: 50 Date of last lung cancer screening: 10/23/2020 Is there documentation of shared decision making? ->Yes Is this a low dose CT or a routine CT?->Low Dose CT Is this the first (baseline) CT or an annual exam?->Annual Does the patient show any signs or symptoms of lung cancer? ->No Smoking Status? ->Former Smoker Date quit smoking? (must be within 15 years)->08 Smoking packs per day?->2 Years smoking?->25 FINDINGS: Mediastinum: The visualized thyroid gland grossly unremarkable in appearance. Atherosclerotic calcification of the aorta and branch vasculature. Coronary artery disease. No pericardial effusion.   Prior median sternotomy and CABG. Small right-sided pleural effusion, unchanged from 10/23/2020. No left-sided pleural effusion. No periaortic or mediastinal hemorrhage. Lungs/Pleura:  Trachea and proximal central airways appear patent. Severe emphysema throughout the lungs with biapical bullous disease and volume loss of the right hemithorax, unchanged from the prior exam.  Mild bibasilar dependent atelectasis. Respiratory motion throughout the lungs. No pneumothorax. No lobar airspace consolidation. New nodular density at the posterior right lung apex measuring up to 1.8 cm on image 26, series 4, and image 72, series 602. Upper Abdomen:  Atherosclerotic calcification of the upper abdominal aorta and branch vasculature. Moderate stool burden. Mild colonic diverticulosis. Soft Tissues/Bones: Mild diffuse degenerative changes throughout the spine. 1. New nodular density at the posterior right lung apex measuring up to 1.8 cm. Severe emphysema throughout the lungs with biapical bullous disease, volume loss of the right hemithorax, unchanged from the prior exam. Respiratory motion throughout the lungs. 2. Prior median sternotomy and CABG. Coronary artery disease. Atherosclerotic calcification of the aorta and branch vasculature. 3. Stable small right-sided pleural effusion. 4. Moderate stool burden. Mild colonic diverticulosis. The findings were sent to the Radiology Results [address] at 2:52 pm on 11/9/2021to be communicated to a licensed caregiver. LUNG RADS: Per ACR Lung-RADS Version 1.1 Category 4B, Very Suspicious (Findings for which additional diagnostic testing and/or tissue sampling is recommended). Management: Chest CT with or without contrast, PET/CT and/or tissue sampling depending on the probability of malignancy and comorbidities. PET/CT may be used when there is a > 8 mm solid component.  For new large nodules that develop on an annual repeat screening CT, a 1 month LDCT may be recommended to address potentially infectious or inflammatory conditions. RECOMMENDATIONS: If you would like to register your patient with the [facility] Nodule/Lung Cancer Screening Program, please contact the Nurse Navigator at [phone]). Impression:  Right lung apex mass, 1.8 cm  Severe emphysema  Coronary artery s/p CABG  History of tobacco dependence  History of nonmuscle invasive urothelial cancer of bladder    Plan:  I had a detailed discussion with the patient and personally went over results of lab work-up imaging studies and other relevant clinical data. Personally reviewed results of lab work-up and other relevant clinical data. Reviewed results of images with the patient. Discussed options for further work-up. Will obtain CT PET to further assess right lung apex lesion. Patient understands that he may need a lung biopsy as well. Patient will also see pulmonary team early next month. I will follow up on their evaluation. Continue surveillance for nonmuscle invasive bladder cancer with serial cystoscopy  Patient counseled on abstinence from smoking we will see patient back in office to review results of imaging studies. Patient had multiple questions was answered to the best my ability. Corine Campos MD      this note is created with the assistance of a speech recognition program.  While intending to generate a document that actually reflects the content of the visit, the document can still have some errors including those of syntax and sound a like substitutions which may escape proof reading. It such instances, actual meaning can be extrapolated by contextual diversion.

## 2021-11-21 PROBLEM — Z00.00 ROUTINE GENERAL MEDICAL EXAMINATION AT A HEALTH CARE FACILITY: Status: RESOLVED | Noted: 2021-04-26 | Resolved: 2021-11-21

## 2021-11-24 ENCOUNTER — HOSPITAL ENCOUNTER (OUTPATIENT)
Dept: NUCLEAR MEDICINE | Age: 66
Discharge: HOME | End: 2021-11-26
Payer: MEDICARE

## 2021-11-24 DIAGNOSIS — Z87.891 HISTORY OF TOBACCO USE: ICD-10-CM

## 2021-11-24 DIAGNOSIS — Z95.1 S/P CABG (CORONARY ARTERY BYPASS GRAFT): ICD-10-CM

## 2021-11-24 DIAGNOSIS — R91.8 LUNG MASS: ICD-10-CM

## 2021-11-24 DIAGNOSIS — C67.9 UROTHELIAL CARCINOMA OF BLADDER (HCC): ICD-10-CM

## 2021-11-24 LAB — GLUCOSE BLD-MCNC: 96 MG/DL (ref 75–110)

## 2021-11-24 PROCEDURE — 3430000000 HC RX DIAGNOSTIC RADIOPHARMACEUTICAL: Performed by: INTERNAL MEDICINE

## 2021-11-24 PROCEDURE — 82947 ASSAY GLUCOSE BLOOD QUANT: CPT

## 2021-11-24 PROCEDURE — G1010 CDSM STANSON: HCPCS

## 2021-11-24 PROCEDURE — A9552 F18 FDG: HCPCS | Performed by: INTERNAL MEDICINE

## 2021-11-24 PROCEDURE — 2580000003 HC RX 258: Performed by: INTERNAL MEDICINE

## 2021-11-24 RX ORDER — SODIUM CHLORIDE 0.9 % (FLUSH) 0.9 %
10 SYRINGE (ML) INJECTION ONCE
Status: COMPLETED | OUTPATIENT
Start: 2021-11-24 | End: 2021-11-24

## 2021-11-24 RX ORDER — FLUDEOXYGLUCOSE F 18 200 MCI/ML
10 INJECTION, SOLUTION INTRAVENOUS
Status: COMPLETED | OUTPATIENT
Start: 2021-11-24 | End: 2021-11-24

## 2021-11-24 RX ADMIN — FLUDEOXYGLUCOSE F 18 11.12 MILLICURIE: 200 INJECTION, SOLUTION INTRAVENOUS at 10:53

## 2021-11-24 RX ADMIN — SODIUM CHLORIDE, PRESERVATIVE FREE 10 ML: 5 INJECTION INTRAVENOUS at 10:53

## 2021-11-30 ENCOUNTER — VIRTUAL VISIT (OUTPATIENT)
Dept: ONCOLOGY | Age: 66
End: 2021-11-30
Payer: MEDICARE

## 2021-11-30 DIAGNOSIS — Z87.891 HISTORY OF TOBACCO USE: ICD-10-CM

## 2021-11-30 DIAGNOSIS — Z95.1 S/P CABG (CORONARY ARTERY BYPASS GRAFT): ICD-10-CM

## 2021-11-30 DIAGNOSIS — R91.8 LUNG MASS: Primary | ICD-10-CM

## 2021-11-30 PROCEDURE — 4040F PNEUMOC VAC/ADMIN/RCVD: CPT | Performed by: INTERNAL MEDICINE

## 2021-11-30 PROCEDURE — 99214 OFFICE O/P EST MOD 30 MIN: CPT | Performed by: INTERNAL MEDICINE

## 2021-11-30 PROCEDURE — 3017F COLORECTAL CA SCREEN DOC REV: CPT | Performed by: INTERNAL MEDICINE

## 2021-11-30 PROCEDURE — 99211 OFF/OP EST MAY X REQ PHY/QHP: CPT | Performed by: INTERNAL MEDICINE

## 2021-11-30 PROCEDURE — 1123F ACP DISCUSS/DSCN MKR DOCD: CPT | Performed by: INTERNAL MEDICINE

## 2021-11-30 PROCEDURE — G8427 DOCREV CUR MEDS BY ELIG CLIN: HCPCS | Performed by: INTERNAL MEDICINE

## 2021-11-30 NOTE — PROGRESS NOTES
Sotero Aguila                                                                                                                  11/30/2021  MRN:   [id number]  YOB: 1955  PCP:                           PARESH Roberts CNP  Referring Physician: No ref. provider found  Treating Physician Name: Zachary Humphrey MD      Reason for visit:  Chief Complaint   Patient presents with    Pulmonary Nodule     f/u post PET/CT    Patient seen in clinic via virtual visit due to ongoing coronavirus pandemic. Current problems:  Right lung apex mass, 1.8 cm  Severe emphysema  Coronary artery s/p CABG  History of tobacco dependence  History of nonmuscle invasive urothelial cancer of bladder    Active and recent treatments:  Lung biopsy    Interim History:  Patient seen in clinic via virtual visit due to ongoing coronavirus pandemic. Patient underwent CT PET which shows FDG uptake in the lung apex mass. Denies new complaint interval event. Denies headaches dizziness chest pain shortness of breath abdominal pain nausea bone pain weight loss or fatigue. During this visit patient's allergy, social, medical, surgical history and medications were reviewed and updated. Summary of Case/History:    Sotero Aguila a 74 y.o.male is a patient with right lung apex mass noted on CT lung screen. Presents to the clinic to establish care and for further work-up and evaluation. Patient has significant history of tobacco dependence more than 35-year pack per day history. Patient quit about 15 years ago. Patient is retired and used to work as a . Patient has been having annual screening lung CT. Last lung CT from October 2020 was unremarkable. Most recent CT lung screen from November 2021 shows a 1.8 cm nodular density in the right lung apex. Additionally also showed severe emphysema throughout the lungs. There was also stable small right pleural effusion noted.   Patient subsequently was referred to oncology service as well as pulmonary team.  He sees pulmonary team to establish care on 12/1/2021. Patient denies any hemoptysis. Denies any weight loss. Denies any chest pain. Past Medical History:   Past Medical History:   Diagnosis Date    Abdominal pain 09/29/2014    Abnormal cardiovascular stress test 12/09/2020    Large inferolateral infarction with significant vernon-infarct ischemia.  CAD (coronary artery disease)     Clostridium difficile diarrhea 10/2014    hospitalized in October 2014    Displacement of cervical intervertebral disc without myelopathy 06/10/2014    [facility], C6/C7     HTN (hypertension)     Hyperlipidemia     Impaired fasting blood sugar     Neck pain     chronic    Neuralgia, neuritis, and radiculitis, unspecified 06/10/2014    [facility], left C7     Shoulder region pain     Left   [facility] injury 01/08/2014    Sprain and strain of unspecified site of shoulder and upper arm 06/10/2014    [facility], left arm        Past Surgical History:     Past Surgical History:   Procedure Laterality Date    BICEPS TENODESIS Right 11/23/2016    right proximal bicep tenodesis    COLONOSCOPY N/A 12/10/2019    COLONOSCOPY POLYPECTOMY SNARE/COLD BIOPSY performed by Gorge Melton MD at [place] [address]  12/18/2020    Successful PTCA. AGUILA of mid LAD / Patent LIMA however distally occluded and not supplying LAD /Residual high grade stenosis in small LCX    CORONARY ANGIOPLASTY WITH STENT PLACEMENT  01/22/2021    CYSTOSCOPY N/A 03/09/2020    CYSTO TURBT performed by Elias Glynn MD at [address] [facility] Left 8/12/14, 1/09/15    C7 TFE    OTHER SURGICAL HISTORY Left 09/23/2014    C7 TFE    OTHER SURGICAL HISTORY Left 7/17/2015 , 1/22/16    C7 TFE    [place] CABG, ARTERY-VEIN, SINGLE N/A 07/06/2018    CABG CORONARY ARTERY BYPASS, SWAN KACEY, CHANI  (CSI# [id number]) performed by Phillip Chinchilla MD at [facility] CABG (LIMA-LAD, R SVG- RCA, OM too small for the grafts) on 18,    ROTATOR CUFF REPAIR Left 2019    2015- Right    SHOULDER ARTHROSCOPY Right 2016    scope decompressing, rotator cuff repair    TRANSESOPHAGEAL ECHOCARDIOGRAM      UMBILICAL HERNIA REPAIR N/A 2019    Laparoscopic Robotic Assisted Umbilical Hernia Repair W/ MESH performed by Estefania Tamez MD at [address] [facility] N/A 2020    Open Incisional Hernia Repair W/ MESH performed by Nazanin Yepez DO at [facility] OR       Patient Family Social History:     Social History     Socioeconomic History    Marital status:      Spouse name: Lore Oseguera Number of children: 2    Years of education: None    Highest education level: None   Occupational History     Comment: [employer]   Tobacco Use    Smoking status: Former Smoker     Packs/day: 2.00     Years: 25.00     Pack years: 50.00     Types: Cigarettes     Start date: 1975     Quit date: 2008     Years since quittin.9    Smokeless tobacco: Never Used    Tobacco comment: Isaac Coyle RRT 16   Vaping Use    Vaping Use: Never used   Substance and Sexual Activity    Alcohol use: No     Alcohol/week: 0.0 standard drinks    Drug use: No    Sexual activity: Yes     Partners: Female   Other Topics Concern    None   Social History Narrative    None     Social Determinants of Health     Financial Resource Strain: Low Risk     Difficulty of Paying Living Expenses: Not very hard   Food Insecurity: No Food Insecurity    Worried About Running Out of Food in the Last Year: Never true    Abundio of Food in the Last Year: Never true   Transportation Needs:     Lack of Transportation (Medical): Not on file    Lack of Transportation (Non-Medical):  Not on file   Physical Activity:     Days of Exercise per Week: Not on file    Minutes of Exercise per Session: Not on file   Stress:     Feeling of Stress : Not on file   Social Connections:     Frequency of 911. (Patient not taking: Reported on 11/16/2021) 25 tablet 3     No current facility-administered medications for this visit. Allergies:   Vicodin [hydrocodone-acetaminophen]    Review of Systems:    Constitutional: No fever or chills. No night sweats, no weight loss   Eyes: No eye discharge, double vision, or eye pain   HEENT: negative for sore mouth, sore throat, hoarseness and voice change   Respiratory: negative for cough , sputum, dyspnea, wheezing, hemoptysis, chest pain   Cardiovascular: negative for chest pain, dyspnea, palpitations, orthopnea, PND   Gastrointestinal: negative for nausea, vomiting, diarrhea, constipation, abdominal pain, Dysphagia, hematemesis and hematochezia   Genitourinary: negative for frequency, dysuria, nocturia, urinary incontinence, and hematuria   Integument: negative for rash, skin lesions, bruises. Hematologic/Lymphatic: negative for easy bruising, bleeding, lymphadenopathy, or petechiae   Endocrine: negative for heat or cold intolerance,weight changes, change in bowel habits and hair loss   Musculoskeletal: negative for myalgias, arthralgias, pain, joint swelling,and bone pain   Neurological: negative for headaches, dizziness, seizures, weakness, numbness      . ...... PHYSICAL EXAMINATION:    Vital Signs: (As obtained by patient/caregiver or practitioner observation)    Blood pressure-  Heart rate-    Respiratory rate-    Temperature-  Pulse oximetry-     Constitutional: [x] Appears well-developed and well-nourished [x] No apparent distress      [] Abnormal-   Mental status  [x] Alert and awake  [x] Oriented to person/place/time [x]Able to follow commands      Eyes:  EOM    [x]  Normal  [] Abnormal-  Sclera  [x]  Normal  [] Abnormal -         Discharge [x]  None visible  [] Abnormal -    HENT:   [x] Normocephalic, atraumatic.   [] Abnormal   [x] Mouth/Throat: Mucous membranes are moist.     External Ears [x] Normal  [] Abnormal-     Neck: [x] No visualized mass Pulmonary/Chest: [x] Respiratory effort normal.  [x] No visualized signs of difficulty breathing or respiratory distress        [] Abnormal-      Musculoskeletal:   [x] Normal gait with no signs of ataxia         [x] Normal range of motion of neck        [] Abnormal-       Neurological:        [x] No Facial Asymmetry (Cranial nerve 7 motor function) (limited exam to video visit)          [x] No gaze palsy        [] Abnormal-         Skin:        [x] No significant exanthematous lesions or discoloration noted on facial skin         [] Abnormal-            Psychiatric:       [x] Normal Affect [x] No Hallucinations        [] Abnormal-     Other pertinent observable physical exam findings-     Due to this being a TeleHealth encounter, evaluation of the following organ systems is limited: Vitals/Constitutional/EENT/Resp/CV/GI//MS/Neuro/Skin/Heme-Lymph-Imm. DATA:    Results for orders placed or performed during the hospital encounter of 11/24/21   POC Glucose Fingerstick   Result Value Ref Range    POC Glucose 96 75 - 110 mg/dL     PET CT SKULL BASE TO MID THIGH    Result Date: 11/24/2021  EXAMINATION: WHOLE BODY PET/CT 11/24/2021 TECHNIQUE: Following IV injection of 11.1 mCi of F 18 -FDG, PET  tumor imaging was acquired from the base of the skull to the mid thighs. Computed tomography was used for purposes of attenuation correction and anatomic localization. Fusion imaging was utilized for interpretation. Uptake time 55 mins. Glucose level 96 mg/dl. COMPARISON: CT lung screening 11/10/2021 HISTORY: ORDERING SYSTEM PROVIDED HISTORY: Lung mass Reason for Exam: Lung mass, History of tobacco use, Urothelial carcinoma of bladder Acuity: Unknown Type of Exam: Initial FINDINGS: HEAD/NECK: No metabolically active cervical lymphadenopathy. CHEST: Redemonstration of posterior right apical nodular density which is mildly FDG avid with max SUV of 3.3.  No metabolically active axillary, hilar, or mediastinal lymphadenopathy. ABDOMEN/PELVIS: No metabolically active intraperitoneal mass. No metabolically active abdominal or pelvic lymphadenopathy. Physiologic activity in the gastrointestinal and genitourinary systems. BONES/SOFT TISSUE: No abnormal FDG activity localizes to the bones. No aggressive osseous lesion. INCIDENTAL CT FINDINGS: Stable chronic changes of the lung parenchyma including emphysema. Multivessel coronary artery calcification. Moderate to severe infrarenal abdominal aortic atherosclerosis with evidence of 3.2 cm aneurysm. Bilateral renal vascular calcifications. *Redemonstration of posterior right apical nodular density which is mildly FDG avid which remains concerning for malignancy, recommend further evaluation with tissue sampling. *Chronic findings as detailed above including distal abdominal aortic aneurysm as measured above, please see follow-up guidelines below. RECOMMENDATIONS: For management of fusiform AAA: 3.0-3.4 cm AAA, recommend follow-up every 3 years. Note: Recommend Vascular consultation if a fusiform AAA enlarges by >0.5 cm in 6 months or >1 cm in 1 year or for a saccular AAA of any size. References: Chun Moncho Radiol 2013; 73(15):825-449; J Vasc Surg. 2018; 67:2-77     CT Lung Screen (Annual)    Result Date: 2021  EXAMINATION: LOW DOSE SCREENING CT OF THE CHEST WITHOUT CONTRAST 2021 2:27 pm TECHNIQUE: Low dose lung cancer screening CT of the chest was performed without the administration of intravenous contrast.  Multiplanar reformatted images are provided for review. Dose modulation, iterative reconstruction, and/or weight based adjustment of the mA/kV was utilized to reduce the radiation dose to as low as reasonably achievable. Field-of-view: 32 cm Dose Length Product: 87.22 mGy CTDlvol: 2.30 mGy COMPARISON: 10/23/2020 HISTORY: Screening.  Patient Age: 75 y/o Number of pack years of smokin pack years If no longer smoking, number of years since cessation: 13.8 years Other symptoms:  None. Additional history: ORDERING SYSTEM PROVIDED HISTORY: Personal history of tobacco use TECHNOLOGIST PROVIDED HISTORY: Age: Patient is 74 y.o. Smoking History: Social History Tobacco Use Smoking status: Former Smoker Packs/day: 2.00 Years: 25.00 Pack years: 50 Types: Cigarettes Start date: 1975 Quit date: 2008 Years since quittin.8 Smokeless tobacco: Never Used Tobacco comment: Allison Camara RRT 16 Vaping Use Vaping Use: Never used Alcohol use: No Alcohol/week: 0.0 standard drinks Drug use: No Pack years: 50 Date of last lung cancer screening: 10/23/2020 Is there documentation of shared decision making? ->Yes Is this a low dose CT or a routine CT?->Low Dose CT Is this the first (baseline) CT or an annual exam?->Annual Does the patient show any signs or symptoms of lung cancer? ->No Smoking Status? ->Former Smoker Date quit smoking? (must be within 15 years)->08 Smoking packs per day?->2 Years smoking?->25 FINDINGS: Mediastinum: The visualized thyroid gland grossly unremarkable in appearance. Atherosclerotic calcification of the aorta and branch vasculature. Coronary artery disease. No pericardial effusion. Prior median sternotomy and CABG. Small right-sided pleural effusion, unchanged from 10/23/2020. No left-sided pleural effusion. No periaortic or mediastinal hemorrhage. Lungs/Pleura:  Trachea and proximal central airways appear patent. Severe emphysema throughout the lungs with biapical bullous disease and volume loss of the right hemithorax, unchanged from the prior exam.  Mild bibasilar dependent atelectasis. Respiratory motion throughout the lungs. No pneumothorax. No lobar airspace consolidation. New nodular density at the posterior right lung apex measuring up to 1.8 cm on image 26, series 4, and image 72, series 602. Upper Abdomen:  Atherosclerotic calcification of the upper abdominal aorta and branch vasculature. Moderate stool burden.   Mild colonic diverticulosis. Soft Tissues/Bones: Mild diffuse degenerative changes throughout the spine. 1. New nodular density at the posterior right lung apex measuring up to 1.8 cm. Severe emphysema throughout the lungs with biapical bullous disease, volume loss of the right hemithorax, unchanged from the prior exam. Respiratory motion throughout the lungs. 2. Prior median sternotomy and CABG. Coronary artery disease. Atherosclerotic calcification of the aorta and branch vasculature. 3. Stable small right-sided pleural effusion. 4. Moderate stool burden. Mild colonic diverticulosis. The findings were sent to the Radiology Results [address] at 2:52 pm on 11/9/2021to be communicated to a licensed caregiver. LUNG RADS: Per ACR Lung-RADS Version 1.1 Category 4B, Very Suspicious (Findings for which additional diagnostic testing and/or tissue sampling is recommended). Management: Chest CT with or without contrast, PET/CT and/or tissue sampling depending on the probability of malignancy and comorbidities. PET/CT may be used when there is a > 8 mm solid component. For new large nodules that develop on an annual repeat screening CT, a 1 month LDCT may be recommended to address potentially infectious or inflammatory conditions. RECOMMENDATIONS: If you would like to register your patient with the [facility] Nodule/Lung Cancer Screening Program, please contact the Nurse Navigator at [phone]). Impression:  Right lung apex mass, 1.8 cm, FDG avid  Severe emphysema  Coronary artery s/p CABG  History of tobacco dependence  History of nonmuscle invasive urothelial cancer of bladder    Plan:  I had a detailed discussion with the patient and personally went over results of lab work-up imaging studies and other relevant clinical data. Reviewed results of CT PET with the patient. Personally reviewed images. Patient lung masses FDG avid. We will schedule for biopsy.   Overall x-ray is concerning for malignancy. Continue surveillance for nonmuscle invasive bladder cancer with serial cystoscopy  Patient counseled on abstinence from smoking we will see patient back in office to review results of biopsy studies. Patient had multiple questions was answered to the best my ability. Di Najera MD      this note is created with the assistance of a speech recognition program.  While intending to generate a document that actually reflects the content of the visit, the document can still have some errors including those of syntax and sound a like substitutions which may escape proof reading. It such instances, actual meaning can be extrapolated by contextual diversion. Elver Skelton, was evaluated through a synchronous (real-time) audio-video encounter. The patient (or guardian if applicable) is aware that this is a billable service. Verbal consent to proceed has been obtained within the past 12 months. The visit was conducted pursuant to the emergency declaration under the [address] [place] [address] authority and the [employer] General Act. Patient identification was verified, and a caregiver was present when appropriate. The patient was located in a state where the provider was credentialed to provide care. Total time spent for this encounter: Not billed by time    --Di Najera MD on 12/5/2021 at 10:10 PM    An electronic signature was used to authenticate this note.

## 2021-12-06 ENCOUNTER — TELEPHONE (OUTPATIENT)
Dept: ONCOLOGY | Age: 66
End: 2021-12-06

## 2021-12-07 ENCOUNTER — TELEPHONE (OUTPATIENT)
Dept: ONCOLOGY | Age: 66
End: 2021-12-07

## 2021-12-07 ENCOUNTER — TELEPHONE (OUTPATIENT)
Dept: INTERVENTIONAL RADIOLOGY/VASCULAR | Age: 66
End: 2021-12-07

## 2021-12-07 NOTE — TELEPHONE ENCOUNTER
Received call from Marcio Dudley with IR. Radiologist approved and patient will be called today to get scheduled.

## 2021-12-07 NOTE — TELEPHONE ENCOUNTER
Writer is covering for [facility]. Per Dr. Yessenia Orellana recent note on 11/30, pt needs to be scheduled for lung bx. Writer confirmed with Dr. Lauren Melendez that I would f/u on appt time/date for lung bx. Writer called IR and spoke to Mingo Kang. She states she will call pt now to schedule him. Will route note to Sandra to update above.

## 2021-12-14 ENCOUNTER — PREP FOR PROCEDURE (OUTPATIENT)
Dept: GENERAL RADIOLOGY | Age: 66
End: 2021-12-14

## 2021-12-14 RX ORDER — SODIUM CHLORIDE 9 MG/ML
INJECTION, SOLUTION INTRAVENOUS CONTINUOUS
Status: CANCELLED | OUTPATIENT
Start: 2021-12-14

## 2021-12-15 ENCOUNTER — HOSPITAL ENCOUNTER (OUTPATIENT)
Dept: CT IMAGING | Age: 66
Discharge: HOME | End: 2021-12-17
Payer: MEDICARE

## 2021-12-15 ENCOUNTER — HOSPITAL ENCOUNTER (OUTPATIENT)
Dept: GENERAL RADIOLOGY | Age: 66
Discharge: HOME | End: 2021-12-17
Payer: MEDICARE

## 2021-12-15 VITALS
TEMPERATURE: 97.5 F | DIASTOLIC BLOOD PRESSURE: 70 MMHG | BODY MASS INDEX: 30.66 KG/M2 | HEIGHT: 69 IN | SYSTOLIC BLOOD PRESSURE: 110 MMHG | RESPIRATION RATE: 16 BRPM | HEART RATE: 70 BPM | WEIGHT: 207 LBS | OXYGEN SATURATION: 96 %

## 2021-12-15 DIAGNOSIS — R91.8 LUNG MASS: ICD-10-CM

## 2021-12-15 LAB
INR BLD: 1
PARTIAL THROMBOPLASTIN TIME: 23.8 SEC (ref 20.5–30.5)
PLATELET # BLD: 218 K/UL (ref 138–453)
PROTHROMBIN TIME: 10.3 SEC (ref 9.1–12.3)

## 2021-12-15 PROCEDURE — 88305 TISSUE EXAM BY PATHOLOGIST: CPT

## 2021-12-15 PROCEDURE — 7100000010 HC PHASE II RECOVERY - FIRST 15 MIN

## 2021-12-15 PROCEDURE — 88341 IMHCHEM/IMCYTCHM EA ADD ANTB: CPT

## 2021-12-15 PROCEDURE — 85610 PROTHROMBIN TIME: CPT

## 2021-12-15 PROCEDURE — 71045 X-RAY EXAM CHEST 1 VIEW: CPT

## 2021-12-15 PROCEDURE — 6370000000 HC RX 637 (ALT 250 FOR IP): Performed by: PHYSICIAN ASSISTANT

## 2021-12-15 PROCEDURE — 7100000011 HC PHASE II RECOVERY - ADDTL 15 MIN

## 2021-12-15 PROCEDURE — 85049 AUTOMATED PLATELET COUNT: CPT

## 2021-12-15 PROCEDURE — 2709999900 CT NEEDLE BIOPSY LUNG PERCUTANEOUS W IMAGING GUIDANCE

## 2021-12-15 PROCEDURE — 88334 PATH CONSLTJ SURG CYTO XM EA: CPT

## 2021-12-15 PROCEDURE — 85730 THROMBOPLASTIN TIME PARTIAL: CPT

## 2021-12-15 PROCEDURE — 2580000003 HC RX 258: Performed by: PHYSICIAN ASSISTANT

## 2021-12-15 PROCEDURE — 6360000002 HC RX W HCPCS: Performed by: RADIOLOGY

## 2021-12-15 PROCEDURE — 88333 PATH CONSLTJ SURG CYTO XM 1: CPT

## 2021-12-15 PROCEDURE — 88342 IMHCHEM/IMCYTCHM 1ST ANTB: CPT

## 2021-12-15 RX ORDER — SODIUM CHLORIDE 9 MG/ML
INJECTION, SOLUTION INTRAVENOUS CONTINUOUS
Status: DISCONTINUED | OUTPATIENT
Start: 2021-12-15 | End: 2021-12-18 | Stop reason: HOSPADM

## 2021-12-15 RX ORDER — ACETAMINOPHEN 325 MG/1
650 TABLET ORAL EVERY 4 HOURS PRN
Status: DISCONTINUED | OUTPATIENT
Start: 2021-12-15 | End: 2021-12-18 | Stop reason: HOSPADM

## 2021-12-15 RX ORDER — FENTANYL CITRATE 50 UG/ML
INJECTION, SOLUTION INTRAMUSCULAR; INTRAVENOUS
Status: COMPLETED | OUTPATIENT
Start: 2021-12-15 | End: 2021-12-15

## 2021-12-15 RX ORDER — MIDAZOLAM HYDROCHLORIDE 2 MG/2ML
INJECTION, SOLUTION INTRAMUSCULAR; INTRAVENOUS
Status: COMPLETED | OUTPATIENT
Start: 2021-12-15 | End: 2021-12-15

## 2021-12-15 RX ADMIN — SODIUM CHLORIDE: 9 INJECTION, SOLUTION INTRAVENOUS at 07:50

## 2021-12-15 RX ADMIN — MIDAZOLAM HYDROCHLORIDE 1 MG: 1 INJECTION, SOLUTION INTRAMUSCULAR; INTRAVENOUS at 09:14

## 2021-12-15 RX ADMIN — ACETAMINOPHEN 650 MG: 325 TABLET ORAL at 09:59

## 2021-12-15 RX ADMIN — FENTANYL CITRATE 50 MCG: 50 INJECTION INTRAMUSCULAR; INTRAVENOUS at 09:14

## 2021-12-15 ASSESSMENT — PAIN DESCRIPTION - LOCATION
LOCATION: BACK
LOCATION: BACK

## 2021-12-15 ASSESSMENT — PAIN SCALES - GENERAL
PAINLEVEL_OUTOF10: 2
PAINLEVEL_OUTOF10: 0

## 2021-12-15 ASSESSMENT — PAIN DESCRIPTION - PAIN TYPE
TYPE: ACUTE PAIN
TYPE: ACUTE PAIN

## 2021-12-15 ASSESSMENT — PAIN - FUNCTIONAL ASSESSMENT: PAIN_FUNCTIONAL_ASSESSMENT: 0-10

## 2021-12-15 ASSESSMENT — PAIN DESCRIPTION - ORIENTATION
ORIENTATION: RIGHT;UPPER
ORIENTATION: UPPER;RIGHT

## 2021-12-15 ASSESSMENT — PAIN DESCRIPTION - DESCRIPTORS
DESCRIPTORS: DULL
DESCRIPTORS: DULL

## 2021-12-15 NOTE — H&P
History and Physical    Pt Name: James Berrios  MRN: [id number]  YOB: 1955  Date of evaluation: 12/15/2021    SUBJECTIVE:   History of Chief Complaint:    Patient presents preprocedure for lung biopsy. Patient says that he had his yearly screening colonoscopy and was found to have abnormality. He was then sent for PET scan and has been scheduled for biopsy today. Patient has a smoking history in the past, quit 2008. Past Medical History    has a past medical history of Abnormal cardiovascular stress test, CAD (coronary artery disease), Cervical disc disease, Clostridium difficile diarrhea, Displacement of cervical intervertebral disc without myelopathy, HTN (hypertension), Hyperlipidemia, Impaired fasting blood sugar, Neck pain, Neuralgia, neuritis, and radiculitis, unspecified, Shoulder region pain, Sprain and strain of unspecified site of shoulder and upper arm, and Wears glasses. Past Surgical History   has a past surgical history that includes Pain management procedure (Left, 8/12/14, 1/09/15); Pain management procedure (Left, 09/23/2014); Pain management procedure (Left, 7/17/2015 , 1/22/16); Shoulder arthroscopy (Right, 04/27/2016); Biceps Tenodesis (Right, 11/23/2016); pr cabg, artery-vein, single (N/A, 07/06/2018); Rotator cuff repair (Left, 01/29/2019); Colonoscopy (N/A, 12/10/2019); Umbilical hernia repair (N/A, 12/18/2019); Cystoscopy (N/A, 03/09/2020); ventral hernia repair (N/A, 11/19/2020); Coronary angioplasty with stent (12/18/2020); Coronary angioplasty with stent (01/22/2021); and transesophageal echocardiogram (11/15/2020). Medications  Prior to Admission medications    Medication Sig Start Date End Date Taking?  Authorizing Provider   tamsulosin (FLOMAX) 0.4 MG capsule Take 1 capsule by mouth daily 8/6/21 12/15/21 Yes Dahiana Mathur MD   metoprolol tartrate (LOPRESSOR) 25 MG tablet Take 0.5 tablets by mouth 2 times daily 8/5/21  Yes Shalom March MD   furosemide (LASIX) 20 MG tablet Take 1 tablet by mouth as needed (FOR LE SWELLING) 2/18/21  Yes Young Biswas MD   atorvastatin (LIPITOR) 40 MG tablet Take 1 tablet by mouth nightly 1/19/21  Yes Pauly Gipson DO   clopidogrel (PLAVIX) 75 MG tablet Take 1 tablet by mouth daily 1/19/21  Yes Pauly Gipson DO   aspirin 81 MG chewable tablet Take 81 mg by mouth daily   Yes Historical Provider, MD   Cholecalciferol (VITAMIN D) 50 MCG (2000 UT) CAPS capsule Take 2,000 Units by mouth daily   Yes Historical Provider, MD   vitamin C (ASCORBIC ACID) 500 MG tablet Take 250 mg by mouth 2 times daily   Yes Historical Provider, MD   acetaminophen (TYLENOL) 325 MG tablet Take 2 tablets by mouth every 6 hours as needed for Pain 7/9/18  Yes PARESH Rascon CNP   fluticasone (FLONASE) 50 MCG/ACT nasal spray 2 sprays by Each Nostril route daily  Patient not taking: Reported on 11/30/2021 4/23/21   PARESH Hill CNP   nitroGLYCERIN (NITROSTAT) 0.4 MG SL tablet up to max of 3 total doses. If no relief after 1 dose, call 911. Patient not taking: Reported on 11/16/2021 12/19/20   PARESH Garcia NP     Allergies  is allergic to vicodin [hydrocodone-acetaminophen]. Family History  family history includes COPD in his mother; Colon Polyps in his brother; High Blood Pressure in his father. Social History   reports that he quit smoking about 13 years ago. His smoking use included cigarettes. He started smoking about 46 years ago. He has a 50.00 pack-year smoking history. He has never used smokeless tobacco.   reports no history of alcohol use. reports no history of drug use.   Marital Status   Occupation retired    Review of Systems:  CONSTITUTIONAL:   negative for fevers, chills, fatigue and malaise    EYES:   negative for double vision, blurred vision and photophobia    HEENT:   negative for tinnitus, epistaxis and sore throat     RESPIRATORY:   negative for cough, shortness of breath, wheezing     CARDIOVASCULAR: negative for chest pain, palpitations, syncope, edema     GASTROINTESTINAL:   negative for nausea, vomiting     GENITOURINARY:   negative for incontinence     MUSCULOSKELETAL:   negative for neck or back pain     NEUROLOGICAL:   Negative for weakness and tingling  negative for headaches and dizziness     PSYCHIATRIC:   negative for anxiety         OBJECTIVE:   VITALS:  height is 5' 9\" (1.753 m) and weight is 207 lb (93.9 kg). His temporal temperature is 96.4 °F (35.8 °C). His blood pressure is 116/70 and his pulse is 70. His respiration is 16 and oxygen saturation is 96%. CONSTITUTIONAL:alert & cooperative, no acute distress. Very pleasant. SKIN:  Warm and dry, no rashes on exposed areas of skin. HEAD:  Normocephalic, atraumatic   EYES: wearing glasses. EOMs intact. EARS:  Hearing grossly WNL. NOSE:  Nares patent. No rhinorrhea. MOUTH/THROAT:  benign  NECK:supple, no lymphadenopathy  LUNGS: Clear to auscultation bilaterally, but overall decreased breath sounds. CARDIOVASCULAR: Heart sounds are normal.  Regular rate and rhythm without murmur. ABDOMEN: soft, non tender, rotund. Possible ventral hernia noted  EXTREMITIES: no edema bilateral lower extremities. IMPRESSIONS:   1. Lung nodule  2.  has a past medical history of Abnormal cardiovascular stress test (12/09/2020), CAD (coronary artery disease), Cervical disc disease, Clostridium difficile diarrhea (10/2014), Displacement of cervical intervertebral disc without myelopathy (06/10/2014), HTN (hypertension), Hyperlipidemia, Impaired fasting blood sugar, Neck pain, Neuralgia, neuritis, and radiculitis, unspecified (06/10/2014), Shoulder region pain, Sprain and strain of unspecified site of shoulder and upper arm (06/10/2014), and Wears glasses. PLANS:   1.  Lung biopsy    ANAHI Mcknight PA-C  Electronically signed 12/15/2021 at 8:07 AM

## 2021-12-15 NOTE — POST SEDATION
Sedation Post Procedure Note    Patient Name: Alycia Ott   YOB: 1955  Room/Bed: Room/bed info not found  Medical Record Number: [id number]  Date: 12/15/2021   Time: 9:39 AM         Physicians/Assistants: JENNIFER Carter    Procedure Performed:  Lung bx    Post-Sedation Vital Signs:  Vitals:    12/15/21 0937   BP: 134/77   Pulse: 69   Resp: 16   Temp:    SpO2: 97%      Vital signs were reviewed and were stable after the procedure (see flow sheet for vitals)            Post-Sedation Exam: Pt remains stable           Complications: none    Electronically signed by JENNIFER Coelho on 12/15/2021 at 9:39 AM

## 2021-12-15 NOTE — BRIEF OP NOTE
Brief Postoperative Note    Arizona Ray  YOB: 1955  [id number]    Pre-operative Diagnosis: Lung nodule      Post-operative Diagnosis: Same    Procedure: Lung bx    Medication Given: fentanyl and versed    Anesthesia: 1%Lidocaine     Surgeons/Assistants:  Marilia Perry MD and Ifrah Salazar PA-C    Estimated Blood Loss: Minimal    Complications: none    Technically successful bx of right lung nodule. 4 core samples were obtained and given to the onsite pathologist that confirmed tissue sampling.      Electronically signed by JENNIFER Anand on 12/15/2021 at 9:40 AM

## 2021-12-15 NOTE — PRE SEDATION
Sedation Pre-Procedure Note    Patient Name: Anais Guo   YOB: 1955  Room/Bed: Room/bed info not found  Medical Record Number: [id number]  Date: 12/15/2021   Time: 9:00Am    Indication:  Lung bx    Consent: I have discussed with the patient and/or the patient representative the indication, alternatives, and the possible risks and/or complications of the planned procedure and the anesthesia methods. The patient and/or patient representative appear to understand and agree to proceed. Vital Signs:   Vitals:    12/15/21 0932   BP: 136/73   Pulse: 73   Resp: 11   Temp:    SpO2: 97%       Past Medical History:   has a past medical history of Abnormal cardiovascular stress test, CAD (coronary artery disease), Cervical disc disease, Clostridium difficile diarrhea, Displacement of cervical intervertebral disc without myelopathy, HTN (hypertension), Hyperlipidemia, Impaired fasting blood sugar, Neck pain, Neuralgia, neuritis, and radiculitis, unspecified, Shoulder region pain, Sprain and strain of unspecified site of shoulder and upper arm, and Wears glasses. Past Surgical History:   has a past surgical history that includes Pain management procedure (Left, 8/12/14, 1/09/15); Pain management procedure (Left, 09/23/2014); Pain management procedure (Left, 7/17/2015 , 1/22/16); Shoulder arthroscopy (Right, 04/27/2016); Biceps Tenodesis (Right, 11/23/2016); pr cabg, artery-vein, single (N/A, 07/06/2018); Rotator cuff repair (Left, 01/29/2019); Colonoscopy (N/A, 12/10/2019); Umbilical hernia repair (N/A, 12/18/2019); Cystoscopy (N/A, 03/09/2020); ventral hernia repair (N/A, 11/19/2020); Coronary angioplasty with stent (12/18/2020); Coronary angioplasty with stent (01/22/2021); and transesophageal echocardiogram (11/15/2020).     Medications:   Scheduled Meds:   Continuous Infusions:    sodium chloride 20 mL/hr at 12/15/21 0750     PRN Meds:   Home Meds:   Prior to Admission medications    Medication Sig Start Date End Date Taking? Authorizing Provider   tamsulosin (FLOMAX) 0.4 MG capsule Take 1 capsule by mouth daily 8/6/21 12/15/21 Yes Kwesi Juarez MD   metoprolol tartrate (LOPRESSOR) 25 MG tablet Take 0.5 tablets by mouth 2 times daily 8/5/21  Yes Jhonny Benjamin MD   furosemide (LASIX) 20 MG tablet Take 1 tablet by mouth as needed (FOR LE SWELLING) 2/18/21  Yes Jhonny Benjamin MD   atorvastatin (LIPITOR) 40 MG tablet Take 1 tablet by mouth nightly 1/19/21  Yes Pauly Gipson DO   clopidogrel (PLAVIX) 75 MG tablet Take 1 tablet by mouth daily 1/19/21  Yes Pauly Gipson DO   aspirin 81 MG chewable tablet Take 81 mg by mouth daily   Yes Historical Provider, MD   Cholecalciferol (VITAMIN D) 50 MCG (2000 UT) CAPS capsule Take 2,000 Units by mouth daily   Yes Historical Provider, MD   vitamin C (ASCORBIC ACID) 500 MG tablet Take 250 mg by mouth 2 times daily   Yes Historical Provider, MD   acetaminophen (TYLENOL) 325 MG tablet Take 2 tablets by mouth every 6 hours as needed for Pain 7/9/18  Yes PARESH Schmidt CNP   fluticasone (FLONASE) 50 MCG/ACT nasal spray 2 sprays by Each Nostril route daily  Patient not taking: Reported on 11/30/2021 4/23/21   PARESH Connor CNP   nitroGLYCERIN (NITROSTAT) 0.4 MG SL tablet up to max of 3 total doses. If no relief after 1 dose, call 911. Patient not taking: Reported on 11/16/2021 12/19/20   PARESH Patterson NP     Coumadin Use Last 7 Days:  no  Antiplatelet drug therapy use last 7 days: no  Other anticoagulant use last 7 days: no  Additional Medication Information:  See [facility] rec      Pre-Sedation Documentation and Exam:   I have reviewed the patient's history and review of systems.     Mallampati Airway Assessment:  Mallampati Class II - (soft palate, fauces & uvula are visible)    Prior History of Anesthesia Complications:   none    ASA Classification:  Class 2 - A normal healthy patient with mild systemic disease    Sedation/ Anesthesia Plan: intravenous sedation    Medications Planned:   midazolam (Versed) intravenously and fentanyl intravenously    Patient is an appropriate candidate for plan of sedation: yes    Electronically signed by JENNIFER Mak on 12/15/2021 at 9:38 AM

## 2021-12-15 NOTE — PROGRESS NOTES
Pt comes to [facility]. Skin warm and dry. resp easy on room air. C/o pain to right upper back. Medicated as ordered. Band aid to right upper back is clean, dry and intact. Lunch provided.

## 2021-12-16 LAB — SURGICAL PATHOLOGY REPORT: NORMAL

## 2021-12-21 ENCOUNTER — OFFICE VISIT (OUTPATIENT)
Dept: ONCOLOGY | Age: 66
End: 2021-12-21
Payer: MEDICARE

## 2021-12-21 VITALS
BODY MASS INDEX: 31.84 KG/M2 | SYSTOLIC BLOOD PRESSURE: 110 MMHG | TEMPERATURE: 98 F | DIASTOLIC BLOOD PRESSURE: 74 MMHG | OXYGEN SATURATION: 94 % | HEART RATE: 73 BPM | HEIGHT: 69 IN | WEIGHT: 215 LBS

## 2021-12-21 DIAGNOSIS — R91.8 LUNG MASS: Primary | ICD-10-CM

## 2021-12-21 DIAGNOSIS — Z95.1 S/P CABG (CORONARY ARTERY BYPASS GRAFT): ICD-10-CM

## 2021-12-21 DIAGNOSIS — C67.9 UROTHELIAL CARCINOMA OF BLADDER (HCC): ICD-10-CM

## 2021-12-21 DIAGNOSIS — Z87.891 HISTORY OF TOBACCO USE: ICD-10-CM

## 2021-12-21 DIAGNOSIS — T81.82XS SUBCUTANEOUS EMPHYSEMA RESULTING FROM A PROCEDURE, SEQUELA: ICD-10-CM

## 2021-12-21 PROCEDURE — 99214 OFFICE O/P EST MOD 30 MIN: CPT | Performed by: INTERNAL MEDICINE

## 2021-12-21 PROCEDURE — 4040F PNEUMOC VAC/ADMIN/RCVD: CPT | Performed by: INTERNAL MEDICINE

## 2021-12-21 PROCEDURE — G8417 CALC BMI ABV UP PARAM F/U: HCPCS | Performed by: INTERNAL MEDICINE

## 2021-12-21 PROCEDURE — 1123F ACP DISCUSS/DSCN MKR DOCD: CPT | Performed by: INTERNAL MEDICINE

## 2021-12-21 PROCEDURE — 1036F TOBACCO NON-USER: CPT | Performed by: INTERNAL MEDICINE

## 2021-12-21 PROCEDURE — G8484 FLU IMMUNIZE NO ADMIN: HCPCS | Performed by: INTERNAL MEDICINE

## 2021-12-21 PROCEDURE — 3017F COLORECTAL CA SCREEN DOC REV: CPT | Performed by: INTERNAL MEDICINE

## 2021-12-21 PROCEDURE — G8427 DOCREV CUR MEDS BY ELIG CLIN: HCPCS | Performed by: INTERNAL MEDICINE

## 2021-12-21 NOTE — PROGRESS NOTES
Sharon Castillo                                                                                                                  12/21/2021  MRN:   [id number]  YOB: 1955  PCP:                           PARESH Steinberg CNP  Referring Physician: No ref. provider found  Treating Physician Name: Margarita Kern MD      Reason for visit:  Chief Complaint   Patient presents with    Follow-up     post biopsy lung mass 12/15/2021   Discussed results of biopsy. Current problems:  Right lung apex mass, 1.8 cm, FDG avid, status post biopsy negative for malignancy  Severe emphysema  Coronary artery s/p CABG  History of tobacco dependence  History of nonmuscle invasive urothelial cancer of bladder    Active and recent treatments:  Active surveillance of lung mass    Interim History:  Patient underwent lung biopsy without any complications. Patient presents to the clinic to discuss results of lung biopsy. Lung biopsy was negative for malignancy showed dense fibrous tissue with chronic inflammation. Patient overall is doing well. Denies any new complaints or interval events. He does not smoke anymore. During this visit patient's allergy, social, medical, surgical history and medications were reviewed and updated. Summary of Case/History:    Sharon Castillo a 74 y.o.male is a patient with right lung apex mass noted on CT lung screen. Presents to the clinic to establish care and for further work-up and evaluation. Patient has significant history of tobacco dependence more than 35-year pack per day history. Patient quit about 15 years ago. Patient is retired and used to work as a . Patient has been having annual screening lung CT. Last lung CT from October 2020 was unremarkable. Most recent CT lung screen from November 2021 shows a 1.8 cm nodular density in the right lung apex. Additionally also showed severe emphysema throughout the lungs.   There was also stable small right pleural effusion noted. Patient subsequently was referred to oncology service as well as pulmonary team.  He sees pulmonary team to establish care on 12/1/2021. Patient denies any hemoptysis. Denies any weight loss. Denies any chest pain. Past Medical History:   Past Medical History:   Diagnosis Date    Abnormal cardiovascular stress test 12/09/2020    Large inferolateral infarction with significant vernon-infarct ischemia.  CAD (coronary artery disease)     CABG-2 vessel, followed by stents x 4    Cervical disc disease     Clostridium difficile diarrhea 10/2014    hospitalized in October 2014    Displacement of cervical intervertebral disc without myelopathy 06/10/2014    [facility], C6/C7     HTN (hypertension)     Hyperlipidemia     Impaired fasting blood sugar     Neck pain     chronic    Neuralgia, neuritis, and radiculitis, unspecified 06/10/2014    [facility], left C7     Shoulder region pain     Left   [facility] injury 01/08/2014    Sprain and strain of unspecified site of shoulder and upper arm 06/10/2014    [facility], left arm     Wears glasses        Past Surgical History:     Past Surgical History:   Procedure Laterality Date    BICEPS TENODESIS Right 11/23/2016    right proximal bicep tenodesis    COLONOSCOPY N/A 12/10/2019    COLONOSCOPY POLYPECTOMY SNARE/COLD BIOPSY performed by Nikia Ruiz MD at [place] [address]  12/18/2020    Successful PTCA. AGUILA of mid LAD / Patent LIMA however distally occluded and not supplying LAD /Residual high grade stenosis in small LCX    CORONARY ANGIOPLASTY WITH STENT PLACEMENT  01/22/2021    CT NEEDLE BIOPSY LUNG PERCUTANEOUS  12/15/2021    CT NEEDLE BIOPSY LUNG PERCUTANEOUS 12/15/2021 [facility] CT SCAN    CYSTOSCOPY N/A 03/09/2020    CYSTO TURBT performed by Dahiana Mathur MD at [address] [place] [address] Left 8/12/14, 1/09/15    C7 TFE    PAIN MANAGEMENT PROCEDURE Left 09/23/2014    C7 TFE    PAIN MANAGEMENT PROCEDURE Left 2015 , 16    C7 TFE    OH CABG, ARTERY-VEIN, SINGLE N/A 2018    CABG CORONARY ARTERY BYPASS, GARY ERAZO CHANI  ([facility]# [id number]) performed by Jose J Dean MD at [address] CABG (LIMA-LAD, R SVG- RCA, OM too small for the grafts) on 18,    ROTATOR CUFF REPAIR Left 2019- Right    SHOULDER ARTHROSCOPY Right 2016    scope decompressing, rotator cuff repair    TRANSESOPHAGEAL ECHOCARDIOGRAM      UMBILICAL HERNIA REPAIR N/A 2019    Laparoscopic Robotic Assisted Umbilical Hernia Repair W/ MESH performed by Jennifer Tomlin MD at [address] N/A 2020    Open Incisional Hernia Repair W/ MESH performed by Tatianna Nunez DO at [facility] OR       Patient Family Social History:     Social History     Socioeconomic History    Marital status:      Spouse name: Joe Almaraz Number of children: 2    Years of education: None    Highest education level: None   Occupational History     Comment: [employer]   Tobacco Use    Smoking status: Former Smoker     Packs/day: 2.00     Years: 25.00     Pack years: 50.00     Types: Cigarettes     Start date: 1975     Quit date: 2008     Years since quittin.9    Smokeless tobacco: Never Used    [facility]   Vaping Use    Vaping Use: Never used   Substance and Sexual Activity    Alcohol use: No     Alcohol/week: 0.0 standard drinks    Drug use: No    Sexual activity: Yes     Partners: Female   Other Topics Concern    None   Social History Narrative    None     Social Determinants of Health     Financial Resource Strain: Low Risk     Difficulty of Paying Living Expenses: Not very hard   Food Insecurity: No Food Insecurity    Worried About Running Out of Food in the Last Year: Never true    Abundio of Food in the Last Year: Never true   Transportation Needs:     Lack of Transportation (Medical):  Not on file    Lack of Transportation (FLONASE) 50 MCG/ACT nasal spray 2 sprays by Each Nostril route daily (Patient not taking: Reported on 11/30/2021) 1 Bottle 5    nitroGLYCERIN (NITROSTAT) 0.4 MG SL tablet up to max of 3 total doses. If no relief after 1 dose, call 911. (Patient not taking: Reported on 11/16/2021) 25 tablet 3     No current facility-administered medications for this visit. Allergies:   Vicodin [hydrocodone-acetaminophen]    Review of Systems:    Constitutional: No fever or chills. No night sweats, no weight loss   Eyes: No eye discharge, double vision, or eye pain   HEENT: negative for sore mouth, sore throat, hoarseness and voice change   Respiratory: negative for cough , sputum, dyspnea, wheezing, hemoptysis, chest pain   Cardiovascular: negative for chest pain, dyspnea, palpitations, orthopnea, PND   Gastrointestinal: negative for nausea, vomiting, diarrhea, constipation, abdominal pain, Dysphagia, hematemesis and hematochezia   Genitourinary: negative for frequency, dysuria, nocturia, urinary incontinence, and hematuria   Integument: negative for rash, skin lesions, bruises.    Hematologic/Lymphatic: negative for easy bruising, bleeding, lymphadenopathy, or petechiae   Endocrine: negative for heat or cold intolerance,weight changes, change in bowel habits and hair loss   Musculoskeletal: negative for myalgias, arthralgias, pain, joint swelling,and bone pain   Neurological: negative for headaches, dizziness, seizures, weakness, numbness      Physical Exam:    Vitals: /74   Pulse 73   Temp 98 °F (36.7 °C) (Tympanic)   Ht 5' 9\" (1.753 m)   Wt 215 lb (97.5 kg)   SpO2 94%   BMI 31.75 kg/m²   General appearance - well appearing, no in pain or distress  Mental status - AAO X3  Eyes - pupils equal and reactive, extraocular eye movements intact  Mouth - mucous membranes moist, pharynx normal without lesions  Neck - supple, no significant adenopathy  Lymphatics - no palpable lymphadenopathy, no hepatosplenomegaly  Chest - clear to auscultation, no wheezes, rales or rhonchi, symmetric air entry  Heart - normal rate, regular rhythm, normal S1, S2, no murmurs  Abdomen - soft, nontender, nondistended, no masses or organomegaly  Neurological - alert, oriented, normal speech, no focal findings or movement disorder noted  Extremities - peripheral pulses normal, no pedal edema, no clubbing or cyanosis  Skin - normal coloration and turgor, no rashes, no suspicious skin lesions noted           DATA:    Results for orders placed or performed during the hospital encounter of 12/15/21   SURGICAL PATHOLOGY REPORT   Result Value Ref Range    Surgical Pathology Report       -- Diagnosis --  LUNG, BIOPSY (RIGHT, CT-GUIDED):       -DENSE FIBROSIS (SCAR) WITH CHRONIC INFLAMMATION. -NO NEOPLASM IDENTIFIED. Khushbu Pineda M.D.  **Electronically Signed Out**         [facility]/12/16/2021       Clinical Information  Pre-op Diagnosis:  LUNG MASS, HX BLADDER CA, + SMOKER, PLTS  218; PT   10.3; INR  1.0    Operative Findings:  RT LUNG MASS 1.8 CM    Source of Specimen  1: RT LUNG MASS    Gross Description  \"APARICIO Aparicio Shows" Four tan cores from 0.4 to 0.9 cm in  length, < 0.1 cm in diameter. Entirely 2cs. mpb tm     Intraoperative Diagnosis  Core material on 4 slides  TP each evaluated. MAURY  Not adequate. Aden Mcneill, 12/15/2021, 09:34)    Microscopic Description  Microscopic examination performed disclosing no alveolar lung tissue. Fragments of dense sclerotic tissue are present with foci of  anthracosis. Occasional clusters of crushed distorted small cells are  present; lymphocytes are favored. Further evaluation is performed as  below. IMMUNOHISTOCHEMISTRY (controls react appropriately), for further  characterization of this process: Pankeratin is negative for  infiltrating epithelial cells; CD45 is positive in chronic  inflammatory cells; GATA3 is negative; synaptophysin is negative.     SURGICAL PATHOLOGY CONSULTATION distal abdominal aortic aneurysm as measured above, please see follow-up guidelines below. RECOMMENDATIONS: For management of fusiform AAA: 3.0-3.4 cm AAA, recommend follow-up every 3 years. Note: Recommend Vascular consultation if a fusiform AAA enlarges by >0.5 cm in 6 months or >1 cm in 1 year or for a saccular AAA of any size. References: Ernst Runner Radiol 2013; 80(02):706-026; J Vasc Surg. 2018; 67:2-77     CT Lung Screen (Annual)    Result Date: 2021  EXAMINATION: LOW DOSE SCREENING CT OF THE CHEST WITHOUT CONTRAST 2021 2:27 pm TECHNIQUE: Low dose lung cancer screening CT of the chest was performed without the administration of intravenous contrast.  Multiplanar reformatted images are provided for review. Dose modulation, iterative reconstruction, and/or weight based adjustment of the mA/kV was utilized to reduce the radiation dose to as low as reasonably achievable. Field-of-view: 32 cm Dose Length Product: 87.22 mGy CTDlvol: 2.30 mGy COMPARISON: 10/23/2020 HISTORY: Screening. Patient Age: 75 y/o Number of pack years of smokin pack years If no longer smoking, number of years since cessation:  13.8 years Other symptoms:  None. Additional history: ORDERING SYSTEM PROVIDED HISTORY: Personal history of tobacco use TECHNOLOGIST PROVIDED HISTORY: Age: Patient is 74 y.o. Smoking History: Social History Tobacco Use Smoking status: Former Smoker Packs/day: 2.00 Years: 25.00 Pack years: 50 Types: Cigarettes Start date: 1975 Quit date: 2008 Years since quittin.8 Smokeless tobacco: Never Used Tobacco comment: Santhosh Puentes [facility] 16 Vaping Use Vaping Use: Never used Alcohol use: No Alcohol/week: 0.0 standard drinks Drug use: No Pack years: 50 Date of last lung cancer screening: 10/23/2020 Is there documentation of shared decision making? ->Yes Is this a low dose CT or a routine CT?->Low Dose CT Is this the first (baseline) CT or an annual exam?->Annual Does the patient show any signs or symptoms of lung cancer? ->No Smoking Status? ->Former Smoker Date quit smoking? (must be within 15 years)->1/4/08 Smoking packs per day?->2 Years smoking?->25 FINDINGS: Mediastinum: The visualized thyroid gland grossly unremarkable in appearance. Atherosclerotic calcification of the aorta and branch vasculature. Coronary artery disease. No pericardial effusion. Prior median sternotomy and CABG. Small right-sided pleural effusion, unchanged from 10/23/2020. No left-sided pleural effusion. No periaortic or mediastinal hemorrhage. Lungs/Pleura:  Trachea and proximal central airways appear patent. Severe emphysema throughout the lungs with biapical bullous disease and volume loss of the right hemithorax, unchanged from the prior exam.  Mild bibasilar dependent atelectasis. Respiratory motion throughout the lungs. No pneumothorax. No lobar airspace consolidation. New nodular density at the posterior right lung apex measuring up to 1.8 cm on image 26, series 4, and image 72, series 602. Upper Abdomen:  Atherosclerotic calcification of the upper abdominal aorta and branch vasculature. Moderate stool burden. Mild colonic diverticulosis. Soft Tissues/Bones: Mild diffuse degenerative changes throughout the spine. 1. New nodular density at the posterior right lung apex measuring up to 1.8 cm. Severe emphysema throughout the lungs with biapical bullous disease, volume loss of the right hemithorax, unchanged from the prior exam. Respiratory motion throughout the lungs. 2. Prior median sternotomy and CABG. Coronary artery disease. Atherosclerotic calcification of the aorta and branch vasculature. 3. Stable small right-sided pleural effusion. 4. Moderate stool burden. Mild colonic diverticulosis. The findings were sent to the Radiology Results [address] at 2:52 pm on 11/9/2021to be communicated to a licensed caregiver.  LUNG RADS: Per ACR Lung-RADS Version 1.1 Category 4B, Very Suspicious (Findings for which additional diagnostic testing and/or tissue sampling is recommended). Management: Chest CT with or without contrast, PET/CT and/or tissue sampling depending on the probability of malignancy and comorbidities. PET/CT may be used when there is a > 8 mm solid component. For new large nodules that develop on an annual repeat screening CT, a 1 month LDCT may be recommended to address potentially infectious or inflammatory conditions. RECOMMENDATIONS: If you would like to register your patient with the [facility] Nodule/Lung Cancer Screening Program, please contact the Nurse Navigator at [phone]). Impression:  Right lung apex mass, 1.8 cm, FDG avid, status post biopsy, negative for malignancy  Severe emphysema  Coronary artery s/p CABG  History of tobacco dependence  History of nonmuscle invasive urothelial cancer of bladder    Plan:  I had a detailed discussion with the patient and personally went over results of lab work-up imaging studies and other relevant clinical data. Reviewed results of biopsy which is negative for malignancy. Again reviewed images of CT PET with the patient. Discussed small chance of false negative results. We will repeat CT chest in 3 months  Patient counseled abstinence from smoking  Discussed natural history of nonmuscle invasive bladder cancer. Recommend continued surveillance  Return to clinic with repeat CT chest.  Patient had multiple questions was answered to the best my ability. Alexandre Asif MD      this note is created with the assistance of a speech recognition program.  While intending to generate a document that actually reflects the content of the visit, the document can still have some errors including those of syntax and sound a like substitutions which may escape proof reading. It such instances, actual meaning can be extrapolated by contextual diversion.

## 2022-01-04 ENCOUNTER — TELEPHONE (OUTPATIENT)
Dept: CARDIOLOGY | Age: 67
End: 2022-01-04

## 2022-01-04 NOTE — TELEPHONE ENCOUNTER
Pt called and is found to have an aortic aneurysm on PET scan completed in [place]. Pt was advised to let cardiologist know about it. Pt has an appt on 2/10/22. Does he need a sooner appt.     [phone]    Last Appt:  Visit date not found  Next Appt:   2/10/2022  Med verified in [address] [place] [address] Rd

## 2022-01-28 ENCOUNTER — TELEPHONE (OUTPATIENT)
Dept: SURGERY | Age: 67
End: 2022-01-28

## 2022-01-28 NOTE — TELEPHONE ENCOUNTER
[address]         Patient:Markus [id number] [facility]           :1955           Surgical/Procedure Planned: hernia repair    Date & Location: [facility]       Outpatient   Planned Length of OR: 60 minutes    Sedation: general    Estimated Cardiac Risk for Non-Cardiac Surgery/Procedure     Low______ Moderate_X_____ High______    Medication Instructions - Clarification needed by this date:     ASA 81 mg  Hold __5_ Days  Plavix   Hold _5__ Days    Resume medications:     Lovenox indicated: _____Yes __X___NO    Provider: Dr. Claudia Shah.  Brandan      Signature of Provider Giving Orders for Medication holds:    _____Pauly Gipson DO_____________________________________

## 2022-02-01 ENCOUNTER — TELEPHONE (OUTPATIENT)
Dept: SURGERY | Age: 67
End: 2022-02-01

## 2022-02-01 ENCOUNTER — INITIAL CONSULT (OUTPATIENT)
Dept: SURGERY | Age: 67
End: 2022-02-01
Payer: MEDICARE

## 2022-02-01 VITALS
BODY MASS INDEX: 32.11 KG/M2 | DIASTOLIC BLOOD PRESSURE: 68 MMHG | HEIGHT: 69 IN | TEMPERATURE: 98.5 F | WEIGHT: 216.8 LBS | HEART RATE: 86 BPM | SYSTOLIC BLOOD PRESSURE: 132 MMHG

## 2022-02-01 DIAGNOSIS — K45.8 RECURRENT ABDOMINAL HERNIA WITHOUT OBSTRUCTION OR GANGRENE, UNSPECIFIED HERNIA TYPE: Primary | ICD-10-CM

## 2022-02-01 PROCEDURE — 1036F TOBACCO NON-USER: CPT | Performed by: SURGERY

## 2022-02-01 PROCEDURE — G8427 DOCREV CUR MEDS BY ELIG CLIN: HCPCS | Performed by: SURGERY

## 2022-02-01 PROCEDURE — 3017F COLORECTAL CA SCREEN DOC REV: CPT | Performed by: SURGERY

## 2022-02-01 PROCEDURE — 99214 OFFICE O/P EST MOD 30 MIN: CPT | Performed by: SURGERY

## 2022-02-01 PROCEDURE — G8484 FLU IMMUNIZE NO ADMIN: HCPCS | Performed by: SURGERY

## 2022-02-01 PROCEDURE — G8417 CALC BMI ABV UP PARAM F/U: HCPCS | Performed by: SURGERY

## 2022-02-01 PROCEDURE — 4040F PNEUMOC VAC/ADMIN/RCVD: CPT | Performed by: SURGERY

## 2022-02-01 PROCEDURE — 1123F ACP DISCUSS/DSCN MKR DOCD: CPT | Performed by: SURGERY

## 2022-02-01 PROCEDURE — 99213 OFFICE O/P EST LOW 20 MIN: CPT | Performed by: SURGERY

## 2022-02-01 NOTE — PROGRESS NOTES
Gini Lopez is a 74 y.o. male who presents today for evaluation of an abdominal hernia. The patient is known to our practice and has previously undergone a robotic assisted laparoscopic umbilical hernia repair with mesh. According to op note he had a 4 x 6 ventral light ST mesh placed and actually ended up having 2 small defects. He represented later and I saw him for port site hernia in the left upper quadrant which was pared open and placed mesh at this point as well. He now comes in complaining that he has had a bulge at the midline that occurred at approximately 6 months ago when he first started to notice it has become bigger. There is time he states that he will notice when he is up moving working that he has couple bulges on the abdominal wall near the midline that seem to go away when he lays down or relaxes. Because of concern for recurrence of a hernia he came in for further evaluation. Reports some mild pain when he is doing activity but has not had any significant pain at the area. No changes in bowel function. No food intolerance. Past Medical History:   Diagnosis Date    Abnormal cardiovascular stress test 12/09/2020    Large inferolateral infarction with significant vernon-infarct ischemia.     CAD (coronary artery disease)     CABG-2 vessel, followed by stents x 4    Cervical disc disease     Clostridium difficile diarrhea 10/2014    hospitalized in October 2014    Displacement of cervical intervertebral disc without myelopathy 06/10/2014    [facility], C6/C7     HTN (hypertension)     Hyperlipidemia     Impaired fasting blood sugar     Neck pain     chronic    Neuralgia, neuritis, and radiculitis, unspecified 06/10/2014    [facility], left C7     Shoulder region pain     Left   [facility] injury 01/08/2014    Sprain and strain of unspecified site of shoulder and upper arm 06/10/2014    [facility], left arm     Wears glasses        Past Surgical History:   Procedure Laterality Date    BICEPS TENODESIS Right 11/23/2016    right proximal bicep tenodesis    COLONOSCOPY N/A 12/10/2019    COLONOSCOPY POLYPECTOMY SNARE/COLD BIOPSY performed by Annalisa Wan MD at [address] [facility]  12/18/2020    Successful PTCA. AGUILA of mid LAD / Patent LIMA however distally occluded and not supplying LAD /Residual high grade stenosis in small LCX    CORONARY ANGIOPLASTY WITH STENT PLACEMENT  01/22/2021    CT NEEDLE BIOPSY LUNG PERCUTANEOUS  12/15/2021    CT NEEDLE BIOPSY LUNG PERCUTANEOUS 12/15/2021 [facility] CT SCAN    CYSTOSCOPY N/A 03/09/2020    CYSTO TURBT performed by Kayode Anton MD at [place] [id number] Left 8/12/14, 1/09/15    C7 TFE    PAIN MANAGEMENT PROCEDURE Left 09/23/2014    C7 TFE    PAIN MANAGEMENT PROCEDURE Left 7/17/2015 , 1/22/16    C7 TFE    ME CABG, ARTERY-VEIN, SINGLE N/A 07/06/2018    CABG CORONARY ARTERY BYPASS, SWAN KACEY, CHANI  (CSI# [id number]) performed by Elva Acosta MD at [facility] CABG (LIMA-LAD, R SVG- RCA, OM too small for the grafts) on 7/6/18,    ROTATOR CUFF REPAIR Left 01/29/2019    2015- Right    SHOULDER ARTHROSCOPY Right 04/27/2016    scope decompressing, rotator cuff repair    TRANSESOPHAGEAL ECHOCARDIOGRAM  28/70/5147    UMBILICAL HERNIA REPAIR N/A 12/18/2019    Laparoscopic Robotic Assisted Umbilical Hernia Repair W/ MESH performed by Annalisa Wan MD at [address] [facility] N/A 11/19/2020    Open Incisional Hernia Repair W/ MESH performed by Maureen Lagos DO at [facility]       Current Outpatient Medications   Medication Sig Dispense Refill    Elastic Bandages & Supports (ABDOMINAL BINDER/ELASTIC 2XL) MISC 1 Units by Does not apply route as needed (comfort) 1 each 1    tamsulosin (FLOMAX) 0.4 MG capsule Take 1 capsule by mouth daily 90 capsule 3    metoprolol tartrate (LOPRESSOR) 25 MG tablet Take 0.5 tablets by mouth 2 times daily 180 tablet 3    furosemide (LASIX) 20 MG tablet Take 1 tablet by mouth as needed (FOR LE SWELLING) 45 tablet 3    atorvastatin (LIPITOR) 40 MG tablet Take 1 tablet by mouth nightly 90 tablet 3    clopidogrel (PLAVIX) 75 MG tablet Take 1 tablet by mouth daily 90 tablet 3    nitroGLYCERIN (NITROSTAT) 0.4 MG SL tablet up to max of 3 total doses. If no relief after 1 dose, call 911. 25 tablet 3    aspirin 81 MG chewable tablet Take 81 mg by mouth daily      Cholecalciferol (VITAMIN D) 50 MCG (2000 UT) CAPS capsule Take 2,000 Units by mouth daily      vitamin C (ASCORBIC ACID) 500 MG tablet Take 250 mg by mouth 2 times daily      acetaminophen (TYLENOL) 325 MG tablet Take 2 tablets by mouth every 6 hours as needed for Pain 120 tablet 3    fluticasone (FLONASE) 50 MCG/ACT nasal spray 2 sprays by Each Nostril route daily (Patient not taking: Reported on 2021) 1 Bottle 5     No current facility-administered medications for this visit.        Allergies   Allergen Reactions    Vicodin [Hydrocodone-Acetaminophen] Nausea Only     Stomach upset       Family History   Problem Relation Age of Onset    High Blood Pressure Father     Colon Polyps Brother     COPD Mother        Social History     Socioeconomic History    Marital status:      Spouse name: Jerrica Bishop Number of children: 2    Years of education: Not on file    Highest education level: Not on file   Occupational History     Comment: [employer]   Tobacco Use    Smoking status: Former Smoker     Packs/day: 2.00     Years: 25.00     Pack years: 50.00     Types: Cigarettes     Start date: 1975     Quit date: 2008     Years since quittin.0    Smokeless tobacco: Never Used    Tobacco comment: [facility]   Vaping Use    Vaping Use: Never used   Substance and Sexual Activity    Alcohol use: No     Alcohol/week: 0.0 standard drinks    Drug use: No    Sexual activity: Yes     Partners: Female   Other Topics Concern    Not on file   Social History Narrative    Not on file     Social Determinants of Health     Financial Resource Strain: Low Risk     Difficulty of Paying Living Expenses: Not very hard   Food Insecurity: No Food Insecurity    Worried About Running Out of Food in the Last Year: Never true    Abundio of Food in the Last Year: Never true   Transportation Needs:     Lack of Transportation (Medical): Not on file    Lack of Transportation (Non-Medical): Not on file   Physical Activity:     Days of Exercise per Week: Not on file    Minutes of Exercise per Session: Not on file   Stress:     Feeling of Stress : Not on file   Social Connections:     Frequency of Communication with Friends and Family: Not on file    Frequency of Social Gatherings with Friends and Family: Not on file    Attends Scientology Services: Not on file    Active Member of [address] [place] [address] or Organizations: Not on file    Attends Club or Organization Meetings: Not on file    Marital Status: Not on file   Intimate Partner Violence:     Fear of Current or Ex-Partner: Not on file    Emotionally Abused: Not on file    Physically Abused: Not on file    Sexually Abused: Not on file   Housing Stability:     Unable to Pay for Housing in the Last Year: Not on file    Number of [place] in the Last Year: Not on file    Unstable Housing in the Last Year: Not on file       ROS:   Review of Systems - General ROS: negative  Psychological ROS: negative  Ophthalmic ROS: negative  ENT ROS: negative  Respiratory ROS: no cough, shortness of breath, or wheezing  Cardiovascular ROS: no chest pain or dyspnea on exertion  Gastrointestinal ROS: Per HPI  Genito-Urinary ROS: no dysuria, trouble voiding, or hematuria  Musculoskeletal ROS: negative  Dermatological ROS: negative      Objective   Vitals:    02/01/22 1356   BP: 132/68   Pulse: 86   Temp: 98.5 °F (36.9 °C)     General:in no apparent distress and well developed and well nourished  Eyes: No gross abnormalities.   Ears, Nose, Throat: hearing grossly normal bilaterally  Neck: neck supple and non tender without mass  Lungs: clear to auscultation without wheezes or rales   Heart: S1S2, no mumurs, RRR  Abdomen: Soft, nondistended, protuberant abdomen, bowel sounds present. Patient does have 2 noticeable bulges in the periumbilical area just superior to the umbilicus. Somewhat difficult to appreciate actual hernia defects and tell if there is more than 1 defects. I am able to reduce contents of these hernias on exam.  The immediately recur. Incision at the left upper quadrant port site hernia with no evidence of any recurrence at this site. Extremity: negative  Neuro: CN II-XII grossly intact      1. Recurrent abdominal hernia without obstruction or gangrene, unspecified hernia type  Assessment & Plan:  Based on exam it is almost a certainty that the patient has had a recurrence of the hernia in the midline in the area of umbilicus. It is difficult to determine whether this is actually a recurrence or this could be a new hernia with in what seems to be a fairly large diastases. I am going to go ahead and get a CT scan for better visualization of the abdominal wall and to better characterize the defect and whether we have multiple defects versus one larger defect and the extent of the hernia as well as the extent of the diastases as we begin to plan for possible surgical intervention. The patient does have a very thin abdominal wall and we will discuss options once we have the imaging back. In the meantime I am going to order for an abdominal binder for comfort. May use as needed.   Orders:  -     CT ABDOMEN PELVIS W IV CONTRAST Additional Contrast? None; Future         (Please note that portions of this note were completed with a voice recognition program.  Efforts were made to edit the dictations but occasionally words are mis-transcribed.)

## 2022-02-01 NOTE — TELEPHONE ENCOUNTER
[facility]         Patient:Markus [address] [facility] Radha           :1955           Surgical/Procedure Planned: hernia repair    Date & Location: TBD       Outpatient   Planned Length of OR: 60 minutes    Sedation: general    Estimated Cardiac Risk for Non-Cardiac Surgery/Procedure     Low______ Moderate__x____ High______    Medication Instructions - Clarification needed by this date:       Plavix   Hold _5__ Days    Resume medications:     Lovenox indicated: _____Yes __x___NO    Merril Rodriguez ASA 81 MG QD IF NO CONTRAINDICATION. Signature of Provider Giving Orders for Medication holds:  Provider: Dr. Анна Sharma.  Dee Dee Nails

## 2022-02-01 NOTE — ASSESSMENT & PLAN NOTE
Based on exam it is almost a certainty that the patient has had a recurrence of the hernia in the midline in the area of umbilicus. It is difficult to determine whether this is actually a recurrence or this could be a new hernia with in what seems to be a fairly large diastases. I am going to go ahead and get a CT scan for better visualization of the abdominal wall and to better characterize the defect and whether we have multiple defects versus one larger defect and the extent of the hernia as well as the extent of the diastases as we begin to plan for possible surgical intervention. The patient does have a very thin abdominal wall and we will discuss options once we have the imaging back. In the meantime I am going to order for an abdominal binder for comfort. May use as needed.

## 2022-02-07 ENCOUNTER — HOSPITAL ENCOUNTER (OUTPATIENT)
Dept: LAB | Age: 67
Discharge: HOME | End: 2022-02-07
Payer: MEDICARE

## 2022-02-07 DIAGNOSIS — K45.8 RECURRENT ABDOMINAL HERNIA WITHOUT OBSTRUCTION OR GANGRENE, UNSPECIFIED HERNIA TYPE: ICD-10-CM

## 2022-02-07 LAB
ANION GAP SERPL CALCULATED.3IONS-SCNC: 8 MMOL/L (ref 9–17)
BUN BLDV-MCNC: 10 MG/DL (ref 8–23)
BUN/CREAT BLD: 13 (ref 9–20)
CALCIUM SERPL-MCNC: 9.1 MG/DL (ref 8.6–10.4)
CHLORIDE BLD-SCNC: 102 MMOL/L (ref 98–107)
CO2: 27 MMOL/L (ref 20–31)
CREAT SERPL-MCNC: 0.8 MG/DL (ref 0.7–1.2)
GFR AFRICAN AMERICAN: >60 ML/MIN
GFR NON-AFRICAN AMERICAN: >60 ML/MIN
GFR SERPL CREATININE-BSD FRML MDRD: ABNORMAL ML/MIN/{1.73_M2}
GFR SERPL CREATININE-BSD FRML MDRD: ABNORMAL ML/MIN/{1.73_M2}
GLUCOSE BLD-MCNC: 91 MG/DL (ref 70–99)
POTASSIUM SERPL-SCNC: 4.4 MMOL/L (ref 3.7–5.3)
SODIUM BLD-SCNC: 137 MMOL/L (ref 135–144)

## 2022-02-07 PROCEDURE — 36415 COLL VENOUS BLD VENIPUNCTURE: CPT

## 2022-02-07 PROCEDURE — 80048 BASIC METABOLIC PNL TOTAL CA: CPT

## 2022-02-09 ENCOUNTER — HOSPITAL ENCOUNTER (OUTPATIENT)
Dept: CT IMAGING | Age: 67
Discharge: HOME | End: 2022-02-11
Payer: MEDICARE

## 2022-02-09 DIAGNOSIS — K45.8 RECURRENT ABDOMINAL HERNIA WITHOUT OBSTRUCTION OR GANGRENE, UNSPECIFIED HERNIA TYPE: ICD-10-CM

## 2022-02-09 PROCEDURE — 2709999900 CT ABDOMEN PELVIS W IV CONTRAST

## 2022-02-09 PROCEDURE — 6360000004 HC RX CONTRAST MEDICATION: Performed by: SURGERY

## 2022-02-09 RX ADMIN — IOPAMIDOL 100 ML: 755 INJECTION, SOLUTION INTRAVENOUS at 13:19

## 2022-02-10 ENCOUNTER — OFFICE VISIT (OUTPATIENT)
Dept: CARDIOLOGY | Age: 67
End: 2022-02-10
Payer: MEDICARE

## 2022-02-10 VITALS
HEART RATE: 60 BPM | WEIGHT: 216 LBS | HEIGHT: 69 IN | DIASTOLIC BLOOD PRESSURE: 65 MMHG | SYSTOLIC BLOOD PRESSURE: 132 MMHG | BODY MASS INDEX: 31.99 KG/M2

## 2022-02-10 DIAGNOSIS — I25.10 CORONARY ARTERY DISEASE INVOLVING NATIVE CORONARY ARTERY OF NATIVE HEART WITHOUT ANGINA PECTORIS: Primary | ICD-10-CM

## 2022-02-10 PROCEDURE — 1036F TOBACCO NON-USER: CPT | Performed by: INTERNAL MEDICINE

## 2022-02-10 PROCEDURE — G8427 DOCREV CUR MEDS BY ELIG CLIN: HCPCS | Performed by: INTERNAL MEDICINE

## 2022-02-10 PROCEDURE — 99214 OFFICE O/P EST MOD 30 MIN: CPT | Performed by: INTERNAL MEDICINE

## 2022-02-10 PROCEDURE — G8484 FLU IMMUNIZE NO ADMIN: HCPCS | Performed by: INTERNAL MEDICINE

## 2022-02-10 PROCEDURE — 1123F ACP DISCUSS/DSCN MKR DOCD: CPT | Performed by: INTERNAL MEDICINE

## 2022-02-10 PROCEDURE — 93010 ELECTROCARDIOGRAM REPORT: CPT | Performed by: INTERNAL MEDICINE

## 2022-02-10 PROCEDURE — 4040F PNEUMOC VAC/ADMIN/RCVD: CPT | Performed by: INTERNAL MEDICINE

## 2022-02-10 PROCEDURE — 3017F COLORECTAL CA SCREEN DOC REV: CPT | Performed by: INTERNAL MEDICINE

## 2022-02-10 PROCEDURE — G8417 CALC BMI ABV UP PARAM F/U: HCPCS | Performed by: INTERNAL MEDICINE

## 2022-02-10 PROCEDURE — 93005 ELECTROCARDIOGRAM TRACING: CPT | Performed by: INTERNAL MEDICINE

## 2022-02-10 NOTE — PROGRESS NOTES
di                       Today's Date: 2/10/2022  Patient Name: Fadia Munoz  Patient's age: 74 y.o., 1955          CC: the patient is a 74 y.o.  male is in the office for CAD   Overall stable from cardiac standpoint with no recent chest pain or angina. His last visit he was not found to have right lung apical mass around 1.8 cm, underwent PET scan followed by CT-guided biopsy which was apparently negative for malignancy. He has been following with oncologist for follow-up. Does have baseline dyspnea on exertion which is pretty stable  No recent worsening  No orthopnea, lower extremity edema or palpitations      Past Medical History:   has a past medical history of Abnormal cardiovascular stress test, CAD (coronary artery disease), Cervical disc disease, Clostridium difficile diarrhea, Displacement of cervical intervertebral disc without myelopathy, HTN (hypertension), Hyperlipidemia, Impaired fasting blood sugar, Neck pain, Neuralgia, neuritis, and radiculitis, unspecified, Shoulder region pain, Sprain and strain of unspecified site of shoulder and upper arm, and Wears glasses. Past Surgical History:   has a past surgical history that includes Pain management procedure (Left, 8/12/14, 1/09/15); Pain management procedure (Left, 09/23/2014); Pain management procedure (Left, 7/17/2015 , 1/22/16); Shoulder arthroscopy (Right, 04/27/2016); Biceps Tenodesis (Right, 11/23/2016); pr cabg, artery-vein, single (N/A, 07/06/2018); Rotator cuff repair (Left, 01/29/2019); Colonoscopy (N/A, 12/10/2019); Umbilical hernia repair (N/A, 12/18/2019); Cystoscopy (N/A, 03/09/2020); ventral hernia repair (N/A, 11/19/2020); Coronary angioplasty with stent (12/18/2020); Coronary angioplasty with stent (01/22/2021); transesophageal echocardiogram (11/15/2020); and CT NEEDLE BIOPSY LUNG PERCUTANEOUS (12/15/2021). Home Medications:    Prior to Admission medications    Medication Sig Start Date End Date Taking? systolic function. Cardiac cath 1/22/21  Successful staged PCI to Left circumflex and OM2  Patent LAD stents with mild non-obstructive disease      Labs:     CBC: No results for input(s): WBC, HGB, HCT, PLT in the last 72 hours. BMP: No results for input(s): NA, K, CO2, BUN, CREATININE, LABGLOM, GLUCOSE in the last 72 hours. PT/INR: No results for input(s): PROTIME, INR in the last 72 hours. FASTING LIPID PANEL:  Lab Results   Component Value Date    HDL 44 10/26/2021    TRIG 121 10/26/2021       Assessment:    · MVCAD S/P CABG (LIMA-LAD, R SVG- RCA, OM too small for the grafts) on 7/6/18, repeat angiogram 12/18/2020 showed distally occluded LIMA and SVG, post PCI-LAD and staged PCI-LCX/OM  · Mild ischemic CMP, LVEF 45%  · Pulmonary edema post incisional hernia repair, resolved with diuretics   · HTN  · HPL  · Obesity      Patient Active Problem List   Diagnosis    Cervical radiculitis    Umbilical hernia    HTN (hypertension)    Impaired fasting blood sugar    Displacement of cervical intervertebral disc without myelopathy    Rotator cuff tear    CAD in native artery    S/P CABG (coronary artery bypass graft)    Hyperlipidemia    Other emphysema ([address]    Positive colorectal cancer screening using DNA-based stool test    Epigastric hernia    Urothelial carcinoma of bladder [facility] [address]    Post-op pain    Incisional hernia, without obstruction or gangrene    S/P PTCA (percutaneous transluminal coronary angioplasty)    S/P cardiac cath    Recurrent abdominal hernia without obstruction or gangrene       Recommendations:  Continue aspirin, plavix, and high intensity statin for secondary prophylaxis  Continue lopressor to 12.5 mg bid   Lasix 20 mg three times a week  F/[facility] pulmonary and oncology as scheduled   Patient to continue low-salt diet and fluid restriction  Aggressive risk factor modification discussed with patient. Routine follow-up in 6 months or earlier if needed.       Junior Mercer, [address] [place], [place] [address]MD vimal, [facility] Cardiology Consult           [phone]

## 2022-02-14 RX ORDER — ATORVASTATIN CALCIUM 40 MG/1
40 TABLET, FILM COATED ORAL NIGHTLY
Qty: 90 TABLET | Refills: 3 | Status: SHIPPED | OUTPATIENT
Start: 2022-02-14

## 2022-02-15 ENCOUNTER — TELEPHONE (OUTPATIENT)
Dept: SURGERY | Age: 67
End: 2022-02-15

## 2022-02-15 ENCOUNTER — OFFICE VISIT (OUTPATIENT)
Dept: SURGERY | Age: 67
End: 2022-02-15
Payer: MEDICARE

## 2022-02-15 VITALS
WEIGHT: 218 LBS | HEIGHT: 69 IN | OXYGEN SATURATION: 95 % | BODY MASS INDEX: 32.29 KG/M2 | HEART RATE: 65 BPM | DIASTOLIC BLOOD PRESSURE: 80 MMHG | SYSTOLIC BLOOD PRESSURE: 130 MMHG | TEMPERATURE: 97.6 F

## 2022-02-15 DIAGNOSIS — K43.9 VENTRAL HERNIA WITHOUT OBSTRUCTION OR GANGRENE: Primary | ICD-10-CM

## 2022-02-15 PROCEDURE — G8417 CALC BMI ABV UP PARAM F/U: HCPCS | Performed by: SURGERY

## 2022-02-15 PROCEDURE — 99215 OFFICE O/P EST HI 40 MIN: CPT | Performed by: SURGERY

## 2022-02-15 PROCEDURE — 4040F PNEUMOC VAC/ADMIN/RCVD: CPT | Performed by: SURGERY

## 2022-02-15 PROCEDURE — 3017F COLORECTAL CA SCREEN DOC REV: CPT | Performed by: SURGERY

## 2022-02-15 PROCEDURE — G8427 DOCREV CUR MEDS BY ELIG CLIN: HCPCS | Performed by: SURGERY

## 2022-02-15 PROCEDURE — G8484 FLU IMMUNIZE NO ADMIN: HCPCS | Performed by: SURGERY

## 2022-02-15 PROCEDURE — 1036F TOBACCO NON-USER: CPT | Performed by: SURGERY

## 2022-02-15 PROCEDURE — 1123F ACP DISCUSS/DSCN MKR DOCD: CPT | Performed by: SURGERY

## 2022-02-15 NOTE — ASSESSMENT & PLAN NOTE
I had a long discussion with Justo Robledo today about his CT scan and the new hernia that is present. We also discussed options for repair. From review of prior records it seems that the patient had a retrorectus robotic repair which means that that plane will certainly have significant scarring as well as ingrowth of the mesh at this point. Because of that we discussed that trying to proceed with retrorectus repair would be difficult and could even result in injuries to the abdominal wall that may cause additional issues. Additionally with his large diastases ideally what we would like to do is plicate the rectus abdominal muscles to restore normal abdominal wall anatomy but with him having a large piece of ventral light mesh in that plane I do not know that we will be able to adequately do this without risking ongoing pain problems or even mesh complications from the previous mesh that has been placed. After we discussed all these things I do think that we can plan to repair the hernia and my plan would be to do robotic assisted laparoscopic ventral hernia repair with mesh and would plan for an IPOM repair. Risks of the procedure including bleeding, infection, scarring, pain, damage to surrounding structures, need for additional surgery, hernia recurrence, mesh complications and anesthesia risk were discussed and consent is obtained. I did spend quite a bit of time also discussing that because of his large diastases that he will be at higher risk for new hernia development in the future in the area of the diastases. Date for surgery to to be determined. He he does states that his wife is supposed to have surgery in the near future and so we will work around this.

## 2022-02-15 NOTE — TELEPHONE ENCOUNTER
Pt seen by Cardiology, Stent 1/21. No changes on most recent EKG, I assume Dr Kelly Pleitez will clear based on this visit and advise regarding Anticoagulation.  Once this has been done, he is cleared from [address] [place] [address]

## 2022-02-15 NOTE — TELEPHONE ENCOUNTER
[facility] SPECIALTY [facility]    Pre-Operative Evaluation/Consultation    Name:  Pramod Bourne                                         Age:  74 y.o. MRN:  [id number]       :  1955   Date:  2/15/2022         Sex: male    There were no encounter diagnoses. Surgeon:  Dr. Herman Kimball  Procedure (Planned):  Laparoscopic robotic assisted ventral hernia repair  Date Scheduled surgery: 3/25/22    Attending : No att. providers found    Primary Physician:Antonia Westfall [facility]  Cardiologist:     Type of Anesthesia Requested: General    Patient Medical history: Allergies   Allergen Reactions    Vicodin [Hydrocodone-Acetaminophen] Nausea Only     Stomach upset     Patient Active Problem List   Diagnosis    Cervical radiculitis    Umbilical hernia    HTN (hypertension)    Impaired fasting blood sugar    Displacement of cervical intervertebral disc without myelopathy    Rotator cuff tear    CAD in native artery    S/P CABG (coronary artery bypass graft)    Hyperlipidemia    Other emphysema (HCC)    Positive colorectal cancer screening using DNA-based stool test    Epigastric hernia    Urothelial carcinoma of bladder ([facility] [address]    Post-op pain    Incisional hernia, without obstruction or gangrene    S/P PTCA (percutaneous transluminal coronary angioplasty)    S/P cardiac cath    Recurrent abdominal hernia without obstruction or gangrene    Ventral hernia without obstruction or gangrene     Past Medical History:   Diagnosis Date    Abnormal cardiovascular stress test 2020    Large inferolateral infarction with significant vernon-infarct ischemia.     CAD (coronary artery disease)     CABG-2 vessel, followed by stents x 4    Cervical disc disease     Clostridium difficile diarrhea 10/2014    hospitalized in 2014    Displacement of cervical intervertebral disc without myelopathy 06/10/2014    [facility], C6/C7     HTN (hypertension)     Hyperlipidemia     Impaired fasting blood sugar     Neck pain     chronic    Neuralgia, neuritis, and radiculitis, unspecified 06/10/2014    [facility], left C7     Shoulder region pain     Left   [facility] injury 01/08/2014    Sprain and strain of unspecified site of shoulder and upper arm 06/10/2014    [facility], left arm     Wears glasses      Past Surgical History:   Procedure Laterality Date    BICEPS TENODESIS Right 11/23/2016    right proximal bicep tenodesis    COLONOSCOPY N/A 12/10/2019    COLONOSCOPY POLYPECTOMY SNARE/COLD BIOPSY performed by Annabelle Lord MD at [address]  12/18/2020    Successful PTCA. AGUILA of mid LAD / Patent LIMA however distally occluded and not supplying LAD /Residual high grade stenosis in small LCX    CORONARY ANGIOPLASTY WITH STENT PLACEMENT  01/22/2021    CT NEEDLE BIOPSY LUNG PERCUTANEOUS  12/15/2021    CT NEEDLE BIOPSY LUNG PERCUTANEOUS 12/15/2021 [facility] CT SCAN    CYSTOSCOPY N/A 03/09/2020    CYSTO TURBT performed by Jong Davis MD at [place] [address] Left 8/12/14, 1/09/15    C7 TFE    PAIN MANAGEMENT PROCEDURE Left 09/23/2014    C7 TFE    PAIN MANAGEMENT PROCEDURE Left 7/17/2015 , 1/22/16    C7 TFE    OR CABG, ARTERY-VEIN, SINGLE N/A 07/06/2018    CABG CORONARY ARTERY BYPASS, GARY ERAZO, CHANI  (CSI# [id number]) performed by Myron Dumont MD at [facility] CABG (LIMA-LAD, R SVG- RCA, OM too small for the grafts) on 7/6/18,    ROTATOR CUFF REPAIR Left 01/29/2019    2015- Right    SHOULDER ARTHROSCOPY Right 04/27/2016    scope decompressing, rotator cuff repair    TRANSESOPHAGEAL ECHOCARDIOGRAM  49/19/1775    UMBILICAL HERNIA REPAIR N/A 12/18/2019    Laparoscopic Robotic Assisted Umbilical Hernia Repair W/ MESH performed by Annabelle Lord MD at [address] [facility] N/A 11/19/2020    Open Incisional Hernia Repair W/ MESH performed by Vijay Peng DO at [address] History     Tobacco Use    Smoking status: Former Smoker     Packs/day: 2.00 Years: 25.00     Pack years: 50.00     Types: Cigarettes     Start date: 1975     Quit date: 2008     Years since quittin.1    Smokeless tobacco: Never Used    Tobacco comment: [facility] [address]   Vaping Use    Vaping Use: Never used   Substance Use Topics    Alcohol use: No     Alcohol/week: 0.0 standard drinks    Drug use: No     Medications:  Current Outpatient Medications   Medication Sig Dispense Refill    atorvastatin (LIPITOR) 40 MG tablet Take 1 tablet by mouth nightly 90 tablet 3    Elastic Bandages & Supports (ABDOMINAL BINDER/ELASTIC 2XL) MISC 1 Units by Does not apply route as needed (comfort) 1 each 1    tamsulosin (FLOMAX) 0.4 MG capsule Take 1 capsule by mouth daily 90 capsule 3    metoprolol tartrate (LOPRESSOR) 25 MG tablet Take 0.5 tablets by mouth 2 times daily 180 tablet 3    fluticasone (FLONASE) 50 MCG/ACT nasal spray 2 sprays by Each Nostril route daily 1 Bottle 5    furosemide (LASIX) 20 MG tablet Take 1 tablet by mouth as needed (FOR LE SWELLING) 45 tablet 3    clopidogrel (PLAVIX) 75 MG tablet Take 1 tablet by mouth daily 90 tablet 3    nitroGLYCERIN (NITROSTAT) 0.4 MG SL tablet up to max of 3 total doses. If no relief after 1 dose, call 911. 25 tablet 3    aspirin 81 MG chewable tablet Take 81 mg by mouth daily      Cholecalciferol (VITAMIN D) 50 MCG (2000 UT) CAPS capsule Take 2,000 Units by mouth daily      vitamin C (ASCORBIC ACID) 500 MG tablet Take 250 mg by mouth 2 times daily      acetaminophen (TYLENOL) 325 MG tablet Take 2 tablets by mouth every 6 hours as needed for Pain 120 tablet 3     No current facility-administered medications for this visit. Scheduled Meds:  Continuous Infusions:  PRN Meds:. Prior to Admission medications    Medication Sig Start Date End Date Taking?  Authorizing Provider   atorvastatin (LIPITOR) 40 MG tablet Take 1 tablet by mouth nightly 22   Renato Gipson DO   Elastic Bandages & Supports (ABDOMINAL BINDER/ELASTIC 2XL) MISC 1 Units by Does not apply route as needed (comfort) 2/1/22   Henrik Nelson DO   tamsulosin [facility]) 0.4 MG capsule Take 1 capsule by mouth daily 8/6/21 2/10/22  Megan De MD   metoprolol tartrate (LOPRESSOR) 25 MG tablet Take 0.5 tablets by mouth 2 times daily 8/5/21   Francesco Sharma MD   fluticasone (FLONASE) 50 MCG/ACT nasal spray 2 sprays by Each Nostril route daily 4/23/21   PARESH Hopkins CNP   furosemide (LASIX) 20 MG tablet Take 1 tablet by mouth as needed (FOR LE SWELLING) 2/18/21   Francesco Sharma MD   clopidogrel (PLAVIX) 75 MG tablet Take 1 tablet by mouth daily 1/19/21   Pauly Gipson DO   nitroGLYCERIN (NITROSTAT) 0.4 MG SL tablet up to max of 3 total doses. If no relief after 1 dose, call 911. 12/19/20   PARESH Workman NP   aspirin 81 MG chewable tablet Take 81 mg by mouth daily    Historical Provider, MD   Cholecalciferol (VITAMIN D) 50 MCG (2000 UT) CAPS capsule Take 2,000 Units by mouth daily    Historical Provider, MD   vitamin C (ASCORBIC ACID) 500 MG tablet Take 250 mg by mouth 2 times daily    Historical Provider, MD   acetaminophen (TYLENOL) 325 MG tablet Take 2 tablets by mouth every 6 hours as needed for Pain 7/9/18   PARESH Granados CNP     Vital Signs (Current) [unfilled]    Weight:   Wt Readings from Last 1 Encounters:   02/15/22 218 lb (98.9 kg)     Height:   Ht Readings from Last 1 Encounters:   02/15/22 5' 9\" (1.753 m)      BMI:  There is no height or weight on file to calculate BMI. Estimated body mass index is 32.19 kg/m² as calculated from the following:    Height as of an earlier encounter on 2/15/22: 5' 9\" (1.753 m). Weight as of an earlier encounter on 2/15/22: 218 lb (98.9 kg). body mass index is unknown because there is no height or weight on file. Cardiac Clearance: cleared by [address] [place] [address]   Appointment for surgery Clearance scheduled for:None     Preoperative Testing:   These are the current and completed labs:  CBC:   Lab Results   Component Value Date    WBC 8.0 10/11/2021    RBC 5.47 10/11/2021    HGB 15.1 10/11/2021    HCT 46.5 10/11/2021    MCV 85.0 10/11/2021    RDW 14.2 10/11/2021     12/15/2021     CMP:   Lab Results   Component Value Date     02/07/2022    K 4.4 02/07/2022     02/07/2022    CO2 27 02/07/2022    BUN 10 02/07/2022    CREATININE 0.80 02/07/2022    GFRAA >60 02/07/2022    LABGLOM >60 02/07/2022    GLUCOSE 91 02/07/2022    PROT 6.9 10/11/2021    CALCIUM 9.1 02/07/2022    BILITOT 0.58 10/11/2021    ALKPHOS 136 10/11/2021    AST 17 10/11/2021    ALT 18 10/11/2021     POC Tests: No results for input(s): POCGLU, POCNA, POCK, POCCL, POCBUN, POCHEMO, POCHCT in the last 72 hours. Coags    Lab Results   Component Value Date    PROTIME 10.3 12/15/2021    INR 1.0 12/15/2021    APTT 23.8 79/87/4529     HCG (If Applicable) No results found for: PREGTESTUR, PREGSERUM, HCG, HCGQUANT   ABGs No results found for: PHART, PO2ART, PDJ2XNI, YUF3HRR, BEART, D0OGWRQU   Type & Screen (If Applicable)  No results found for: Antolinhumera [place]    Additional ordered pre-operative testing:  []CBC    []ABG      [] BMP   []URINALYSIS   []CMP    []HCG   []COAGS PT/INR  []T&C  []LFTs   []TYPE AND SCREEN    [] EKG  [] Chest X-Ray  [] Other Radiology    [] Sent to Hospitalist None  [] Sent to Anesthesia for your review: CRNAs   [] Additional Orders: None     Comments:None   Requests: No Special requests    Signed:  Julia Simms LPN 6/00/5385 8:57 PM

## 2022-02-15 NOTE — PROGRESS NOTES
Rashuan Fisher is a 74 y.o. male who presents today for follow-up evaluation of ventral hernia. Briefly the patient has previously undergone a ventral hernia repair robotically with another surgeon and has now had a recurrence of his hernia. He was sent for CT scan to better characterize the size of the defect and for surgical planning. The CT is showing that he has a new defect that seems to be just above the level of the previously placed mesh. Does contain intestine but does not seem to be incarcerated. Comes back today to discuss results of the CT and for further recommendations. No changes since last seen. Past Medical History:   Diagnosis Date    Abnormal cardiovascular stress test 12/09/2020    Large inferolateral infarction with significant vernon-infarct ischemia.  CAD (coronary artery disease)     CABG-2 vessel, followed by stents x 4    Cervical disc disease     Clostridium difficile diarrhea 10/2014    hospitalized in October 2014    Displacement of cervical intervertebral disc without myelopathy 06/10/2014    [facility], C6/C7     HTN (hypertension)     Hyperlipidemia     Impaired fasting blood sugar     Neck pain     chronic    Neuralgia, neuritis, and radiculitis, unspecified 06/10/2014    [facility], left C7     Shoulder region pain     Left   [facility] injury 01/08/2014    Sprain and strain of unspecified site of shoulder and upper arm 06/10/2014    [facility], left arm     Wears glasses        Past Surgical History:   Procedure Laterality Date    BICEPS TENODESIS Right 11/23/2016    right proximal bicep tenodesis    COLONOSCOPY N/A 12/10/2019    COLONOSCOPY POLYPECTOMY SNARE/COLD BIOPSY performed by Vikas Donald MD at [place] [address]  12/18/2020    Successful PTCA. AGUILA of mid LAD / Patent LIMA however distally occluded and not supplying LAD /Residual high grade stenosis in small LCX    CORONARY ANGIOPLASTY WITH STENT PLACEMENT  01/22/2021    CT NEEDLE BIOPSY LUNG PERCUTANEOUS  12/15/2021    CT NEEDLE BIOPSY LUNG PERCUTANEOUS 12/15/2021 [facility] CT SCAN    CYSTOSCOPY N/A 03/09/2020    CYSTO TURBT performed by Trav Estevez MD at [address] [facility] Left 8/12/14, 1/09/15    C7 TFE    PAIN MANAGEMENT PROCEDURE Left 09/23/2014    C7 TFE    PAIN MANAGEMENT PROCEDURE Left 7/17/2015 , 1/22/16    C7 TFE    VT CABG, ARTERY-VEIN, SINGLE N/A 07/06/2018    CABG CORONARY ARTERY BYPASS, GARY ERAZO, CHANI  ([facility]# [id number]) performed by Han Garcia MD at [facility] CABG (LIMA-LAD, R SVG- RCA, OM too small for the grafts) on 7/6/18,    ROTATOR CUFF REPAIR Left 01/29/2019 2015- Right    SHOULDER ARTHROSCOPY Right 04/27/2016    scope decompressing, rotator cuff repair    TRANSESOPHAGEAL ECHOCARDIOGRAM  65/84/8232    UMBILICAL HERNIA REPAIR N/A 12/18/2019    Laparoscopic Robotic Assisted Umbilical Hernia Repair W/ MESH performed by John Del Toro MD at [address] [facility] N/A 11/19/2020    Open Incisional Hernia Repair W/ MESH performed by Josie Gar DO at [facility] OR       Current Outpatient Medications   Medication Sig Dispense Refill    atorvastatin (LIPITOR) 40 MG tablet Take 1 tablet by mouth nightly 90 tablet 3    Elastic Bandages & Supports (ABDOMINAL BINDER/ELASTIC 2XL) MISC 1 Units by Does not apply route as needed (comfort) 1 each 1    tamsulosin (FLOMAX) 0.4 MG capsule Take 1 capsule by mouth daily 90 capsule 3    metoprolol tartrate (LOPRESSOR) 25 MG tablet Take 0.5 tablets by mouth 2 times daily 180 tablet 3    fluticasone (FLONASE) 50 MCG/ACT nasal spray 2 sprays by Each Nostril route daily 1 Bottle 5    furosemide (LASIX) 20 MG tablet Take 1 tablet by mouth as needed (FOR LE SWELLING) 45 tablet 3    clopidogrel (PLAVIX) 75 MG tablet Take 1 tablet by mouth daily 90 tablet 3    nitroGLYCERIN (NITROSTAT) 0.4 MG SL tablet up to max of 3 total doses.  If no relief after 1 dose, call 911. 25 tablet 3    aspirin 81 MG chewable tablet Take 81 mg by mouth daily      Cholecalciferol (VITAMIN D) 50 MCG (2000 UT) CAPS capsule Take 2,000 Units by mouth daily      vitamin C (ASCORBIC ACID) 500 MG tablet Take 250 mg by mouth 2 times daily      acetaminophen (TYLENOL) 325 MG tablet Take 2 tablets by mouth every 6 hours as needed for Pain 120 tablet 3     No current facility-administered medications for this visit. Allergies   Allergen Reactions    Vicodin [Hydrocodone-Acetaminophen] Nausea Only     Stomach upset       Family History   Problem Relation Age of Onset    High Blood Pressure Father     Colon Polyps Brother     COPD Mother        Social History     Socioeconomic History    Marital status:      Spouse name: Avelino Ramirez Number of children: 2    Years of education: Not on file    Highest education level: Not on file   Occupational History     Comment: [employer]   Tobacco Use    Smoking status: Former Smoker     Packs/day: 2.00     Years: 25.00     Pack years: 50.00     Types: Cigarettes     Start date: 1975     Quit date: 2008     Years since quittin.1    Smokeless tobacco: Never Used    Tobacco comment: Rochelle Pandey [facility] 16   Vaping Use    Vaping Use: Never used   Substance and Sexual Activity    Alcohol use: No     Alcohol/week: 0.0 standard drinks    Drug use: No    Sexual activity: Yes     Partners: Female   Other Topics Concern    Not on file   Social History Narrative    Not on file     Social Determinants of Health     Financial Resource Strain: Low Risk     Difficulty of Paying Living Expenses: Not very hard   Food Insecurity: No Food Insecurity    Worried About Running Out of Food in the Last Year: Never true    Abundio of Food in the Last Year: Never true   Transportation Needs:     Lack of Transportation (Medical): Not on file    Lack of Transportation (Non-Medical):  Not on file   Physical Activity:     Days of Exercise per Week: Not on file    Minutes of Exercise per Session: Not on file   Stress:     Feeling of Stress : Not on file   Social Connections:     Frequency of Communication with Friends and Family: Not on file    Frequency of Social Gatherings with Friends and Family: Not on file    Attends Scientology Services: Not on file    Active Member of Clubs or Organizations: Not on file    Attends Club or Organization Meetings: Not on file    Marital Status: Not on file   Intimate Partner Violence:     Fear of Current or Ex-Partner: Not on file    Emotionally Abused: Not on file    Physically Abused: Not on file    Sexually Abused: Not on file   Housing Stability:     Unable to Pay for Housing in the Last Year: Not on file    Number of [place] in the Last Year: Not on file    Unstable Housing in the Last Year: Not on file       ROS:   Review of Systems - General ROS: negative  Psychological ROS: negative  Ophthalmic ROS: negative  ENT ROS: negative  Respiratory ROS: no cough, shortness of breath, or wheezing  Cardiovascular ROS: no chest pain or dyspnea on exertion  Gastrointestinal ROS: Per HPI  Genito-Urinary ROS: no dysuria, trouble voiding, or hematuria  Musculoskeletal ROS: negative  Dermatological ROS: negative      Objective   Vitals:    02/15/22 0849   BP: 130/80   Pulse: 65   Temp: 97.6 °F (36.4 °C)   SpO2: 95%     General:in no apparent distress and well developed and well nourished  Eyes: No gross abnormalities. Ears, Nose, Throat: hearing grossly normal bilaterally  Neck: neck supple and non tender without mass  Lungs: clear to auscultation without wheezes or rales   Heart: S1S2, no mumurs, RRR  Abdomen: Soft, nondistended, nontender, bowel sounds present. Patient's abdomen is protuberant. He does have a large diastases in the midline that extends from just below costal margin all the way down to below his umbilicus.   A little hard to appreciate the hernia defect due to body habitus but does seem that he probably has a 2 to 3 cm hernia about 8 to 10 cm above the level of the umbilicus in the midline. Extremity: negative  Neuro: CN II-XII grossly intact      1. Ventral hernia without obstruction or gangrene  Assessment & Plan:  I had a long discussion with Deaj Javan today about his CT scan and the new hernia that is present. We also discussed options for repair. From review of prior records it seems that the patient had a retrorectus robotic repair which means that that plane will certainly have significant scarring as well as ingrowth of the mesh at this point. Because of that we discussed that trying to proceed with retrorectus repair would be difficult and could even result in injuries to the abdominal wall that may cause additional issues. Additionally with his large diastases ideally what we would like to do is plicate the rectus abdominal muscles to restore normal abdominal wall anatomy but with him having a large piece of ventral light mesh in that plane I do not know that we will be able to adequately do this without risking ongoing pain problems or even mesh complications from the previous mesh that has been placed. After we discussed all these things I do think that we can plan to repair the hernia and my plan would be to do robotic assisted laparoscopic ventral hernia repair with mesh and would plan for an IPOM repair. Risks of the procedure including bleeding, infection, scarring, pain, damage to surrounding structures, need for additional surgery, hernia recurrence, mesh complications and anesthesia risk were discussed and consent is obtained. I did spend quite a bit of time also discussing that because of his large diastases that he will be at higher risk for new hernia development in the future in the area of the diastases. Date for surgery to to be determined. He he does states that his wife is supposed to have surgery in the near future and so we will work around this.          (Please note that portions of this note were completed with a voice recognition program.  Efforts were made to edit the dictations but occasionally words are mis-transcribed.)

## 2022-02-17 NOTE — TELEPHONE ENCOUNTER
I'm sorry, this is a [place] patient, my mistake. There is telephone encounter that I routed to you from [address] [facility] cardiology.

## 2022-02-17 NOTE — TELEPHONE ENCOUNTER
We are aware of the aneurysm. Its infrarenal abdominal aortic aneurysm measuring only 3.2 cm. Guidelines suggest follow up and no intervention. If any doubts and questions, patient can be referred to Vascular surgeon.

## 2022-02-17 NOTE — TELEPHONE ENCOUNTER
Is there any way that when we have a clearance that a copy could be forwarded to us with the original request for review? I The clearance doesn't say that it is clearance nor does it say it come from Cardiology. I see it and I cant go through every note to figure out that a clearance exists. This would save us time and expedite clearances. Rain Maid aortic stenosis well as preventing me from redundant requests for a clearance that is already there.  Thanks much for your assist.    PARESH Joshua - CRNA,MSN,2/17/2022 9:50 AM  Chief Anesthetist  [facility]

## 2022-02-17 NOTE — TELEPHONE ENCOUNTER
Cardiology Consultation  [facility] [address] Isabella Shankar  [phone]      2/17/2022      Regarding:  Chick Cancel  1955      To Whom It May Concern,      Patient is Intermediate Cardiac Risk for planned hernia repair surgery. Special instructions:  Patient is taking Plavix and can be held for 5 days prior to procedure and resumed as soon as surgical bleeding risk has resolved. Please continue other meds.         Thank you,      Lise Smyth MD [place] [address] [facility]

## 2022-03-10 ENCOUNTER — HOSPITAL ENCOUNTER (OUTPATIENT)
Dept: LAB | Age: 67
Discharge: HOME | End: 2022-03-10
Payer: MEDICARE

## 2022-03-10 ENCOUNTER — HOSPITAL ENCOUNTER (OUTPATIENT)
Dept: CT IMAGING | Age: 67
Discharge: HOME | End: 2022-03-12
Payer: MEDICARE

## 2022-03-10 DIAGNOSIS — R91.8 LUNG MASS: ICD-10-CM

## 2022-03-10 LAB
ABSOLUTE EOS #: 0.22 K/UL (ref 0–0.44)
ABSOLUTE IMMATURE GRANULOCYTE: 0.07 K/UL (ref 0–0.3)
ABSOLUTE LYMPH #: 2.86 K/UL (ref 1.1–3.7)
ABSOLUTE MONO #: 0.57 K/UL (ref 0.1–1.2)
ALBUMIN SERPL-MCNC: 4.1 G/DL (ref 3.5–5.2)
ALBUMIN/GLOBULIN RATIO: 1.6 (ref 1–2.5)
ALP BLD-CCNC: 136 U/L (ref 40–129)
ALT SERPL-CCNC: 19 U/L (ref 5–41)
ANION GAP SERPL CALCULATED.3IONS-SCNC: 7 MMOL/L (ref 9–17)
AST SERPL-CCNC: 19 U/L
BASOPHILS # BLD: 1 % (ref 0–2)
BASOPHILS ABSOLUTE: 0.09 K/UL (ref 0–0.2)
BILIRUB SERPL-MCNC: 0.45 MG/DL (ref 0.3–1.2)
BUN BLDV-MCNC: 12 MG/DL (ref 8–23)
BUN/CREAT BLD: 14 (ref 9–20)
CALCIUM SERPL-MCNC: 9 MG/DL (ref 8.6–10.4)
CHLORIDE BLD-SCNC: 104 MMOL/L (ref 98–107)
CO2: 27 MMOL/L (ref 20–31)
CREAT SERPL-MCNC: 0.85 MG/DL (ref 0.7–1.2)
EOSINOPHILS RELATIVE PERCENT: 3 % (ref 1–4)
GFR AFRICAN AMERICAN: >60 ML/MIN
GFR NON-AFRICAN AMERICAN: >60 ML/MIN
GFR SERPL CREATININE-BSD FRML MDRD: ABNORMAL ML/MIN/{1.73_M2}
GLUCOSE BLD-MCNC: 107 MG/DL (ref 70–99)
HCT VFR BLD CALC: 47 % (ref 40.7–50.3)
HEMOGLOBIN: 15.3 G/DL (ref 13–17)
IMMATURE GRANULOCYTES: 1 %
LYMPHOCYTES # BLD: 41 % (ref 24–43)
MCH RBC QN AUTO: 27.6 PG (ref 25.2–33.5)
MCHC RBC AUTO-ENTMCNC: 32.6 G/DL (ref 25.2–33.5)
MCV RBC AUTO: 84.8 FL (ref 82.6–102.9)
MONOCYTES # BLD: 8 % (ref 3–12)
NRBC AUTOMATED: 0 PER 100 WBC
PDW BLD-RTO: 14 % (ref 11.8–14.4)
PLATELET # BLD: 223 K/UL (ref 138–453)
PMV BLD AUTO: 10.6 FL (ref 8.1–13.5)
POTASSIUM SERPL-SCNC: 4.6 MMOL/L (ref 3.7–5.3)
RBC # BLD: 5.54 M/UL (ref 4.21–5.77)
SEG NEUTROPHILS: 46 % (ref 36–65)
SEGMENTED NEUTROPHILS ABSOLUTE COUNT: 3.25 K/UL (ref 1.5–8.1)
SODIUM BLD-SCNC: 138 MMOL/L (ref 135–144)
TOTAL PROTEIN: 6.7 G/DL (ref 6.4–8.3)
WBC # BLD: 7.1 K/UL (ref 3.5–11.3)

## 2022-03-10 PROCEDURE — 80053 COMPREHEN METABOLIC PANEL: CPT

## 2022-03-10 PROCEDURE — 36415 COLL VENOUS BLD VENIPUNCTURE: CPT

## 2022-03-10 PROCEDURE — 85025 COMPLETE CBC W/AUTO DIFF WBC: CPT

## 2022-03-10 PROCEDURE — 71250 CT THORAX DX C-: CPT

## 2022-03-15 ENCOUNTER — OFFICE VISIT (OUTPATIENT)
Dept: ONCOLOGY | Age: 67
End: 2022-03-15
Payer: MEDICARE

## 2022-03-15 VITALS
DIASTOLIC BLOOD PRESSURE: 82 MMHG | HEART RATE: 86 BPM | WEIGHT: 218 LBS | BODY MASS INDEX: 32.29 KG/M2 | HEIGHT: 69 IN | SYSTOLIC BLOOD PRESSURE: 118 MMHG | OXYGEN SATURATION: 96 %

## 2022-03-15 DIAGNOSIS — Z95.1 S/P CABG (CORONARY ARTERY BYPASS GRAFT): ICD-10-CM

## 2022-03-15 DIAGNOSIS — R91.8 LUNG MASS: Primary | ICD-10-CM

## 2022-03-15 DIAGNOSIS — Z87.891 HISTORY OF TOBACCO USE: ICD-10-CM

## 2022-03-15 DIAGNOSIS — C67.9 UROTHELIAL CARCINOMA OF BLADDER (HCC): ICD-10-CM

## 2022-03-15 PROCEDURE — G8417 CALC BMI ABV UP PARAM F/U: HCPCS | Performed by: INTERNAL MEDICINE

## 2022-03-15 PROCEDURE — G8484 FLU IMMUNIZE NO ADMIN: HCPCS | Performed by: INTERNAL MEDICINE

## 2022-03-15 PROCEDURE — 1123F ACP DISCUSS/DSCN MKR DOCD: CPT | Performed by: INTERNAL MEDICINE

## 2022-03-15 PROCEDURE — 4040F PNEUMOC VAC/ADMIN/RCVD: CPT | Performed by: INTERNAL MEDICINE

## 2022-03-15 PROCEDURE — 99214 OFFICE O/P EST MOD 30 MIN: CPT | Performed by: INTERNAL MEDICINE

## 2022-03-15 PROCEDURE — 1036F TOBACCO NON-USER: CPT | Performed by: INTERNAL MEDICINE

## 2022-03-15 PROCEDURE — 3017F COLORECTAL CA SCREEN DOC REV: CPT | Performed by: INTERNAL MEDICINE

## 2022-03-15 PROCEDURE — G8427 DOCREV CUR MEDS BY ELIG CLIN: HCPCS | Performed by: INTERNAL MEDICINE

## 2022-03-15 NOTE — PROGRESS NOTES
Nia Vail                                                                                                                  3/15/2022  MRN:   [id number]  YOB: 1955  PCP:                           PARESH Hughes - CNP  Referring Physician: No ref. provider found  Treating Physician Name: Mary Bo MD      Reason for visit:  Chief Complaint   Patient presents with    3 Month Follow-Up     lung mass   Discuss results of CT scans    Current problems:  Right lung apex mass, 1.8 cm, mildly FDG avid, status post biopsy negative for malignancy  Severe emphysema  Coronary artery s/p CABG  History of tobacco dependence  History of nonmuscle invasive urothelial cancer of bladder    Active and recent treatments:  Active surveillance of lung mass    Interim History:  Patient presents to the clinic for a follow-up visit and to discuss results of his CT chest.  Overall patient has been doing fine but denies any hemoptysis. Does not currently complain of cough. He has quit smoking. He is scheduled for cystoscopy next week. He is also going to have a hernia surgery near future. During this visit patient's allergy, social, medical, surgical history and medications were reviewed and updated. Summary of Case/History:    Nia Vail a 74 y.o.male is a patient with right lung apex mass noted on CT lung screen. Presents to the clinic to establish care and for further work-up and evaluation. Patient has significant history of tobacco dependence more than 35-year pack per day history. Patient quit about 15 years ago. Patient is retired and used to work as a . Patient has been having annual screening lung CT. Last lung CT from October 2020 was unremarkable. Most recent CT lung screen from November 2021 shows a 1.8 cm nodular density in the right lung apex. Additionally also showed severe emphysema throughout the lungs.   There was also stable small right pleural effusion noted. Patient subsequently was referred to oncology service as well as pulmonary team.  He sees pulmonary team to establish care on 12/1/2021. Patient denies any hemoptysis. Denies any weight loss. Denies any chest pain. Past Medical History:   Past Medical History:   Diagnosis Date    Abnormal cardiovascular stress test 12/09/2020    Large inferolateral infarction with significant vernon-infarct ischemia.  CAD (coronary artery disease)     CABG-2 vessel, followed by stents x 4    Cervical disc disease     Clostridium difficile diarrhea 10/2014    hospitalized in October 2014    Displacement of cervical intervertebral disc without myelopathy 06/10/2014    [facility], C6/C7     HTN (hypertension)     Hyperlipidemia     Impaired fasting blood sugar     Neck pain     chronic    Neuralgia, neuritis, and radiculitis, unspecified 06/10/2014    [facility], left C7     Shoulder region pain     Left   [facility] injury 01/08/2014    Sprain and strain of unspecified site of shoulder and upper arm 06/10/2014    [facility], left arm     Wears glasses        Past Surgical History:     Past Surgical History:   Procedure Laterality Date    BICEPS TENODESIS Right 11/23/2016    right proximal bicep tenodesis    COLONOSCOPY N/A 12/10/2019    COLONOSCOPY POLYPECTOMY SNARE/COLD BIOPSY performed by Eneida Goetz MD at [facility]  12/18/2020    Successful PTCA. AGUILA of mid LAD / Patent LIMA however distally occluded and not supplying LAD /Residual high grade stenosis in small LCX    CORONARY ANGIOPLASTY WITH STENT PLACEMENT  01/22/2021    CT NEEDLE BIOPSY LUNG PERCUTANEOUS  12/15/2021    CT NEEDLE BIOPSY LUNG PERCUTANEOUS 12/15/2021 [facility] CT SCAN    CYSTOSCOPY N/A 03/09/2020    CYSTO TURBT performed by Danish Andrade MD at [place] [id number] Left 8/12/14, 1/09/15    C7 TFE    PAIN MANAGEMENT PROCEDURE Left 09/23/2014    C7 TFE    PAIN MANAGEMENT PROCEDURE Left 7/17/2015 , 1/22/16 C7 TFE    FL CABG, ARTERY-VEIN, SINGLE N/A 2018    CABG CORONARY ARTERY BYPASS, GARY ERAZO, CHANI  (CSI# [id number]) performed by Manjeet Tian MD at [facility] CABG (LIMA-LAD, R SVG- RCA, OM too small for the grafts) on 18,    ROTATOR CUFF REPAIR Left 2019- Right    SHOULDER ARTHROSCOPY Right 2016    scope decompressing, rotator cuff repair    TRANSESOPHAGEAL ECHOCARDIOGRAM      UMBILICAL HERNIA REPAIR N/A 2019    Laparoscopic Robotic Assisted Umbilical Hernia Repair W/ MESH performed by Alejandro Cornejo MD at [address] [facility] N/A 2020    Open Incisional Hernia Repair W/ MESH performed by Edna Mae DO at [facility] OR       Patient Family Social History:     Social History     Socioeconomic History    Marital status:      Spouse name: Soto Payne Number of children: 2    Years of education: None    Highest education level: None   Occupational History     Comment: [employer] [place]   Tobacco Use    Smoking status: Former Smoker     Packs/day: 2.00     Years: 25.00     Pack years: 50.00     Types: Cigarettes     Start date: 1975     Quit date: 2008     Years since quittin.2    Smokeless tobacco: Never Used    Tobacco comment: Tg Nelson RRT 16   Vaping Use    Vaping Use: Never used   Substance and Sexual Activity    Alcohol use: No     Alcohol/week: 0.0 standard drinks    Drug use: No    Sexual activity: Yes     Partners: Female   Other Topics Concern    None   Social History Narrative    None     Social Determinants of Health     Financial Resource Strain: Low Risk     Difficulty of Paying Living Expenses: Not very hard   Food Insecurity: No Food Insecurity    Worried About Running Out of Food in the Last Year: Never true    Abundio of Food in the Last Year: Never true   Transportation Needs:     Lack of Transportation (Medical): Not on file    Lack of Transportation (Non-Medical):  Not on file Physical Activity:     Days of Exercise per Week: Not on file    Minutes of Exercise per Session: Not on file   Stress:     Feeling of Stress : Not on file   Social Connections:     Frequency of Communication with Friends and Family: Not on file    Frequency of Social Gatherings with Friends and Family: Not on file    Attends Scientologist Services: Not on file    Active Member of [address] [place] [address] [employer] or Organizations: Not on file    Attends Club or Organization Meetings: Not on file    Marital Status: Not on file   Intimate Partner Violence:     Fear of Current or Ex-Partner: Not on file    Emotionally Abused: Not on file    Physically Abused: Not on file    Sexually Abused: Not on file   Housing Stability:     Unable to Pay for Housing in the Last Year: Not on file    Number of [place] in the Last Year: Not on file    Unstable Housing in the Last Year: Not on file     Family History   Problem Relation Age of Onset    High Blood Pressure Father     Colon Polyps Brother     COPD Mother      Current Medications:     Current Outpatient Medications   Medication Sig Dispense Refill    atorvastatin (LIPITOR) 40 MG tablet Take 1 tablet by mouth nightly 90 tablet 3    Elastic Bandages & Supports (ABDOMINAL BINDER/ELASTIC 2XL) MISC 1 Units by Does not apply route as needed (comfort) 1 each 1    tamsulosin (FLOMAX) 0.4 MG capsule Take 1 capsule by mouth daily 90 capsule 3    metoprolol tartrate (LOPRESSOR) 25 MG tablet Take 0.5 tablets by mouth 2 times daily 180 tablet 3    furosemide (LASIX) 20 MG tablet Take 1 tablet by mouth as needed (FOR LE SWELLING) 45 tablet 3    clopidogrel (PLAVIX) 75 MG tablet Take 1 tablet by mouth daily 90 tablet 3    nitroGLYCERIN (NITROSTAT) 0.4 MG SL tablet up to max of 3 total doses.  If no relief after 1 dose, call 911. 25 tablet 3    aspirin 81 MG chewable tablet Take 81 mg by mouth daily      Cholecalciferol (VITAMIN D) 50 MCG (2000 UT) CAPS capsule Take 2,000 Units by mouth hepatosplenomegaly  Chest - clear to auscultation, no wheezes, rales or rhonchi, symmetric air entry  Heart - normal rate, regular rhythm, normal S1, S2, no murmurs  Abdomen - soft, nontender, nondistended, no masses or organomegaly  Neurological - alert, oriented, normal speech, no focal findings or movement disorder noted  Extremities - peripheral pulses normal, no pedal edema, no clubbing or cyanosis  Skin - normal coloration and turgor, no rashes, no suspicious skin lesions noted           DATA:    Results for orders placed or performed during the hospital encounter of 03/10/22   Comprehensive Metabolic Panel   Result Value Ref Range    Glucose 107 (H) 70 - 99 mg/dL    BUN 12 8 - 23 mg/dL    CREATININE 0.85 0.70 - 1.20 mg/dL    Bun/Cre Ratio 14 9 - 20    Calcium 9.0 8.6 - 10.4 mg/dL    Sodium 138 135 - 144 mmol/L    Potassium 4.6 3.7 - 5.3 mmol/L    Chloride 104 98 - 107 mmol/L    CO2 27 20 - 31 mmol/L    Anion Gap 7 (L) 9 - 17 mmol/L    Alkaline Phosphatase 136 (H) 40 - 129 U/L    ALT 19 5 - 41 U/L    AST 19 <40 U/L    Total Bilirubin 0.45 0.3 - 1.2 mg/dL    Total Protein 6.7 6.4 - 8.3 g/dL    Albumin 4.1 3.5 - 5.2 g/dL    Albumin/Globulin Ratio 1.6 1.0 - 2.5    GFR Non-African American >60 >60 mL/min    GFR African American >60 >60 mL/min    GFR Comment         CBC Auto Differential   Result Value Ref Range    WBC 7.1 3.5 - 11.3 k/uL    RBC 5.54 4.21 - 5.77 m/uL    Hemoglobin 15.3 13.0 - 17.0 g/dL    Hematocrit 47.0 40.7 - 50.3 %    MCV 84.8 82.6 - 102.9 fL    MCH 27.6 25.2 - 33.5 pg    MCHC 32.6 25.2 - 33.5 g/dL    RDW 14.0 11.8 - 14.4 %    Platelets 204 887 - 850 k/uL    MPV 10.6 8.1 - 13.5 fL    NRBC Automated 0.0 0.0 per 100 WBC    Seg Neutrophils 46 36 - 65 %    Lymphocytes 41 24 - 43 %    Monocytes 8 3 - 12 %    Eosinophils % 3 1 - 4 %    Basophils 1 0 - 2 %    Immature Granulocytes 1 (H) 0 %    Segs Absolute 3.25 1.50 - 8.10 k/uL    Absolute Lymph # 2.86 1.10 - 3.70 k/uL    Absolute Mono # 0.57 0.10 - 1.20 k/uL    Absolute Eos # 0.22 0.00 - 0.44 k/uL    Basophils Absolute 0.09 0.00 - 0.20 k/uL    Absolute Immature Granulocyte 0.07 0.00 - 0.30 k/uL     CT CHEST WO CONTRAST    Result Date: 3/10/2022  EXAMINATION: CT OF THE CHEST WITHOUT CONTRAST 3/10/2022 7:58 am TECHNIQUE: CT of the chest was performed without the administration of intravenous contrast. Multiplanar reformatted images are provided for review. Dose modulation, iterative reconstruction, and/or weight based adjustment of the mA/kV was utilized to reduce the radiation dose to as low as reasonably achievable. COMPARISON: 12/15/2021. HISTORY: ORDERING SYSTEM PROVIDED HISTORY: Lung mass TECHNOLOGIST PROVIDED HISTORY: lung mass Reason for Exam: F/u lung mass FINDINGS: Mediastinum: Volume loss of the right hemithorax was again noted. Calcified pleural plaque was noted in the right upper lobe anteriorly. Coronary artery calcifications were noted. No hilar or mediastinal mass was noted. No hilar, mediastinal or axillary lymphadenopathy were identified. Lungs/pleura: Hyperinflation was noted with diaphragmatic flattening. Numerous blebs and bulla were noted in both upper lobes and right middle lobe. A small right pleural effusion was noted. Subtle peripheral fibrosis was noted in the right lower lobe. A pleural based 2 cm irregular nodule was identified posteriorly in the right apex. This is unchanged from the CT scan of 12/15/2021. No pneumonia or interstitial edema was noted. Upper Abdomen: No adrenal mass was noted. Vascular calcifications were identified in the right kidney. Soft Tissues/Bones: Postoperative changes were noted from prior sternotomy. No osseous erosion was identified. Changes of pulmonary emphysema primarily in both upper lobes and right middle lobe. A small right pleural effusion was noted. A pleural based 2 cm irregular nodule or pleural thickening was identified posteriorly in the right apex.   It is unchanged from the CT chest of 12/15/2021. Impression:  Right lung apex mass, 1.8 cm, FDG avid, status post biopsy, negative for malignancy  Severe emphysema  Coronary artery s/p CABG  History of tobacco dependence  History of nonmuscle invasive urothelial cancer of bladder    Plan:  I had a detailed discussion with the patient and personally went over results of lab work-up imaging studies and other relevant clinical data. Reviewed results of CT scan which show overall stable appearance of the right upper lobe pleural-based nodularity measuring about 2 cm. We also reviewed images of the CT scan compared with the prior CT scan as well as the CT PET. This nodule was new noted on the CT lung screen back in November 2021. It was not present back in November 2020. Nodule was also mildly FDG avid. Even though biopsy is negative given smoking history I believe we need to further investigate. I am not sure if patient would be a candidate for wedge resection to rule out malignancy. I will reach out to patient's pulmonologist to discuss the case. Patient was counseled on abstinence from smoking  Discussed natural history of nonmuscle invasive bladder cancer. Recommend continued surveillance  At this point I will schedule a follow-up with repeat CT chest in 4 months this may change after I discussed patient's case with the pulmonologist.  Patient had multiple questions was answered to the best my ability. Addendum:    Case discussed with lung cancer navigator. Case discussed with pulmonary team.  Will refer patient to CT surgery and schedule follow-up with Dr. Aracelis Guerrero MD      this note is created with the assistance of a speech recognition program.  While intending to generate a document that actually reflects the content of the visit, the document can still have some errors including those of syntax and sound a like substitutions which may escape proof reading.   It such instances, actual meaning can be extrapolated by contextual diversion.

## 2022-03-18 ENCOUNTER — TELEPHONE (OUTPATIENT)
Dept: CASE MANAGEMENT | Age: 67
End: 2022-03-18

## 2022-03-18 NOTE — TELEPHONE ENCOUNTER
Name: Marlo Sweeney  : 1955  MRN: [id number]    Oncology Navigation Follow-Up Note  Navigator reviewing recent [place] F/U notes and will plan to repeat scan in 3 month.  Plan to follow  Electronically signed by Kobe Pope RN on 3/18/2022 at 4:32 PM

## 2022-03-21 ENCOUNTER — TELEPHONE (OUTPATIENT)
Dept: ONCOLOGY | Age: 67
End: 2022-03-21

## 2022-03-21 ENCOUNTER — PROCEDURE VISIT (OUTPATIENT)
Dept: UROLOGY | Age: 67
End: 2022-03-21
Payer: MEDICARE

## 2022-03-21 VITALS — SYSTOLIC BLOOD PRESSURE: 120 MMHG | DIASTOLIC BLOOD PRESSURE: 78 MMHG | OXYGEN SATURATION: 97 % | HEART RATE: 69 BPM

## 2022-03-21 DIAGNOSIS — N40.0 BENIGN PROSTATIC HYPERPLASIA WITHOUT LOWER URINARY TRACT SYMPTOMS: ICD-10-CM

## 2022-03-21 DIAGNOSIS — C67.9 MALIGNANT NEOPLASM OF URINARY BLADDER, UNSPECIFIED SITE (HCC): Primary | ICD-10-CM

## 2022-03-21 PROCEDURE — 52000 CYSTOURETHROSCOPY: CPT | Performed by: UROLOGY

## 2022-03-21 NOTE — PROGRESS NOTES
Cystoscopy Operative Note    Patient:  Obie Dean  MRN: [id number]  YOB: 1955    Date: 03/21/22  Surgeon: Pau Hernández MD  Anesthesia: Urethral 2% Xylocaine   Indications:     Bladder Cancer  Low grade noninvasive    BPH- LUTS      Position: Supine    Findings:   The patient was prepped and draped in the usual sterile fashion. The flexible cystoscope was advanced through the urethra and into the bladder. The bladder was thoroughly inspected and the following was noted:    Residual Urine: Minimal  Urethra: No abnormalities of the urethra are noted. Prostate: mod lateral lobe hypertrophy+  Bladder: No tumors or CIS noted. Dystrophic calcification over left lateral wall  No bladder diverticulum. Mild trabeculation noted. Ureters: Clear efflux from both ureters. Orifices with normal configuration and location. The cystoscope was removed. The patient tolerated the procedure well. No cancer recurrence  Worsening LUTS- flomax not helping. Discussed outlet surgery.  Pt will hold off    Cystoscopy in one year

## 2022-03-21 NOTE — TELEPHONE ENCOUNTER
Dr. Juju Sepulveda called stating that you would like patient worked in to see you in pulmonology. When would you like me to schedule this patient. Dr. Juju Sepulveda already has the referral placed for pt to see Dr. Macey Walker.

## 2022-03-22 DIAGNOSIS — J44.9 COPD MIXED TYPE (HCC): Primary | ICD-10-CM

## 2022-03-23 ENCOUNTER — TELEPHONE (OUTPATIENT)
Dept: CARDIOTHORACIC SURGERY | Age: 67
End: 2022-03-23

## 2022-03-23 NOTE — TELEPHONE ENCOUNTER
I spoke with the patient and he was under the impression that a decision on how to proceed will be made after he has another CT Chest and a F/U appt with Dr. Kacy Monet in July.   Deferred Referral to 7/1/22

## 2022-03-24 NOTE — H&P
Paloma Lopez is a 74 y.o. male who presents today for follow-up evaluation of ventral hernia. Briefly the patient has previously undergone a ventral hernia repair robotically with another surgeon and has now had a recurrence of his hernia. He was sent for CT scan to better characterize the size of the defect and for surgical planning. The CT is showing that he has a new defect that seems to be just above the level of the previously placed mesh. Does contain intestine but does not seem to be incarcerated. Comes back today to discuss results of the CT and for further recommendations. No changes since last seen.     Past Medical History        Past Medical History:   Diagnosis Date    Abnormal cardiovascular stress test 12/09/2020     Large inferolateral infarction with significant vernon-infarct ischemia.  CAD (coronary artery disease)       CABG-2 vessel, followed by stents x 4    Cervical disc disease      Clostridium difficile diarrhea 10/2014     hospitalized in October 2014    Displacement of cervical intervertebral disc without myelopathy 06/10/2014     [facility], C6/C7     HTN (hypertension)      Hyperlipidemia      Impaired fasting blood sugar      Neck pain       chronic    Neuralgia, neuritis, and radiculitis, unspecified 06/10/2014     [facility], left C7     Shoulder region pain       Left   [facility] injury 01/08/2014    Sprain and strain of unspecified site of shoulder and upper arm 06/10/2014     [facility], left arm     Wears glasses              Past Surgical History         Past Surgical History:   Procedure Laterality Date    BICEPS TENODESIS Right 11/23/2016     right proximal bicep tenodesis    COLONOSCOPY N/A 12/10/2019     COLONOSCOPY POLYPECTOMY SNARE/COLD BIOPSY performed by Kelsea Wiseman MD at [address]   12/18/2020     Successful PTCA. AGUILA of mid LAD / Patent LIMA however distally occluded and not supplying LAD /Residual high grade stenosis in small LCX    CORONARY ANGIOPLASTY WITH STENT PLACEMENT   01/22/2021    CT NEEDLE BIOPSY LUNG PERCUTANEOUS   12/15/2021     CT NEEDLE BIOPSY LUNG PERCUTANEOUS 12/15/2021 [facility] CT SCAN    CYSTOSCOPY N/A 03/09/2020     CYSTO TURBT performed by Francisco Garcia MD at [facility] Left 8/12/14, 1/09/15     C7 TFE    PAIN MANAGEMENT PROCEDURE Left 09/23/2014     C7 TFE    PAIN MANAGEMENT PROCEDURE Left 7/17/2015 , 1/22/16     C7 TFE    RI CABG, ARTERY-VEIN, SINGLE N/A 07/06/2018     CABG CORONARY ARTERY BYPASS, GARY ERAZO, CHANI  (CSI# [id number]) performed by Sang Paulson MD at [facility] CABG (LIMA-LAD, R SVG- RCA, OM too small for the grafts) on 7/6/18,    ROTATOR CUFF REPAIR Left 01/29/2019 2015- Right    SHOULDER ARTHROSCOPY Right 04/27/2016     scope decompressing, rotator cuff repair    TRANSESOPHAGEAL ECHOCARDIOGRAM   75/93/7944    UMBILICAL HERNIA REPAIR N/A 12/18/2019     Laparoscopic Robotic Assisted Umbilical Hernia Repair W/ MESH performed by Jeane Morton MD at [address] [facility] N/A 11/19/2020     Open Incisional Hernia Repair W/ MESH performed by Krissy Fung DO at [facility]            Current Facility-Administered Medications          Current Outpatient Medications   Medication Sig Dispense Refill    atorvastatin (LIPITOR) 40 MG tablet Take 1 tablet by mouth nightly 90 tablet 3    Elastic Bandages & Supports (ABDOMINAL BINDER/ELASTIC 2XL) MISC 1 Units by Does not apply route as needed (comfort) 1 each 1    tamsulosin (FLOMAX) 0.4 MG capsule Take 1 capsule by mouth daily 90 capsule 3    metoprolol tartrate (LOPRESSOR) 25 MG tablet Take 0.5 tablets by mouth 2 times daily 180 tablet 3    fluticasone (FLONASE) 50 MCG/ACT nasal spray 2 sprays by Each Nostril route daily 1 Bottle 5    furosemide (LASIX) 20 MG tablet Take 1 tablet by mouth as needed (FOR LE SWELLING) 45 tablet 3    clopidogrel (PLAVIX) 75 MG tablet Take 1 tablet by mouth daily 90 tablet 3    nitroGLYCERIN (NITROSTAT) 0.4 MG SL tablet up to max of 3 total doses. If no relief after 1 dose, call 911. 25 tablet 3    aspirin 81 MG chewable tablet Take 81 mg by mouth daily        Cholecalciferol (VITAMIN D) 50 MCG (2000 UT) CAPS capsule Take 2,000 Units by mouth daily        vitamin C (ASCORBIC ACID) 500 MG tablet Take 250 mg by mouth 2 times daily        acetaminophen (TYLENOL) 325 MG tablet Take 2 tablets by mouth every 6 hours as needed for Pain 120 tablet 3      No current facility-administered medications for this visit.                  Allergies   Allergen Reactions    Vicodin [Hydrocodone-Acetaminophen] Nausea Only       Stomach upset         Family History         Family History   Problem Relation Age of Onset    High Blood Pressure Father      Colon Polyps Brother      COPD Mother              Social History         Socioeconomic History    Marital status:        Spouse name: Liliane Siddiqui Number of children: 2    Years of education: Not on file    Highest education level: Not on file   Occupational History       Comment: [employer]   Tobacco Use    Smoking status: Former Smoker       Packs/day: 2.00       Years: 25.00       Pack years: 50.00       Types: Cigarettes       Start date: 1975       Quit date: 2008       Years since quittin.1    Smokeless tobacco: Never Used    Tobacco comment: Sarahy Gregorio RRT 16   Vaping Use    Vaping Use: Never used   Substance and Sexual Activity    Alcohol use:  No       Alcohol/week: 0.0 standard drinks    Drug use: No    Sexual activity: Yes       Partners: Female   Other Topics Concern    Not on file   Social History Narrative    Not on file      Social Determinants of Health          Financial Resource Strain: Low Risk     Difficulty of Paying Living Expenses: Not very hard   Food Insecurity: No Food Insecurity    Worried About Running Out of Food in the Last Year: Never true    Abundio of Food in the Last Year: Never true   Transportation Needs:     Lack of Transportation (Medical): Not on file    Lack of Transportation (Non-Medical): Not on file   Physical Activity:     Days of Exercise per Week: Not on file    Minutes of Exercise per Session: Not on file   Stress:     Feeling of Stress : Not on file   Social Connections:     Frequency of Communication with Friends and Family: Not on file    Frequency of Social Gatherings with Friends and Family: Not on file    Attends Jehovah's witness Services: Not on file    Active Member of [address] [place] [address] [employer] or Organizations: Not on file    Attends Club or Organization Meetings: Not on file    Marital Status: Not on file   Intimate Partner Violence:     Fear of Current or Ex-Partner: Not on file    Emotionally Abused: Not on file    Physically Abused: Not on file    Sexually Abused: Not on file   Housing Stability:     Unable to Pay for Housing in the Last Year: Not on file    Number of [place] in the Last Year: Not on file    Unstable Housing in the Last Year: Not on file            ROS:   Review of Systems - General ROS: negative  Psychological ROS: negative  Ophthalmic ROS: negative  ENT ROS: negative  Respiratory ROS: no cough, shortness of breath, or wheezing  Cardiovascular ROS: no chest pain or dyspnea on exertion  Gastrointestinal ROS: Per HPI  Genito-Urinary ROS: no dysuria, trouble voiding, or hematuria  Musculoskeletal ROS: negative  Dermatological ROS: negative        Objective       Vitals:     02/15/22 0849   BP: 130/80   Pulse: 65   Temp: 97.6 °F (36.4 °C)   SpO2: 95%      General:in no apparent distress and well developed and well nourished  Eyes: No gross abnormalities. Ears, Nose, Throat: hearing grossly normal bilaterally  Neck: neck supple and non tender without mass  Lungs: clear to auscultation without wheezes or rales   Heart: S1S2, no mumurs, RRR  Abdomen: Soft, nondistended, nontender, bowel sounds present.   Patient's abdomen is protuberant. He does have a large diastases in the midline that extends from just below costal margin all the way down to below his umbilicus. A little hard to appreciate the hernia defect due to body habitus but does seem that he probably has a 2 to 3 cm hernia about 8 to 10 cm above the level of the umbilicus in the midline. Extremity: negative  Neuro: CN II-XII grossly intact        1. Ventral hernia without obstruction or gangrene  Assessment & Plan:  I had a long discussion with Mayelin Maldonado today about his CT scan and the new hernia that is present. We also discussed options for repair. From review of prior records it seems that the patient had a retrorectus robotic repair which means that that plane will certainly have significant scarring as well as ingrowth of the mesh at this point. Because of that we discussed that trying to proceed with retrorectus repair would be difficult and could even result in injuries to the abdominal wall that may cause additional issues. Additionally with his large diastases ideally what we would like to do is plicate the rectus abdominal muscles to restore normal abdominal wall anatomy but with him having a large piece of ventral light mesh in that plane I do not know that we will be able to adequately do this without risking ongoing pain problems or even mesh complications from the previous mesh that has been placed.     After we discussed all these things I do think that we can plan to repair the hernia and my plan would be to do robotic assisted laparoscopic ventral hernia repair with mesh and would plan for an IPOM repair.       Risks of the procedure including bleeding, infection, scarring, pain, damage to surrounding structures, need for additional surgery, hernia recurrence, mesh complications and anesthesia risk were discussed and consent is obtained.   I did spend quite a bit of time also discussing that because of his large diastases that he will be at higher risk for new hernia development in the future in the area of the diastases.     Date for surgery to to be determined.   He he does states that his wife is supposed to have surgery in the near future and so we will work around this.           (Please note that portions of this note were completed with a voice recognition program.  Efforts were made to edit the dictations but occasionally words are mis-transcribed.)    The patient was interviewed and examined and there have been no changes since the above History and Physical.    Electronically signed by Kar Hansen DO on 3/24/2022 at 7:27 PM

## 2022-03-25 ENCOUNTER — ANESTHESIA EVENT (OUTPATIENT)
Dept: OPERATING ROOM | Age: 67
End: 2022-03-25
Payer: MEDICARE

## 2022-03-25 ENCOUNTER — HOSPITAL ENCOUNTER (OUTPATIENT)
Age: 67
Setting detail: OUTPATIENT SURGERY
Discharge: HOME | End: 2022-03-25
Attending: SURGERY | Admitting: SURGERY
Payer: MEDICARE

## 2022-03-25 ENCOUNTER — ANESTHESIA (OUTPATIENT)
Dept: OPERATING ROOM | Age: 67
End: 2022-03-25
Payer: MEDICARE

## 2022-03-25 VITALS
HEIGHT: 69 IN | HEART RATE: 85 BPM | DIASTOLIC BLOOD PRESSURE: 68 MMHG | RESPIRATION RATE: 15 BRPM | SYSTOLIC BLOOD PRESSURE: 140 MMHG | WEIGHT: 214.8 LBS | BODY MASS INDEX: 31.81 KG/M2 | OXYGEN SATURATION: 95 % | TEMPERATURE: 97.2 F

## 2022-03-25 VITALS
OXYGEN SATURATION: 99 % | DIASTOLIC BLOOD PRESSURE: 85 MMHG | RESPIRATION RATE: 18 BRPM | SYSTOLIC BLOOD PRESSURE: 185 MMHG | TEMPERATURE: 83.2 F

## 2022-03-25 DIAGNOSIS — G89.18 POST-OP PAIN: Primary | ICD-10-CM

## 2022-03-25 DIAGNOSIS — R91.1 PULMONARY NODULE: Primary | ICD-10-CM

## 2022-03-25 PROCEDURE — 6370000000 HC RX 637 (ALT 250 FOR IP): Performed by: SURGERY

## 2022-03-25 PROCEDURE — 6360000002 HC RX W HCPCS: Performed by: SURGERY

## 2022-03-25 PROCEDURE — 6360000002 HC RX W HCPCS: Performed by: NURSE ANESTHETIST, CERTIFIED REGISTERED

## 2022-03-25 PROCEDURE — 2709999900 HC NON-CHARGEABLE SUPPLY: Performed by: SURGERY

## 2022-03-25 PROCEDURE — 2500000003 HC RX 250 WO HCPCS: Performed by: NURSE ANESTHETIST, CERTIFIED REGISTERED

## 2022-03-25 PROCEDURE — 2500000003 HC RX 250 WO HCPCS: Performed by: SURGERY

## 2022-03-25 PROCEDURE — 3600000009 HC SURGERY ROBOT BASE: Performed by: SURGERY

## 2022-03-25 PROCEDURE — 7100000010 HC PHASE II RECOVERY - FIRST 15 MIN: Performed by: SURGERY

## 2022-03-25 PROCEDURE — 49656 PR LAP, RECURRENT INCISIONAL HERNIA REPAIR,REDUCIBLE: CPT | Performed by: SURGERY

## 2022-03-25 PROCEDURE — 3700000000 HC ANESTHESIA ATTENDED CARE: Performed by: SURGERY

## 2022-03-25 PROCEDURE — S2900 ROBOTIC SURGICAL SYSTEM: HCPCS | Performed by: SURGERY

## 2022-03-25 PROCEDURE — C1781 MESH (IMPLANTABLE): HCPCS | Performed by: SURGERY

## 2022-03-25 PROCEDURE — 7100000001 HC PACU RECOVERY - ADDTL 15 MIN: Performed by: SURGERY

## 2022-03-25 PROCEDURE — 7100000011 HC PHASE II RECOVERY - ADDTL 15 MIN: Performed by: SURGERY

## 2022-03-25 PROCEDURE — 3600000019 HC SURGERY ROBOT ADDTL 15MIN: Performed by: SURGERY

## 2022-03-25 PROCEDURE — 3700000001 HC ADD 15 MINUTES (ANESTHESIA): Performed by: SURGERY

## 2022-03-25 PROCEDURE — 2580000003 HC RX 258: Performed by: NURSE ANESTHETIST, CERTIFIED REGISTERED

## 2022-03-25 PROCEDURE — 7100000000 HC PACU RECOVERY - FIRST 15 MIN: Performed by: SURGERY

## 2022-03-25 DEVICE — MESH SURG W6XL8IN ELLIPSE SEPRA TECHNOLOGY VENTRALIGHT: Type: IMPLANTABLE DEVICE | Site: ABDOMEN | Status: FUNCTIONAL

## 2022-03-25 RX ORDER — ONDANSETRON 2 MG/ML
INJECTION INTRAMUSCULAR; INTRAVENOUS PRN
Status: DISCONTINUED | OUTPATIENT
Start: 2022-03-25 | End: 2022-03-25 | Stop reason: SDUPTHER

## 2022-03-25 RX ORDER — OXYCODONE HYDROCHLORIDE 5 MG/1
5 TABLET ORAL PRN
Status: COMPLETED | OUTPATIENT
Start: 2022-03-25 | End: 2022-03-25

## 2022-03-25 RX ORDER — ONDANSETRON 2 MG/ML
4 INJECTION INTRAMUSCULAR; INTRAVENOUS
Status: DISCONTINUED | OUTPATIENT
Start: 2022-03-25 | End: 2022-03-25 | Stop reason: HOSPADM

## 2022-03-25 RX ORDER — LIDOCAINE HYDROCHLORIDE 20 MG/ML
INJECTION, SOLUTION EPIDURAL; INFILTRATION; INTRACAUDAL; PERINEURAL PRN
Status: DISCONTINUED | OUTPATIENT
Start: 2022-03-25 | End: 2022-03-25 | Stop reason: SDUPTHER

## 2022-03-25 RX ORDER — SODIUM CHLORIDE 0.9 % (FLUSH) 0.9 %
5-40 SYRINGE (ML) INJECTION EVERY 12 HOURS SCHEDULED
Status: DISCONTINUED | OUTPATIENT
Start: 2022-03-25 | End: 2022-03-25 | Stop reason: HOSPADM

## 2022-03-25 RX ORDER — SODIUM CHLORIDE, SODIUM LACTATE, POTASSIUM CHLORIDE, CALCIUM CHLORIDE 600; 310; 30; 20 MG/100ML; MG/100ML; MG/100ML; MG/100ML
INJECTION, SOLUTION INTRAVENOUS CONTINUOUS PRN
Status: DISCONTINUED | OUTPATIENT
Start: 2022-03-25 | End: 2022-03-25 | Stop reason: SDUPTHER

## 2022-03-25 RX ORDER — MORPHINE SULFATE 2 MG/ML
1 INJECTION, SOLUTION INTRAMUSCULAR; INTRAVENOUS EVERY 5 MIN PRN
Status: DISCONTINUED | OUTPATIENT
Start: 2022-03-25 | End: 2022-03-25 | Stop reason: HOSPADM

## 2022-03-25 RX ORDER — PROPOFOL 10 MG/ML
INJECTION, EMULSION INTRAVENOUS PRN
Status: DISCONTINUED | OUTPATIENT
Start: 2022-03-25 | End: 2022-03-25 | Stop reason: SDUPTHER

## 2022-03-25 RX ORDER — FENTANYL CITRATE 50 UG/ML
25 INJECTION, SOLUTION INTRAMUSCULAR; INTRAVENOUS EVERY 5 MIN PRN
Status: COMPLETED | OUTPATIENT
Start: 2022-03-25 | End: 2022-03-25

## 2022-03-25 RX ORDER — HYDROCODONE BITARTRATE AND ACETAMINOPHEN 5; 325 MG/1; MG/1
1 TABLET ORAL EVERY 6 HOURS PRN
Qty: 28 TABLET | Refills: 0 | Status: SHIPPED | OUTPATIENT
Start: 2022-03-25 | End: 2022-04-01

## 2022-03-25 RX ORDER — SODIUM CHLORIDE 9 MG/ML
25 INJECTION, SOLUTION INTRAVENOUS PRN
Status: DISCONTINUED | OUTPATIENT
Start: 2022-03-25 | End: 2022-03-25 | Stop reason: HOSPADM

## 2022-03-25 RX ORDER — SODIUM CHLORIDE, SODIUM LACTATE, POTASSIUM CHLORIDE, CALCIUM CHLORIDE 600; 310; 30; 20 MG/100ML; MG/100ML; MG/100ML; MG/100ML
INJECTION, SOLUTION INTRAVENOUS CONTINUOUS
Status: DISCONTINUED | OUTPATIENT
Start: 2022-03-25 | End: 2022-03-25 | Stop reason: HOSPADM

## 2022-03-25 RX ORDER — SODIUM CHLORIDE 0.9 % (FLUSH) 0.9 %
10 SYRINGE (ML) INJECTION EVERY 12 HOURS SCHEDULED
Status: DISCONTINUED | OUTPATIENT
Start: 2022-03-25 | End: 2022-03-25 | Stop reason: HOSPADM

## 2022-03-25 RX ORDER — ROCURONIUM BROMIDE 10 MG/ML
INJECTION, SOLUTION INTRAVENOUS PRN
Status: DISCONTINUED | OUTPATIENT
Start: 2022-03-25 | End: 2022-03-25 | Stop reason: SDUPTHER

## 2022-03-25 RX ORDER — MORPHINE SULFATE 2 MG/ML
2 INJECTION, SOLUTION INTRAMUSCULAR; INTRAVENOUS EVERY 5 MIN PRN
Status: DISCONTINUED | OUTPATIENT
Start: 2022-03-25 | End: 2022-03-25 | Stop reason: HOSPADM

## 2022-03-25 RX ORDER — MIDAZOLAM HYDROCHLORIDE 1 MG/ML
INJECTION INTRAMUSCULAR; INTRAVENOUS PRN
Status: DISCONTINUED | OUTPATIENT
Start: 2022-03-25 | End: 2022-03-25 | Stop reason: SDUPTHER

## 2022-03-25 RX ORDER — FENTANYL CITRATE 50 UG/ML
INJECTION, SOLUTION INTRAMUSCULAR; INTRAVENOUS PRN
Status: DISCONTINUED | OUTPATIENT
Start: 2022-03-25 | End: 2022-03-25 | Stop reason: SDUPTHER

## 2022-03-25 RX ORDER — OXYCODONE HYDROCHLORIDE 5 MG/1
10 TABLET ORAL PRN
Status: COMPLETED | OUTPATIENT
Start: 2022-03-25 | End: 2022-03-25

## 2022-03-25 RX ORDER — SODIUM CHLORIDE 0.9 % (FLUSH) 0.9 %
5-40 SYRINGE (ML) INJECTION PRN
Status: DISCONTINUED | OUTPATIENT
Start: 2022-03-25 | End: 2022-03-25 | Stop reason: HOSPADM

## 2022-03-25 RX ORDER — SODIUM CHLORIDE 0.9 % (FLUSH) 0.9 %
10 SYRINGE (ML) INJECTION PRN
Status: DISCONTINUED | OUTPATIENT
Start: 2022-03-25 | End: 2022-03-25 | Stop reason: HOSPADM

## 2022-03-25 RX ORDER — DEXAMETHASONE SODIUM PHOSPHATE 10 MG/ML
INJECTION INTRAMUSCULAR; INTRAVENOUS PRN
Status: DISCONTINUED | OUTPATIENT
Start: 2022-03-25 | End: 2022-03-25 | Stop reason: SDUPTHER

## 2022-03-25 RX ADMIN — SUGAMMADEX 195 MG: 100 INJECTION, SOLUTION INTRAVENOUS at 13:48

## 2022-03-25 RX ADMIN — FENTANYL CITRATE 50 MCG: 50 INJECTION, SOLUTION INTRAMUSCULAR; INTRAVENOUS at 12:16

## 2022-03-25 RX ADMIN — LIDOCAINE HYDROCHLORIDE 50 MG: 20 INJECTION, SOLUTION EPIDURAL; INFILTRATION; INTRACAUDAL; PERINEURAL at 11:25

## 2022-03-25 RX ADMIN — Medication 2000 MG: at 11:38

## 2022-03-25 RX ADMIN — FENTANYL CITRATE 25 MCG: 50 INJECTION INTRAMUSCULAR; INTRAVENOUS at 14:52

## 2022-03-25 RX ADMIN — FENTANYL CITRATE 25 MCG: 50 INJECTION INTRAMUSCULAR; INTRAVENOUS at 14:27

## 2022-03-25 RX ADMIN — OXYCODONE 10 MG: 5 TABLET ORAL at 16:06

## 2022-03-25 RX ADMIN — FENTANYL CITRATE 50 MCG: 50 INJECTION, SOLUTION INTRAMUSCULAR; INTRAVENOUS at 11:24

## 2022-03-25 RX ADMIN — DEXAMETHASONE SODIUM PHOSPHATE 10 MG: 10 INJECTION INTRAMUSCULAR; INTRAVENOUS at 11:37

## 2022-03-25 RX ADMIN — ROCURONIUM BROMIDE 50 MG: 10 INJECTION INTRAVENOUS at 11:25

## 2022-03-25 RX ADMIN — ROCURONIUM BROMIDE 30 MG: 10 INJECTION INTRAVENOUS at 12:18

## 2022-03-25 RX ADMIN — ONDANSETRON 4 MG: 2 INJECTION INTRAMUSCULAR; INTRAVENOUS at 13:44

## 2022-03-25 RX ADMIN — FENTANYL CITRATE 25 MCG: 50 INJECTION INTRAMUSCULAR; INTRAVENOUS at 14:19

## 2022-03-25 RX ADMIN — FENTANYL CITRATE 25 MCG: 50 INJECTION INTRAMUSCULAR; INTRAVENOUS at 14:40

## 2022-03-25 RX ADMIN — ROCURONIUM BROMIDE 20 MG: 10 INJECTION INTRAVENOUS at 13:21

## 2022-03-25 RX ADMIN — MIDAZOLAM HYDROCHLORIDE 2 MG: 1 INJECTION, SOLUTION INTRAMUSCULAR; INTRAVENOUS at 11:24

## 2022-03-25 RX ADMIN — SODIUM CHLORIDE, POTASSIUM CHLORIDE, SODIUM LACTATE AND CALCIUM CHLORIDE: 600; 310; 30; 20 INJECTION, SOLUTION INTRAVENOUS at 11:22

## 2022-03-25 RX ADMIN — PROPOFOL 160 MG: 10 INJECTION, EMULSION INTRAVENOUS at 11:25

## 2022-03-25 ASSESSMENT — PULMONARY FUNCTION TESTS
PIF_VALUE: 18
PIF_VALUE: 27
PIF_VALUE: 27
PIF_VALUE: 26
PIF_VALUE: 25
PIF_VALUE: 27
PIF_VALUE: 20
PIF_VALUE: 27
PIF_VALUE: 22
PIF_VALUE: 24
PIF_VALUE: 26
PIF_VALUE: 27
PIF_VALUE: 26
PIF_VALUE: 27
PIF_VALUE: 27
PIF_VALUE: 19
PIF_VALUE: 16
PIF_VALUE: 11
PIF_VALUE: 27
PIF_VALUE: 27
PIF_VALUE: 25
PIF_VALUE: 26
PIF_VALUE: 27
PIF_VALUE: 23
PIF_VALUE: 22
PIF_VALUE: 17
PIF_VALUE: 28
PIF_VALUE: 7
PIF_VALUE: 25
PIF_VALUE: 10
PIF_VALUE: 23
PIF_VALUE: 24
PIF_VALUE: 27
PIF_VALUE: 27
PIF_VALUE: 20
PIF_VALUE: 27
PIF_VALUE: 27
PIF_VALUE: 16
PIF_VALUE: 28
PIF_VALUE: 23
PIF_VALUE: 24
PIF_VALUE: 27
PIF_VALUE: 20
PIF_VALUE: 24
PIF_VALUE: 26
PIF_VALUE: 19
PIF_VALUE: 25
PIF_VALUE: 24
PIF_VALUE: 23
PIF_VALUE: 25
PIF_VALUE: 28
PIF_VALUE: 27
PIF_VALUE: 27
PIF_VALUE: 24
PIF_VALUE: 25
PIF_VALUE: 27
PIF_VALUE: 24
PIF_VALUE: 22
PIF_VALUE: 26
PIF_VALUE: 16
PIF_VALUE: 28
PIF_VALUE: 17
PIF_VALUE: 27
PIF_VALUE: 24
PIF_VALUE: 27
PIF_VALUE: 27
PIF_VALUE: 17
PIF_VALUE: 21
PIF_VALUE: 25
PIF_VALUE: 27
PIF_VALUE: 17
PIF_VALUE: 0
PIF_VALUE: 26
PIF_VALUE: 21
PIF_VALUE: 25
PIF_VALUE: 11
PIF_VALUE: 22
PIF_VALUE: 10
PIF_VALUE: 29
PIF_VALUE: 19
PIF_VALUE: 19
PIF_VALUE: 25
PIF_VALUE: 27
PIF_VALUE: 23
PIF_VALUE: 23
PIF_VALUE: 27
PIF_VALUE: 27
PIF_VALUE: 18
PIF_VALUE: 20
PIF_VALUE: 27
PIF_VALUE: 25
PIF_VALUE: 28
PIF_VALUE: 24
PIF_VALUE: 25
PIF_VALUE: 24
PIF_VALUE: 21
PIF_VALUE: 21
PIF_VALUE: 17
PIF_VALUE: 24
PIF_VALUE: 2
PIF_VALUE: 25
PIF_VALUE: 28
PIF_VALUE: 22
PIF_VALUE: 12
PIF_VALUE: 25
PIF_VALUE: 6
PIF_VALUE: 17
PIF_VALUE: 23
PIF_VALUE: 27
PIF_VALUE: 17
PIF_VALUE: 17
PIF_VALUE: 0
PIF_VALUE: 24
PIF_VALUE: 22
PIF_VALUE: 23
PIF_VALUE: 25
PIF_VALUE: 26
PIF_VALUE: 22
PIF_VALUE: 21
PIF_VALUE: 0
PIF_VALUE: 19
PIF_VALUE: 17
PIF_VALUE: 25
PIF_VALUE: 27
PIF_VALUE: 18
PIF_VALUE: 19
PIF_VALUE: 28
PIF_VALUE: 24
PIF_VALUE: 27
PIF_VALUE: 27
PIF_VALUE: 26
PIF_VALUE: 23
PIF_VALUE: 10
PIF_VALUE: 27
PIF_VALUE: 23
PIF_VALUE: 28
PIF_VALUE: 18
PIF_VALUE: 24
PIF_VALUE: 27
PIF_VALUE: 28
PIF_VALUE: 27
PIF_VALUE: 24
PIF_VALUE: 4
PIF_VALUE: 27
PIF_VALUE: 16
PIF_VALUE: 25
PIF_VALUE: 17
PIF_VALUE: 24
PIF_VALUE: 25
PIF_VALUE: 17

## 2022-03-25 ASSESSMENT — PAIN DESCRIPTION - PAIN TYPE
TYPE: SURGICAL PAIN

## 2022-03-25 ASSESSMENT — PAIN DESCRIPTION - LOCATION
LOCATION: ABDOMEN

## 2022-03-25 ASSESSMENT — COPD QUESTIONNAIRES: CAT_SEVERITY: MILD

## 2022-03-25 ASSESSMENT — PAIN SCALES - GENERAL
PAINLEVEL_OUTOF10: 6
PAINLEVEL_OUTOF10: 10
PAINLEVEL_OUTOF10: 8
PAINLEVEL_OUTOF10: 10
PAINLEVEL_OUTOF10: 5
PAINLEVEL_OUTOF10: 10
PAINLEVEL_OUTOF10: 9

## 2022-03-25 ASSESSMENT — PAIN DESCRIPTION - DESCRIPTORS: DESCRIPTORS: ACHING

## 2022-03-25 ASSESSMENT — PAIN - FUNCTIONAL ASSESSMENT: PAIN_FUNCTIONAL_ASSESSMENT: 0-10

## 2022-03-25 NOTE — ANESTHESIA PRE PROCEDURE
Department of Anesthesiology  Preprocedure Note       Name:  Kerry Sawyer   Age:  74 y.o.  :  1955                                          MRN:  [id number]         Date:  3/25/2022      Surgeon: Jasper Yeh):  Augusta Calixto DO    Procedure: Procedure(s):  LAPAROSCOPIC Robotic Assisted Ventral Hernia Repair with mesh    Medications prior to admission:   Prior to Admission medications    Medication Sig Start Date End Date Taking? Authorizing Provider   atorvastatin (LIPITOR) 40 MG tablet Take 1 tablet by mouth nightly 22   Renato Gipson DO   Elastic Bandages & Supports (ABDOMINAL BINDER/ELASTIC 2XL) MISC 1 Units by Does not apply route as needed (comfort) 22   Augusta Calixto DO   tamsulosin [facility]) 0.4 MG capsule Take 1 capsule by mouth daily 8/6/21 3/15/22  Nora Cm MD   metoprolol tartrate (LOPRESSOR) 25 MG tablet Take 0.5 tablets by mouth 2 times daily 21   Sneha Mayfield MD   fluticasone (FLONASE) 50 MCG/ACT nasal spray 2 sprays by Each Nostril route daily  Patient not taking: Reported on 3/21/2022 4/23/21   PARESH Amin CNP   furosemide (LASIX) 20 MG tablet Take 1 tablet by mouth as needed (FOR LE SWELLING) 21   Sneha Mayfield MD   clopidogrel (PLAVIX) 75 MG tablet Take 1 tablet by mouth daily 21   Pauly Gipson DO   nitroGLYCERIN (NITROSTAT) 0.4 MG SL tablet up to max of 3 total doses.  If no relief after 1 dose, call 911. 20   PARESH Silva NP   aspirin 81 MG chewable tablet Take 81 mg by mouth daily    Historical Provider, MD   Cholecalciferol (VITAMIN D) 50 MCG ( UT) CAPS capsule Take 2,000 Units by mouth daily    Historical Provider, MD   vitamin C (ASCORBIC ACID) 500 MG tablet Take 250 mg by mouth 2 times daily    Historical Provider, MD   acetaminophen (TYLENOL) 325 MG tablet Take 2 tablets by mouth every 6 hours as needed for Pain 18   PARESH Mejia - CNP       Current medications:    Current Facility-Administered Medications   Medication Dose Route Frequency Provider Last Rate Last Admin    lactated ringers infusion   IntraVENous Continuous Augusta Calixto, DO        sodium chloride flush 0.9 % injection 10 mL  10 mL IntraVENous 2 times per day Augustabartolome Calixto, DO        sodium chloride flush 0.9 % injection 10 mL  10 mL IntraVENous PRN Augusta Calixto, DO        0.9 % sodium chloride infusion  25 mL IntraVENous PRN Augusta Calixto, DO        ceFAZolin (ANCEF) 2000 mg in sterile water 20 mL IV syringe  2,000 mg IntraVENous On Call to [address] Latha Garcia DO           Allergies: Allergies   Allergen Reactions    Vicodin [Hydrocodone-Acetaminophen] Nausea Only     Stomach upset       Problem List:    Patient Active Problem List   Diagnosis Code    Cervical radiculitis S87.25    Umbilical hernia V87.3    HTN (hypertension) I10    Impaired fasting blood sugar R73.01    Displacement of cervical intervertebral disc without myelopathy M50.20    Rotator cuff tear M75.100    CAD in native artery I25.10    S/P CABG (coronary artery bypass graft) Z95.1    Hyperlipidemia E78.5    Other emphysema (HCC) J43.8    Positive colorectal cancer screening using DNA-based stool test R19.5    Epigastric hernia K43.9    Urothelial carcinoma of bladder (HCC) C67.9    Post-op pain G89.18    Incisional hernia, without obstruction or gangrene K43.2    S/P PTCA (percutaneous transluminal coronary angioplasty) Z98.61    S/P cardiac cath Z98.890    Recurrent abdominal hernia without obstruction or gangrene K45.8    Ventral hernia without obstruction or gangrene K43.9       Past Medical History:        Diagnosis Date    Abnormal cardiovascular stress test 12/09/2020    Large inferolateral infarction with significant vernon-infarct ischemia.     CAD (coronary artery disease)     CABG-2 vessel, followed by stents x 4    Cervical disc disease     Clostridium difficile diarrhea 10/2014    hospitalized in October 2014    Displacement of cervical intervertebral disc without myelopathy 06/10/2014    [facility], C6/C7     HTN (hypertension)     Hyperlipidemia     Impaired fasting blood sugar     Neck pain     chronic    Neuralgia, neuritis, and radiculitis, unspecified 06/10/2014    [facility], left C7     Shoulder region pain     Left   [facility] injury 01/08/2014    Sprain and strain of unspecified site of shoulder and upper arm 06/10/2014    [facility], left arm     Wears glasses        Past Surgical History:        Procedure Laterality Date    BICEPS TENODESIS Right 11/23/2016    right proximal bicep tenodesis    COLONOSCOPY N/A 12/10/2019    COLONOSCOPY POLYPECTOMY SNARE/COLD BIOPSY performed by Veda Mueller MD at [place] [address]  12/18/2020    Successful PTCA. AGUILA of mid LAD / Patent LIMA however distally occluded and not supplying LAD /Residual high grade stenosis in small LCX    CORONARY ANGIOPLASTY WITH STENT PLACEMENT  01/22/2021    CT NEEDLE BIOPSY LUNG PERCUTANEOUS  12/15/2021    CT NEEDLE BIOPSY LUNG PERCUTANEOUS 12/15/2021 [facility] CT SCAN    CYSTOSCOPY N/A 03/09/2020    CYSTO TURBT performed by Santi Rapp MD at [place] [address] Left 8/12/14, 1/09/15    C7 TFE    PAIN MANAGEMENT PROCEDURE Left 09/23/2014    C7 TFE    PAIN MANAGEMENT PROCEDURE Left 7/17/2015 , 1/22/16    C7 TFE    NE CABG, ARTERY-VEIN, SINGLE N/A 07/06/2018    CABG CORONARY ARTERY BYPASS, SWAN KACEY, CHANI  (CSI# [id number]) performed by Damon Olguin MD at [address] [facility] [address] CABG (LIMA-LAD, R SVG- RCA, OM too small for the grafts) on 7/6/18,    ROTATOR CUFF REPAIR Left 01/29/2019 2015- Right    SHOULDER ARTHROSCOPY Right 04/27/2016    scope decompressing, rotator cuff repair    TRANSESOPHAGEAL ECHOCARDIOGRAM  75/65/4816    UMBILICAL HERNIA REPAIR N/A 12/18/2019    Laparoscopic Robotic Assisted Umbilical Hernia Repair W/ MESH performed by Veda Mueller MD at [place] [address] N/A 11/19/2020 Open Incisional Hernia Repair W/ MESH performed by Henrik Nelson DO at [facility] OR       Social History:    Social History     Tobacco Use    Smoking status: Former Smoker     Packs/day: 2.00     Years: 25.00     Pack years: 50.00     Types: Cigarettes     Start date: 1975     Quit date: 2008     Years since quittin.2    Smokeless tobacco: Never Used    Tobacco comment: Ruth Houser RRT 16   Substance Use Topics    Alcohol use: No     Alcohol/week: 0.0 standard drinks                                Counseling given: Not Answered  Comment: Ruth Houser RRT 16      Vital Signs (Current):   Vitals:    22 0907   BP: 130/63   Pulse: 69   Resp: 16   Temp: 36.1 °C (97 °F)   TempSrc: Oral   SpO2: 96%   Weight: 214 lb 12.8 oz (97.4 kg)   Height: 5' 9\" (1.753 m)                                              BP Readings from Last 3 Encounters:   22 130/63   22 120/78   03/15/22 118/82       NPO Status: Time of last liquid consumption:                         Time of last solid consumption:                         Date of last liquid consumption: 22                        Date of last solid food consumption: 22    BMI:   Wt Readings from Last 3 Encounters:   22 214 lb 12.8 oz (97.4 kg)   03/15/22 218 lb (98.9 kg)   02/15/22 218 lb (98.9 kg)     Body mass index is 31.72 kg/m².     CBC:   Lab Results   Component Value Date    WBC 7.1 03/10/2022    RBC 5.54 03/10/2022    HGB 15.3 03/10/2022    HCT 47.0 03/10/2022    MCV 84.8 03/10/2022    RDW 14.0 03/10/2022     03/10/2022       CMP:   Lab Results   Component Value Date     03/10/2022    K 4.6 03/10/2022     03/10/2022    CO2 27 03/10/2022    BUN 12 03/10/2022    CREATININE 0.85 03/10/2022    GFRAA >60 03/10/2022    LABGLOM >60 03/10/2022    GLUCOSE 107 03/10/2022    PROT 6.7 03/10/2022    CALCIUM 9.0 03/10/2022    BILITOT 0.45 03/10/2022    ALKPHOS 136 03/10/2022    AST 19 03/10/2022    ALT 19 03/10/2022       POC Tests: No results for input(s): POCGLU, POCNA, POCK, POCCL, POCBUN, POCHEMO, POCHCT in the last 72 hours. Coags:   Lab Results   Component Value Date    PROTIME 10.3 12/15/2021    INR 1.0 12/15/2021    APTT 23.8 12/15/2021       HCG (If Applicable): No results found for: PREGTESTUR, PREGSERUM, HCG, HCGQUANT     ABGs: No results found for: PHART, PO2ART, WQU9SRL, RXT0BYA, BEART, S0VTPBHU     Type & Screen (If Applicable):  No results found for: LABABO, LABRH    Drug/Infectious Status (If Applicable):  No results found for: HIV, HEPCAB    COVID-19 Screening (If Applicable):   Lab Results   Component Value Date    COVID19 Not Detected 11/13/2020           Anesthesia Evaluation  Patient summary reviewed  Airway: Mallampati: II  TM distance: >3 FB   Neck ROM: full  Comment: Cervical radiculopathy noted. Patient denies discomfort with neck extension. Mouth opening: > = 3 FB Dental:    (+) upper dentures and lower dentures      Pulmonary: breath sounds clear to auscultation  (+) COPD: mild,                             Cardiovascular:    (+) hypertension: moderate and mild, CAD: non-obstructive, CABG/stent: no interval change, CHF: no interval change,         Rhythm: regular  Rate: normal                    Neuro/Psych:   (+) neuromuscular disease:,             GI/Hepatic/Renal: Neg GI/Hepatic/Renal ROS            Endo/Other: Negative Endo/Other ROS                    Abdominal:             Vascular: negative vascular ROS. Other Findings:             Anesthesia Plan      general     ASA 3     (CAD, Hx of CHF, )  Induction: intravenous. Anesthetic plan and risks discussed with patient.       Plan discussed with CRNA and surgical team.                  PARESH Kent - CRNA   3/25/2022

## 2022-03-25 NOTE — ANESTHESIA POSTPROCEDURE EVALUATION
Department of Anesthesiology  Postprocedure Note    Patient: Jannette Brown  MRN: [id number]  YOB: 1955  Date of evaluation: 3/25/2022  Time:  2:10 PM     Procedure Summary     Date: 03/25/22 Room / Location: [address] [place] [address]    Anesthesia Start: 1122 Anesthesia Stop: 8560    Procedure: LAPAROSCOPIC Robotic Assisted Ventral Hernia Repair with mesh WITH LYSIS OF ADHESIONS (N/A ) Diagnosis: (ventral hernia)    Surgeons: Eula Martinez DO Responsible Provider: PARESH Kent CRNA    Anesthesia Type: general ASA Status: 3          Anesthesia Type: general    Jazlyn Phase I: Jazlyn Score: 10    Jazlyn Phase II:      Last vitals: Reviewed and per EMR flowsheets.        Anesthesia Post Evaluation    Patient location during evaluation: PACU  Patient participation: complete - patient participated  Level of consciousness: awake and alert  Pain score: 2  Airway patency: patent  Nausea & Vomiting: no nausea and no vomiting  Complications: no  Cardiovascular status: blood pressure returned to baseline and hemodynamically stable  Respiratory status: acceptable and nasal cannula  Hydration status: stable

## 2022-03-25 NOTE — PROGRESS NOTES
CLINICAL PHARMACY NOTE: MEDS TO BEDS    Total # of Prescriptions Filled: 1   The following medications were delivered to the patient:  · Hydrocodone/Acetaminophen 5/325    Additional Documentation:

## 2022-03-26 NOTE — OP NOTE
Pepe [address]                 [address] [place] [address]                                OPERATIVE REPORT    PATIENT NAME: Mojgan Melendez                 :        1955  MED REC NO:   [id number]                             ROOM:  ACCOUNT NO:   [de-identified]                           ADMIT DATE: 2022  PROVIDER:     Alicia Kern    DATE OF PROCEDURE:  2022    SURGEON:  Dr. Alicia Kern. ASSISTANT:  None. PREOPERATIVE DIAGNOSIS:  Recurrent ventral hernia. POSTOPERATIVE DIAGNOSES:  1. Recurrent ventral hernia. 2.  Intra-abdominal adhesions. PROCEDURES:  1. Laparoscopic lysis of adhesions totaling approximately 30 to 40  minutes. 2.  Robotic-assisted laparoscopic ventral incisional hernia repair. ANESTHESIA:  General.    ESTIMATED BLOOD LOSS:  15 mL. FLUIDS:  1000 mL of crystalloid. COMPLICATIONS:  None. SPECIMENS:  None. INDICATIONS FOR PROCEDURE:  The patient is a 76-year-old gentleman who  was referred to my office after seeing his primary care with concern of  a recurrent hernia. The patient has previously undergone a  robotic-assisted umbilical hernia repair and according to OP note had an  additional defect just above the umbilicus which were both repaired. However, he states that over the past few months, he has been noticing a  bulge in the upper abdomen approximately snf between his umbilicus  and his xiphoid. Once he saw me, it did feel like he probably had a  hernia in the area and we obtained a CT scan to better characterize the  defect and for operative planning. After discussion, he did want to  proceed with surgery and we planned for robotic-assisted repair. Prior  to the day of the procedure, risks, benefits, and alternatives were  explained to the patient and consent was obtained.     DESCRIPTION OF PROCEDURE:  The patient was brought to the operative  suite and placed on the operative table in the supine position. Monitoring devices and SCD cuffs were placed. General endotracheal  anesthesia was induced. After induction of anesthesia, time-out was  performed and correct patient and procedure were verified. The  patient's abdomen was prepped and draped in the sterile fashion. Prior  to the time of the incision, the patient received preoperative  antibiotics in accordance with SCIP protocol. Once we had him prepped  and draped, I had Anesthesia flex the bed a little bit to open up the  space between the costal margin and the ASIS for port placement. At  this point, the skin below the costal margin at midclavicular line in  the left upper quadrant was anesthetized with local anesthetic. A small  stab incision was made through this area and a Veress needle was  advanced into the abdominal cavity. Saline drop test showed no  aspiration of blood or enteric fluid and free flow of saline. Insufflation tubing was connected and the patient's abdomen was  insufflated to 12 mmHg. Once done, I used a measuring device and a skin  marker to chinmay out three incision sites at the left lateral abdomen, all  approximately 20 cm away from the umbilicus. The upper incision site  was then anesthetized and small transverse incision was made. Once  done, I used an 8 mm robotic trocar with laparoscope in place to advance  into the abdominal cavity in an Optiview fashion. Once we were through  the muscle and the peritoneum, I ran into what appeared to be likely  omental fat, but I could not clearly identify structures at this point  and to ensure that we are not going to have any inadvertent injury to  the intra-abdominal contents, I decided to abort this entrance here. As  he has had a prior robotic repair, it was suspected that he probably had  some adhesions in the area which were preventing us from being able to  get into the abdomen safely.   So, for this reason, at this point, I  anesthetized the skin in the right upper quadrant at midclavicular line  approximately three fingerbreadths below the costal margin. A  transverse incision was made and again, an 8 mm robotic trocar with  laparoscope in place was advanced into the abdomen. We were able to  enter safely this area and inspection of underlying tissue showed no  damage during the entrance. The laparoscope was then used to visualize  that left upper quadrant and it did appear that there was some omentum  adhesed to the area where we were trying to enter. Additional  inspection in the left lateral abdomen showed some adhesions in the left  lower quadrant as well which were going to cause some issues with  getting that left lower quadrant port into place. For this reason, I  decided to go ahead and proceed with lysis of adhesions to clear the  area so that we could safely enter. A 5 mm trocar was placed slightly  lower in the right side of the abdomen under visualization. I then used  hot scissors to take down the adhesions of the omentum in the left upper  quadrant. This was done fairly easily and as we were doing this, we  carefully inspected the tissue to ensure that there were no other  contents and there were none. I then turned my attention to the left  lower quadrant and it seemed that the patient had some adhesions of  epiploic fat of the sigmoid colon adhesed up which was tenting the colon  up slightly. I was able to take these down with the scissors fairly  easily and was able to get good clearance for our port insertions. Once  this was done, I went ahead and advanced that left upper quadrant port  into good position and then anesthetized with two additional port sites,  made my incisions and advanced the two additional robotic trocars into  the abdomen under visualization with the scope.   Once we were done, I  did have the bed rolled slightly to the patient's right side just to  make sure that as we were exchanging instruments, we were not going to  inadvertently snag any abdominal contents with instrument changes. The  robot was then docked and targeted to the anterior abdominal wall. Once  we were in, inspection did show that the patient had what appeared to be  kind of a Swiss cheese hernia in the upper abdomen approximately 7 to 8  cm above the umbilicus. A measuring device was brought in through one  of the assist ports and was used to measure the defects. There were two  kind of Baker_____ defects, that both measured approximately 2 cm in  diameter and then several small centimeter or less defects that were  kind of in between those two main defects. The more superior of the two  defects was actually slightly off of midline to the right side. I began  to clear the falciform ligament and take that down partially so that we  would have good space for closure as well as for mesh placement. Once  we had the space kind of fully identified and the defects fully  identified, it seemed that from the top to the bottom of all of these  defects measured about 7 to 8 cm and width wise we had probably about 5  cm in width total.  I brought in a few nonabsorbable 0 V-Loc sutures and  then began closing these defects with running sutures. I focused first  on the two larger defects and closed both of those and then used an  additional V-Loc to close the additional smaller defects. Once we had  all the defects closed, I again measured and was measuring approximately  probably 8 and 10 cm between the top of my superior closure and the  bottom of my inferior closure. In order to make sure that we covered  this area very well which was within a fairly large diastasis, I elected  to use a 6 x 8 inch oval Ventralight ST mesh.   I briefly scrubbed back  into the case after kind of identifying where I wanted the mesh to be  located and made small stab incisions to superior and inferior aspect of  where I was going to place the mesh so that I could kind of have it  tented up with sutures. I then placed Vicryl sutures into the superior  and inferior aspect of the long access of the mesh on the non-coated  side and left these with several inches of length so that they could  actually be pulled up through the abdominal wall. After making the stab  incisions, I then scrubbed back out and went back to the robot. I had  the bedside assist at this point bring in a Jarret-Dean needle to  grasp the suture so that we could hold the mesh up against the abdominal  wall. Once this was kind of partially secured up because of the size,  we were having some difficulty with a kind of just wanting to just kind  of fold down on itself, but I had a couple 12-inch absorbable 3-0 V-Loc  sutures brought in and I began to circumferentially stitch the mesh in. After a short period of stitching, I was just having a lot of difficulty  managing the mesh because it was folding down so much and so I had the  bedside assist pass in a 3-0 Vicryl and I kind of tacked those left and  right sides of the mesh up through the abdominal wall just so that I  could better manage it during the securing of it through the abdominal  wall. Once I had those stitches in, at this point the suturing went  fairly quickly and I circumferentially sutured the mesh through the  abdominal wall making sure that I did take small bites of that posterior  leaflet in a running fashion. Once we had the mesh secured in, it did  appear to be fairly flat against the abdominal wall with just one small  area that appeared to be buckling slightly, but it was probably less  than 2 cm of buckling. Final inspection did show no obvious injuries to  other intra-abdominal contents. At this point, we removed all the  excess suture and needle material as well as the measuring device that  had been placed into the abdomen at the start of the case.   There was  also a small piece of fat that was removed from one of the hernia  defects that I actually ended up amputating and this was also removed. The robotic instruments were then removed and robot was undocked. During the robotic portion of the case, I did have the bedside assist  remove the two right-sided ports, so those were already out, but I did  uncap the left lateral ports to allow evacuation of CO2 from the abdomen  and then these were also removed. When I started the case, I did upsize  that left superior port to a 12 mm port which was going to make mesh  passage easier and so at this point, I closed that defect at the fascial  level with an 0 Vicryl on a UR6. I used a Yahir Began to grasp the tissue  and kind of elevate it and then was able to close it fairly easily  through the incision. All port sites were then anesthetized with local  anesthetic and then closed at skin level with 4-0 Monocryl in a  subcuticular fashion. The patient's abdomen was cleansed and dried and  skin glue was placed across all the port site incisions and then I used  glue to also close those small stab incisions that had been made for the  Veress needle as well as for the securing of the mesh. At the  conclusion of the procedure, all sponge, instrument and needle counts  were correct. The patient was awoken from anesthesia and taken to the  PACU in stable condition.         Yousif Vela    D: 03/25/2022 14:21:11       T: 03/25/2022 14:29:16     [email or web address]  Job#: [id number]     Doc#: [id number]    CC:  Primary Care

## 2022-04-05 ENCOUNTER — OFFICE VISIT (OUTPATIENT)
Dept: SURGERY | Age: 67
End: 2022-04-05
Payer: MEDICARE

## 2022-04-05 VITALS
WEIGHT: 212.2 LBS | DIASTOLIC BLOOD PRESSURE: 68 MMHG | TEMPERATURE: 97.6 F | SYSTOLIC BLOOD PRESSURE: 130 MMHG | HEART RATE: 76 BPM | BODY MASS INDEX: 31.43 KG/M2 | HEIGHT: 69 IN

## 2022-04-05 DIAGNOSIS — Z09 POSTOP CHECK: Primary | ICD-10-CM

## 2022-04-05 PROCEDURE — 99213 OFFICE O/P EST LOW 20 MIN: CPT

## 2022-04-05 PROCEDURE — 99024 POSTOP FOLLOW-UP VISIT: CPT | Performed by: SURGERY

## 2022-04-05 NOTE — PROGRESS NOTES
03/09/2020    CYSTO TURBT performed by Jessica Ly MD at [address] [place] [address] ([facility]) N/A 3/25/2022    LAPAROSCOPIC Robotic Assisted Ventral Hernia Repair with mesh WITH LYSIS OF ADHESIONS performed by Leopoldo Clerk, DO at [place] [id number] Left 8/12/14, 1/09/15    C7 TFE    PAIN MANAGEMENT PROCEDURE Left 09/23/2014    C7 TFE    PAIN MANAGEMENT PROCEDURE Left 7/17/2015 , 1/22/16    C7 TFE    [place] CABG, ARTERY-VEIN, SINGLE N/A 07/06/2018    CABG CORONARY ARTERY BYPASS, GARY ERAZO, CHANI  (CSI# [id number]) performed by Briana Mcclure MD at [facility] CABG (LIMA-LAD, R SVG- RCA, OM too small for the grafts) on 7/6/18,    ROTATOR CUFF REPAIR Left 01/29/2019 2015- Right    SHOULDER ARTHROSCOPY Right 04/27/2016    scope decompressing, rotator cuff repair    TRANSESOPHAGEAL ECHOCARDIOGRAM  98/38/2201    UMBILICAL HERNIA REPAIR N/A 12/18/2019    Laparoscopic Robotic Assisted Umbilical Hernia Repair W/ MESH performed by Divina Mcgrath MD at [facility] N/A 11/19/2020    Open Incisional Hernia Repair W/ MESH performed by Leopoldo Clerk, DO at [facility] OR       Current Outpatient Medications   Medication Sig Dispense Refill    atorvastatin (LIPITOR) 40 MG tablet Take 1 tablet by mouth nightly 90 tablet 3    Elastic Bandages & Supports (ABDOMINAL BINDER/ELASTIC 2XL) MISC 1 Units by Does not apply route as needed (comfort) 1 each 1    tamsulosin (FLOMAX) 0.4 MG capsule Take 1 capsule by mouth daily 90 capsule 3    metoprolol tartrate (LOPRESSOR) 25 MG tablet Take 0.5 tablets by mouth 2 times daily 180 tablet 3    furosemide (LASIX) 20 MG tablet Take 1 tablet by mouth as needed (FOR LE SWELLING) 45 tablet 3    clopidogrel (PLAVIX) 75 MG tablet Take 1 tablet by mouth daily 90 tablet 3    aspirin 81 MG chewable tablet Take 81 mg by mouth daily      Cholecalciferol (VITAMIN D) 50 MCG (2000 UT) CAPS capsule Take 2,000 Units by mouth daily      vitamin C (ASCORBIC ACID) 500 MG tablet Take 250 mg by mouth 2 times daily      acetaminophen (TYLENOL) 325 MG tablet Take 2 tablets by mouth every 6 hours as needed for Pain 120 tablet 3    fluticasone (FLONASE) 50 MCG/ACT nasal spray 2 sprays by Each Nostril route daily (Patient not taking: Reported on 3/21/2022) 1 Bottle 5    nitroGLYCERIN (NITROSTAT) 0.4 MG SL tablet up to max of 3 total doses. If no relief after 1 dose, call 911. (Patient not taking: Reported on 2022) 25 tablet 3     No current facility-administered medications for this visit.        Allergies   Allergen Reactions    Chlorhexidine Rash    Vicodin [Hydrocodone-Acetaminophen] Nausea Only     Stomach upset       Family History   Problem Relation Age of Onset    High Blood Pressure Father     Colon Polyps Brother     COPD Mother        Social History     Socioeconomic History    Marital status:      Spouse name: Raisa Rubio Number of children: 2    Years of education: Not on file    Highest education level: Not on file   Occupational History     Comment: [employer]   Tobacco Use    Smoking status: Former Smoker     Packs/day: 2.00     Years: 25.00     Pack years: 50.00     Types: Cigarettes     Start date: 1975     Quit date: 2008     Years since quittin.2    Smokeless tobacco: Never Used    Tobacco comment: Swann Fees RRT 16   Vaping Use    Vaping Use: Never used   Substance and Sexual Activity    Alcohol use: No     Alcohol/week: 0.0 standard drinks    Drug use: No    Sexual activity: Yes     Partners: Female   Other Topics Concern    Not on file   Social History Narrative    Not on file     Social Determinants of Health     Financial Resource Strain: Low Risk     Difficulty of Paying Living Expenses: Not very hard   Food Insecurity: No Food Insecurity    Worried About Running Out of Food in the Last Year: Never true    Abundio of Food in the Last Year: Never true   Transportation Needs:     Lack of Transportation (Medical): Not on file    Lack of Transportation (Non-Medical): Not on file   Physical Activity:     Days of Exercise per Week: Not on file    Minutes of Exercise per Session: Not on file   Stress:     Feeling of Stress : Not on file   Social Connections:     Frequency of Communication with Friends and Family: Not on file    Frequency of Social Gatherings with Friends and Family: Not on file    Attends Gnosticism Services: Not on file    Active Member of [address] [place] [address] [employer] or Organizations: Not on file    Attends Club or Organization Meetings: Not on file    Marital Status: Not on file   Intimate Partner Violence:     Fear of Current or Ex-Partner: Not on file    Emotionally Abused: Not on file    Physically Abused: Not on file    Sexually Abused: Not on file   Housing Stability:     Unable to Pay for Housing in the Last Year: Not on file    Number of [place] in the Last Year: Not on file    Unstable Housing in the Last Year: Not on file       ROS:   Review of Systems - Negative except as noted in HPI      Objective   Vitals:    04/05/22 1003   BP: 130/68   Pulse: 76   Temp: 97.6 °F (36.4 °C)     General:in no apparent distress and well developed and well nourished  Eyes: No gross abnormalities. Ears, Nose, Throat: hearing grossly normal bilaterally  Neck: neck supple and non tender without mass  Lungs: clear to auscultation without wheezes or rales   Heart: S1S2, no mumurs, RRR  Abdomen: Soft, nondistended, minimal tenderness to palpation at incisions but otherwise nontender, bowel sounds present. Incisions all intact with glue in place. He does have a slight rash across his abdomen it seems to be improving. No evidence of hernia recurrence. Extremity: negative  Neuro: CN II-XII grossly intact      1. Postop check  Assessment & Plan:  Patient doing well after surgery and healing as expected.   The itching that he is experiencing may be related to hair regrowth versus healing versus possible reaction to the skin prep. We discussed continued activity restrictions for full 6 weeks after surgery at which time he may return to activity as tolerated. Patient will follow up as needed. He is encouraged to call with any questions concerns or problems and we will get him back in for recheck.          (Please note that portions of this note were completed with a voice recognition program.  Efforts were made to edit the dictations but occasionally words are mis-transcribed.)

## 2022-04-05 NOTE — ASSESSMENT & PLAN NOTE
Patient doing well after surgery and healing as expected. The itching that he is experiencing may be related to hair regrowth versus healing versus possible reaction to the skin prep. We discussed continued activity restrictions for full 6 weeks after surgery at which time he may return to activity as tolerated. Patient will follow up as needed. He is encouraged to call with any questions concerns or problems and we will get him back in for recheck.

## 2022-04-06 ENCOUNTER — TELEPHONE (OUTPATIENT)
Dept: ONCOLOGY | Age: 67
End: 2022-04-06

## 2022-04-06 NOTE — TELEPHONE ENCOUNTER
Dr. Lutz So office called pt to schedule and pt told them that he was under the impression to f/u with us after the ct in 4 months and then go from there. How would you like me to proceed. Please advise.

## 2022-04-06 NOTE — TELEPHONE ENCOUNTER
Left message for patient to call office back.  He is scheduled with Dr. Roxie Santana on 4/12/22 at 1:15 pm.

## 2022-04-11 RX ORDER — CLOPIDOGREL BISULFATE 75 MG/1
TABLET ORAL
Qty: 90 TABLET | Refills: 3 | Status: SHIPPED | OUTPATIENT
Start: 2022-04-11

## 2022-04-11 NOTE — TELEPHONE ENCOUNTER
Last Appt:  2/10/2022  Next Appt:  7/21/2022  Med verified in [facility] [address]

## 2022-04-12 ENCOUNTER — OFFICE VISIT (OUTPATIENT)
Dept: PULMONOLOGY | Age: 67
End: 2022-04-12
Payer: MEDICARE

## 2022-04-12 VITALS
HEIGHT: 69 IN | SYSTOLIC BLOOD PRESSURE: 124 MMHG | TEMPERATURE: 98.6 F | DIASTOLIC BLOOD PRESSURE: 70 MMHG | OXYGEN SATURATION: 96 % | BODY MASS INDEX: 31.99 KG/M2 | HEART RATE: 74 BPM | WEIGHT: 216 LBS

## 2022-04-12 DIAGNOSIS — R91.1 NODULE OF UPPER LOBE OF RIGHT LUNG: ICD-10-CM

## 2022-04-12 DIAGNOSIS — J90 PLEURAL EFFUSION ON RIGHT: ICD-10-CM

## 2022-04-12 DIAGNOSIS — J43.1 PANLOBULAR EMPHYSEMA (HCC): ICD-10-CM

## 2022-04-12 DIAGNOSIS — J44.9 COPD, SEVERITY TO BE DETERMINED (HCC): Primary | ICD-10-CM

## 2022-04-12 PROCEDURE — G8417 CALC BMI ABV UP PARAM F/U: HCPCS | Performed by: INTERNAL MEDICINE

## 2022-04-12 PROCEDURE — 1123F ACP DISCUSS/DSCN MKR DOCD: CPT | Performed by: INTERNAL MEDICINE

## 2022-04-12 PROCEDURE — 1036F TOBACCO NON-USER: CPT | Performed by: INTERNAL MEDICINE

## 2022-04-12 PROCEDURE — 4040F PNEUMOC VAC/ADMIN/RCVD: CPT | Performed by: INTERNAL MEDICINE

## 2022-04-12 PROCEDURE — 99214 OFFICE O/P EST MOD 30 MIN: CPT | Performed by: INTERNAL MEDICINE

## 2022-04-12 PROCEDURE — 3023F SPIROM DOC REV: CPT | Performed by: INTERNAL MEDICINE

## 2022-04-12 PROCEDURE — 99204 OFFICE O/P NEW MOD 45 MIN: CPT | Performed by: INTERNAL MEDICINE

## 2022-04-12 PROCEDURE — G8427 DOCREV CUR MEDS BY ELIG CLIN: HCPCS | Performed by: INTERNAL MEDICINE

## 2022-04-12 PROCEDURE — 3017F COLORECTAL CA SCREEN DOC REV: CPT | Performed by: INTERNAL MEDICINE

## 2022-04-12 NOTE — LETTER
[facility] A department of [address]  Phone: [phone]  Fax: [phone]           Blair Norman MD      April 12, 2022     Patient: Juan Pablo Peacock   MR Number: [id number]   YOB: 1955   Date of Visit: 4/12/2022       Dear Dr. Ida Espinoza:    Thank you for referring Emanuel Silverman to me for evaluation/treatment. Below are the relevant portions of my assessment and plan of care. If you have questions, please do not hesitate to call me. I look forward to following Tung Tian along with you.     Sincerely,        Blair Norman MD    CC providers:  Tricia Smith MD  [facility] [id number]  [place] [facility] [place]

## 2022-04-12 NOTE — PROGRESS NOTES
REASON FOR THE CONSULTATION:  Chief Complaint   Patient presents with    New Patient     Lung mass      HISTORY OF PRESENT ILLNESS:    Jaret Barkley is a 74y.o. year old male here for evaluation of no sob with exertion , no hemoptysis , no sputum . No inhalers   No hx of bibi   He was found to have abnormal lung nodule in the right apex on CT lung screening, this prompted a PET CT scan which showed a mild SUV of 3.3 without distant metastasis. Following this he had CT-guided biopsy which was showing chronic inflammation without malignant cells. Referred here for opinion regarding surgical resection versus serial follow-up CT chest                   Immunization   Immunization History   Administered Date(s) Administered    COVID-19, Pfizer Purple top, DILUTE for use, 12+ yrs, 30mcg/0.3mL dose 02/23/2021, 03/16/2021, 10/07/2021    DTaP 10/15/2016    DTaP vaccine 10/15/2016    Fluvirin 4 years and over 11/03/2015    Influenza Vaccine, unspecified formulation 11/03/2015, 10/15/2016    Influenza Virus Vaccine 11/03/2015    Influenza Whole 11/14/2012    Influenza, High Dose (Fluzone 65 yrs and older) 09/23/2017, 09/23/2017, 10/06/2018, 11/18/2019, 10/12/2020    Influenza, High-dose, Norva Mary Beth, 65 yrs +, IM (Fluzone) 10/06/2020    Influenza, Quadv, IM, PF (6 mo and older Fluzone, Flulaval, Fluarix, and 3 yrs and older Afluria) 09/23/2017    Influenza, Quadv, adjuvanted, 65 yrs +, IM, PF (Fluad) 10/15/2021    Influenza, Triv, inactivated, subunit, adjuvanted, IM (Fluad 65 yrs and older) 10/14/2018    Pneumococcal Conjugate 13-valent ([id number]) 10/06/2018    Pneumococcal Polysaccharide ([id number]) 10/14/2019    Tdap (Boostrix, Adacel) 10/22/2016    Zoster Live (Zostavax) 11/03/2015            PAST MEDICAL HISTORY:       Diagnosis Date    Abnormal cardiovascular stress test 12/09/2020    Large inferolateral infarction with significant vernon-infarct ischemia.     CAD (coronary artery disease)     CABG-2 vessel, followed by stents x 4    Cervical disc disease     Clostridium difficile diarrhea 10/2014    hospitalized in October 2014    Displacement of cervical intervertebral disc without myelopathy 06/10/2014    BW, C6/C7     HTN (hypertension)     Hyperlipidemia     Impaired fasting blood sugar     Neck pain     chronic    Neuralgia, neuritis, and radiculitis, unspecified 06/10/2014    [facility], left C7     Shoulder region pain     Left   [facility] injury 01/08/2014    Sprain and strain of unspecified site of shoulder and upper arm 06/10/2014    [facility], left arm     Wears glasses          Family History:       Problem Relation Age of Onset    High Blood Pressure Father     Colon Polyps Brother     COPD Mother        SURGICAL HISTORY:   Past Surgical History:   Procedure Laterality Date    BICEPS TENODESIS Right 11/23/2016    right proximal bicep tenodesis    COLONOSCOPY N/A 12/10/2019    COLONOSCOPY POLYPECTOMY SNARE/COLD BIOPSY performed by Patrick Bauer MD at [place] [id number]  12/18/2020    Successful PTCA. AGUILA of mid LAD / Patent LIMA however distally occluded and not supplying LAD /Residual high grade stenosis in small LCX    CORONARY ANGIOPLASTY WITH STENT PLACEMENT  01/22/2021    CT NEEDLE BIOPSY LUNG PERCUTANEOUS  12/15/2021    CT NEEDLE BIOPSY LUNG PERCUTANEOUS 12/15/2021 [facility] CT SCAN    CYSTOSCOPY N/A 03/09/2020    CYSTO TURBT performed by Ian Caballero MD at [facility] N/A 3/25/2022    LAPAROSCOPIC Robotic Assisted Ventral Hernia Repair with mesh WITH LYSIS OF ADHESIONS performed by Jared Pinto DO at [facility] [address] Left 8/12/14, 1/09/15    C7 TFE    PAIN MANAGEMENT PROCEDURE Left 09/23/2014    C7 TFE    PAIN MANAGEMENT PROCEDURE Left 7/17/2015 , 1/22/16    C7 TFE    VA CABG, ARTERY-VEIN, SINGLE N/A 07/06/2018    CABG CORONARY ARTERY BYPASS, GARY ERAZO, CHANI  (CSI# [id number]) performed by Benigno Irizarry MD at [facility] CVOR CABG (LIMA-LAD, R SVG- RCA, OM too small for the grafts) on 7/6/18,    ROTATOR CUFF REPAIR Left 01/29/2019 2015- Right    SHOULDER ARTHROSCOPY Right 04/27/2016    scope decompressing, rotator cuff repair    TRANSESOPHAGEAL ECHOCARDIOGRAM  65/19/2511    UMBILICAL HERNIA REPAIR N/A 12/18/2019    Laparoscopic Robotic Assisted Umbilical Hernia Repair W/ MESH performed by Paulo Ya MD at [address] [facility] N/A 11/19/2020    Open Incisional Hernia Repair W/ MESH performed by Lloyd Barrios DO at [facility]:      There  No history of TB or TB exposure. There  Yes  asbestos ,[place] dust exposure. The patient reports  No coal mine, [place], [place], [facility] exposure. History of travel outside the country No  There  is use of   marijuana No   VapingNo  IV heroinNo  Crack cocaineNo  There  [place] tub exposure. Pets -cats Yes, dogsNo  Birds No    Occupational history  , farm      TOBACCO:   reports that he quit smoking about 14 years ago. His smoking use included cigarettes. He started smoking about 47 years ago. He has a 50.00 pack-year smoking history. He has never used smokeless tobacco.  ETOH:   reports no history of alcohol use. ALLERGIES:      Allergies   Allergen Reactions    Chlorhexidine Rash    Vicodin [Hydrocodone-Acetaminophen] Nausea Only     Stomach upset         Home Meds:   Prior to Admission medications    Medication Sig Start Date End Date Taking?  Authorizing Provider   clopidogrel (PLAVIX) 75 MG tablet Take 1 tablet by mouth once daily 4/11/22  Yes Renato Gipson DO   atorvastatin (LIPITOR) 40 MG tablet Take 1 tablet by mouth nightly 2/14/22  Yes Renato Gipson DO   tamsulosin (FLOMAX) 0.4 MG capsule Take 1 capsule by mouth daily 8/6/21 4/12/22 Yes María Young MD   metoprolol tartrate (LOPRESSOR) 25 MG tablet Take 0.5 tablets by mouth 2 times daily 8/5/21  Yes Pablo Brennan MD furosemide (LASIX) 20 MG tablet Take 1 tablet by mouth as needed (FOR LE SWELLING) 2/18/21  Yes Davina Cueto MD   aspirin 81 MG chewable tablet Take 81 mg by mouth daily   Yes Historical Provider, MD   Cholecalciferol (VITAMIN D) 50 MCG (2000 UT) CAPS capsule Take 2,000 Units by mouth daily   Yes Historical Provider, MD   vitamin C (ASCORBIC ACID) 500 MG tablet Take 250 mg by mouth 2 times daily   Yes Historical Provider, MD   acetaminophen (TYLENOL) 325 MG tablet Take 2 tablets by mouth every 6 hours as needed for Pain 7/9/18  Yes PARESH Candelaria CNP   Elastic Bandages & Supports (ABDOMINAL BINDER/ELASTIC 2XL) MISC 1 Units by Does not apply route as needed (comfort)  Patient not taking: Reported on 4/12/2022 2/1/22   El Thomas DO   fluticasone [facility]) 50 MCG/ACT nasal spray 2 sprays by Each Nostril route daily  Patient not taking: Reported on 3/21/2022 4/23/21   [employer], PARESH - CNP   nitroGLYCERIN (NITROSTAT) 0.4 MG SL tablet up to max of 3 total doses. If no relief after 1 dose, call 911.   Patient not taking: Reported on 4/5/2022 12/19/20   PARESH Cortés NP              REVIEW OF SYSTEMS:    CONSTITUTIONAL:  negative for  fevers, chills, sweats, fatigue, malaise, anorexia and weight loss  EYES:  negative for  double vision, blurred vision, dry eyes, eye discharge and redness  HEENT:  negative for  hearing loss, tinnitus, ear drainage, earaches and nasal congestion  RESPIRATORY:  See hpi  CARDIOVASCULAR:  negative for  chest pain,, palpitations, orthopnea, PND  GASTROINTESTINAL:  negative for nausea, vomiting, change in bowel habits, diarrhea, constipation, abdominal pain, pruritus, abdominal mass and abdominal distention  GENITOURINARY:  negative for frequency, dysuria, nocturia, urinary incontinence and hesitancy  INTEGUMENT  negative for rash, skin lesion(s), dryness, skin color change, changes in lesion, pruritus and changes in hair  HEMATOLOGIC/LYMPHATIC:  negative for easy bruising, bleeding, lymphadenopathy, petechiae and swelling/edema  ALLERGIC/IMMUNOLOGIC:  negative for recurrent infections, urticaria and drug reactions  ENDOCRINE:  negative for heat intolerance, cold intolerance, tremor, weight changes and change in bowel habits  MUSCULOSKELETAL:  negative for  myalgias, arthralgias, pain, joint swelling, stiff joints and decreased range of motion  NEUROLOGICAL:  negative for headaches, dizziness, seizures, memory problems, speech problems, visual disturbance and coordination problems  BEHAVIOR/PSYCH:  negative for poor appetite, increased appetite, decreased sleep, increased sleep, decreased energy level, increased energy level and poor concentration  Skin no rash no dermatitis  Vitals:  /70 (Site: Left Upper Arm, Position: Sitting, Cuff Size: Large Adult)   Pulse 74   Temp 98.6 °F (37 °C)   Ht 5' 9\" (1.753 m)   Wt 216 lb (98 kg)   SpO2 96%   BMI 31.90 kg/m²     PHYSICAL EXAM:  General Appearance:    Alert, cooperative, no distress, appears stated age   Head:    Normocephalic, without obvious abnormality, atraumatic      Eyes:    PERRL, conjunctiva/corneas clear, EOM's intact   Ears:    Normal  external ear canals, both ears   Nose:   Nares normal, septum midline, mucosa normal, no drainage        or sinus tenderness   Throat:   Lips, mucosa, and tongue normal; teeth and gums normal   Neck:   Supple, symmetrical, trachea midline, no adenopathy;     thyroid:  no enlargement/tenderness/nodules; no carotid    bruit , noJVD   Back:     Symmetric, no curvature, ROM normal, no CVA tenderness   Lungs:     Ventilating all lobes without any rales, rhonchi, wheezes or dullness. No bronchial breath sounds.   Chest expansion equal bilaterally    Chest Wall:    No tenderness or deformity      Heart:    Regular rate and rhythm, S1 and S2 normal, no murmur, rub        or gallop no rvh                           Abdomen: Pulses:                              Skin:                  Lymph nodes:                    Neurologic:                  Soft, non-tender, bowel sounds active all four quadrants,     no masses, no organomegaly         2+ and symmetric all extremities     Skin color, texture, turgor normal, no rashes or lesions       Cervical, supraclavicular not enlarged or matted or tender      CNII-XII intact, normal strength 5/5 . Sensation grossly normal  and reflexes normal 2+  throughout     Clubbing No  Lower ext edema No  Upper ext edema No                 CBC: No results for input(s): WBC, HGB, PLT in the last 72 hours. BMP:  No results for input(s): NA, K, CL, CO2, BUN, CREATININE, GLUCOSE in the last 72 hours. Hepatic: No results for input(s): AST, ALT, ALB, BILITOT, ALKPHOS in the last 72 hours. Amylase:   Lab Results   Component Value Date    AMYLASE 50 09/29/2014     Lipase:   Lab Results   Component Value Date    LIPASE 42 09/29/2014     Troponin: No results for input(s): TROPONINI in the last 72 hours. BNP: No results for input(s): BNP in the last 72 hours. Lipids: No results for input(s): CHOL, HDL in the last 72 hours. Invalid input(s): LDLCALCU  ABGs: No results found for: PHART, PO2ART, [id number]  INR: No results for input(s): INR in the last 72 hours. Thyroid:   Lab Results   Component Value Date    TSH 3.71 10/11/2021     Urinalysis: No results for input(s): BACTERIA, BLOODU, CLARITYU, COLORU, PHUR, PROTEINU, RBCUA, SPECGRAV, BILIRUBINUR, NITRU, WBCUA, LEUKOCYTESUR, GLUCOSEU in the last 72 hours. CT chest 3/10/2022  Impression   Changes of pulmonary emphysema primarily in both upper lobes and right middle   lobe.  A small right pleural effusion was noted.  A pleural based 2 cm   irregular nodule or pleural thickening was identified posteriorly in the   right apex.  It is unchanged from the CT chest of 12/15/2021. IMPRESSION:     Diagnosis Orders   1.  COPD, severity to be determined ([address]  Full PFT Study With Bronchodilator   2. Panlobular emphysema ([address]     3. Pleural effusion on right chronic seen in 2018 - hx of asbestos exposure      4. Nodule of upper lobe of right lung          :                PLAN:      I reviewed CT scan imaging going back to 2018, PET CT scan and CT-guided biopsy imaging. Small right pleural effusion is chronic since 2018 and may be related to prior asbestos exposure. Right upper lobe apical lung nodule approximately 1.8 cm to 2 cm is unchanged from 11/9/2021 to 3/10/2022. It had mild SUV of 3.3 and CT-guided biopsy showed chronic inflammation and no malignant cells. Keeping in mind that the lung nodule has not changed in size from November to March 2022 and there is significant bullous and emphysematous changes along with comorbid cardiovascular disease. Reasonable to follow the lung nodule with serial CT scan rather than proceeding with open lung biopsy. Patient will follow up with me in July 2022 after the CT chest which has been scheduled. Hold off on cardiothoracic surgery evaluation for open lung biopsy and resection. Reviewed imaging with patient in detail      Requested Prescriptions      No prescriptions requested or ordered in this encounter       There are no discontinued medications. Aparicio received counseling on the following healthy behaviors: nutrition, exercise and medication adherence    Patient given educational materials : see patient instruction       Discussed use, benefit, and side effects of prescribed medications. Barriers to medication compliance addressed. All patient questions answered. Pt voiced understanding. I hope this updates you on my evaluation and clinical thinking. Thank you for allowing me to participate in his care. Sincerely,      Electronically signed by Marily Olmos MD on 4/12/2022 at 1:23 PM       Please note that this chart was generated using voice recognition Dragon dictation software. Although every effort was made to ensure the accuracy of this automated transcription, some errors in transcription may have occurred.

## 2022-04-12 NOTE — TELEPHONE ENCOUNTER
Per Dr. Juju Sepulveda keeping f/u as planned. Pt seen Dr. Juno Rincon.  No need to see Dr. Macey Walker at this time

## 2022-04-25 ENCOUNTER — OFFICE VISIT (OUTPATIENT)
Dept: FAMILY MEDICINE CLINIC | Age: 67
End: 2022-04-25
Payer: MEDICARE

## 2022-04-25 VITALS
TEMPERATURE: 96.9 F | RESPIRATION RATE: 16 BRPM | HEIGHT: 70 IN | BODY MASS INDEX: 31.26 KG/M2 | HEART RATE: 69 BPM | DIASTOLIC BLOOD PRESSURE: 60 MMHG | WEIGHT: 218.4 LBS | OXYGEN SATURATION: 97 % | SYSTOLIC BLOOD PRESSURE: 114 MMHG

## 2022-04-25 DIAGNOSIS — I10 PRIMARY HYPERTENSION: ICD-10-CM

## 2022-04-25 DIAGNOSIS — R74.8 ELEVATED ALKALINE PHOSPHATASE LEVEL: ICD-10-CM

## 2022-04-25 DIAGNOSIS — R73.9 HYPERGLYCEMIA: Primary | ICD-10-CM

## 2022-04-25 DIAGNOSIS — E78.2 MIXED HYPERLIPIDEMIA: ICD-10-CM

## 2022-04-25 LAB — HBA1C MFR BLD: 5.9 %

## 2022-04-25 PROCEDURE — 1036F TOBACCO NON-USER: CPT | Performed by: FAMILY MEDICINE

## 2022-04-25 PROCEDURE — G8417 CALC BMI ABV UP PARAM F/U: HCPCS | Performed by: FAMILY MEDICINE

## 2022-04-25 PROCEDURE — 99214 OFFICE O/P EST MOD 30 MIN: CPT | Performed by: FAMILY MEDICINE

## 2022-04-25 PROCEDURE — 4040F PNEUMOC VAC/ADMIN/RCVD: CPT | Performed by: FAMILY MEDICINE

## 2022-04-25 PROCEDURE — 83036 HEMOGLOBIN GLYCOSYLATED A1C: CPT | Performed by: FAMILY MEDICINE

## 2022-04-25 PROCEDURE — 1123F ACP DISCUSS/DSCN MKR DOCD: CPT | Performed by: FAMILY MEDICINE

## 2022-04-25 PROCEDURE — 3017F COLORECTAL CA SCREEN DOC REV: CPT | Performed by: FAMILY MEDICINE

## 2022-04-25 PROCEDURE — 99213 OFFICE O/P EST LOW 20 MIN: CPT | Performed by: FAMILY MEDICINE

## 2022-04-25 PROCEDURE — PBSHW POCT GLYCOSYLATED HEMOGLOBIN (HGB A1C): Performed by: FAMILY MEDICINE

## 2022-04-25 PROCEDURE — G8427 DOCREV CUR MEDS BY ELIG CLIN: HCPCS | Performed by: FAMILY MEDICINE

## 2022-04-25 SDOH — ECONOMIC STABILITY: FOOD INSECURITY: WITHIN THE PAST 12 MONTHS, THE FOOD YOU BOUGHT JUST DIDN'T LAST AND YOU DIDN'T HAVE MONEY TO GET MORE.: NEVER TRUE

## 2022-04-25 SDOH — ECONOMIC STABILITY: FOOD INSECURITY: WITHIN THE PAST 12 MONTHS, YOU WORRIED THAT YOUR FOOD WOULD RUN OUT BEFORE YOU GOT MONEY TO BUY MORE.: NEVER TRUE

## 2022-04-25 ASSESSMENT — ENCOUNTER SYMPTOMS
ABDOMINAL PAIN: 0
WHEEZING: 0
PHOTOPHOBIA: 0
COUGH: 0
CHOKING: 0
SHORTNESS OF BREATH: 0
CHEST TIGHTNESS: 0

## 2022-04-25 ASSESSMENT — PATIENT HEALTH QUESTIONNAIRE - PHQ9
SUM OF ALL RESPONSES TO PHQ QUESTIONS 1-9: 0
2. FEELING DOWN, DEPRESSED OR HOPELESS: 0
SUM OF ALL RESPONSES TO PHQ QUESTIONS 1-9: 0
SUM OF ALL RESPONSES TO PHQ9 QUESTIONS 1 & 2: 0
1. LITTLE INTEREST OR PLEASURE IN DOING THINGS: 0

## 2022-04-25 ASSESSMENT — SOCIAL DETERMINANTS OF HEALTH (SDOH): HOW HARD IS IT FOR YOU TO PAY FOR THE VERY BASICS LIKE FOOD, HOUSING, MEDICAL CARE, AND HEATING?: SOMEWHAT HARD

## 2022-04-25 NOTE — PATIENT INSTRUCTIONS
Nutrition Health Education:    Keep hydrated, walk 30 minutes minimum 3 times weekly as tolerated. Diet should consist of low fat, low sodium and high fiber. Nutritious foods such as fruits (if you're not diabetic), vegetables, lean meats, lean dairy, whole grains such as brown rice, quinoa, and dry beans. [place]en with small amounts of heart healthy extra virgin olive oil. Be watchful of any extra salt/sugar/seasoning to your food. You should eat no more than 2 grams or 2,000 mg of salt daily. Salt will raise your BP. Avoid regular/diet sodas, caffeine, energy drinks as these are full of artificial ingredients/sweeteners, sugar, salt and chemicals that spike insulin and are harmful to your health. Sugar intake increases metabolic disfunction, type 2 diabetes, insulin resistance, addictive food behavior and obesity. Avoid all processed foods, foods from boxes, cans, microwave meals as these contain high salt, sugar or fat content and not much nutrition. Get at least 8 hrs of sleep every night and turn off all electronics at least 1 hour before bedtime as these decreases melatonin production and increases wakefulness. If your cholesterol is high, no greasy, fried, fast or fatty foods. Decrease red meat intake. No butter, grimm, lard or creams, no milk as these things clog your arteries and leads to heart attacks and death. If you smoke, smoking increases risk of lung disease, cancers, high BP, heart attack, stroke and death. Take your daily medications as prescribed and inform your family doctor if you are having any side effects or issues taking medications.     Elevated Cholesterol:   No greasy, fried, fast, fatty foods   No trans fats   Decreased red meat intake to once every 2 months   No butter, grimm nor cream cheese, cheese   No egg yolk   NO milk   Decrease your cholesterol in diet    Elevated Blood Pressure:   No caffeine   No fast foods   Decrease salt in diet (consume nothing in a can, nothing in a box as these things contain high sodium)   No energy drinks   Buy a BP cuff and take blood pressure 3 times a day and write down blood pressure and pulse and bring in to me when you RTO   Lose weight and increase exercise   Start only walking then may advance to brisk walking and lift low poundage free weights    Pt is under care of urology, cardiology , heme/onc and pulmonology  Reviewed last labs     Fasting blood work and follow up in end of june

## 2022-04-25 NOTE — PROGRESS NOTES
Name: Kaye Matute  DOS: 4/25/2022  MRN: [id number]      Subjective:  Kaye Matute is a 74 y.o. male being seen for   Chief Complaint   Patient presents with    Hypertension     CAD, Hyperlipidemia- last lipids 10/26/2021. Get established. Vitals:    04/25/22 1012   BP:    Pulse:    Resp: 16   Temp:    SpO2:      Allergies   Allergen Reactions    Chlorhexidine Rash    Vicodin [Hydrocodone-Acetaminophen] Nausea Only     Stomach upset     Past Medical History:   Diagnosis Date    Abnormal cardiovascular stress test 12/09/2020    Large inferolateral infarction with significant vernon-infarct ischemia.  CAD (coronary artery disease)     CABG-2 vessel, followed by stents x 4    Cervical disc disease     Clostridium difficile diarrhea 10/2014    hospitalized in October 2014    Displacement of cervical intervertebral disc without myelopathy 06/10/2014    [facility], C6/C7     HTN (hypertension)     Hyperlipidemia     Impaired fasting blood sugar     Neck pain     chronic    Neuralgia, neuritis, and radiculitis, unspecified 06/10/2014    [facility], left C7     Shoulder region pain     Left   [facility] injury 01/08/2014    Sprain and strain of unspecified site of shoulder and upper arm 06/10/2014    [facility], left arm     Wears glasses      Past Surgical History:   Procedure Laterality Date    BICEPS TENODESIS Right 11/23/2016    right proximal bicep tenodesis    COLONOSCOPY N/A 12/10/2019    COLONOSCOPY POLYPECTOMY SNARE/COLD BIOPSY performed by Kyle Garcia MD at [address] [place] [address]  12/18/2020    Successful PTCA. AGUILA of mid LAD / Patent LIMA however distally occluded and not supplying LAD /Residual high grade stenosis in small LCX    CORONARY ANGIOPLASTY WITH STENT PLACEMENT  01/22/2021    CT NEEDLE BIOPSY LUNG PERCUTANEOUS  12/15/2021    CT NEEDLE BIOPSY LUNG PERCUTANEOUS 12/15/2021 [facility] CT SCAN    CYSTOSCOPY N/A 03/09/2020    CYSTO TURBT performed by Tatianna Ac MD at [facility] OR    HERNIA REPAIR N/A 3/25/2022    LAPAROSCOPIC Robotic Assisted Ventral Hernia Repair with mesh WITH LYSIS OF ADHESIONS performed by Kavin Costa DO at [place] [address] Left 14, 1/09/15    C7 TFE    PAIN MANAGEMENT PROCEDURE Left 2014    C7 TFE    PAIN MANAGEMENT PROCEDURE Left 2015 , 16    C7 TFE    CT CABG, ARTERY-VEIN, SINGLE N/A 2018    CABG CORONARY ARTERY BYPASS, GARY ERAZO, CHANI  (I# [id number]) performed by Antolin Lee MD at [facility] CABG (LIMA-LAD, R SVG- RCA, OM too small for the grafts) on 18,    ROTATOR CUFF REPAIR Left 2019- Right    SHOULDER ARTHROSCOPY Right 2016    scope decompressing, rotator cuff repair    TRANSESOPHAGEAL ECHOCARDIOGRAM      UMBILICAL HERNIA REPAIR N/A 2019    Laparoscopic Robotic Assisted Umbilical Hernia Repair W/ MESH performed by Kyle Garcia MD at [address] [place] [address] N/A 2020    Open Incisional Hernia Repair W/ MESH performed by Kavin Costa DO at [address] [place] [address] History     Socioeconomic History    Marital status:      Spouse name: Viki Herring Number of children: 2    Years of education: Not on file    Highest education level: Not on file   Occupational History     Comment: [employer]   Tobacco Use    Smoking status: Former Smoker     Packs/day: 2.00     Years: 25.00     Pack years: 50.00     Types: Cigarettes     Start date: 1975     Quit date: 2008     Years since quittin.3    Smokeless tobacco: Never Used    Tobacco comment: Mika Arana RRT 16   Vaping Use    Vaping Use: Never used   Substance and Sexual Activity    Alcohol use: No     Alcohol/week: 0.0 standard drinks    Drug use: No    Sexual activity: Yes     Partners: Female   Other Topics Concern    Not on file   Social History Narrative    Not on file     Social Determinants of Health     Financial Resource Strain: Medium Risk  Difficulty of Paying Living Expenses: Somewhat hard   Food Insecurity: No Food Insecurity    Worried About Running Out of Food in the Last Year: Never true    Ran Out of Food in the Last Year: Never true   Transportation Needs:     Lack of Transportation (Medical): Not on file    Lack of Transportation (Non-Medical): Not on file   Physical Activity:     Days of Exercise per Week: Not on file    Minutes of Exercise per Session: Not on file   Stress:     Feeling of Stress : Not on file   Social Connections:     Frequency of Communication with Friends and Family: Not on file    Frequency of Social Gatherings with Friends and Family: Not on file    Attends Orthodox Services: Not on file    Active Member of [address] [place] [address] [employer] or Organizations: Not on file    Attends Club or Organization Meetings: Not on file    Marital Status: Not on file   Intimate Partner Violence:     Fear of Current or Ex-Partner: Not on file    Emotionally Abused: Not on file    Physically Abused: Not on file    Sexually Abused: Not on file   Housing Stability:     Unable to Pay for Housing in the Last Year: Not on file    Number of [place] in the Last Year: Not on file    Unstable Housing in the Last Year: Not on file       Current Outpatient Medications   Medication Sig Dispense Refill    clopidogrel (PLAVIX) 75 MG tablet Take 1 tablet by mouth once daily 90 tablet 3    atorvastatin (LIPITOR) 40 MG tablet Take 1 tablet by mouth nightly 90 tablet 3    tamsulosin (FLOMAX) 0.4 MG capsule Take 1 capsule by mouth daily 90 capsule 3    metoprolol tartrate (LOPRESSOR) 25 MG tablet Take 0.5 tablets by mouth 2 times daily 180 tablet 3    furosemide (LASIX) 20 MG tablet Take 1 tablet by mouth as needed (FOR LE SWELLING) 45 tablet 3    nitroGLYCERIN (NITROSTAT) 0.4 MG SL tablet up to max of 3 total doses.  If no relief after 1 dose, call 911. 25 tablet 3    aspirin 81 MG chewable tablet Take 81 mg by mouth daily      Cholecalciferol (VITAMIN D) 50 MCG (2000 UT) CAPS capsule Take 2,000 Units by mouth daily      vitamin C (ASCORBIC ACID) 500 MG tablet Take 250 mg by mouth 2 times daily      acetaminophen (TYLENOL) 325 MG tablet Take 2 tablets by mouth every 6 hours as needed for Pain 120 tablet 3     No current facility-administered medications for this visit. Objective:  Pt states he feels fine today. Just had a ventral hernia surgery in 03/2022    Review of Systems   Constitutional: Negative for fatigue and unexpected weight change. Eyes: Negative for photophobia and visual disturbance. Respiratory: Negative for cough, choking, chest tightness, shortness of breath and wheezing. Cardiovascular: Positive for leg swelling (chronic edema legs B/L takes lasix). Negative for chest pain and palpitations. Gastrointestinal: Negative for abdominal pain. Genitourinary: Negative for difficulty urinating and hematuria. Musculoskeletal: Positive for arthralgias (a litttle knee B/L ). Negative for myalgias. Skin: Negative for rash and wound. Neurological: Negative for dizziness, tremors, seizures, speech difficulty, weakness, light-headedness, numbness and headaches. Hematological: Negative for adenopathy. Does not bruise/bleed easily. Psychiatric/Behavioral: Negative for agitation, confusion, decreased concentration, self-injury, sleep disturbance and suicidal ideas. The patient is not nervous/anxious. Physical Exam  Constitutional:       General: He is not in acute distress. Appearance: Normal appearance. He is not ill-appearing or toxic-appearing. HENT:      Head: Normocephalic and atraumatic. Right Ear: Tympanic membrane, ear canal and external ear normal. There is no impacted cerumen. Left Ear: Tympanic membrane, ear canal and external ear normal. There is no impacted cerumen. Nose: Nose normal. No congestion or rhinorrhea.       Mouth/Throat:      Mouth: Mucous membranes are moist.      Pharynx: Oropharynx is clear. Eyes:      Extraocular Movements: Extraocular movements intact. Conjunctiva/sclera: Conjunctivae normal.      Pupils: Pupils are equal, round, and reactive to light. Cardiovascular:      Rate and Rhythm: Normal rate and regular rhythm. Pulses: Normal pulses. Heart sounds: Normal heart sounds. No murmur heard. Pulmonary:      Effort: Pulmonary effort is normal. No respiratory distress. Breath sounds: Normal breath sounds. No wheezing or rhonchi. Abdominal:      General: Abdomen is flat. Bowel sounds are normal. There is no distension. Palpations: Abdomen is soft. There is no mass. Tenderness: There is no abdominal tenderness. There is no guarding. Musculoskeletal:         General: Normal range of motion. Cervical back: Normal range of motion and neck supple. Right lower leg: No edema. Left lower leg: No edema. Lymphadenopathy:      Cervical: No cervical adenopathy. Skin:     General: Skin is warm. Capillary Refill: Capillary refill takes less than 2 seconds. Findings: No rash. Comments: Little cicatrix abdomen form laparoscopic hernia repair   Neurological:      General: No focal deficit present. Mental Status: He is alert and oriented to person, place, and time. Motor: No weakness. Coordination: Coordination normal.      Gait: Gait normal.   Psychiatric:         Mood and Affect: Mood normal.         Behavior: Behavior normal.         Thought Content: Thought content normal.          Assessment:   Diagnosis Orders   1. Hyperglycemia  POCT Hb A1C (glycosylated hemoglobin)    Comprehensive Metabolic Panel   2. Elevated alkaline phosphatase level  Comprehensive Metabolic Panel   3. Primary hypertension  Comprehensive Metabolic Panel   4.  Mixed hyperlipidemia  Lipid Panel         Plan:  Orders Placed This Encounter   Procedures    Comprehensive Metabolic Panel     Standing Status:   Future     Standing Expiration Date:   8/25/2022    Lipid Panel     Standing Status:   Future     Standing Expiration Date:   8/25/2022     Order Specific Question:   Is Patient Fasting?/# of Hours     Answer: Yes    POCT Hb A1C (glycosylated hemoglobin)         Patient Instructions   Nutrition Health Education:    Keep hydrated, walk 30 minutes minimum 3 times weekly as tolerated. Diet should consist of low fat, low sodium and high fiber. Nutritious foods such as fruits (if you're not diabetic), vegetables, lean meats, lean dairy, whole grains such as brown rice, quinoa, and dry beans. Josph Presto with small amounts of heart healthy extra virgin olive oil. Be watchful of any extra salt/sugar/seasoning to your food. You should eat no more than 2 grams or 2,000 mg of salt daily. Salt will raise your BP. Avoid regular/diet sodas, caffeine, energy drinks as these are full of artificial ingredients/sweeteners, sugar, salt and chemicals that spike insulin and are harmful to your health. Sugar intake increases metabolic disfunction, type 2 diabetes, insulin resistance, addictive food behavior and obesity. Avoid all processed foods, foods from boxes, cans, microwave meals as these contain high salt, sugar or fat content and not much nutrition. Get at least 8 hrs of sleep every night and turn off all electronics at least 1 hour before bedtime as these decreases melatonin production and increases wakefulness. If your cholesterol is high, no greasy, fried, fast or fatty foods. Decrease red meat intake. No butter, grimm, lard or creams, no milk as these things clog your arteries and leads to heart attacks and death. If you smoke, smoking increases risk of lung disease, cancers, high BP, heart attack, stroke and death. Take your daily medications as prescribed and inform your family doctor if you are having any side effects or issues taking medications.     Elevated Cholesterol:   No greasy, fried, fast, fatty foods   No trans fats   Decreased red meat intake to once every 2 months   No butter, grimm nor cream cheese, cheese   No egg yolk   NO milk   Decrease your cholesterol in diet    Elevated Blood Pressure:   No caffeine   No fast foods   Decrease salt in diet (consume nothing in a can, nothing in a box as these things contain high sodium)   No energy drinks   Buy a BP cuff and take blood pressure 3 times a day and write down blood pressure and pulse and bring in to me when you RTO   Lose weight and increase exercise   Start only walking then may advance to brisk walking and lift low poundage free weights    Pt is under care of urology, cardiology , heme/onc and pulmonology  Reviewed last labs     Fasting blood work and follow up in end of june       Return in about 2 months (around 6/25/2022) for HTN,CHOL.      Jose Sandhu, DO

## 2022-05-05 PROBLEM — Z09 POSTOP CHECK: Status: RESOLVED | Noted: 2022-04-05 | Resolved: 2022-05-05

## 2022-05-16 RX ORDER — FUROSEMIDE 20 MG/1
TABLET ORAL
Qty: 45 TABLET | Refills: 3 | Status: SHIPPED | OUTPATIENT
Start: 2022-05-16

## 2022-06-24 ENCOUNTER — HOSPITAL ENCOUNTER (OUTPATIENT)
Dept: LAB | Age: 67
Discharge: HOME | End: 2022-06-24
Payer: MEDICARE

## 2022-06-24 DIAGNOSIS — R74.8 ELEVATED ALKALINE PHOSPHATASE LEVEL: ICD-10-CM

## 2022-06-24 DIAGNOSIS — R73.9 HYPERGLYCEMIA: ICD-10-CM

## 2022-06-24 DIAGNOSIS — R74.8 ELEVATED ALKALINE PHOSPHATASE LEVEL: Primary | ICD-10-CM

## 2022-06-24 DIAGNOSIS — E78.2 MIXED HYPERLIPIDEMIA: ICD-10-CM

## 2022-06-24 DIAGNOSIS — I10 PRIMARY HYPERTENSION: ICD-10-CM

## 2022-06-24 LAB
ALBUMIN SERPL-MCNC: 4.2 G/DL (ref 3.5–5.2)
ALBUMIN/GLOBULIN RATIO: 1.4 (ref 1–2.5)
ALP BLD-CCNC: 149 U/L (ref 40–129)
ALT SERPL-CCNC: 20 U/L (ref 5–41)
ANION GAP SERPL CALCULATED.3IONS-SCNC: 11 MMOL/L (ref 9–17)
AST SERPL-CCNC: 20 U/L
BILIRUB SERPL-MCNC: 0.51 MG/DL (ref 0.3–1.2)
BUN BLDV-MCNC: 14 MG/DL (ref 8–23)
BUN/CREAT BLD: 16 (ref 9–20)
CALCIUM SERPL-MCNC: 9 MG/DL (ref 8.6–10.4)
CHLORIDE BLD-SCNC: 104 MMOL/L (ref 98–107)
CHOLESTEROL/HDL RATIO: 2.7
CHOLESTEROL: 123 MG/DL
CO2: 25 MMOL/L (ref 20–31)
CREAT SERPL-MCNC: 0.86 MG/DL (ref 0.7–1.2)
GFR AFRICAN AMERICAN: >60 ML/MIN
GFR NON-AFRICAN AMERICAN: >60 ML/MIN
GFR SERPL CREATININE-BSD FRML MDRD: ABNORMAL ML/MIN/{1.73_M2}
GLUCOSE BLD-MCNC: 108 MG/DL (ref 70–99)
HDLC SERPL-MCNC: 46 MG/DL
LDL CHOLESTEROL: 55 MG/DL (ref 0–130)
POTASSIUM SERPL-SCNC: 4 MMOL/L (ref 3.7–5.3)
SODIUM BLD-SCNC: 140 MMOL/L (ref 135–144)
TOTAL PROTEIN: 7.1 G/DL (ref 6.4–8.3)
TRIGL SERPL-MCNC: 109 MG/DL

## 2022-06-24 PROCEDURE — 36415 COLL VENOUS BLD VENIPUNCTURE: CPT

## 2022-06-24 PROCEDURE — 80061 LIPID PANEL: CPT

## 2022-06-24 PROCEDURE — 84075 ASSAY ALKALINE PHOSPHATASE: CPT

## 2022-06-24 PROCEDURE — 84080 ASSAY ALKALINE PHOSPHATASES: CPT

## 2022-06-24 PROCEDURE — 80053 COMPREHEN METABOLIC PANEL: CPT

## 2022-06-28 LAB
ALK PHOS BONE SPECIFIC: 39 U/L (ref 0–55)
ALK PHOS OTHER CALC: 0 U/L
ALK PHOSPHATASE: 134 U/L (ref 40–120)
ALKALINE PHOSPHATASE LIVER FRACTION: 95 U/L (ref 0–94)

## 2022-07-07 ENCOUNTER — TELEPHONE (OUTPATIENT)
Dept: FAMILY MEDICINE CLINIC | Age: 67
End: 2022-07-07

## 2022-07-07 ENCOUNTER — OFFICE VISIT (OUTPATIENT)
Dept: FAMILY MEDICINE CLINIC | Age: 67
End: 2022-07-07
Payer: MEDICARE

## 2022-07-07 VITALS
HEIGHT: 69 IN | WEIGHT: 215 LBS | OXYGEN SATURATION: 97 % | RESPIRATION RATE: 16 BRPM | DIASTOLIC BLOOD PRESSURE: 60 MMHG | SYSTOLIC BLOOD PRESSURE: 122 MMHG | TEMPERATURE: 96.9 F | BODY MASS INDEX: 31.84 KG/M2 | HEART RATE: 61 BPM

## 2022-07-07 DIAGNOSIS — M54.2 NECK PAIN: ICD-10-CM

## 2022-07-07 DIAGNOSIS — I10 PRIMARY HYPERTENSION: ICD-10-CM

## 2022-07-07 DIAGNOSIS — R22.42 LOWER LEG MASS, LEFT: ICD-10-CM

## 2022-07-07 DIAGNOSIS — M62.838 MUSCLE SPASM: ICD-10-CM

## 2022-07-07 DIAGNOSIS — R74.8 ELEVATED SERUM ALKALINE PHOSPHATASE LEVEL: ICD-10-CM

## 2022-07-07 DIAGNOSIS — Z12.5 SCREENING FOR PROSTATE CANCER: ICD-10-CM

## 2022-07-07 DIAGNOSIS — M25.511 ACUTE PAIN OF RIGHT SHOULDER: ICD-10-CM

## 2022-07-07 DIAGNOSIS — E78.2 MIXED HYPERLIPIDEMIA: Primary | ICD-10-CM

## 2022-07-07 PROCEDURE — 1036F TOBACCO NON-USER: CPT | Performed by: FAMILY MEDICINE

## 2022-07-07 PROCEDURE — 99214 OFFICE O/P EST MOD 30 MIN: CPT | Performed by: FAMILY MEDICINE

## 2022-07-07 PROCEDURE — G8417 CALC BMI ABV UP PARAM F/U: HCPCS | Performed by: FAMILY MEDICINE

## 2022-07-07 PROCEDURE — 1123F ACP DISCUSS/DSCN MKR DOCD: CPT | Performed by: FAMILY MEDICINE

## 2022-07-07 PROCEDURE — 3017F COLORECTAL CA SCREEN DOC REV: CPT | Performed by: FAMILY MEDICINE

## 2022-07-07 PROCEDURE — G8427 DOCREV CUR MEDS BY ELIG CLIN: HCPCS | Performed by: FAMILY MEDICINE

## 2022-07-07 PROCEDURE — 99212 OFFICE O/P EST SF 10 MIN: CPT | Performed by: FAMILY MEDICINE

## 2022-07-07 RX ORDER — CYCLOBENZAPRINE HCL 10 MG
10 TABLET ORAL NIGHTLY PRN
Qty: 5 TABLET | Refills: 0 | Status: SHIPPED | OUTPATIENT
Start: 2022-07-07 | End: 2022-07-12

## 2022-07-07 SDOH — ECONOMIC STABILITY: FOOD INSECURITY: WITHIN THE PAST 12 MONTHS, THE FOOD YOU BOUGHT JUST DIDN'T LAST AND YOU DIDN'T HAVE MONEY TO GET MORE.: NEVER TRUE

## 2022-07-07 SDOH — ECONOMIC STABILITY: FOOD INSECURITY: WITHIN THE PAST 12 MONTHS, YOU WORRIED THAT YOUR FOOD WOULD RUN OUT BEFORE YOU GOT MONEY TO BUY MORE.: NEVER TRUE

## 2022-07-07 ASSESSMENT — PATIENT HEALTH QUESTIONNAIRE - PHQ9
SUM OF ALL RESPONSES TO PHQ QUESTIONS 1-9: 1
1. LITTLE INTEREST OR PLEASURE IN DOING THINGS: 0
SUM OF ALL RESPONSES TO PHQ QUESTIONS 1-9: 1
2. FEELING DOWN, DEPRESSED OR HOPELESS: 1
SUM OF ALL RESPONSES TO PHQ QUESTIONS 1-9: 1
SUM OF ALL RESPONSES TO PHQ QUESTIONS 1-9: 1
SUM OF ALL RESPONSES TO PHQ9 QUESTIONS 1 & 2: 1

## 2022-07-07 ASSESSMENT — ENCOUNTER SYMPTOMS
CHEST TIGHTNESS: 0
CHOKING: 0
WHEEZING: 0
PHOTOPHOBIA: 0
SHORTNESS OF BREATH: 0
COUGH: 0
ABDOMINAL PAIN: 0

## 2022-07-07 ASSESSMENT — SOCIAL DETERMINANTS OF HEALTH (SDOH): HOW HARD IS IT FOR YOU TO PAY FOR THE VERY BASICS LIKE FOOD, HOUSING, MEDICAL CARE, AND HEATING?: SOMEWHAT HARD

## 2022-07-07 NOTE — PATIENT INSTRUCTIONS
Nutrition Health Education:    Keep hydrated, walk 30 minutes minimum 3 times weekly as tolerated. Diet should consist of low fat, low sodium and high fiber. Nutritious foods such as fruits (if you're not diabetic), vegetables, lean meats, lean dairy, whole grains such as brown rice, quinoa, and dry beans. Shirlie Óscar with small amounts of heart healthy extra virgin olive oil. Be watchful of any extra salt/sugar/seasoning to your food. You should eat no more than 2 grams or 2,000 mg of salt daily. Salt will raise your BP. Avoid regular/diet sodas, caffeine, energy drinks as these are full of artificial ingredients/sweeteners, sugar, salt and chemicals that spike insulin and are harmful to your health. Sugar intake increases metabolic disfunction, type 2 diabetes, insulin resistance, addictive food behavior and obesity. Avoid all processed foods, foods from boxes, cans, microwave meals as these contain high salt, sugar or fat content and not much nutrition. Get at least 8 hrs of sleep every night and turn off all electronics at least 1 hour before bedtime as these decreases melatonin production and increases wakefulness. If your cholesterol is high, no greasy, fried, fast or fatty foods. Decrease red meat intake. No butter, grimm, lard or creams, no milk as these things clog your arteries and leads to heart attacks and death. If you smoke, smoking increases risk of lung disease, cancers, high BP, heart attack, stroke and death. Take your daily medications as prescribed and inform your family doctor if you are having any side effects or issues taking medications.     Elevated Blood Pressure:   No caffeine   No fast foods   Decrease salt in diet (consume nothing in a can, nothing in a box as these things contain high sodium)   No energy drinks   Buy a BP cuff and take blood pressure 3 times a day and write down blood pressure and pulse and bring in to me when you RTO   Lose weight and increase exercise if you are capable

## 2022-07-07 NOTE — TELEPHONE ENCOUNTER
He has no surgery scheduled that he is aware of. He is going to see Dr Alyson Amaya for bump on leg. He does take both ASA and Plavix.

## 2022-07-07 NOTE — PROGRESS NOTES
Female   Other Topics Concern    Not on file   Social History Narrative    Not on file     Social Determinants of Health     Financial Resource Strain: Medium Risk    Difficulty of Paying Living Expenses: Somewhat hard   Food Insecurity: No Food Insecurity    Worried About Running Out of Food in the Last Year: Never true    Abundio of Food in the Last Year: Never true   Transportation Needs:     Lack of Transportation (Medical): Not on file    Lack of Transportation (Non-Medical):  Not on file   Physical Activity:     Days of Exercise per Week: Not on file    Minutes of Exercise per Session: Not on file   Stress:     Feeling of Stress : Not on file   Social Connections:     Frequency of Communication with Friends and Family: Not on file    Frequency of Social Gatherings with Friends and Family: Not on file    Attends Christianity Services: Not on file    Active Member of [address] [place] [address] or Organizations: Not on file    Attends Club or Organization Meetings: Not on file    Marital Status: Not on file   Intimate Partner Violence:     Fear of Current or Ex-Partner: Not on file    Emotionally Abused: Not on file    Physically Abused: Not on file    Sexually Abused: Not on file   Housing Stability:     Unable to Pay for Housing in the Last Year: Not on file    Number of [place] in the Last Year: Not on file    Unstable Housing in the Last Year: Not on file       Current Outpatient Medications   Medication Sig Dispense Refill    cyclobenzaprine (FLEXERIL) 10 MG tablet Take 1 tablet by mouth nightly as needed for Muscle spasms 5 tablet 0    furosemide (LASIX) 20 MG tablet TAKE 1 TABLET BY MOUTH ONCE DAILY AS NEEDED FOR  LEG  SWELLING 45 tablet 3    clopidogrel (PLAVIX) 75 MG tablet Take 1 tablet by mouth once daily 90 tablet 3    atorvastatin (LIPITOR) 40 MG tablet Take 1 tablet by mouth nightly 90 tablet 3    tamsulosin (FLOMAX) 0.4 MG capsule Take 1 capsule by mouth daily 90 capsule 3    metoprolol tartrate (LOPRESSOR) 25 MG tablet Take 0.5 tablets by mouth 2 times daily 180 tablet 3    nitroGLYCERIN (NITROSTAT) 0.4 MG SL tablet up to max of 3 total doses. If no relief after 1 dose, call 911. 25 tablet 3    aspirin 81 MG chewable tablet Take 81 mg by mouth daily      Cholecalciferol (VITAMIN D) 50 MCG (2000 UT) CAPS capsule Take 2,000 Units by mouth daily      vitamin C (ASCORBIC ACID) 500 MG tablet Take 250 mg by mouth 2 times daily      acetaminophen (TYLENOL) 325 MG tablet Take 2 tablets by mouth every 6 hours as needed for Pain 120 tablet 3     No current facility-administered medications for this visit. Objective:  Pt is here for follow up on his cholesterol and blood work. Also ever since he he had ventral hernia repair with a mesh in 03/2022 his right shoulder has been hurting with radiating pain to the right side of his neck. Pt has an upcoming heme/onc appt this month and he will ask that doctor about his alkaline phosphatase levels. Last night had eggs and rocha. This morning had oatmeal cookie. Pt admits he eats a lot of fruit. He is taking tylenol PM. Also 6 months ago her bumped his left lateral lower leg while working on a car and then this mass popped up never went away and it is tender. Review of Systems   Constitutional: Negative for fatigue and unexpected weight change. Eyes: Negative for photophobia and visual disturbance. Respiratory: Negative for cough, choking, chest tightness, shortness of breath and wheezing. Cardiovascular: Negative for chest pain, palpitations and leg swelling. Gastrointestinal: Negative for abdominal pain. Genitourinary: Negative for difficulty urinating and hematuria. Musculoskeletal: Positive for myalgias (chronic right shoulder pain ever since his hernia surgery 03/2022) and neck pain (ever since his hernia surgery 03/2022). Negative for arthralgias. Skin: Negative for rash and wound.         Under skin of left lower leg lateral aspect is this tender mass he has had for 6 months after he hit his leg while working on his car   Neurological: Negative for dizziness, weakness, light-headedness, numbness and headaches. Hematological: Negative for adenopathy. Does not bruise/bleed easily. Psychiatric/Behavioral: Negative for agitation, confusion, decreased concentration and suicidal ideas. Physical Exam  Constitutional:       General: He is not in acute distress. Appearance: Normal appearance. He is obese. He is not ill-appearing, toxic-appearing or diaphoretic. HENT:      Head: Normocephalic and atraumatic. Right Ear: Tympanic membrane, ear canal and external ear normal. There is no impacted cerumen. Left Ear: Tympanic membrane, ear canal and external ear normal. There is no impacted cerumen. Nose: Nose normal. No congestion or rhinorrhea. Mouth/Throat:      Mouth: Mucous membranes are moist.      Pharynx: Oropharynx is clear. No oropharyngeal exudate or posterior oropharyngeal erythema. Eyes:      Extraocular Movements: Extraocular movements intact. Conjunctiva/sclera: Conjunctivae normal.      Pupils: Pupils are equal, round, and reactive to light. Cardiovascular:      Rate and Rhythm: Normal rate and regular rhythm. Pulses: Normal pulses. Heart sounds: Normal heart sounds. No murmur heard. Pulmonary:      Effort: Pulmonary effort is normal.      Breath sounds: Normal breath sounds. No wheezing, rhonchi or rales. Abdominal:      General: Abdomen is flat. Bowel sounds are normal. There is no distension. Palpations: Abdomen is soft. There is no mass. Tenderness: There is no abdominal tenderness. There is no right CVA tenderness, left CVA tenderness or guarding.    Musculoskeletal:         General: Tenderness (decreased rotation of the neck to the right and decreased sidebend to the left and reproducible pain right posterio shoulder, trapezius and right cervical Para spinals to the base of the skull where the paraspinals insert) present. Normal range of motion. Cervical back: Normal range of motion and neck supple. Right lower leg: No edema. Left lower leg: No edema. Lymphadenopathy:      Cervical: No cervical adenopathy. Skin:     General: Skin is warm. Capillary Refill: Capillary refill takes less than 2 seconds. Findings: Lesion (under skin left lower leg is an oval shaped tender mobile mass about 2cm in width and about 3c in length) present. Neurological:      General: No focal deficit present. Mental Status: He is alert and oriented to person, place, and time. Motor: No weakness. Coordination: Coordination normal.      Gait: Gait normal.   Psychiatric:         Mood and Affect: Mood normal.         Behavior: Behavior normal.         Thought Content: Thought content normal.          Assessment:   Diagnosis Orders   1. Mixed hyperlipidemia  Comprehensive Metabolic Panel    Lipid Panel   2. Primary hypertension  Comprehensive Metabolic Panel   3. Neck pain  XR CERVICAL SPINE (4-5 VIEWS)    cyclobenzaprine (FLEXERIL) 10 MG tablet   4. Acute pain of right shoulder  XR SHOULDER RIGHT (MIN 2 VIEWS)    cyclobenzaprine (FLEXERIL) 10 MG tablet   5. Muscle spasm  cyclobenzaprine (FLEXERIL) 10 MG tablet   6. Lower leg mass, left  Ambulatory referral to General Surgery   7. Elevated serum alkaline phosphatase level     8.  Screening for prostate cancer  PSA Screening         Plan:  Orders Placed This Encounter   Procedures    XR CERVICAL SPINE (4-5 VIEWS)     Standing Status:   Future     Standing Expiration Date:   8/7/2022     Order Specific Question:   Reason for exam:     Answer:   neck pain    XR SHOULDER RIGHT (MIN 2 VIEWS)     Standing Status:   Future     Standing Expiration Date:   8/7/2022     Order Specific Question:   Reason for exam:     Answer:   Acute pain of right shoulder    Comprehensive Metabolic Panel     Standing Status:   Future Standing Expiration Date:   1/7/2023    Lipid Panel     Standing Status:   Future     Standing Expiration Date:   1/7/2023     Order Specific Question:   Is Patient Fasting?/# of Hours     Answer: Yes    PSA Screening     Standing Status:   Future     Standing Expiration Date:   1/7/2023    Ambulatory referral to General Surgery     Referral Priority:   Routine     Referral Type:   Eval and Treat     Referral Reason:   Specialty Services Required     Referred to Provider:   Letty Trinidad DO     Requested Specialty:   General Surgery     Number of Visits Requested:   1         Patient Instructions   Nutrition Health Education:    Keep hydrated, walk 30 minutes minimum 3 times weekly as tolerated. Diet should consist of low fat, low sodium and high fiber. Nutritious foods such as fruits (if you're not diabetic), vegetables, lean meats, lean dairy, whole grains such as brown rice, quinoa, and dry beans. Sandra Dry with small amounts of heart healthy extra virgin olive oil. Be watchful of any extra salt/sugar/seasoning to your food. You should eat no more than 2 grams or 2,000 mg of salt daily. Salt will raise your BP. Avoid regular/diet sodas, caffeine, energy drinks as these are full of artificial ingredients/sweeteners, sugar, salt and chemicals that spike insulin and are harmful to your health. Sugar intake increases metabolic disfunction, type 2 diabetes, insulin resistance, addictive food behavior and obesity. Avoid all processed foods, foods from boxes, cans, microwave meals as these contain high salt, sugar or fat content and not much nutrition. Get at least 8 hrs of sleep every night and turn off all electronics at least 1 hour before bedtime as these decreases melatonin production and increases wakefulness. If your cholesterol is high, no greasy, fried, fast or fatty foods. Decrease red meat intake.  No butter, grimm, lard or creams, no milk as these things clog your arteries and leads to heart attacks and death. If you smoke, smoking increases risk of lung disease, cancers, high BP, heart attack, stroke and death. Take your daily medications as prescribed and inform your family doctor if you are having any side effects or issues taking medications. Elevated Blood Pressure:   No caffeine   No fast foods   Decrease salt in diet (consume nothing in a can, nothing in a box as these things contain high sodium)   No energy drinks   Buy a BP cuff and take blood pressure 3 times a day and write down blood pressure and pulse and bring in to me when you RTO   Lose weight and increase exercise if you are capable of exercising   Start only walking then may advance to brisk walking and lift low poundage free weights if you are capable    Elevated Cholesterol:   No greasy, fried, fast, fatty foods   No trans fats   Decreased red meat intake to once every 2 months   No butter, grimm nor cream cheese, cheese   No egg yolk   NO milk   Decrease your cholesterol in diet    Decrease sugar in diet:    NO sugar, decrease fruit intake, No juice, NO veggies with color other than green, NO sauteed onions or anything that caramelizes, you may eat raw onions. NO alcohol, try not to eat diabetic candies as they may cause diarrhea. Minimize your carbohydrate intake. Reviewed labs with pt cmp,lipids,alkalinephosphitase    Pt will ask his heme/onc doctor Dr. Ignacio Agosto about his elevated alkaline phosphatase    I reviewed the CT scan abdomen showed no abnormalities     Ordered Xray of the neck  Ordered an xray of the right shoulder    Muscle Relaxants:   No driving or operating equipment while taking the muscle relaxant and/or painkillers   When at work do not take, just take before going to bed   If you are not working or driving, you may take these medications as prescribed    Muscle Spasm:   At home no heavy lifting, pulling nor pushing until better.     Hot shower let the water run on this area    Apply moist heat to the

## 2022-07-11 ENCOUNTER — OFFICE VISIT (OUTPATIENT)
Dept: FAMILY MEDICINE CLINIC | Age: 67
End: 2022-07-11
Payer: MEDICARE

## 2022-07-11 VITALS
HEART RATE: 73 BPM | SYSTOLIC BLOOD PRESSURE: 132 MMHG | OXYGEN SATURATION: 97 % | TEMPERATURE: 98.1 F | BODY MASS INDEX: 31.84 KG/M2 | HEIGHT: 69 IN | DIASTOLIC BLOOD PRESSURE: 78 MMHG | RESPIRATION RATE: 18 BRPM | WEIGHT: 215 LBS

## 2022-07-11 DIAGNOSIS — R09.81 NASAL CONGESTION: ICD-10-CM

## 2022-07-11 DIAGNOSIS — J02.9 SORE THROAT: ICD-10-CM

## 2022-07-11 DIAGNOSIS — U07.1 COVID-19 VIRUS INFECTION: Primary | ICD-10-CM

## 2022-07-11 DIAGNOSIS — R05.9 COUGH: ICD-10-CM

## 2022-07-11 LAB
Lab: ABNORMAL
QC PASS/FAIL: ABNORMAL
SARS-COV-2 RDRP RESP QL NAA+PROBE: POSITIVE

## 2022-07-11 PROCEDURE — 1123F ACP DISCUSS/DSCN MKR DOCD: CPT | Performed by: NURSE PRACTITIONER

## 2022-07-11 PROCEDURE — 87635 SARS-COV-2 COVID-19 AMP PRB: CPT | Performed by: NURSE PRACTITIONER

## 2022-07-11 PROCEDURE — 3017F COLORECTAL CA SCREEN DOC REV: CPT | Performed by: NURSE PRACTITIONER

## 2022-07-11 PROCEDURE — G8427 DOCREV CUR MEDS BY ELIG CLIN: HCPCS | Performed by: NURSE PRACTITIONER

## 2022-07-11 PROCEDURE — 99213 OFFICE O/P EST LOW 20 MIN: CPT | Performed by: NURSE PRACTITIONER

## 2022-07-11 PROCEDURE — G8417 CALC BMI ABV UP PARAM F/U: HCPCS | Performed by: NURSE PRACTITIONER

## 2022-07-11 PROCEDURE — PBSHW POCT COVID-19 RAPID, NAAT: Performed by: NURSE PRACTITIONER

## 2022-07-11 PROCEDURE — 1036F TOBACCO NON-USER: CPT | Performed by: NURSE PRACTITIONER

## 2022-07-11 ASSESSMENT — ENCOUNTER SYMPTOMS
SHORTNESS OF BREATH: 0
VOMITING: 0
SORE THROAT: 1
DIARRHEA: 0
NAUSEA: 0
COUGH: 1

## 2022-07-11 NOTE — PROGRESS NOTES
[address], APRN-CNP  [address]  Phone:  [phone]  Fax:  [phone]  Lamont Klein is a 69 y.o. male who presents today for his medical conditions/complaints as noted below. Nallely Tino Whitt c/o of Pharyngitis (Pt presents to walkin with complaint of a scratchy throat since thursday night that progressed over the weekend to a sore throat, congestion, fatigue, cough. Pt recieved 2 positive home covid test within the last 3 hours.)      HPI:     URI   This is a new problem. The current episode started in the past 7 days. The problem has been waxing and waning. There has been no fever. Associated symptoms include congestion, coughing, sneezing and a sore throat. Pertinent negatives include no diarrhea, headaches, nausea, rash or vomiting. Wt Readings from Last 3 Encounters:   07/11/22 215 lb (97.5 kg)   07/07/22 215 lb (97.5 kg)   04/25/22 218 lb 6.4 oz (99.1 kg)       Temp Readings from Last 3 Encounters:   07/11/22 98.1 °F (36.7 °C)   07/07/22 96.9 °F (36.1 °C)   04/25/22 96.9 °F (36.1 °C)       BP Readings from Last 3 Encounters:   07/11/22 132/78   07/07/22 122/60   04/25/22 114/60       Pulse Readings from Last 3 Encounters:   07/11/22 73   07/07/22 61   04/25/22 69        SpO2 Readings from Last 3 Encounters:   07/11/22 97%   07/07/22 97%   04/25/22 97%             Past Medical History:   Diagnosis Date    Abnormal cardiovascular stress test 12/09/2020    Large inferolateral infarction with significant vernon-infarct ischemia.     CAD (coronary artery disease)     CABG-2 vessel, followed by stents x 4    Cervical disc disease     Clostridium difficile diarrhea 10/2014    hospitalized in October 2014    Displacement of cervical intervertebral disc without myelopathy 06/10/2014    BWC, C6/C7     HTN (hypertension)     Hyperlipidemia     Impaired fasting blood sugar     Neck pain     chronic    Neuralgia, neuritis, and radiculitis, unspecified 06/10/2014    [facility], left C7     Shoulder region pain     Left   [facility] injury 01/08/2014    Sprain and strain of unspecified site of shoulder and upper arm 06/10/2014    [facility], left arm     Wears glasses       Past Surgical History:   Procedure Laterality Date    BICEPS TENODESIS Right 11/23/2016    right proximal bicep tenodesis    COLONOSCOPY N/A 12/10/2019    COLONOSCOPY POLYPECTOMY SNARE/COLD BIOPSY performed by Shavon Bell MD at [place] [address]  12/18/2020    Successful PTCA. AGUILA of mid LAD / Patent LIMA however distally occluded and not supplying LAD /Residual high grade stenosis in small LCX    CORONARY ANGIOPLASTY WITH STENT PLACEMENT  01/22/2021    CT NEEDLE BIOPSY LUNG PERCUTANEOUS  12/15/2021    CT NEEDLE BIOPSY LUNG PERCUTANEOUS 12/15/2021 [facility] CT SCAN    CYSTOSCOPY N/A 03/09/2020    CYSTO TURBT performed by Elvira Uribe MD at [facility] N/A 3/25/2022    LAPAROSCOPIC Robotic Assisted Ventral Hernia Repair with mesh WITH LYSIS OF ADHESIONS performed by Duke Canavan, DO at [facility] Left 8/12/14, 1/09/15    C7 TFE    PAIN MANAGEMENT PROCEDURE Left 09/23/2014    C7 TFE    PAIN MANAGEMENT PROCEDURE Left 7/17/2015 , 1/22/16    C7 TFE    MD CABG, ARTERY-VEIN, SINGLE N/A 07/06/2018    CABG CORONARY ARTERY BYPASS, [facility], CHANI  (CSI# [id number]) performed by Gi De La O MD at [address] CABG (LIMA-LAD, R SVG- RCA, OM too small for the grafts) on 7/6/18,    ROTATOR CUFF REPAIR Left 01/29/2019    2015- Right    SHOULDER ARTHROSCOPY Right 04/27/2016    scope decompressing, rotator cuff repair    TRANSESOPHAGEAL ECHOCARDIOGRAM  55/52/1316    UMBILICAL HERNIA REPAIR N/A 12/18/2019    Laparoscopic Robotic Assisted Umbilical Hernia Repair W/ MESH performed by Shavon Bell MD at [address] N/A 11/19/2020    Open Incisional Hernia Repair W/ MESH performed by Lance Vick Shay Murray,  at [facility] OR     Family History   Problem Relation Age of Onset    High Blood Pressure Father     Colon Polyps Brother     COPD Mother      Social History     Tobacco Use    Smoking status: Former Smoker     Packs/day: 2.00     Years: 25.00     Pack years: 50.00     Types: Cigarettes     Start date: 1975     Quit date: 2008     Years since quittin.5    Smokeless tobacco: Never Used    Tobacco comment: Solitario Stacy RRT 16   Substance Use Topics    Alcohol use: No     Alcohol/week: 0.0 standard drinks      Current Outpatient Medications   Medication Sig Dispense Refill    nirmatrelvir/ritonavir (PAXLOVID) 20 x 150 MG & 10 x 100MG Take 3 tablets (two 150 mg nirmatrelvir and one 100 mg ritonavir tablets) by mouth every 12 hours for 5 days. 30 tablet 0    cyclobenzaprine (FLEXERIL) 10 MG tablet Take 1 tablet by mouth nightly as needed for Muscle spasms 5 tablet 0    furosemide (LASIX) 20 MG tablet TAKE 1 TABLET BY MOUTH ONCE DAILY AS NEEDED FOR  LEG  SWELLING 45 tablet 3    clopidogrel (PLAVIX) 75 MG tablet Take 1 tablet by mouth once daily 90 tablet 3    atorvastatin (LIPITOR) 40 MG tablet Take 1 tablet by mouth nightly 90 tablet 3    metoprolol tartrate (LOPRESSOR) 25 MG tablet Take 0.5 tablets by mouth 2 times daily 180 tablet 3    nitroGLYCERIN (NITROSTAT) 0.4 MG SL tablet up to max of 3 total doses. If no relief after 1 dose, call 911. 25 tablet 3    aspirin 81 MG chewable tablet Take 81 mg by mouth daily      Cholecalciferol (VITAMIN D) 50 MCG (2000 UT) CAPS capsule Take 2,000 Units by mouth daily      vitamin C (ASCORBIC ACID) 500 MG tablet Take 250 mg by mouth 2 times daily      acetaminophen (TYLENOL) 325 MG tablet Take 2 tablets by mouth every 6 hours as needed for Pain 120 tablet 3    tamsulosin (FLOMAX) 0.4 MG capsule Take 1 capsule by mouth daily 90 capsule 3     No current facility-administered medications for this visit.      Allergies   Allergen Reactions    Chlorhexidine Rash    Vicodin [Hydrocodone-Acetaminophen] Nausea Only     Stomach upset       No exam data present    Subjective:      Review of Systems   Constitutional: Negative for chills, diaphoresis, fatigue and fever. HENT: Positive for congestion, sneezing and sore throat. Respiratory: Positive for cough. Negative for shortness of breath. Gastrointestinal: Negative for diarrhea, nausea and vomiting. Musculoskeletal: Negative for arthralgias and myalgias. Skin: Negative for rash. Neurological: Negative for headaches. Objective:     /78 (Site: Right Upper Arm, Position: Sitting, Cuff Size: Medium Adult)   Pulse 73   Temp 98.1 °F (36.7 °C)   Resp 18   Ht 5' 9\" (1.753 m)   Wt 215 lb (97.5 kg)   SpO2 97%   BMI 31.75 kg/m²     Physical Exam  Vitals and nursing note reviewed. Constitutional:       General: He is not in acute distress. HENT:      Head: Normocephalic and atraumatic. Right Ear: External ear normal.      Left Ear: External ear normal.      Nose: Mucosal edema and congestion present. Mouth/Throat:      Pharynx: Posterior oropharyngeal erythema present. No oropharyngeal exudate. Eyes:      Conjunctiva/sclera: Conjunctivae normal.      Pupils: Pupils are equal, round, and reactive to light. Cardiovascular:      Rate and Rhythm: Normal rate and regular rhythm. Heart sounds: Normal heart sounds. Pulmonary:      Effort: Pulmonary effort is normal. No respiratory distress. Breath sounds: Normal breath sounds. No wheezing. Abdominal:      Tenderness: There is abdominal tenderness. Musculoskeletal:         General: Normal range of motion. Cervical back: Normal range of motion and neck supple. Lymphadenopathy:      Cervical: No cervical adenopathy. Skin:     General: Skin is warm and dry. Capillary Refill: Capillary refill takes less than 2 seconds. Findings: No rash.    Neurological:      Mental Status: He is alert and oriented to person, place, and time. Psychiatric:         Judgment: Judgment normal.         Assessment:      Diagnosis Orders   1. COVID-19 virus infection  nirmatrelvir/ritonavir (PAXLOVID) 20 x 150 MG & 10 x 100MG   2. Sore throat  POCT COVID-19 Rapid, NAAT   3. Nasal congestion  POCT COVID-19 Rapid, NAAT   4. Cough  POCT COVID-19 Rapid, NAAT     No results found for this visit on 07/11/22. Plan:       Paxolovid as directed. Please go to the emergency room if you become short of breath, have weakness or extreme fatigue or if you feel you may be dehydrated. You may call the office if it is during normal business hours and request a nurse visit where we check you pulse oximetry/oxygen level. If your level is too low you will be sent to the hospital for further treatment. You are being provided a work or school excuse so you may quarantine until you test results are back. If you are COVID positive you will be given an additional excuse to lengthen your quarantine. Practice coughing and deep breathing every hour while awake. If you are laying down, change positions frequently. Try laying on your stomach to open up your lungs more. If you are allowed to take tylenol or ibuprofen you may take these to relieve fever, chills or body aches. Follow up with primary care provider in 1 to 2 days if needed. Treated with over the counter medications if age appropriate. Patient can use Tylenol or Ibuprofen. Over the counter cough syrup if over the age of 10. Antibiotics are not indicated at the present time. Cough treatment if over the age of 1 - 1 teaspoon of (Local honey if able to find) honey mixed with squeezed  fresh lemon juice. Mix in warm water or take directly. This decreases sore throat pain and reduces cough. May be repeated every 2 hours as needed for cough. Advised to follow up if does not get better or symptoms worsen.  Go to Urgent Care of ER if you become dehydrated, have breathing difficulty, or have extreme weakness. Cough may last from 4 to 6 weeks. Patient Instructions     Paxlovid as directed. Please go to the emergency room if you become short of breath, have weakness or extreme fatigue or if you feel you may be dehydrated. You may call the office if it is during normal business hours and request a nurse visit where we check you pulse oximetry/oxygen level. If your level is too low you will be sent to the hospital for further treatment. You are being provided a work or school excuse so you may quarantine until you test results are back. If you are COVID positive you will be given an additional excuse to lengthen your quarantine. Practice coughing and deep breathing every hour while awake. If you are laying down, change positions frequently. Try laying on your stomach to open up your lungs more. If you are allowed to take tylenol or ibuprofen you may take these to relieve fever, chills or body aches. Follow up with primary care provider in 1 to 2 days if needed. Treated with over the counter medications if age appropriate. Patient can use Tylenol or Ibuprofen. Over the counter cough syrup if over the age of 10. Antibiotics are not indicated at the present time. Cough treatment if over the age of 1 - 1 teaspoon of (Local honey if able to find) honey mixed with squeezed  fresh lemon juice. Mix in warm water or take directly. This decreases sore throat pain and reduces cough. May be repeated every 2 hours as needed for cough. Advised to follow up if does not get better or symptoms worsen. Go to Urgent Care of ER if you become dehydrated, have breathing difficulty, or have extreme weakness. Cough may last from 4 to 6 weeks. Patient Education        10 Things to Do When You Have COVID-19      Talk to your doctor right away about treatment. They might have you take medicine to help prevent serious illness. Stay home. Don't go to school, work, or public areas. And don't use public transportation, ride-shares, or taxis unless you have no choice. Leave your home only if you need to get medical care. But call the doctor's office firstso they know you're coming. And wear a well-fitting mask. Ask before leaving isolation. Follow your doctor's advice about when it is safe for you to leave isolation. If you have questions, go to the [facility] website at cdc.gov to check the [employer]. Wear a well-fitting mask when you are around other people. It can help stop the spread of the virus. Limit contact with people in your home. If possible, stay in a separate bedroom and use a separate bathroom. Cover your mouth and nose with a tissue when you cough or sneeze. Then throw the tissue in the trash right away. Wash your hands often, especially after you cough or sneeze. Use soap and water, and scrub for at least 20 seconds. If soap and wateraren't available, use an alcohol-based hand . Don't share personal household items. These include bedding, towels, cups and glasses, and eating utensils. Clean and disinfect your home every day. Use household  or disinfectant wipes or sprays. If needed, take acetaminophen (Tylenol) or ibuprofen (Advil, Motrin) to relieve fever and body aches. Read and follow all instructions on the label. Current as of: May 28, 2022               Content Version: 13.3  © 2006-2022 Healthwise[employer]. Care instructions adapted under license by [facility] [place] ([facility]). If you have questions about a medical condition or this instruction, always ask your healthcare professional. [place] [address] any warranty or liability for your use of this information. Patient Education        Learning About Coronavirus [phone])  What is coronavirus (COVID-19)? COVID-19 is a disease caused by a type of coronavirus.  This illness was firstfound in December 2019. It has since spread worldwide. Coronaviruses are a large group of viruses. They cause the common cold. They also cause more serious illnesses like [place] East respiratory syndrome (MERS) and severe acute respiratory syndrome (SARS). COVID-19 is caused by a novelcoronavirus. That means it's a new type that has not been seen in people before. What are the symptoms? COVID-19 symptoms may include:   Fever.  Cough.  Trouble breathing.  Chills or repeated shaking with chills.  Muscle and body aches.  Headache.  Sore throat.  New loss of taste or smell.  Vomiting.  Diarrhea. In severe cases, COVID-19 can cause pneumonia and make it hard to breathewithout help from a machine. It can cause death. How is it diagnosed? COVID-19 is diagnosed with a viral test. This may also be called a PCR test or antigen test. It looks for evidence of the virus in your breathing passages orlungs (respiratory system). The test is most often done on a sample from the nose, throat, or lungs. It's sometimes done on a sample of saliva. One way a sample is collected is byputting a long swab into the back of your nose. If you have questions about COVID-19 testing, ask your doctor or go to cdc.govto use the COVID-19 Viral Testing Tool. How is it treated? Mild cases of COVID-19 can be treated at home. Serious cases need treatment in the hospital. Treatment may include medicines, plus breathing support such as oxygen therapy or a ventilator. Some people may be placed on their belly tohelp their oxygen levels. Treatments that may help people who have COVID-19 include:  Antiviral medicines. These medicines treat viral infections. Immune-based therapy. These medicines help the immune system fight COVID-19. Examples include monoclonal antibodies. Blood thinners. These medicines help prevent blood clots. People with severe illness are at risk for blood clots.   How can you protect yourself and others?  Stay up to date on your COVID-19 vaccines.  Avoid sick people, and stay away from others if you are sick.  Stay at least 6 feet away from other people.  Avoid crowds, especially inside.  Get tested for COVID-19 before you have an indoor visit with people who don't live with you.  Improve the airflow when you spend time indoors with people who don't live with you. If you can, open windows and doors. Or you can use a fan to blow air away from people and out a window.  Cover your mouth with a tissue when you cough or sneeze.  Wash your hands often, especially after you cough or sneeze. Use soap and water, and scrub for at least 20 seconds. If soap and water aren't available, use an alcohol-based hand .  Avoid touching your mouth, nose, and eyes. Check the CDC website at cdc.gov for the most current information on how to protect yourself. And if you have questions, ask your doctor or go to cdc.govto use the COVID-19 Quarantine and Isolation Calculator. Here are some other steps you may need to take.  If you are not up to date on your COVID-19 vaccines:  ? Wear a mask with the best fit, protection, and comfort for you. A mask can protect you even when others aren't wearing one. This might be especially important if you:  - Have certain health conditions. - Live with someone who has a compromised immune system. - Live with someone who is not up to date on their COVID-19 vaccines.  If you have been exposed to the virus AND are not up to date on your COVID-19 vaccines:  ? Talk to your doctor as soon as you can. Your doctor might have you take medicine to help prevent serious illness. ? Get a COVID-19 test. You may need to be tested more than once. And if your test is positive, follow the instructions below. ? Stay home. Try to separate from other people where you live. Don't go to school, work, or public areas. ? Wear a well-fitting mask around other people for a full 10 days. Avoid travel, and stay away from people at high risk for serious illness. ? Watch for symptoms.  If you have been exposed AND either tested positive for COVID-19 in the last 90 days and have recovered or you are up to date on your COVID-19 vaccines:  ? Talk to your doctor as soon as you can. Your doctor may have you take medicine to help prevent serious illness. ? Get a COVID-19 test. Wait 5 days after you were last exposed. You may need to be tested more than once. And if your test is positive, follow the instructions below. ? Wear a well-fitting mask around other people for a full 10 days. ? Avoid travel and stay away from people at high risk for serious illness. ? Watch for symptoms. ? If you tested positive for COVID-19 in the last 90 days and have not recovered, another COVID-19 test may not be needed.  If you're sick or test positive for COVID-19:  ? Talk to your doctor as soon as you can. Your doctor may have you take medicine to help prevent serious illness. ? Get a COVID-19 test unless you have already been tested. You may need to be tested more than once. ? Stay home. Leave only if you need to get medical care. ? If you were seriously ill or if you have a weakened immune system, you may need to isolate for several weeks. ? For a full 10 days, wear a well-fitting mask whenever you're around other people. ? Avoid travel and stay away from people at high risk for serious illness. ? Limit contact with pets and people in your home. If possible, stay in a separate bedroom and use a separate bathroom. ? Clean and disinfect your home every day. Use household  and disinfectant wipes or sprays. Take special care to clean things that you touch with your hands. How can you self-isolate when you have COVID-19? If you have COVID-19, there are things you can do to help avoid spreading thevirus to others.  Stay home, and avoid contact with other people.    Limit contact with people in your home. If possible, stay in a separate bedroom and use a separate bathroom.  Wear a well-fitting mask when you are around other people.  Avoid contact with pets and other animals.  Cover your mouth and nose with a tissue when you cough or sneeze. Then throw it in the trash right away.  Wash your hands often, especially after you cough or sneeze. Use soap and water, and scrub for at least 20 seconds. If soap and water aren't available, use an alcohol-based hand .  Don't share personal household items. These include bedding, towels, cups and glasses, and eating utensils. [address] in the warmest water allowed for the fabric type, and dry it completely. It's okay to wash other people's laundry with yours.  Clean and disinfect your home. Use household  and disinfectant wipes or sprays. If you have questions, visit cdc.gov to check the Quarantine and IsolationCalculator. When should you call for help? Call 911 anytime you think you may need emergency care. For example, call if you have life-threatening symptoms, such as:     You have severe trouble breathing. (You can't talk at all.)      You have constant chest pain or pressure.      You are severely dizzy or lightheaded.      You are confused or can't think clearly.      You have pale, gray, or blue-colored skin or lips.      You pass out (lose consciousness) or are very hard to wake up.      You have loss of balance or trouble walking.      You have trouble seeing out of one or both eyes.      You have weakness or drooping on one side of the face.      You have weakness or numbness in an arm or a leg.      You have trouble speaking.      You have a severe headache.      You have a seizure. Call your doctor now or seek immediate medical care if:     You have moderate trouble breathing. (You can't speak a full sentence.)      You are coughing up blood.      You have signs of low blood pressure.  These include feeling lightheaded; being too weak to stand; and having cold, pale, clammy skin. Watch closely for changes in your health, and be sure to contact your doctor if:     Your symptoms get worse.      You are not getting better as expected.      You have new or worse symptoms of anxiety, depression, nightmares, or flashbacks. Call before you go to the doctor's office. Follow their instructions. And wear a mask. Where can you learn more? Go to [email or web address] org and sign in to your [employer] account. Enter C008 in the [employer] box to learn more about \"Learning About Coronavirus (COVID-19). \"     If you do not have an account, please click on the \"Sign Up Now\" link. Current as of: May 28, 2022               Content Version: 13.3  © 5938-7781 [employer]. Care instructions adapted under license by [employer] [address] [place] [address]. If you have questions about a medical condition or this instruction, always ask your healthcare professional. [place] [address] any warranty or liability for your use of this information. Patient Education        Coronavirus ([id number]): Care Instructions  Overview  The coronavirus disease (COVID-19) is caused by a virus. Symptoms may include a fever, a cough, and shortness of breath. It can spread through droplets from coughing and sneezing, breathing, and singing. The virus also can spread whenpeople are in close contact with someone who is infected. Most people have mild symptoms and can take care of themselves at home with medicine to reduce symptoms. Talk to your doctor. They might have you take medicine to help prevent serious illness. If your symptoms get worse, you may need care in a hospital. Treatment may include medicines, plus breathingsupport such as oxygen therapy or a ventilator. It's important to not spread the virus to others. If you have COVID-19, wear a well-fitting mask anytime you are around other people. Isolate yourself whileyou are sick. Leave your home only if you need to get medical care or testing. Follow-up care is a key part of your treatment and safety. Be sure to make and go to all appointments, and call your doctor if you are having problems. It's also a good idea to know your test results and keep alist of the medicines you take. How can you care for yourself at home?  Get extra rest. It can help you feel better.  Drink plenty of fluids. This helps replace fluids lost from fever. Fluids may also help ease a scratchy throat.  You can take acetaminophen (Tylenol) or ibuprofen (Advil, Motrin) to reduce a fever. It may also help with muscle and body aches. Read and follow all instructions on the label.  Use petroleum jelly on sore skin. This can help if the skin around your nose and lips becomes sore from rubbing a lot with tissues. If you use oxygen, use a water-based product instead of petroleum jelly.  Keep track of symptoms such as fever and shortness of breath. This can help you know if you need to call your doctor. It can also help you know when it's safe to be around other people.  In some cases, your doctor might suggest that you get a pulse oximeter. How can you self-isolate when you have COVID-19? If you have COVID-19, there are things you can do to help avoid spreading thevirus to others.  Stay home, and avoid contact with other people.  Limit contact with people in your home. If possible, stay in a separate bedroom and use a separate bathroom.  Wear a well-fitting mask when you are around other people.  Avoid contact with pets and other animals.  Cover your mouth and nose with a tissue when you cough or sneeze. Then throw it in the trash right away.  Wash your hands often, especially after you cough or sneeze. Use soap and water, and scrub for at least 20 seconds. If soap and water aren't available, use an alcohol-based hand .    Don't share personal household items. These include bedding, towels, cups and glasses, and eating utensils. [address] in the warmest water allowed for the fabric type, and dry it completely. It's okay to wash other people's laundry with yours.  Clean and disinfect your home. Use household  and disinfectant wipes or sprays. If you have questions, visit cdc.gov to check the Quarantine and IsolationCalculator. When can you end self-isolation for COVID-19? If you know or think that you have the virus, you will need to self-isolate. When you can be around other people you live with and leave home depends on whether you have symptoms. Important: Day 0 is the day your symptoms started or the day you tested positive. Day 1 is the day after your symptoms first started or your test was positive. Isolation starts on Day0. If you have symptoms, you can end isolation at the end of Day 5 if you haven't had a fever for 24 hours while not taking medicines to lower the fever and your symptoms are getting better. If you tested positive but have NO symptoms, you can end isolation at the end of Day 5. But if you start to have symptoms,follow the steps above. Use Day 0 as your first day of symptoms. You may take a COVID-19 test at the end of Day 5. If it is positive, continue to isolate until the end of Day 10. If you have a weakened immune system or areseriously ill, you may need to isolate for up to 20 days. If you have questions, go to the [facility] website at cdc.gov to check the [employer]. When should you call for help? Call 911 anytime you think you may need emergency care. For example, call if you have life-threatening symptoms, such as:     You have severe trouble breathing.  (You can't talk at all.)      You have constant chest pain or pressure.      You are severely dizzy or lightheaded.      You are confused or can't think clearly.      You have pale, gray, or blue-colored skin or lips.      You pass out (lose consciousness) or are very hard to wake up.      You have loss of balance or trouble walking.      You have trouble seeing out of one or both eyes.      You have weakness or drooping on one side of the face.      You have weakness or numbness in an arm or a leg.      You have trouble speaking.      You have a severe headache.      You have a seizure. Call your doctor now or seek immediate medical care if:     You have moderate trouble breathing. (You can't speak a full sentence.)      You are coughing up blood.      You have signs of low blood pressure. These include feeling lightheaded; being too weak to stand; and having cold, pale, clammy skin. Watch closely for changes in your health, and be sure to contact your doctor if:     Your symptoms get worse.      You are not getting better as expected.      You have new or worse symptoms of anxiety, depression, nightmares, or flashbacks. Call before you go to the doctor's office. Follow their instructions. And wear a mask. Where can you learn more? Go to https://[employer]. org and sign in to your [employer] account. Enter C007 in the [facility] box to learn more about \"Coronavirus (COVID-19): Care Instructions. \"     If you do not have an account, please click on the \"Sign Up Now\" link. Current as of: May 28, 2022               Content Version: 13.3  © 7445-3395 Healthwise, Incorporated. Care instructions adapted under license by [facility]. If you have questions about a medical condition or this instruction, always ask your healthcare professional. [place] [address] any warranty or liability for your use of this information. Patient Education        Learning About Being High-Risk for Serious Illness From COVID-19  Who is at high risk? COVID-19 causes a mild illness in many people who have it. But certain thingsmay increase your risk for more serious illness.  These include:   Age. ? Babies born premature or who are less than 3year old may be at high risk. ? The risk also increases with age. Older adults are at highest risk.  Asthma, cystic fibrosis, chronic obstructive pulmonary disease (COPD), and other chronic lung diseases.  Vaping or smoking or having a history of smoking.  Serious heart conditions, such as heart failure, coronary artery disease, or high blood pressure.  Tuberculosis (TB).  HIV.  A weakened immune system or taking medicines, such as steroids, that suppress the immune system. This also includes medicines taken because of an organ transplant.  Cancer or getting treatment for cancer.  Neurologic conditions or diseases that involve the nerves and brain, such as stroke, dementia, or cerebral palsy.  Being overweight (obesity).  Diabetes.  Chronic kidney disease.  Liver disease.  Substance use disorders.  Sickle cell disease.  Pregnancy or a recent pregnancy.  Genetic, metabolic, or neurologic problems in children. This includes children who may have many health problems that affect many body systems. These problems may limit how well the child can do routine activities of daily life.  Down syndrome.  Mood disorders, such as depression or schizophrenia. Some people have a higher risk of getting very sick or dying from COVID-19 because of where they live or work. The risk can also be higher if people don't have access to health care. This includes people from certain racial and ethnicminority groups, as well as people with disabilities. This is not a complete list. If you have a chronic health problem, ask your doctor if you should take extra precautions. The more of these things you have, the higher your risk for serious illness. Talk with your doctor about ways tomanage your risk. Should you get the COVID-19 vaccine if you have an underlying health problem? The simple answer is yes.  The COVID-19 vaccine is safe and effective for almost everyone. The only people advised not to get it are those who have had a severeallergic reaction to the vaccine's ingredients. Experts recommend getting the vaccine. This is especially true if you have an underlying health problem like diabetes, chronic lung disease, or obesity. Getting infected with COVID-19 can be much worse if you have conditions likethese. If you have a weakened immune system, you may be at higher risk for getting seriously ill with COVID-19. The vaccine may not work as well for you, but itshould still be safe. Talk with your doctor if you have any questions about the vaccine. How can you protect yourself? Follow these guidelines until your doctor tells you it's safe to stop.  Stay up to date on your COVID-19 vaccines.  Talk to your doctor about how many doses you need.  Avoid sick people.  Talk to your doctor to see if you need medicine to lower your risk of getting COVID-19 or getting very sick from COVID-19.  Wear a mask with the best fit, protection, and comfort for you if you go into public areas, especially indoor spaces.  Avoid crowds. And try to stay 6 feet apart from other people. If you have to be in a crowded area, wear a mask. This is important even if you're outside.  Wash your hands often.  Avoid touching your mouth, nose, and eyes.  Improve the airflow when you spend time indoors with people who don't live with you. If you can, open windows and doors. Or you can use a fan to blow air away from people and out a window.  Ask the people you live with or who are in close contact with you to stay up to date on their COVID-19 vaccines.  Ask the people you live with to wear a mask in public areas. This is important even if they're up to date on their COVID-19 vaccines.  Ask people who don't live with you to get a COVID-19 test before visiting with them indoors.   If you think you've been exposed, or you're sick, call your doctor as soon jona can. Your doctor may have you take medicine to help prevent serious illness. Should you get the COVID-19 vaccine if you are planning a pregnancy, are pregnant, were recently pregnant, or are breastfeeding? Experts recommend getting the COVID-19 vaccine if you are pregnant, were recently pregnant, or are breastfeeding. The vaccine is also recommended if you are planning a pregnancy. Talk to your doctor about getting the vaccine. Other vaccines, like the flu vaccine, are safely given in pregnancy and after pregnancy. There is no evidence that vaccines, including the COVID-19 vaccine, cause fertility problems. The risk of problems from the COVID-19 vaccine should be far smaller than the risks to you and your baby from having the infection. Having COVID-19 while pregnant increases your risk of  labor and stillbirth. So gettingthe COVID-19 vaccine is important for you and your baby. Where can you learn more? Go to https://[employer]pic[employer]Trust.[employer]. org and sign in to your [employer] account. Enter A131 in the [employer] box to learn more about \"Learning About Being High-Risk for Serious Illness From COVID-19. \"     If you do not have an account, please click on the \"Sign Up Now\" link. Current as of: May 28, 2022               Content Version: 13.3   Healthwise, Incorporated. Care instructions adapted under license by [facility] ([facility]). If you have questions about a medical condition or this instruction, always ask your healthcare professional. [place] [address] any warranty or liability for your use of this information. Patient/Caregiver instructed on use, benefit, and side effects of prescribed medications. All patient/parent/caregiver questions answered. Patient/parent/caregiver voiced understanding. Reviewed health maintenance. Instructed to continue current medications, diet and exercise. Patient agreed with treatment plan. Follow up as directed. Electronically signed by Dolph Bamberger, APRN - NP on7/11/2022

## 2022-07-11 NOTE — PATIENT INSTRUCTIONS
Paxlovid as directed. Please go to the emergency room if you become short of breath, have weakness or extreme fatigue or if you feel you may be dehydrated. You may call the office if it is during normal business hours and request a nurse visit where we check you pulse oximetry/oxygen level. If your level is too low you will be sent to the hospital for further treatment. You are being provided a work or school excuse so you may quarantine until you test results are back. If you are COVID positive you will be given an additional excuse to lengthen your quarantine. Practice coughing and deep breathing every hour while awake. If you are laying down, change positions frequently. Try laying on your stomach to open up your lungs more. If you are allowed to take tylenol or ibuprofen you may take these to relieve fever, chills or body aches. Follow up with primary care provider in 1 to 2 days if needed. Treated with over the counter medications if age appropriate. Patient can use Tylenol or Ibuprofen. Over the counter cough syrup if over the age of 10. Antibiotics are not indicated at the present time. Cough treatment if over the age of 1 - 1 teaspoon of (Local honey if able to find) honey mixed with squeezed  fresh lemon juice. Mix in warm water or take directly. This decreases sore throat pain and reduces cough. May be repeated every 2 hours as needed for cough. Advised to follow up if does not get better or symptoms worsen. Go to Urgent Care of ER if you become dehydrated, have breathing difficulty, or have extreme weakness. Cough may last from 4 to 6 weeks. Patient Education        10 Things to Do When You Have COVID-19      Talk to your doctor right away about treatment. They might have you take medicine to help prevent serious illness. Stay home. Don't go to school, work, or public areas.  And don't use public transportation, ride-shares, or taxis unless you have no choice. Leave your home only if you need to get medical care. But call the doctor's office firstso they know you're coming. And wear a well-fitting mask. Ask before leaving isolation. Follow your doctor's advice about when it is safe for you to leave isolation. If you have questions, go to the [facility] website at cdc.gov to check the [employer]. Wear a well-fitting mask when you are around other people. It can help stop the spread of the virus. Limit contact with people in your home. If possible, stay in a separate bedroom and use a separate bathroom. Cover your mouth and nose with a tissue when you cough or sneeze. Then throw the tissue in the trash right away. Wash your hands often, especially after you cough or sneeze. Use soap and water, and scrub for at least 20 seconds. If soap and wateraren't available, use an alcohol-based hand . Don't share personal household items. These include bedding, towels, cups and glasses, and eating utensils. Clean and disinfect your home every day. Use household  or disinfectant wipes or sprays. If needed, take acetaminophen (Tylenol) or ibuprofen (Advil, Motrin) to relieve fever and body aches. Read and follow all instructions on the label. Current as of: May 28, 2022               Content Version: 13.3  © 2006-2022 [employer]. Care instructions adapted under license by [facility] ([facility]). If you have questions about a medical condition or this instruction, always ask your healthcare professional. [place] [address] any warranty or liability for your use of this information. Patient Education        Learning About Coronavirus [phone])  What is coronavirus (COVID-19)? COVID-19 is a disease caused by a type of coronavirus. This illness was firstfound in December 2019. It has since spread worldwide. Coronaviruses are a large group of viruses.  They cause the common cold. They also cause more serious illnesses like [place] respiratory syndrome (MERS) and severe acute respiratory syndrome (SARS). COVID-19 is caused by a novelcoronavirus. That means it's a new type that has not been seen in people before. What are the symptoms? COVID-19 symptoms may include:   Fever.  Cough.  Trouble breathing.  Chills or repeated shaking with chills.  Muscle and body aches.  Headache.  Sore throat.  New loss of taste or smell.  Vomiting.  Diarrhea. In severe cases, COVID-19 can cause pneumonia and make it hard to breathewithout help from a machine. It can cause death. How is it diagnosed? COVID-19 is diagnosed with a viral test. This may also be called a PCR test or antigen test. It looks for evidence of the virus in your breathing passages orlungs (respiratory system). The test is most often done on a sample from the nose, throat, or lungs. It's sometimes done on a sample of saliva. One way a sample is collected is byputting a long swab into the back of your nose. If you have questions about COVID-19 testing, ask your doctor or go to cdc.govto use the COVID-19 Viral Testing Tool. How is it treated? Mild cases of COVID-19 can be treated at home. Serious cases need treatment in the hospital. Treatment may include medicines, plus breathing support such as oxygen therapy or a ventilator. Some people may be placed on their belly tohelp their oxygen levels. Treatments that may help people who have COVID-19 include:  Antiviral medicines. These medicines treat viral infections. Immune-based therapy. These medicines help the immune system fight COVID-19. Examples include monoclonal antibodies. Blood thinners. These medicines help prevent blood clots. People with severe illness are at risk for blood clots. How can you protect yourself and others?  Stay up to date on your COVID-19 vaccines.  Avoid sick people, and stay away from others if you are sick.    Stay at least 6 feet away from other people.  Avoid crowds, especially inside.  Get tested for COVID-19 before you have an indoor visit with people who don't live with you.  Improve the airflow when you spend time indoors with people who don't live with you. If you can, open windows and doors. Or you can use a fan to blow air away from people and out a window.  Cover your mouth with a tissue when you cough or sneeze.  Wash your hands often, especially after you cough or sneeze. Use soap and water, and scrub for at least 20 seconds. If soap and water aren't available, use an alcohol-based hand .  Avoid touching your mouth, nose, and eyes. Check the CDC website at cdc.gov for the most current information on how to protect yourself. And if you have questions, ask your doctor or go to cdc.govto use the COVID-19 Quarantine and Isolation Calculator. Here are some other steps you may need to take.  If you are not up to date on your COVID-19 vaccines:  ? Wear a mask with the best fit, protection, and comfort for you. A mask can protect you even when others aren't wearing one. This might be especially important if you:  - Have certain health conditions. - Live with someone who has a compromised immune system. - Live with someone who is not up to date on their COVID-19 vaccines.  If you have been exposed to the virus AND are not up to date on your COVID-19 vaccines:  ? Talk to your doctor as soon as you can. Your doctor might have you take medicine to help prevent serious illness. ? Get a COVID-19 test. You may need to be tested more than once. And if your test is positive, follow the instructions below. ? Stay home. Try to separate from other people where you live. Don't go to school, work, or public areas. ? Wear a well-fitting mask around other people for a full 10 days. Avoid travel, and stay away from people at high risk for serious illness. ? Watch for symptoms.    If you have been exposed AND either tested positive for COVID-19 in the last 90 days and have recovered or you are up to date on your COVID-19 vaccines:  ? Talk to your doctor as soon as you can. Your doctor may have you take medicine to help prevent serious illness. ? Get a COVID-19 test. Wait 5 days after you were last exposed. You may need to be tested more than once. And if your test is positive, follow the instructions below. ? Wear a well-fitting mask around other people for a full 10 days. ? Avoid travel and stay away from people at high risk for serious illness. ? Watch for symptoms. ? If you tested positive for COVID-19 in the last 90 days and have not recovered, another COVID-19 test may not be needed.  If you're sick or test positive for COVID-19:  ? Talk to your doctor as soon as you can. Your doctor may have you take medicine to help prevent serious illness. ? Get a COVID-19 test unless you have already been tested. You may need to be tested more than once. ? Stay home. Leave only if you need to get medical care. ? If you were seriously ill or if you have a weakened immune system, you may need to isolate for several weeks. ? For a full 10 days, wear a well-fitting mask whenever you're around other people. ? Avoid travel and stay away from people at high risk for serious illness. ? Limit contact with pets and people in your home. If possible, stay in a separate bedroom and use a separate bathroom. ? Clean and disinfect your home every day. Use household  and disinfectant wipes or sprays. Take special care to clean things that you touch with your hands. How can you self-isolate when you have COVID-19? If you have COVID-19, there are things you can do to help avoid spreading thevirus to others.  Stay home, and avoid contact with other people.  Limit contact with people in your home. If possible, stay in a separate bedroom and use a separate bathroom.    Wear a well-fitting mask when you are around other people.  Avoid contact with pets and other animals.  Cover your mouth and nose with a tissue when you cough or sneeze. Then throw it in the trash right away.  Wash your hands often, especially after you cough or sneeze. Use soap and water, and scrub for at least 20 seconds. If soap and water aren't available, use an alcohol-based hand .  Don't share personal household items. These include bedding, towels, cups and glasses, and eating utensils. [address] in the warmest water allowed for the fabric type, and dry it completely. It's okay to wash other people's laundry with yours.  Clean and disinfect your home. Use household  and disinfectant wipes or sprays. If you have questions, visit cdc.gov to check the Quarantine and IsolationCalculator. When should you call for help? Call 911 anytime you think you may need emergency care. For example, call if you have life-threatening symptoms, such as:     You have severe trouble breathing. (You can't talk at all.)      You have constant chest pain or pressure.      You are severely dizzy or lightheaded.      You are confused or can't think clearly.      You have pale, gray, or blue-colored skin or lips.      You pass out (lose consciousness) or are very hard to wake up.      You have loss of balance or trouble walking.      You have trouble seeing out of one or both eyes.      You have weakness or drooping on one side of the face.      You have weakness or numbness in an arm or a leg.      You have trouble speaking.      You have a severe headache.      You have a seizure. Call your doctor now or seek immediate medical care if:     You have moderate trouble breathing. (You can't speak a full sentence.)      You are coughing up blood.      You have signs of low blood pressure. These include feeling lightheaded; being too weak to stand; and having cold, pale, clammy skin.    Watch closely for changes in your health, and be sure to contact your doctor if:     Your symptoms get worse.      You are not getting better as expected.      You have new or worse symptoms of anxiety, depression, nightmares, or flashbacks. Call before you go to the doctor's office. Follow their instructions. And wear a mask. Where can you learn more? Go to [email or web address] and sign in to your [employer] account. Enter C008 in the [employer] box to learn more about \"Learning About Coronavirus (COVID-19). \"     If you do not have an account, please click on the \"Sign Up Now\" link. Current as of: May 28, 2022               Content Version: 13.3  © 2006-2022 [employer]. Care instructions adapted under license by [facility] ([facility]). If you have questions about a medical condition or this instruction, always ask your healthcare professional. [place] [address] any warranty or liability for your use of this information. Patient Education        Coronavirus ([id number]): Care Instructions  Overview  The coronavirus disease (COVID-19) is caused by a virus. Symptoms may include a fever, a cough, and shortness of breath. It can spread through droplets from coughing and sneezing, breathing, and singing. The virus also can spread whenpeople are in close contact with someone who is infected. Most people have mild symptoms and can take care of themselves at home with medicine to reduce symptoms. Talk to your doctor. They might have you take medicine to help prevent serious illness. If your symptoms get worse, you may need care in a hospital. Treatment may include medicines, plus breathingsupport such as oxygen therapy or a ventilator. It's important to not spread the virus to others. If you have COVID-19, wear a well-fitting mask anytime you are around other people. Isolate yourself whileyou are sick. Leave your home only if you need to get medical care or testing.   Follow-up care is a key part of your treatment and safety. Be sure to make and go to all appointments, and call your doctor if you are having problems. It's also a good idea to know your test results and keep alist of the medicines you take. How can you care for yourself at home?  Get extra rest. It can help you feel better.  Drink plenty of fluids. This helps replace fluids lost from fever. Fluids may also help ease a scratchy throat.  You can take acetaminophen (Tylenol) or ibuprofen (Advil, Motrin) to reduce a fever. It may also help with muscle and body aches. Read and follow all instructions on the label.  Use petroleum jelly on sore skin. This can help if the skin around your nose and lips becomes sore from rubbing a lot with tissues. If you use oxygen, use a water-based product instead of petroleum jelly.  Keep track of symptoms such as fever and shortness of breath. This can help you know if you need to call your doctor. It can also help you know when it's safe to be around other people.  In some cases, your doctor might suggest that you get a pulse oximeter. How can you self-isolate when you have COVID-19? If you have COVID-19, there are things you can do to help avoid spreading thevirus to others.  Stay home, and avoid contact with other people.  Limit contact with people in your home. If possible, stay in a separate bedroom and use a separate bathroom.  Wear a well-fitting mask when you are around other people.  Avoid contact with pets and other animals.  Cover your mouth and nose with a tissue when you cough or sneeze. Then throw it in the trash right away.  Wash your hands often, especially after you cough or sneeze. Use soap and water, and scrub for at least 20 seconds. If soap and water aren't available, use an alcohol-based hand .  Don't share personal household items. These include bedding, towels, cups and glasses, and eating utensils.   [address] in the warmest water allowed for the fabric type, and dry it completely. It's okay to wash other people's laundry with yours.  Clean and disinfect your home. Use household  and disinfectant wipes or sprays. If you have questions, visit cdc.gov to check the Quarantine and IsolationCalculator. When can you end self-isolation for COVID-19? If you know or think that you have the virus, you will need to self-isolate. When you can be around other people you live with and leave home depends on whether you have symptoms. Important: Day 0 is the day your symptoms started or the day you tested positive. Day 1 is the day after your symptoms first started or your test was positive. Isolation starts on Day0. If you have symptoms, you can end isolation at the end of Day 5 if you haven't had a fever for 24 hours while not taking medicines to lower the fever and your symptoms are getting better. If you tested positive but have NO symptoms, you can end isolation at the end of Day 5. But if you start to have symptoms,follow the steps above. Use Day 0 as your first day of symptoms. You may take a COVID-19 test at the end of Day 5. If it is positive, continue to isolate until the end of Day 10. If you have a weakened immune system or areseriously ill, you may need to isolate for up to 20 days. If you have questions, go to the  [facility] website at cdc.gov to check the [employer]. When should you call for help? Call 911 anytime you think you may need emergency care. For example, call if you have life-threatening symptoms, such as:     You have severe trouble breathing.  (You can't talk at all.)      You have constant chest pain or pressure.      You are severely dizzy or lightheaded.      You are confused or can't think clearly.      You have pale, gray, or blue-colored skin or lips.      You pass out (lose consciousness) or are very hard to wake up.      You have loss of balance or trouble walking.      You have trouble seeing out of one or both eyes.      You have weakness or drooping on one side of the face.      You have weakness or numbness in an arm or a leg.      You have trouble speaking.      You have a severe headache.      You have a seizure. Call your doctor now or seek immediate medical care if:     You have moderate trouble breathing. (You can't speak a full sentence.)      You are coughing up blood.      You have signs of low blood pressure. These include feeling lightheaded; being too weak to stand; and having cold, pale, clammy skin. Watch closely for changes in your health, and be sure to contact your doctor if:     Your symptoms get worse.      You are not getting better as expected.      You have new or worse symptoms of anxiety, depression, nightmares, or flashbacks. Call before you go to the doctor's office. Follow their instructions. And wear a mask. Where can you learn more? Go to [email or web address].boolino. org and sign in to your [employer] account. Enter C007 in the [employer] box to learn more about \"Coronavirus (COVID-19): Care Instructions. \"     If you do not have an account, please click on the \"Sign Up Now\" link. Current as of: May 28, 2022               Content Version: 13.3  © 2006-2022 [employer], Incorporated. Care instructions adapted under license by [facility] ([facility]). If you have questions about a medical condition or this instruction, always ask your healthcare professional. [place] [address] any warranty or liability for your use of this information. Patient Education        Learning About Being High-Risk for Serious Illness From COVID-19  Who is at high risk? COVID-19 causes a mild illness in many people who have it. But certain thingsmay increase your risk for more serious illness. These include:   Age. ? Babies born premature or who are less than 3year old may be at high risk. ? The risk also increases with age.  Older adults are at highest risk.  Asthma, cystic fibrosis, chronic obstructive pulmonary disease (COPD), and other chronic lung diseases.  Vaping or smoking or having a history of smoking.  Serious heart conditions, such as heart failure, coronary artery disease, or high blood pressure.  Tuberculosis (TB).  HIV.  A weakened immune system or taking medicines, such as steroids, that suppress the immune system. This also includes medicines taken because of an organ transplant.  Cancer or getting treatment for cancer.  Neurologic conditions or diseases that involve the nerves and brain, such as stroke, dementia, or cerebral palsy.  Being overweight (obesity).  Diabetes.  Chronic kidney disease.  Liver disease.  Substance use disorders.  Sickle cell disease.  Pregnancy or a recent pregnancy.  Genetic, metabolic, or neurologic problems in children. This includes children who may have many health problems that affect many body systems. These problems may limit how well the child can do routine activities of daily life.  Down syndrome.  Mood disorders, such as depression or schizophrenia. Some people have a higher risk of getting very sick or dying from COVID-19 because of where they live or work. The risk can also be higher if people don't have access to health care. This includes people from certain racial and ethnicminority groups, as well as people with disabilities. This is not a complete list. If you have a chronic health problem, ask your doctor if you should take extra precautions. The more of these things you have, the higher your risk for serious illness. Talk with your doctor about ways tomanage your risk. Should you get the COVID-19 vaccine if you have an underlying health problem? The simple answer is yes. The COVID-19 vaccine is safe and effective for almost everyone.  The only people advised not to get it are those who have had a severeallergic reaction to the vaccine's ingredients. Experts recommend getting the vaccine. This is especially true if you have an underlying health problem like diabetes, chronic lung disease, or obesity. Getting infected with COVID-19 can be much worse if you have conditions likethese. If you have a weakened immune system, you may be at higher risk for getting seriously ill with COVID-19. The vaccine may not work as well for you, but itshould still be safe. Talk with your doctor if you have any questions about the vaccine. How can you protect yourself? Follow these guidelines until your doctor tells you it's safe to stop.  Stay up to date on your COVID-19 vaccines.  Talk to your doctor about how many doses you need.  Avoid sick people.  Talk to your doctor to see if you need medicine to lower your risk of getting COVID-19 or getting very sick from COVID-19.  Wear a mask with the best fit, protection, and comfort for you if you go into public areas, especially indoor spaces.  Avoid crowds. And try to stay 6 feet apart from other people. If you have to be in a crowded area, wear a mask. This is important even if you're outside.  Wash your hands often.  Avoid touching your mouth, nose, and eyes.  Improve the airflow when you spend time indoors with people who don't live with you. If you can, open windows and doors. Or you can use a fan to blow air away from people and out a window.  Ask the people you live with or who are in close contact with you to stay up to date on their COVID-19 vaccines.  Ask the people you live with to wear a mask in public areas. This is important even if they're up to date on their COVID-19 vaccines.  Ask people who don't live with you to get a COVID-19 test before visiting with them indoors. If you think you've been exposed, or you're sick, call your doctor as soon asyou can. Your doctor may have you take medicine to help prevent serious illness.   Should you get the COVID-19 vaccine if you are planning a pregnancy, are pregnant, were recently pregnant, or are breastfeeding? Experts recommend getting the COVID-19 vaccine if you are pregnant, were recently pregnant, or are breastfeeding. The vaccine is also recommended if you are planning a pregnancy. Talk to your doctor about getting the vaccine. Other vaccines, like the flu vaccine, are safely given in pregnancy and after pregnancy. There is no evidence that vaccines, including the COVID-19 vaccine, cause fertility problems. The risk of problems from the COVID-19 vaccine should be far smaller than the risks to you and your baby from having the infection. Having COVID-19 while pregnant increases your risk of  labor and stillbirth. So gettingthe COVID-19 vaccine is important for you and your baby. Where can you learn more? Go to https://Spreadknowledge.[employer]wire. org and sign in to your [employer] account. Enter A131 in the [employer] box to learn more about \"Learning About Being High-Risk for Serious Illness From COVID-19. \"     If you do not have an account, please click on the \"Sign Up Now\" link. Current as of: May 28, 2022               Content Version: 13.3  © 8511-9948 Healthwise, [employer]. Care instructions adapted under license by [address] If you have questions about a medical condition or this instruction, always ask your healthcare professional. [address] any warranty or liability for your use of this information.

## 2022-07-14 ENCOUNTER — HOSPITAL ENCOUNTER (OUTPATIENT)
Dept: CT IMAGING | Age: 67
Discharge: HOME | End: 2022-07-16
Attending: SURGERY
Payer: MEDICARE

## 2022-07-14 ENCOUNTER — HOSPITAL ENCOUNTER (OUTPATIENT)
Dept: GENERAL RADIOLOGY | Age: 67
Discharge: HOME | End: 2022-07-16
Attending: SURGERY
Payer: MEDICARE

## 2022-07-14 DIAGNOSIS — Z95.1 S/P CABG (CORONARY ARTERY BYPASS GRAFT): ICD-10-CM

## 2022-07-14 DIAGNOSIS — M54.2 NECK PAIN: ICD-10-CM

## 2022-07-14 DIAGNOSIS — C67.9 UROTHELIAL CARCINOMA OF BLADDER (HCC): ICD-10-CM

## 2022-07-14 DIAGNOSIS — R91.8 LUNG MASS: ICD-10-CM

## 2022-07-14 DIAGNOSIS — M25.511 ACUTE PAIN OF RIGHT SHOULDER: ICD-10-CM

## 2022-07-14 DIAGNOSIS — Z87.891 HISTORY OF TOBACCO USE: ICD-10-CM

## 2022-07-14 PROCEDURE — 71250 CT THORAX DX C-: CPT

## 2022-07-14 PROCEDURE — 73030 X-RAY EXAM OF SHOULDER: CPT

## 2022-07-14 PROCEDURE — 72050 X-RAY EXAM NECK SPINE 4/5VWS: CPT

## 2022-07-14 NOTE — RESULT ENCOUNTER NOTE
I spoke with pt told him about the Lytic lucency in the proximal right humeral diaphysis concerning for neoplastic process.  He understands and told me he will let Dr. Marcos Braga know of this and will make sure he looks at this result as he has an appt with him next week

## 2022-07-19 ENCOUNTER — HOSPITAL ENCOUNTER (OUTPATIENT)
Dept: LAB | Age: 67
Discharge: HOME | End: 2022-07-19
Payer: MEDICARE

## 2022-07-19 ENCOUNTER — OFFICE VISIT (OUTPATIENT)
Dept: ONCOLOGY | Age: 67
End: 2022-07-19
Payer: MEDICARE

## 2022-07-19 VITALS
TEMPERATURE: 97.7 F | DIASTOLIC BLOOD PRESSURE: 80 MMHG | SYSTOLIC BLOOD PRESSURE: 122 MMHG | BODY MASS INDEX: 31.55 KG/M2 | HEIGHT: 69 IN | WEIGHT: 213 LBS | HEART RATE: 62 BPM | OXYGEN SATURATION: 98 %

## 2022-07-19 DIAGNOSIS — Z95.1 S/P CABG (CORONARY ARTERY BYPASS GRAFT): ICD-10-CM

## 2022-07-19 DIAGNOSIS — M89.9 LYTIC LESION OF BONE ON X-RAY: Primary | ICD-10-CM

## 2022-07-19 DIAGNOSIS — C67.9 UROTHELIAL CARCINOMA OF BLADDER (HCC): ICD-10-CM

## 2022-07-19 DIAGNOSIS — M89.9 LYTIC LESION OF BONE ON X-RAY: ICD-10-CM

## 2022-07-19 LAB
ABSOLUTE EOS #: 0.32 K/UL (ref 0–0.44)
ABSOLUTE IMMATURE GRANULOCYTE: 0.13 K/UL (ref 0–0.3)
ABSOLUTE LYMPH #: 3.67 K/UL (ref 1.1–3.7)
ABSOLUTE MONO #: 0.64 K/UL (ref 0.1–1.2)
ANION GAP SERPL CALCULATED.3IONS-SCNC: 9 MMOL/L (ref 9–17)
BASOPHILS # BLD: 1 % (ref 0–2)
BASOPHILS ABSOLUTE: 0.1 K/UL (ref 0–0.2)
BUN BLDV-MCNC: 14 MG/DL (ref 8–23)
BUN/CREAT BLD: 18 (ref 9–20)
CALCIUM SERPL-MCNC: 9.4 MG/DL (ref 8.6–10.4)
CHLORIDE BLD-SCNC: 102 MMOL/L (ref 98–107)
CO2: 26 MMOL/L (ref 20–31)
CREAT SERPL-MCNC: 0.8 MG/DL (ref 0.7–1.2)
EOSINOPHILS RELATIVE PERCENT: 4 % (ref 1–4)
FREE KAPPA/LAMBDA RATIO: 1.36 (ref 0.26–1.65)
GFR AFRICAN AMERICAN: >60 ML/MIN
GFR NON-AFRICAN AMERICAN: >60 ML/MIN
GFR SERPL CREATININE-BSD FRML MDRD: ABNORMAL ML/MIN/{1.73_M2}
GLUCOSE BLD-MCNC: 105 MG/DL (ref 70–99)
HCT VFR BLD CALC: 47.7 % (ref 40.7–50.3)
HEMOGLOBIN: 15.6 G/DL (ref 13–17)
IMMATURE GRANULOCYTES: 2 %
KAPPA FREE LIGHT CHAINS QNT: 1.77 MG/DL (ref 0.37–1.94)
LAMBDA FREE LIGHT CHAINS QNT: 1.3 MG/DL (ref 0.57–2.63)
LYMPHOCYTES # BLD: 42 % (ref 24–43)
MCH RBC QN AUTO: 27.4 PG (ref 25.2–33.5)
MCHC RBC AUTO-ENTMCNC: 32.7 G/DL (ref 25.2–33.5)
MCV RBC AUTO: 83.7 FL (ref 82.6–102.9)
MONOCYTES # BLD: 7 % (ref 3–12)
NRBC AUTOMATED: 0 PER 100 WBC
PDW BLD-RTO: 13.7 % (ref 11.8–14.4)
PLATELET # BLD: 242 K/UL (ref 138–453)
PMV BLD AUTO: 10 FL (ref 8.1–13.5)
POTASSIUM SERPL-SCNC: 4.7 MMOL/L (ref 3.7–5.3)
PROSTATE SPECIFIC ANTIGEN: 0.42 NG/ML
RBC # BLD: 5.7 M/UL (ref 4.21–5.77)
SEG NEUTROPHILS: 44 % (ref 36–65)
SEGMENTED NEUTROPHILS ABSOLUTE COUNT: 3.87 K/UL (ref 1.5–8.1)
SODIUM BLD-SCNC: 137 MMOL/L (ref 135–144)
WBC # BLD: 8.7 K/UL (ref 3.5–11.3)

## 2022-07-19 PROCEDURE — G8417 CALC BMI ABV UP PARAM F/U: HCPCS | Performed by: INTERNAL MEDICINE

## 2022-07-19 PROCEDURE — 84153 ASSAY OF PSA TOTAL: CPT

## 2022-07-19 PROCEDURE — 85025 COMPLETE CBC W/AUTO DIFF WBC: CPT

## 2022-07-19 PROCEDURE — 84155 ASSAY OF PROTEIN SERUM: CPT

## 2022-07-19 PROCEDURE — 99214 OFFICE O/P EST MOD 30 MIN: CPT | Performed by: INTERNAL MEDICINE

## 2022-07-19 PROCEDURE — 36415 COLL VENOUS BLD VENIPUNCTURE: CPT

## 2022-07-19 PROCEDURE — 83883 ASSAY NEPHELOMETRY NOT SPEC: CPT

## 2022-07-19 PROCEDURE — 1123F ACP DISCUSS/DSCN MKR DOCD: CPT | Performed by: INTERNAL MEDICINE

## 2022-07-19 PROCEDURE — 1036F TOBACCO NON-USER: CPT | Performed by: INTERNAL MEDICINE

## 2022-07-19 PROCEDURE — 86334 IMMUNOFIX E-PHORESIS SERUM: CPT

## 2022-07-19 PROCEDURE — 3017F COLORECTAL CA SCREEN DOC REV: CPT | Performed by: INTERNAL MEDICINE

## 2022-07-19 PROCEDURE — 84165 PROTEIN E-PHORESIS SERUM: CPT

## 2022-07-19 PROCEDURE — 80048 BASIC METABOLIC PNL TOTAL CA: CPT

## 2022-07-19 PROCEDURE — G8427 DOCREV CUR MEDS BY ELIG CLIN: HCPCS | Performed by: INTERNAL MEDICINE

## 2022-07-19 NOTE — PROGRESS NOTES
Akua Rodríguez                                                                                                                  7/19/2022  MRN:   [id number]  YOB: 1955  PCP:                           Chavez Tanner DO  Referring Physician: No ref. provider found  Treating Physician Name: Kailee Cooley MD      Reason for visit:  Chief Complaint   Patient presents with    Follow-up     Lung mass post CT   Discussed results of CT scan    Current problems:  Right lung apex mass, 1.8 cm, mildly FDG avid, status post biopsy negative for malignancy  Severe emphysema  Coronary artery s/p CABG  History of tobacco dependence  History of nonmuscle invasive urothelial cancer of bladder    Active and recent treatments:  Active surveillance of lung mass  Work-up for bony lesions/lytic lesion of humerus    Interim History:  Patient presents to the clinic for a follow-up visit. Patient recently had COVID he is now recovering. CT chest shows stable findings. Patient had neck pain and arm pain had a x-ray done which shows a possible bony lucencies or lytic lesion in the humerus. Still has neck pain and arm pain    During this visit patient's allergy, social, medical, surgical history and medications were reviewed and updated. Summary of Case/History:    Akua Rodríguez a 69 y.o.male is a patient with right lung apex mass noted on CT lung screen. Presents to the clinic to establish care and for further work-up and evaluation. Patient has significant history of tobacco dependence more than 35-year pack per day history. Patient quit about 15 years ago. Patient is retired and used to work as a . Patient has been having annual screening lung CT. Last lung CT from October 2020 was unremarkable. Most recent CT lung screen from November 2021 shows a 1.8 cm nodular density in the right lung apex. Additionally also showed severe emphysema throughout the lungs.   There was also stable small right pleural effusion noted. Patient subsequently was referred to oncology service as well as pulmonary team.  He sees pulmonary team to establish care on 12/1/2021. Patient denies any hemoptysis. Denies any weight loss. Denies any chest pain. Past Medical History:   Past Medical History:   Diagnosis Date    Abnormal cardiovascular stress test 12/09/2020    Large inferolateral infarction with significant vernon-infarct ischemia. CAD (coronary artery disease)     CABG-2 vessel, followed by stents x 4    Cervical disc disease     Clostridium difficile diarrhea 10/2014    hospitalized in October 2014    Displacement of cervical intervertebral disc without myelopathy 06/10/2014    [facility], C6/C7     HTN (hypertension)     Hyperlipidemia     Impaired fasting blood sugar     Neck pain     chronic    Neuralgia, neuritis, and radiculitis, unspecified 06/10/2014    [facility], left C7     Shoulder region pain     Left   [facility] injury 01/08/2014    Sprain and strain of unspecified site of shoulder and upper arm 06/10/2014    [facility], left arm     Wears glasses        Past Surgical History:     Past Surgical History:   Procedure Laterality Date    BICEPS TENODESIS Right 11/23/2016    right proximal bicep tenodesis    COLONOSCOPY N/A 12/10/2019    COLONOSCOPY POLYPECTOMY SNARE/COLD BIOPSY performed by Christina Muro MD at [facility]  12/18/2020    Successful PTCA. AGUILA of mid LAD / Patent LIMA however distally occluded and not supplying LAD /Residual high grade stenosis in small LCX    CORONARY ANGIOPLASTY WITH STENT PLACEMENT  01/22/2021    CT NEEDLE BIOPSY LUNG PERCUTANEOUS  12/15/2021    CT NEEDLE BIOPSY LUNG PERCUTANEOUS 12/15/2021 [facility] CT SCAN    CYSTOSCOPY N/A 03/09/2020    CYSTO TURBT performed by Ruddy Taylor MD at [address] [place] [address] N/A 3/25/2022    LAPAROSCOPIC Robotic Assisted Ventral Hernia Repair with mesh WITH LYSIS OF ADHESIONS performed by Marjorie Kang DO at [facility] MANAGEMENT PROCEDURE Left 14, 1/09/15    C7 TFE    PAIN MANAGEMENT PROCEDURE Left 2014    C7 TFE    PAIN MANAGEMENT PROCEDURE Left 2015 , 16    C7 TFE    UT CABG, ARTERY-VEIN, SINGLE N/A 2018    CABG CORONARY ARTERY BYPASS, GARY ERAZO, CHANI  (CSI# [id number]) performed by Mikaela Shanks MD at [facility] CABG (LIMA-LAD, R SVG- RCA, OM too small for the grafts) on 18,    ROTATOR CUFF REPAIR Left 2019- Right    SHOULDER ARTHROSCOPY Right 2016    scope decompressing, rotator cuff repair    TRANSESOPHAGEAL ECHOCARDIOGRAM      UMBILICAL HERNIA REPAIR N/A 2019    Laparoscopic Robotic Assisted Umbilical Hernia Repair W/ MESH performed by Macarena Fong MD at [place] [id number] N/A 2020    Open Incisional Hernia Repair W/ MESH performed by Mima Moncada DO at [facility] OR       Patient Family Social History:     Social History     Socioeconomic History    Marital status:      Spouse name: Kellen Pollard    Number of children: 2    Years of education: None    Highest education level: None   Occupational History     Comment: [employer]   Tobacco Use    Smoking status: Former     Packs/day: 2.00     Years: 25.00     Pack years: 50.00     Types: Cigarettes     Start date: 1975     Quit date: 2008     Years since quittin.5    Smokeless tobacco: Never    Tobacco comments:     Saumya Rivero RRT 16   Vaping Use    Vaping Use: Never used   Substance and Sexual Activity    Alcohol use: No     Alcohol/week: 0.0 standard drinks    Drug use: No    Sexual activity: Yes     Partners: Female     Social Determinants of Health     Financial Resource Strain: Medium Risk    Difficulty of Paying Living Expenses: Somewhat hard   Food Insecurity: No Food Insecurity    Worried About Running Out of Food in the Last Year: Never true    Ran Out of Food in the Last Year: Never true     Family History   Problem Relation Age of Onset    High Blood Pressure Father     Colon Polyps Brother     COPD Mother      Current Medications:     Current Outpatient Medications   Medication Sig Dispense Refill    furosemide (LASIX) 20 MG tablet TAKE 1 TABLET BY MOUTH ONCE DAILY AS NEEDED FOR  LEG  SWELLING 45 tablet 3    clopidogrel (PLAVIX) 75 MG tablet Take 1 tablet by mouth once daily 90 tablet 3    atorvastatin (LIPITOR) 40 MG tablet Take 1 tablet by mouth nightly 90 tablet 3    tamsulosin (FLOMAX) 0.4 MG capsule Take 1 capsule by mouth daily 90 capsule 3    metoprolol tartrate (LOPRESSOR) 25 MG tablet Take 0.5 tablets by mouth 2 times daily 180 tablet 3    aspirin 81 MG chewable tablet Take 81 mg by mouth daily      Cholecalciferol (VITAMIN D) 50 MCG (2000 UT) CAPS capsule Take 2,000 Units by mouth daily      vitamin C (ASCORBIC ACID) 500 MG tablet Take 250 mg by mouth 2 times daily      acetaminophen (TYLENOL) 325 MG tablet Take 2 tablets by mouth every 6 hours as needed for Pain 120 tablet 3    nitroGLYCERIN (NITROSTAT) 0.4 MG SL tablet up to max of 3 total doses. If no relief after 1 dose, call 911. (Patient not taking: Reported on 7/19/2022) 25 tablet 3     No current facility-administered medications for this visit. Allergies:   Chlorhexidine and Vicodin [hydrocodone-acetaminophen]    Review of Systems:    Constitutional: No fever or chills. No night sweats, no weight loss   Eyes: No eye discharge, double vision, or eye pain   HEENT: negative for sore mouth, sore throat, hoarseness and voice change   Respiratory: negative for cough , sputum, dyspnea, wheezing, hemoptysis, chest pain   Cardiovascular: negative for chest pain, dyspnea, palpitations, orthopnea, PND   Gastrointestinal: negative for nausea, vomiting, diarrhea, constipation, abdominal pain, Dysphagia, hematemesis and hematochezia   Genitourinary: negative for frequency, dysuria, nocturia, urinary incontinence, and hematuria   Integument: negative for rash, skin lesions, bruises. Hematologic/Lymphatic: negative for easy bruising, bleeding, lymphadenopathy, or petechiae   Endocrine: negative for heat or cold intolerance,weight changes, change in bowel habits and hair loss   Musculoskeletal: negative for myalgias, arthralgias, pain, joint swelling,and bone pain.   Positive for arm pain  Neurological: negative for headaches, dizziness, seizures, weakness, numbness      Physical Exam:    Vitals: /80 (Site: Left Upper Arm, Position: Sitting, Cuff Size: Large Adult)   Pulse 62   Temp 97.7 °F (36.5 °C) (Temporal)   Ht 5' 9\" (1.753 m)   Wt 213 lb (96.6 kg)   SpO2 98%   BMI 31.45 kg/m²   General appearance - well appearing, no in pain or distress  Mental status - AAO X3  Eyes - pupils equal and reactive, extraocular eye movements intact  Mouth - mucous membranes moist, pharynx normal without lesions  Neck - supple, no significant adenopathy  Lymphatics - no palpable lymphadenopathy, no hepatosplenomegaly  Chest - clear to auscultation, no wheezes, rales or rhonchi, symmetric air entry  Heart - normal rate, regular rhythm, normal S1, S2, no murmurs  Abdomen - soft, nontender, nondistended, no masses or organomegaly  Neurological - alert, oriented, normal speech, no focal findings or movement disorder noted  Extremities - peripheral pulses normal, no pedal edema, no clubbing or cyanosis  Skin - normal coloration and turgor, no rashes, no suspicious skin lesions noted           DATA:    Results for orders placed or performed in visit on 07/11/22   POCT COVID-19 Rapid, NAAT   Result Value Ref Range    SARS-COV-2, RdRp gene Positive (A) Negative    Lot Number [id number]     QC Pass/Fail pass      XR CERVICAL SPINE (4-5 VIEWS)    Result Date: 7/14/2022  EXAMINATION: XRAY VIEWS OF THE CERVICAL SPINE 7/14/2022 9:07 am COMPARISON: March 7, 2014 HISTORY: ORDERING SYSTEM PROVIDED HISTORY: Neck pain TECHNOLOGIST PROVIDED HISTORY: neck pain Reason for Exam: No injury; worsening rt sided neck and shoulder pain FINDINGS: Alignment overall anatomic. No acute fracture. The odontoid is intact as visualized. Mild degenerative change most significant C6-7 with loss disc height endplate spondylosis. Uncovertebral joint spurring C4-5 encroaches the neural foramina bilaterally. Prevertebral soft tissues unremarkable. No acute findings. Multilevel degenerative change. XR SHOULDER RIGHT (MIN 2 VIEWS)    Result Date: 7/14/2022  EXAMINATION: THREE XRAY VIEWS OF THE RIGHT SHOULDER 7/14/2022 9:07 am COMPARISON: June 22, 2016 HISTORY: ORDERING SYSTEM PROVIDED HISTORY: Acute pain of right shoulder TECHNOLOGIST PROVIDED HISTORY: Acute pain of right shoulder Reason for Exam: No injury; worsening rt sided neck and shoulder pain FINDINGS: No acute osseous abnormality. Mild AC and glenohumeral degenerative change. There is a new rounded lytic lucency in the proximal diaphysis of the visualized right humerus present on prior imaging. Fibrotic changes right lung demonstrated. No acute findings. Lytic lucency in the proximal right humeral diaphysis concerning for neoplastic process. Correlation with clinical findings required. CT CHEST WO CONTRAST    Result Date: 7/15/2022  EXAMINATION: CT OF THE CHEST WITHOUT CONTRAST 7/14/2022 8:54 am TECHNIQUE: CT of the chest was performed without the administration of intravenous contrast. Multiplanar reformatted images are provided for review. Automated exposure control, iterative reconstruction, and/or weight based adjustment of the mA/kV was utilized to reduce the radiation dose to as low as reasonably achievable. COMPARISON: CT exams 03/10/2022, 11/09/2021, 10/23/2020. PET-CT 11/24/2021. HISTORY: ORDERING SYSTEM PROVIDED HISTORY: Lung mass TECHNOLOGIST PROVIDED HISTORY: lung mass Reason for Exam: Lung mass right side, h/o previous smoker quit about 15 years ago, s/p cabbage FINDINGS: Mediastinum: Status post CABG. Unchanged subcentimeter mediastinal lymph nodes. No new or enlarging lymph node. No pericardial effusion. Lungs/pleura: Grossly unchanged appearance of triangular-shaped opacity with internal calcification in the posterior right apex. Unchanged partially loculated trace right effusion with associated pleural thickening. No new airspace disease identified. Upper Abdomen: No new findings. Soft Tissues/Bones: No new findings. Stable exam of the chest.  No appreciable change in triangular-shaped partially calcified opacity in the posterior right lung apex. Additional chronic and benign findings, as above. Impression:  Right lung apex mass, 1.8 cm, FDG avid, status post biopsy, negative for malignancy  Severe emphysema  Coronary artery s/p CABG  History of tobacco dependence  History of nonmuscle invasive urothelial cancer of bladder    Plan:  I had a detailed discussion with the patient and personally went over results of lab work-up imaging studies and other relevant clinical data. Discussed results of CT scan overall stable findings. Patient also has a follow-up appointment with pulmonology team coming up. Patient has had negative biopsy and has stable findings. From oncology standpoint we will not be on CT chest  Reviewed results of x-rays shows an incidental finding of lytic lucency in the proximal right humerus. We will order work-up including paraproteinemia profile. Will order bone scan  Patient has history of nonmuscle invasive bladder cancer but it is low-grade noninvasive. Patient last cystoscopy from March was unremarkable  Patient is s/p colonoscopy in 2019  We will see patient back in office with results of above test.      Dahiana Mercado MD      this note is created with the assistance of a speech recognition program.  While intending to generate a document that actually reflects the content of the visit, the document can still have some errors including those of syntax and sound a like substitutions which may escape proof reading. It such instances, actual meaning can be extrapolated by contextual diversion.

## 2022-07-20 LAB
ALBUMIN (CALCULATED): 4.1 G/DL (ref 3.2–5.2)
ALBUMIN PERCENT: 61 % (ref 45–65)
ALPHA 1 PERCENT: 3 % (ref 3–6)
ALPHA 2 PERCENT: 11 % (ref 6–13)
ALPHA-1-GLOBULIN: 0.2 G/DL (ref 0.1–0.4)
ALPHA-2-GLOBULIN: 0.8 G/DL (ref 0.5–0.9)
BETA GLOBULIN: 0.8 G/DL (ref 0.5–1.1)
BETA PERCENT: 12 % (ref 11–19)
GAMMA GLOBULIN %: 13 % (ref 9–20)
GAMMA GLOBULIN: 0.8 G/DL (ref 0.5–1.5)
PATHOLOGIST: NORMAL
PATHOLOGIST: NORMAL
PROTEIN ELECTROPHORESIS, SERUM: NORMAL
SERUM IFX INTERP: NORMAL
TOTAL PROT. SUM,%: 100 % (ref 98–102)
TOTAL PROT. SUM: 6.7 G/DL (ref 6.3–8.2)
TOTAL PROTEIN: 6.7 G/DL (ref 6.4–8.3)

## 2022-07-21 ENCOUNTER — OFFICE VISIT (OUTPATIENT)
Dept: CARDIOLOGY | Age: 67
End: 2022-07-21
Payer: MEDICARE

## 2022-07-21 VITALS
HEART RATE: 71 BPM | SYSTOLIC BLOOD PRESSURE: 126 MMHG | BODY MASS INDEX: 31.84 KG/M2 | DIASTOLIC BLOOD PRESSURE: 66 MMHG | WEIGHT: 215.6 LBS

## 2022-07-21 DIAGNOSIS — I25.10 CORONARY ARTERY DISEASE INVOLVING NATIVE CORONARY ARTERY OF NATIVE HEART WITHOUT ANGINA PECTORIS: Primary | ICD-10-CM

## 2022-07-21 PROCEDURE — G8427 DOCREV CUR MEDS BY ELIG CLIN: HCPCS | Performed by: INTERNAL MEDICINE

## 2022-07-21 PROCEDURE — 99214 OFFICE O/P EST MOD 30 MIN: CPT | Performed by: INTERNAL MEDICINE

## 2022-07-21 PROCEDURE — 93010 ELECTROCARDIOGRAM REPORT: CPT | Performed by: INTERNAL MEDICINE

## 2022-07-21 PROCEDURE — G8417 CALC BMI ABV UP PARAM F/U: HCPCS | Performed by: INTERNAL MEDICINE

## 2022-07-21 PROCEDURE — 93005 ELECTROCARDIOGRAM TRACING: CPT | Performed by: INTERNAL MEDICINE

## 2022-07-21 PROCEDURE — 1036F TOBACCO NON-USER: CPT | Performed by: INTERNAL MEDICINE

## 2022-07-21 PROCEDURE — 1123F ACP DISCUSS/DSCN MKR DOCD: CPT | Performed by: INTERNAL MEDICINE

## 2022-07-21 PROCEDURE — 3017F COLORECTAL CA SCREEN DOC REV: CPT | Performed by: INTERNAL MEDICINE

## 2022-07-21 PROCEDURE — 99213 OFFICE O/P EST LOW 20 MIN: CPT | Performed by: INTERNAL MEDICINE

## 2022-07-21 NOTE — PROGRESS NOTES
di                       Today's Date: 7/21/2022  Patient Name: Kimberly Lim  Patient's age: 69 y.o., 1955          CC: the patient is a 69 y.o.  male is in the office for CAD   10 days ago he contracted COVID-19 infection. He had upper respiratory tract infection symptoms. Also reports mild worsening of his baseline dyspnea since then. Chest imaging were not performed. He did not have to be hospitalized. Currently upper respiratory tract infection symptoms have resolved. No significant lower extremity edema. Has been walking without any decline in his capacity. No recent chest pain or angina. Past Medical History:   has a past medical history of Abnormal cardiovascular stress test, CAD (coronary artery disease), Cervical disc disease, Clostridium difficile diarrhea, Displacement of cervical intervertebral disc without myelopathy, HTN (hypertension), Hyperlipidemia, Impaired fasting blood sugar, Neck pain, Neuralgia, neuritis, and radiculitis, unspecified, Shoulder region pain, Sprain and strain of unspecified site of shoulder and upper arm, and Wears glasses. Past Surgical History:   has a past surgical history that includes Pain management procedure (Left, 8/12/14, 1/09/15); Pain management procedure (Left, 09/23/2014); Pain management procedure (Left, 7/17/2015 , 1/22/16); Shoulder arthroscopy (Right, 04/27/2016); Biceps Tenodesis (Right, 11/23/2016); pr cabg, artery-vein, single (N/A, 07/06/2018); Rotator cuff repair (Left, 01/29/2019); Colonoscopy (N/A, 12/10/2019); Umbilical hernia repair (N/A, 12/18/2019); Cystoscopy (N/A, 03/09/2020); ventral hernia repair (N/A, 11/19/2020); Coronary angioplasty with stent (12/18/2020); Coronary angioplasty with stent (01/22/2021); transesophageal echocardiogram (11/15/2020); CT NEEDLE BIOPSY LUNG PERCUTANEOUS (12/15/2021); and hernia repair (N/A, 3/25/2022).      Home Medications:    Prior to Admission medications    Medication Sig Start Date End Date Taking? Authorizing Provider   metoprolol tartrate (LOPRESSOR) 25 MG tablet Take 0.5 tablets by mouth in the morning and 0.5 tablets before bedtime. 7/21/22  Yes Lucía Cao MD   furosemide (LASIX) 20 MG tablet TAKE 1 TABLET BY MOUTH ONCE DAILY AS NEEDED FOR  LEG  SWELLING 5/16/22  Yes Renato Gipson DO   clopidogrel (PLAVIX) 75 MG tablet Take 1 tablet by mouth once daily 4/11/22  Yes Renato Gipson DO   atorvastatin (LIPITOR) 40 MG tablet Take 1 tablet by mouth nightly 2/14/22  Yes Renato Gipson DO   nitroGLYCERIN (NITROSTAT) 0.4 MG SL tablet up to max of 3 total doses. If no relief after 1 dose, call 911. 12/19/20  Yes PARESH Sagastume NP   aspirin 81 MG chewable tablet Take 81 mg by mouth daily   Yes Historical Provider, MD   Cholecalciferol (VITAMIN D) 50 MCG (2000 UT) CAPS capsule Take 2,000 Units by mouth daily   Yes Historical Provider, MD   vitamin C (ASCORBIC ACID) 500 MG tablet Take 250 mg by mouth 2 times daily   Yes Historical Provider, MD   acetaminophen (TYLENOL) 325 MG tablet Take 2 tablets by mouth every 6 hours as needed for Pain 7/9/18  Yes PARESH Lynn CNP   tamsulosin [facility]) 0.4 MG capsule Take 1 capsule by mouth daily 8/6/21 7/19/22  Charanjit Samson MD       Allergies:  Chlorhexidine and Vicodin [hydrocodone-acetaminophen]    Social History:   reports that he quit smoking about 14 years ago. His smoking use included cigarettes. He started smoking about 47 years ago. He has a 50.00 pack-year smoking history. He has never used smokeless tobacco. He reports that he does not drink alcohol and does not use drugs. Family History:    Family History   Problem Relation Age of Onset    High Blood Pressure Father     Colon Polyps Brother     COPD Mother        REVIEW OF SYSTEMS:  CONSTITUTIONAL:NEGATIVE  HEENT:NEG  Cardiovascular: No chest pain, positive for dyspnea on exertion, No palpitations.  Lower extremity edema: No  RESPIRATORY: NEG  GASTROINTESTINAL: negative  GENITOURINARY:  negative  INTEGUMENT:  negative  MUSCULOSKELETAL:  positive for  pain  NEUROLOGICAL:  negative    PHYSICAL EXAM:      /66 (Site: Right Upper Arm, Position: Sitting)   Pulse 71   Wt 215 lb 9.6 oz (97.8 kg)   BMI 31.84 kg/m²    HEENT: PERRL, no cervical lymphadenopathy. No masses palpable. Cardiovascular: The apical impulse is not displaced  Heart  Sounds:RRR, S4  Jugular venous pulsation Normal  The carotid upstroke is normal  Peripheral pulses are symmetrical and full  Respiratory: Good respiratory effort. On auscultation: clear to auscultation bilaterally. Right lower lobe rales +  Abdomen:  No masses or tenderness  Bowel sounds present  Extremities:   No Cyanosis or Clubbing   Lower extremity edema: Trace bilateral  Skin: Warm and dry    Right groin: soft, non tender and without hematoma. Cardiac data:    EKG: Normal sinus rhythm, old inferoposterior and lateral infarct. No new change from before    TTE 11/15/2020  Not all walls well visualized, cannot comment on specific wall motion. Normal left ventricular diameter. Mild left ventricular hypertrophy. Left ventricular systolic function is mildly reduced. Left ventricular  ejection fraction 45 %. Grade I (mild) left ventricular diastolic dysfunction. Left atrium is mildly dilated. Normal structure and function of other valves. Aortic root is mildly dilated at 4.3 cm. Nuclear stress test 12/9/2020  1. Large inferolateral infarction with significant vernon-infarct ischemia. 2.  LVEF 46%. Cardiac cath 12/18/2020   1. Severe Multivessel CAD   2. Patent LIMA however distally occluded and not supplying LAD   3. Occluded SVG-RCA with left to right Collaterals   4. Successful PTCA. AGUILA of mid LAD   5. Residual high grade stenosis in small LCX   6. Normal LV systolic function.       Cardiac cath 1/22/21  Successful staged PCI to Left circumflex and OM2  Patent LAD stents with mild non-obstructive disease      Labs: CBC:   Recent Labs     07/19/22  0858   WBC 8.7   HGB 15.6   HCT 47.7        BMP:   Recent Labs     07/19/22  0858      K 4.7   CO2 26   BUN 14   CREATININE 0.80   LABGLOM >60   GLUCOSE 105*     PT/INR: No results for input(s): PROTIME, INR in the last 72 hours.   FASTING LIPID PANEL:  Lab Results   Component Value Date/Time    HDL 46 06/24/2022 08:19 AM    TRIG 109 06/24/2022 08:19 AM       Assessment:    MVCAD S/P CABG (LIMA-LAD, R SVG- RCA, OM too small for the grafts) on 7/6/18, repeat angiogram 12/18/2020 showed distally occluded LIMA and SVG, post PCI-LAD and staged PCI-LCX/OM  Mild ischemic CMP, LVEF 45%  Pulmonary edema post incisional hernia repair, resolved with diuretics   HTN  HPL  Obesity  COVID-19 infection (07/2022)   Right lung apical mass, 1.8 cm, biopsy negative for malignancy       Patient Active Problem List   Diagnosis    Cervical radiculitis    Umbilical hernia    HTN (hypertension)    Impaired fasting blood sugar    Displacement of cervical intervertebral disc without myelopathy    Rotator cuff tear    CAD in native artery    S/P CABG (coronary artery bypass graft)    Hyperlipidemia    Other emphysema ([facility] [address]    Positive colorectal cancer screening using DNA-based stool test    Epigastric hernia    Urothelial carcinoma of bladder (HCC)    Post-op pain    Incisional hernia, without obstruction or gangrene    S/P PTCA (percutaneous transluminal coronary angioplasty)    S/P cardiac cath    Recurrent abdominal hernia without obstruction or gangrene    Ventral hernia without obstruction or gangrene    Recurrent incisional hernia       Recommendations:  Continue aspirin, plavix, and high intensity statin for secondary prophylaxis  Continue lopressor to 12.5 mg bid   Lasix 20 mg three times a week and increase to daily if needed  F/u with pulmonary and oncology as scheduled   Patient to continue low-salt diet and fluid restriction  Aggressive risk factor modification discussed with patient. Routine follow-up in 4 months or earlier if needed.       Ezequiel Maya MD, [address] Cardiology Consult           [phone]

## 2022-07-25 ENCOUNTER — HOSPITAL ENCOUNTER (OUTPATIENT)
Dept: NUCLEAR MEDICINE | Age: 67
Discharge: HOME | End: 2022-07-27
Payer: MEDICARE

## 2022-07-25 ENCOUNTER — HOSPITAL ENCOUNTER (OUTPATIENT)
Dept: PULMONOLOGY | Age: 67
Discharge: HOME | End: 2022-07-25
Payer: MEDICARE

## 2022-07-25 DIAGNOSIS — C67.9 UROTHELIAL CARCINOMA OF BLADDER (HCC): ICD-10-CM

## 2022-07-25 DIAGNOSIS — M89.9 LYTIC LESION OF BONE ON X-RAY: ICD-10-CM

## 2022-07-25 DIAGNOSIS — J44.9 COPD, SEVERITY TO BE DETERMINED (HCC): ICD-10-CM

## 2022-07-25 DIAGNOSIS — Z95.1 S/P CABG (CORONARY ARTERY BYPASS GRAFT): ICD-10-CM

## 2022-07-25 LAB
DLCO %PRED: NORMAL
DLCO PRED: NORMAL
DLCO/VA %PRED: NORMAL
DLCO/VA PRED: NORMAL
DLCO/VA: NORMAL
DLCO: NORMAL
EXPIRATORY TIME-POST: NORMAL
EXPIRATORY TIME: NORMAL
FEF 25-75% %CHNG: NORMAL
FEF 25-75% %PRED-POST: NORMAL
FEF 25-75% %PRED-PRE: NORMAL
FEF 25-75% PRED: NORMAL
FEF 25-75%-POST: NORMAL
FEF 25-75%-PRE: NORMAL
FEV1 %PRED-POST: NORMAL
FEV1 %PRED-PRE: NORMAL
FEV1 PRED: NORMAL
FEV1-POST: NORMAL
FEV1-PRE: NORMAL
FEV1/FVC %PRED-POST: NORMAL
FEV1/FVC %PRED-PRE: NORMAL
FEV1/FVC PRED: NORMAL
FEV1/FVC-POST: NORMAL
FEV1/FVC-PRE: NORMAL
FVC %PRED-POST: NORMAL
FVC %PRED-PRE: NORMAL
FVC PRED: NORMAL
FVC-POST: NORMAL
FVC-PRE: NORMAL
GAW %PRED: NORMAL
GAW PRED: NORMAL
GAW: NORMAL
IC %PRED: NORMAL
IC PRED: NORMAL
IC: NORMAL
MEP: NORMAL
MIP: NORMAL
MVV %PRED-PRE: NORMAL
MVV PRED: NORMAL
MVV-PRE: NORMAL
PEF %PRED-POST: NORMAL
PEF %PRED-PRE: NORMAL
PEF PRED: NORMAL
PEF%CHNG: NORMAL
PEF-POST: NORMAL
PEF-PRE: NORMAL
RAW %PRED: NORMAL
RAW PRED: NORMAL
RAW: NORMAL
RV %PRED: NORMAL
RV PRED: NORMAL
RV: NORMAL
SVC %PRED: NORMAL
SVC PRED: NORMAL
SVC: NORMAL
TLC %PRED: NORMAL
TLC PRED: NORMAL
TLC: NORMAL
VA %PRED: NORMAL
VA PRED: NORMAL
VA: NORMAL
VTG %PRED: NORMAL
VTG PRED: NORMAL
VTG: NORMAL

## 2022-07-25 PROCEDURE — 94060 EVALUATION OF WHEEZING: CPT

## 2022-07-25 PROCEDURE — 94726 PLETHYSMOGRAPHY LUNG VOLUMES: CPT

## 2022-07-25 PROCEDURE — 3430000000 HC RX DIAGNOSTIC RADIOPHARMACEUTICAL: Performed by: INTERNAL MEDICINE

## 2022-07-25 PROCEDURE — 6370000000 HC RX 637 (ALT 250 FOR IP): Performed by: INTERNAL MEDICINE

## 2022-07-25 PROCEDURE — 78306 BONE IMAGING WHOLE BODY: CPT | Performed by: INTERNAL MEDICINE

## 2022-07-25 PROCEDURE — A9503 TC99M MEDRONATE: HCPCS | Performed by: INTERNAL MEDICINE

## 2022-07-25 PROCEDURE — 94640 AIRWAY INHALATION TREATMENT: CPT

## 2022-07-25 PROCEDURE — 94729 DIFFUSING CAPACITY: CPT

## 2022-07-25 RX ORDER — ALBUTEROL SULFATE 90 UG/1
4 AEROSOL, METERED RESPIRATORY (INHALATION) ONCE
Status: COMPLETED | OUTPATIENT
Start: 2022-07-25 | End: 2022-07-25

## 2022-07-25 RX ORDER — TC 99M MEDRONATE 20 MG/10ML
25 INJECTION, POWDER, LYOPHILIZED, FOR SOLUTION INTRAVENOUS
Status: COMPLETED | OUTPATIENT
Start: 2022-07-25 | End: 2022-07-25

## 2022-07-25 RX ADMIN — ALBUTEROL SULFATE 4 PUFF: 90 AEROSOL, METERED RESPIRATORY (INHALATION) at 13:16

## 2022-07-25 RX ADMIN — TC 99M MEDRONATE 25 MILLICURIE: 20 INJECTION, POWDER, LYOPHILIZED, FOR SOLUTION INTRAVENOUS at 12:43

## 2022-07-26 ENCOUNTER — PROCEDURE VISIT (OUTPATIENT)
Dept: SURGERY | Age: 67
End: 2022-07-26
Payer: MEDICARE

## 2022-07-26 VITALS
WEIGHT: 214 LBS | HEART RATE: 72 BPM | BODY MASS INDEX: 31.7 KG/M2 | SYSTOLIC BLOOD PRESSURE: 120 MMHG | HEIGHT: 69 IN | TEMPERATURE: 98.1 F | DIASTOLIC BLOOD PRESSURE: 74 MMHG

## 2022-07-26 DIAGNOSIS — S80.12XA HEMATOMA OF LEFT LOWER LEG: Primary | ICD-10-CM

## 2022-07-26 PROCEDURE — G8417 CALC BMI ABV UP PARAM F/U: HCPCS | Performed by: SURGERY

## 2022-07-26 PROCEDURE — 99213 OFFICE O/P EST LOW 20 MIN: CPT | Performed by: SURGERY

## 2022-07-26 PROCEDURE — G8427 DOCREV CUR MEDS BY ELIG CLIN: HCPCS | Performed by: SURGERY

## 2022-07-26 PROCEDURE — 3017F COLORECTAL CA SCREEN DOC REV: CPT | Performed by: SURGERY

## 2022-07-26 PROCEDURE — 1123F ACP DISCUSS/DSCN MKR DOCD: CPT | Performed by: SURGERY

## 2022-07-26 PROCEDURE — 1036F TOBACCO NON-USER: CPT | Performed by: SURGERY

## 2022-07-26 NOTE — PROGRESS NOTES
Boyd Chakraborty is a 69 y.o. male who presents today for further evaluation of a lump on the lateral left leg. Patient reports that several months ago he struck his leg with a car door and soon after developed large swollen area that was bruised and measured approximately 4 to 5 cm and stood a couple centimeters away from the surrounding skin. He had no drainage from the area at that time and it never bled or drained anything. He had just been monitoring conservatively and eventually the bruising went away and the swelling has decreased but when he saw his PCP last still had some swelling at the area. There was concern that this could be mass and was referred to general surgery. On discussion today he he states that the area of swelling has continued to decrease in size and denies any pain with palpation in the area has not had any other symptoms. The area of this swelling is in the same spot where he reports he had the large bruise previously. No other complaints today. Past Medical History:   Diagnosis Date    Abnormal cardiovascular stress test 12/09/2020    Large inferolateral infarction with significant vernon-infarct ischemia.     CAD (coronary artery disease)     CABG-2 vessel, followed by stents x 4    Cervical disc disease     Clostridium difficile diarrhea 10/2014    hospitalized in October 2014    Displacement of cervical intervertebral disc without myelopathy 06/10/2014    [facility], C6/C7     HTN (hypertension)     Hyperlipidemia     Impaired fasting blood sugar     Neck pain     chronic    Neuralgia, neuritis, and radiculitis, unspecified 06/10/2014    [facility], left C7     Shoulder region pain     Left   [facility] injury 01/08/2014    Sprain and strain of unspecified site of shoulder and upper arm 06/10/2014    [facility], left arm     Wears glasses        Past Surgical History:   Procedure Laterality Date    BICEPS TENODESIS Right 11/23/2016    right proximal bicep tenodesis    COLONOSCOPY N/A 12/10/2019    COLONOSCOPY POLYPECTOMY SNARE/COLD BIOPSY performed by Declan Brewer MD [facility]  12/18/2020    Successful PTCA. AGUILA of mid LAD / Patent LIMA however distally occluded and not supplying LAD /Residual high grade stenosis in small LCX    CORONARY ANGIOPLASTY WITH STENT PLACEMENT  01/22/2021    CT NEEDLE BIOPSY LUNG PERCUTANEOUS  12/15/2021    CT NEEDLE BIOPSY LUNG PERCUTANEOUS 12/15/2021 [facility] CT SCAN    CYSTOSCOPY N/A 03/09/2020    CYSTO TURBT performed by Tawana Matt MD at [place] [address] N/A 3/25/2022    LAPAROSCOPIC Robotic Assisted Ventral Hernia Repair with mesh WITH LYSIS OF ADHESIONS performed by Cynthia Rudd DO at [address] [place] [address] Left 8/12/14, 1/09/15    C7 TFE    PAIN MANAGEMENT PROCEDURE Left 09/23/2014    C7 TFE    PAIN MANAGEMENT PROCEDURE Left 7/17/2015 , 1/22/16    C7 TFE    FL CABG, ARTERY-VEIN, SINGLE N/A 07/06/2018    CABG CORONARY ARTERY BYPASS, GARY ERAZO, CHANI  (CSI# [id number]) performed by Silvio Ornelas MD at [facility] CABG (LIMA-LAD, R SVG- RCA, OM too small for the grafts) on 7/6/18,    ROTATOR CUFF REPAIR Left 01/29/2019 2015- Right    SHOULDER ARTHROSCOPY Right 04/27/2016    scope decompressing, rotator cuff repair    TRANSESOPHAGEAL ECHOCARDIOGRAM  12/34/2011    UMBILICAL HERNIA REPAIR N/A 12/18/2019    Laparoscopic Robotic Assisted Umbilical Hernia Repair W/ MESH performed by Declan Brewer MD at [address] [place] [address] N/A 11/19/2020    Open Incisional Hernia Repair W/ MESH performed by Cynthia Rudd DO at [facility]       Current Outpatient Medications   Medication Sig Dispense Refill    metoprolol tartrate (LOPRESSOR) 25 MG tablet Take 0.5 tablets by mouth in the morning and 0.5 tablets before bedtime.  180 tablet 3    furosemide (LASIX) 20 MG tablet TAKE 1 TABLET BY MOUTH ONCE DAILY AS NEEDED FOR  LEG  SWELLING 45 tablet 3    clopidogrel (PLAVIX) 75 MG tablet Take 1 Insecurity    Worried About Running Out of Food in the Last Year: Never true    Ran Out of Food in the Last Year: Never true   Transportation Needs: Not on file   Physical Activity: Not on file   Stress: Not on file   Social Connections: Not on file   Intimate Partner Violence: Not on file   Housing Stability: Not on file       ROS:   Review of Systems - General ROS: negative  Psychological ROS: negative  Ophthalmic ROS: negative  ENT ROS: negative  Respiratory ROS: no cough, shortness of breath, or wheezing  Cardiovascular ROS: no chest pain or dyspnea on exertion  Gastrointestinal ROS: no abdominal pain, change in bowel habits, or black or bloody stools  Genito-Urinary ROS: no dysuria, trouble voiding, or hematuria  Musculoskeletal ROS: negative  Dermatological ROS: per HPI      Objective   Vitals:    07/26/22 0849   BP: 120/74   Pulse: 72   Temp: 98.1 °F (36.7 °C)     General:in no apparent distress  Eyes: No gross abnormalities. Ears, Nose, Throat: hearing grossly normal bilaterally  Neck: neck supple and non tender without mass  Lungs: clear to auscultation without wheezes or rales   Heart: S1S2, no mumurs, RRR  Abdomen: soft, nontender, no HSM, no guarding, no rebound, no masses  Extremity: The lateral aspect of the left lower leg about mid lower leg there is a small lump that measures approximately a centimeter in size that is palpable with exam but is not noticeable visually. There is no palpation in the area. No surrounding tissue changes. Neuro: CN II-XII grossly intact      1. Hematoma of left lower leg  Assessment & Plan:  The patient had a hematoma that first occurred several months ago. It seems that the small lump that he has today is likely resolving hematoma and has decreased in size even from the time of the referral.    Dylon today certainly there is not anything that I see that requires any surgical intervention in the small lump on his leg is not causing him any symptoms.   No interventions planned. May follow-up with PCP as needed.          (Please note that portions of this note were completed with a voice recognition program.  Efforts were made to edit the dictations but occasionally words are mis-transcribed.)

## 2022-07-26 NOTE — ASSESSMENT & PLAN NOTE
The patient had a hematoma that first occurred several months ago. It seems that the small lump that he has today is likely resolving hematoma and has decreased in size even from the time of the referral.    Dylon today certainly there is not anything that I see that requires any surgical intervention in the small lump on his leg is not causing him any symptoms. No interventions planned. May follow-up with PCP as needed.

## 2022-07-27 ENCOUNTER — OFFICE VISIT (OUTPATIENT)
Dept: PULMONOLOGY | Age: 67
End: 2022-07-27
Payer: MEDICARE

## 2022-07-27 VITALS
HEIGHT: 69 IN | OXYGEN SATURATION: 98 % | SYSTOLIC BLOOD PRESSURE: 126 MMHG | BODY MASS INDEX: 31.7 KG/M2 | TEMPERATURE: 97 F | HEART RATE: 74 BPM | WEIGHT: 214 LBS | DIASTOLIC BLOOD PRESSURE: 70 MMHG

## 2022-07-27 DIAGNOSIS — J90 PLEURAL EFFUSION ON RIGHT: ICD-10-CM

## 2022-07-27 DIAGNOSIS — R91.1 NODULE OF UPPER LOBE OF RIGHT LUNG: Primary | ICD-10-CM

## 2022-07-27 DIAGNOSIS — J43.1 PANLOBULAR EMPHYSEMA (HCC): ICD-10-CM

## 2022-07-27 DIAGNOSIS — J44.9 CHRONIC OBSTRUCTIVE PULMONARY DISEASE, UNSPECIFIED COPD TYPE (HCC): ICD-10-CM

## 2022-07-27 PROCEDURE — G8427 DOCREV CUR MEDS BY ELIG CLIN: HCPCS | Performed by: INTERNAL MEDICINE

## 2022-07-27 PROCEDURE — 3023F SPIROM DOC REV: CPT | Performed by: INTERNAL MEDICINE

## 2022-07-27 PROCEDURE — 99214 OFFICE O/P EST MOD 30 MIN: CPT | Performed by: INTERNAL MEDICINE

## 2022-07-27 PROCEDURE — 1036F TOBACCO NON-USER: CPT | Performed by: INTERNAL MEDICINE

## 2022-07-27 PROCEDURE — 3017F COLORECTAL CA SCREEN DOC REV: CPT | Performed by: INTERNAL MEDICINE

## 2022-07-27 PROCEDURE — 1123F ACP DISCUSS/DSCN MKR DOCD: CPT | Performed by: INTERNAL MEDICINE

## 2022-07-27 PROCEDURE — G8417 CALC BMI ABV UP PARAM F/U: HCPCS | Performed by: INTERNAL MEDICINE

## 2022-07-27 PROCEDURE — 99213 OFFICE O/P EST LOW 20 MIN: CPT | Performed by: INTERNAL MEDICINE

## 2022-07-30 NOTE — PROGRESS NOTES
REASON FOR THE CONSULTATION:  Chief Complaint   Patient presents with    COPD     3 month follow up    Pulmonary Nodule     Post CT      HISTORY OF PRESENT ILLNESS:    Elver Skelton is a 69y.o. year old male   Imaging reviewed with patient. No cough , no fever , no hemoptysis , clear sputum . No chest pain , no orthopnea , no PND   No nausea or abdominal pain . Last visit  here for evaluation of no sob with exertion , no hemoptysis , no sputum . No inhalers   No hx of bibi   He was found to have abnormal lung nodule in the right apex on CT lung screening, this prompted a PET CT scan which showed a mild SUV of 3.3 without distant metastasis. Following this he had CT-guided biopsy which was showing chronic inflammation without malignant cells.   Referred here for opinion regarding surgical resection versus serial follow-up CT chest                   Immunization   Immunization History   Administered Date(s) Administered    COVID-19, PFIZER PURPLE top, DILUTE for use, (age 15 y+), 30mcg/0.3mL 02/23/2021, 03/16/2021, 10/07/2021, 05/25/2022    DTaP 10/15/2016    DTaP vaccine 10/15/2016    Fluvirin 4 years and over 11/03/2015    Influenza Vaccine, unspecified formulation 11/03/2015, 10/15/2016    Influenza Virus Vaccine 11/03/2015    Influenza Whole 11/14/2012    Influenza, High Dose (Fluzone 65 yrs and older) 09/23/2017, 09/23/2017, 10/06/2018, 11/18/2019, 10/12/2020    Influenza, High-dose, Luis Angel Sebastian, 65 yrs +, IM (Fluzone) 10/06/2020    Influenza, Quadv, IM, PF (6 mo and older Fluzone, Flulaval, Fluarix, and 3 yrs and older Afluria) 09/23/2017    Influenza, Quadv, adjuvanted, 65 yrs +, IM, PF (Fluad) 10/15/2021    Influenza, Triv, inactivated, subunit, adjuvanted, IM (Fluad 65 yrs and older) 10/14/2018    Pneumococcal Conjugate 13-valent ([id number]) 10/06/2018    Pneumococcal Polysaccharide ([id number]) 10/14/2019    Tdap (Boostrix, Adacel) 10/22/2016    Zoster Live (Zostavax) 11/03/2015            PAST MEDICAL HISTORY:       Diagnosis Date    Abnormal cardiovascular stress test 12/09/2020    Large inferolateral infarction with significant vernon-infarct ischemia. CAD (coronary artery disease)     CABG-2 vessel, followed by stents x 4    Cervical disc disease     Clostridium difficile diarrhea 10/2014    hospitalized in October 2014    Displacement of cervical intervertebral disc without myelopathy 06/10/2014    [facility], C6/C7     HTN (hypertension)     Hyperlipidemia     Impaired fasting blood sugar     Neck pain     chronic    Neuralgia, neuritis, and radiculitis, unspecified 06/10/2014    [facility], left C7     Shoulder region pain     Left   [facility] injury 01/08/2014    Sprain and strain of unspecified site of shoulder and upper arm 06/10/2014    [facility], left arm     Wears glasses          Family History:       Problem Relation Age of Onset    High Blood Pressure Father     Colon Polyps Brother     COPD Mother        SURGICAL HISTORY:   Past Surgical History:   Procedure Laterality Date    BICEPS TENODESIS Right 11/23/2016    right proximal bicep tenodesis    COLONOSCOPY N/A 12/10/2019    COLONOSCOPY POLYPECTOMY SNARE/COLD BIOPSY performed by Anam Lubin MD at [place] [address]  12/18/2020    Successful PTCA. AGUILA of mid LAD / Patent LIMA however distally occluded and not supplying LAD /Residual high grade stenosis in small LCX    CORONARY ANGIOPLASTY WITH STENT PLACEMENT  01/22/2021    CT NEEDLE BIOPSY LUNG PERCUTANEOUS  12/15/2021    CT NEEDLE BIOPSY LUNG PERCUTANEOUS 12/15/2021 [facility] CT SCAN    CYSTOSCOPY N/A 03/09/2020    CYSTO TURBT performed by Shabana Hutchinson MD at [address] [place] [address] N/A 3/25/2022    LAPAROSCOPIC Robotic Assisted Ventral Hernia Repair with mesh WITH LYSIS OF ADHESIONS performed by Lorena Ramsay DO at [facility] Left 8/12/14, 1/09/15    C7 TFE    PAIN MANAGEMENT PROCEDURE Left 09/23/2014    C7 TFE    PAIN MANAGEMENT PROCEDURE Left 7/17/2015 , 1/22/16    C7 TFE    IN CABG, ARTERY-VEIN, SINGLE N/A 07/06/2018    CABG CORONARY ARTERY BYPASS, GARY ERAZO CHANI  (CSI# [id number]) performed by Arcelia Alcantar MD at [facility] CABG (LIMA-LAD, R SVG- RCA, OM too small for the grafts) on 7/6/18,    ROTATOR CUFF REPAIR Left 01/29/2019 2015- Right    SHOULDER ARTHROSCOPY Right 04/27/2016    scope decompressing, rotator cuff repair    TRANSESOPHAGEAL ECHOCARDIOGRAM  54/20/4133    UMBILICAL HERNIA REPAIR N/A 12/18/2019    Laparoscopic Robotic Assisted Umbilical Hernia Repair W/ MESH performed by Anup Valadez MD at [facility] N/A 11/19/2020    Open Incisional Hernia Repair W/ MESH performed by Jose Roberto Knight DO at [facility]:      There  No history of TB or TB exposure. There  Yes  asbestos ,[place] dust exposure. The patient reports  No coal mine, [place], [place], [facility] exposure. History of travel outside the country No  There  is use of   marijuana No   VapingNo  IV heroinNo  Crack cocaineNo  There  Nohot tub exposure. Pets -cats Yes, dogsNo  Birds No    Occupational history  , farm      TOBACCO:   reports that he quit smoking about 14 years ago. His smoking use included cigarettes. He started smoking about 47 years ago. He has a 50.00 pack-year smoking history. He has never used smokeless tobacco.  ETOH:   reports no history of alcohol use. ALLERGIES:      Allergies   Allergen Reactions    Chlorhexidine Rash    Vicodin [Hydrocodone-Acetaminophen] Nausea Only     Stomach upset         Home Meds:   Prior to Admission medications    Medication Sig Start Date End Date Taking? Authorizing Provider   metoprolol tartrate (LOPRESSOR) 25 MG tablet Take 0.5 tablets by mouth in the morning and 0.5 tablets before bedtime.  7/21/22  Yes Antonio Beckford MD   furosemide (LASIX) 20 MG tablet TAKE 1 TABLET BY MOUTH ONCE DAILY AS NEEDED FOR  LEG SWELLING 5/16/22  Yes Renato Gipson DO   clopidogrel (PLAVIX) 75 MG tablet Take 1 tablet by mouth once daily 4/11/22  Yes Renato Gipson DO   atorvastatin (LIPITOR) 40 MG tablet Take 1 tablet by mouth nightly 2/14/22  Yes Renato Gipson DO   nitroGLYCERIN (NITROSTAT) 0.4 MG SL tablet up to max of 3 total doses. If no relief after 1 dose, call 911. 12/19/20  Yes PARESH Sheppard - NP   aspirin 81 MG chewable tablet Take 81 mg by mouth daily   Yes Historical Provider, MD   Cholecalciferol (VITAMIN D) 50 MCG (2000 UT) CAPS capsule Take 2,000 Units by mouth daily   Yes Historical Provider, MD   vitamin C (ASCORBIC ACID) 500 MG tablet Take 250 mg by mouth 2 times daily   Yes Historical Provider, MD   acetaminophen (TYLENOL) 325 MG tablet Take 2 tablets by mouth every 6 hours as needed for Pain 7/9/18  Yes PARESH Hanna - CNP   tamsulosin [facility]) 0.4 MG capsule Take 1 capsule by mouth daily 8/6/21 7/19/22  German Loyola MD            Review of Systems -  General ROS: negative for - chills, fatigue, fever or weight loss  ENT ROS: negative for - headaches, oral lesions or sore throat  Cardiovascular ROS: no chest pain , orthopnea or pnd   Gastrointestinal ROS: no abdominal pain, change in bowel habits, or black or bloody stools  Skin - no rash   Neuro - no blurry vision , no loc .  No focal weakness   msk - no jt tenderness or swelling    Vascular - no claudication , rest completed and negative   Lymphatic - complete and negative   Hematology - oncology - complete and negative   Allergy immunology - complete and negative    no burning or hematuria    Vitals:  /70 (Site: Right Upper Arm, Position: Sitting, Cuff Size: Large Adult)   Pulse 74   Temp 97 °F (36.1 °C)   Ht 5' 9\" (1.753 m)   Wt 214 lb (97.1 kg)   SpO2 98%   BMI 31.60 kg/m²     PHYSICAL EXAM:  Head and neck atraumatic, normocephalic    Lymph nodes-no cervical, supraclavicular lymphadenopathy    Neck-no JVP elevation    Lungs - Ventilating all lobes without any rales, rhonchi, wheezes or dullness. No bronchial breath sounds. Chest expansion equal bilaterally    CVS- S1, S2 regular. No S3 no S4, no murmurs    Abdomen-nontender, nondistended. Bowel sounds are present. No organomegaly    Lower extremity-no edema    Upper extremity-no edema    Neurological-grossly normal cranial nerves. No overt motor deficit             CBC: No results for input(s): WBC, HGB, PLT in the last 72 hours. BMP:  No results for input(s): NA, K, CL, CO2, BUN, CREATININE, GLUCOSE in the last 72 hours. Hepatic: No results for input(s): AST, ALT, ALB, BILITOT, ALKPHOS in the last 72 hours. Amylase:   Lab Results   Component Value Date/Time    AMYLASE 50 09/29/2014 12:25 PM     Lipase:   Lab Results   Component Value Date/Time    LIPASE 42 09/29/2014 12:25 PM     Troponin: No results for input(s): TROPONINI in the last 72 hours. BNP: No results for input(s): BNP in the last 72 hours. Lipids: No results for input(s): CHOL, HDL in the last 72 hours. Invalid input(s): LDLCALCU  ABGs: No results found for: PHART, PO2ART, ELT3TOS  INR: No results for input(s): INR in the last 72 hours. Thyroid:   Lab Results   Component Value Date/Time    TSH 3.71 10/11/2021 08:22 AM     Urinalysis: No results for input(s): BACTERIA, BLOODU, CLARITYU, COLORU, PHUR, PROTEINU, RBCUA, SPECGRAV, BILIRUBINUR, NITRU, WBCUA, LEUKOCYTESUR, GLUCOSEU in the last 72 hours. CT chest 3/10/2022  Impression   Changes of pulmonary emphysema primarily in both upper lobes and right middle   lobe. A small right pleural effusion was noted. A pleural based 2 cm   irregular nodule or pleural thickening was identified posteriorly in the   right apex. It is unchanged from the CT chest of 12/15/2021. CT chest 7/14/2022     COMPARISON:   CT exams 03/10/2022, 11/09/2021, 10/23/2020. PET-CT 11/24/2021.        HISTORY:   ORDERING SYSTEM PROVIDED HISTORY: Lung mass   TECHNOLOGIST PROVIDED HISTORY:   lung mass Reason for Exam: Lung mass right side, h/o previous smoker quit about 15   years ago, s/p cabbage       FINDINGS:   Mediastinum: Status post CABG. Unchanged subcentimeter mediastinal lymph   nodes. No new or enlarging lymph node. No pericardial effusion. Lungs/pleura: Grossly unchanged appearance of triangular-shaped opacity with   internal calcification in the posterior right apex. Unchanged partially   loculated trace right effusion with associated pleural thickening. No new   airspace disease identified. Upper Abdomen: No new findings. Soft Tissues/Bones: No new findings. Impression   Stable exam of the chest.  No appreciable change in triangular-shaped   partially calcified opacity in the posterior right lung apex. Additional   chronic and benign findings, as above. IMPRESSION:     Diagnosis Orders   1. Nodule of upper lobe of right lung  CT CHEST LOW DOSE (LDCT)      2. Panlobular emphysema ([address]        3. Pleural effusion on right chronic seen in 2018 - hx of asbestos exposure         4. Chronic obstructive pulmonary disease, unspecified COPD type ([address]             I reviewed CT scan imaging going back to 2018, PET CT scan and CT-guided biopsy imaging. Small right pleural effusion is chronic since 2018 and may be related to prior asbestos exposure. I reviewed CT scan imaging going back to 2018, PET CT scan and CT-guided biopsy imaging. Small right pleural effusion is chronic since 2018 and may be related to prior asbestos exposure. Right upper lobe apical lung nodule approximately 1.8 cm to 2 cm is unchanged from 11/9/2021 to 3/10/2022. It had mild SUV of 3.3 and CT-guided biopsy showed chronic inflammation and no malignant cells. PLAN:      Spirometry is normal but diffusion capacity decreased due to emphysema. Right apical nodule with calcified area is stable. CT chest in 1 year.   Follow-up in 1 year        Requested Prescriptions      No prescriptions requested or ordered in this encounter       There are no discontinued medications. Markus received counseling on the following healthy behaviors: nutrition, exercise and medication adherence    Patient given educational materials : see patient instruction       Discussed use, benefit, and side effects of prescribed medications. Barriers to medication compliance addressed. All patient questions answered. Pt voiced understanding. I hope this updates you on my evaluation and clinical thinking. Thank you for allowing me to participate in his care. Sincerely,      Electronically signed by Miles Miles MD on 7/30/2022 at 1:31 PM       Please note that this chart was generated using voice recognition Dragon dictation software. Although every effort was made to ensure the accuracy of this automated transcription, some errors in transcription may have occurred.

## 2022-08-05 ENCOUNTER — OFFICE VISIT (OUTPATIENT)
Dept: ONCOLOGY | Age: 67
End: 2022-08-05
Payer: MEDICARE

## 2022-08-05 VITALS
HEART RATE: 64 BPM | RESPIRATION RATE: 16 BRPM | BODY MASS INDEX: 31.7 KG/M2 | WEIGHT: 214 LBS | HEIGHT: 69 IN | TEMPERATURE: 98.4 F | SYSTOLIC BLOOD PRESSURE: 122 MMHG | OXYGEN SATURATION: 96 % | DIASTOLIC BLOOD PRESSURE: 72 MMHG

## 2022-08-05 DIAGNOSIS — M89.9 LYTIC LESION OF BONE ON X-RAY: Primary | ICD-10-CM

## 2022-08-05 DIAGNOSIS — R91.8 LUNG MASS: ICD-10-CM

## 2022-08-05 DIAGNOSIS — C67.9 UROTHELIAL CARCINOMA OF BLADDER (HCC): ICD-10-CM

## 2022-08-05 PROCEDURE — 1123F ACP DISCUSS/DSCN MKR DOCD: CPT | Performed by: INTERNAL MEDICINE

## 2022-08-05 PROCEDURE — 1036F TOBACCO NON-USER: CPT | Performed by: INTERNAL MEDICINE

## 2022-08-05 PROCEDURE — 3017F COLORECTAL CA SCREEN DOC REV: CPT | Performed by: INTERNAL MEDICINE

## 2022-08-05 PROCEDURE — 99214 OFFICE O/P EST MOD 30 MIN: CPT | Performed by: INTERNAL MEDICINE

## 2022-08-05 PROCEDURE — G8417 CALC BMI ABV UP PARAM F/U: HCPCS | Performed by: INTERNAL MEDICINE

## 2022-08-05 PROCEDURE — G8427 DOCREV CUR MEDS BY ELIG CLIN: HCPCS | Performed by: INTERNAL MEDICINE

## 2022-08-05 NOTE — PROGRESS NOTES
Shara Carter                                                                                                                  8/5/2022  MRN:   [id number]  YOB: 1955  PCP:                           Erik Bai DO  Referring Physician: Kishor  Treating Physician Name: Sia Arshad MD      Reason for visit:  Chief Complaint   Patient presents with    Follow-up     Lytic lesion of bone    Reviewed results of lab work-up and imaging studies    Current problems:  Right lung apex mass, 1.8 cm, mildly FDG avid, status post biopsy negative for malignancy  Severe emphysema  Coronary artery s/p CABG  History of tobacco dependence  History of nonmuscle invasive urothelial cancer of bladder    Active and recent treatments:  Active surveillance of lung mass  Surveillance for lytic lesion of humerus    Interim History:  Patient presents to the clinic for follow-up visit. Patient underwent bone scan which shows very subtle uptake at the site of the lytic lesion of the humerus. No other suspicious area noted. Clinically patient continues to do well. During this visit patient's allergy, social, medical, surgical history and medications were reviewed and updated. Summary of Case/History:    Shara Carter a 69 y.o.male is a patient with right lung apex mass noted on CT lung screen. Presents to the clinic to establish care and for further work-up and evaluation. Patient has significant history of tobacco dependence more than 35-year pack per day history. Patient quit about 15 years ago. Patient is retired and used to work as a . Patient has been having annual screening lung CT. Last lung CT from October 2020 was unremarkable. Most recent CT lung screen from November 2021 shows a 1.8 cm nodular density in the right lung apex. Additionally also showed severe emphysema throughout the lungs. There was also stable small right pleural effusion noted.   Patient subsequently was referred to oncology service as well as pulmonary team.  He sees pulmonary team to establish care on 12/1/2021. Patient denies any hemoptysis. Denies any weight loss. Denies any chest pain. Past Medical History:   Past Medical History:   Diagnosis Date    Abnormal cardiovascular stress test 12/09/2020    Large inferolateral infarction with significant vernon-infarct ischemia. CAD (coronary artery disease)     CABG-2 vessel, followed by stents x 4    Cervical disc disease     Clostridium difficile diarrhea 10/2014    hospitalized in October 2014    Displacement of cervical intervertebral disc without myelopathy 06/10/2014    [facility], C6/C7     HTN (hypertension)     Hyperlipidemia     Impaired fasting blood sugar     Neck pain     chronic    Neuralgia, neuritis, and radiculitis, unspecified 06/10/2014    [facility], left C7     Shoulder region pain     Left   [facility] injury 01/08/2014    Sprain and strain of unspecified site of shoulder and upper arm 06/10/2014    [facility], left arm     Wears glasses        Past Surgical History:     Past Surgical History:   Procedure Laterality Date    BICEPS TENODESIS Right 11/23/2016    right proximal bicep tenodesis    COLONOSCOPY N/A 12/10/2019    COLONOSCOPY POLYPECTOMY SNARE/COLD BIOPSY performed by Nicolas Bauman MD at [address]  12/18/2020    Successful PTCA. AGUILA of mid LAD / Patent LIMA however distally occluded and not supplying LAD /Residual high grade stenosis in small LCX    CORONARY ANGIOPLASTY WITH STENT PLACEMENT  01/22/2021    CT NEEDLE BIOPSY LUNG PERCUTANEOUS  12/15/2021    CT NEEDLE BIOPSY LUNG PERCUTANEOUS 12/15/2021 [facility] CT SCAN    CYSTOSCOPY N/A 03/09/2020    [facility] N/A 3/25/2022    LAPAROSCOPIC Robotic Assisted Ventral Hernia Repair with mesh WITH LYSIS OF ADHESIONS performed by Hoa Randolph DO at [address] [place] [address] Left 8/12/14, 1/09/15    C7 TFE    PAIN MANAGEMENT PROCEDURE Left 2014    C7 TFE    PAIN MANAGEMENT PROCEDURE Left 2015 , 16    C7 TFE    NC CABG, ARTERY-VEIN, SINGLE N/A 2018    CABG CORONARY ARTERY BYPASS, GARY ERAZO CHANI  (CSI# [id number]) performed by Lucita Tsai MD at [facility] CABG (LIMA-LAD, R SVG- RCA, OM too small for the grafts) on 18,    ROTATOR CUFF REPAIR Left 2019- Right    SHOULDER ARTHROSCOPY Right 2016    scope decompressing, rotator cuff repair    TRANSESOPHAGEAL ECHOCARDIOGRAM      UMBILICAL HERNIA REPAIR N/A 2019    Laparoscopic Robotic Assisted Umbilical Hernia Repair W/ MESH performed by Karen Huggins MD at [address] [place] [address] N/A 2020    Open Incisional Hernia Repair W/ MESH performed by Dixie Khan DO at [facility] OR       Patient Family Social History:     Social History     Socioeconomic History    Marital status:      Spouse name: Hipolito Craig    Number of children: 2    Years of education: None    Highest education level: None   Occupational History     Comment: [employer]   Tobacco Use    Smoking status: Former     Packs/day: 2.00     Years: 25.00     Pack years: 50.00     Types: Cigarettes     Start date: 1975     Quit date: 2008     Years since quittin.5    Smokeless tobacco: Never    Tobacco comments:     Bhargav Sullivan RRT 14   Vaping Use    Vaping Use: Never used   Substance and Sexual Activity    Alcohol use: No     Alcohol/week: 0.0 standard drinks    Drug use: No    Sexual activity: Yes     Partners: Female     Social Determinants of Health     Financial Resource Strain: Medium Risk    Difficulty of Paying Living Expenses: Somewhat hard   Food Insecurity: No Food Insecurity    Worried About Running Out of Food in the Last Year: Never true    Ran Out of Food in the Last Year: Never true     Family History   Problem Relation Age of Onset    High Blood Pressure Father     Colon Polyps Brother negative for easy bruising, bleeding, lymphadenopathy, or petechiae   Endocrine: negative for heat or cold intolerance,weight changes, change in bowel habits and hair loss   Musculoskeletal: negative for myalgias, arthralgias, pain, joint swelling,and bone pain.   Positive for arm pain, improved  Neurological: negative for headaches, dizziness, seizures, weakness, numbness      Physical Exam:    Vitals: /72 (Site: Right Upper Arm, Position: Sitting, Cuff Size: Medium Adult)   Pulse 64   Temp 98.4 °F (36.9 °C) (Temporal)   Resp 16   Ht 5' 9\" (1.753 m)   Wt 214 lb (97.1 kg)   SpO2 96%   BMI 31.60 kg/m²   General appearance - well appearing, no in pain or distress  Mental status - AAO X3  Eyes - pupils equal and reactive, extraocular eye movements intact  Mouth - mucous membranes moist, pharynx normal without lesions  Neck - supple, no significant adenopathy  Lymphatics - no palpable lymphadenopathy, no hepatosplenomegaly  Chest - clear to auscultation, no wheezes, rales or rhonchi, symmetric air entry  Heart - normal rate, regular rhythm, normal S1, S2, no murmurs  Abdomen - soft, nontender, nondistended, no masses or organomegaly  Neurological - alert, oriented, normal speech, no focal findings or movement disorder noted  Extremities - peripheral pulses normal, no pedal edema, no clubbing or cyanosis  Skin - normal coloration and turgor, no rashes, no suspicious skin lesions noted           DATA:    Results for orders placed or performed during the hospital encounter of 07/25/22   Full PFT Study With Bronchodilator   Result Value Ref Range    FVC-Pre      FVC Pred      FVC %Pred-Pre      PVC-Post      FVC %Pred-Post      FEV1-Pre      FEV1 Pred      FEV1 %Pred-Pre      FEV1-Post      FEV1 %Pred-Post      FEV1/FVC-Pre      FEV1/FVC Pred      FEV1/FVC %Pred-Pre      FEV1/FVC-Post      FEV1/FVC %Pred-Post      FEF 25-75%-Pre      FEF 25-75% Pred      FEF 25-75% %Pred-Pre      FEF 25-75%-Post      FEF 25-75% %Pred-Post      FEF 25-75% %Change      Expiratory Time      Expiratory Time-Post      PEF-Pre      PEF Pred      PEF %Pred-Pre      PEF-Post      PEF %Pred-Post      PEF %Change      MVV-Pre      MVV Pred      MVV %Pred-Pre      DLCO      DLCO Pred      DLCO %Pred      DLCO/VA      DLCO/VA Pred      DLCO/VA %Pred      VA      VA Pred      VA %Pred      Raw      Raw Pred      Raw %Pred      Gaw      GAW PRED      Gaw %Pred      SVC      SVC Pred      SVC %Pred      TLC      TLC Pred      TLC %Pred      RV      RV Pred      RV %Pred      IC      IC Pred      IC %Pred      VTG      VTG Pred      VTG %Pred      MIP      MEP       XR CERVICAL SPINE (4-5 VIEWS)    Result Date: 7/14/2022  EXAMINATION: XRAY VIEWS OF THE CERVICAL SPINE 7/14/2022 9:07 am COMPARISON: March 7, 2014 HISTORY: ORDERING SYSTEM PROVIDED HISTORY: Neck pain TECHNOLOGIST PROVIDED HISTORY: neck pain Reason for Exam: No injury; worsening rt sided neck and shoulder pain FINDINGS: Alignment overall anatomic. No acute fracture. The odontoid is intact as visualized. Mild degenerative change most significant C6-7 with loss disc height endplate spondylosis. Uncovertebral joint spurring C4-5 encroaches the neural foramina bilaterally. Prevertebral soft tissues unremarkable. No acute findings. Multilevel degenerative change. XR SHOULDER RIGHT (MIN 2 VIEWS)    Result Date: 7/14/2022  EXAMINATION: THREE XRAY VIEWS OF THE RIGHT SHOULDER 7/14/2022 9:07 am COMPARISON: June 22, 2016 HISTORY: ORDERING SYSTEM PROVIDED HISTORY: Acute pain of right shoulder TECHNOLOGIST PROVIDED HISTORY: Acute pain of right shoulder Reason for Exam: No injury; worsening rt sided neck and shoulder pain FINDINGS: No acute osseous abnormality. Mild AC and glenohumeral degenerative change. There is a new rounded lytic lucency in the proximal diaphysis of the visualized right humerus present on prior imaging. Fibrotic changes right lung demonstrated. No acute findings. Lytic lucency in the proximal right humeral diaphysis concerning for neoplastic process. Correlation with clinical findings required. CT CHEST WO CONTRAST    Result Date: 7/15/2022  EXAMINATION: CT OF THE CHEST WITHOUT CONTRAST 7/14/2022 8:54 am TECHNIQUE: CT of the chest was performed without the administration of intravenous contrast. Multiplanar reformatted images are provided for review. Automated exposure control, iterative reconstruction, and/or weight based adjustment of the mA/kV was utilized to reduce the radiation dose to as low as reasonably achievable. COMPARISON: CT exams 03/10/2022, 11/09/2021, 10/23/2020. PET-CT 11/24/2021. HISTORY: ORDERING SYSTEM PROVIDED HISTORY: Lung mass TECHNOLOGIST PROVIDED HISTORY: lung mass Reason for Exam: Lung mass right side, h/o previous smoker quit about 15 years ago, s/p cabbage FINDINGS: Mediastinum: Status post CABG. Unchanged subcentimeter mediastinal lymph nodes. No new or enlarging lymph node. No pericardial effusion. Lungs/pleura: Grossly unchanged appearance of triangular-shaped opacity with internal calcification in the posterior right apex. Unchanged partially loculated trace right effusion with associated pleural thickening. No new airspace disease identified. Upper Abdomen: No new findings. Soft Tissues/Bones: No new findings. Stable exam of the chest.  No appreciable change in triangular-shaped partially calcified opacity in the posterior right lung apex. Additional chronic and benign findings, as above. NM BONE SCAN WHOLE BODY    Result Date: 7/27/2022  EXAMINATION: WHOLE BODY BONE SCAN  7/25/2022 TECHNIQUE: The patient was injected intravenously with 25.2 mCi of 99 mTc MDP and scintigraphy of the entire skeleton was performed approximately three hours later. COMPARISON: Plain film examination of the right humerus/shoulder of 14 July 2022. PET-CT scanning of 24 November 2022.   CT abdomen and pelvis 9 February 2022 HISTORY: ORDERING SYSTEM PROVIDED HISTORY: Lytic lesion of bone on x-ray TECHNOLOGIST PROVIDED HISTORY: lytic lesion of humerus Reason for Exam: HX Bladder CA, lytic lesion seen on humerus on xray FINDINGS: Foci of activity in the shoulders, sternoclavicular joints, wrists, iliac bones, hips, knees, and ankles are most likely degenerative. The patient has asymmetric uptake of radiotracer in the cervical facets likely due to degenerative change demonstrated on cervical spine x-ray of 14 July 2022. Very subtle radiotracer uptake is noted within the proximal right humeral diaphysis matching to the patient's lytic lesion on recent right shoulder examination. Appearance of the right humeral lesion is almost punched-out. The patient should be assessed for gammopathy. Metastasis are also a consideration. Mild radiotracer uptake in the midline of the sacrum is noted. This corresponds to an area of arthritis at the L5-S1 level. The remainder of the osseous structures and soft tissues are unremarkable. Physiologic activity is present in the skeletal and renal collecting systems. Very subtle radiotracer uptake corresponds to lytic lesion in the proximal right humeral diaphysis. No other findings of concern are noted within the skeleton. Other areas of uptake correspond to degenerative changes. Appearance of the lytic lesion on plain film may be related to gammopathy or metastatic disease. RECOMMENDATIONS: Correlation with laboratory findings is advised. Impression:  Right lung apex mass, 1.8 cm, FDG avid, status post biopsy, negative for malignancy  Severe emphysema  Coronary artery s/p CABG  History of tobacco dependence  History of nonmuscle invasive urothelial cancer of bladder    Plan:  I had a detailed discussion with the patient and personally went over results of lab work-up imaging studies and other relevant clinical data. Reviewed results of bone scan. Reviewed images as well.   Very subtle uptake noted at the suspicious area of the right humerus. Patient does state that he had a bicep surgery done few years ago. Patient paraproteinemia profile is negative. Lung nodule biopsy was negative. Patient has non muscle invasive urothelial cancer therefore metastasis unlikely  Given findings noted on the bone scan recommend continued surveillance at this point. We will see patient back in office in 6 months with repeat paraproteinemia profile we will also add a urine immunofixation study. We will obtain x-ray of the humerus. Patient has history of nonmuscle invasive bladder cancer but it is low-grade noninvasive. Patient last cystoscopy from March was unremarkable  Patient is s/p colonoscopy in 2019  Return to clinic in 6 months      Dahiana Mercado MD      this note is created with the assistance of a speech recognition program.  While intending to generate a document that actually reflects the content of the visit, the document can still have some errors including those of syntax and sound a like substitutions which may escape proof reading. It such instances, actual meaning can be extrapolated by contextual diversion.

## 2022-08-08 ENCOUNTER — TELEPHONE (OUTPATIENT)
Dept: CASE MANAGEMENT | Age: 67
End: 2022-08-08

## 2022-08-28 DIAGNOSIS — N40.0 BENIGN PROSTATIC HYPERPLASIA WITHOUT LOWER URINARY TRACT SYMPTOMS: ICD-10-CM

## 2022-08-29 RX ORDER — TAMSULOSIN HYDROCHLORIDE 0.4 MG/1
CAPSULE ORAL
Qty: 90 CAPSULE | Refills: 0 | Status: SHIPPED | OUTPATIENT
Start: 2022-08-29

## 2022-09-02 NOTE — PROGRESS NOTES
CLINICAL PHARMACY NOTE: MEDS TO [address] Select Patient?: Yes  Total # of Prescriptions Filled: 2   The following medications were delivered to the patient:  · Nitroglycerin  · plavix  Total # of Interventions Completed: 0  Time Spent (min): 0    Additional Documentation:
Patient admitted, consent signed and questions answered. Patient ready for procedure. Call light to reach with side rails up 2 of 2. Bilateral groin areas clipped. Wife Hurshel Clovis at bedside with patient. History and physical needs updated.
Pre procedure call made. Pt informed of when and where to arrive, NPO status, visitor and mask policy.
Received post procedure to [facility] to room 12. Assessment obtained. Restrictions reviewed with patient. Post procedure pathway initiated. Right groin site soft , band aid dry and intact. No hematoma noted. Family at side. Patient without complaints. Head of bed flat with right leg straight.
Report called to 2c RN, patient transported to 240. Right groin assessed byWriter and RN. Groin soft, pedal pulses palpable.
Additional Progress Note...

## 2022-10-03 ENCOUNTER — OFFICE VISIT (OUTPATIENT)
Dept: FAMILY MEDICINE CLINIC | Age: 67
End: 2022-10-03
Payer: MEDICARE

## 2022-10-03 VITALS
SYSTOLIC BLOOD PRESSURE: 134 MMHG | HEART RATE: 74 BPM | DIASTOLIC BLOOD PRESSURE: 64 MMHG | BODY MASS INDEX: 31.76 KG/M2 | WEIGHT: 214.4 LBS | OXYGEN SATURATION: 96 % | HEIGHT: 69 IN | TEMPERATURE: 97.8 F | RESPIRATION RATE: 16 BRPM

## 2022-10-03 DIAGNOSIS — M25.511 ACUTE PAIN OF RIGHT SHOULDER: ICD-10-CM

## 2022-10-03 DIAGNOSIS — M62.838 MUSCLE SPASM: ICD-10-CM

## 2022-10-03 DIAGNOSIS — M54.2 NECK PAIN: Primary | ICD-10-CM

## 2022-10-03 PROCEDURE — 1123F ACP DISCUSS/DSCN MKR DOCD: CPT | Performed by: FAMILY MEDICINE

## 2022-10-03 PROCEDURE — G8417 CALC BMI ABV UP PARAM F/U: HCPCS | Performed by: FAMILY MEDICINE

## 2022-10-03 PROCEDURE — 1036F TOBACCO NON-USER: CPT | Performed by: FAMILY MEDICINE

## 2022-10-03 PROCEDURE — 3017F COLORECTAL CA SCREEN DOC REV: CPT | Performed by: FAMILY MEDICINE

## 2022-10-03 PROCEDURE — 99214 OFFICE O/P EST MOD 30 MIN: CPT | Performed by: FAMILY MEDICINE

## 2022-10-03 PROCEDURE — G8427 DOCREV CUR MEDS BY ELIG CLIN: HCPCS | Performed by: FAMILY MEDICINE

## 2022-10-03 PROCEDURE — 99212 OFFICE O/P EST SF 10 MIN: CPT | Performed by: FAMILY MEDICINE

## 2022-10-03 PROCEDURE — G8484 FLU IMMUNIZE NO ADMIN: HCPCS | Performed by: FAMILY MEDICINE

## 2022-10-03 RX ORDER — CYCLOBENZAPRINE HCL 5 MG
5 TABLET ORAL NIGHTLY
Qty: 10 TABLET | Refills: 0 | Status: SHIPPED | OUTPATIENT
Start: 2022-10-03 | End: 2022-10-13

## 2022-10-03 SDOH — ECONOMIC STABILITY: FOOD INSECURITY: WITHIN THE PAST 12 MONTHS, THE FOOD YOU BOUGHT JUST DIDN'T LAST AND YOU DIDN'T HAVE MONEY TO GET MORE.: NEVER TRUE

## 2022-10-03 SDOH — ECONOMIC STABILITY: FOOD INSECURITY: WITHIN THE PAST 12 MONTHS, YOU WORRIED THAT YOUR FOOD WOULD RUN OUT BEFORE YOU GOT MONEY TO BUY MORE.: NEVER TRUE

## 2022-10-03 ASSESSMENT — PATIENT HEALTH QUESTIONNAIRE - PHQ9
2. FEELING DOWN, DEPRESSED OR HOPELESS: 0
SUM OF ALL RESPONSES TO PHQ QUESTIONS 1-9: 0
SUM OF ALL RESPONSES TO PHQ QUESTIONS 1-9: 0
SUM OF ALL RESPONSES TO PHQ9 QUESTIONS 1 & 2: 0
SUM OF ALL RESPONSES TO PHQ QUESTIONS 1-9: 0
SUM OF ALL RESPONSES TO PHQ QUESTIONS 1-9: 0
1. LITTLE INTEREST OR PLEASURE IN DOING THINGS: 0

## 2022-10-03 ASSESSMENT — ENCOUNTER SYMPTOMS
ABDOMINAL PAIN: 0
COUGH: 0
CHOKING: 0
SHORTNESS OF BREATH: 0
PHOTOPHOBIA: 0
CHEST TIGHTNESS: 0
WHEEZING: 0

## 2022-10-03 ASSESSMENT — SOCIAL DETERMINANTS OF HEALTH (SDOH): HOW HARD IS IT FOR YOU TO PAY FOR THE VERY BASICS LIKE FOOD, HOUSING, MEDICAL CARE, AND HEATING?: NOT HARD AT ALL

## 2022-10-03 NOTE — PATIENT INSTRUCTIONS
Nutrition Health Education:    Keep hydrated, walk 30 minutes minimum 3 times weekly as tolerated. Diet should consist of low fat, low sodium and high fiber. Nutritious foods such as fruits (if you're not diabetic), vegetables, lean meats, lean dairy, whole grains such as brown rice, quinoa, and dry beans. Leeann Jolly with small amounts of heart healthy extra virgin olive oil. Be watchful of any extra salt/sugar/seasoning to your food. You should eat no more than 2 grams or 2,000 mg of salt daily. Salt will raise your BP. Avoid regular/diet sodas, caffeine, energy drinks as these are full of artificial ingredients/sweeteners, sugar, salt and chemicals that spike insulin and are harmful to your health. Sugar intake increases metabolic disfunction, type 2 diabetes, insulin resistance, addictive food behavior and obesity. Avoid all processed foods, foods from boxes, cans, microwave meals as these contain high salt, sugar or fat content and not much nutrition. Get at least 8 hrs of sleep every night and turn off all electronics at least 1 hour before bedtime as these decreases melatonin production and increases wakefulness. If your cholesterol is high, no greasy, fried, fast or fatty foods. Decrease red meat intake. No butter, grimm, lard or creams, no milk as these things clog your arteries and leads to heart attacks and death. If you smoke, smoking increases risk of lung disease, cancers, high BP, heart attack, stroke and death. Take your daily medications as prescribed and inform your family doctor if you are having any side effects or issues taking medications. Muscle Relaxants:  No driving or operating equipment while taking the muscle relaxant and/or painkillers  When at work do not take, just take before going to bed  If you are not working or driving, you may take these medications as prescribed     Muscle Spasm: At home no heavy lifting, pulling nor pushing until better.    Get in a warm bath with epsom salts as this is a natural muscle relaxer.    Apply moist heat to the affected area then an ice pack several times a day until better     Prescribed flexeril 5 mg nightly as needed   If no improvement or gt worse return to office, orthopedic physician or er or physical therapy    Return to office as scheduled

## 2022-10-03 NOTE — PROGRESS NOTES
Name: Betito Epstein  DOS: 10/3/2022  MRN: [id number]      Subjective:  Betito Epstein is a 69 y.o. male being seen for   Chief Complaint   Patient presents with    Neck Pain     Back/ neck pain worsening. Pain is keeping him up at night. He rates his pain an 8. Vitals:    10/03/22 1208   BP: 134/64   Pulse: 74   Resp: 16   Temp: 97.8 °F (36.6 °C)   SpO2: 96%     Allergies   Allergen Reactions    Chlorhexidine Rash    Vicodin [Hydrocodone-Acetaminophen] Nausea Only     Stomach upset     Past Medical History:   Diagnosis Date    Abnormal cardiovascular stress test 12/09/2020    Large inferolateral infarction with significant vernon-infarct ischemia. CAD (coronary artery disease)     CABG-2 vessel, followed by stents x 4    Cervical disc disease     Clostridium difficile diarrhea 10/2014    hospitalized in October 2014    Displacement of cervical intervertebral disc without myelopathy 06/10/2014    [facility], C6/C7     HTN (hypertension)     Hyperlipidemia     Impaired fasting blood sugar     Neck pain     chronic    Neuralgia, neuritis, and radiculitis, unspecified 06/10/2014    [facility], left C7     Shoulder region pain     Left   [facility] injury 01/08/2014    Sprain and strain of unspecified site of shoulder and upper arm 06/10/2014    [facility], left arm     Wears glasses      Past Surgical History:   Procedure Laterality Date    BICEPS TENODESIS Right 11/23/2016    right proximal bicep tenodesis    COLONOSCOPY N/A 12/10/2019    COLONOSCOPY POLYPECTOMY SNARE/COLD BIOPSY performed by Vernon Arellano MD at [place] [address]  12/18/2020    Successful PTCA. AGUILA of mid LAD / Patent LIMA however distally occluded and not supplying LAD /Residual high grade stenosis in small LCX    CORONARY ANGIOPLASTY WITH STENT PLACEMENT  01/22/2021    CT NEEDLE BIOPSY LUNG PERCUTANEOUS  12/15/2021    CT NEEDLE BIOPSY LUNG PERCUTANEOUS 12/15/2021 [facility] CT SCAN    CYSTOSCOPY N/A 03/09/2020    CYSTO TURBT performed by Cailin Hughes MD at [address] [place] N/A 3/25/2022    LAPAROSCOPIC Robotic Assisted Ventral Hernia Repair with mesh WITH LYSIS OF ADHESIONS performed by Amira Maya DO at [address] [place] [address] Left 14, 1/09/15    C7 TFE    PAIN MANAGEMENT PROCEDURE Left 2014    C7 TFE    PAIN MANAGEMENT PROCEDURE Left 2015 , 16    C7 TFE    MA CABG, ARTERY-VEIN, SINGLE N/A 2018    CABG CORONARY ARTERY BYPASS, GARY ERAZO, CHANI  ([facility]# [id number]) performed by Clarisse Hopkins MD at [facility] CABG (LIMA-LAD, R SVG- RCA, OM too small for the grafts) on 18,    ROTATOR CUFF REPAIR Left 2019- Right    SHOULDER ARTHROSCOPY Right 2016    scope decompressing, rotator cuff repair    TRANSESOPHAGEAL ECHOCARDIOGRAM      UMBILICAL HERNIA REPAIR N/A 2019    Laparoscopic Robotic Assisted Umbilical Hernia Repair W/ MESH performed by Huma Yeh MD at [address] [facility] N/A 2020    Open Incisional Hernia Repair W/ MESH performed by Amira Maya DO at [address] [place] [address] History     Socioeconomic History    Marital status:      Spouse name: Millie Brennan    Number of children: 2   Occupational History     Comment: [employer]   Tobacco Use    Smoking status: Former     Packs/day: 2.00     Years: 25.00     Pack years: 50.00     Types: Cigarettes     Start date: 1975     Quit date: 2008     Years since quittin.7    Smokeless tobacco: Never    Tobacco comments:     Beverly Briones RRT 14   Vaping Use    Vaping Use: Never used   Substance and Sexual Activity    Alcohol use: No     Alcohol/week: 0.0 standard drinks    Drug use: No    Sexual activity: Yes     Partners: Female     Social Determinants of Health     Financial Resource Strain: Low Risk     Difficulty of Paying Living Expenses: Not hard at all   Food Insecurity: No Food Insecurity    Worried About [facility] in the Last Year: Never true    Ran Out of Food in the Last Year: Never true       Current Outpatient Medications   Medication Sig Dispense Refill    cyclobenzaprine (FLEXERIL) 5 MG tablet Take 1 tablet by mouth nightly for 10 days 10 tablet 0    tamsulosin (FLOMAX) 0.4 MG capsule Take 1 capsule by mouth once daily 90 capsule 0    metoprolol tartrate (LOPRESSOR) 25 MG tablet Take 0.5 tablets by mouth in the morning and 0.5 tablets before bedtime. 180 tablet 3    furosemide (LASIX) 20 MG tablet TAKE 1 TABLET BY MOUTH ONCE DAILY AS NEEDED FOR  LEG  SWELLING 45 tablet 3    clopidogrel (PLAVIX) 75 MG tablet Take 1 tablet by mouth once daily 90 tablet 3    atorvastatin (LIPITOR) 40 MG tablet Take 1 tablet by mouth nightly 90 tablet 3    nitroGLYCERIN (NITROSTAT) 0.4 MG SL tablet up to max of 3 total doses. If no relief after 1 dose, call 911. 25 tablet 3    aspirin 81 MG chewable tablet Take 81 mg by mouth daily      Cholecalciferol (VITAMIN D) 50 MCG (2000 UT) CAPS capsule Take 2,000 Units by mouth daily      vitamin C (ASCORBIC ACID) 500 MG tablet Take 250 mg by mouth 2 times daily      acetaminophen (TYLENOL) 325 MG tablet Take 2 tablets by mouth every 6 hours as needed for Pain 120 tablet 3     No current facility-administered medications for this visit. Objective:  Pt has an ongoing issue with his neck but over the past week it has been hurting him more. Pt is under care of Dr. Lydia Zambrano. Today C/L right posterior shoulder pain up to right neck. He says the flexeril worked well. He was doing well up until a week ago. He went to a concert outside he was sitting at a picnic table on a hard bench. There was nothing to lean on. Pain is dull and constant but at night is worse. Review of Systems   Constitutional:  Positive for fatigue (not too much). Negative for unexpected weight change. Eyes:  Negative for photophobia and visual disturbance.    Respiratory:  Negative for cough, choking, chest tightness, shortness of breath and wheezing. Cardiovascular:  Negative for chest pain, palpitations and leg swelling. Gastrointestinal:  Negative for abdominal pain. Genitourinary:  Negative for difficulty urinating and hematuria. Musculoskeletal:  Positive for arthralgias (all over), myalgias (right postrior shoulder and right side of neck) and neck pain. Negative for neck stiffness. Skin:  Negative for rash and wound. Neurological:  Negative for dizziness, weakness, light-headedness and headaches. Hematological:  Negative for adenopathy. Does not bruise/bleed easily. Psychiatric/Behavioral:  Negative for agitation, confusion, decreased concentration and suicidal ideas. Physical Exam  Constitutional:       General: He is not in acute distress. Appearance: Normal appearance. He is obese. He is not ill-appearing, toxic-appearing or diaphoretic. HENT:      Head: Normocephalic and atraumatic. Right Ear: External ear normal.      Left Ear: External ear normal.      Nose: Nose normal. No congestion or rhinorrhea. Mouth/Throat:      Mouth: Mucous membranes are moist.      Pharynx: Oropharynx is clear. No oropharyngeal exudate or posterior oropharyngeal erythema. Eyes:      Extraocular Movements: Extraocular movements intact. Conjunctiva/sclera: Conjunctivae normal.      Pupils: Pupils are equal, round, and reactive to light. Cardiovascular:      Rate and Rhythm: Normal rate and regular rhythm. Pulses: Normal pulses. Heart sounds: Normal heart sounds. No murmur heard. Pulmonary:      Effort: Pulmonary effort is normal.      Breath sounds: Normal breath sounds. No wheezing, rhonchi or rales. Abdominal:      General: Abdomen is flat. Bowel sounds are normal. There is no distension. Palpations: Abdomen is soft. There is no mass. Tenderness: There is no abdominal tenderness. There is no right CVA tenderness, left CVA tenderness or guarding.    Musculoskeletal:         General: Tenderness (reproducible pain of the righ ttrapezius and righ cervical para spinals , decreased rotation of the cervical spine ot the rigth) present. Normal range of motion. Cervical back: Normal range of motion and neck supple. Right lower leg: No edema. Left lower leg: No edema. Lymphadenopathy:      Cervical: No cervical adenopathy. Skin:     General: Skin is warm. Capillary Refill: Capillary refill takes less than 2 seconds. Neurological:      General: No focal deficit present. Mental Status: He is alert and oriented to person, place, and time. Gait: Gait normal.   Psychiatric:         Mood and Affect: Mood normal.         Behavior: Behavior normal.         Thought Content: Thought content normal.        Assessment:   Diagnosis Orders   1. Neck pain  cyclobenzaprine (FLEXERIL) 5 MG tablet      2. Muscle spasm  cyclobenzaprine (FLEXERIL) 5 MG tablet      3. Acute pain of right shoulder  cyclobenzaprine (FLEXERIL) 5 MG tablet            Plan:  No orders of the defined types were placed in this encounter. Patient Instructions   Nutrition Health Education:    Keep hydrated, walk 30 minutes minimum 3 times weekly as tolerated. Diet should consist of low fat, low sodium and high fiber. Nutritious foods such as fruits (if you're not diabetic), vegetables, lean meats, lean dairy, whole grains such as brown rice, quinoa, and dry beans. [address] with small amounts of heart healthy extra virgin olive oil. Be watchful of any extra salt/sugar/seasoning to your food. You should eat no more than 2 grams or 2,000 mg of salt daily. Salt will raise your BP. Avoid regular/diet sodas, caffeine, energy drinks as these are full of artificial ingredients/sweeteners, sugar, salt and chemicals that spike insulin and are harmful to your health. Sugar intake increases metabolic disfunction, type 2 diabetes, insulin resistance, addictive food behavior and obesity.  Avoid all processed foods, foods from boxes, cans, microwave meals as these contain high salt, sugar or fat content and not much nutrition. Get at least 8 hrs of sleep every night and turn off all electronics at least 1 hour before bedtime as these decreases melatonin production and increases wakefulness. If your cholesterol is high, no greasy, fried, fast or fatty foods. Decrease red meat intake. No butter, grimm, lard or creams, no milk as these things clog your arteries and leads to heart attacks and death. If you smoke, smoking increases risk of lung disease, cancers, high BP, heart attack, stroke and death. Take your daily medications as prescribed and inform your family doctor if you are having any side effects or issues taking medications. Muscle Relaxants:  No driving or operating equipment while taking the muscle relaxant and/or painkillers  When at work do not take, just take before going to bed  If you are not working or driving, you may take these medications as prescribed     Muscle Spasm: At home no heavy lifting, pulling nor pushing until better. Get in a warm bath with epsom salts as this is a natural muscle relaxer. Apply moist heat to the affected area then an ice pack several times a day until better     Prescribed flexeril 5 mg nightly as needed   If no improvement or gt worse return to office, orthopedic physician or er or physical therapy    Return to office as scheduled     Return for as scheduled.      Erasmo Solares,

## 2022-10-13 NOTE — CARE COORDINATION
Case Management Initial Discharge [address] Nolan,             Met with:patient to discuss discharge plans. Information verified: address, contacts, phone number, , insurance Yes    Emergency Contact/Next of Kin name & number: Latosha Hess - wife  [phone]    PCP: Akhil Morrison MD  Date of last visit: 6m ago    Insurance Provider: [facility]    Discharge Planning    Living Arrangements: Pt lives with wife in  [address]: family      Home has1 stories  2 stairs to climb to get into front door, 0stairs to climb to reach second floor  Location of bedroom/bathroom in home  - one floor    Patient able to perform ADL's:Independent    Current Services (outpatient & in home) none  DME equipment: none  DME provider: none    Receiving oral anticoagulation therapy? Yes, Plavix    If indicated:   Physician managing anticoagulation treatment: TCC  Where does patient obtain lab work for ATC treatment? Potential Assistance Needed:       Patient agreeable to home care: No, had HC in 2018 ([facility]  [place] of choice provided:  n/a    Prior SNF/Rehab Placement and Facility: no  Agreeable to SNF/Rehab: No  [place] of choice provided: n/a     Evaluation: no    Expected Discharge date:       Patient expects to be discharged to:home     Follow Up Appointment: Best Day/ Time:      Transportation provider: craig freeman  Transportation arrangements needed for discharge: No    Readmission Risk              Risk of Unplanned Readmission:        10             Does patient have a readmission risk score greater than 14?: No  If yes, follow-up appointment must be made within 7 days of discharge.      Goals of Care:       Discharge Plan: Pt will return home independently          Electronically signed by Amos Castro on 21 at 8:39 AM EST No

## 2022-11-03 ENCOUNTER — HOSPITAL ENCOUNTER (OUTPATIENT)
Dept: LAB | Age: 67
Discharge: HOME | End: 2022-11-03
Payer: MEDICARE

## 2022-11-03 DIAGNOSIS — Z12.5 SCREENING FOR PROSTATE CANCER: ICD-10-CM

## 2022-11-03 DIAGNOSIS — I10 PRIMARY HYPERTENSION: ICD-10-CM

## 2022-11-03 DIAGNOSIS — E78.2 MIXED HYPERLIPIDEMIA: ICD-10-CM

## 2022-11-03 LAB
ALBUMIN SERPL-MCNC: 4.2 G/DL (ref 3.5–5.2)
ALBUMIN/GLOBULIN RATIO: 1.6 (ref 1–2.5)
ALP BLD-CCNC: 139 U/L (ref 40–129)
ALT SERPL-CCNC: 22 U/L (ref 5–41)
ANION GAP SERPL CALCULATED.3IONS-SCNC: 10 MMOL/L (ref 9–17)
AST SERPL-CCNC: 21 U/L
BILIRUB SERPL-MCNC: 0.5 MG/DL (ref 0.3–1.2)
BUN BLDV-MCNC: 15 MG/DL (ref 8–23)
BUN/CREAT BLD: 19 (ref 9–20)
CALCIUM SERPL-MCNC: 8.8 MG/DL (ref 8.6–10.4)
CHLORIDE BLD-SCNC: 106 MMOL/L (ref 98–107)
CHOLESTEROL/HDL RATIO: 2.7
CHOLESTEROL: 108 MG/DL
CO2: 24 MMOL/L (ref 20–31)
CREAT SERPL-MCNC: 0.78 MG/DL (ref 0.7–1.2)
GFR SERPL CREATININE-BSD FRML MDRD: >60 ML/MIN/1.73M2
GLUCOSE BLD-MCNC: 106 MG/DL (ref 70–99)
HDLC SERPL-MCNC: 40 MG/DL
LDL CHOLESTEROL: 40 MG/DL (ref 0–130)
POTASSIUM SERPL-SCNC: 4.4 MMOL/L (ref 3.7–5.3)
PROSTATE SPECIFIC ANTIGEN: 0.38 NG/ML
SODIUM BLD-SCNC: 140 MMOL/L (ref 135–144)
TOTAL PROTEIN: 6.8 G/DL (ref 6.4–8.3)
TRIGL SERPL-MCNC: 139 MG/DL

## 2022-11-03 PROCEDURE — G0103 PSA SCREENING: HCPCS

## 2022-11-03 PROCEDURE — 36415 COLL VENOUS BLD VENIPUNCTURE: CPT

## 2022-11-03 PROCEDURE — 80061 LIPID PANEL: CPT

## 2022-11-03 PROCEDURE — 80053 COMPREHEN METABOLIC PANEL: CPT

## 2022-11-08 ENCOUNTER — OFFICE VISIT (OUTPATIENT)
Dept: FAMILY MEDICINE CLINIC | Age: 67
End: 2022-11-08
Payer: MEDICARE

## 2022-11-08 VITALS
BODY MASS INDEX: 32.2 KG/M2 | HEART RATE: 74 BPM | SYSTOLIC BLOOD PRESSURE: 130 MMHG | HEIGHT: 69 IN | OXYGEN SATURATION: 98 % | DIASTOLIC BLOOD PRESSURE: 78 MMHG | WEIGHT: 217.4 LBS | RESPIRATION RATE: 14 BRPM

## 2022-11-08 DIAGNOSIS — M62.838 MUSCLE SPASM: Primary | ICD-10-CM

## 2022-11-08 DIAGNOSIS — M54.2 NECK PAIN: ICD-10-CM

## 2022-11-08 PROCEDURE — G8417 CALC BMI ABV UP PARAM F/U: HCPCS

## 2022-11-08 PROCEDURE — G8427 DOCREV CUR MEDS BY ELIG CLIN: HCPCS

## 2022-11-08 PROCEDURE — 3074F SYST BP LT 130 MM HG: CPT

## 2022-11-08 PROCEDURE — 99213 OFFICE O/P EST LOW 20 MIN: CPT

## 2022-11-08 PROCEDURE — 3017F COLORECTAL CA SCREEN DOC REV: CPT

## 2022-11-08 PROCEDURE — 99212 OFFICE O/P EST SF 10 MIN: CPT

## 2022-11-08 PROCEDURE — 1123F ACP DISCUSS/DSCN MKR DOCD: CPT

## 2022-11-08 PROCEDURE — G8484 FLU IMMUNIZE NO ADMIN: HCPCS

## 2022-11-08 PROCEDURE — 1036F TOBACCO NON-USER: CPT

## 2022-11-08 PROCEDURE — 3078F DIAST BP <80 MM HG: CPT

## 2022-11-08 RX ORDER — TIZANIDINE 4 MG/1
4 TABLET ORAL NIGHTLY PRN
Qty: 30 TABLET | Refills: 0 | Status: SHIPPED | OUTPATIENT
Start: 2022-11-08

## 2022-11-08 ASSESSMENT — ENCOUNTER SYMPTOMS
COUGH: 0
NAUSEA: 0
CHEST TIGHTNESS: 0
ABDOMINAL PAIN: 0
EYE DISCHARGE: 0
CONSTIPATION: 0
RHINORRHEA: 0
WHEEZING: 0
BACK PAIN: 0
SHORTNESS OF BREATH: 0
SINUS PRESSURE: 0
EYE ITCHING: 0
SINUS PAIN: 0
COLOR CHANGE: 0
DIARRHEA: 0

## 2022-11-08 NOTE — PROGRESS NOTES
Peterson Cornejo (:  1955) is a 69 y.o. male,Established patient, here for evaluation of the following chief complaint(s):  Establish Care (Pt is here to get established. He had labs done on 11/3/22. Pt states that he has had right shoulder pain that goes up the neck for about 1 year. He has seen Pain Management in the past for his neck, he was thinking about going back. )         ASSESSMENT/PLAN:  1. Muscle spasm  -     External Referral To Physical Therapy  -     tiZANidine (ZANAFLEX) 4 MG tablet; Take 1 tablet by mouth nightly as needed (neck pain), Disp-30 tablet, R-0Normal  2. Neck pain  -     tiZANidine (ZANAFLEX) 4 MG tablet; Take 1 tablet by mouth nightly as needed (neck pain), Disp-30 tablet, R-0Normal      Return in about 6 weeks (around 2022), or if symptoms worsen or fail to improve. Subjective   SUBJECTIVE/OBJECTIVE:  Kevin Jackman is here today to follow-up on his neck pain visit. States that his pain is in his cervical area lower and then radiates to the levator scapular area. He does have some limited range of motion when turning his head to the right however it is well within normal when he looks in other planes. No numbness tingling loss of motion, or strength noted. He is able to move his right arm in all directions without problems. At this point I feel it is a levator scapulae strain versus cervical strain. I think he would be best benefited by the utilization of physical therapy. We will refer him to physical therapy, as well as prescribed tizanidine. Tizanidine side effects are discussed with Kevin Jackman at length. He verbalized understanding this, will follow up with physical therapy and meet back half-way between completion. Review of Systems   Constitutional:  Negative for chills, fatigue, fever and unexpected weight change. HENT:  Negative for congestion, ear pain, rhinorrhea, sinus pressure and sinus pain.     Eyes:  Negative for discharge, itching and visual disturbance. Respiratory:  Negative for cough, chest tightness, shortness of breath and wheezing. Cardiovascular:  Negative for chest pain, palpitations and leg swelling. Gastrointestinal:  Negative for abdominal pain, constipation, diarrhea and nausea. Endocrine: Negative for cold intolerance, heat intolerance, polydipsia and polyphagia. Genitourinary:  Negative for dysuria, flank pain and frequency. Musculoskeletal:  Positive for neck pain and neck stiffness (Range of motion to the right side of neck is limited normal range of motion through all other planes). Negative for arthralgias, back pain and myalgias. Skin:  Negative for color change. Allergic/Immunologic: Negative for environmental allergies and food allergies. Neurological:  Negative for dizziness, weakness, light-headedness, numbness and headaches. Psychiatric/Behavioral:  Negative for agitation, behavioral problems and confusion. The patient is not nervous/anxious. Objective   Physical Exam  Vitals and nursing note reviewed. Constitutional:       General: He is not in acute distress. Appearance: Normal appearance. He is not ill-appearing. HENT:      Head: Normocephalic and atraumatic. Right Ear: Tympanic membrane and external ear normal.      Left Ear: Tympanic membrane and external ear normal.      Nose: Nose normal. No congestion or rhinorrhea. Mouth/Throat:      Mouth: Mucous membranes are moist.      Pharynx: Oropharynx is clear. No posterior oropharyngeal erythema. Eyes:      Pupils: Pupils are equal, round, and reactive to light. Cardiovascular:      Rate and Rhythm: Normal rate and regular rhythm. Pulses: Normal pulses. Heart sounds: Normal heart sounds. Pulmonary:      Effort: Pulmonary effort is normal.      Breath sounds: Normal breath sounds. No wheezing or rhonchi. Abdominal:      General: Bowel sounds are normal.      Palpations: There is no mass. Tenderness:  There is no abdominal tenderness. Musculoskeletal:         General: Tenderness (Right cervical and levator scapular area.) present. No swelling. Normal range of motion. Cervical back: Normal range of motion. No rigidity or tenderness. Skin:     General: Skin is warm and dry. Capillary Refill: Capillary refill takes less than 2 seconds. Coloration: Skin is not pale. Findings: No erythema. Neurological:      General: No focal deficit present. Mental Status: He is alert and oriented to person, place, and time. Psychiatric:         Mood and Affect: Mood normal.         Behavior: Behavior normal.         Thought Content: Thought content normal.         Judgment: Judgment normal.                An electronic signature was used to authenticate this note.     --Lesley Downey, PARESH - CNP

## 2022-11-17 ENCOUNTER — OFFICE VISIT (OUTPATIENT)
Dept: CARDIOLOGY | Age: 67
End: 2022-11-17
Payer: MEDICARE

## 2022-11-17 VITALS
DIASTOLIC BLOOD PRESSURE: 68 MMHG | BODY MASS INDEX: 32.14 KG/M2 | HEIGHT: 69 IN | WEIGHT: 217 LBS | SYSTOLIC BLOOD PRESSURE: 120 MMHG | HEART RATE: 67 BPM

## 2022-11-17 DIAGNOSIS — I25.10 CORONARY ARTERY DISEASE INVOLVING NATIVE CORONARY ARTERY OF NATIVE HEART WITHOUT ANGINA PECTORIS: Primary | ICD-10-CM

## 2022-11-17 PROCEDURE — G8427 DOCREV CUR MEDS BY ELIG CLIN: HCPCS | Performed by: INTERNAL MEDICINE

## 2022-11-17 PROCEDURE — G8484 FLU IMMUNIZE NO ADMIN: HCPCS | Performed by: INTERNAL MEDICINE

## 2022-11-17 PROCEDURE — 93010 ELECTROCARDIOGRAM REPORT: CPT | Performed by: INTERNAL MEDICINE

## 2022-11-17 PROCEDURE — 99214 OFFICE O/P EST MOD 30 MIN: CPT | Performed by: INTERNAL MEDICINE

## 2022-11-17 PROCEDURE — 93005 ELECTROCARDIOGRAM TRACING: CPT | Performed by: INTERNAL MEDICINE

## 2022-11-17 PROCEDURE — 3078F DIAST BP <80 MM HG: CPT | Performed by: INTERNAL MEDICINE

## 2022-11-17 PROCEDURE — 3017F COLORECTAL CA SCREEN DOC REV: CPT | Performed by: INTERNAL MEDICINE

## 2022-11-17 PROCEDURE — 3074F SYST BP LT 130 MM HG: CPT | Performed by: INTERNAL MEDICINE

## 2022-11-17 PROCEDURE — 99212 OFFICE O/P EST SF 10 MIN: CPT | Performed by: INTERNAL MEDICINE

## 2022-11-17 PROCEDURE — G8417 CALC BMI ABV UP PARAM F/U: HCPCS | Performed by: INTERNAL MEDICINE

## 2022-11-17 PROCEDURE — 1036F TOBACCO NON-USER: CPT | Performed by: INTERNAL MEDICINE

## 2022-11-17 PROCEDURE — 1123F ACP DISCUSS/DSCN MKR DOCD: CPT | Performed by: INTERNAL MEDICINE

## 2022-11-17 NOTE — PROGRESS NOTES
di                       Today's Date: 11/17/2022  Patient Name: Roberta Purvis  Patient's age: 69 y.o., 1955          CC: the patient is a 69 y.o.  male is in the office for CAD   Stable from cardiac standpoint, denies any episode of chest pain in last 6 months. No dyspnea on exertion. Able to walk 2 miles in the morning every day without any exertional symptoms. Compliant with his medications. Blood pressure and LDL well controlled. Past Medical History:   has a past medical history of Abnormal cardiovascular stress test, CAD (coronary artery disease), Cervical disc disease, Clostridium difficile diarrhea, Displacement of cervical intervertebral disc without myelopathy, HTN (hypertension), Hyperlipidemia, Impaired fasting blood sugar, Neck pain, Neuralgia, neuritis, and radiculitis, unspecified, Shoulder region pain, Sprain and strain of unspecified site of shoulder and upper arm, and Wears glasses. Past Surgical History:   has a past surgical history that includes Pain management procedure (Left, 8/12/14, 1/09/15); Pain management procedure (Left, 09/23/2014); Pain management procedure (Left, 7/17/2015 , 1/22/16); Shoulder arthroscopy (Right, 04/27/2016); Biceps Tenodesis (Right, 11/23/2016); pr cabg, artery-vein, single (N/A, 07/06/2018); Rotator cuff repair (Left, 01/29/2019); Colonoscopy (N/A, 12/10/2019); Umbilical hernia repair (N/A, 12/18/2019); Cystoscopy (N/A, 03/09/2020); ventral hernia repair (N/A, 11/19/2020); Coronary angioplasty with stent (12/18/2020); Coronary angioplasty with stent (01/22/2021); transesophageal echocardiogram (11/15/2020); CT NEEDLE BIOPSY LUNG PERCUTANEOUS (12/15/2021); and hernia repair (N/A, 3/25/2022). Home Medications:    Prior to Admission medications    Medication Sig Start Date End Date Taking?  Authorizing Provider   tiZANidine (ZANAFLEX) 4 MG tablet Take 1 tablet by mouth nightly as needed (neck pain) 11/8/22  Yes Kandis Friend, APRN - CNP tamsulosin (FLOMAX) 0.4 MG capsule Take 1 capsule by mouth once daily 8/29/22  Yes Parris Delacruz MD   metoprolol tartrate (LOPRESSOR) 25 MG tablet Take 0.5 tablets by mouth in the morning and 0.5 tablets before bedtime. 7/21/22  Yes Kenneth Anton MD   furosemide (LASIX) 20 MG tablet TAKE 1 TABLET BY MOUTH ONCE DAILY AS NEEDED FOR  LEG  SWELLING 5/16/22  Yes Renato Gipson DO   clopidogrel (PLAVIX) 75 MG tablet Take 1 tablet by mouth once daily 4/11/22  Yes Renato Gipson DO   atorvastatin (LIPITOR) 40 MG tablet Take 1 tablet by mouth nightly 2/14/22  Yes Renato Gipson DO   aspirin 81 MG chewable tablet Take 81 mg by mouth daily   Yes Historical Provider, MD   Cholecalciferol (VITAMIN D) 50 MCG (2000 UT) CAPS capsule Take 2,000 Units by mouth daily   Yes Historical Provider, MD   vitamin C (ASCORBIC ACID) 500 MG tablet Take 250 mg by mouth 2 times daily   Yes Historical Provider, MD   acetaminophen (TYLENOL) 325 MG tablet Take 2 tablets by mouth every 6 hours as needed for Pain 7/9/18  Yes PARESH Funez - CNP       Allergies:  Chlorhexidine and Vicodin [hydrocodone-acetaminophen]    Social History:   reports that he quit smoking about 14 years ago. His smoking use included cigarettes. He started smoking about 47 years ago. He has a 50.00 pack-year smoking history. He has never used smokeless tobacco. He reports that he does not drink alcohol and does not use drugs. Family History:    Family History   Problem Relation Age of Onset    High Blood Pressure Father     Colon Polyps Brother     COPD Mother        REVIEW OF SYSTEMS:  CONSTITUTIONAL:NEGATIVE  HEENT:NEG  Cardiovascular: No chest pain, positive for dyspnea on exertion, No palpitations.  Lower extremity edema: No  RESPIRATORY: NEG  GASTROINTESTINAL:  negative  GENITOURINARY:  negative  INTEGUMENT:  negative  MUSCULOSKELETAL:  positive for  pain  NEUROLOGICAL:  negative    PHYSICAL EXAM:      /68   Ht 5' 9\" (1.753 m)   Wt 217 lb (98.4 kg)   BMI 32.05 kg/m²    HEENT: PERRL, no cervical lymphadenopathy. No masses palpable. Cardiovascular: The apical impulse is not displaced  Heart  Sounds:RRR, S4  Jugular venous pulsation Normal  The carotid upstroke is normal  Peripheral pulses are symmetrical and full  Respiratory: Good respiratory effort. On auscultation: clear to auscultation bilaterally. Right lower lobe rales +  Abdomen:  No masses or tenderness  Bowel sounds present  Extremities:   No Cyanosis or Clubbing   Lower extremity edema: Trace bilateral  Skin: Warm and dry    Cardiac data:    EKG: Normal sinus rhythm, old inferoposterior and lateral infarct. No new change from before    TTE 11/15/2020  Not all walls well visualized, cannot comment on specific wall motion. Normal left ventricular diameter. Mild left ventricular hypertrophy. Left ventricular systolic function is mildly reduced. Left ventricular  ejection fraction 45 %. Grade I (mild) left ventricular diastolic dysfunction. Left atrium is mildly dilated. Normal structure and function of other valves. Aortic root is mildly dilated at 4.3 cm. Nuclear stress test 12/9/2020  1. Large inferolateral infarction with significant vernon-infarct ischemia. 2.  LVEF 46%. Cardiac cath 12/18/2020   1. Severe Multivessel CAD   2. Patent LIMA however distally occluded and not supplying LAD   3. Occluded SVG-RCA with left to right Collaterals   4. Successful PTCA. AGUILA of mid LAD   5. Residual high grade stenosis in small LCX   6. Normal LV systolic function. Cardiac cath 1/22/21  Successful staged PCI to Left circumflex and OM2  Patent LAD stents with mild non-obstructive disease      Labs:     CBC:   No results for input(s): WBC, HGB, HCT, PLT in the last 72 hours. BMP:   No results for input(s): NA, K, CO2, BUN, CREATININE, LABGLOM, GLUCOSE in the last 72 hours. PT/INR: No results for input(s): PROTIME, INR in the last 72 hours.   FASTING LIPID PANEL:  Lab Results   Component Value Date/Time    HDL 40 11/03/2022 09:16 AM    TRIG 139 11/03/2022 09:16 AM       Assessment:    MVCAD S/P CABG (LIMA-LAD, R SVG- RCA, OM too small for the grafts) on 7/6/18, repeat angiogram 12/18/2020 showed distally occluded LIMA and SVG, post PCI-LAD and staged PCI-LCX/OM  Mild ischemic CMP, LVEF 45%  Pulmonary edema post incisional hernia repair, resolved with diuretics   HTN  HPL  Obesity  COVID-19 infection (07/2022)   Right lung apical mass, 1.8 cm, biopsy negative for malignancy       Patient Active Problem List   Diagnosis    Cervical radiculitis    Umbilical hernia    HTN (hypertension)    Impaired fasting blood sugar    Displacement of cervical intervertebral disc without myelopathy    Rotator cuff tear    CAD in native artery    S/P CABG (coronary artery bypass graft)    Hyperlipidemia    Other emphysema ([address]    Positive colorectal cancer screening using DNA-based stool test    Epigastric hernia    Urothelial carcinoma of bladder (HCC)    Post-op pain    Incisional hernia, without obstruction or gangrene    S/P PTCA (percutaneous transluminal coronary angioplasty)    S/P cardiac cath    Recurrent abdominal hernia without obstruction or gangrene    Ventral hernia without obstruction or gangrene    Recurrent incisional hernia    Hematoma of left lower leg       Recommendations:  Continue aspirin, plavix, and high intensity statin for secondary prophylaxis  Continue lopressor 12.5 mg bid   Lasix 20 mg three times a week and increase to daily if needed  Patient to continue low-salt diet and fluid restriction  Aggressive risk factor modification discussed with patient. Routine follow-up in 6 months or earlier if needed.       Rubina Claudio MD, [address] Cardiology Consult           [phone]

## 2022-11-20 DIAGNOSIS — N40.0 BENIGN PROSTATIC HYPERPLASIA WITHOUT LOWER URINARY TRACT SYMPTOMS: ICD-10-CM

## 2022-11-21 RX ORDER — TAMSULOSIN HYDROCHLORIDE 0.4 MG/1
CAPSULE ORAL
Qty: 90 CAPSULE | Refills: 0 | Status: SHIPPED | OUTPATIENT
Start: 2022-11-21

## 2022-12-20 ENCOUNTER — OFFICE VISIT (OUTPATIENT)
Dept: FAMILY MEDICINE CLINIC | Age: 67
End: 2022-12-20
Payer: MEDICARE

## 2022-12-20 ENCOUNTER — HOSPITAL ENCOUNTER (OUTPATIENT)
Dept: GENERAL RADIOLOGY | Age: 67
Discharge: HOME | End: 2022-12-22
Payer: MEDICARE

## 2022-12-20 VITALS
SYSTOLIC BLOOD PRESSURE: 126 MMHG | HEART RATE: 63 BPM | DIASTOLIC BLOOD PRESSURE: 78 MMHG | HEIGHT: 69 IN | OXYGEN SATURATION: 97 % | BODY MASS INDEX: 32.67 KG/M2 | WEIGHT: 220.6 LBS

## 2022-12-20 DIAGNOSIS — M79.601 RIGHT ARM PAIN: ICD-10-CM

## 2022-12-20 DIAGNOSIS — Z00.00 MEDICARE ANNUAL WELLNESS VISIT, SUBSEQUENT: Primary | ICD-10-CM

## 2022-12-20 DIAGNOSIS — M54.2 NECK PAIN: ICD-10-CM

## 2022-12-20 DIAGNOSIS — M62.838 MUSCLE SPASM: ICD-10-CM

## 2022-12-20 PROCEDURE — 3017F COLORECTAL CA SCREEN DOC REV: CPT

## 2022-12-20 PROCEDURE — 3074F SYST BP LT 130 MM HG: CPT

## 2022-12-20 PROCEDURE — 73060 X-RAY EXAM OF HUMERUS: CPT

## 2022-12-20 PROCEDURE — 1123F ACP DISCUSS/DSCN MKR DOCD: CPT

## 2022-12-20 PROCEDURE — G8484 FLU IMMUNIZE NO ADMIN: HCPCS

## 2022-12-20 PROCEDURE — 99212 OFFICE O/P EST SF 10 MIN: CPT

## 2022-12-20 PROCEDURE — G0439 PPPS, SUBSEQ VISIT: HCPCS

## 2022-12-20 PROCEDURE — 3078F DIAST BP <80 MM HG: CPT

## 2022-12-20 RX ORDER — TIZANIDINE 4 MG/1
4 TABLET ORAL NIGHTLY PRN
Qty: 30 TABLET | Refills: 0 | Status: SHIPPED | OUTPATIENT
Start: 2022-12-20

## 2022-12-20 ASSESSMENT — LIFESTYLE VARIABLES
HOW MANY STANDARD DRINKS CONTAINING ALCOHOL DO YOU HAVE ON A TYPICAL DAY: PATIENT DOES NOT DRINK
HOW OFTEN DO YOU HAVE A DRINK CONTAINING ALCOHOL: NEVER

## 2022-12-20 NOTE — PATIENT INSTRUCTIONS
Advance Directives: Care Instructions  Overview  An advance directive is a legal way to state your wishes at the end of your life. It tells your family and your doctor what to do if you can't say what you want. There are two main types of advance directives. You can change them any time your wishes change. Living will. This form tells your family and your doctor your wishes about life support and other treatment. The form is also called a declaration. Medical power of . This form lets you name a person to make treatment decisions for you when you can't speak for yourself. This person is called a health care agent (health care proxy, health care surrogate). The form is also called a durable power of  for health care. If you do not have an advance directive, decisions about your medical care may be made by a family member, or by a doctor or a  who doesn't know you. It may help to think of an advance directive as a gift to the people who care for you. If you have one, they won't have to make tough decisions by themselves. For more information, including forms for your state, see the [address] website (www.caringinfo.org/planning/advance-directives/). Follow-up care is a key part of your treatment and safety. Be sure to make and go to all appointments, and call your doctor if you are having problems. It's also a good idea to know your test results and keep a list of the medicines you take. What should you include in an advance directive? Many states have a unique advance directive form. (It may ask you to address specific issues.) Or you might use a universal form that's approved by many states. If your form doesn't tell you what to address, it may be hard to know what to include in your advance directive. Use the questions below to help you get started. Who do you want to make decisions about your medical care if you are not able to?   What life-support measures do you want if you have a serious illness that gets worse over time or can't be cured? What are you most afraid of that might happen? (Maybe you're afraid of having pain, losing your independence, or being kept alive by machines.)  Where would you prefer to die? (Your home? A hospital? A nursing home?)  Do you want to donate your organs when you die? Do you want certain Restoration practices performed before you die? When should you call for help? Be sure to contact your doctor if you have any questions. Where can you learn more? Go to http://www.carreon.com/ and enter R264 to learn more about \"Advance Directives: Care Instructions. \"  Current as of: June 16, 2022               Content Version: 13.5  © 1125-2198 Healthwise, Incorporated. Care instructions adapted under license by [facility] [place] ([facility]). If you have questions about a medical condition or this instruction, always ask your healthcare professional. [place] [address] any warranty or liability for your use of this information. Personalized Preventive Plan for Maryana Leger - 12/20/2022  Medicare offers a range of preventive health benefits. Some of the tests and screenings are paid in full while other may be subject to a deductible, co-insurance, and/or copay. Some of these benefits include a comprehensive review of your medical history including lifestyle, illnesses that may run in your family, and various assessments and screenings as appropriate. After reviewing your medical record and screening and assessments performed today your provider may have ordered immunizations, labs, imaging, and/or referrals for you. A list of these orders (if applicable) as well as your Preventive Care list are included within your After Visit Summary for your review.     Other Preventive Recommendations:    A preventive eye exam performed by an eye specialist is recommended every 1-2 years to screen for glaucoma; cataracts, macular degeneration, and other eye disorders. A preventive dental visit is recommended every 6 months. Try to get at least 150 minutes of exercise per week or 10,000 steps per day on a pedometer . Order or download the FREE \"Exercise & Physical Activity: Your Everyday Guide\" from The [employer]. Call 9-742.557.6005 or search [employer] online. You need 9720-3353 mg of calcium and 2137-5471 IU of vitamin D per day. It is possible to meet your calcium requirement with diet alone, but a vitamin D supplement is usually necessary to meet this goal.  When exposed to the sun, use a sunscreen that protects against both UVA and UVB radiation with an SPF of 30 or greater. Reapply every 2 to 3 hours or after sweating, drying off with a towel, or swimming. Always wear a seat belt when traveling in a car. Always wear a helmet when riding a bicycle or motorcycle.

## 2022-12-20 NOTE — PROGRESS NOTES
Medicare Annual Wellness Visit    Rafael Padilla is here for Medicare AWV (Pt is here for follow up for his neck pain. He went to PT and he states it did not help. He would like a referral to [facility] pain [facility]. The tizanidine does help with the pain. He is also here for a medicare AWV. )    Assessment & Plan   Medicare annual wellness visit, subsequent  Muscle spasm  -     tiZANidine (ZANAFLEX) 4 MG tablet; Take 1 tablet by mouth nightly as needed (neck pain), Disp-30 tablet, R-0Normal  -     [facility], [place]  Neck pain  -     tiZANidine (ZANAFLEX) 4 MG tablet; Take 1 tablet by mouth nightly as needed (neck pain), Disp-30 tablet, R-0Normal  -     [facility] [place]  Right arm pain  -     XR HUMERUS RIGHT (MIN 2 VIEWS); Future      Recommendations for Preventive Services Due: see orders and patient instructions/AVS.  Recommended screening schedule for the next 5-10 years is provided to the patient in written form: see Patient Instructions/AVS.     Return in 6 months (on 6/20/2023), or if symptoms worsen or fail to improve, for Medicare Annual Wellness Visit in 1 year. Subjective   The following acute and/or chronic problems were also addressed today:  Neck pain, Paraspinous muscle spasm. Continue tizanidine 4mg nightly as needed for neck spasm. Refer to pain management as he does not want surgical intervention. Patient's complete Health Risk Assessment and screening values have been reviewed and are found in Flowsheets. The following problems were reviewed today and where indicated follow up appointments were made and/or referrals ordered.     Positive Risk Factor Screenings with Interventions:                 Weight and Activity:  Physical Activity: Insufficiently Active    Days of Exercise per Week: 5 days    Minutes of Exercise per Session: 20 min     On average, how many days per week do you engage in moderate to strenuous exercise (like a brisk walk)?: 5 days  Have you lost any weight without trying in the past 3 months?: No  Body mass index: (!) 32.57    Obesity Interventions:  Patient declines any further evaluation or treatment               Lung Cancer Screening:  LDCT Screening: Discussed with patient the benefits and harms of screening, follow-up diagnostic testing, over-diagnosis, false positive rate, and total radiation exposure. Counseled on the importance of adherence to annual lung cancer LDCT screening, impact of comorbidities, ability and willingness to undergo diagnosis and treatment. Counseled on the importance of maintaining cigarette smoking abstinence and cessation. The patient has a history of >20 pack years and is either still smoking or quit within the last 15 years. There are no signs or symptoms of lung cancer. LDCT Screening: Discussed with patient the benefits and harms of screening, follow-up diagnostic testing, over-diagnosis, false positive rate, and total radiation exposure. Counseled on the importance of adherence to annual lung cancer LDCT screening, impact of comorbidities, ability and willingness to undergo diagnosis and treatment. Counseled on the importance of maintaining cigarette smoking abstinence and cessation. The patient has a history of >20 pack years and is either still smoking or quit within the last 15 years. There are no signs or symptoms of lung cancer. CV Risk Counseling:  Patient was asked about his current diet and exercise habits, and personalized advice was provided regarding recommended lifestyle changes. Patient's individual cardiovascular disease risk factors, including advanced age (> 54 for men, > 72 for women), dyslipidemia, male gender, obesity (BMI >= 30), sedentary lifestyle, and smoking/tobacco exposure, were discussed, as well as the likely benefits of lifestyle changes.  Based upon patient's motivation to change his behavior, the following plan was agreed upon to work toward lowering cardiovascular disease risk: low saturated fat, low cholesterol diet, Mediterranean diet, DASH diet, at least 150 minutes of exercise/week, and increase physical activity, as tolerated. Aspirin use for primary prevention of cardiovascular disease for men 45-79 and women 55-79: Indicated- continue daily aspirin. Educational materials for lifestyle changes were provided. Patient will follow-up in 6 month(s) with PCP. Provider spent 10 minutes counseling patient. Objective   Vitals:    12/20/22 1029   BP: 126/78   Site: Right Upper Arm   Position: Sitting   Cuff Size: Medium Adult   Pulse: 63   SpO2: 97%   Weight: 220 lb 9.6 oz (100.1 kg)   Height: 5' 9\" (1.753 m)      Body mass index is 32.58 kg/m².         General Appearance: alert and oriented to person, place and time, well developed and well- nourished, in no acute distress  Skin: warm and dry, no rash or erythema  Head: normocephalic and atraumatic  Eyes: pupils equal, round, and reactive to light, extraocular eye movements intact, conjunctivae normal  ENT: tympanic membrane, external ear and ear canal normal bilaterally, nose without deformity, nasal mucosa and turbinates normal without polyps  Neck: supple and non-tender without mass, no thyromegaly or thyroid nodules, no cervical lymphadenopathy  Pulmonary/Chest: clear to auscultation bilaterally- no wheezes, rales or rhonchi, normal air movement, no respiratory distress  Cardiovascular: normal rate, regular rhythm, normal S1 and S2, no murmurs, rubs, clicks, or gallops, distal pulses intact, no carotid bruits  Abdomen: soft, non-tender, non-distended, normal bowel sounds, no masses or organomegaly  Extremities: no cyanosis, clubbing or edema  Musculoskeletal: normal range of motion, no joint swelling, deformity or tenderness  Neurologic: reflexes normal and symmetric, no cranial nerve deficit, gait, coordination and speech normal       Allergies   Allergen Reactions    Chlorhexidine Rash Vicodin [Hydrocodone-Acetaminophen] Nausea Only     Stomach upset     Prior to Visit Medications    Medication Sig Taking? Authorizing Provider   tiZANidine (ZANAFLEX) 4 MG tablet Take 1 tablet by mouth nightly as needed (neck pain) Yes PARESH Tillman CNP   tamsulosin (FLOMAX) 0.4 MG capsule Take 1 capsule by mouth once daily Yes Parris Delacruz MD   metoprolol tartrate (LOPRESSOR) 25 MG tablet Take 0.5 tablets by mouth in the morning and 0.5 tablets before bedtime.  Yes Kenneth Anton MD   furosemide (LASIX) 20 MG tablet TAKE 1 TABLET BY MOUTH ONCE DAILY AS NEEDED FOR  LEG  SWELLING Yes Renato Gipson DO   clopidogrel (PLAVIX) 75 MG tablet Take 1 tablet by mouth once daily Yes Renato Gipson DO   atorvastatin (LIPITOR) 40 MG tablet Take 1 tablet by mouth nightly Yes Renato Gipson DO   aspirin 81 MG chewable tablet Take 81 mg by mouth daily Yes Historical Provider, MD   Cholecalciferol (VITAMIN D) 50 MCG (2000 UT) CAPS capsule Take 2,000 Units by mouth daily Yes Historical Provider, MD   vitamin C (ASCORBIC ACID) 500 MG tablet Take 250 mg by mouth 2 times daily Yes Historical Provider, MD   acetaminophen (TYLENOL) 325 MG tablet Take 2 tablets by mouth every 6 hours as needed for Pain Yes PARESH Funez CNP       CareTeam (Including outside providers/suppliers regularly involved in providing care):   Patient Care Team:  PARESH Tillman CNP as PCP - General (Nurse Practitioner)  PARESH Tillman CNP as PCP - [facility]  Nasrin Delacruz MD (General Surgery)  Parris Dleacruz MD as Consulting Physician (Urology)  Nolvia Reyes DO as Consulting Physician (Cardiology)  Marquita Crawford MD (Physical Medicine and Rehab)     Reviewed and updated this visit:  Tobacco  Allergies  Meds  Problems  Med Hx  Surg Hx  Soc Hx  Fam Hx

## 2023-01-17 ENCOUNTER — OFFICE VISIT (OUTPATIENT)
Dept: PAIN MANAGEMENT | Age: 68
End: 2023-01-17
Payer: MEDICARE

## 2023-01-17 VITALS
SYSTOLIC BLOOD PRESSURE: 110 MMHG | WEIGHT: 223 LBS | BODY MASS INDEX: 33.03 KG/M2 | OXYGEN SATURATION: 93 % | DIASTOLIC BLOOD PRESSURE: 72 MMHG | HEIGHT: 69 IN | HEART RATE: 76 BPM

## 2023-01-17 DIAGNOSIS — M54.12 CERVICAL RADICULOPATHY: ICD-10-CM

## 2023-01-17 DIAGNOSIS — M79.18 MYOFASCIAL PAIN: ICD-10-CM

## 2023-01-17 DIAGNOSIS — M50.30 DDD (DEGENERATIVE DISC DISEASE), CERVICAL: Primary | ICD-10-CM

## 2023-01-17 DIAGNOSIS — M62.838 MUSCLE SPASM: ICD-10-CM

## 2023-01-17 DIAGNOSIS — M47.812 CERVICAL FACET JOINT SYNDROME: ICD-10-CM

## 2023-01-17 DIAGNOSIS — M54.2 NECK PAIN: ICD-10-CM

## 2023-01-17 PROCEDURE — G8417 CALC BMI ABV UP PARAM F/U: HCPCS | Performed by: NURSE PRACTITIONER

## 2023-01-17 PROCEDURE — 3017F COLORECTAL CA SCREEN DOC REV: CPT | Performed by: NURSE PRACTITIONER

## 2023-01-17 PROCEDURE — G8484 FLU IMMUNIZE NO ADMIN: HCPCS | Performed by: NURSE PRACTITIONER

## 2023-01-17 PROCEDURE — G8427 DOCREV CUR MEDS BY ELIG CLIN: HCPCS | Performed by: NURSE PRACTITIONER

## 2023-01-17 PROCEDURE — 3078F DIAST BP <80 MM HG: CPT | Performed by: NURSE PRACTITIONER

## 2023-01-17 PROCEDURE — 1036F TOBACCO NON-USER: CPT | Performed by: NURSE PRACTITIONER

## 2023-01-17 PROCEDURE — 99204 OFFICE O/P NEW MOD 45 MIN: CPT | Performed by: NURSE PRACTITIONER

## 2023-01-17 PROCEDURE — 3074F SYST BP LT 130 MM HG: CPT | Performed by: NURSE PRACTITIONER

## 2023-01-17 PROCEDURE — 1123F ACP DISCUSS/DSCN MKR DOCD: CPT | Performed by: NURSE PRACTITIONER

## 2023-01-17 RX ORDER — TIZANIDINE 4 MG/1
4 TABLET ORAL NIGHTLY PRN
Qty: 30 TABLET | Refills: 5 | Status: SHIPPED | OUTPATIENT
Start: 2023-01-17

## 2023-01-17 ASSESSMENT — ENCOUNTER SYMPTOMS
EYES NEGATIVE: 1
GASTROINTESTINAL NEGATIVE: 1
SHORTNESS OF BREATH: 0

## 2023-01-17 NOTE — PROGRESS NOTES
Subjective:      Patient ID: Kelsie Adam is a 69 y.o. male. Chief Complaint   Patient presents with    New Patient     Muscle spasm from shoulder to neck right side     HPI Initial new patient consult    PT, after 7 weeks increased pain so stopped. History neck pain, was typically on the left side. Tylenol prn with relief    Pain Assessment  Location of Pain: Shoulder  Location Modifiers: Right  Severity of Pain: 7  Quality of Pain: Sharp, Dull, Aching (spasm pinching)  Duration of Pain: Persistent  Frequency of Pain: Constant  Aggravating Factors: Stretching, Straightening, Bending (ROM)  Limiting Behavior: Yes  Relieving Factors: Rest, Ice, Heat (meds)  Result of Injury: No  Work-Related Injury: No    Allergies   Allergen Reactions    Chlorhexidine Rash    Vicodin [Hydrocodone-Acetaminophen] Nausea Only     Stomach upset       Outpatient Medications Marked as Taking for the 1/17/23 encounter (Office Visit) with PARESH Rushing CNP   Medication Sig Dispense Refill    tiZANidine (ZANAFLEX) 4 MG tablet Take 1 tablet by mouth nightly as needed (neck pain) 30 tablet 0    tamsulosin (FLOMAX) 0.4 MG capsule Take 1 capsule by mouth once daily 90 capsule 0    metoprolol tartrate (LOPRESSOR) 25 MG tablet Take 0.5 tablets by mouth in the morning and 0.5 tablets before bedtime.  180 tablet 3    furosemide (LASIX) 20 MG tablet TAKE 1 TABLET BY MOUTH ONCE DAILY AS NEEDED FOR  LEG  SWELLING 45 tablet 3    clopidogrel (PLAVIX) 75 MG tablet Take 1 tablet by mouth once daily 90 tablet 3    atorvastatin (LIPITOR) 40 MG tablet Take 1 tablet by mouth nightly 90 tablet 3    aspirin 81 MG chewable tablet Take 81 mg by mouth daily      Cholecalciferol (VITAMIN D) 50 MCG (2000 UT) CAPS capsule Take 2,000 Units by mouth daily      vitamin C (ASCORBIC ACID) 500 MG tablet Take 250 mg by mouth 2 times daily      acetaminophen (TYLENOL) 325 MG tablet Take 2 tablets by mouth every 6 hours as needed for Pain 120 tablet 3 Past Medical History:   Diagnosis Date    Abnormal cardiovascular stress test 12/09/2020    Large inferolateral infarction with significant vernon-infarct ischemia. CAD (coronary artery disease)     CABG-2 vessel, followed by stents x 4    Cervical disc disease     Clostridium difficile diarrhea 10/2014    hospitalized in October 2014    Displacement of cervical intervertebral disc without myelopathy 06/10/2014    [facility], C6/C7     HTN (hypertension)     Hyperlipidemia     Impaired fasting blood sugar     Neck pain     chronic    Neuralgia, neuritis, and radiculitis, unspecified 06/10/2014    [facility], left C7     Shoulder region pain     Left   [facility] injury 01/08/2014    Sprain and strain of unspecified site of shoulder and upper arm 06/10/2014    [facility], left arm     Wears glasses        Past Surgical History:   Procedure Laterality Date    BICEPS TENODESIS Right 11/23/2016    right proximal bicep tenodesis    COLONOSCOPY N/A 12/10/2019    COLONOSCOPY POLYPECTOMY SNARE/COLD BIOPSY performed by Madyson Keys MD at [place] [address]  12/18/2020    Successful PTCA. AGUILA of mid LAD / Patent LIMA however distally occluded and not supplying LAD /Residual high grade stenosis in small LCX    CORONARY ANGIOPLASTY WITH STENT PLACEMENT  01/22/2021    CT NEEDLE BIOPSY LUNG PERCUTANEOUS  12/15/2021    CT NEEDLE BIOPSY LUNG PERCUTANEOUS 12/15/2021 [facility] CT SCAN    CYSTOSCOPY N/A 03/09/2020    CYSTO TURBT performed by Loraine Carlin MD at [address] [place] [address] N/A 3/25/2022    LAPAROSCOPIC Robotic Assisted Ventral Hernia Repair with mesh WITH LYSIS OF ADHESIONS performed by [facility] Left 8/12/14, 1/09/15    C7 TFE    PAIN MANAGEMENT PROCEDURE Left 09/23/2014    C7 TFE    PAIN MANAGEMENT PROCEDURE Left 7/17/2015 , 1/22/16    C7 TFE    [place] CORONARY ARTERY BYP W/VEIN & ARTERY GRAFT 1 VEIN N/A 07/06/2018    CABG CORONARY ARTERY BYPASS, GARY ERAZO CHANI (CSI# [id number]) performed by Sang Paulson MD at [facility] CABG (LIMA-LAD, R SVG- RCA, OM too small for the grafts) on 7/6/18,    ROTATOR CUFF REPAIR Left 01/29/2019 2015- Right    SHOULDER ARTHROSCOPY Right 04/27/2016    scope decompressing, rotator cuff repair    TRANSESOPHAGEAL ECHOCARDIOGRAM  76/76/6286    UMBILICAL HERNIA REPAIR N/A 12/18/2019    Laparoscopic Robotic Assisted Umbilical Hernia Repair W/ MESH performed by Jeane Morton MD at [address] [place] [address] N/A 11/19/2020    Open Incisional Hernia Repair W/ MESH performed by Krissy Fung DO at [facility] OR       Family History   Problem Relation Age of Onset    High Blood Pressure Father     Colon Polyps Brother     COPD Mother        Social History     Socioeconomic History    Marital status:      Spouse name: Tom Lobato    Number of children: 2    Years of education: None    Highest education level: None   Occupational History     Comment: [employer]   Tobacco Use    Smoking status: Former     Packs/day: 2.00     Years: 25.00     Pack years: 50.00     Types: Cigarettes     Start date: 1/1/1975     Quit date: 1/4/2008     Years since quitting: 15.0    Smokeless tobacco: Never    Tobacco comments:     Greer Chong RRT 11/23/16   Vaping Use    Vaping Use: Never used   Substance and Sexual Activity    Alcohol use: No     Alcohol/week: 0.0 standard drinks    Drug use: No    Sexual activity: Yes     Partners: Female     Social Determinants of Health     Financial Resource Strain: Low Risk     Difficulty of Paying Living Expenses: Not hard at all   Food Insecurity: No Food Insecurity    Worried About Running Out of Food in the Last Year: Never true    Ran Out of Food in the Last Year: Never true   Physical Activity: Insufficiently Active    Days of Exercise per Week: 5 days    Minutes of Exercise per Session: 20 min     Review of Systems   Constitutional:  Positive for activity change. HENT: Negative. Eyes: Negative. Respiratory:  Negative for shortness of breath. Cardiovascular:  Negative for chest pain. Gastrointestinal: Negative. Endocrine: Negative. Genitourinary:  Negative for difficulty urinating. Musculoskeletal:  Positive for arthralgias, joint swelling, myalgias and neck pain. Skin: Negative. Neurological:  Positive for weakness and numbness. Hematological:  Does not bruise/bleed easily. Psychiatric/Behavioral:  Positive for sleep disturbance. Objective:   Physical Exam  Vitals reviewed. Constitutional:       General: He is awake. He is not in acute distress. Appearance: He is well-developed. He is not ill-appearing, toxic-appearing or diaphoretic. Interventions: He is not intubated. HENT:      Head: Normocephalic and atraumatic. Right Ear: External ear normal.      Left Ear: External ear normal.      Nose: Nose normal.   Eyes:      General: Lids are normal.   Cardiovascular:      Rate and Rhythm: Normal rate. Pulmonary:      Effort: Pulmonary effort is normal. No tachypnea, bradypnea, accessory muscle usage, prolonged expiration, respiratory distress or retractions. He is not intubated. Abdominal:      General: Abdomen is flat. Palpations: Abdomen is soft. Musculoskeletal:      Cervical back: Muscular tenderness (right upper trapezius) present. Decreased range of motion. Right foot: No foot drop. Left foot: No foot drop. Skin:     General: Skin is warm and dry. Capillary Refill: Capillary refill takes less than 2 seconds. Coloration: Skin is not ashen, jaundiced or pale. Findings: No rash. Nails: There is no clubbing. Neurological:      Mental Status: He is alert and oriented to person, place, and time. Cranial Nerves: No cranial nerve deficit. Psychiatric:         Attention and Perception: Attention normal.         Mood and Affect: Mood normal.         Speech: Speech normal.         Behavior: Behavior is cooperative. Cognition and Memory: Cognition normal.         Judgment: Judgment normal.       Assessment / Plan:      Diagnosis Orders   1. DDD (degenerative disc disease), cervical        2. Cervical radiculopathy        3. Cervical facet joint syndrome        4.  Myofascial pain            Cervical xray reviewed, Schedule TPI's, if no relief will schedule cervical CT scan

## 2023-01-24 ENCOUNTER — PROCEDURE VISIT (OUTPATIENT)
Dept: PAIN MANAGEMENT | Age: 68
End: 2023-01-24
Payer: MEDICARE

## 2023-01-24 VITALS
HEIGHT: 69 IN | BODY MASS INDEX: 32.14 KG/M2 | SYSTOLIC BLOOD PRESSURE: 110 MMHG | DIASTOLIC BLOOD PRESSURE: 64 MMHG | HEART RATE: 76 BPM | OXYGEN SATURATION: 95 % | WEIGHT: 217 LBS

## 2023-01-24 DIAGNOSIS — M79.18 MYOFASCIAL PAIN: Primary | ICD-10-CM

## 2023-01-24 PROCEDURE — 99215 OFFICE O/P EST HI 40 MIN: CPT | Performed by: NURSE PRACTITIONER

## 2023-01-24 PROCEDURE — 20553 NJX 1/MLT TRIGGER POINTS 3/>: CPT | Performed by: NURSE PRACTITIONER

## 2023-01-24 RX ADMIN — TRIAMCINOLONE ACETONIDE 10 MG: 10 INJECTION, SUSPENSION INTRA-ARTICULAR; INTRALESIONAL at 11:04

## 2023-01-24 NOTE — PROGRESS NOTES
[address]  1955 1/24/23      PAIN MANAGEMENT CLINIC PROCEDURE NOTE    CHIEF COMPLAINT: This is a 69 y.o. male patient who presents to the [facility] Management Clinic with a history of pain in the right upper back. PRE-PROCEDURE DIAGNOSIS: Right myofascial pain    POST-PROCEDURE DIAGNOSIS: same as pre-procedure diagnosis    Vitals:    01/24/23 1038   BP: 110/64   Pulse: 76   SpO2: 95%            Trigger points were found in the right:   1. UPPER TRAPEZIUS MUSCLE  2. LEVATOR SCAPULA MUSCLE  4. RHOMBOIDS      PROCEDURE:     The procedure was explained, including risks and benefits, and the patient has agreed to proceed. The injection site was marked on the patients skin. A large area around the injection site was cleaned with chlora-prep. TRIGGER POINT INJECTIONS  A 25G 1.5 inch needle was inserted into each muscle. Depth and direction of the needle were monitored at all times. The needle was advanced until a muscle twitch response was felt and the patient reported concordant pain. The syringe was aspirated and was negative for heme or air. Then, a combination of 1ml of 2%lidocaine, and 10mg of kenalog (NDC# 6263-0404-16) was injected. The needle was then moved in and out of the muscle at the same depth to break up additional muscle spasms. A total of 6 trigger points were injected. The injection site was cleaned and dressed with a spot band aid. The patient tolerated the procedure well and with out complication The patient was instructed avoid heat and excessive activity for 24-48 hours.

## 2023-02-07 ENCOUNTER — HOSPITAL ENCOUNTER (OUTPATIENT)
Dept: GENERAL RADIOLOGY | Age: 68
Discharge: HOME | End: 2023-02-09
Payer: MEDICARE

## 2023-02-07 ENCOUNTER — HOSPITAL ENCOUNTER (OUTPATIENT)
Age: 68
Discharge: HOME | End: 2023-02-07
Payer: MEDICARE

## 2023-02-07 DIAGNOSIS — M89.9 LYTIC LESION OF BONE ON X-RAY: ICD-10-CM

## 2023-02-07 DIAGNOSIS — C67.9 UROTHELIAL CARCINOMA OF BLADDER (HCC): ICD-10-CM

## 2023-02-07 DIAGNOSIS — R91.8 LUNG MASS: ICD-10-CM

## 2023-02-07 LAB
ABSOLUTE EOS #: 0.22 K/UL (ref 0–0.44)
ABSOLUTE IMMATURE GRANULOCYTE: 0.07 K/UL (ref 0–0.3)
ABSOLUTE LYMPH #: 3.06 K/UL (ref 1.1–3.7)
ABSOLUTE MONO #: 0.52 K/UL (ref 0.1–1.2)
ALBUMIN SERPL-MCNC: 4.1 G/DL (ref 3.5–5.2)
ALBUMIN/GLOBULIN RATIO: 1.5 (ref 1–2.5)
ALP SERPL-CCNC: 120 U/L (ref 40–129)
ALT SERPL-CCNC: 23 U/L (ref 5–41)
ANION GAP SERPL CALCULATED.3IONS-SCNC: 9 MMOL/L (ref 9–17)
AST SERPL-CCNC: 20 U/L
BASOPHILS # BLD: 1 % (ref 0–2)
BASOPHILS ABSOLUTE: 0.09 K/UL (ref 0–0.2)
BILIRUB SERPL-MCNC: 0.5 MG/DL (ref 0.3–1.2)
BUN SERPL-MCNC: 16 MG/DL (ref 8–23)
BUN/CREAT BLD: 19 (ref 9–20)
CALCIUM SERPL-MCNC: 9 MG/DL (ref 8.6–10.4)
CHLORIDE SERPL-SCNC: 105 MMOL/L (ref 98–107)
CO2 SERPL-SCNC: 26 MMOL/L (ref 20–31)
CREAT SERPL-MCNC: 0.86 MG/DL (ref 0.7–1.2)
EOSINOPHILS RELATIVE PERCENT: 3 % (ref 1–4)
FREE KAPPA/LAMBDA RATIO: 1.44 (ref 0.26–1.65)
GFR SERPL CREATININE-BSD FRML MDRD: >60 ML/MIN/1.73M2
GLUCOSE SERPL-MCNC: 106 MG/DL (ref 70–99)
HCT VFR BLD AUTO: 48 % (ref 40.7–50.3)
HGB BLD-MCNC: 15.9 G/DL (ref 13–17)
IMMATURE GRANULOCYTES: 1 %
KAPPA LC FREE SER-MCNC: 1.58 MG/DL (ref 0.37–1.94)
LAMBDA LC FREE SERPL-MCNC: 1.1 MG/DL (ref 0.57–2.63)
LYMPHOCYTES # BLD: 37 % (ref 24–43)
MCH RBC QN AUTO: 27.7 PG (ref 25.2–33.5)
MCHC RBC AUTO-ENTMCNC: 33.1 G/DL (ref 25.2–33.5)
MCV RBC AUTO: 83.5 FL (ref 82.6–102.9)
MONOCYTES # BLD: 6 % (ref 3–12)
NRBC AUTOMATED: 0 PER 100 WBC
PDW BLD-RTO: 13.4 % (ref 11.8–14.4)
PLATELET # BLD AUTO: 230 K/UL (ref 138–453)
PMV BLD AUTO: 9.9 FL (ref 8.1–13.5)
POTASSIUM SERPL-SCNC: 4.8 MMOL/L (ref 3.7–5.3)
PROT SERPL-MCNC: 6.8 G/DL (ref 6.4–8.3)
RBC # BLD: 5.75 M/UL (ref 4.21–5.77)
SEG NEUTROPHILS: 52 % (ref 36–65)
SEGMENTED NEUTROPHILS ABSOLUTE COUNT: 4.31 K/UL (ref 1.5–8.1)
SODIUM SERPL-SCNC: 140 MMOL/L (ref 135–144)
WBC # BLD AUTO: 8.3 K/UL (ref 3.5–11.3)

## 2023-02-07 PROCEDURE — 73060 X-RAY EXAM OF HUMERUS: CPT

## 2023-02-07 PROCEDURE — 84165 PROTEIN E-PHORESIS SERUM: CPT

## 2023-02-07 PROCEDURE — 84155 ASSAY OF PROTEIN SERUM: CPT

## 2023-02-07 PROCEDURE — 80053 COMPREHEN METABOLIC PANEL: CPT

## 2023-02-07 PROCEDURE — 86335 IMMUNFIX E-PHORSIS/URINE/CSF: CPT

## 2023-02-07 PROCEDURE — 84156 ASSAY OF PROTEIN URINE: CPT

## 2023-02-07 PROCEDURE — 36415 COLL VENOUS BLD VENIPUNCTURE: CPT

## 2023-02-07 PROCEDURE — 83521 IG LIGHT CHAINS FREE EACH: CPT

## 2023-02-07 PROCEDURE — 85025 COMPLETE CBC W/AUTO DIFF WBC: CPT

## 2023-02-08 LAB
ALBUMIN (CALCULATED): 4.2 G/DL (ref 3.2–5.2)
ALBUMIN PERCENT: 66 % (ref 45–65)
ALPHA 1 PERCENT: 3 % (ref 3–6)
ALPHA 2 PERCENT: 10 % (ref 6–13)
ALPHA1 GLOB SERPL ELPH-MCNC: 0.2 G/DL (ref 0.1–0.4)
ALPHA2 GLOB SERPL ELPH-MCNC: 0.7 G/DL (ref 0.5–0.9)
B-GLOBULIN SERPL ELPH-MCNC: 0.7 G/DL (ref 0.5–1.1)
BETA PERCENT: 11 % (ref 11–19)
GAMMA GLOB SERPL ELPH-MCNC: 0.7 G/DL (ref 0.5–1.5)
GAMMA GLOBULIN %: 11 % (ref 9–20)
PATHOLOGIST: ABNORMAL
PROT SERPL-MCNC: 6.4 G/DL (ref 6.4–8.3)
PROTEIN ELECTROPHORESIS, SERUM: ABNORMAL
TOTAL PROT. SUM,%: 101 % (ref 98–102)
TOTAL PROT. SUM: 6.5 G/DL (ref 6.3–8.2)
URINE IFX INTERP: NORMAL
URINE IFX SPECIMEN: NORMAL
URINE TOTAL PROTEIN: 11 MG/DL
VOLUME: 40 ML

## 2023-02-14 ENCOUNTER — OFFICE VISIT (OUTPATIENT)
Dept: ONCOLOGY | Age: 68
End: 2023-02-14
Payer: MEDICARE

## 2023-02-14 VITALS
OXYGEN SATURATION: 96 % | HEIGHT: 69 IN | TEMPERATURE: 98.1 F | RESPIRATION RATE: 16 BRPM | HEART RATE: 84 BPM | SYSTOLIC BLOOD PRESSURE: 112 MMHG | WEIGHT: 220 LBS | BODY MASS INDEX: 32.58 KG/M2 | DIASTOLIC BLOOD PRESSURE: 64 MMHG

## 2023-02-14 DIAGNOSIS — C67.9 UROTHELIAL CARCINOMA OF BLADDER (HCC): ICD-10-CM

## 2023-02-14 DIAGNOSIS — M89.9 LYTIC LESION OF BONE ON X-RAY: Primary | ICD-10-CM

## 2023-02-14 PROCEDURE — G8427 DOCREV CUR MEDS BY ELIG CLIN: HCPCS | Performed by: INTERNAL MEDICINE

## 2023-02-14 PROCEDURE — G8484 FLU IMMUNIZE NO ADMIN: HCPCS | Performed by: INTERNAL MEDICINE

## 2023-02-14 PROCEDURE — 3017F COLORECTAL CA SCREEN DOC REV: CPT | Performed by: INTERNAL MEDICINE

## 2023-02-14 PROCEDURE — 1036F TOBACCO NON-USER: CPT | Performed by: INTERNAL MEDICINE

## 2023-02-14 PROCEDURE — 99213 OFFICE O/P EST LOW 20 MIN: CPT | Performed by: INTERNAL MEDICINE

## 2023-02-14 PROCEDURE — 3074F SYST BP LT 130 MM HG: CPT | Performed by: INTERNAL MEDICINE

## 2023-02-14 PROCEDURE — 1123F ACP DISCUSS/DSCN MKR DOCD: CPT | Performed by: INTERNAL MEDICINE

## 2023-02-14 PROCEDURE — G8417 CALC BMI ABV UP PARAM F/U: HCPCS | Performed by: INTERNAL MEDICINE

## 2023-02-14 PROCEDURE — 3078F DIAST BP <80 MM HG: CPT | Performed by: INTERNAL MEDICINE

## 2023-02-14 NOTE — PROGRESS NOTES
Governor Robb                                                                                                                  2/14/2023  MRN:   [id number]  YOB: 1955  PCP:                           PARESH Garner - CNP  Referring Physician: Kishor  Treating Physician Name: Tracey Franco MD      Reason for visit:  Chief Complaint   Patient presents with    Follow-up     Lytic lesion, 6 month follow up   Reviewed results of lab work-up and imaging studies    Current problems:  Right lung apex mass, 1.8 cm, mildly FDG avid, status post biopsy negative for malignancy  Severe emphysema  Coronary artery s/p CABG  History of tobacco dependence  History of nonmuscle invasive urothelial cancer of bladder    Active and recent treatments:  Active surveillance of lung mass  Surveillance for lytic lesion of humerus    Interim History:  Patient presents to the clinic for a follow-up visit and to discuss results of his lab work-up and x-ray. Patient clinically is doing well. Denies any pain in his arm. Humerus x-ray again shows a lytic lesion with malignant etiology differential however patient paraproteinemia profile is negative. Previously work-up for malignancy was negative. Patient does have a follow-up coming up with pulmonary team with a CT chest prior to that. During this visit patient's allergy, social, medical, surgical history and medications were reviewed and updated. Summary of Case/History:    Governor Robb a 69 y.o.male is a patient with right lung apex mass noted on CT lung screen. Presents to the clinic to establish care and for further work-up and evaluation. Patient has significant history of tobacco dependence more than 35-year pack per day history. Patient quit about 15 years ago. Patient is retired and used to work as a . Patient has been having annual screening lung CT. Last lung CT from October 2020 was unremarkable.   Most recent CT lung screen from November 2021 shows a 1.8 cm nodular density in the right lung apex. Additionally also showed severe emphysema throughout the lungs. There was also stable small right pleural effusion noted. Patient subsequently was referred to oncology service as well as pulmonary team.  He sees pulmonary team to establish care on 12/1/2021. Patient denies any hemoptysis. Denies any weight loss. Denies any chest pain. Past Medical History:   Past Medical History:   Diagnosis Date    Abnormal cardiovascular stress test 12/09/2020    Large inferolateral infarction with significant vernon-infarct ischemia. CAD (coronary artery disease)     CABG-2 vessel, followed by stents x 4    Cervical disc disease     Clostridium difficile diarrhea 10/2014    hospitalized in October 2014    Displacement of cervical intervertebral disc without myelopathy 06/10/2014    [facility], C6/C7     HTN (hypertension)     Hyperlipidemia     Impaired fasting blood sugar     Neck pain     chronic    Neuralgia, neuritis, and radiculitis, unspecified 06/10/2014    [facility], left C7     Shoulder region pain     Left   [facility] injury 01/08/2014    Sprain and strain of unspecified site of shoulder and upper arm 06/10/2014    [facility], left arm     Wears glasses        Past Surgical History:     Past Surgical History:   Procedure Laterality Date    BICEPS TENODESIS Right 11/23/2016    right proximal bicep tenodesis    COLONOSCOPY N/A 12/10/2019    COLONOSCOPY POLYPECTOMY SNARE/COLD BIOPSY performed by Dylan Flores MD at [place] [address]  12/18/2020    Successful PTCA. AGUILA of mid LAD / Patent LIMA however distally occluded and not supplying LAD /Residual high grade stenosis in small LCX    CORONARY ANGIOPLASTY WITH STENT PLACEMENT  01/22/2021    CT NEEDLE BIOPSY LUNG PERCUTANEOUS  12/15/2021    CT NEEDLE BIOPSY LUNG PERCUTANEOUS 12/15/2021 [facility] CT SCAN    CYSTOSCOPY N/A 03/09/2020    CYSTO TURBT performed by Flaco Sevilla MD at [address] [place] [address] HERNIA REPAIR N/A 3/25/2022    LAPAROSCOPIC Robotic Assisted Ventral Hernia Repair with mesh WITH LYSIS OF ADHESIONS performed by Mima Moncada DO at [address] [place] [address] Left 14, 1/09/15    C7 TFE    PAIN MANAGEMENT PROCEDURE Left 2014    C7 TFE    PAIN MANAGEMENT PROCEDURE Left 2015 , 16    C7 TFE    [place] CORONARY ARTERY BYP W/VEIN & ARTERY GRAFT 1 VEIN N/A 2018    CABG CORONARY ARTERY BYPASS, GARY ERAZO, CHANI  (CSI# [id number]) performed by Mikaela Shanks MD at [facility] CABG (LIMA-LAD, R SVG- RCA, OM too small for the grafts) on 18,    ROTATOR CUFF REPAIR Left 2019- Right    SHOULDER ARTHROSCOPY Right 2016    scope decompressing, rotator cuff repair    TRANSESOPHAGEAL ECHOCARDIOGRAM  15/10/0598    UMBILICAL HERNIA REPAIR N/A 2019    Laparoscopic Robotic Assisted Umbilical Hernia Repair W/ MESH performed by Macarena Fong MD at [address] [place] [address] N/A 2020    Open Incisional Hernia Repair W/ MESH performed by Mima Moncada DO at [facility] OR       Patient Family Social History:     Social History     Socioeconomic History    Marital status:      Spouse name: Kellen Pollard    Number of children: 2    Years of education: None    Highest education level: None   Occupational History     Comment: [employer]   Tobacco Use    Smoking status: Former     Packs/day: 2.00     Years: 25.00     Pack years: 50.00     Types: Cigarettes     Start date: 1975     Quit date: 2008     Years since quitting: 15.1    Smokeless tobacco: Never    Tobacco comments:     Saumya Rivero RRT 16   Vaping Use    Vaping Use: Never used   Substance and Sexual Activity    Alcohol use: No     Alcohol/week: 0.0 standard drinks    Drug use: No    Sexual activity: Yes     Partners: Female     Social Determinants of Health     Financial Resource Strain: Low Risk     Difficulty of Paying Living Expenses: Not hard at all   Food Insecurity: No Food Insecurity    Worried About Running Out of Food in the Last Year: Never true    Ran Out of Food in the Last Year: Never true   Physical Activity: Insufficiently Active    Days of Exercise per Week: 5 days    Minutes of Exercise per Session: 20 min     Family History   Problem Relation Age of Onset    High Blood Pressure Father     Colon Polyps Brother     COPD Mother      Current Medications:     Current Outpatient Medications   Medication Sig Dispense Refill    tiZANidine (ZANAFLEX) 4 MG tablet Take 1 tablet by mouth nightly as needed (neck pain) 30 tablet 5    tamsulosin (FLOMAX) 0.4 MG capsule Take 1 capsule by mouth once daily 90 capsule 0    metoprolol tartrate (LOPRESSOR) 25 MG tablet Take 0.5 tablets by mouth in the morning and 0.5 tablets before bedtime. 180 tablet 3    furosemide (LASIX) 20 MG tablet TAKE 1 TABLET BY MOUTH ONCE DAILY AS NEEDED FOR  LEG  SWELLING 45 tablet 3    clopidogrel (PLAVIX) 75 MG tablet Take 1 tablet by mouth once daily 90 tablet 3    atorvastatin (LIPITOR) 40 MG tablet Take 1 tablet by mouth nightly 90 tablet 3    aspirin 81 MG chewable tablet Take 81 mg by mouth daily      Cholecalciferol (VITAMIN D) 50 MCG (2000 UT) CAPS capsule Take 2,000 Units by mouth daily      vitamin C (ASCORBIC ACID) 500 MG tablet Take 250 mg by mouth 2 times daily      acetaminophen (TYLENOL) 325 MG tablet Take 2 tablets by mouth every 6 hours as needed for Pain 120 tablet 3     No current facility-administered medications for this visit. Allergies:   Chlorhexidine and Vicodin [hydrocodone-acetaminophen]    Review of Systems:    Constitutional: No fever or chills.  No night sweats, no weight loss   Eyes: No eye discharge, double vision, or eye pain   HEENT: negative for sore mouth, sore throat, hoarseness and voice change   Respiratory: negative for cough , sputum, dyspnea, wheezing, hemoptysis, chest pain   Cardiovascular: negative for chest pain, dyspnea, palpitations, orthopnea, PND   Gastrointestinal: negative for nausea, vomiting, diarrhea, constipation, abdominal pain, Dysphagia, hematemesis and hematochezia   Genitourinary: negative for frequency, dysuria, nocturia, urinary incontinence, and hematuria   Integument: negative for rash, skin lesions, bruises. Hematologic/Lymphatic: negative for easy bruising, bleeding, lymphadenopathy, or petechiae   Endocrine: negative for heat or cold intolerance,weight changes, change in bowel habits and hair loss   Musculoskeletal: negative for myalgias, arthralgias, pain, joint swelling,and bone pain.     Neurological: negative for headaches, dizziness, seizures, weakness, numbness      Physical Exam:    Vitals: /64 (Site: Right Upper Arm, Position: Sitting, Cuff Size: Medium Adult)   Pulse 84   Temp 98.1 °F (36.7 °C) (Temporal)   Resp 16   Ht 5' 9\" (1.753 m)   Wt 220 lb (99.8 kg)   SpO2 96%   BMI 32.49 kg/m²   General appearance - well appearing, no in pain or distress  Mental status - AAO X3  Eyes - pupils equal and reactive, extraocular eye movements intact  Mouth - mucous membranes moist, pharynx normal without lesions  Neck - supple, no significant adenopathy  Lymphatics - no palpable lymphadenopathy, no hepatosplenomegaly  Chest - clear to auscultation, no wheezes, rales or rhonchi, symmetric air entry  Heart - normal rate, regular rhythm, normal S1, S2, no murmurs  Abdomen - soft, nontender, nondistended, no masses or organomegaly  Neurological - alert, oriented, normal speech, no focal findings or movement disorder noted  Extremities - peripheral pulses normal, no pedal edema, no clubbing or cyanosis  Skin - normal coloration and turgor, no rashes, no suspicious skin lesions noted           DATA:    Results for orders placed or performed during the hospital encounter of 02/07/23   [place]/Lambda Quantitative Free Light Chains, Serum   Result Value Ref Range    Kappa Free Light Chains QNT 1.58 0.37 - 1.94 mg/dL    Lambda Free Light Chains QNT 1.10 0.57 - 2.63 mg/dL    Free Kappa/Lambda Ratio 1.44 0.26 - 1.65   Immunofixation Urine Random Profile   Result Value Ref Range    Urine IFX Specimen . CLEAN CATCH URINE     Volume 40 mL    Urine Total Protein 11 mg/dL    Urine IFX Interp       IMMUNOFIXATION IS NEGATIVE FOR MONOCLONAL IMMUNOGLOBULIN. Electrophoresis Protein, Serum   Result Value Ref Range    Total Protein 6.4 6.4 - 8.3 g/dL    Albumin (calculated) 4.2 3.2 - 5.2 g/dL    Albumin % 66 (H) 45 - 65 %    Alpha-1-Globulin 0.2 0.1 - 0.4 g/dL    Alpha 1 % 3 3 - 6 %    Alpha-2-Globulin 0.7 0.5 - 0.9 g/dL    Alpha 2 % 10 6 - 13 %    Beta Globulin 0.7 0.5 - 1.1 g/dL    Beta Percent 11 11 - 19 %    Gamma Globulin 0.7 0.5 - 1.5 g/dL    Gamma Globulin % 11 9 - 20 %    Total Prot. Sum 6.5 6.3 - 8.2 g/dL    Total Prot. Sum,% 101 98 - 102 %    Protein Electrophoresis, Serum NORMAL ELECTROPHORETIC PATTERN     Pathologist ELECTRONICALLY SIGNED. Radha Connolly M.D.     Comprehensive Metabolic Panel   Result Value Ref Range    Glucose 106 (H) 70 - 99 mg/dL    BUN 16 8 - 23 mg/dL    Creatinine 0.86 0.70 - 1.20 mg/dL    Est, Glom Filt Rate >60 >60 mL/min/1.73m2    Bun/Cre Ratio 19 9 - 20    Calcium 9.0 8.6 - 10.4 mg/dL    Sodium 140 135 - 144 mmol/L    Potassium 4.8 3.7 - 5.3 mmol/L    Chloride 105 98 - 107 mmol/L    CO2 26 20 - 31 mmol/L    Anion Gap 9 9 - 17 mmol/L    Alkaline Phosphatase 120 40 - 129 U/L    ALT 23 5 - 41 U/L    AST 20 <40 U/L    Total Bilirubin 0.5 0.3 - 1.2 mg/dL    Total Protein 6.8 6.4 - 8.3 g/dL    Albumin 4.1 3.5 - 5.2 g/dL    Albumin/Globulin Ratio 1.5 1.0 - 2.5   CBC with Auto Differential   Result Value Ref Range    WBC 8.3 3.5 - 11.3 k/uL    RBC 5.75 4.21 - 5.77 m/uL    Hemoglobin 15.9 13.0 - 17.0 g/dL    Hematocrit 48.0 40.7 - 50.3 %    MCV 83.5 82.6 - 102.9 fL    MCH 27.7 25.2 - 33.5 pg    MCHC 33.1 25.2 - 33.5 g/dL    RDW 13.4 11.8 - 14.4 %    Platelets 810 339 - 889 k/uL    MPV 9.9 8.1 - 13.5 fL    NRBC Automated 0.0 0.0 per 100 WBC    Seg Neutrophils 52 36 - 65 %    Lymphocytes 37 24 - 43 %    Monocytes 6 3 - 12 %    Eosinophils % 3 1 - 4 %    Basophils 1 0 - 2 %    Immature Granulocytes 1 (H) 0 %    Segs Absolute 4.31 1.50 - 8.10 k/uL    Absolute Lymph # 3.06 1.10 - 3.70 k/uL    Absolute Mono # 0.52 0.10 - 1.20 k/uL    Absolute Eos # 0.22 0.00 - 0.44 k/uL    Basophils Absolute 0.09 0.00 - 0.20 k/uL    Absolute Immature Granulocyte 0.07 0.00 - 0.30 k/uL         Impression:  Right lung apex mass, 1.8 cm, FDG avid, status post biopsy, negative for malignancy  Severe emphysema  Coronary artery s/p CABG  History of tobacco dependence  History of nonmuscle invasive urothelial cancer of bladder    Plan:  I had a detailed discussion with the patient and personally went over results of lab work-up imaging studies and other relevant clinical data. Reviewed results of x-ray. Reviewed images. Lytic lesion is obvious but has been stable. Patient is already work-up for metastatic disease or malignancy has been negative including CT PET done in 2021 and bone scan done in 2022. Patient repeat paraproteinemia profile has been negative. Encourage patient to continue to follow-up with pulmonologist given FDG avid right lung mass. He has a follow-up appointment coming up shortly. Patient also encouraged to continue to follow-up with primary care physician as well as urologist  At this point given lack of any clear evidence of malignant disease or diagnosis of malignancy we will see patient back in office on as-needed basis. Margarita Kern MD      this note is created with the assistance of a speech recognition program.  While intending to generate a document that actually reflects the content of the visit, the document can still have some errors including those of syntax and sound a like substitutions which may escape proof reading. It such instances, actual meaning can be extrapolated by contextual diversion.

## 2023-02-17 RX ORDER — ATORVASTATIN CALCIUM 40 MG/1
40 TABLET, FILM COATED ORAL NIGHTLY
Qty: 90 TABLET | Refills: 0 | Status: SHIPPED | OUTPATIENT
Start: 2023-02-17

## 2023-02-19 DIAGNOSIS — N40.0 BENIGN PROSTATIC HYPERPLASIA WITHOUT LOWER URINARY TRACT SYMPTOMS: ICD-10-CM

## 2023-02-20 RX ORDER — TAMSULOSIN HYDROCHLORIDE 0.4 MG/1
CAPSULE ORAL
Qty: 90 CAPSULE | Refills: 0 | Status: SHIPPED | OUTPATIENT
Start: 2023-02-20

## 2023-02-21 ENCOUNTER — OFFICE VISIT (OUTPATIENT)
Dept: PAIN MANAGEMENT | Age: 68
End: 2023-02-21
Payer: MEDICARE

## 2023-02-21 VITALS
BODY MASS INDEX: 32.14 KG/M2 | WEIGHT: 217 LBS | HEIGHT: 69 IN | DIASTOLIC BLOOD PRESSURE: 80 MMHG | OXYGEN SATURATION: 95 % | HEART RATE: 67 BPM | SYSTOLIC BLOOD PRESSURE: 118 MMHG

## 2023-02-21 DIAGNOSIS — M54.12 CERVICAL RADICULOPATHY: Primary | ICD-10-CM

## 2023-02-21 DIAGNOSIS — M50.20 DISPLACEMENT OF CERVICAL INTERVERTEBRAL DISC WITHOUT MYELOPATHY: ICD-10-CM

## 2023-02-21 PROCEDURE — G8427 DOCREV CUR MEDS BY ELIG CLIN: HCPCS | Performed by: NURSE PRACTITIONER

## 2023-02-21 PROCEDURE — G8417 CALC BMI ABV UP PARAM F/U: HCPCS | Performed by: NURSE PRACTITIONER

## 2023-02-21 PROCEDURE — G8484 FLU IMMUNIZE NO ADMIN: HCPCS | Performed by: NURSE PRACTITIONER

## 2023-02-21 PROCEDURE — 1123F ACP DISCUSS/DSCN MKR DOCD: CPT | Performed by: NURSE PRACTITIONER

## 2023-02-21 PROCEDURE — 3074F SYST BP LT 130 MM HG: CPT | Performed by: NURSE PRACTITIONER

## 2023-02-21 PROCEDURE — 3017F COLORECTAL CA SCREEN DOC REV: CPT | Performed by: NURSE PRACTITIONER

## 2023-02-21 PROCEDURE — 99214 OFFICE O/P EST MOD 30 MIN: CPT | Performed by: NURSE PRACTITIONER

## 2023-02-21 PROCEDURE — 3079F DIAST BP 80-89 MM HG: CPT | Performed by: NURSE PRACTITIONER

## 2023-02-21 PROCEDURE — 1036F TOBACCO NON-USER: CPT | Performed by: NURSE PRACTITIONER

## 2023-02-21 RX ORDER — GABAPENTIN 300 MG/1
CAPSULE ORAL
Qty: 90 CAPSULE | Refills: 1 | Status: SHIPPED | OUTPATIENT
Start: 2023-02-21 | End: 2023-03-31

## 2023-02-21 ASSESSMENT — ENCOUNTER SYMPTOMS
GASTROINTESTINAL NEGATIVE: 1
EYES NEGATIVE: 1
SHORTNESS OF BREATH: 0

## 2023-02-21 NOTE — PROGRESS NOTES
Subjective:      Patient ID: Quincy Roberts is a 69 y.o. male. Chief Complaint   Patient presents with    Back Pain     5 week f/u     HPI Follow up after initial new patient consult. Failed TPI's    PT, after 7 weeks increased pain so stopped. History neck pain, was typically on the left side. Tylenol prn with relief    Pain Assessment  Location of Pain: Back  Severity of Pain: 6  Quality of Pain: Dull, Aching, Sharp  Duration of Pain: Persistent  Frequency of Pain: Constant  Aggravating Factors: Bending, Stretching, Straightening, Exercise, Kneeling, Squatting, Standing, Walking, Stairs  Limiting Behavior: Yes  Relieving Factors: Rest, Heat (meds)  Result of Injury: No  Work-Related Injury: No    Allergies   Allergen Reactions    Chlorhexidine Rash    Vicodin [Hydrocodone-Acetaminophen] Nausea Only     Stomach upset       Outpatient Medications Marked as Taking for the 2/21/23 encounter (Office Visit) with PARESH Barrera CNP   Medication Sig Dispense Refill    gabapentin (NEURONTIN) 300 MG capsule Intended supply: 90 days 90 capsule 1    tamsulosin (FLOMAX) 0.4 MG capsule Take 1 capsule by mouth once daily 90 capsule 0    atorvastatin (LIPITOR) 40 MG tablet Take 1 tablet by mouth nightly 90 tablet 0    tiZANidine (ZANAFLEX) 4 MG tablet Take 1 tablet by mouth nightly as needed (neck pain) 30 tablet 5    metoprolol tartrate (LOPRESSOR) 25 MG tablet Take 0.5 tablets by mouth in the morning and 0.5 tablets before bedtime.  180 tablet 3    furosemide (LASIX) 20 MG tablet TAKE 1 TABLET BY MOUTH ONCE DAILY AS NEEDED FOR  LEG  SWELLING 45 tablet 3    clopidogrel (PLAVIX) 75 MG tablet Take 1 tablet by mouth once daily 90 tablet 3    aspirin 81 MG chewable tablet Take 81 mg by mouth daily      Cholecalciferol (VITAMIN D) 50 MCG (2000 UT) CAPS capsule Take 2,000 Units by mouth daily      vitamin C (ASCORBIC ACID) 500 MG tablet Take 250 mg by mouth 2 times daily      acetaminophen (TYLENOL) 325 MG tablet Take 2 tablets by mouth every 6 hours as needed for Pain 120 tablet 3       Past Medical History:   Diagnosis Date    Abnormal cardiovascular stress test 12/09/2020    Large inferolateral infarction with significant vernon-infarct ischemia. CAD (coronary artery disease)     CABG-2 vessel, followed by stents x 4    Cervical disc disease     Clostridium difficile diarrhea 10/2014    hospitalized in October 2014    Displacement of cervical intervertebral disc without myelopathy 06/10/2014    [facility], C6/C7     HTN (hypertension)     Hyperlipidemia     Impaired fasting blood sugar     Neck pain     chronic    Neuralgia, neuritis, and radiculitis, unspecified 06/10/2014    [facility], left C7     Shoulder region pain     Left   [facility] injury 01/08/2014    Sprain and strain of unspecified site of shoulder and upper arm 06/10/2014    [facility], left arm     Wears glasses        Past Surgical History:   Procedure Laterality Date    BICEPS TENODESIS Right 11/23/2016    right proximal bicep tenodesis    COLONOSCOPY N/A 12/10/2019    COLONOSCOPY POLYPECTOMY SNARE/COLD BIOPSY performed by Dick Dela Cruz MD at [place] [address]  12/18/2020    Successful PTCA. AGUILA of mid LAD / Patent LIMA however distally occluded and not supplying LAD /Residual high grade stenosis in small LCX    CORONARY ANGIOPLASTY WITH STENT PLACEMENT  01/22/2021    CT NEEDLE BIOPSY LUNG PERCUTANEOUS  12/15/2021    CT NEEDLE BIOPSY LUNG PERCUTANEOUS 12/15/2021 [facility] CT SCAN    CYSTOSCOPY N/A 03/09/2020    CYSTO TURBT performed by Ford Flower MD at [address] [place] [address] N/A 3/25/2022    LAPAROSCOPIC Robotic Assisted Ventral Hernia Repair with mesh WITH LYSIS OF ADHESIONS [facility] Left 8/12/14, 1/09/15    C7 TFE    PAIN MANAGEMENT PROCEDURE Left 09/23/2014    C7 TFE    PAIN MANAGEMENT PROCEDURE Left 7/17/2015 , 1/22/16    C7 TFE    [place] CORONARY ARTERY BYP W/VEIN & ARTERY GRAFT 1 VEIN N/A 07/06/2018    CABG CORONARY ARTERY BYPASS, GARY ERAZO, CHANI  (CSI# [id number]) performed by Jo Hobbs MD at [facility] CABG (LIMA-LAD, R SVG- RCA, OM too small for the grafts) on 7/6/18,    ROTATOR CUFF REPAIR Left 01/29/2019 2015- Right    SHOULDER ARTHROSCOPY Right 04/27/2016    scope decompressing, rotator cuff repair    TRANSESOPHAGEAL ECHOCARDIOGRAM  19/62/0290    UMBILICAL HERNIA REPAIR N/A 12/18/2019    Laparoscopic Robotic Assisted Umbilical Hernia Repair W/ MESH performed by Vandana Stephenson MD at [address] [place] [address] N/A 11/19/2020    Open Incisional Hernia Repair W/ MESH performed by Mirta Fregoso DO at [facility] OR       Family History   Problem Relation Age of Onset    High Blood Pressure Father     Colon Polyps Brother     COPD Mother        Social History     Socioeconomic History    Marital status:      Spouse name: Rhoda Gomes    Number of children: 2    Years of education: None    Highest education level: None   Occupational History     Comment: [employer]   Tobacco Use    Smoking status: Former     Packs/day: 2.00     Years: 25.00     Pack years: 50.00     Types: Cigarettes     Start date: 1/1/1975     Quit date: 1/4/2008     Years since quitting: 15.1    Smokeless tobacco: Never    Tobacco comments:     RUSLAN Hobson RRT 11/23/16   Vaping Use    Vaping Use: Never used   Substance and Sexual Activity    Alcohol use: No     Alcohol/week: 0.0 standard drinks    Drug use: No    Sexual activity: Yes     Partners: Female     Social Determinants of Health     Financial Resource Strain: Low Risk     Difficulty of Paying Living Expenses: Not hard at all   Food Insecurity: No Food Insecurity    Worried About Running Out of Food in the Last Year: Never true    Ran Out of Food in the Last Year: Never true   Physical Activity: Insufficiently Active    Days of Exercise per Week: 5 days    Minutes of Exercise per Session: 20 min     Review of Systems   Constitutional: Positive for activity change. HENT: Negative. Eyes: Negative. Respiratory:  Negative for shortness of breath. Cardiovascular:  Negative for chest pain. Gastrointestinal: Negative. Endocrine: Negative. Genitourinary:  Negative for difficulty urinating. Musculoskeletal:  Positive for arthralgias, joint swelling, myalgias and neck pain. Skin: Negative. Neurological:  Positive for weakness and numbness. Hematological:  Does not bruise/bleed easily. Psychiatric/Behavioral:  Positive for sleep disturbance. Objective:   Physical Exam  Vitals reviewed. Constitutional:       General: He is awake. He is not in acute distress. Appearance: He is well-developed. He is not ill-appearing, toxic-appearing or diaphoretic. Interventions: He is not intubated. HENT:      Head: Normocephalic and atraumatic. Right Ear: External ear normal.      Left Ear: External ear normal.      Nose: Nose normal.   Eyes:      General: Lids are normal.   Cardiovascular:      Rate and Rhythm: Normal rate. Pulmonary:      Effort: Pulmonary effort is normal. No tachypnea, bradypnea, accessory muscle usage, prolonged expiration, respiratory distress or retractions. He is not intubated. Abdominal:      General: Abdomen is flat. Palpations: Abdomen is soft. Musculoskeletal:      Cervical back: Muscular tenderness (right upper trapezius) present. Decreased range of motion. Right foot: No foot drop. Left foot: No foot drop. Skin:     General: Skin is warm and dry. Capillary Refill: Capillary refill takes less than 2 seconds. Coloration: Skin is not ashen, jaundiced or pale. Findings: No rash. Nails: There is no clubbing. Neurological:      Mental Status: He is alert and oriented to person, place, and time. Cranial Nerves: No cranial nerve deficit.    Psychiatric:         Attention and Perception: Attention normal.         Mood and Affect: Mood normal. Speech: Speech normal.         Behavior: Behavior is cooperative. Cognition and Memory: Cognition normal.         Judgment: Judgment normal.       Assessment / Plan:      Diagnosis Orders   1. Cervical radiculopathy  CT CERVICAL SPINE WO CONTRAST      2. Displacement of cervical intervertebral disc without myelopathy          Cervical CT scan wo contrast. Gabapentin 300mg tid.  Follow up after CT scan

## 2023-02-22 ENCOUNTER — HOSPITAL ENCOUNTER (OUTPATIENT)
Dept: CT IMAGING | Age: 68
Discharge: HOME | End: 2023-02-24
Payer: MEDICARE

## 2023-02-22 DIAGNOSIS — M54.12 CERVICAL RADICULOPATHY: ICD-10-CM

## 2023-02-22 PROCEDURE — 72125 CT NECK SPINE W/O DYE: CPT

## 2023-02-27 ENCOUNTER — OFFICE VISIT (OUTPATIENT)
Dept: PAIN MANAGEMENT | Age: 68
End: 2023-02-27
Payer: MEDICARE

## 2023-02-27 VITALS
OXYGEN SATURATION: 92 % | BODY MASS INDEX: 32.58 KG/M2 | WEIGHT: 220 LBS | HEIGHT: 69 IN | SYSTOLIC BLOOD PRESSURE: 120 MMHG | DIASTOLIC BLOOD PRESSURE: 64 MMHG | HEART RATE: 75 BPM

## 2023-02-27 DIAGNOSIS — M47.812 CERVICAL FACET JOINT SYNDROME: ICD-10-CM

## 2023-02-27 DIAGNOSIS — M54.2 CERVICALGIA: ICD-10-CM

## 2023-02-27 DIAGNOSIS — M50.30 DDD (DEGENERATIVE DISC DISEASE), CERVICAL: Primary | ICD-10-CM

## 2023-02-27 PROCEDURE — 1123F ACP DISCUSS/DSCN MKR DOCD: CPT | Performed by: NURSE PRACTITIONER

## 2023-02-27 PROCEDURE — G8484 FLU IMMUNIZE NO ADMIN: HCPCS | Performed by: NURSE PRACTITIONER

## 2023-02-27 PROCEDURE — G8427 DOCREV CUR MEDS BY ELIG CLIN: HCPCS | Performed by: NURSE PRACTITIONER

## 2023-02-27 PROCEDURE — G8417 CALC BMI ABV UP PARAM F/U: HCPCS | Performed by: NURSE PRACTITIONER

## 2023-02-27 PROCEDURE — 1036F TOBACCO NON-USER: CPT | Performed by: NURSE PRACTITIONER

## 2023-02-27 PROCEDURE — 3078F DIAST BP <80 MM HG: CPT | Performed by: NURSE PRACTITIONER

## 2023-02-27 PROCEDURE — 99214 OFFICE O/P EST MOD 30 MIN: CPT | Performed by: NURSE PRACTITIONER

## 2023-02-27 PROCEDURE — 99213 OFFICE O/P EST LOW 20 MIN: CPT | Performed by: NURSE PRACTITIONER

## 2023-02-27 PROCEDURE — 3017F COLORECTAL CA SCREEN DOC REV: CPT | Performed by: NURSE PRACTITIONER

## 2023-02-27 PROCEDURE — 99215 OFFICE O/P EST HI 40 MIN: CPT

## 2023-02-27 PROCEDURE — 3074F SYST BP LT 130 MM HG: CPT | Performed by: NURSE PRACTITIONER

## 2023-02-27 ASSESSMENT — ENCOUNTER SYMPTOMS
SHORTNESS OF BREATH: 0
GASTROINTESTINAL NEGATIVE: 1
EYES NEGATIVE: 1

## 2023-02-27 NOTE — PROGRESS NOTES
Subjective:      Patient ID: Abdirizak Romero is a 69 y.o. male. Chief Complaint   Patient presents with    Results     Ct scan     HPI Returns to review cervical CT scan, Failed TPI's    PT, after 7 weeks increased pain so stopped. Right sided neck pain    Tylenol prn with relief         Allergies   Allergen Reactions    Chlorhexidine Rash    Vicodin [Hydrocodone-Acetaminophen] Nausea Only     Stomach upset       Outpatient Medications Marked as Taking for the 2/27/23 encounter (Office Visit) with Tad Prince, APRN - CNP   Medication Sig Dispense Refill    gabapentin (NEURONTIN) 300 MG capsule Intended supply: 90 days 90 capsule 1    tamsulosin (FLOMAX) 0.4 MG capsule Take 1 capsule by mouth once daily 90 capsule 0    atorvastatin (LIPITOR) 40 MG tablet Take 1 tablet by mouth nightly 90 tablet 0    tiZANidine (ZANAFLEX) 4 MG tablet Take 1 tablet by mouth nightly as needed (neck pain) 30 tablet 5    metoprolol tartrate (LOPRESSOR) 25 MG tablet Take 0.5 tablets by mouth in the morning and 0.5 tablets before bedtime. 180 tablet 3    furosemide (LASIX) 20 MG tablet TAKE 1 TABLET BY MOUTH ONCE DAILY AS NEEDED FOR  LEG  SWELLING 45 tablet 3    clopidogrel (PLAVIX) 75 MG tablet Take 1 tablet by mouth once daily 90 tablet 3    aspirin 81 MG chewable tablet Take 81 mg by mouth daily      Cholecalciferol (VITAMIN D) 50 MCG (2000 UT) CAPS capsule Take 2,000 Units by mouth daily      vitamin C (ASCORBIC ACID) 500 MG tablet Take 250 mg by mouth 2 times daily      acetaminophen (TYLENOL) 325 MG tablet Take 2 tablets by mouth every 6 hours as needed for Pain 120 tablet 3       Past Medical History:   Diagnosis Date    Abnormal cardiovascular stress test 12/09/2020    Large inferolateral infarction with significant vernon-infarct ischemia.     CAD (coronary artery disease)     CABG-2 vessel, followed by stents x 4    Cervical disc disease     Clostridium difficile diarrhea 10/2014    hospitalized in October 2014 Displacement of cervical intervertebral disc without myelopathy 06/10/2014    [facility], C6/C7     HTN (hypertension)     Hyperlipidemia     Impaired fasting blood sugar     Neck pain     chronic    Neuralgia, neuritis, and radiculitis, unspecified 06/10/2014    [facility], left C7     Shoulder region pain     Left   [facility] injury 01/08/2014    Sprain and strain of unspecified site of shoulder and upper arm 06/10/2014    [facility], left arm     Wears glasses        Past Surgical History:   Procedure Laterality Date    BICEPS TENODESIS Right 11/23/2016    right proximal bicep tenodesis    COLONOSCOPY N/A 12/10/2019    COLONOSCOPY POLYPECTOMY SNARE/COLD BIOPSY performed by Miah Gleason MD at [place] [address]  12/18/2020    Successful PTCA. AGUILA of mid LAD / Patent LIMA however distally occluded and not supplying LAD /Residual high grade stenosis in small LCX    CORONARY ANGIOPLASTY WITH STENT PLACEMENT  01/22/2021    CT NEEDLE BIOPSY LUNG PERCUTANEOUS  12/15/2021    CT NEEDLE BIOPSY LUNG PERCUTANEOUS 12/15/2021 [facility] CT SCAN    CYSTOSCOPY N/A 03/09/2020    CYSTO TURBT performed by Pricila Iglesias MD at [address] N/A 3/25/2022    LAPAROSCOPIC Robotic Assisted Ventral Hernia Repair with mesh WITH LYSIS OF ADHESIONS performed by Radha Caruso DO at [address] Left 8/12/14, 1/09/15    C7 TFE    PAIN MANAGEMENT PROCEDURE Left 09/23/2014    C7 TFE    PAIN MANAGEMENT PROCEDURE Left 7/17/2015 , 1/22/16    C7 TFE    [place] CORONARY ARTERY BYP W/VEIN & ARTERY GRAFT 1 VEIN N/A 07/06/2018    CABG CORONARY ARTERY BYPASS, GARY ERAZO CHANI  (CSI# [id number]) performed by Lloyd Lopez MD at [facility] CABG (LIMA-LAD, R SVG- RCA, OM too small for the grafts) on 7/6/18,    ROTATOR CUFF REPAIR Left 01/29/2019    2015- Right    SHOULDER ARTHROSCOPY Right 04/27/2016    scope decompressing, rotator cuff repair    TRANSESOPHAGEAL ECHOCARDIOGRAM  57/40/3544    UMBILICAL HERNIA REPAIR N/A 12/18/2019    Laparoscopic Robotic Assisted Umbilical Hernia Repair W/ MESH performed by Dyan Xavier MD at [facility] N/A 11/19/2020    Open Incisional Hernia Repair W/ MESH performed by Tristen Diaz DO at [facility] OR       Family History   Problem Relation Age of Onset    High Blood Pressure Father     Colon Polyps Brother     COPD Mother        Social History     Socioeconomic History    Marital status:      Spouse name: Oc Smith    Number of children: 2    Years of education: None    Highest education level: None   Occupational History     Comment: [employer]   Tobacco Use    Smoking status: Former     Packs/day: 2.00     Years: 25.00     Pack years: 50.00     Types: Cigarettes     Start date: 1/1/1975     Quit date: 1/4/2008     Years since quitting: 15.1    Smokeless tobacco: Never    Tobacco comments:     Skyejean Rojas RRT 11/23/16   Vaping Use    Vaping Use: Never used   Substance and Sexual Activity    Alcohol use: No     Alcohol/week: 0.0 standard drinks    Drug use: No    Sexual activity: Yes     Partners: Female     Social Determinants of Health     Financial Resource Strain: Low Risk     Difficulty of Paying Living Expenses: Not hard at all   Food Insecurity: No Food Insecurity    Worried About Running Out of Food in the Last Year: Never true    Ran Out of Food in the Last Year: Never true   Physical Activity: Insufficiently Active    Days of Exercise per Week: 5 days    Minutes of Exercise per Session: 20 min     Review of Systems   Constitutional:  Positive for activity change. HENT: Negative. Eyes: Negative. Respiratory:  Negative for shortness of breath. Cardiovascular:  Negative for chest pain. Gastrointestinal: Negative. Endocrine: Negative. Genitourinary:  Negative for difficulty urinating. Musculoskeletal:  Positive for arthralgias, joint swelling, myalgias and neck pain. Skin: Negative.     Neurological:  Positive for weakness and numbness. Hematological:  Does not bruise/bleed easily. Psychiatric/Behavioral:  Positive for sleep disturbance. Objective:   Physical Exam  Vitals reviewed. Constitutional:       General: He is awake. He is not in acute distress. Appearance: He is well-developed. He is not ill-appearing, toxic-appearing or diaphoretic. Interventions: He is not intubated. HENT:      Head: Normocephalic and atraumatic. Right Ear: External ear normal.      Left Ear: External ear normal.      Nose: Nose normal.   Eyes:      General: Lids are normal.   Cardiovascular:      Rate and Rhythm: Normal rate. Pulmonary:      Effort: Pulmonary effort is normal. No tachypnea, bradypnea, accessory muscle usage, prolonged expiration, respiratory distress or retractions. He is not intubated. Abdominal:      General: Abdomen is flat. Palpations: Abdomen is soft. Musculoskeletal:      Cervical back: Muscular tenderness (right upper trapezius) present. Decreased range of motion. Right foot: No foot drop. Left foot: No foot drop. Skin:     General: Skin is warm and dry. Capillary Refill: Capillary refill takes less than 2 seconds. Coloration: Skin is not ashen, jaundiced or pale. Findings: No rash. Nails: There is no clubbing. Neurological:      Mental Status: He is alert and oriented to person, place, and time. Cranial Nerves: No cranial nerve deficit. Psychiatric:         Attention and Perception: Attention normal.         Mood and Affect: Mood normal.         Speech: Speech normal.         Behavior: Behavior is cooperative. Cognition and Memory: Cognition normal.         Judgment: Judgment normal.       Assessment / Plan:      Diagnosis Orders   1. DDD (degenerative disc disease), cervical        2. Cervical facet joint syndrome        3.  Cervicalgia          Cervical CT scan reviewed, will schedule right C5/C6 and C6/C7 facet joint injections    Informed consent has not been obtained for procedure. Markus Rosenberg not on blood thinners. Medications to hold have been reviewed with patient. Blood thinners must be held with permission from your cardiologist or primary care physician. A letter is not required to besent to PCP/Cardiologist regarding holding medications for procedure to decrease bleeding risk.

## 2023-02-28 ENCOUNTER — TELEPHONE (OUTPATIENT)
Dept: PAIN MANAGEMENT | Age: 68
End: 2023-02-28

## 2023-02-28 DIAGNOSIS — M47.812 CERVICAL FACET JOINT SYNDROME: Primary | ICD-10-CM

## 2023-02-28 RX ORDER — DIAZEPAM 5 MG/1
TABLET ORAL
Qty: 2 TABLET | Refills: 0 | OUTPATIENT
Start: 2023-02-28 | End: 2023-04-04

## 2023-02-28 NOTE — TELEPHONE ENCOUNTER
Luis Case called requesting a refill of the below medication which has been pended for you:     Requested Prescriptions     Pending Prescriptions Disp Refills    diazePAM (VALIUM) 5 MG tablet 2 tablet 0     Sig: Take one tablet by mouth 12 hours prior to procedure and take one tablet by mouth 2 hours prior to procedure.        Last Appointment Date: Visit date not found  Next Appointment Date: Visit date not found    Allergies   Allergen Reactions    Chlorhexidine Rash    Vicodin [Hydrocodone-Acetaminophen] Nausea Only     Stomach upset       Pharmacy:  [facility]

## 2023-03-09 ENCOUNTER — APPOINTMENT (OUTPATIENT)
Dept: GENERAL RADIOLOGY | Age: 68
End: 2023-03-09
Attending: PHYSICAL MEDICINE & REHABILITATION
Payer: MEDICARE

## 2023-03-09 ENCOUNTER — HOSPITAL ENCOUNTER (OUTPATIENT)
Age: 68
Setting detail: OUTPATIENT SURGERY
Discharge: HOME | End: 2023-03-09
Attending: PHYSICAL MEDICINE & REHABILITATION | Admitting: PHYSICAL MEDICINE & REHABILITATION
Payer: MEDICARE

## 2023-03-09 VITALS
SYSTOLIC BLOOD PRESSURE: 135 MMHG | HEIGHT: 69 IN | BODY MASS INDEX: 32.14 KG/M2 | TEMPERATURE: 98.3 F | DIASTOLIC BLOOD PRESSURE: 68 MMHG | OXYGEN SATURATION: 98 % | RESPIRATION RATE: 16 BRPM | WEIGHT: 217 LBS | HEART RATE: 65 BPM

## 2023-03-09 PROCEDURE — 2709999900 HC NON-CHARGEABLE SUPPLY: Performed by: PHYSICAL MEDICINE & REHABILITATION

## 2023-03-09 PROCEDURE — 6360000002 HC RX W HCPCS: Performed by: PHYSICAL MEDICINE & REHABILITATION

## 2023-03-09 PROCEDURE — 3600000056 HC PAIN LEVEL 4 BASE: Performed by: PHYSICAL MEDICINE & REHABILITATION

## 2023-03-09 PROCEDURE — 6360000004 HC RX CONTRAST MEDICATION: Performed by: PHYSICAL MEDICINE & REHABILITATION

## 2023-03-09 PROCEDURE — 7100000010 HC PHASE II RECOVERY - FIRST 15 MIN: Performed by: PHYSICAL MEDICINE & REHABILITATION

## 2023-03-09 PROCEDURE — 2500000003 HC RX 250 WO HCPCS: Performed by: PHYSICAL MEDICINE & REHABILITATION

## 2023-03-09 PROCEDURE — 3209999900 FLUORO FOR SURGICAL PROCEDURES

## 2023-03-09 RX ORDER — SODIUM CHLORIDE 0.9 % (FLUSH) 0.9 %
5-40 SYRINGE (ML) INJECTION PRN
Status: DISCONTINUED | OUTPATIENT
Start: 2023-03-09 | End: 2023-03-09 | Stop reason: HOSPADM

## 2023-03-09 RX ORDER — DEXAMETHASONE SODIUM PHOSPHATE 10 MG/ML
INJECTION INTRAMUSCULAR; INTRAVENOUS PRN
Status: DISCONTINUED | OUTPATIENT
Start: 2023-03-09 | End: 2023-03-09 | Stop reason: ALTCHOICE

## 2023-03-09 RX ORDER — LIDOCAINE HYDROCHLORIDE 20 MG/ML
INJECTION, SOLUTION EPIDURAL; INFILTRATION; INTRACAUDAL; PERINEURAL PRN
Status: DISCONTINUED | OUTPATIENT
Start: 2023-03-09 | End: 2023-03-09 | Stop reason: ALTCHOICE

## 2023-03-09 RX ORDER — SODIUM CHLORIDE 0.9 % (FLUSH) 0.9 %
5-40 SYRINGE (ML) INJECTION EVERY 12 HOURS SCHEDULED
Status: DISCONTINUED | OUTPATIENT
Start: 2023-03-09 | End: 2023-03-09 | Stop reason: HOSPADM

## 2023-03-09 RX ORDER — SODIUM CHLORIDE 9 MG/ML
INJECTION, SOLUTION INTRAVENOUS PRN
Status: DISCONTINUED | OUTPATIENT
Start: 2023-03-09 | End: 2023-03-09 | Stop reason: HOSPADM

## 2023-03-09 ASSESSMENT — PAIN DESCRIPTION - DESCRIPTORS: DESCRIPTORS: DULL;SHARP

## 2023-03-09 ASSESSMENT — PAIN - FUNCTIONAL ASSESSMENT: PAIN_FUNCTIONAL_ASSESSMENT: 0-10

## 2023-03-09 ASSESSMENT — ENCOUNTER SYMPTOMS
SHORTNESS OF BREATH: 0
EYES NEGATIVE: 1
GASTROINTESTINAL NEGATIVE: 1

## 2023-03-09 ASSESSMENT — PAIN SCALES - GENERAL: PAINLEVEL_OUTOF10: 0

## 2023-03-09 NOTE — OP NOTE
ZYGAPOPHYSEAL JOINT INJECTION    3/9/23    Surgeon: Deanna Pendleton MD    Pre-operative Diagnosis: Principal Problem:    Cervical facet joint syndrome  Active Problems:    DDD (degenerative disc disease), cervical    Displacement of cervical intervertebral disc without myelopathy  Resolved Problems:    * No resolved hospital problems. *       Post-operative Diagnosis: Same    INDICATION:  Previous treatment and examination findings are noted in the H&P and previous clinic notes. Right C5-66-7 facet joint injections have been requested for diagnostic and therapeutic reasons. Conservative treatment was ineffective i.e.: ice, NSAIDS, rest, narcotic medication, chiropractic care, physical therapy and message therapy. Patient is unable to perform the following ADL's: ambulating, grooming, sitting, and standing    Pain Assessment: 0-10  Pain Level: 8     Pain Orientation: Right  Pain Location: Neck, Shoulder  Pain Descriptors: Kelford Fetch    Last Plain films: 2020      EXAMINATION:   Right C5-6-6-7 facet joint injections with arthrography. CONSENT:  Written consent was obtained from the patient on preprinted consent form after explaining the procedure, indications, potential complications and outcomes. Alternative treatments were also discussed. DISCUSSION:  The patient was sterilely prepped and draped in the usual fashion in the prone position. Time out\" was verified for correct patient, side, level and procedure. SEDATION:  No conscious sedation was performed during the procedure. The patient remained awake and conversed throughout the procedure. The patient underwent pulse oximetry and blood pressure monitoring independently by a trained observer, as well as by a physician.     PROCEDURE[de-identified]  Under image-intensifier control, a standard technique was employed, a 25 gauge needle x 2.5 inch spinal needle was guided successfully to cannulate the RRight C5-66-7 zygapophyseal joint via a posterior lateral approach.    Instillation of .5 mL of.Isovue M contrast medium demonstrated contiguous flow into the joint and the joint capsule. No vascular spread was noted. Digital subtraction was not employed to evaluate for vascular spread.] The patient was monitored for any untoward reaction to contrast medium before proceeding with procedure #2. Dexamethasone (Decadron 10 mg/mL) was injected into each joint.  A total of 2 joints were injected.    PROVOCATION: The patient did not report pain reproduction in a concordant distribution upon capsular distention of the cervical facet joints from the contrast injection.    ARTHROGRAPHY: The cervical facet arthrogram revealed contrast in the joint space in the superior and inferior recesses; no contrast extravasation.    The patient tolerated the procedure well and without complications and was noted to be in stable condition prior to discharge from the procedure center with discharge instructions.    IMPRESSIONS:  cervical facet arthrogram is abnormal: -.  cervical facet joint injection negative for provocation of the patient's pain symptoms.    EBL: no blood loss    SPECIMEN: none    RECOMMENDATIONS:  Complete and return “Post-Procedure Pain and Activity Diary.”  Contact the [facility] for symptom exacerbation, fever or unusual symptoms.   Post-procedure care according to verbal and written discharge instructions  Continue with PT as per MD directions.  Consider diagnositc MBB if there is > 80% pain relief and improved activity scores.    SPINE FLUOROSCOPIC IMAGE INTERPRETATION    EXAMINATION:  AP, Lateral, and Oblique views.    FLUORO TIME: 12 seconds    DISCUSSION:  Spot views of the spine reveal normal alignment and segmentation. Spinal needles are positioned in the cervical facet joint. Contrast outlines the joint space and reveals --. Visualized spine reveals disc space -. Soft tissues reveal no abnormalities.    IMPRESSION: cervical facet arthrogram shows  satisfactory needle placement and contrast dispersal.    Electronically signed by Maria De Jesus Argueta MD on 3/9/2023 at 9:13 AM.

## 2023-03-09 NOTE — H&P
Subjective:      Patient ID: Akua Rodríguez is a 69 y.o. male. Chief Complaint   Patient presents with    Results     Ct scan     HPI Returns to review cervical CT scan, Failed TPI's    PT, after 7 weeks increased pain so stopped. Right sided neck pain    Tylenol prn with relief         Allergies   Allergen Reactions    Chlorhexidine Rash    Vicodin [Hydrocodone-Acetaminophen] Nausea Only     Stomach upset       Outpatient Medications Marked as Taking for the 2/27/23 encounter (Office Visit) with Aris Segura, PARESH - CNP   Medication Sig Dispense Refill    gabapentin (NEURONTIN) 300 MG capsule Intended supply: 90 days 90 capsule 1    tamsulosin (FLOMAX) 0.4 MG capsule Take 1 capsule by mouth once daily 90 capsule 0    atorvastatin (LIPITOR) 40 MG tablet Take 1 tablet by mouth nightly 90 tablet 0    tiZANidine (ZANAFLEX) 4 MG tablet Take 1 tablet by mouth nightly as needed (neck pain) 30 tablet 5    metoprolol tartrate (LOPRESSOR) 25 MG tablet Take 0.5 tablets by mouth in the morning and 0.5 tablets before bedtime. 180 tablet 3    furosemide (LASIX) 20 MG tablet TAKE 1 TABLET BY MOUTH ONCE DAILY AS NEEDED FOR  LEG  SWELLING 45 tablet 3    clopidogrel (PLAVIX) 75 MG tablet Take 1 tablet by mouth once daily 90 tablet 3    aspirin 81 MG chewable tablet Take 81 mg by mouth daily      Cholecalciferol (VITAMIN D) 50 MCG (2000 UT) CAPS capsule Take 2,000 Units by mouth daily      vitamin C (ASCORBIC ACID) 500 MG tablet Take 250 mg by mouth 2 times daily      acetaminophen (TYLENOL) 325 MG tablet Take 2 tablets by mouth every 6 hours as needed for Pain 120 tablet 3       Past Medical History:   Diagnosis Date    Abnormal cardiovascular stress test 12/09/2020    Large inferolateral infarction with significant vernon-infarct ischemia.     CAD (coronary artery disease)     CABG-2 vessel, followed by stents x 4    Cervical disc disease     Clostridium difficile diarrhea 10/2014    hospitalized in October 2014 Displacement of cervical intervertebral disc without myelopathy 06/10/2014    [facility], C6/C7     HTN (hypertension)     Hyperlipidemia     Impaired fasting blood sugar     Neck pain     chronic    Neuralgia, neuritis, and radiculitis, unspecified 06/10/2014    [facility], left C7     Shoulder region pain     Left   [facility] injury 01/08/2014    Sprain and strain of unspecified site of shoulder and upper arm 06/10/2014    [facility], left arm     Wears glasses        Past Surgical History:   Procedure Laterality Date    BICEPS TENODESIS Right 11/23/2016    right proximal bicep tenodesis    COLONOSCOPY N/A 12/10/2019    COLONOSCOPY POLYPECTOMY SNARE/COLD BIOPSY performed by Gume Goldman MD at [place] [address]  12/18/2020    Successful PTCA. AGUILA of mid LAD / Patent LIMA however distally occluded and not supplying LAD /Residual high grade stenosis in small LCX    CORONARY ANGIOPLASTY WITH STENT PLACEMENT  01/22/2021    CT NEEDLE BIOPSY LUNG PERCUTANEOUS  12/15/2021    CT NEEDLE BIOPSY LUNG PERCUTANEOUS 12/15/2021 [facility] CT SCAN    CYSTOSCOPY N/A 03/09/2020    CYSTO TURBT performed by Blaise Veras MD at [address] [place] [address] N/A 3/25/2022    LAPAROSCOPIC Robotic Assisted Ventral Hernia Repair with mesh WITH LYSIS OF ADHESIONS performed by Oracio Rene DO at [address] [facility] Left 8/12/14, 1/09/15    C7 TFE    PAIN MANAGEMENT PROCEDURE Left 09/23/2014    C7 TFE    PAIN MANAGEMENT PROCEDURE Left 7/17/2015 , 1/22/16    C7 TFE    [place] CORONARY ARTERY BYP W/VEIN & ARTERY GRAFT 1 VEIN N/A 07/06/2018    CABG CORONARY ARTERY BYPASS, GARY ERAZO, CHANI  (CSI# [id number]) performed by Jabier Thompson MD at [facility] CABG (LIMA-LAD, R SVG- RCA, OM too small for the grafts) on 7/6/18,    ROTATOR CUFF REPAIR Left 01/29/2019    2015- Right    SHOULDER ARTHROSCOPY Right 04/27/2016    scope decompressing, rotator cuff repair    TRANSESOPHAGEAL ECHOCARDIOGRAM  57/05/1787    UMBILICAL HERNIA REPAIR N/A 12/18/2019    Laparoscopic Robotic Assisted Umbilical Hernia Repair W/ MESH performed by Hever Newton MD at [address] [facility] N/A 11/19/2020    Open Incisional Hernia Repair W/ MESH performed by Landen Friend DO at [facility] OR       Family History   Problem Relation Age of Onset    High Blood Pressure Father     Colon Polyps Brother     COPD Mother        Social History     Socioeconomic History    Marital status:      Spouse name: Candido Medrano    Number of children: 2    Years of education: None    Highest education level: None   Occupational History     Comment: [employer]   Tobacco Use    Smoking status: Former     Packs/day: 2.00     Years: 25.00     Pack years: 50.00     Types: Cigarettes     Start date: 1/1/1975     Quit date: 1/4/2008     Years since quitting: 15.1    Smokeless tobacco: Never    Tobacco comments:     Ramana Gonzales RRT 11/23/16   Vaping Use    Vaping Use: Never used   Substance and Sexual Activity    Alcohol use: No     Alcohol/week: 0.0 standard drinks    Drug use: No    Sexual activity: Yes     Partners: Female     Social Determinants of Health     Financial Resource Strain: Low Risk     Difficulty of Paying Living Expenses: Not hard at all   Food Insecurity: No Food Insecurity    Worried About Running Out of Food in the Last Year: Never true    Ran Out of Food in the Last Year: Never true   Physical Activity: Insufficiently Active    Days of Exercise per Week: 5 days    Minutes of Exercise per Session: 20 min     Review of Systems   Constitutional:  Positive for activity change. HENT: Negative. Eyes: Negative. Respiratory:  Negative for shortness of breath. Cardiovascular:  Negative for chest pain. Gastrointestinal: Negative. Endocrine: Negative. Genitourinary:  Negative for difficulty urinating. Musculoskeletal:  Positive for arthralgias, joint swelling, myalgias and neck pain. Skin: Negative.     Neurological:  Positive for weakness and numbness. Hematological:  Does not bruise/bleed easily. Psychiatric/Behavioral:  Positive for sleep disturbance. Objective:   Physical Exam  Vitals reviewed. Constitutional:       General: He is awake. He is not in acute distress. Appearance: He is well-developed. He is not ill-appearing, toxic-appearing or diaphoretic. Interventions: He is not intubated. HENT:      Head: Normocephalic and atraumatic. Right Ear: External ear normal.      Left Ear: External ear normal.      Nose: Nose normal.   Eyes:      General: Lids are normal.   Cardiovascular:      Rate and Rhythm: Normal rate. Pulmonary:      Effort: Pulmonary effort is normal. No tachypnea, bradypnea, accessory muscle usage, prolonged expiration, respiratory distress or retractions. He is not intubated. Abdominal:      General: Abdomen is flat. Palpations: Abdomen is soft. Musculoskeletal:      Cervical back: Muscular tenderness (right upper trapezius) present. Decreased range of motion. Right foot: No foot drop. Left foot: No foot drop. Skin:     General: Skin is warm and dry. Capillary Refill: Capillary refill takes less than 2 seconds. Coloration: Skin is not ashen, jaundiced or pale. Findings: No rash. Nails: There is no clubbing. Neurological:      Mental Status: He is alert and oriented to person, place, and time. Cranial Nerves: No cranial nerve deficit. Psychiatric:         Attention and Perception: Attention normal.         Mood and Affect: Mood normal.         Speech: Speech normal.         Behavior: Behavior is cooperative. Cognition and Memory: Cognition normal.         Judgment: Judgment normal.       Assessment / Plan:      Diagnosis Orders   1. DDD (degenerative disc disease), cervical        2. Cervical facet joint syndrome        3.  Cervicalgia          Cervical CT scan reviewed, will schedule right C5/C6 and C6/C7 facet joint injections    Informed consent has not been obtained for procedure. Markus Rosenberg not on blood thinners. Medications to hold have been reviewed with patient. Blood thinners must be held with permission from your cardiologist or primary care physician. A letter is not required to besent to PCP/Cardiologist regarding holding medications for procedure to decrease bleeding risk.

## 2023-03-09 NOTE — INTERVAL H&P NOTE
I have interviewed and examined the patient and reviewed the recent History and Physical.  There have been no changes to the recent H&P documentation. The surgical consent form has been signed. Last anticoagulant medication use was:24 hours    Premedication taken for contrast allergy? No    Valium taken for oral sedation? No    No outpatient medications have been marked as taking for the 3/9/23 encounter [facility] Encounter). The patient understands the planned operation and its associated risks and benefits and agrees to proceed.         Electronically signed by Julio Cesar Steiner MD on 3/9/2023 at 8:48 AM

## 2023-03-09 NOTE — DISCHARGE INSTRUCTIONS
Home Care after Facet Joint Injection    The doctor has done an injection to help diagnose the cause and site of your pain. You may feel sore at the injection site for the next 2-4 days. You may apply ice to the site for 20 minutes on and 20 minutes off to decrease pain and discomfort, if needed, for the first 24 hours. After 24 hours, you may use heat if needed. You will be given a pain log to complete for the next 14 days. Complete this form and make a copy for your own records. Then, mail it back to us or drop it off at the pain management clinic. We will need this information to decide the next step in your treatment plan. It is important that you do the activities that most often cause or intensify your pain the first 24 hours after the injection. Record your results on the pain log as directed. Do not nap. Try to limit the use of pain medicines if you can during the first 24 hours. You may resume taking your medicines after the procedure. Our staff will tell you how to take your pain medicines. No baths or soaking the injection site for 24 hours after the procedure. Taking a shower today is okay. You may resume taking your routine medicines after the procedure including pain medicines as prescribed. Remember to limit the use of pain medications the first 24 hours. Resume any medications held for the procedure (blood thinners, aspirin, anti-inflammatories). If you do not already have a follow-up appointment, call your referring doctors office to make one to discuss your results within 2-4 weeks after the treatment. Your doctor will have the report within 7-10 days. Signs of infection  Fever greater than 100.4°F by mouth for 2 readings taken 4 hours apart  Increased redness, swelling around the site  Any drainage from the site     If you have any new symptoms or any signs of infection, please call [phone] during business hours to notify us.  You can also notify your primary care physician. After hours, nights and weekends, call [phone].

## 2023-03-27 ENCOUNTER — PROCEDURE VISIT (OUTPATIENT)
Dept: UROLOGY | Age: 68
End: 2023-03-27
Payer: MEDICARE

## 2023-03-27 VITALS
SYSTOLIC BLOOD PRESSURE: 128 MMHG | HEIGHT: 69 IN | WEIGHT: 220 LBS | OXYGEN SATURATION: 94 % | HEART RATE: 76 BPM | DIASTOLIC BLOOD PRESSURE: 80 MMHG | BODY MASS INDEX: 32.58 KG/M2 | RESPIRATION RATE: 16 BRPM

## 2023-03-27 DIAGNOSIS — Z01.818 PRE-OP TESTING: ICD-10-CM

## 2023-03-27 DIAGNOSIS — N40.0 BENIGN PROSTATIC HYPERPLASIA WITHOUT LOWER URINARY TRACT SYMPTOMS: Primary | ICD-10-CM

## 2023-03-27 DIAGNOSIS — C67.9 MALIGNANT NEOPLASM OF URINARY BLADDER, UNSPECIFIED SITE (HCC): ICD-10-CM

## 2023-03-27 PROCEDURE — 52000 CYSTOURETHROSCOPY: CPT | Performed by: UROLOGY

## 2023-03-27 RX ORDER — TAMSULOSIN HYDROCHLORIDE 0.4 MG/1
0.4 CAPSULE ORAL DAILY
Qty: 90 CAPSULE | Refills: 3 | Status: SHIPPED | OUTPATIENT
Start: 2023-03-27

## 2023-03-27 NOTE — PROGRESS NOTES
Cystoscopy Operative Note    Patient:  Alycia Ott  MRN: [id number]  YOB: 1955    Date: 03/27/23  Surgeon: Linnette Agustin MD  Anesthesia: Urethral 2% Xylocaine   Indications:     Bladder Cancer  Low grade noninvasive    BPH- LUTS      Position: Supine    Findings:   The patient was prepped and draped in the usual sterile fashion. The flexible cystoscope was advanced through the urethra and into the bladder. The bladder was thoroughly inspected and the following was noted:    Residual Urine: Minimal  Urethra: No abnormalities of the urethra are noted. Prostate: mod lateral lobe hypertrophy+  Bladder: two tumors on dome of bladder. Dystrophic calcification over left lateral wall  No bladder diverticulum. Mild trabeculation noted. Ureters: Clear efflux from both ureters. Orifices with normal configuration and location. The cystoscope was removed. The patient tolerated the procedure well. No cancer recurrence  Worsening LUTS- flomax is helping. Discussed outlet surgery.  Pt will hold off  Refilled flomax  Needs cysto bladder biopsy w haylee (20) gen

## 2023-03-27 NOTE — PROGRESS NOTES
[address] [place] [address] Pkwy number [id number]; Serial Number [id number] scope #1 used for procedure today, was removed from sterile container after visual inspection.

## 2023-04-03 RX ORDER — CLOPIDOGREL BISULFATE 75 MG/1
TABLET ORAL
Qty: 90 TABLET | Refills: 2 | Status: SHIPPED | OUTPATIENT
Start: 2023-04-03

## 2023-04-03 NOTE — TELEPHONE ENCOUNTER
Patient is requesting a refill of tamsulosin 0.4 mg be sent to [address] in [facility] for a 90 day supply. Please see result note

## 2023-04-04 DIAGNOSIS — M62.838 MUSCLE SPASM: ICD-10-CM

## 2023-04-04 DIAGNOSIS — M54.2 NECK PAIN: ICD-10-CM

## 2023-04-04 RX ORDER — GABAPENTIN 300 MG/1
CAPSULE ORAL
Qty: 90 CAPSULE | Refills: 0 | Status: SHIPPED | OUTPATIENT
Start: 2023-04-04 | End: 2023-05-10

## 2023-04-04 RX ORDER — TIZANIDINE 4 MG/1
4 TABLET ORAL 3 TIMES DAILY PRN
Qty: 90 TABLET | Refills: 2 | Status: SHIPPED | OUTPATIENT
Start: 2023-04-04

## 2023-04-04 NOTE — TELEPHONE ENCOUNTER
Gabapentin 300 mg   Last Appt:  2/27/2023  Next Appt:   4/14/2023  Med verified in [facility] [address]

## 2023-04-04 NOTE — TELEPHONE ENCOUNTER
Last Appt:  2/27/2023  Next Appt:   4/14/2023  Med verified in Epic    Tizanidine dose increase requested per fax from [facility]. Stating pt is taking tid now. [place] did write on 3/13/23 Telephone encounter that he could try muscle relaxer up to tid.

## 2023-04-14 ENCOUNTER — HOSPITAL ENCOUNTER (OUTPATIENT)
Dept: GENERAL RADIOLOGY | Age: 68
Discharge: HOME | End: 2023-04-16
Payer: MEDICARE

## 2023-04-14 DIAGNOSIS — Z01.818 PRE-OP TESTING: ICD-10-CM

## 2023-04-14 PROCEDURE — 71046 X-RAY EXAM CHEST 2 VIEWS: CPT

## 2023-04-19 ENCOUNTER — TELEPHONE (OUTPATIENT)
Dept: UROLOGY | Age: 68
End: 2023-04-19

## 2023-04-19 NOTE — TELEPHONE ENCOUNTER
[address]         Patient:Markus [id number] [facility]           :1955           Surgical/Procedure Planned: cysto, bladder biopy     Date & Location: [address]        Outpatient   Planned Length of OR: .5 hr    Sedation: general    This is notification of the scheduled procedure only:     Estimated Cardiac Risk for Non-Cardiac Surgery/Procedure     Low______ Moderate__x____ High______    Medication Instructions - Clarification needed by this date:   -Insulin:  -Other medications:    ASA 81 mg/325 mg Hold _5__ Days  Plavix   Hold __5_ Days  Resume medications:     Lovenox indicated: _____Yes __x___NO    Provider:Dajuan       Signature of Provider Giving Orders for Medication holds:  Cindy Richards MD    _____________________________________________

## 2023-04-24 ENCOUNTER — ANESTHESIA (OUTPATIENT)
Dept: OPERATING ROOM | Age: 68
End: 2023-04-24
Payer: MEDICARE

## 2023-04-24 ENCOUNTER — HOSPITAL ENCOUNTER (OUTPATIENT)
Age: 68
Setting detail: OUTPATIENT SURGERY
Discharge: HOME | End: 2023-04-24
Attending: UROLOGY | Admitting: UROLOGY
Payer: MEDICARE

## 2023-04-24 ENCOUNTER — ANESTHESIA EVENT (OUTPATIENT)
Dept: OPERATING ROOM | Age: 68
End: 2023-04-24
Payer: MEDICARE

## 2023-04-24 VITALS
HEIGHT: 69 IN | HEART RATE: 60 BPM | BODY MASS INDEX: 32.67 KG/M2 | RESPIRATION RATE: 16 BRPM | OXYGEN SATURATION: 98 % | DIASTOLIC BLOOD PRESSURE: 72 MMHG | WEIGHT: 220.6 LBS | TEMPERATURE: 97.1 F | SYSTOLIC BLOOD PRESSURE: 135 MMHG

## 2023-04-24 DIAGNOSIS — C67.1 MALIGNANT NEOPLASM OF DOME OF URINARY BLADDER (HCC): ICD-10-CM

## 2023-04-24 PROCEDURE — 2580000003 HC RX 258: Performed by: UROLOGY

## 2023-04-24 PROCEDURE — 6360000002 HC RX W HCPCS: Performed by: NURSE ANESTHETIST, CERTIFIED REGISTERED

## 2023-04-24 PROCEDURE — 3700000000 HC ANESTHESIA ATTENDED CARE: Performed by: UROLOGY

## 2023-04-24 PROCEDURE — 88305 TISSUE EXAM BY PATHOLOGIST: CPT

## 2023-04-24 PROCEDURE — 3600000002 HC SURGERY LEVEL 2 BASE: Performed by: UROLOGY

## 2023-04-24 PROCEDURE — 7100000010 HC PHASE II RECOVERY - FIRST 15 MIN: Performed by: UROLOGY

## 2023-04-24 PROCEDURE — 6360000002 HC RX W HCPCS: Performed by: UROLOGY

## 2023-04-24 PROCEDURE — 3600000012 HC SURGERY LEVEL 2 ADDTL 15MIN: Performed by: UROLOGY

## 2023-04-24 PROCEDURE — 7100000011 HC PHASE II RECOVERY - ADDTL 15 MIN: Performed by: UROLOGY

## 2023-04-24 PROCEDURE — 2709999900 HC NON-CHARGEABLE SUPPLY: Performed by: UROLOGY

## 2023-04-24 PROCEDURE — 3700000001 HC ADD 15 MINUTES (ANESTHESIA): Performed by: UROLOGY

## 2023-04-24 RX ORDER — SODIUM CHLORIDE 0.9 % (FLUSH) 0.9 %
5-40 SYRINGE (ML) INJECTION PRN
Status: CANCELLED | OUTPATIENT
Start: 2023-04-24

## 2023-04-24 RX ORDER — SODIUM CHLORIDE 9 MG/ML
INJECTION, SOLUTION INTRAVENOUS CONTINUOUS
Status: CANCELLED | OUTPATIENT
Start: 2023-04-24

## 2023-04-24 RX ORDER — CEPHALEXIN 500 MG/1
500 CAPSULE ORAL 3 TIMES DAILY
Qty: 15 CAPSULE | Refills: 0 | Status: SHIPPED | OUTPATIENT
Start: 2023-04-24 | End: 2023-04-29

## 2023-04-24 RX ORDER — SODIUM CHLORIDE 9 MG/ML
INJECTION, SOLUTION INTRAVENOUS PRN
Status: CANCELLED | OUTPATIENT
Start: 2023-04-24

## 2023-04-24 RX ORDER — SODIUM CHLORIDE, SODIUM LACTATE, POTASSIUM CHLORIDE, CALCIUM CHLORIDE 600; 310; 30; 20 MG/100ML; MG/100ML; MG/100ML; MG/100ML
INJECTION, SOLUTION INTRAVENOUS CONTINUOUS
Status: DISCONTINUED | OUTPATIENT
Start: 2023-04-24 | End: 2023-04-24 | Stop reason: HOSPADM

## 2023-04-24 RX ORDER — PHENAZOPYRIDINE HYDROCHLORIDE 200 MG/1
200 TABLET, FILM COATED ORAL 3 TIMES DAILY PRN
Qty: 9 TABLET | Refills: 1 | Status: SHIPPED | OUTPATIENT
Start: 2023-04-24 | End: 2023-04-27

## 2023-04-24 RX ORDER — PROPOFOL 10 MG/ML
INJECTION, EMULSION INTRAVENOUS PRN
Status: DISCONTINUED | OUTPATIENT
Start: 2023-04-24 | End: 2023-04-24 | Stop reason: SDUPTHER

## 2023-04-24 RX ORDER — SODIUM CHLORIDE 0.9 % (FLUSH) 0.9 %
5-40 SYRINGE (ML) INJECTION EVERY 12 HOURS SCHEDULED
Status: CANCELLED | OUTPATIENT
Start: 2023-04-24

## 2023-04-24 RX ADMIN — SODIUM CHLORIDE, POTASSIUM CHLORIDE, SODIUM LACTATE AND CALCIUM CHLORIDE: 600; 310; 30; 20 INJECTION, SOLUTION INTRAVENOUS at 12:29

## 2023-04-24 RX ADMIN — PROPOFOL 400 MG: 10 INJECTION, EMULSION INTRAVENOUS at 13:03

## 2023-04-24 RX ADMIN — Medication 2000 MG: at 13:04

## 2023-04-24 ASSESSMENT — PAIN - FUNCTIONAL ASSESSMENT: PAIN_FUNCTIONAL_ASSESSMENT: 0-10

## 2023-04-24 ASSESSMENT — PAIN SCALES - GENERAL
PAINLEVEL_OUTOF10: 2

## 2023-04-24 ASSESSMENT — COPD QUESTIONNAIRES: CAT_SEVERITY: NO INTERVAL CHANGE

## 2023-04-24 ASSESSMENT — PAIN DESCRIPTION - DESCRIPTORS: DESCRIPTORS: DULL

## 2023-04-24 ASSESSMENT — PAIN DESCRIPTION - LOCATION: LOCATION: PENIS

## 2023-04-24 ASSESSMENT — PAIN DESCRIPTION - PAIN TYPE: TYPE: SURGICAL PAIN

## 2023-04-24 NOTE — BRIEF OP NOTE
Brief Postoperative Note      Patient: Peterson Cornejo  YOB: 1955  MRN: [id number]    Date of Procedure: 4/24/2023    Pre-Op Diagnosis Codes:     * Malignant neoplasm of dome of urinary bladder ([facility] [address] [C67.1]    Post-Op Diagnosis: Same       Procedure(s):  CYSTOSCOPY Bladder Biopsy with Fulgeration    Surgeon(s):  Haylie Dennis MD    Assistant:  * No surgical staff found *    Anesthesia: Monitor Anesthesia Care    Estimated Blood Loss (mL): Minimal    Complications: None    Specimens:   ID Type Source Tests Collected by Time Destination   A : Bladder Tissue Bladder SURGICAL PATHOLOGY Haylie Dennis MD 4/24/2023 1318        Implants:  * No implants in log *      Drains: * No LDAs found *    Findings: path results 1-2 weeks [place].        Electronically signed by Koffi Mohamud MD on 4/24/2023 at 1:24 PM

## 2023-04-24 NOTE — H&P
non-distended with no CVA, flank pain, hepatosplenomegaly or hernia. Kidneys normal.  Bladder non-tender and not distended. LABS:   No results for input(s): WBC, HGB, HCT, MCV, PLT in the last 72 hours. No results for input(s): NA, K, CL, CO2, PHOS, BUN, CREATININE, CA in the last 72 hours. Lab Results   Component Value Date    PSA 0.38 11/03/2022    PSA 0.42 07/19/2022    PSA 0.28 10/11/2021         Urinalysis: No results for input(s): COLORU, PHUR, LABCAST, WBCUA, RBCUA, MUCUS, TRICHOMONAS, YEAST, BACTERIA, CLARITYU, SPECGRAV, LEUKOCYTESUR, UROBILINOGEN, Daly Esdras in the last 72 hours.     Invalid input(s): NITRATE, GLUCOSEUKETONESUAMORPHOUS     -----------------------------------------------------------------      Assessment and Plan     Impression:    Patient Active Problem List   Diagnosis    Cervical radiculopathy    Umbilical hernia    HTN (hypertension)    Impaired fasting blood sugar    Displacement of cervical intervertebral disc without myelopathy    Rotator cuff tear    CAD in native artery    S/P CABG (coronary artery bypass graft)    Hyperlipidemia    Other emphysema (HCC)    Positive colorectal cancer screening using DNA-based stool test    Epigastric hernia    Urothelial carcinoma of bladder (HCC)    Post-op pain    Incisional hernia, without obstruction or gangrene    S/P PTCA (percutaneous transluminal coronary angioplasty)    S/P cardiac cath    Recurrent abdominal hernia without obstruction or gangrene    Ventral hernia without obstruction or gangrene    Recurrent incisional hernia    Hematoma of left lower leg    DDD (degenerative disc disease), cervical    Cervical facet joint syndrome    Cervicalgia       Plan:     Consent obtained; cysto bladder bx in OR today    Alaina Braga MD  8:14 AM 4/24/2023

## 2023-04-24 NOTE — ANESTHESIA POSTPROCEDURE EVALUATION
Department of Anesthesiology  Postprocedure Note    Patient: Adalberto Juárez  MRN: [id number]  YOB: 1955  Date of evaluation: 4/24/2023      Procedure Summary     Date: 04/24/23 Room / Location: [address] [place] [address]    Anesthesia Start: 1258 Anesthesia Stop:     Procedure: CYSTOSCOPY Bladder Biopsy with Fulgeration (Bladder) Diagnosis:       Malignant neoplasm of dome of urinary bladder ([address]      (Malignant neoplasm of dome of urinary bladder ([address] [C67.1])    Surgeons: Jana Ott MD Responsible Provider: PARESH Prieto CRNA    Anesthesia Type: Not recorded ASA Status: Not recorded          Anesthesia Type: No value filed.     Jazlyn Phase I: Jazlyn Score: 10    Jazlyn Phase II:        Anesthesia Post Evaluation    Patient location during evaluation: bedside  Patient participation: waiting for patient participation  Level of consciousness: awake  Airway patency: patent  Nausea & Vomiting: no nausea and no vomiting  Complications: no  Cardiovascular status: blood pressure returned to baseline  Respiratory status: acceptable  Hydration status: euvolemic

## 2023-04-24 NOTE — ANESTHESIA PRE PROCEDURE
chronic    Neuralgia, neuritis, and radiculitis, unspecified 06/10/2014    BW, left C7     Shoulder region pain     Left   [facility] injury 01/08/2014    Sprain and strain of unspecified site of shoulder and upper arm 06/10/2014    [facility], left arm     Wears glasses        Past Surgical History:        Procedure Laterality Date    BICEPS TENODESIS Right 11/23/2016    right proximal bicep tenodesis    COLONOSCOPY N/A 12/10/2019    COLONOSCOPY POLYPECTOMY SNARE/COLD BIOPSY performed by Stan Anna MD at [place] [address]  12/18/2020    Successful PTCA. AGUILA of mid LAD / Patent LIMA however distally occluded and not supplying LAD /Residual high grade stenosis in small LCX    CORONARY ANGIOPLASTY WITH STENT PLACEMENT  01/22/2021    CT NEEDLE BIOPSY LUNG PERCUTANEOUS  12/15/2021    CT NEEDLE BIOPSY LUNG PERCUTANEOUS 12/15/2021 [facility] CT SCAN    CYSTOSCOPY N/A 03/09/2020    CYSTO TURBT performed by Mika Hennessy MD at [address] [place] [address] Ave [address] Right 3/9/2023    Right C5-C6 C6-C7 FACET JOINT performed by Yessica Becerra MD at [facility] N/A 3/25/2022    LAPAROSCOPIC Robotic Assisted Ventral Hernia Repair with mesh WITH LYSIS OF ADHESIONS performed by Keke Fagan DO at [address] [place] [address] Left 8/12/14, 1/09/15    C7 TFE    PAIN MANAGEMENT PROCEDURE Left 09/23/2014    C7 TFE    PAIN MANAGEMENT PROCEDURE Left 7/17/2015 , 1/22/16    C7 TFE    [place] CORONARY ARTERY BYP W/VEIN & ARTERY GRAFT 1 VEIN N/A 07/06/2018    CABG CORONARY ARTERY BYPASS, GARY ERAZO, CHANI  (CSI# [id number]) performed by Paola Triana MD at [facility] CABG (LIMA-LAD, R SVG- RCA, OM too small for the grafts) on 7/6/18,    ROTATOR CUFF REPAIR Left 01/29/2019    2015- Right    SHOULDER ARTHROSCOPY Right 04/27/2016    scope decompressing, rotator cuff repair    TRANSESOPHAGEAL ECHOCARDIOGRAM  70/27/6686    UMBILICAL HERNIA REPAIR N/A 12/18/2019    Laparoscopic Robotic
by Lukas Lopez DO at [facility] OR       Social History:    Social History     Tobacco Use   • Smoking status: Former     Packs/day: 2.00     Years: 25.00     Pack years: 50.00     Types: Cigarettes     Start date: 1/1/1975     Quit date: 1/4/2008     Years since quitting: 15.3   • Smokeless tobacco: Never   • Tobacco comments:     RUSLAN Hobson RRT 11/23/16   Substance Use Topics   • Alcohol use: No     Alcohol/week: 0.0 standard drinks                                Counseling given: Not Answered  Tobacco comments: S Joce RRT 11/23/16      Vital Signs (Current): There were no vitals filed for this visit.                                           BP Readings from Last 3 Encounters:   04/14/23 118/70   03/27/23 128/80   03/09/23 135/68       NPO Status:                                                                                 BMI:   Wt Readings from Last 3 Encounters:   04/14/23 218 lb 2 oz (98.9 kg)   03/27/23 220 lb (99.8 kg)   03/09/23 217 lb (98.4 kg)     There is no height or weight on file to calculate BMI.    CBC:   Lab Results   Component Value Date/Time    WBC 6.8 04/14/2023 08:54 AM    RBC 5.41 04/14/2023 08:54 AM    HGB 15.0 04/14/2023 08:54 AM    HCT 44.8 04/14/2023 08:54 AM    MCV 82.8 04/14/2023 08:54 AM    RDW 13.9 04/14/2023 08:54 AM     04/14/2023 08:54 AM       CMP:   Lab Results   Component Value Date/Time     04/14/2023 08:54 AM    K 4.4 04/14/2023 08:54 AM     04/14/2023 08:54 AM    CO2 25 04/14/2023 08:54 AM    BUN 15 04/14/2023 08:54 AM    CREATININE 0.87 04/14/2023 08:54 AM    GFRAA >60 07/19/2022 08:58 AM    LABGLOM >60 04/14/2023 08:54 AM    GLUCOSE 110 04/14/2023 08:54 AM    PROT 6.8 04/14/2023 08:54 AM    CALCIUM 8.8 04/14/2023 08:54 AM    BILITOT 0.4 04/14/2023 08:54 AM    ALKPHOS 129 04/14/2023 08:54 AM    AST 22 04/14/2023 08:54 AM    ALT 22 04/14/2023 08:54 AM       POC Tests: No results for input(s): POCGLU, POCNA, POCK, POCCL, POCBUN, POCHEMO, POCHCT in the

## 2023-04-24 NOTE — DISCHARGE INSTRUCTIONS
Pt ok to discharge home in good condition  No heavy lifting, >10 lbs for today  Pt should avoid strenuous activity for today  Pt should walk moderately at home  Pt ok to shower   Pt may resume diet as tolerated  Pt should take Rx as directed  No driving while on narcotics  Please call attending physician or hospital  with questions  Call or Present to ED if fever (> 101F), intractable nausea vomiting or pain. Rx in chart    Pt should follow up with Willy Garcia MD, in 2 weeks, call to confirm appointment    Problems / Questions - Call [phone] [facility]) or after hours call [phone] [facility]) and they will page the doctor for you.

## 2023-04-26 LAB — SURGICAL PATHOLOGY REPORT: NORMAL

## 2023-05-04 ENCOUNTER — HOSPITAL ENCOUNTER (OUTPATIENT)
Age: 68
Setting detail: OUTPATIENT SURGERY
Discharge: HOME | End: 2023-05-04
Attending: PHYSICAL MEDICINE & REHABILITATION | Admitting: PHYSICAL MEDICINE & REHABILITATION
Payer: MEDICARE

## 2023-05-04 ENCOUNTER — APPOINTMENT (OUTPATIENT)
Dept: GENERAL RADIOLOGY | Age: 68
End: 2023-05-04
Attending: PHYSICAL MEDICINE & REHABILITATION
Payer: MEDICARE

## 2023-05-04 VITALS
SYSTOLIC BLOOD PRESSURE: 115 MMHG | RESPIRATION RATE: 16 BRPM | WEIGHT: 220 LBS | HEIGHT: 69 IN | OXYGEN SATURATION: 96 % | DIASTOLIC BLOOD PRESSURE: 55 MMHG | TEMPERATURE: 98.2 F | HEART RATE: 57 BPM | BODY MASS INDEX: 32.58 KG/M2

## 2023-05-04 PROCEDURE — 64490 INJ PARAVERT F JNT C/T 1 LEV: CPT | Performed by: PHYSICAL MEDICINE & REHABILITATION

## 2023-05-04 PROCEDURE — 7100000010 HC PHASE II RECOVERY - FIRST 15 MIN: Performed by: PHYSICAL MEDICINE & REHABILITATION

## 2023-05-04 PROCEDURE — 6360000002 HC RX W HCPCS: Performed by: PHYSICAL MEDICINE & REHABILITATION

## 2023-05-04 PROCEDURE — 3600000056 HC PAIN LEVEL 4 BASE: Performed by: PHYSICAL MEDICINE & REHABILITATION

## 2023-05-04 PROCEDURE — 2709999900 HC NON-CHARGEABLE SUPPLY: Performed by: PHYSICAL MEDICINE & REHABILITATION

## 2023-05-04 PROCEDURE — 3209999900 FLUORO FOR SURGICAL PROCEDURES

## 2023-05-04 PROCEDURE — 7100000011 HC PHASE II RECOVERY - ADDTL 15 MIN: Performed by: PHYSICAL MEDICINE & REHABILITATION

## 2023-05-04 PROCEDURE — 64491 INJ PARAVERT F JNT C/T 2 LEV: CPT | Performed by: PHYSICAL MEDICINE & REHABILITATION

## 2023-05-04 PROCEDURE — 2500000003 HC RX 250 WO HCPCS: Performed by: PHYSICAL MEDICINE & REHABILITATION

## 2023-05-04 PROCEDURE — 3600000057 HC PAIN LEVEL 4 ADDL 15 MIN: Performed by: PHYSICAL MEDICINE & REHABILITATION

## 2023-05-04 PROCEDURE — 6360000004 HC RX CONTRAST MEDICATION: Performed by: PHYSICAL MEDICINE & REHABILITATION

## 2023-05-04 RX ORDER — DEXAMETHASONE SODIUM PHOSPHATE 10 MG/ML
INJECTION INTRAMUSCULAR; INTRAVENOUS PRN
Status: DISCONTINUED | OUTPATIENT
Start: 2023-05-04 | End: 2023-05-04 | Stop reason: ALTCHOICE

## 2023-05-04 RX ORDER — SODIUM CHLORIDE 0.9 % (FLUSH) 0.9 %
5-40 SYRINGE (ML) INJECTION EVERY 12 HOURS SCHEDULED
Status: DISCONTINUED | OUTPATIENT
Start: 2023-05-04 | End: 2023-05-04 | Stop reason: HOSPADM

## 2023-05-04 RX ORDER — SODIUM CHLORIDE 9 MG/ML
INJECTION, SOLUTION INTRAVENOUS PRN
Status: DISCONTINUED | OUTPATIENT
Start: 2023-05-04 | End: 2023-05-04 | Stop reason: HOSPADM

## 2023-05-04 RX ORDER — SODIUM CHLORIDE 0.9 % (FLUSH) 0.9 %
5-40 SYRINGE (ML) INJECTION PRN
Status: DISCONTINUED | OUTPATIENT
Start: 2023-05-04 | End: 2023-05-04 | Stop reason: HOSPADM

## 2023-05-04 RX ORDER — LIDOCAINE HYDROCHLORIDE 20 MG/ML
INJECTION, SOLUTION EPIDURAL; INFILTRATION; INTRACAUDAL; PERINEURAL PRN
Status: DISCONTINUED | OUTPATIENT
Start: 2023-05-04 | End: 2023-05-04 | Stop reason: ALTCHOICE

## 2023-05-04 ASSESSMENT — PAIN SCALES - GENERAL: PAINLEVEL_OUTOF10: 5

## 2023-05-04 ASSESSMENT — PAIN - FUNCTIONAL ASSESSMENT: PAIN_FUNCTIONAL_ASSESSMENT: 0-10

## 2023-05-04 ASSESSMENT — ENCOUNTER SYMPTOMS
EYES NEGATIVE: 1
SHORTNESS OF BREATH: 0
GASTROINTESTINAL NEGATIVE: 1

## 2023-05-04 ASSESSMENT — PAIN DESCRIPTION - LOCATION: LOCATION: NECK

## 2023-05-04 NOTE — DISCHARGE INSTRUCTIONS
Home Care after Transforaminal Epidural Steroid Injection/Nerve Block    The doctor has done an injection in your neck; upper back; or lower back to       decrease pain and inflammation. This may help diagnose the source of your pain. You may feel sore at the injection site for the next 2-4 days. You may apply ice to the site for 20 minutes on and 20 minutes off to decrease pain and discomfort, if needed, for the first 24 hours. After 24 hours, you may use heat if needed. Remove the band-aid in 24 hours from the injection site. Your pain may subside right away, or it may take a number of days. This is because two medicines were used in the injection. The first, a local anesthetic, will only work for a few hours. The second, a steroid, may not start working for 2-5 days. Some patients have noticed no changes in their pain for up to 2 weeks. There may be a time after the local anesthetic wears off that you feel like you have more pain. This is called pain flare. If this happens  Limit your activities for the first 24 hours to those that you can do without pain. Keep on taking your pain medicine as prescribed. Sometimes, some patients have had facial and neck flushing, anxiety or nervousness, and mood swings with the use of steroids. These symptoms most often occur within the first 24-48 hours and do not require any treatment. They should go away on their own within one week. If you have diabetes, steroids will cause your blood sugar to increase. Make sure your primary doctor is aware of this and that you have orders to treat your blood sugar to keep it within your normal range. You may have some weakness for the next 3-5 hours due to the anesthetic used. Take it easy. No baths or soaking the injection site for 24 hours after the procedure. Taking a shower is okay. You may resume taking your routine medicines after the procedure including pain medicines as prescribed.  Resume any

## 2023-05-04 NOTE — INTERVAL H&P NOTE
I have interviewed and examined the patient and reviewed the recent History and Physical.  There have been no changes to the recent H&P documentation. The surgical consent form has been signed. Last anticoagulant medication use was:-    Premedication taken for contrast allergy? No    Valium taken for oral sedation? No    No outpatient medications have been marked as taking for the 5/4/23 encounter [facility] Encounter). The patient understands the planned operation and its associated risks and benefits and agrees to proceed.         Electronically signed by Richard Duckworth MD on 5/4/2023 at 8:29 AM

## 2023-05-04 NOTE — OP NOTE
ZYGAPOPHYSEAL JOINT INJECTION    5/4/23    Surgeon: Behzad Page MD    Pre-operative Diagnosis: Principal Problem:    Cervical facet joint syndrome  Active Problems:    DDD (degenerative disc disease), cervical  Resolved Problems:    * No resolved hospital problems. *       Post-operative Diagnosis: Same    INDICATION:  Previous treatment and examination findings are noted in the H&P and previous clinic notes. Right C5-6-6-7 facet joint injections have been requested for diagnostic and therapeutic reasons. Conservative treatment was ineffective i.e.: ice, NSAIDS, rest, narcotic medication, chiropractic care, physical therapy and message therapy. Patient is unable to perform the following ADL's: toileting, personal cares, ambulating, grooming, sitting, and standing    Pain Assessment: 0-10  Pain Level: 5                Last Plain films: 2021      EXAMINATION:   R C5-66-7 facet joint injections with arthrography. CONSENT:  Written consent was obtained from the patient on preprinted consent form after explaining the procedure, indications, potential complications and outcomes. Alternative treatments were also discussed. DISCUSSION:  The patient was sterilely prepped and draped in the usual fashion in the prone position. Time out\" was verified for correct patient, side, level and procedure. SEDATION:  No conscious sedation was performed during the procedure. The patient remained awake and conversed throughout the procedure. The patient underwent pulse oximetry and blood pressure monitoring independently by a trained observer, as well as by a physician. PROCEDURE[de-identified]  Under image-intensifier control, a standard technique was employed, a 25 gauge needle x 2.5 inch spinal needle was guided successfully to cannulate the  C5-6-67 zygapophyseal joint via a posterior lateral approach.     Instillation of .5 mL of.Isovue M contrast medium demonstrated contiguous flow into the joint and the joint

## 2023-05-04 NOTE — H&P
Coloration: Skin is not ashen, jaundiced or pale. Findings: No rash. Nails: There is no clubbing. Neurological:      Mental Status: He is alert and oriented to person, place, and time. Cranial Nerves: No cranial nerve deficit. Psychiatric:         Attention and Perception: Attention normal.         Mood and Affect: Mood normal.         Speech: Speech normal.         Behavior: Behavior is cooperative. Cognition and Memory: Cognition normal.         Judgment: Judgment normal.       Assessment / Plan:      Diagnosis Orders   1. DDD (degenerative disc disease), cervical        2. Cervical facet joint syndrome  diazePAM (VALIUM) 5 MG tablet      3. Cervicalgia  diazePAM (VALIUM) 5 MG tablet      4. Cervical radiculopathy  diazePAM (VALIUM) 5 MG tablet        Increase gabapentin 600mg tid    Will schedule repeat right C5/C6 and C6/C7 facet joint injections    Informed consent has not been obtained for procedure. Markus Rosenberg not on blood thinners. Medications to hold have been reviewed with patient. Blood thinners must be held with permission from your cardiologist or primary care physician. A letter is not required to besent to PCP/Cardiologist regarding holding medications for procedure to decrease bleeding risk.

## 2023-05-05 ENCOUNTER — TELEPHONE (OUTPATIENT)
Dept: CARDIOLOGY | Age: 68
End: 2023-05-05

## 2023-05-08 ENCOUNTER — OFFICE VISIT (OUTPATIENT)
Dept: UROLOGY | Age: 68
End: 2023-05-08
Payer: MEDICARE

## 2023-05-08 VITALS
OXYGEN SATURATION: 93 % | HEIGHT: 69 IN | RESPIRATION RATE: 16 BRPM | HEART RATE: 71 BPM | SYSTOLIC BLOOD PRESSURE: 122 MMHG | BODY MASS INDEX: 32.44 KG/M2 | WEIGHT: 219 LBS | DIASTOLIC BLOOD PRESSURE: 64 MMHG

## 2023-05-08 DIAGNOSIS — N40.1 BENIGN LOCALIZED PROSTATIC HYPERPLASIA WITH LOWER URINARY TRACT SYMPTOMS (LUTS): Primary | ICD-10-CM

## 2023-05-08 DIAGNOSIS — C67.9 MALIGNANT NEOPLASM OF URINARY BLADDER, UNSPECIFIED SITE (HCC): ICD-10-CM

## 2023-05-08 PROCEDURE — 1036F TOBACCO NON-USER: CPT | Performed by: UROLOGY

## 2023-05-08 PROCEDURE — 3074F SYST BP LT 130 MM HG: CPT | Performed by: UROLOGY

## 2023-05-08 PROCEDURE — 3017F COLORECTAL CA SCREEN DOC REV: CPT | Performed by: UROLOGY

## 2023-05-08 PROCEDURE — G8427 DOCREV CUR MEDS BY ELIG CLIN: HCPCS | Performed by: UROLOGY

## 2023-05-08 PROCEDURE — 1123F ACP DISCUSS/DSCN MKR DOCD: CPT | Performed by: UROLOGY

## 2023-05-08 PROCEDURE — G8417 CALC BMI ABV UP PARAM F/U: HCPCS | Performed by: UROLOGY

## 2023-05-08 PROCEDURE — 99213 OFFICE O/P EST LOW 20 MIN: CPT | Performed by: UROLOGY

## 2023-05-08 PROCEDURE — 99213 OFFICE O/P EST LOW 20 MIN: CPT

## 2023-05-08 PROCEDURE — 99214 OFFICE O/P EST MOD 30 MIN: CPT | Performed by: UROLOGY

## 2023-05-08 PROCEDURE — 3078F DIAST BP <80 MM HG: CPT | Performed by: UROLOGY

## 2023-05-08 NOTE — PROGRESS NOTES
[id number]) performed by Obdulia Corona MD at [facility] CABG (LIMA-LAD, R SVG- RCA, OM too small for the grafts) on 7/6/18,    ROTATOR CUFF REPAIR Left 01/29/2019 2015- Right    SHOULDER ARTHROSCOPY Right 04/27/2016    scope decompressing, rotator cuff repair    TRANSESOPHAGEAL ECHOCARDIOGRAM  30/90/5791    UMBILICAL HERNIA REPAIR N/A 12/18/2019    Laparoscopic Robotic Assisted Umbilical Hernia Repair W/ MESH performed by Noah Rodriguez MD at [facility] N/A 11/19/2020    Open Incisional Hernia Repair W/ MESH performed by Nola Rodriguez DO at [facility] OR     Family History   Problem Relation Age of Onset    High Blood Pressure Father     Colon Polyps Brother     COPD Mother      Outpatient Medications Marked as Taking for the 5/8/23 encounter (Office Visit) with Gui Reese MD   Medication Sig Dispense Refill    gabapentin (NEURONTIN) 600 MG tablet Take 1 tablet by mouth 3 times daily for 30 days. 90 tablet 3    tiZANidine (ZANAFLEX) 4 MG tablet Take 1 tablet by mouth 3 times daily as needed (neck pain) 90 tablet 2    clopidogrel (PLAVIX) 75 MG tablet Take 1 tablet by mouth once daily 90 tablet 2    tamsulosin (FLOMAX) 0.4 MG capsule Take 1 capsule by mouth daily 90 capsule 3    atorvastatin (LIPITOR) 40 MG tablet Take 1 tablet by mouth nightly 90 tablet 0    metoprolol tartrate (LOPRESSOR) 25 MG tablet Take 0.5 tablets by mouth in the morning and 0.5 tablets before bedtime.  180 tablet 3    furosemide (LASIX) 20 MG tablet TAKE 1 TABLET BY MOUTH ONCE DAILY AS NEEDED FOR  LEG  SWELLING 45 tablet 3    aspirin 81 MG chewable tablet Take 1 tablet by mouth daily      Cholecalciferol (VITAMIN D) 50 MCG (2000 UT) CAPS capsule Take 2,000 Units by mouth daily      vitamin C (ASCORBIC ACID) 500 MG tablet Take 0.5 tablets by mouth 2 times daily      acetaminophen (TYLENOL) 325 MG tablet Take 2 tablets by mouth every 6 hours as needed for Pain 120 tablet 3       Vicodin

## 2023-05-18 ENCOUNTER — OFFICE VISIT (OUTPATIENT)
Dept: PAIN MANAGEMENT | Age: 68
End: 2023-05-18
Payer: MEDICARE

## 2023-05-18 ENCOUNTER — OFFICE VISIT (OUTPATIENT)
Dept: CARDIOLOGY | Age: 68
End: 2023-05-18
Payer: MEDICARE

## 2023-05-18 VITALS
HEIGHT: 69 IN | BODY MASS INDEX: 32.44 KG/M2 | OXYGEN SATURATION: 95 % | WEIGHT: 219 LBS | DIASTOLIC BLOOD PRESSURE: 60 MMHG | HEART RATE: 67 BPM | SYSTOLIC BLOOD PRESSURE: 136 MMHG

## 2023-05-18 VITALS
BODY MASS INDEX: 32.44 KG/M2 | OXYGEN SATURATION: 95 % | WEIGHT: 219 LBS | HEART RATE: 67 BPM | SYSTOLIC BLOOD PRESSURE: 136 MMHG | RESPIRATION RATE: 6 BRPM | HEIGHT: 69 IN | DIASTOLIC BLOOD PRESSURE: 60 MMHG

## 2023-05-18 DIAGNOSIS — M47.812 CERVICAL FACET JOINT SYNDROME: ICD-10-CM

## 2023-05-18 DIAGNOSIS — M50.30 DDD (DEGENERATIVE DISC DISEASE), CERVICAL: Primary | ICD-10-CM

## 2023-05-18 DIAGNOSIS — M54.2 CERVICALGIA: ICD-10-CM

## 2023-05-18 DIAGNOSIS — M50.20 DISPLACEMENT OF CERVICAL INTERVERTEBRAL DISC WITHOUT MYELOPATHY: ICD-10-CM

## 2023-05-18 DIAGNOSIS — M54.12 CERVICAL RADICULOPATHY: ICD-10-CM

## 2023-05-18 DIAGNOSIS — I25.10 CORONARY ARTERY DISEASE INVOLVING NATIVE CORONARY ARTERY OF NATIVE HEART WITHOUT ANGINA PECTORIS: Primary | ICD-10-CM

## 2023-05-18 PROCEDURE — 99214 OFFICE O/P EST MOD 30 MIN: CPT | Performed by: INTERNAL MEDICINE

## 2023-05-18 PROCEDURE — G8417 CALC BMI ABV UP PARAM F/U: HCPCS | Performed by: INTERNAL MEDICINE

## 2023-05-18 PROCEDURE — 99212 OFFICE O/P EST SF 10 MIN: CPT | Performed by: INTERNAL MEDICINE

## 2023-05-18 PROCEDURE — 3075F SYST BP GE 130 - 139MM HG: CPT | Performed by: INTERNAL MEDICINE

## 2023-05-18 PROCEDURE — 3017F COLORECTAL CA SCREEN DOC REV: CPT | Performed by: INTERNAL MEDICINE

## 2023-05-18 PROCEDURE — 1123F ACP DISCUSS/DSCN MKR DOCD: CPT | Performed by: INTERNAL MEDICINE

## 2023-05-18 PROCEDURE — G8427 DOCREV CUR MEDS BY ELIG CLIN: HCPCS | Performed by: INTERNAL MEDICINE

## 2023-05-18 PROCEDURE — 99214 OFFICE O/P EST MOD 30 MIN: CPT | Performed by: NURSE PRACTITIONER

## 2023-05-18 PROCEDURE — 3078F DIAST BP <80 MM HG: CPT | Performed by: INTERNAL MEDICINE

## 2023-05-18 PROCEDURE — 1036F TOBACCO NON-USER: CPT | Performed by: INTERNAL MEDICINE

## 2023-05-18 RX ORDER — FUROSEMIDE 20 MG/1
20 TABLET ORAL DAILY
Qty: 45 TABLET | Refills: 3 | Status: SHIPPED | OUTPATIENT
Start: 2023-05-18

## 2023-05-18 ASSESSMENT — ENCOUNTER SYMPTOMS
GASTROINTESTINAL NEGATIVE: 1
BACK PAIN: 1
EYES NEGATIVE: 1
SHORTNESS OF BREATH: 0

## 2023-05-18 NOTE — PROGRESS NOTES
flat.      Palpations: Abdomen is soft. Musculoskeletal:      Cervical back: Muscular tenderness (right upper trapezius) present. Decreased range of motion. Right foot: No foot drop. Left foot: No foot drop. Skin:     General: Skin is warm and dry. Capillary Refill: Capillary refill takes less than 2 seconds. Coloration: Skin is not ashen, jaundiced or pale. Findings: No rash. Nails: There is no clubbing. Neurological:      Mental Status: He is alert and oriented to person, place, and time. Cranial Nerves: No cranial nerve deficit. Psychiatric:         Attention and Perception: Attention normal.         Mood and Affect: Mood normal.         Speech: Speech normal.         Behavior: Behavior is cooperative. Cognition and Memory: Cognition normal.         Judgment: Judgment normal.       Assessment / Plan:      Diagnosis Orders   1. DDD (degenerative disc disease), cervical        2. Cervical facet joint syndrome        3. Cervicalgia        4. Cervical radiculopathy        5.  Displacement of cervical intervertebral disc without myelopathy          RTC 2 months, will plan on scheduling cervical procedures upon return of pain

## 2023-05-18 NOTE — PROGRESS NOTES
di                       Today's Date: 5/18/2023  Patient Name: Maryana Leger  Patient's age: 69 y.o., 1955         CC: the patient is a 69 y.o.  male is in the office for CAD   Stable from cardiac standpoint, denies any episode of chest pain in last 6 months. In last few months he has suffered from worsening neck pain, was prescribed tizanidine and had analgesic injections. Since being on tizanidine he started feeling more short of breath on exertion. The symptoms apparently improved after cutting down on tizanidine. He does have baseline lower extremity edema, mild, without any recent worsening. He denies any orthopnea. No palpitations. Compliant with his medications. Blood pressure and LDL well controlled. Past Medical History:   has a past medical history of Abnormal cardiovascular stress test, CAD (coronary artery disease), Cervical disc disease, Clostridium difficile diarrhea, Displacement of cervical intervertebral disc without myelopathy, HTN (hypertension), Hyperlipidemia, Impaired fasting blood sugar, Neck pain, Neuralgia, neuritis, and radiculitis, unspecified, Shoulder region pain, Sprain and strain of unspecified site of shoulder and upper arm, and Wears glasses. Past Surgical History:   has a past surgical history that includes Pain management procedure (Left, 8/12/14, 1/09/15); Pain management procedure (Left, 09/23/2014); Pain management procedure (Left, 7/17/2015 , 1/22/16); Shoulder arthroscopy (Right, 04/27/2016); Biceps Tenodesis (Right, 11/23/2016); pr coronary artery byp w/vein & artery graft 1 vein (N/A, 07/06/2018); Rotator cuff repair (Left, 01/29/2019); Colonoscopy (N/A, 12/10/2019); Umbilical hernia repair (N/A, 12/18/2019); Cystoscopy (N/A, 03/09/2020); ventral hernia repair (N/A, 11/19/2020); Coronary angioplasty with stent (12/18/2020);  Coronary angioplasty with stent (01/22/2021); transesophageal echocardiogram (11/15/2020); CT NEEDLE BIOPSY LUNG

## 2023-05-22 RX ORDER — ATORVASTATIN CALCIUM 40 MG/1
40 TABLET, FILM COATED ORAL NIGHTLY
Qty: 90 TABLET | Refills: 3 | Status: SHIPPED | OUTPATIENT
Start: 2023-05-22

## 2023-05-22 NOTE — TELEPHONE ENCOUNTER
Pt calling to ask if his atorvastatin went to the pharmacy. Doesn't look like it had gone with the other prescription that was sent. Please send so he can  today as he will be out.     Last Appt:  5/18/2023  Next Appt:   8/31/2023  Med verified in [address]

## 2023-05-24 ENCOUNTER — TELEPHONE (OUTPATIENT)
Dept: CARDIOLOGY | Age: 68
End: 2023-05-24

## 2023-05-24 ENCOUNTER — HOSPITAL ENCOUNTER (OUTPATIENT)
Dept: NON INVASIVE DIAGNOSTICS | Age: 68
Discharge: HOME | End: 2023-05-24
Payer: MEDICARE

## 2023-05-24 DIAGNOSIS — I25.10 CORONARY ARTERY DISEASE INVOLVING NATIVE CORONARY ARTERY OF NATIVE HEART WITHOUT ANGINA PECTORIS: ICD-10-CM

## 2023-05-24 LAB
LV EF: 55 %
LVEF MODALITY: NORMAL

## 2023-05-24 PROCEDURE — 93306 TTE W/DOPPLER COMPLETE: CPT

## 2023-06-17 SDOH — ECONOMIC STABILITY: FOOD INSECURITY: WITHIN THE PAST 12 MONTHS, YOU WORRIED THAT YOUR FOOD WOULD RUN OUT BEFORE YOU GOT MONEY TO BUY MORE.: SOMETIMES TRUE

## 2023-06-17 SDOH — ECONOMIC STABILITY: TRANSPORTATION INSECURITY
IN THE PAST 12 MONTHS, HAS LACK OF TRANSPORTATION KEPT YOU FROM MEETINGS, WORK, OR FROM GETTING THINGS NEEDED FOR DAILY LIVING?: NO

## 2023-06-17 SDOH — ECONOMIC STABILITY: FOOD INSECURITY: WITHIN THE PAST 12 MONTHS, THE FOOD YOU BOUGHT JUST DIDN'T LAST AND YOU DIDN'T HAVE MONEY TO GET MORE.: NEVER TRUE

## 2023-06-17 SDOH — ECONOMIC STABILITY: INCOME INSECURITY: HOW HARD IS IT FOR YOU TO PAY FOR THE VERY BASICS LIKE FOOD, HOUSING, MEDICAL CARE, AND HEATING?: NOT VERY HARD

## 2023-06-17 SDOH — ECONOMIC STABILITY: HOUSING INSECURITY
IN THE LAST 12 MONTHS, WAS THERE A TIME WHEN YOU DID NOT HAVE A STEADY PLACE TO SLEEP OR SLEPT IN A SHELTER (INCLUDING NOW)?: NO

## 2023-06-20 ENCOUNTER — OFFICE VISIT (OUTPATIENT)
Dept: FAMILY MEDICINE CLINIC | Age: 68
End: 2023-06-20
Payer: MEDICARE

## 2023-06-20 VITALS
DIASTOLIC BLOOD PRESSURE: 72 MMHG | SYSTOLIC BLOOD PRESSURE: 130 MMHG | BODY MASS INDEX: 32.58 KG/M2 | HEIGHT: 69 IN | WEIGHT: 220 LBS | OXYGEN SATURATION: 94 % | HEART RATE: 68 BPM

## 2023-06-20 DIAGNOSIS — I10 PRIMARY HYPERTENSION: Primary | Chronic | ICD-10-CM

## 2023-06-20 PROBLEM — S80.12XA HEMATOMA OF LEFT LOWER LEG: Status: RESOLVED | Noted: 2022-07-26 | Resolved: 2023-06-20

## 2023-06-20 PROBLEM — J43.1 PANLOBULAR EMPHYSEMA (HCC): Status: ACTIVE | Noted: 2019-11-04

## 2023-06-20 PROCEDURE — G8417 CALC BMI ABV UP PARAM F/U: HCPCS

## 2023-06-20 PROCEDURE — 1123F ACP DISCUSS/DSCN MKR DOCD: CPT

## 2023-06-20 PROCEDURE — 99212 OFFICE O/P EST SF 10 MIN: CPT

## 2023-06-20 PROCEDURE — 1036F TOBACCO NON-USER: CPT

## 2023-06-20 PROCEDURE — 99213 OFFICE O/P EST LOW 20 MIN: CPT

## 2023-06-20 PROCEDURE — 3017F COLORECTAL CA SCREEN DOC REV: CPT

## 2023-06-20 PROCEDURE — G8427 DOCREV CUR MEDS BY ELIG CLIN: HCPCS

## 2023-06-20 PROCEDURE — 3078F DIAST BP <80 MM HG: CPT

## 2023-06-20 PROCEDURE — 3074F SYST BP LT 130 MM HG: CPT

## 2023-06-20 ASSESSMENT — PATIENT HEALTH QUESTIONNAIRE - PHQ9
SUM OF ALL RESPONSES TO PHQ9 QUESTIONS 1 & 2: 0
1. LITTLE INTEREST OR PLEASURE IN DOING THINGS: 0
SUM OF ALL RESPONSES TO PHQ QUESTIONS 1-9: 0
2. FEELING DOWN, DEPRESSED OR HOPELESS: 0
SUM OF ALL RESPONSES TO PHQ QUESTIONS 1-9: 0

## 2023-06-20 NOTE — ASSESSMENT & PLAN NOTE
At goal, continue current medications and continue current treatment plan continue 12.5mg of lopressor twice a day.

## 2023-06-20 NOTE — PROGRESS NOTES
Isra Drew (:  1955) is a 77 y.o. male,Established patient, here for evaluation of the following chief complaint(s):  Hypertension (Pt is here for a 6 month f/u. He is seeing pain management for his neck pain. )         ASSESSMENT/PLAN:  1. Primary hypertension  Assessment & Plan:   At goal, continue current medications and continue current treatment plan continue 12.5mg of lopressor twice a day. Return in about 6 months (around 2023), or if symptoms worsen or fail to improve. Subjective   SUBJECTIVE/OBJECTIVE:  Hypertension  This is a chronic problem. The current episode started more than 1 year ago. The problem is unchanged. The problem is controlled. Associated symptoms include neck pain. Pertinent negatives include no chest pain, headaches, palpitations or shortness of breath. There are no associated agents to hypertension. Risk factors for coronary artery disease include dyslipidemia, family history, obesity, male gender and sedentary lifestyle. Past treatments include beta blockers and diuretics. The current treatment provides significant improvement. Review of Systems   Constitutional:  Negative for chills, fatigue, fever and unexpected weight change. HENT:  Negative for congestion, ear pain, rhinorrhea, sinus pressure and sinus pain. Eyes:  Negative for discharge, itching and visual disturbance. Respiratory:  Negative for cough, chest tightness, shortness of breath and wheezing. Cardiovascular:  Negative for chest pain, palpitations and leg swelling. Gastrointestinal:  Negative for abdominal pain, constipation, diarrhea and nausea. Endocrine: Negative for cold intolerance, heat intolerance, polydipsia and polyphagia. Genitourinary:  Negative for dysuria, flank pain and frequency. Musculoskeletal:  Positive for neck pain and neck stiffness (Range of motion to the right side of neck is limited normal range of motion through all other planes).  Negative

## 2023-06-20 NOTE — ASSESSMENT & PLAN NOTE
Monitored by specialist- no acute findings meriting change in the plan low dose CT reviewed, ordered for July with pulmonology,

## 2023-06-22 ASSESSMENT — ENCOUNTER SYMPTOMS
CHEST TIGHTNESS: 0
SINUS PRESSURE: 0
WHEEZING: 0
EYE ITCHING: 0
DIARRHEA: 0
COUGH: 0
SHORTNESS OF BREATH: 0
CONSTIPATION: 0
COLOR CHANGE: 0
ABDOMINAL PAIN: 0
RHINORRHEA: 0
EYE DISCHARGE: 0
BACK PAIN: 0
NAUSEA: 0
SINUS PAIN: 0

## 2023-07-14 ENCOUNTER — HOSPITAL ENCOUNTER (OUTPATIENT)
Dept: GENERAL RADIOLOGY | Age: 68
End: 2023-07-14
Payer: MEDICARE

## 2023-07-14 ENCOUNTER — OFFICE VISIT (OUTPATIENT)
Dept: PAIN MANAGEMENT | Age: 68
End: 2023-07-14
Payer: MEDICARE

## 2023-07-14 VITALS
HEIGHT: 69 IN | OXYGEN SATURATION: 94 % | HEART RATE: 67 BPM | DIASTOLIC BLOOD PRESSURE: 76 MMHG | WEIGHT: 219 LBS | RESPIRATION RATE: 16 BRPM | BODY MASS INDEX: 32.44 KG/M2 | SYSTOLIC BLOOD PRESSURE: 128 MMHG

## 2023-07-14 DIAGNOSIS — M25.511 CHRONIC RIGHT SHOULDER PAIN: ICD-10-CM

## 2023-07-14 DIAGNOSIS — G89.29 CHRONIC RIGHT SHOULDER PAIN: ICD-10-CM

## 2023-07-14 DIAGNOSIS — M47.812 CERVICAL FACET JOINT SYNDROME: ICD-10-CM

## 2023-07-14 DIAGNOSIS — M54.2 CERVICALGIA: ICD-10-CM

## 2023-07-14 DIAGNOSIS — M54.12 CERVICAL RADICULOPATHY: Primary | ICD-10-CM

## 2023-07-14 DIAGNOSIS — Z01.818 PRE-OP TESTING: ICD-10-CM

## 2023-07-14 PROCEDURE — G8427 DOCREV CUR MEDS BY ELIG CLIN: HCPCS | Performed by: NURSE PRACTITIONER

## 2023-07-14 PROCEDURE — 3074F SYST BP LT 130 MM HG: CPT | Performed by: NURSE PRACTITIONER

## 2023-07-14 PROCEDURE — 3078F DIAST BP <80 MM HG: CPT | Performed by: NURSE PRACTITIONER

## 2023-07-14 PROCEDURE — 93005 ELECTROCARDIOGRAM TRACING: CPT | Performed by: NURSE PRACTITIONER

## 2023-07-14 PROCEDURE — 73030 X-RAY EXAM OF SHOULDER: CPT

## 2023-07-14 PROCEDURE — 99215 OFFICE O/P EST HI 40 MIN: CPT | Performed by: NURSE PRACTITIONER

## 2023-07-14 PROCEDURE — 1123F ACP DISCUSS/DSCN MKR DOCD: CPT | Performed by: NURSE PRACTITIONER

## 2023-07-14 PROCEDURE — 3017F COLORECTAL CA SCREEN DOC REV: CPT | Performed by: NURSE PRACTITIONER

## 2023-07-14 PROCEDURE — 93010 ELECTROCARDIOGRAM REPORT: CPT | Performed by: NURSE PRACTITIONER

## 2023-07-14 PROCEDURE — G8417 CALC BMI ABV UP PARAM F/U: HCPCS | Performed by: NURSE PRACTITIONER

## 2023-07-14 PROCEDURE — 1036F TOBACCO NON-USER: CPT | Performed by: NURSE PRACTITIONER

## 2023-07-14 ASSESSMENT — ENCOUNTER SYMPTOMS
EYES NEGATIVE: 1
SHORTNESS OF BREATH: 0
GASTROINTESTINAL NEGATIVE: 1
BACK PAIN: 1

## 2023-07-14 NOTE — PROGRESS NOTES
Subjective:      Patient ID: January Carroll is a 77 y.o. male. Chief Complaint   Patient presents with    Neck Pain     Neck Pain   Associated symptoms include numbness and weakness. Pertinent negatives include no chest pain. Back Pain  Associated symptoms include numbness and weakness. Pertinent negatives include no chest pain. Here today for routine pain clinic recheck. Right shoulder pain, started approximately 2 months ago    PT, after 7 weeks increased pain so stopped. Right sided neck pain-Post cervical facet injections with relief, ROM improved    Tylenol prn with relief as well as tizanidine, gabapentin     Pain Assessment  Location of Pain: Neck  Location Modifiers: Left, Right, Medial  Severity of Pain: 4  Quality of Pain: Dull, Aching, Sharp  Duration of Pain: Persistent  Frequency of Pain: Constant  Aggravating Factors:  (turning head to the right)  Limiting Behavior: Yes  Relieving Factors: Nsaids (meds)  Result of Injury: No  Work-Related Injury: No  Are there other pain locations you wish to document?: No    Allergies   Allergen Reactions    Vicodin [Hydrocodone-Acetaminophen] Nausea Only     Stomach upset       Outpatient Medications Marked as Taking for the 7/14/23 encounter (Office Visit) with PARESH Raymundo - CNP   Medication Sig Dispense Refill    atorvastatin (LIPITOR) 40 MG tablet Take 1 tablet by mouth nightly 90 tablet 3    furosemide (LASIX) 20 MG tablet Take 1 tablet by mouth daily 45 tablet 3    gabapentin (NEURONTIN) 600 MG tablet Take 1 tablet by mouth 3 times daily for 30 days.  90 tablet 3    tiZANidine (ZANAFLEX) 4 MG tablet Take 1 tablet by mouth 3 times daily as needed (neck pain) 90 tablet 2    clopidogrel (PLAVIX) 75 MG tablet Take 1 tablet by mouth once daily 90 tablet 2    tamsulosin (FLOMAX) 0.4 MG capsule Take 1 capsule by mouth daily 90 capsule 3    metoprolol tartrate (LOPRESSOR) 25 MG tablet Take 0.5 tablets by mouth in the morning and 0.5 tablets

## 2023-07-17 ENCOUNTER — TELEPHONE (OUTPATIENT)
Dept: PAIN MANAGEMENT | Age: 68
End: 2023-07-17

## 2023-07-19 ENCOUNTER — HOSPITAL ENCOUNTER (OUTPATIENT)
Dept: CT IMAGING | Age: 68
Discharge: HOME | End: 2023-07-21
Attending: INTERNAL MEDICINE
Payer: MEDICARE

## 2023-07-19 DIAGNOSIS — R91.1 NODULE OF UPPER LOBE OF RIGHT LUNG: ICD-10-CM

## 2023-07-19 PROCEDURE — 71250 CT THORAX DX C-: CPT

## 2023-07-26 ENCOUNTER — OFFICE VISIT (OUTPATIENT)
Dept: PULMONOLOGY | Age: 68
End: 2023-07-26
Payer: MEDICARE

## 2023-07-26 VITALS
DIASTOLIC BLOOD PRESSURE: 68 MMHG | HEART RATE: 64 BPM | WEIGHT: 218.4 LBS | SYSTOLIC BLOOD PRESSURE: 120 MMHG | BODY MASS INDEX: 32.35 KG/M2 | OXYGEN SATURATION: 97 % | HEIGHT: 69 IN | TEMPERATURE: 97.7 F

## 2023-07-26 DIAGNOSIS — J90 PLEURAL EFFUSION ON RIGHT: ICD-10-CM

## 2023-07-26 DIAGNOSIS — J44.9 CHRONIC OBSTRUCTIVE PULMONARY DISEASE, UNSPECIFIED COPD TYPE (HCC): ICD-10-CM

## 2023-07-26 DIAGNOSIS — J43.1 PANLOBULAR EMPHYSEMA (HCC): Primary | ICD-10-CM

## 2023-07-26 DIAGNOSIS — R91.1 NODULE OF UPPER LOBE OF RIGHT LUNG: ICD-10-CM

## 2023-07-26 PROCEDURE — 99213 OFFICE O/P EST LOW 20 MIN: CPT | Performed by: INTERNAL MEDICINE

## 2023-07-26 PROCEDURE — G8417 CALC BMI ABV UP PARAM F/U: HCPCS | Performed by: INTERNAL MEDICINE

## 2023-07-26 PROCEDURE — 99214 OFFICE O/P EST MOD 30 MIN: CPT | Performed by: INTERNAL MEDICINE

## 2023-07-26 PROCEDURE — 3017F COLORECTAL CA SCREEN DOC REV: CPT | Performed by: INTERNAL MEDICINE

## 2023-07-26 PROCEDURE — 3078F DIAST BP <80 MM HG: CPT | Performed by: INTERNAL MEDICINE

## 2023-07-26 PROCEDURE — 3074F SYST BP LT 130 MM HG: CPT | Performed by: INTERNAL MEDICINE

## 2023-07-26 PROCEDURE — 1123F ACP DISCUSS/DSCN MKR DOCD: CPT | Performed by: INTERNAL MEDICINE

## 2023-07-26 PROCEDURE — 1036F TOBACCO NON-USER: CPT | Performed by: INTERNAL MEDICINE

## 2023-07-26 PROCEDURE — 3023F SPIROM DOC REV: CPT | Performed by: INTERNAL MEDICINE

## 2023-07-26 PROCEDURE — G8427 DOCREV CUR MEDS BY ELIG CLIN: HCPCS | Performed by: INTERNAL MEDICINE

## 2023-07-26 RX ORDER — ALBUTEROL SULFATE 90 UG/1
2 AEROSOL, METERED RESPIRATORY (INHALATION) EVERY 6 HOURS PRN
Qty: 18 G | Refills: 6 | Status: SHIPPED | OUTPATIENT
Start: 2023-07-26 | End: 2023-11-21

## 2023-07-26 NOTE — PROGRESS NOTES
REASON FOR THE CONSULTATION:  Chief Complaint   Patient presents with    Pulmonary Nodule     Annual follow up with CT results      HISTORY OF PRESENT ILLNESS:    Shamika Silverman is a 77y.o. year old male   No cough , no fever , no hemoptysis , clear sputum . ocassional mild expiatory  wheeze   No chest pain , no orthopnea , no PND   No nausea or abdominal pain . Ct chest reviewed     Last visit  here for evaluation of no sob with exertion , no hemoptysis , no sputum . No inhalers   No hx of bibi   He was found to have abnormal lung nodule in the right apex on CT lung screening, this prompted a PET CT scan which showed a mild SUV of 3.3 without distant metastasis. Following this he had CT-guided biopsy which was showing chronic inflammation without malignant cells.   Referred here for opinion regarding surgical resection versus serial follow-up CT chest                   Immunization   Immunization History   Administered Date(s) Administered    COVID-19, PFIZER Bivalent, DO NOT Dilute, (age 12y+), IM, 30 mcg/0.3 mL 10/15/2022    COVID-19, PFIZER PURPLE top, DILUTE for use, (age 15 y+), 30mcg/0.3mL 02/23/2021, 03/16/2021, 05/25/2022    DTaP 10/15/2016    DTaP vaccine 10/15/2016    Fluvirin 4 years and over 11/03/2015    Influenza Vaccine, unspecified formulation 11/03/2015, 10/15/2016    Influenza Virus Vaccine 11/03/2015    Influenza Whole 11/14/2012    Influenza, FLUAD, (age 72 y+), Adjuvanted, 0.5mL 10/15/2021    Influenza, FLUARIX, FLULAVAL, FLUZONE (age 10 mo+) AND AFLURIA, (age 1 y+), PF, 0.5mL 09/23/2017    Influenza, FLUZONE (age 72 y+), High Dose, 0.7mL 10/06/2020, 10/25/2022    Influenza, High Dose (Fluzone 65 yrs and older) 09/23/2017, 09/23/2017, 10/06/2018, 11/18/2019, 10/12/2020    Influenza, Triv, inactivated, subunit, adjuvanted, IM (Fluad 65 yrs and older) 10/14/2018    Pneumococcal, PCV-13, PREVNAR 13, (age 6w+), IM, 0.5mL 10/06/2018    Pneumococcal, PPSV23, PNEUMOVAX 23, (age 2y+), SC/IM, 0.5mL

## 2023-07-28 ENCOUNTER — TELEPHONE (OUTPATIENT)
Dept: PAIN MANAGEMENT | Age: 68
End: 2023-07-28

## 2023-07-31 ENCOUNTER — TELEPHONE (OUTPATIENT)
Dept: PAIN MANAGEMENT | Age: 68
End: 2023-07-31

## 2023-08-04 ENCOUNTER — TELEPHONE (OUTPATIENT)
Dept: PAIN MANAGEMENT | Age: 68
End: 2023-08-04

## 2023-08-04 DIAGNOSIS — M54.12 CERVICAL RADICULOPATHY: ICD-10-CM

## 2023-08-04 DIAGNOSIS — M54.2 CERVICALGIA: ICD-10-CM

## 2023-08-04 DIAGNOSIS — M47.812 CERVICAL FACET JOINT SYNDROME: ICD-10-CM

## 2023-08-04 RX ORDER — DIAZEPAM 5 MG/1
TABLET ORAL
Qty: 2 TABLET | Refills: 0 | Status: SHIPPED | OUTPATIENT
Start: 2023-08-04 | End: 2023-09-21

## 2023-08-04 NOTE — TELEPHONE ENCOUNTER
Functionality Assessment/ Goals Worksheet    On a scale of 0 (Does not Interfere) to 10 (Completely Interferes)    Which number describes how during the past week pain has interfered with the following:      A. General Activity:      B. Mood:      C.  Walking Ability:      D. Normal Work (Includes both work outside the home and housework):      E.  Relations with Other People:      F.  Sleep:       G.  Enjoyment of Life:       2. Patient prefers to Take their Pain Medications:   [] On a regular basis   [] Only when necessary   [] Does not take pain medications    3. What are the Patient's Goals/ Expectations for Visiting Pain Management?    [] Learn about my pain   [] Physical Therapy   [] Receive Injections   [] Deal with Anxiety and Stress   [] Receive Medication   [] Treat Depression    [] Treat Sleep   [] Treat Opioid Dependence/ Addiction      Please call patient and document so can be added to chart

## 2023-08-04 NOTE — TELEPHONE ENCOUNTER
Sai Ulrich, patient is still not approved. Please review denial letter and advise. Procedure rescheduled for 8/14/23.   Thank you

## 2023-08-04 NOTE — TELEPHONE ENCOUNTER
Christina Reed called requesting a refill of the below medication which has been pended for you:     Requested Prescriptions     Pending Prescriptions Disp Refills    diazePAM (VALIUM) 5 MG tablet 2 tablet 0     Sig: Take 1 tablet 12 hours prior to procedure,take 1 additional tablet 2 hours prior to procedure       Last Appointment Date: 7/14/2023  Next Appointment Date: Visit date not found    Allergies   Allergen Reactions    Vicodin [Hydrocodone-Acetaminophen] Nausea Only     Stomach upset       Pharmacy:  [facility]

## 2023-08-06 DIAGNOSIS — M47.812 CERVICAL FACET JOINT SYNDROME: Primary | ICD-10-CM

## 2023-08-06 RX ORDER — DIAZEPAM 5 MG/1
5 TABLET ORAL SEE ADMIN INSTRUCTIONS
Qty: 2 TABLET | Refills: 0 | Status: SHIPPED | OUTPATIENT
Start: 2023-08-06 | End: 2023-08-07

## 2023-08-21 ENCOUNTER — TELEPHONE (OUTPATIENT)
Dept: CARDIOLOGY | Age: 68
End: 2023-08-21

## 2023-08-21 NOTE — TELEPHONE ENCOUNTER
Pt called and would like to speak to [place].     Last Appt:  5/18/2023  Next Appt:   8/31/2023  Med verified in [address]

## 2023-08-31 ENCOUNTER — OFFICE VISIT (OUTPATIENT)
Dept: CARDIOLOGY | Age: 68
End: 2023-08-31
Payer: MEDICARE

## 2023-08-31 ENCOUNTER — OFFICE VISIT (OUTPATIENT)
Dept: PAIN MANAGEMENT | Age: 68
End: 2023-08-31
Payer: MEDICARE

## 2023-08-31 VITALS
BODY MASS INDEX: 32.29 KG/M2 | OXYGEN SATURATION: 96 % | DIASTOLIC BLOOD PRESSURE: 70 MMHG | WEIGHT: 218 LBS | SYSTOLIC BLOOD PRESSURE: 120 MMHG | HEART RATE: 68 BPM | HEIGHT: 69 IN

## 2023-08-31 VITALS
BODY MASS INDEX: 32.29 KG/M2 | HEART RATE: 66 BPM | SYSTOLIC BLOOD PRESSURE: 118 MMHG | DIASTOLIC BLOOD PRESSURE: 68 MMHG | WEIGHT: 218 LBS | HEIGHT: 69 IN

## 2023-08-31 DIAGNOSIS — I10 HYPERTENSION, UNSPECIFIED TYPE: ICD-10-CM

## 2023-08-31 DIAGNOSIS — M50.30 DDD (DEGENERATIVE DISC DISEASE), CERVICAL: ICD-10-CM

## 2023-08-31 DIAGNOSIS — M47.812 CERVICAL FACET JOINT SYNDROME: Primary | ICD-10-CM

## 2023-08-31 DIAGNOSIS — I25.10 CAD IN NATIVE ARTERY: ICD-10-CM

## 2023-08-31 DIAGNOSIS — M54.2 CERVICALGIA: ICD-10-CM

## 2023-08-31 DIAGNOSIS — I25.10 CORONARY ARTERY DISEASE INVOLVING NATIVE CORONARY ARTERY OF NATIVE HEART WITHOUT ANGINA PECTORIS: Primary | ICD-10-CM

## 2023-08-31 PROCEDURE — 3074F SYST BP LT 130 MM HG: CPT | Performed by: INTERNAL MEDICINE

## 2023-08-31 PROCEDURE — 93010 ELECTROCARDIOGRAM REPORT: CPT | Performed by: INTERNAL MEDICINE

## 2023-08-31 PROCEDURE — 99214 OFFICE O/P EST MOD 30 MIN: CPT | Performed by: INTERNAL MEDICINE

## 2023-08-31 PROCEDURE — 99212 OFFICE O/P EST SF 10 MIN: CPT | Performed by: INTERNAL MEDICINE

## 2023-08-31 PROCEDURE — 1036F TOBACCO NON-USER: CPT | Performed by: INTERNAL MEDICINE

## 2023-08-31 PROCEDURE — G8427 DOCREV CUR MEDS BY ELIG CLIN: HCPCS | Performed by: INTERNAL MEDICINE

## 2023-08-31 PROCEDURE — 93005 ELECTROCARDIOGRAM TRACING: CPT | Performed by: INTERNAL MEDICINE

## 2023-08-31 PROCEDURE — G8417 CALC BMI ABV UP PARAM F/U: HCPCS | Performed by: INTERNAL MEDICINE

## 2023-08-31 PROCEDURE — 1123F ACP DISCUSS/DSCN MKR DOCD: CPT | Performed by: INTERNAL MEDICINE

## 2023-08-31 PROCEDURE — 99213 OFFICE O/P EST LOW 20 MIN: CPT | Performed by: NURSE PRACTITIONER

## 2023-08-31 PROCEDURE — 3017F COLORECTAL CA SCREEN DOC REV: CPT | Performed by: INTERNAL MEDICINE

## 2023-08-31 PROCEDURE — 3078F DIAST BP <80 MM HG: CPT | Performed by: INTERNAL MEDICINE

## 2023-08-31 ASSESSMENT — ENCOUNTER SYMPTOMS
BACK PAIN: 1
GASTROINTESTINAL NEGATIVE: 1
SHORTNESS OF BREATH: 0
EYES NEGATIVE: 1

## 2023-08-31 NOTE — PROGRESS NOTES
di                       Today's Date: 8/31/2023  Patient Name: Armani Maguire  Patient's age: 77 y.o., 1955         CC: the patient is a 77 y.o.  male is in the office for CAD. Stable from cardiac standpoint, denies any episode of chest pain in last 6 months. He denies any orthopnea. No palpitations. Compliant with his medications. Blood pressure and LDL well controlled. Past Medical History:   has a past medical history of Abnormal cardiovascular stress test, CAD (coronary artery disease), Cervical disc disease, Clostridium difficile diarrhea, Displacement of cervical intervertebral disc without myelopathy, HTN (hypertension), Hyperlipidemia, Impaired fasting blood sugar, Neck pain, Neuralgia, neuritis, and radiculitis, unspecified, Shoulder region pain, Sprain and strain of unspecified site of shoulder and upper arm, and Wears glasses. Past Surgical History:   has a past surgical history that includes Pain management procedure (Left, 8/12/14, 1/09/15); Pain management procedure (Left, 09/23/2014); Pain management procedure (Left, 7/17/2015 , 1/22/16); Shoulder arthroscopy (Right, 04/27/2016); Biceps Tenodesis (Right, 11/23/2016); pr coronary artery byp w/vein & artery graft 1 vein (N/A, 07/06/2018); Rotator cuff repair (Left, 01/29/2019); Colonoscopy (N/A, 12/10/2019); Umbilical hernia repair (N/A, 12/18/2019); Cystoscopy (N/A, 03/09/2020); ventral hernia repair (N/A, 11/19/2020); Coronary angioplasty with stent (12/18/2020); Coronary angioplasty with stent (01/22/2021); transesophageal echocardiogram (11/15/2020); CT NEEDLE BIOPSY LUNG PERCUTANEOUS (12/15/2021); hernia repair (N/A, 3/25/2022); Facet joint injection (Right, 3/9/2023); Cystoscopy (N/A, 4/24/2023); and Facet joint injection (Right, 5/4/2023). Home Medications:    Prior to Admission medications    Medication Sig Start Date End Date Taking?  Authorizing Provider   diazePAM (VALIUM) 5 MG tablet Take 1 tablet 12 hours

## 2023-08-31 NOTE — PROGRESS NOTES
Subjective:      Patient ID: January Carroll is a 77 y.o. male. Chief Complaint   Patient presents with    Pain     Right neck, right shoulder. Consultation     Injection denial.      Neck Pain   Associated symptoms include numbness and weakness. Pertinent negatives include no chest pain. Back Pain  Associated symptoms include numbness and weakness. Pertinent negatives include no chest pain. Pain  Associated symptoms include arthralgias, joint swelling, myalgias, neck pain, numbness and weakness. Pertinent negatives include no chest pain. Here today for routine pain clinic recheck. Right sided neck pain-Post cervical facet injections 5/4/2023 with relief>80%, ROM improved    Tylenol prn with relief as well as tizanidine, gabapentin     Which number describes how during the past week pain has interfered with the following:                            A.  General Activity:   ( 8)              B.  Mood:   (8)              C.  Walking Ability:   (9 - was walking 2 miles, now \"can't walk even 200 ft\". )              D.  Normal Work (Includes both work outside the home and housework):   (10 - can't left, cant drive due to not being able to turn head)              E.  Relations with Other People:   (8 - \"not good, easily irritated, cant concentrate\")               F.  Sleep:   (9 - \"will take muscle relaxer to help sleep but after a few hours will start tossing & turning\")                    G.  Enjoyment of Life:   (8 - cant hardly do anything\"_     2. Patient prefers to Take their Pain Medications:              [] On a regular basis              [x] Only when necessary  (only takes tylenol & muscle relaxers as needed )              [x] Does not take pain medications  (afraid of addiction)      3. What are the Patient's Goals/ Expectations for Visiting Pain Management?               [x] Learn about my pain              [] Physical Therapy              [x] Receive Injections              [] Deal with

## 2023-09-05 RX ORDER — GABAPENTIN 600 MG/1
TABLET ORAL
Qty: 90 TABLET | Refills: 3 | OUTPATIENT
Start: 2023-09-05 | End: 2023-10-05

## 2023-09-05 RX ORDER — GABAPENTIN 600 MG/1
600 TABLET ORAL 3 TIMES DAILY
Qty: 90 TABLET | Refills: 0 | OUTPATIENT
Start: 2023-09-05 | End: 2023-10-05

## 2023-09-05 NOTE — TELEPHONE ENCOUNTER
Gabapentin 600 mg  Last Appt:  8/31/2023  Next Appt:   Visit date not found  Med verified in [address] [place] [address]

## 2023-11-08 ENCOUNTER — HOSPITAL ENCOUNTER (OUTPATIENT)
Age: 68
Discharge: HOME | End: 2023-11-08

## 2023-11-08 DIAGNOSIS — C67.9 MALIGNANT NEOPLASM OF URINARY BLADDER, UNSPECIFIED SITE (HCC): ICD-10-CM

## 2023-11-13 ENCOUNTER — PROCEDURE VISIT (OUTPATIENT)
Dept: UROLOGY | Age: 68
End: 2023-11-13
Payer: MEDICARE

## 2023-11-13 VITALS
HEIGHT: 69 IN | OXYGEN SATURATION: 96 % | WEIGHT: 218 LBS | SYSTOLIC BLOOD PRESSURE: 108 MMHG | RESPIRATION RATE: 16 BRPM | HEART RATE: 68 BPM | DIASTOLIC BLOOD PRESSURE: 80 MMHG | BODY MASS INDEX: 32.29 KG/M2

## 2023-11-13 DIAGNOSIS — N40.1 BENIGN LOCALIZED PROSTATIC HYPERPLASIA WITH LOWER URINARY TRACT SYMPTOMS (LUTS): Primary | ICD-10-CM

## 2023-11-13 DIAGNOSIS — C67.9 MALIGNANT NEOPLASM OF URINARY BLADDER, UNSPECIFIED SITE (HCC): ICD-10-CM

## 2023-11-13 PROCEDURE — 52000 CYSTOURETHROSCOPY: CPT | Performed by: UROLOGY

## 2023-11-13 NOTE — PROGRESS NOTES
[address] [place] [address] E number [id number]; Serial Number [id number] scope #1 used for procedure today, was removed from sterile container after visual inspection.

## 2023-11-13 NOTE — PROGRESS NOTES
Cystoscopy    Operative Note    Patient:  Agusto Mullen  MRN: [id number]  YOB: 1955    Date: 11/13/23  Surgeon: Ze Lopez MD  Anesthesia: Urojet Local  Indications:     BPH- cont flomax. Stable. Bladder cancer- hx of low grade. Had two recurrences on dome of bladder. Will reassess in six months with cystoscopy. Cysto in office six months. FISH prior. Get fish one month before        Position: Supine  EBL: 0 ml    Findings:   The patient was prepped and draped in the usual sterile fashion. The flexible cystoscope was advanced through the urethra and into the bladder. The bladder was thoroughly inspected and the following was noted:    Residual Urine: significant\" \" . Urine clear, with no obvious infection  Urethra: No abnormalities of the urethra are noted. Urethral dilation was not performed. Prostate: lateral lobe hypertrophy + present, prostate moderately obstructing, intravesical extension of prostate not present. There was no previous TURP defect. Bladder: bladder tumor noted. Two med size on dome. Moderate trabeculation noted. no bladder diverticulum. Ureters: Orifices with normal configuration and location. The cystoscope was removed. The patient tolerated the procedure well. Did not get fish.   Needs cysto turbt (15) gen two on dome

## 2023-11-20 LAB — UROTHELIAL CANCER DETECTION: NORMAL

## 2023-12-04 ENCOUNTER — TELEPHONE (OUTPATIENT)
Dept: UROLOGY | Age: 68
End: 2023-12-04

## 2023-12-04 NOTE — TELEPHONE ENCOUNTER
[facility]         Patient:[facility]           :1955           Surgical/Procedure Planned: MELVIN clementT     Date & Location: [facility]- 23       Outpatient   Planned Length of OR: 1hr    Sedation: general    Estimated Cardiac Risk for Non-Cardiac Surgery/Procedure     Low______ Moderate___x___ High______    Medication Instructions - Clarification needed by this date:   -Insulin:  -Other medications:    ASA 81 mg Hold _5__ Days  Plavix   Hold _5__ Days    Resume medications:     Lovenox indicated: _____Yes _____NO    Provider:Dr. Marcos Mercedes       Signature of Provider Giving Orders for Medication holds:  Brenda Luque MD    _____________________________________________

## 2023-12-05 NOTE — TELEPHONE ENCOUNTER
[facility]    Pre-Operative Evaluation/Consultation    Name:  Alicia Jimenes                                         Age:  77 y.o. MRN:  [id number]       :  1955   Date:  2023         Sex: male    There were no encounter diagnoses. Surgeon:  Dr. Azul Arroyo   Procedure (Planned):  cysto, TURBT   Date Scheduled surgery: 23    Attending : No att. providers found    Primary Physician: JOSE Doss   Cardiologist:      Type of Anesthesia Requested: General    Patient Medical history: Allergies   Allergen Reactions    Vicodin [Hydrocodone-Acetaminophen] Nausea Only     Stomach upset     Patient Active Problem List   Diagnosis    Cervical radiculopathy    Umbilical hernia    HTN (hypertension)    Impaired fasting blood sugar    Displacement of cervical intervertebral disc without myelopathy    Rotator cuff tear    CAD in native artery    S/P CABG (coronary artery bypass graft)    Hyperlipidemia    Panlobular emphysema (HCC)    Positive colorectal cancer screening using DNA-based stool test    Epigastric hernia    Urothelial carcinoma of bladder (HCC)    Post-op pain    Incisional hernia, without obstruction or gangrene    S/P PTCA (percutaneous transluminal coronary angioplasty)    S/P cardiac cath    Recurrent abdominal hernia without obstruction or gangrene    Ventral hernia without obstruction or gangrene    Recurrent incisional hernia    DDD (degenerative disc disease), cervical    Cervical facet joint syndrome    Cervicalgia     Past Medical History:   Diagnosis Date    Abnormal cardiovascular stress test 2020    Large inferolateral infarction with significant vernon-infarct ischemia.     CAD (coronary artery disease)     CABG-2 vessel, followed by stents x 4    Cervical disc disease     Clostridium difficile diarrhea 10/2014    hospitalized in 2014    Displacement of cervical intervertebral disc without myelopathy 06/10/2014    BWC, C6/C7     HTN (hypertension)

## 2023-12-06 ENCOUNTER — OFFICE VISIT (OUTPATIENT)
Dept: PAIN MANAGEMENT | Age: 68
End: 2023-12-06
Payer: MEDICARE

## 2023-12-06 VITALS
RESPIRATION RATE: 17 BRPM | DIASTOLIC BLOOD PRESSURE: 74 MMHG | HEIGHT: 69 IN | HEART RATE: 69 BPM | BODY MASS INDEX: 33.03 KG/M2 | WEIGHT: 223 LBS | SYSTOLIC BLOOD PRESSURE: 128 MMHG | OXYGEN SATURATION: 94 %

## 2023-12-06 DIAGNOSIS — M47.812 CERVICAL FACET JOINT SYNDROME: ICD-10-CM

## 2023-12-06 DIAGNOSIS — M54.12 CERVICAL RADICULOPATHY: Primary | ICD-10-CM

## 2023-12-06 DIAGNOSIS — M50.30 DDD (DEGENERATIVE DISC DISEASE), CERVICAL: ICD-10-CM

## 2023-12-06 DIAGNOSIS — M54.2 CERVICALGIA: ICD-10-CM

## 2023-12-06 DIAGNOSIS — M50.20 DISPLACEMENT OF CERVICAL INTERVERTEBRAL DISC WITHOUT MYELOPATHY: ICD-10-CM

## 2023-12-06 PROCEDURE — 3074F SYST BP LT 130 MM HG: CPT | Performed by: NURSE PRACTITIONER

## 2023-12-06 PROCEDURE — 3017F COLORECTAL CA SCREEN DOC REV: CPT | Performed by: NURSE PRACTITIONER

## 2023-12-06 PROCEDURE — 3078F DIAST BP <80 MM HG: CPT | Performed by: NURSE PRACTITIONER

## 2023-12-06 PROCEDURE — 1123F ACP DISCUSS/DSCN MKR DOCD: CPT | Performed by: NURSE PRACTITIONER

## 2023-12-06 PROCEDURE — 99215 OFFICE O/P EST HI 40 MIN: CPT | Performed by: NURSE PRACTITIONER

## 2023-12-06 PROCEDURE — G8484 FLU IMMUNIZE NO ADMIN: HCPCS | Performed by: NURSE PRACTITIONER

## 2023-12-06 PROCEDURE — G8427 DOCREV CUR MEDS BY ELIG CLIN: HCPCS | Performed by: NURSE PRACTITIONER

## 2023-12-06 PROCEDURE — 1036F TOBACCO NON-USER: CPT | Performed by: NURSE PRACTITIONER

## 2023-12-06 PROCEDURE — G8417 CALC BMI ABV UP PARAM F/U: HCPCS | Performed by: NURSE PRACTITIONER

## 2023-12-06 ASSESSMENT — ENCOUNTER SYMPTOMS
BACK PAIN: 1
SHORTNESS OF BREATH: 0
GASTROINTESTINAL NEGATIVE: 1
EYES NEGATIVE: 1

## 2023-12-06 NOTE — PROGRESS NOTES
[facility]) [facility]  [address] [facility], [address] [place] [address]    Patient Name: Sarah Reveles  MRN: [id number]  Encounter Date: 12/6/2023     SUBJECTIVE:  Sarah Reveles is a 77 y.o., male being seen today regarding   Chief Complaint   Patient presents with    Neck Pain    Shoulder Pain     right    and routine medication management. Right sided neck pain-Post cervical facet injections 5/4/2023 with relief>80%, ROM improved     Functionality Assessment & Goals: On a scale of 0 (Does not Interfere) to 10 (Completely Interferes)  Which number describes how during the past week pain has interfered with the following:   A. General Activity: 8    B. Mood:  10   C. Walking Ability:  5   D. Normal Work (Includes both work outside the home and housework):  8   E. Relations with Other People:  3   F. Sleep:  8    G. Enjoyment of Life:  8  2. Patient prefers to Take their Pain Medications:   [x] On a regular basis   [x] Only when necessary   [] Does not take pain medications  3. What are the Patient's Goals/ Expectations for Visiting Pain Management? [] Learn about my pain   [x] Physical Therapy   [x] Receive Injections   [] Deal with Anxiety and Stress   [x] Receive Medication   [] Treat Depression    [] Treat Sleep   [] Treat Opioid Dependence/ Addiction    Current Pain Assessment:         12/6/2023     9:45 AM   AMB PAIN ASSESSMENT   Location of Pain Neck   Location Modifiers Right   Severity of Pain 8   Quality of Pain Throbbing; Sharp;Dull;Aching   Duration of Pain Persistent   Frequency of Pain Constant   Aggravating Factors Other (Comment)   Limiting Behavior Some   Relieving Factors Rest;Ice;Heat   Result of Injury No   Work-Related Injury No   Are there other pain locations you wish to document?  No        Current Medications & Allergies:   Current Outpatient Medications   Medication Instructions    acetaminophen (TYLENOL) 650 mg, Oral, EVERY 6 HOURS PRN    albuterol sulfate HFA

## 2023-12-07 ENCOUNTER — TELEPHONE (OUTPATIENT)
Dept: PAIN MANAGEMENT | Age: 68
End: 2023-12-07

## 2023-12-07 RX ORDER — FUROSEMIDE 20 MG/1
20 TABLET ORAL DAILY
Qty: 45 TABLET | Refills: 3 | Status: SHIPPED | OUTPATIENT
Start: 2023-12-07

## 2023-12-07 NOTE — TELEPHONE ENCOUNTER
Rt C5-C6 C6-C7 RFA  with MAC sedation scheduled for 1/22/24  with surgery center notified. Patient Educated to have . Last EKG on8/31/23. Sedation clearance needed.

## 2023-12-07 NOTE — TELEPHONE ENCOUNTER
The patient is scheduled for a Rt C5-C6 C6-C7 RFA  on 1/22/24 please review their chart to assure that are able to have the procedure under sedation. Their last EKG was on 8/31/23.

## 2023-12-11 ENCOUNTER — ANESTHESIA (OUTPATIENT)
Dept: OPERATING ROOM | Age: 68
End: 2023-12-11
Payer: MEDICARE

## 2023-12-11 ENCOUNTER — ANESTHESIA EVENT (OUTPATIENT)
Dept: OPERATING ROOM | Age: 68
End: 2023-12-11
Payer: MEDICARE

## 2023-12-11 ENCOUNTER — HOSPITAL ENCOUNTER (OUTPATIENT)
Age: 68
Setting detail: OUTPATIENT SURGERY
Discharge: HOME | End: 2023-12-11
Attending: UROLOGY | Admitting: UROLOGY
Payer: MEDICARE

## 2023-12-11 VITALS
WEIGHT: 219 LBS | RESPIRATION RATE: 14 BRPM | HEART RATE: 62 BPM | OXYGEN SATURATION: 94 % | DIASTOLIC BLOOD PRESSURE: 69 MMHG | SYSTOLIC BLOOD PRESSURE: 127 MMHG | BODY MASS INDEX: 32.44 KG/M2 | TEMPERATURE: 97 F | HEIGHT: 69 IN

## 2023-12-11 DIAGNOSIS — C67.1 MALIGNANT NEOPLASM OF DOME OF URINARY BLADDER (HCC): ICD-10-CM

## 2023-12-11 PROCEDURE — 6360000002 HC RX W HCPCS: Performed by: NURSE ANESTHETIST, CERTIFIED REGISTERED

## 2023-12-11 PROCEDURE — 2580000003 HC RX 258: Performed by: NURSE ANESTHETIST, CERTIFIED REGISTERED

## 2023-12-11 PROCEDURE — 7100000011 HC PHASE II RECOVERY - ADDTL 15 MIN: Performed by: UROLOGY

## 2023-12-11 PROCEDURE — 2720000010 HC SURG SUPPLY STERILE: Performed by: UROLOGY

## 2023-12-11 PROCEDURE — 2500000003 HC RX 250 WO HCPCS: Performed by: NURSE ANESTHETIST, CERTIFIED REGISTERED

## 2023-12-11 PROCEDURE — 3600000002 HC SURGERY LEVEL 2 BASE: Performed by: UROLOGY

## 2023-12-11 PROCEDURE — 3700000001 HC ADD 15 MINUTES (ANESTHESIA): Performed by: UROLOGY

## 2023-12-11 PROCEDURE — 2709999900 HC NON-CHARGEABLE SUPPLY: Performed by: UROLOGY

## 2023-12-11 PROCEDURE — 2580000003 HC RX 258: Performed by: UROLOGY

## 2023-12-11 PROCEDURE — 88307 TISSUE EXAM BY PATHOLOGIST: CPT

## 2023-12-11 PROCEDURE — 7100000010 HC PHASE II RECOVERY - FIRST 15 MIN: Performed by: UROLOGY

## 2023-12-11 PROCEDURE — 3600000012 HC SURGERY LEVEL 2 ADDTL 15MIN: Performed by: UROLOGY

## 2023-12-11 PROCEDURE — 6360000002 HC RX W HCPCS: Performed by: UROLOGY

## 2023-12-11 PROCEDURE — 3700000000 HC ANESTHESIA ATTENDED CARE: Performed by: UROLOGY

## 2023-12-11 RX ORDER — SODIUM CHLORIDE, SODIUM LACTATE, POTASSIUM CHLORIDE, CALCIUM CHLORIDE 600; 310; 30; 20 MG/100ML; MG/100ML; MG/100ML; MG/100ML
INJECTION, SOLUTION INTRAVENOUS CONTINUOUS PRN
Status: DISCONTINUED | OUTPATIENT
Start: 2023-12-11 | End: 2023-12-11 | Stop reason: SDUPTHER

## 2023-12-11 RX ORDER — SODIUM CHLORIDE 9 MG/ML
INJECTION, SOLUTION INTRAVENOUS PRN
Status: DISCONTINUED | OUTPATIENT
Start: 2023-12-11 | End: 2023-12-11 | Stop reason: HOSPADM

## 2023-12-11 RX ORDER — KETOROLAC TROMETHAMINE 30 MG/ML
INJECTION, SOLUTION INTRAMUSCULAR; INTRAVENOUS PRN
Status: DISCONTINUED | OUTPATIENT
Start: 2023-12-11 | End: 2023-12-11 | Stop reason: SDUPTHER

## 2023-12-11 RX ORDER — SODIUM CHLORIDE 0.9 % (FLUSH) 0.9 %
5-40 SYRINGE (ML) INJECTION PRN
Status: DISCONTINUED | OUTPATIENT
Start: 2023-12-11 | End: 2023-12-11 | Stop reason: HOSPADM

## 2023-12-11 RX ORDER — CEPHALEXIN 500 MG/1
500 CAPSULE ORAL 3 TIMES DAILY
Qty: 15 CAPSULE | Refills: 0 | Status: SHIPPED | OUTPATIENT
Start: 2023-12-11 | End: 2023-12-16

## 2023-12-11 RX ORDER — SODIUM CHLORIDE 0.9 % (FLUSH) 0.9 %
5-40 SYRINGE (ML) INJECTION EVERY 12 HOURS SCHEDULED
Status: DISCONTINUED | OUTPATIENT
Start: 2023-12-11 | End: 2023-12-11 | Stop reason: HOSPADM

## 2023-12-11 RX ORDER — DEXMEDETOMIDINE HYDROCHLORIDE 100 UG/ML
INJECTION, SOLUTION INTRAVENOUS PRN
Status: DISCONTINUED | OUTPATIENT
Start: 2023-12-11 | End: 2023-12-11 | Stop reason: SDUPTHER

## 2023-12-11 RX ORDER — ONDANSETRON 2 MG/ML
INJECTION INTRAMUSCULAR; INTRAVENOUS PRN
Status: DISCONTINUED | OUTPATIENT
Start: 2023-12-11 | End: 2023-12-11 | Stop reason: SDUPTHER

## 2023-12-11 RX ORDER — SULFAMETHOXAZOLE AND TRIMETHOPRIM 800; 160 MG/1; MG/1
1 TABLET ORAL 2 TIMES DAILY
Qty: 14 TABLET | Refills: 0 | Status: SHIPPED | OUTPATIENT
Start: 2023-12-11 | End: 2023-12-18

## 2023-12-11 RX ORDER — PROPOFOL 10 MG/ML
INJECTION, EMULSION INTRAVENOUS PRN
Status: DISCONTINUED | OUTPATIENT
Start: 2023-12-11 | End: 2023-12-11 | Stop reason: SDUPTHER

## 2023-12-11 RX ORDER — DEXAMETHASONE SODIUM PHOSPHATE 4 MG/ML
INJECTION, SOLUTION INTRA-ARTICULAR; INTRALESIONAL; INTRAMUSCULAR; INTRAVENOUS; SOFT TISSUE PRN
Status: DISCONTINUED | OUTPATIENT
Start: 2023-12-11 | End: 2023-12-11 | Stop reason: SDUPTHER

## 2023-12-11 RX ORDER — SODIUM CHLORIDE, SODIUM LACTATE, POTASSIUM CHLORIDE, CALCIUM CHLORIDE 600; 310; 30; 20 MG/100ML; MG/100ML; MG/100ML; MG/100ML
INJECTION, SOLUTION INTRAVENOUS CONTINUOUS
Status: DISCONTINUED | OUTPATIENT
Start: 2023-12-11 | End: 2023-12-11 | Stop reason: HOSPADM

## 2023-12-11 RX ORDER — FENTANYL CITRATE 50 UG/ML
INJECTION, SOLUTION INTRAMUSCULAR; INTRAVENOUS PRN
Status: DISCONTINUED | OUTPATIENT
Start: 2023-12-11 | End: 2023-12-11 | Stop reason: SDUPTHER

## 2023-12-11 RX ADMIN — FENTANYL CITRATE 50 MCG: 50 INJECTION, SOLUTION INTRAMUSCULAR; INTRAVENOUS at 12:39

## 2023-12-11 RX ADMIN — DEXMEDETOMIDINE HYDROCHLORIDE 20 MCG: 100 INJECTION, SOLUTION INTRAVENOUS at 12:32

## 2023-12-11 RX ADMIN — DEXAMETHASONE SODIUM PHOSPHATE 4 MG: 4 INJECTION, SOLUTION INTRAMUSCULAR; INTRAVENOUS at 12:32

## 2023-12-11 RX ADMIN — PROPOFOL 150 MG: 10 INJECTION, EMULSION INTRAVENOUS at 12:32

## 2023-12-11 RX ADMIN — KETOROLAC TROMETHAMINE 15 MG: 30 INJECTION, SOLUTION INTRAMUSCULAR at 12:32

## 2023-12-11 RX ADMIN — FENTANYL CITRATE 50 MCG: 50 INJECTION, SOLUTION INTRAMUSCULAR; INTRAVENOUS at 12:32

## 2023-12-11 RX ADMIN — SODIUM CHLORIDE, POTASSIUM CHLORIDE, SODIUM LACTATE AND CALCIUM CHLORIDE: 600; 310; 30; 20 INJECTION, SOLUTION INTRAVENOUS at 12:31

## 2023-12-11 RX ADMIN — ONDANSETRON 4 MG: 2 INJECTION INTRAMUSCULAR; INTRAVENOUS at 12:32

## 2023-12-11 RX ADMIN — SODIUM CHLORIDE, POTASSIUM CHLORIDE, SODIUM LACTATE AND CALCIUM CHLORIDE: 600; 310; 30; 20 INJECTION, SOLUTION INTRAVENOUS at 11:35

## 2023-12-11 RX ADMIN — PROPOFOL 150 MCG/KG/MIN: 10 INJECTION, EMULSION INTRAVENOUS at 12:33

## 2023-12-11 RX ADMIN — Medication 2000 MG: at 12:31

## 2023-12-11 ASSESSMENT — PAIN - FUNCTIONAL ASSESSMENT
PAIN_FUNCTIONAL_ASSESSMENT: 0-10
PAIN_FUNCTIONAL_ASSESSMENT: PREVENTS OR INTERFERES SOME ACTIVE ACTIVITIES AND ADLS

## 2023-12-11 ASSESSMENT — PAIN SCALES - GENERAL
PAINLEVEL_OUTOF10: 0
PAINLEVEL_OUTOF10: 0

## 2023-12-11 ASSESSMENT — PAIN DESCRIPTION - DESCRIPTORS: DESCRIPTORS: ACHING

## 2023-12-11 NOTE — H&P
Corine Sharif MD  History and Physical    Patient:  Anshul Gaston  MRN: [id number]  YOB: 1955    HISTORY OF PRESENT ILLNESS:     The patient is a 77 y.o. male who presents with bladder tumor. Here for procedure. Patient's old records, notes and chart reviewed and summarized above. Corine Sharif MD independently reviewed the images and verified the radiology reports from:    No results found. Past Medical History:    Past Medical History:   Diagnosis Date    Abnormal cardiovascular stress test 12/09/2020    Large inferolateral infarction with significant vernon-infarct ischemia. CAD (coronary artery disease)     CABG-2 vessel, followed by stents x 4    Cervical disc disease     Clostridium difficile diarrhea 10/2014    hospitalized in October 2014    Displacement of cervical intervertebral disc without myelopathy 06/10/2014    [facility], C6/C7     HTN (hypertension)     Hyperlipidemia     Impaired fasting blood sugar     Neck pain     chronic    Neuralgia, neuritis, and radiculitis, unspecified 06/10/2014    BW, left C7     Shoulder region pain     Left   [facility] injury 01/08/2014    Sprain and strain of unspecified site of shoulder and upper arm 06/10/2014    [facility], left arm     Wears glasses        Past Surgical History:    Past Surgical History:   Procedure Laterality Date    BICEPS TENODESIS Right 11/23/2016    right proximal bicep tenodesis    COLONOSCOPY N/A 12/10/2019    COLONOSCOPY POLYPECTOMY SNARE/COLD BIOPSY performed by Galdino Walsh MD at [address] [place] [address]  12/18/2020    Successful PTCA. AGUILA of mid LAD / Patent LIMA however distally occluded and not supplying LAD /Residual high grade stenosis in small LCX    CORONARY ANGIOPLASTY WITH STENT PLACEMENT  01/22/2021    CT NEEDLE BIOPSY LUNG PERCUTANEOUS  12/15/2021    CT NEEDLE BIOPSY LUNG PERCUTANEOUS 12/15/2021 [facility] CT SCAN    CYSTOSCOPY N/A 03/09/2020    CYSTO TURBT performed by Carlos Schulz MD at

## 2023-12-11 NOTE — ANESTHESIA POSTPROCEDURE EVALUATION
Department of Anesthesiology  Postprocedure Note    Patient: Nataly Dueñas  MRN: [id number]  YOB: 1955  Date of evaluation: 12/11/2023      Procedure Summary       Date: 12/11/23 Room / Location: [facility] aJy Lee 01 / [facility]    Anesthesia Start: 8416 Anesthesia Stop: 6685    Procedure: CYSTOSCOPY Transuretheral resection bladdder tumor -45 Diagnosis:       Malignant neoplasm of dome of urinary bladder ([address])      (Malignant neoplasm of dome of urinary bladder ([address]) [C67.1])    Surgeons: Uvaldo Llamas MD Responsible Provider: PARESH Mendez CRNA    Anesthesia Type: General, TIVA ASA Status: 3            Anesthesia Type: General, TIVA    Jazlyn Phase I: Jazlyn Score: 10    Jazlyn Phase II:        Anesthesia Post Evaluation    Patient location during evaluation: bedside  Level of consciousness: sleepy but conscious  Airway patency: patent  Nausea & Vomiting: no nausea and no vomiting  Complications: no  Cardiovascular status: hemodynamically stable  Respiratory status: spontaneous ventilation  Hydration status: euvolemic  Multimodal analgesia pain management approach  Pain management: satisfactory to patient

## 2023-12-11 NOTE — ANESTHESIA PRE PROCEDURE
Department of Anesthesiology  Preprocedure Note       Name:  Marcos Shore   Age:  77 y.o.  :  1955                                          MRN:  [id number]         Date:  2023      Surgeon: Ynes Dutton):  Robbin Burch MD    Procedure: Procedure(s):  CYSTOSCOPY Transuretheral resection bladdder tumor -45    Medications prior to admission:   Prior to Admission medications    Medication Sig Start Date End Date Taking? Authorizing Provider   furosemide (LASIX) 20 MG tablet Take 1 tablet by mouth once daily 23   Marilee Valera MD   metoprolol tartrate (LOPRESSOR) 25 MG tablet TAKE 1/2 (ONE-HALF) TABLET BY MOUTH IN THE MORNING AND 1/2 (ONE-HALF) AT BEDTIME 10/3/23   Katlyn Rodriguez APRN - CNP   gabapentin (NEURONTIN) 600 MG tablet Take 1 tablet by mouth 3 times daily for 30 days.  23  Chriss Dutton MD   albuterol sulfate HFA (PROVENTIL;VENTOLIN;PROAIR) 108 (90 Base) MCG/ACT inhaler Inhale 2 puffs into the lungs every 6 hours as needed for Wheezing 23  Bobo Tran MD   atorvastatin (LIPITOR) 40 MG tablet Take 1 tablet by mouth nightly 23   Renato Gipson DO   tiZANidine (ZANAFLEX) 4 MG tablet Take 1 tablet by mouth 3 times daily as needed (neck pain)  Patient taking differently: Take 1 tablet by mouth 3 times daily as needed (neck pain) Taking 0.5 tablet every evening 23   Chriss Dutton MD   clopidogrel (PLAVIX) 75 MG tablet Take 1 tablet by mouth once daily 4/3/23   Renato Gipson DO   tamsulosin (FLOMAX) 0.4 MG capsule Take 1 capsule by mouth daily 3/27/23   Robbin Burch MD   aspirin 81 MG chewable tablet Take 1 tablet by mouth daily    Shauna Briseno MD   Cholecalciferol (VITAMIN D) 50 MCG (2000 UT) CAPS capsule Take 2,000 Units by mouth daily    Shauna Briseno MD   vitamin C (ASCORBIC ACID) 500 MG tablet Take 0.5 tablets by mouth 2 times daily    Shauna Briseno MD   acetaminophen (TYLENOL) 325 MG tablet Take 2 tablets by mouth every 6

## 2023-12-11 NOTE — DISCHARGE INSTRUCTIONS
Pt ok to discharge home in good condition  No heavy lifting, >10 lbs for today  Pt should avoid strenuous activity for today  Pt should walk moderately at home  Pt ok to shower   Pt may resume diet as tolerated  Pt should take Rx as directed  No driving while on narcotics  Please call attending physician or hospital  with questions  Call or Present to ED if fever (> 101F), intractable nausea vomiting or pain.   Rx in chart    Pt should follow up with Pauline Nelson MD, in 1 weeks, call to confirm appointment

## 2023-12-11 NOTE — BRIEF OP NOTE
Brief Postoperative Note      Patient: Vladislav Cancino  YOB: 1955  MRN: [id number]    Date of Procedure: 12/11/2023    Pre-Op Diagnosis Codes:     * Malignant neoplasm of dome of urinary bladder ([address]) [C67.1]    Post-Op Diagnosis: Same       Procedure(s):  CYSTOSCOPY Transuretheral resection bladdder tumor -39    Surgeon(s):  Sharmaine Phillips MD    Assistant:  * No surgical staff found *    Anesthesia: General    Estimated Blood Loss (mL): Minimal    Complications: None    Specimens:   ID Type Source Tests Collected by Time Destination   A : BLADDER TUMOR Tissue Bladder SURGICAL PATHOLOGY Sharmaine Phillips MD 12/11/2023 1303        Implants:  * No implants in log *      Drains:   Urinary Catheter 12/11/23 Mancia (Active)   Catheter Indications Perioperative use for selected surgical procedures 12/11/23 1311   Urine Color Colorless 12/11/23 1324   Urine Appearance Clear 12/11/23 1324   Collection Container Standard 12/11/23 1324       Findings: mancia out tomor.  Path one week tahir       Electronically signed by Boaz Norton MD on 12/11/2023 at 1:36 PM

## 2023-12-12 ENCOUNTER — NURSE ONLY (OUTPATIENT)
Dept: UROLOGY | Age: 68
End: 2023-12-12

## 2023-12-12 DIAGNOSIS — C67.9 UROTHELIAL CARCINOMA OF BLADDER (HCC): Primary | ICD-10-CM

## 2023-12-12 PROCEDURE — NBSRV NON-BILLABLE SERVICE: Performed by: UROLOGY

## 2023-12-12 NOTE — PROGRESS NOTES
Following Dr. Kayla Jean-Baptiste plan of care: Once balloon was deflated, removed 22F 3-way mancia catheter in total without difficulty. 100cc of yellow urine noted on drainage bag. Patient was educated on going to the ER if unable to urinate In 8-12 hrs. Patient tolerated the procedure well and will follow up with path results or sooner if needed.

## 2023-12-14 LAB — SURGICAL PATHOLOGY REPORT: NORMAL

## 2023-12-21 ENCOUNTER — HOSPITAL ENCOUNTER (INPATIENT)
Age: 68
LOS: 3 days | Discharge: HOME | DRG: 377 | End: 2023-12-24
Attending: EMERGENCY MEDICINE | Admitting: INTERNAL MEDICINE
Payer: MEDICARE

## 2023-12-21 ENCOUNTER — APPOINTMENT (OUTPATIENT)
Dept: CT IMAGING | Age: 68
DRG: 377 | End: 2023-12-21
Payer: MEDICARE

## 2023-12-21 DIAGNOSIS — I10 PRIMARY HYPERTENSION: Primary | ICD-10-CM

## 2023-12-21 DIAGNOSIS — K92.2 GASTROINTESTINAL HEMORRHAGE, UNSPECIFIED GASTROINTESTINAL HEMORRHAGE TYPE: ICD-10-CM

## 2023-12-21 DIAGNOSIS — K26.4 GASTROINTESTINAL HEMORRHAGE ASSOCIATED WITH DUODENAL ULCER: ICD-10-CM

## 2023-12-21 DIAGNOSIS — K26.9 DUODENAL ULCER: ICD-10-CM

## 2023-12-21 LAB
ALBUMIN SERPL-MCNC: 3.7 G/DL (ref 3.5–5.2)
ALBUMIN/GLOB SERPL: 1.7 {RATIO} (ref 1–2.5)
ALP SERPL-CCNC: 98 U/L (ref 40–129)
ALT SERPL-CCNC: 24 U/L (ref 5–41)
ANION GAP SERPL CALCULATED.3IONS-SCNC: 12 MMOL/L (ref 9–17)
AST SERPL-CCNC: 19 U/L
BASOPHILS # BLD: 0 K/UL (ref 0–0.2)
BASOPHILS NFR BLD: 0 % (ref 0–2)
BILIRUB DIRECT SERPL-MCNC: 0.1 MG/DL
BILIRUB INDIRECT SERPL-MCNC: 0.1 MG/DL (ref 0–1)
BILIRUB SERPL-MCNC: 0.2 MG/DL (ref 0.3–1.2)
BUN SERPL-MCNC: 53 MG/DL (ref 8–23)
BUN/CREAT SERPL: 48 (ref 9–20)
CALCIUM SERPL-MCNC: 8.3 MG/DL (ref 8.6–10.4)
CHLORIDE SERPL-SCNC: 105 MMOL/L (ref 98–107)
CO2 SERPL-SCNC: 21 MMOL/L (ref 20–31)
CREAT SERPL-MCNC: 1.1 MG/DL (ref 0.7–1.2)
EOSINOPHIL # BLD: 0.15 K/UL (ref 0–0.4)
EOSINOPHILS RELATIVE PERCENT: 1 % (ref 1–8)
ERYTHROCYTE [DISTWIDTH] IN BLOOD BY AUTOMATED COUNT: 13.5 % (ref 11.8–14.4)
GFR SERPL CREATININE-BSD FRML MDRD: >60 ML/MIN/1.73M2
GLOBULIN SER CALC-MCNC: 2.2 G/DL (ref 1.5–3.8)
GLUCOSE SERPL-MCNC: 180 MG/DL (ref 70–99)
HCT VFR BLD AUTO: 25.7 % (ref 40.7–50.3)
HCT VFR BLD AUTO: 28.3 % (ref 40.7–50.3)
HGB BLD-MCNC: 8.4 G/DL (ref 13–17)
HGB BLD-MCNC: 9.5 G/DL (ref 13–17)
IMM GRANULOCYTES # BLD AUTO: 0.46 K/UL (ref 0–0.3)
IMM GRANULOCYTES NFR BLD: 3 %
INR PPP: 1.1
LACTATE BLDV-SCNC: 1.5 MMOL/L (ref 0.5–2.2)
LACTATE BLDV-SCNC: 3.1 MMOL/L (ref 0.5–2.2)
LIPASE SERPL-CCNC: 242 U/L (ref 13–60)
LYMPHOCYTES NFR BLD: 4.79 K/UL (ref 1–4.8)
LYMPHOCYTES RELATIVE PERCENT: 31 % (ref 15–43)
MAGNESIUM SERPL-MCNC: 2.2 MG/DL (ref 1.6–2.6)
MCH RBC QN AUTO: 28.3 PG (ref 25.2–33.5)
MCHC RBC AUTO-ENTMCNC: 33.6 G/DL (ref 25.2–33.5)
MCV RBC AUTO: 84.2 FL (ref 82.6–102.9)
MONOCYTES NFR BLD: 0.31 K/UL (ref 0.1–1.2)
MONOCYTES NFR BLD: 2 % (ref 6–14)
MORPHOLOGY: ABNORMAL
MORPHOLOGY: ABNORMAL
NEUTROPHILS NFR BLD: 63 % (ref 44–74)
NEUTS SEG NFR BLD: 9.74 K/UL (ref 1.5–8.1)
NRBC BLD-RTO: 0.3 PER 100 WBC
NUCLEATED RED BLOOD CELLS: 1 PER 100 WBC
PARTIAL THROMBOPLASTIN TIME: 21.7 SEC (ref 23.9–33.8)
PLATELET # BLD AUTO: 344 K/UL (ref 138–453)
PMV BLD AUTO: 10.7 FL (ref 8.1–13.5)
POTASSIUM SERPL-SCNC: 5.1 MMOL/L (ref 3.7–5.3)
PROT SERPL-MCNC: 5.9 G/DL (ref 6.4–8.3)
PROTHROMBIN TIME: 13.9 SEC (ref 11.5–14.2)
RBC # BLD AUTO: 3.36 M/UL (ref 4.21–5.77)
SODIUM SERPL-SCNC: 138 MMOL/L (ref 135–144)
WBC OTHER # BLD: 15.4 K/UL (ref 3.5–11.3)

## 2023-12-21 PROCEDURE — 99222 1ST HOSP IP/OBS MODERATE 55: CPT | Performed by: INTERNAL MEDICINE

## 2023-12-21 PROCEDURE — C9113 INJ PANTOPRAZOLE SODIUM, VIA: HCPCS | Performed by: SURGERY

## 2023-12-21 PROCEDURE — 85610 PROTHROMBIN TIME: CPT

## 2023-12-21 PROCEDURE — 80048 BASIC METABOLIC PNL TOTAL CA: CPT

## 2023-12-21 PROCEDURE — 2709999900 CT ABDOMEN PELVIS W IV CONTRAST

## 2023-12-21 PROCEDURE — 80076 HEPATIC FUNCTION PANEL: CPT

## 2023-12-21 PROCEDURE — 86920 COMPATIBILITY TEST SPIN: CPT

## 2023-12-21 PROCEDURE — 2580000003 HC RX 258: Performed by: INTERNAL MEDICINE

## 2023-12-21 PROCEDURE — 3609017100 HC EGD: Performed by: SURGERY

## 2023-12-21 PROCEDURE — 83735 ASSAY OF MAGNESIUM: CPT

## 2023-12-21 PROCEDURE — 85018 HEMOGLOBIN: CPT

## 2023-12-21 PROCEDURE — 86901 BLOOD TYPING SEROLOGIC RH(D): CPT

## 2023-12-21 PROCEDURE — 3700000000 HC ANESTHESIA ATTENDED CARE: Performed by: SURGERY

## 2023-12-21 PROCEDURE — 2580000003 HC RX 258: Performed by: SURGERY

## 2023-12-21 PROCEDURE — 99285 EMERGENCY DEPT VISIT HI MDM: CPT

## 2023-12-21 PROCEDURE — 83605 ASSAY OF LACTIC ACID: CPT

## 2023-12-21 PROCEDURE — 6360000002 HC RX W HCPCS: Performed by: EMERGENCY MEDICINE

## 2023-12-21 PROCEDURE — 93005 ELECTROCARDIOGRAM TRACING: CPT | Performed by: EMERGENCY MEDICINE

## 2023-12-21 PROCEDURE — 36415 COLL VENOUS BLD VENIPUNCTURE: CPT

## 2023-12-21 PROCEDURE — 0DJ08ZZ INSPECTION OF UPPER INTESTINAL TRACT, VIA NATURAL OR ARTIFICIAL OPENING ENDOSCOPIC: ICD-10-PCS | Performed by: SURGERY

## 2023-12-21 PROCEDURE — 2709999900 HC NON-CHARGEABLE SUPPLY: Performed by: SURGERY

## 2023-12-21 PROCEDURE — 86850 RBC ANTIBODY SCREEN: CPT

## 2023-12-21 PROCEDURE — 2580000003 HC RX 258: Performed by: EMERGENCY MEDICINE

## 2023-12-21 PROCEDURE — 6370000000 HC RX 637 (ALT 250 FOR IP): Performed by: SURGERY

## 2023-12-21 PROCEDURE — 83690 ASSAY OF LIPASE: CPT

## 2023-12-21 PROCEDURE — 96361 HYDRATE IV INFUSION ADD-ON: CPT

## 2023-12-21 PROCEDURE — 2060000000 HC ICU INTERMEDIATE R&B

## 2023-12-21 PROCEDURE — 94760 N-INVAS EAR/PLS OXIMETRY 1: CPT

## 2023-12-21 PROCEDURE — 3700000001 HC ADD 15 MINUTES (ANESTHESIA): Performed by: SURGERY

## 2023-12-21 PROCEDURE — 85014 HEMATOCRIT: CPT

## 2023-12-21 PROCEDURE — 85025 COMPLETE CBC W/AUTO DIFF WBC: CPT

## 2023-12-21 PROCEDURE — 96365 THER/PROPH/DIAG IV INF INIT: CPT

## 2023-12-21 PROCEDURE — C9113 INJ PANTOPRAZOLE SODIUM, VIA: HCPCS | Performed by: EMERGENCY MEDICINE

## 2023-12-21 PROCEDURE — 85730 THROMBOPLASTIN TIME PARTIAL: CPT

## 2023-12-21 PROCEDURE — 6360000002 HC RX W HCPCS: Performed by: SURGERY

## 2023-12-21 PROCEDURE — 6360000004 HC RX CONTRAST MEDICATION: Performed by: EMERGENCY MEDICINE

## 2023-12-21 PROCEDURE — 86900 BLOOD TYPING SEROLOGIC ABO: CPT

## 2023-12-21 RX ORDER — FUROSEMIDE 20 MG/1
20 TABLET ORAL DAILY
Status: DISCONTINUED | OUTPATIENT
Start: 2023-12-22 | End: 2023-12-24 | Stop reason: HOSPADM

## 2023-12-21 RX ORDER — ATORVASTATIN CALCIUM 40 MG/1
40 TABLET, FILM COATED ORAL NIGHTLY
Status: DISCONTINUED | OUTPATIENT
Start: 2023-12-21 | End: 2023-12-24 | Stop reason: HOSPADM

## 2023-12-21 RX ORDER — 0.9 % SODIUM CHLORIDE 0.9 %
1000 INTRAVENOUS SOLUTION INTRAVENOUS ONCE
Status: COMPLETED | OUTPATIENT
Start: 2023-12-21 | End: 2023-12-21

## 2023-12-21 RX ORDER — VITAMIN B COMPLEX
2000 TABLET ORAL DAILY
Status: DISCONTINUED | OUTPATIENT
Start: 2023-12-21 | End: 2023-12-24 | Stop reason: HOSPADM

## 2023-12-21 RX ORDER — SODIUM CHLORIDE 0.9 % (FLUSH) 0.9 %
10 SYRINGE (ML) INJECTION PRN
Status: DISCONTINUED | OUTPATIENT
Start: 2023-12-21 | End: 2023-12-24 | Stop reason: HOSPADM

## 2023-12-21 RX ORDER — WATER 10 ML/10ML
INJECTION INTRAMUSCULAR; INTRAVENOUS; SUBCUTANEOUS
Status: DISCONTINUED
Start: 2023-12-21 | End: 2023-12-21 | Stop reason: WASHOUT

## 2023-12-21 RX ORDER — SODIUM CHLORIDE 0.9 % (FLUSH) 0.9 %
5-40 SYRINGE (ML) INJECTION PRN
Status: DISCONTINUED | OUTPATIENT
Start: 2023-12-21 | End: 2023-12-21 | Stop reason: HOSPADM

## 2023-12-21 RX ORDER — TIZANIDINE 4 MG/1
4 TABLET ORAL 3 TIMES DAILY PRN
Status: DISCONTINUED | OUTPATIENT
Start: 2023-12-21 | End: 2023-12-24 | Stop reason: HOSPADM

## 2023-12-21 RX ORDER — SODIUM CHLORIDE 9 MG/ML
INJECTION, SOLUTION INTRAVENOUS PRN
Status: DISCONTINUED | OUTPATIENT
Start: 2023-12-21 | End: 2023-12-21 | Stop reason: HOSPADM

## 2023-12-21 RX ORDER — ASCORBIC ACID 500 MG
250 TABLET ORAL 2 TIMES DAILY
Status: DISCONTINUED | OUTPATIENT
Start: 2023-12-21 | End: 2023-12-21

## 2023-12-21 RX ORDER — TAMSULOSIN HYDROCHLORIDE 0.4 MG/1
0.4 CAPSULE ORAL NIGHTLY
Status: DISCONTINUED | OUTPATIENT
Start: 2023-12-21 | End: 2023-12-24 | Stop reason: HOSPADM

## 2023-12-21 RX ORDER — SIMETHICONE 80 MG
160 TABLET,CHEWABLE ORAL 4 TIMES DAILY PRN
Status: DISCONTINUED | OUTPATIENT
Start: 2023-12-21 | End: 2023-12-24 | Stop reason: HOSPADM

## 2023-12-21 RX ORDER — SODIUM CHLORIDE 0.9 % (FLUSH) 0.9 %
5-40 SYRINGE (ML) INJECTION EVERY 12 HOURS SCHEDULED
Status: DISCONTINUED | OUTPATIENT
Start: 2023-12-21 | End: 2023-12-21 | Stop reason: HOSPADM

## 2023-12-21 RX ORDER — GABAPENTIN 600 MG/1
600 TABLET ORAL 3 TIMES DAILY
Status: DISCONTINUED | OUTPATIENT
Start: 2023-12-21 | End: 2023-12-24 | Stop reason: HOSPADM

## 2023-12-21 RX ORDER — SODIUM CHLORIDE, SODIUM LACTATE, POTASSIUM CHLORIDE, CALCIUM CHLORIDE 600; 310; 30; 20 MG/100ML; MG/100ML; MG/100ML; MG/100ML
INJECTION, SOLUTION INTRAVENOUS CONTINUOUS
Status: DISCONTINUED | OUTPATIENT
Start: 2023-12-21 | End: 2023-12-21

## 2023-12-21 RX ORDER — ALBUTEROL SULFATE 2.5 MG/3ML
2.5 SOLUTION RESPIRATORY (INHALATION)
Status: DISCONTINUED | OUTPATIENT
Start: 2023-12-21 | End: 2023-12-24 | Stop reason: HOSPADM

## 2023-12-21 RX ORDER — SODIUM CHLORIDE 0.9 % (FLUSH) 0.9 %
10 SYRINGE (ML) INJECTION EVERY 12 HOURS SCHEDULED
Status: DISCONTINUED | OUTPATIENT
Start: 2023-12-21 | End: 2023-12-24 | Stop reason: HOSPADM

## 2023-12-21 RX ORDER — ONDANSETRON 2 MG/ML
4 INJECTION INTRAMUSCULAR; INTRAVENOUS EVERY 6 HOURS PRN
Status: DISCONTINUED | OUTPATIENT
Start: 2023-12-21 | End: 2023-12-24 | Stop reason: HOSPADM

## 2023-12-21 RX ORDER — SODIUM CHLORIDE 9 MG/ML
INJECTION, SOLUTION INTRAVENOUS PRN
Status: DISCONTINUED | OUTPATIENT
Start: 2023-12-21 | End: 2023-12-24 | Stop reason: HOSPADM

## 2023-12-21 RX ORDER — METOPROLOL TARTRATE 50 MG/1
50 TABLET, FILM COATED ORAL 2 TIMES DAILY
Status: DISCONTINUED | OUTPATIENT
Start: 2023-12-21 | End: 2023-12-24 | Stop reason: HOSPADM

## 2023-12-21 RX ORDER — SODIUM CHLORIDE 9 MG/ML
INJECTION, SOLUTION INTRAVENOUS CONTINUOUS
Status: DISCONTINUED | OUTPATIENT
Start: 2023-12-21 | End: 2023-12-24 | Stop reason: HOSPADM

## 2023-12-21 RX ORDER — SODIUM CHLORIDE FOR INHALATION 0.9 %
3 VIAL, NEBULIZER (ML) INHALATION
Status: DISCONTINUED | OUTPATIENT
Start: 2023-12-21 | End: 2023-12-21

## 2023-12-21 RX ORDER — SODIUM CHLORIDE 9 MG/ML
INJECTION, SOLUTION INTRAVENOUS CONTINUOUS
Status: DISCONTINUED | OUTPATIENT
Start: 2023-12-21 | End: 2023-12-21

## 2023-12-21 RX ORDER — PANTOPRAZOLE SODIUM 40 MG/10ML
INJECTION, POWDER, LYOPHILIZED, FOR SOLUTION INTRAVENOUS
Status: DISCONTINUED
Start: 2023-12-21 | End: 2023-12-21 | Stop reason: WASHOUT

## 2023-12-21 RX ORDER — 0.9 % SODIUM CHLORIDE 0.9 %
1000 INTRAVENOUS SOLUTION INTRAVENOUS
Status: DISCONTINUED | OUTPATIENT
Start: 2023-12-21 | End: 2023-12-21

## 2023-12-21 RX ORDER — ACETAMINOPHEN 325 MG/1
650 TABLET ORAL EVERY 4 HOURS PRN
Status: DISCONTINUED | OUTPATIENT
Start: 2023-12-21 | End: 2023-12-24 | Stop reason: HOSPADM

## 2023-12-21 RX ADMIN — SODIUM CHLORIDE 1000 ML: 9 INJECTION, SOLUTION INTRAVENOUS at 13:57

## 2023-12-21 RX ADMIN — PANTOPRAZOLE SODIUM 8 MG/HR: 40 INJECTION, POWDER, FOR SOLUTION INTRAVENOUS at 23:01

## 2023-12-21 RX ADMIN — GABAPENTIN 600 MG: 600 TABLET, FILM COATED ORAL at 20:57

## 2023-12-21 RX ADMIN — SODIUM CHLORIDE 80 MG: 9 INJECTION, SOLUTION INTRAVENOUS at 10:51

## 2023-12-21 RX ADMIN — IOPAMIDOL 100 ML: 755 INJECTION, SOLUTION INTRAVENOUS at 11:32

## 2023-12-21 RX ADMIN — PANTOPRAZOLE SODIUM 8 MG/HR: 40 INJECTION, POWDER, FOR SOLUTION INTRAVENOUS at 13:15

## 2023-12-21 RX ADMIN — METOPROLOL TARTRATE 50 MG: 50 TABLET ORAL at 20:57

## 2023-12-21 RX ADMIN — SODIUM CHLORIDE: 9 INJECTION, SOLUTION INTRAVENOUS at 13:13

## 2023-12-21 RX ADMIN — SODIUM CHLORIDE, PRESERVATIVE FREE 10 ML: 5 INJECTION INTRAVENOUS at 20:57

## 2023-12-21 RX ADMIN — OCTREOTIDE ACETATE 25 MCG/HR: 100 INJECTION, SOLUTION INTRAVENOUS; SUBCUTANEOUS at 13:20

## 2023-12-21 RX ADMIN — SODIUM CHLORIDE: 9 INJECTION, SOLUTION INTRAVENOUS at 16:35

## 2023-12-21 RX ADMIN — SODIUM CHLORIDE 1000 ML: 9 INJECTION, SOLUTION INTRAVENOUS at 10:45

## 2023-12-21 RX ADMIN — SODIUM CHLORIDE, POTASSIUM CHLORIDE, SODIUM LACTATE AND CALCIUM CHLORIDE: 600; 310; 30; 20 INJECTION, SOLUTION INTRAVENOUS at 15:34

## 2023-12-21 RX ADMIN — ATORVASTATIN CALCIUM 40 MG: 40 TABLET, FILM COATED ORAL at 20:57

## 2023-12-21 RX ADMIN — TAMSULOSIN HYDROCHLORIDE 0.4 MG: 0.4 CAPSULE ORAL at 20:57

## 2023-12-21 NOTE — H&P
HOSPITALIST ADMISSION H&P      REASON FOR ADMISSION:  GI bleed--upper  ESTIMATED LENGTH OF STAY:  > 2 midnights---2-3 days    ATTENDING/ADMITTING PHYSICIAN: Jeancarlos Alcaraz MD MD  PCP: PARESH Arreaga CNP    HISTORY OF PRESENT ILLNESS:      The patient is a 77 y.o. male patient of PARESH Arreaga CNP who presents with declining HGB--vomited maroon colored emesis---black stool--maroon stool several days---HGB previously 15.0----now 12.5 --> 9.5 ---> 8.4---has been on ASA--Plavix, unfortunately, took both this AM before going to the ER. Event of brief LOC this AM.  Patient to undergo EGD now. Elevated lactic acid at admission---now normalized    Lipase elevated 242--consistent with a mild pancreatitis due to duodenal involvement    CT AP----thickened 2nd portion duodenum---ulcer highly suspected. ASCVD---CABG--stents---see below----most recent 2D ECHO 5.24.2023--now shows EF ~ 55%    Emphysema    IFG--impaired fasting glucose    CKD--Stage 2    Known carcinoma urinary bladder--TURBTs    Per patient report--slow emergence from anesthesia--history       See below for additional PMH. Patient przx-blwmybjdpz-jehetjes-available records reviewed, including, but not limited to,  ER reports--labs--imaging--office records--Cardiology notes---personal notes. Past Medical History:   Diagnosis Date    Abnormal cardiovascular stress test 12/09/2020    Large inferolateral infarction with significant vernon-infarct ischemia.     CAD (coronary artery disease)     CABG-2 vessel, followed by stents x 4    Cervical disc disease     Clostridium difficile diarrhea 10/2014    hospitalized in October 2014    Displacement of cervical intervertebral disc without myelopathy 06/10/2014    BWC, C6/C7     HTN (hypertension)     Hyperlipidemia     Impaired fasting blood sugar     Neck pain     chronic    Neuralgia, neuritis, and radiculitis, unspecified 06/10/2014    BWC, left C7     Shoulder region pain     Left

## 2023-12-21 NOTE — ED NOTES
Writer spoke with Glory Diaz from surgery regarding what pt last ate and when. Pt reports he last ate around 1800 last night, having eaten 4-5 pieces of celery with peanut butter and some crackers. He had sips of water through the night and took his medications with water this morning.

## 2023-12-21 NOTE — OP NOTE
EGD PROCEDURE NOTE:      Pre op diagnosis:     Gastrointestinal hemorrhage with melena [K92.1]   Hb 8.4    Operative Procedure:    1. EGD with cold biopsy    Surgeon:    Dr. Bharath May     Anesthesia:    IV sedation per CRNA    EBL:  minimal      Procedure:    Patient was taken to the endoscopy suite and placed in a left lateral decubitus position. They were given IV sedation as mentioned above. The gastric scope was passed through the mouth piece and down the esophagus, stomach and into the second portion of the duodenum. It was withdrawn slowly The scope was retroflexed in the stomach and GE junction was examined. The following findings were noted. Final Diagnosis:    GE junction at 40 cm  2 moderate size duodenal ulcers with necrotic bases. No active bleeidng. In 1st portion  Stomach and esophagus mostly normal    Plan:    Prilosec 20 mg bid  Pepcid 20 mg nighly  Carafate 1 gm tid  Follow up with Dr. Fritz Ware in 3 months for repeat EGD to take bx and make sure ulcer has healed.

## 2023-12-21 NOTE — CONSULTS
BIOPSY LUNG PERCUTANEOUS  12/15/2021    CT NEEDLE BIOPSY LUNG PERCUTANEOUS 12/15/2021 [facility] CT SCAN    CYSTOSCOPY N/A 03/09/2020    CYSTO TURBT performed by Cecil Jesus MD at [facility] N/A 4/24/2023    CYSTOSCOPY Bladder Biopsy with Fulgeration performed by Cecil Jesus MD at [facility] N/A 12/11/2023    CYSTOSCOPY Transuretheral resection bladdder tumor -39 performed by Cecil Jesus MD at [address] Right 3/9/2023    Right C5-C6 C6-C7 FACET JOINT performed by Cristian Galvan MD at [address] Right 5/4/2023    Right C5-C6 C6-C7 FACET Joint performed by Cristian Galvan MD at [address] N/A 3/25/2022    LAPAROSCOPIC Robotic Assisted Ventral Hernia Repair with mesh WITH LYSIS OF ADHESIONS performed by Dana Alcantar DO at [address] Left 8/12/14, 1/09/15    C7 TFE    PAIN MANAGEMENT PROCEDURE Left 09/23/2014    C7 TFE    PAIN MANAGEMENT PROCEDURE Left 7/17/2015 , 1/22/16    C7 TFE    OH CORONARY ARTERY BYP W/VEIN & ARTERY GRAFT 1 VEIN N/A 07/06/2018    CABG CORONARY ARTERY BYPASS, GARY ERAZO CHANI  (CSI# [id number]) performed by Eve Fletcher MD at [facility] CABG (LIMA-LAD, R SVG- RCA, OM too small for the grafts) on 7/6/18,    ROTATOR CUFF REPAIR Left 01/29/2019 2015- Right    SHOULDER ARTHROSCOPY Right 04/27/2016    scope decompressing, rotator cuff repair    TRANSESOPHAGEAL ECHOCARDIOGRAM  95/37/9503    UMBILICAL HERNIA REPAIR N/A 12/18/2019    Laparoscopic Robotic Assisted Umbilical Hernia Repair W/ MESH performed by Fawad Ford MD at [address] N/A 11/19/2020    Open Incisional Hernia Repair W/ MESH performed by Dana Alcantar DO at [address]     Past Medical History:   Diagnosis Date    Abnormal cardiovascular stress test 12/09/2020    Large inferolateral infarction with significant vernon-infarct ischemia.     CAD (coronary artery disease)     CABG-2 vessel, followed by stents

## 2023-12-21 NOTE — ED PROVIDER NOTES
[facility] ED  EMERGENCY DEPARTMENT ENCOUNTER      Pt Name: Juan Freedman  MRN: [id number]  [address] [facility] 1955  Date of evaluation: 12/21/2023  Provider: Katie Bob MD    CHIEF COMPLAINT     Chief Complaint   Patient presents with    Diarrhea     Just after getting pt into a room pt has a large BM which is noted to be black and bright red    Fatigue         HISTORY OF PRESENT ILLNESS   (Location/Symptom, Timing/Onset, Context/Setting,Quality, Duration, Modifying Factors, Severity)  Note limiting factors. Juan Freedman is a 77 y.o. male who presents to the emergency department with a chief complaint of black tarry stool today. He had black tarry stool 4 to 5 days ago also. He contacted his PCP and had outpatient blood work yesterday which showed a hemoglobin of 12.5. His baseline is usually around 15. He also has had some emesis today. Patient reports some vague mid and lower abdominal discomfort. He denies alcohol use and has not been on any large doses of nonsteroidals recently. He takes low-dose aspirin tablet daily. The history is provided by the patient and the spouse. Nursing Notes werereviewed. REVIEW OF SYSTEMS    (2-9 systems for level 4, 10 or more for level 5)     Review of Systems   All other systems reviewed and are negative. Except as noted above the remainder of the review of systems was reviewed and negative. PAST MEDICAL HISTORY     Past Medical History:   Diagnosis Date    Abnormal cardiovascular stress test 12/09/2020    Large inferolateral infarction with significant vernon-infarct ischemia.     CAD (coronary artery disease)     CABG-2 vessel, followed by stents x 4    Cervical disc disease     Clostridium difficile diarrhea 10/2014    hospitalized in October 2014    Displacement of cervical intervertebral disc without myelopathy 06/10/2014    BWC, C6/C7     HTN (hypertension)     Hyperlipidemia     Impaired fasting blood sugar     Neck pain

## 2023-12-22 LAB
ALBUMIN SERPL-MCNC: 3 G/DL (ref 3.5–5.2)
ALBUMIN/GLOB SERPL: 1.9 {RATIO} (ref 1–2.5)
ALP SERPL-CCNC: 72 U/L (ref 40–129)
ALT SERPL-CCNC: 15 U/L (ref 5–41)
ANION GAP SERPL CALCULATED.3IONS-SCNC: 7 MMOL/L (ref 9–17)
AST SERPL-CCNC: 12 U/L
BASOPHILS # BLD: 0.09 K/UL (ref 0–0.2)
BASOPHILS NFR BLD: 1 % (ref 0–2)
BILIRUB SERPL-MCNC: 0.4 MG/DL (ref 0.3–1.2)
BUN SERPL-MCNC: 25 MG/DL (ref 8–23)
BUN/CREAT SERPL: 31 (ref 9–20)
CALCIUM SERPL-MCNC: 7.3 MG/DL (ref 8.6–10.4)
CHLORIDE SERPL-SCNC: 109 MMOL/L (ref 98–107)
CO2 SERPL-SCNC: 24 MMOL/L (ref 20–31)
CREAT SERPL-MCNC: 0.8 MG/DL (ref 0.7–1.2)
EKG ATRIAL RATE: 93 BPM
EKG P AXIS: 41 DEGREES
EKG P-R INTERVAL: 156 MS
EKG Q-T INTERVAL: 366 MS
EKG QRS DURATION: 98 MS
EKG QTC CALCULATION (BAZETT): 455 MS
EKG R AXIS: 25 DEGREES
EKG T AXIS: 171 DEGREES
EKG VENTRICULAR RATE: 93 BPM
EOSINOPHIL # BLD: 0.17 K/UL (ref 0–0.44)
EOSINOPHILS RELATIVE PERCENT: 2 % (ref 1–4)
ERYTHROCYTE [DISTWIDTH] IN BLOOD BY AUTOMATED COUNT: 14.1 % (ref 11.8–14.4)
GFR SERPL CREATININE-BSD FRML MDRD: >60 ML/MIN/1.73M2
GLUCOSE SERPL-MCNC: 110 MG/DL (ref 70–99)
HCT VFR BLD AUTO: 21.2 % (ref 40.7–50.3)
HCT VFR BLD AUTO: 23.2 % (ref 40.7–50.3)
HCT VFR BLD AUTO: 23.2 % (ref 40.7–50.3)
HCT VFR BLD AUTO: 23.7 % (ref 40.7–50.3)
HGB BLD-MCNC: 6.9 G/DL (ref 13–17)
HGB BLD-MCNC: 7.6 G/DL (ref 13–17)
HGB BLD-MCNC: 7.7 G/DL (ref 13–17)
HGB BLD-MCNC: 7.9 G/DL (ref 13–17)
IMM GRANULOCYTES # BLD AUTO: 0.32 K/UL (ref 0–0.3)
IMM GRANULOCYTES NFR BLD: 3 %
LIPASE SERPL-CCNC: 71 U/L (ref 13–60)
LYMPHOCYTES NFR BLD: 2.91 K/UL (ref 1.1–3.7)
LYMPHOCYTES RELATIVE PERCENT: 27 % (ref 24–43)
MCH RBC QN AUTO: 28.8 PG (ref 25.2–33.5)
MCHC RBC AUTO-ENTMCNC: 33.2 G/DL (ref 25.2–33.5)
MCV RBC AUTO: 86.9 FL (ref 82.6–102.9)
MONOCYTES NFR BLD: 0.82 K/UL (ref 0.1–1.2)
MONOCYTES NFR BLD: 8 % (ref 3–12)
NEUTROPHILS NFR BLD: 59 % (ref 36–65)
NEUTS SEG NFR BLD: 6.51 K/UL (ref 1.5–8.1)
NRBC BLD-RTO: 0.6 PER 100 WBC
PLATELET # BLD AUTO: 234 K/UL (ref 138–453)
PMV BLD AUTO: 10.2 FL (ref 8.1–13.5)
POTASSIUM SERPL-SCNC: 3.7 MMOL/L (ref 3.7–5.3)
PROT SERPL-MCNC: 4.6 G/DL (ref 6.4–8.3)
RBC # BLD AUTO: 2.67 M/UL (ref 4.21–5.77)
SODIUM SERPL-SCNC: 140 MMOL/L (ref 135–144)
WBC OTHER # BLD: 10.8 K/UL (ref 3.5–11.3)

## 2023-12-22 PROCEDURE — 2580000003 HC RX 258: Performed by: INTERNAL MEDICINE

## 2023-12-22 PROCEDURE — 2580000003 HC RX 258: Performed by: SURGERY

## 2023-12-22 PROCEDURE — 85018 HEMOGLOBIN: CPT

## 2023-12-22 PROCEDURE — 99231 SBSQ HOSP IP/OBS SF/LOW 25: CPT | Performed by: INTERNAL MEDICINE

## 2023-12-22 PROCEDURE — 6360000002 HC RX W HCPCS: Performed by: NURSE PRACTITIONER

## 2023-12-22 PROCEDURE — 83690 ASSAY OF LIPASE: CPT

## 2023-12-22 PROCEDURE — 2060000000 HC ICU INTERMEDIATE R&B

## 2023-12-22 PROCEDURE — 97161 PT EVAL LOW COMPLEX 20 MIN: CPT

## 2023-12-22 PROCEDURE — 6370000000 HC RX 637 (ALT 250 FOR IP): Performed by: SURGERY

## 2023-12-22 PROCEDURE — P9016 RBC LEUKOCYTES REDUCED: HCPCS

## 2023-12-22 PROCEDURE — C9113 INJ PANTOPRAZOLE SODIUM, VIA: HCPCS | Performed by: SURGERY

## 2023-12-22 PROCEDURE — 80053 COMPREHEN METABOLIC PANEL: CPT

## 2023-12-22 PROCEDURE — 6360000002 HC RX W HCPCS: Performed by: SURGERY

## 2023-12-22 PROCEDURE — 85025 COMPLETE CBC W/AUTO DIFF WBC: CPT

## 2023-12-22 PROCEDURE — 36430 TRANSFUSION BLD/BLD COMPNT: CPT

## 2023-12-22 PROCEDURE — 36415 COLL VENOUS BLD VENIPUNCTURE: CPT

## 2023-12-22 PROCEDURE — 85014 HEMATOCRIT: CPT

## 2023-12-22 RX ORDER — PANTOPRAZOLE SODIUM 40 MG/10ML
INJECTION, POWDER, LYOPHILIZED, FOR SOLUTION INTRAVENOUS
Status: DISPENSED
Start: 2023-12-22 | End: 2023-12-23

## 2023-12-22 RX ORDER — SODIUM CHLORIDE 9 MG/ML
INJECTION, SOLUTION INTRAVENOUS PRN
Status: DISCONTINUED | OUTPATIENT
Start: 2023-12-22 | End: 2023-12-24 | Stop reason: HOSPADM

## 2023-12-22 RX ORDER — CALCIUM GLUCONATE 10 MG/ML
1000 INJECTION, SOLUTION INTRAVENOUS ONCE
Status: COMPLETED | OUTPATIENT
Start: 2023-12-22 | End: 2023-12-22

## 2023-12-22 RX ADMIN — PANTOPRAZOLE SODIUM 8 MG/HR: 40 INJECTION, POWDER, FOR SOLUTION INTRAVENOUS at 08:05

## 2023-12-22 RX ADMIN — SODIUM CHLORIDE, PRESERVATIVE FREE 10 ML: 5 INJECTION INTRAVENOUS at 08:17

## 2023-12-22 RX ADMIN — PANTOPRAZOLE SODIUM 8 MG/HR: 40 INJECTION, POWDER, FOR SOLUTION INTRAVENOUS at 18:51

## 2023-12-22 RX ADMIN — FUROSEMIDE 20 MG: 20 TABLET ORAL at 08:15

## 2023-12-22 RX ADMIN — SODIUM CHLORIDE: 9 INJECTION, SOLUTION INTRAVENOUS at 17:31

## 2023-12-22 RX ADMIN — CALCIUM GLUCONATE 1000 MG: 10 INJECTION, SOLUTION INTRAVENOUS at 06:59

## 2023-12-22 RX ADMIN — SODIUM CHLORIDE, PRESERVATIVE FREE 10 ML: 5 INJECTION INTRAVENOUS at 06:58

## 2023-12-22 RX ADMIN — SODIUM CHLORIDE, PRESERVATIVE FREE 10 ML: 5 INJECTION INTRAVENOUS at 21:50

## 2023-12-22 RX ADMIN — GABAPENTIN 600 MG: 600 TABLET, FILM COATED ORAL at 15:18

## 2023-12-22 RX ADMIN — SODIUM CHLORIDE: 9 INJECTION, SOLUTION INTRAVENOUS at 08:08

## 2023-12-22 RX ADMIN — METOPROLOL TARTRATE 50 MG: 50 TABLET ORAL at 21:49

## 2023-12-22 RX ADMIN — GABAPENTIN 600 MG: 600 TABLET, FILM COATED ORAL at 21:49

## 2023-12-22 RX ADMIN — SODIUM CHLORIDE: 9 INJECTION, SOLUTION INTRAVENOUS at 00:14

## 2023-12-22 RX ADMIN — OCTREOTIDE ACETATE 25 MCG/HR: 100 INJECTION, SOLUTION INTRAVENOUS; SUBCUTANEOUS at 08:06

## 2023-12-22 RX ADMIN — GABAPENTIN 600 MG: 600 TABLET, FILM COATED ORAL at 08:15

## 2023-12-22 RX ADMIN — ATORVASTATIN CALCIUM 40 MG: 40 TABLET, FILM COATED ORAL at 21:49

## 2023-12-22 RX ADMIN — TAMSULOSIN HYDROCHLORIDE 0.4 MG: 0.4 CAPSULE ORAL at 21:49

## 2023-12-22 RX ADMIN — CHOLECALCIFEROL TAB 25 MCG (1000 UNIT) 2000 UNITS: 25 TAB at 08:16

## 2023-12-22 RX ADMIN — METOPROLOL TARTRATE 50 MG: 50 TABLET ORAL at 08:16

## 2023-12-22 NOTE — CONSENT
Informed Consent for Blood Component Transfusion Note    I have discussed with the patient the rationale for blood component transfusion; its benefits in treating or preventing fatigue, organ damage, or death; and its risk which includes mild transfusion reactions, rare risk of blood borne infection, or more serious but rare reactions. I have discussed the alternatives to transfusion, including the risk and consequences of not receiving transfusion. The patient had an opportunity to ask questions and had agreed to proceed with transfusion of blood components.     Electronically signed by PARESH Calero CNP on 12/22/23 at 12:18 AM EST

## 2023-12-22 NOTE — CARE COORDINATION
DISCHARGE BARRIERS       Reason for Referral:  SW completed a Psychosocial Assessment for evaluation of patient's mental health, social status, and functional capacity within the community. Shade Laguerre is a 77 y.o. male admitted due to GI bleed. Patient accompanied by spouse. SW provided supportive listening while patient discussed past medical history and events leading up to hospitalization. Mental Status:  Alert, oriented, and engaging during assessment. Decision Making:  Makes own decisions. Family/Social/Home Environment: lives with their spouse    Support: Discussed a good social support network     Current Services:  None    Current DMEs: None    PCP: PARESH Rodriguez CNP and reparen no issues affording medication.  status:   None     ADLs and means of transportation: Independent in ADLs prior to hospitalization and able to transport self. Food insecurity or needed financial assistance: Denies any food insecurity or financial concerns at this time. ACP and Code Status:  SW discussed an Advance Directive which included the patient's choices for care and treatment in the case of a health event that adversely affects decision-making abilities. SW provided education and resources. Shade Laguerre has no questions at this time and has agreed to keep me up-to-date should anything change. Shade Laguerre is a Full Code status and Power of  for healthcare on file. Collaborative List of SNF/ECF/HH were provided: offered, declined No discharge order at this time. Anticipated Needs/Discharge Plan:  Spoke with patient/family/representative about discharge plan. Patient/Family/Representative verbalizes understanding of the plan of care and denies discharge needs or further services at this time. SW provided business card. SW will continue to monitor needs and assist as appropriate.         Electronically signed by JULES Haywood on 12/22/2023 at

## 2023-12-22 NOTE — CARE COORDINATION
Case Management Assessment  Initial Evaluation    Date/Time of Evaluation: 12/22/2023 9:55 AM  Assessment Completed by: Darcel Peabody, RN    If patient is discharged prior to next notation, then this note serves as note for discharge by case management. Patient Name: Carina Yepez                   YOB: 1955  Diagnosis: GI bleed [K92.2]  Gastrointestinal hemorrhage, unspecified gastrointestinal hemorrhage type [K92.2]                   Date / Time: 12/21/2023  9:56 AM    Patient Admission Status: Inpatient   Readmission Risk (Low < 19, Mod (19-27), High > 27): Readmission Risk Score: 12.3    Current PCP: PARESH Marie CNP  PCP verified by CM? Yes    Chart Reviewed: Yes      History Provided by: Patient  Patient Orientation: Alert and Oriented    Patient Cognition: Alert    Hospitalization in the last 30 days (Readmission):  No    If yes, Readmission Assessment in CM Navigator will be completed. Advance Directives:      Code Status: Full Code   Patient's Primary Decision Maker is: Named in Scanned ACP Document      Discharge Planning:    Patient lives with: Spouse/Significant Other Type of Home: House  Primary Care Giver: Self  Patient Support Systems include: Spouse/Significant Other, Family Members   Current Financial resources: Medicare, [place] ([place]), [employer]  Current community resources:    Current services prior to admission: None            Current DME:              Type of Home Care services:  None    ADLS  Prior functional level: Independent in ADLs/IADLs  Current functional level: Independent in ADLs/IADLs    PT AM-PAC:   /24  OT AM-PAC:   /24    Family can provide assistance at DC: Yes  Would you like Case Management to discuss the discharge plan with any other family members/significant others, and if so, who?  No  Plans to Return to Present Housing: Yes  Other Identified Issues/Barriers to RETURNING to current housing: none   Potential Assistance needed at discharge:

## 2023-12-22 NOTE — PLAN OF CARE
Problem: Discharge Planning  Goal: Discharge to home or other facility with appropriate resources  Outcome: Progressing  Flowsheets (Taken 12/22/2023 1119)  Discharge to home or other facility with appropriate resources: Identify barriers to discharge with patient and caregiver     Problem: Safety - Adult  Goal: Free from fall injury  Outcome: Progressing  Flowsheets (Taken 12/22/2023 1119)  Free From Fall Injury:   Instruct family/caregiver on patient safety   Based on caregiver fall risk screen, instruct family/caregiver to ask for assistance with transferring infant if caregiver noted to have fall risk factors     Problem: ABCDS Injury Assessment  Goal: Absence of physical injury  Outcome: Progressing  Flowsheets (Taken 12/22/2023 1119)  Absence of Physical Injury: Implement safety measures based on patient assessment

## 2023-12-22 NOTE — PLAN OF CARE
Problem: Discharge Planning  Goal: Discharge to home or other facility with appropriate resources  12/22/2023 1119 by Temitope Mojica RN  Outcome: Progressing  Flowsheets (Taken 12/22/2023 1119)  Discharge to home or other facility with appropriate resources: Identify barriers to discharge with patient and caregiver     Problem: Safety - Adult  Goal: Free from fall injury  12/22/2023 1748 by Lizz Leach RN  Outcome: Progressing  12/22/2023 1119 by Temitope Mojica RN  Outcome: Progressing  Flowsheets (Taken 12/22/2023 1119)  Free From Fall Injury:   [place] Chowdhury family/caregiver on patient safety   Based on caregiver fall risk screen, instruct family/caregiver to ask for assistance with transferring infant if caregiver noted to have fall risk factors     Problem: ABCDS Injury Assessment  Goal: Absence of physical injury  12/22/2023 1119 by Temitope Mojica RN  Outcome: Progressing  Flowsheets (Taken 12/22/2023 1119)  Absence of Physical Injury: Implement safety measures based on patient assessment

## 2023-12-23 PROBLEM — Z95.5 H/O HEART ARTERY STENT: Status: ACTIVE | Noted: 2023-12-23

## 2023-12-23 PROBLEM — K26.9 DUODENAL ULCER: Status: ACTIVE | Noted: 2023-12-23

## 2023-12-23 LAB
ALBUMIN SERPL-MCNC: 2.8 G/DL (ref 3.5–5.2)
ANION GAP SERPL CALCULATED.3IONS-SCNC: 8 MMOL/L (ref 9–17)
BASOPHILS # BLD: 0.08 K/UL (ref 0–0.2)
BASOPHILS NFR BLD: 1 % (ref 0–2)
BUN SERPL-MCNC: 16 MG/DL (ref 8–23)
BUN/CREAT SERPL: 27 (ref 9–20)
CALCIUM SERPL-MCNC: 7.1 MG/DL (ref 8.6–10.4)
CHLORIDE SERPL-SCNC: 112 MMOL/L (ref 98–107)
CO2 SERPL-SCNC: 23 MMOL/L (ref 20–31)
CREAT SERPL-MCNC: 0.6 MG/DL (ref 0.7–1.2)
EOSINOPHIL # BLD: 0.25 K/UL (ref 0–0.44)
EOSINOPHILS RELATIVE PERCENT: 3 % (ref 1–4)
ERYTHROCYTE [DISTWIDTH] IN BLOOD BY AUTOMATED COUNT: 14.3 % (ref 11.8–14.4)
GFR SERPL CREATININE-BSD FRML MDRD: >60 ML/MIN/1.73M2
GLUCOSE SERPL-MCNC: 91 MG/DL (ref 70–99)
HCT VFR BLD AUTO: 20.6 % (ref 40.7–50.3)
HCT VFR BLD AUTO: 22 % (ref 40.7–50.3)
HCT VFR BLD AUTO: 23.2 % (ref 40.7–50.3)
HCT VFR BLD AUTO: 25.4 % (ref 40.7–50.3)
HGB BLD-MCNC: 6.7 G/DL (ref 13–17)
HGB BLD-MCNC: 7.2 G/DL (ref 13–17)
HGB BLD-MCNC: 8 G/DL (ref 13–17)
HGB BLD-MCNC: 8.5 G/DL (ref 13–17)
IMM GRANULOCYTES # BLD AUTO: 0.33 K/UL (ref 0–0.3)
IMM GRANULOCYTES NFR BLD: 3 %
LYMPHOCYTES NFR BLD: 2.32 K/UL (ref 1.1–3.7)
LYMPHOCYTES RELATIVE PERCENT: 24 % (ref 24–43)
MCH RBC QN AUTO: 28.4 PG (ref 25.2–33.5)
MCHC RBC AUTO-ENTMCNC: 32.5 G/DL (ref 25.2–33.5)
MCV RBC AUTO: 87.3 FL (ref 82.6–102.9)
MONOCYTES NFR BLD: 0.6 K/UL (ref 0.1–1.2)
MONOCYTES NFR BLD: 6 % (ref 3–12)
NEUTROPHILS NFR BLD: 63 % (ref 36–65)
NEUTS SEG NFR BLD: 6.07 K/UL (ref 1.5–8.1)
NRBC BLD-RTO: 1 PER 100 WBC
PLATELET # BLD AUTO: 226 K/UL (ref 138–453)
PMV BLD AUTO: 10.5 FL (ref 8.1–13.5)
POTASSIUM SERPL-SCNC: 3.4 MMOL/L (ref 3.7–5.3)
RBC # BLD AUTO: 2.36 M/UL (ref 4.21–5.77)
SODIUM SERPL-SCNC: 143 MMOL/L (ref 135–144)
WBC OTHER # BLD: 9.7 K/UL (ref 3.5–11.3)

## 2023-12-23 PROCEDURE — 2580000003 HC RX 258: Performed by: SURGERY

## 2023-12-23 PROCEDURE — 2580000003 HC RX 258: Performed by: INTERNAL MEDICINE

## 2023-12-23 PROCEDURE — 99232 SBSQ HOSP IP/OBS MODERATE 35: CPT | Performed by: INTERNAL MEDICINE

## 2023-12-23 PROCEDURE — 6370000000 HC RX 637 (ALT 250 FOR IP): Performed by: NURSE PRACTITIONER

## 2023-12-23 PROCEDURE — 80048 BASIC METABOLIC PNL TOTAL CA: CPT

## 2023-12-23 PROCEDURE — 82040 ASSAY OF SERUM ALBUMIN: CPT

## 2023-12-23 PROCEDURE — C9113 INJ PANTOPRAZOLE SODIUM, VIA: HCPCS | Performed by: SURGERY

## 2023-12-23 PROCEDURE — 2060000000 HC ICU INTERMEDIATE R&B

## 2023-12-23 PROCEDURE — 6360000002 HC RX W HCPCS: Performed by: SURGERY

## 2023-12-23 PROCEDURE — 36415 COLL VENOUS BLD VENIPUNCTURE: CPT

## 2023-12-23 PROCEDURE — 94760 N-INVAS EAR/PLS OXIMETRY 1: CPT

## 2023-12-23 PROCEDURE — 85025 COMPLETE CBC W/AUTO DIFF WBC: CPT

## 2023-12-23 PROCEDURE — 6370000000 HC RX 637 (ALT 250 FOR IP): Performed by: SURGERY

## 2023-12-23 PROCEDURE — 85014 HEMATOCRIT: CPT

## 2023-12-23 PROCEDURE — P9016 RBC LEUKOCYTES REDUCED: HCPCS

## 2023-12-23 PROCEDURE — 2580000003 HC RX 258: Performed by: NURSE PRACTITIONER

## 2023-12-23 PROCEDURE — 85018 HEMOGLOBIN: CPT

## 2023-12-23 PROCEDURE — 36430 TRANSFUSION BLD/BLD COMPNT: CPT

## 2023-12-23 RX ORDER — SODIUM CHLORIDE 9 MG/ML
INJECTION, SOLUTION INTRAVENOUS PRN
Status: DISCONTINUED | OUTPATIENT
Start: 2023-12-23 | End: 2023-12-23

## 2023-12-23 RX ORDER — POTASSIUM CHLORIDE 20 MEQ/1
40 TABLET, EXTENDED RELEASE ORAL ONCE
Status: COMPLETED | OUTPATIENT
Start: 2023-12-23 | End: 2023-12-23

## 2023-12-23 RX ORDER — PANTOPRAZOLE SODIUM 40 MG/10ML
INJECTION, POWDER, LYOPHILIZED, FOR SOLUTION INTRAVENOUS
Status: DISPENSED
Start: 2023-12-23 | End: 2023-12-24

## 2023-12-23 RX ADMIN — CHOLECALCIFEROL TAB 25 MCG (1000 UNIT) 2000 UNITS: 25 TAB at 08:22

## 2023-12-23 RX ADMIN — METOPROLOL TARTRATE 50 MG: 50 TABLET ORAL at 08:22

## 2023-12-23 RX ADMIN — SODIUM CHLORIDE: 9 INJECTION, SOLUTION INTRAVENOUS at 01:33

## 2023-12-23 RX ADMIN — SODIUM CHLORIDE, PRESERVATIVE FREE 10 ML: 5 INJECTION INTRAVENOUS at 08:22

## 2023-12-23 RX ADMIN — PANTOPRAZOLE SODIUM 8 MG/HR: 40 INJECTION, POWDER, FOR SOLUTION INTRAVENOUS at 05:21

## 2023-12-23 RX ADMIN — SODIUM CHLORIDE: 9 INJECTION, SOLUTION INTRAVENOUS at 23:44

## 2023-12-23 RX ADMIN — GABAPENTIN 600 MG: 600 TABLET, FILM COATED ORAL at 21:12

## 2023-12-23 RX ADMIN — TAMSULOSIN HYDROCHLORIDE 0.4 MG: 0.4 CAPSULE ORAL at 21:13

## 2023-12-23 RX ADMIN — SODIUM CHLORIDE, PRESERVATIVE FREE 10 ML: 5 INJECTION INTRAVENOUS at 21:12

## 2023-12-23 RX ADMIN — POTASSIUM CHLORIDE 40 MEQ: 1500 TABLET, EXTENDED RELEASE ORAL at 08:22

## 2023-12-23 RX ADMIN — FUROSEMIDE 20 MG: 20 TABLET ORAL at 08:22

## 2023-12-23 RX ADMIN — GABAPENTIN 600 MG: 600 TABLET, FILM COATED ORAL at 15:35

## 2023-12-23 RX ADMIN — METOPROLOL TARTRATE 50 MG: 50 TABLET ORAL at 21:12

## 2023-12-23 RX ADMIN — ATORVASTATIN CALCIUM 40 MG: 40 TABLET, FILM COATED ORAL at 21:12

## 2023-12-23 RX ADMIN — GABAPENTIN 600 MG: 600 TABLET, FILM COATED ORAL at 08:22

## 2023-12-23 RX ADMIN — PANTOPRAZOLE SODIUM 8 MG/HR: 40 INJECTION, POWDER, FOR SOLUTION INTRAVENOUS at 14:00

## 2023-12-23 NOTE — PLAN OF CARE
Problem: Discharge Planning  Goal: Discharge to home or other facility with appropriate resources  12/23/2023 0849 by Sweta Marinelli RN  Outcome: Progressing  12/22/2023 2302 by Renea Ruvalcaba RN  Outcome: Progressing     Problem: Safety - Adult  Goal: Free from fall injury  12/23/2023 0849 by Sweta Marinelli RN  Outcome: Progressing  12/22/2023 2302 by Renea Ruvalcaba RN  Outcome: Progressing     Problem: ABCDS Injury Assessment  Goal: Absence of physical injury  12/23/2023 0849 by Sweta Marinelli RN  Outcome: Progressing  12/22/2023 2302 by Renea Ruvalcaba RN  Outcome: Progressing

## 2023-12-23 NOTE — PLAN OF CARE
Problem: Discharge Planning  Goal: Discharge to home or other facility with appropriate resources  12/22/2023 2302 by Antoine Gtz RN  Outcome: Progressing  12/22/2023 1119 by Nai Maria RN  Outcome: Progressing  Flowsheets (Taken 12/22/2023 1119)  Discharge to home or other facility with appropriate resources: Identify barriers to discharge with patient and caregiver     Problem: Safety - Adult  Goal: Free from fall injury  12/22/2023 2302 by Antoine Gtz RN  Outcome: Progressing  12/22/2023 1748 by Cookie Carias RN  Outcome: Progressing  12/22/2023 1119 by Nai Maria RN  Outcome: Progressing  Flowsheets (Taken 12/22/2023 1119)  Free From Fall Injury:   Gini Leahy family/caregiver on patient safety   Based on caregiver fall risk screen, instruct family/caregiver to ask for assistance with transferring infant if caregiver noted to have fall risk factors     Problem: ABCDS Injury Assessment  Goal: Absence of physical injury  12/22/2023 2302 by Antoine Gtz RN  Outcome: Progressing  12/22/2023 1119 by Nai Maria RN  Outcome: Progressing  Flowsheets (Taken 12/22/2023 1119)  Absence of Physical Injury: Implement safety measures based on patient assessment

## 2023-12-24 VITALS
HEART RATE: 71 BPM | WEIGHT: 217.8 LBS | SYSTOLIC BLOOD PRESSURE: 116 MMHG | DIASTOLIC BLOOD PRESSURE: 22 MMHG | RESPIRATION RATE: 18 BRPM | OXYGEN SATURATION: 95 % | TEMPERATURE: 98 F | BODY MASS INDEX: 32.26 KG/M2 | HEIGHT: 69 IN

## 2023-12-24 LAB
ABO/RH: NORMAL
ANION GAP SERPL CALCULATED.3IONS-SCNC: 7 MMOL/L (ref 9–17)
ANTIBODY SCREEN: NEGATIVE
ARM BAND NUMBER: NORMAL
BASOPHILS # BLD: 0.08 K/UL (ref 0–0.2)
BASOPHILS NFR BLD: 1 % (ref 0–2)
BLOOD BANK BLOOD PRODUCT EXPIRATION DATE: NORMAL
BLOOD BANK BLOOD PRODUCT EXPIRATION DATE: NORMAL
BLOOD BANK DISPENSE STATUS: NORMAL
BLOOD BANK DISPENSE STATUS: NORMAL
BLOOD BANK ISBT PRODUCT BLOOD TYPE: 5100
BLOOD BANK ISBT PRODUCT BLOOD TYPE: 5100
BLOOD BANK PRODUCT CODE: NORMAL
BLOOD BANK PRODUCT CODE: NORMAL
BLOOD BANK SAMPLE EXPIRATION: NORMAL
BLOOD BANK UNIT TYPE AND RH: NORMAL
BLOOD BANK UNIT TYPE AND RH: NORMAL
BPU ID: NORMAL
BPU ID: NORMAL
BUN SERPL-MCNC: 12 MG/DL (ref 8–23)
BUN/CREAT SERPL: 17 (ref 9–20)
CALCIUM SERPL-MCNC: 7.4 MG/DL (ref 8.6–10.4)
CHLORIDE SERPL-SCNC: 108 MMOL/L (ref 98–107)
CO2 SERPL-SCNC: 24 MMOL/L (ref 20–31)
COMPONENT: NORMAL
COMPONENT: NORMAL
CREAT SERPL-MCNC: 0.7 MG/DL (ref 0.7–1.2)
CROSSMATCH RESULT: NORMAL
CROSSMATCH RESULT: NORMAL
EOSINOPHIL # BLD: 0.3 K/UL (ref 0–0.44)
EOSINOPHILS RELATIVE PERCENT: 3 % (ref 1–4)
ERYTHROCYTE [DISTWIDTH] IN BLOOD BY AUTOMATED COUNT: 14.9 % (ref 11.8–14.4)
GFR SERPL CREATININE-BSD FRML MDRD: >60 ML/MIN/1.73M2
GLUCOSE SERPL-MCNC: 100 MG/DL (ref 70–99)
HCT VFR BLD AUTO: 23.1 % (ref 40.7–50.3)
HCT VFR BLD AUTO: 24.3 % (ref 40.7–50.3)
HGB BLD-MCNC: 7.8 G/DL (ref 13–17)
HGB BLD-MCNC: 8.1 G/DL (ref 13–17)
IMM GRANULOCYTES # BLD AUTO: 0.49 K/UL (ref 0–0.3)
IMM GRANULOCYTES NFR BLD: 5 %
LYMPHOCYTES NFR BLD: 2.3 K/UL (ref 1.1–3.7)
LYMPHOCYTES RELATIVE PERCENT: 23 % (ref 24–43)
MCH RBC QN AUTO: 28.7 PG (ref 25.2–33.5)
MCHC RBC AUTO-ENTMCNC: 33.3 G/DL (ref 25.2–33.5)
MCV RBC AUTO: 86.2 FL (ref 82.6–102.9)
MONOCYTES NFR BLD: 0.6 K/UL (ref 0.1–1.2)
MONOCYTES NFR BLD: 6 % (ref 3–12)
NEUTROPHILS NFR BLD: 62 % (ref 36–65)
NEUTS SEG NFR BLD: 6.46 K/UL (ref 1.5–8.1)
NRBC BLD-RTO: 1.1 PER 100 WBC
PLATELET # BLD AUTO: 227 K/UL (ref 138–453)
PMV BLD AUTO: 10.1 FL (ref 8.1–13.5)
POTASSIUM SERPL-SCNC: 3.5 MMOL/L (ref 3.7–5.3)
RBC # BLD AUTO: 2.82 M/UL (ref 4.21–5.77)
RBC # BLD: ABNORMAL 10*6/UL
SODIUM SERPL-SCNC: 139 MMOL/L (ref 135–144)
TRANSFUSION STATUS: NORMAL
TRANSFUSION STATUS: NORMAL
UNIT DIVISION: 0
UNIT DIVISION: 0
UNIT ISSUE DATE/TIME: NORMAL
UNIT ISSUE DATE/TIME: NORMAL
WBC OTHER # BLD: 10.2 K/UL (ref 3.5–11.3)

## 2023-12-24 PROCEDURE — 2580000003 HC RX 258: Performed by: SURGERY

## 2023-12-24 PROCEDURE — 85014 HEMATOCRIT: CPT

## 2023-12-24 PROCEDURE — 85018 HEMOGLOBIN: CPT

## 2023-12-24 PROCEDURE — C9113 INJ PANTOPRAZOLE SODIUM, VIA: HCPCS | Performed by: SURGERY

## 2023-12-24 PROCEDURE — 80048 BASIC METABOLIC PNL TOTAL CA: CPT

## 2023-12-24 PROCEDURE — 6360000002 HC RX W HCPCS: Performed by: SURGERY

## 2023-12-24 PROCEDURE — 94760 N-INVAS EAR/PLS OXIMETRY 1: CPT

## 2023-12-24 PROCEDURE — 85025 COMPLETE CBC W/AUTO DIFF WBC: CPT

## 2023-12-24 PROCEDURE — 6370000000 HC RX 637 (ALT 250 FOR IP): Performed by: NURSE PRACTITIONER

## 2023-12-24 PROCEDURE — 99238 HOSP IP/OBS DSCHRG MGMT 30/<: CPT | Performed by: INTERNAL MEDICINE

## 2023-12-24 PROCEDURE — 36415 COLL VENOUS BLD VENIPUNCTURE: CPT

## 2023-12-24 PROCEDURE — 6370000000 HC RX 637 (ALT 250 FOR IP): Performed by: SURGERY

## 2023-12-24 RX ORDER — POTASSIUM CHLORIDE 20 MEQ/1
40 TABLET, EXTENDED RELEASE ORAL ONCE
Status: COMPLETED | OUTPATIENT
Start: 2023-12-24 | End: 2023-12-24

## 2023-12-24 RX ORDER — OMEPRAZOLE 20 MG/1
20 CAPSULE, DELAYED RELEASE ORAL
Qty: 180 CAPSULE | Refills: 0 | Status: SHIPPED | OUTPATIENT
Start: 2023-12-24

## 2023-12-24 RX ORDER — SUCRALFATE 1 G/1
1 TABLET ORAL 3 TIMES DAILY
Qty: 120 TABLET | Refills: 0 | Status: SHIPPED | OUTPATIENT
Start: 2023-12-24

## 2023-12-24 RX ORDER — POTASSIUM CHLORIDE 750 MG/1
10 TABLET, EXTENDED RELEASE ORAL DAILY
Qty: 30 TABLET | Refills: 0 | Status: SHIPPED | OUTPATIENT
Start: 2023-12-24

## 2023-12-24 RX ORDER — FAMOTIDINE 20 MG/1
20 TABLET, FILM COATED ORAL NIGHTLY
Qty: 60 TABLET | Refills: 0 | Status: SHIPPED | OUTPATIENT
Start: 2023-12-24

## 2023-12-24 RX ADMIN — POTASSIUM CHLORIDE 40 MEQ: 1500 TABLET, EXTENDED RELEASE ORAL at 06:38

## 2023-12-24 RX ADMIN — METOPROLOL TARTRATE 50 MG: 50 TABLET ORAL at 08:01

## 2023-12-24 RX ADMIN — GABAPENTIN 600 MG: 600 TABLET, FILM COATED ORAL at 08:01

## 2023-12-24 RX ADMIN — PANTOPRAZOLE SODIUM 8 MG/HR: 40 INJECTION, POWDER, FOR SOLUTION INTRAVENOUS at 00:44

## 2023-12-24 RX ADMIN — SODIUM CHLORIDE, PRESERVATIVE FREE 10 ML: 5 INJECTION INTRAVENOUS at 08:02

## 2023-12-24 RX ADMIN — CHOLECALCIFEROL TAB 25 MCG (1000 UNIT) 2000 UNITS: 25 TAB at 08:01

## 2023-12-24 RX ADMIN — FUROSEMIDE 20 MG: 20 TABLET ORAL at 08:01

## 2023-12-24 NOTE — PLAN OF CARE
Problem: Discharge Planning  Goal: Discharge to home or other facility with appropriate resources  12/24/2023 0934 by Darlene Bowers RN  Outcome: Completed  Flowsheets (Taken 12/24/2023 0806)  Discharge to home or other facility with appropriate resources: Identify barriers to discharge with patient and caregiver  12/23/2023 2318 by Angela Soares RN  Outcome: Progressing     Problem: Safety - Adult  Goal: Free from fall injury  12/24/2023 0934 by Darlene Bowers RN  Outcome: Completed  12/23/2023 2318 by Angela Soares RN  Outcome: Adequate for Discharge     Problem: ABCDS Injury Assessment  Goal: Absence of physical injury  12/24/2023 0934 by Darlene Bowers RN  Outcome: Completed  12/23/2023 2318 by Angela Soares RN  Outcome: Adequate for Discharge

## 2023-12-25 NOTE — DISCHARGE SUMMARY
[address]                 [address] [facility], [address]                               DISCHARGE SUMMARY    PATIENT NAME: Urmila Hernandez                 :        1955  MED REC NO:   [id number]                             ROOM:       2477  ACCOUNT NO:   [de-identified]                           ADMIT DATE: 2023  PROVIDER:     Judy Jean-Baptiste MD                  DISCHARGE DATE:  2023    DISCHARGE DIAGNOSES:  1. Upper GI bleed. 2.  Duodenal ulcers. 3.  Hypokalemia. Please see H and P dictated on admission for complete details of HPI,  past medical history, family history, social history, admission physical  exam.    HOSPITAL COURSE:  The patient was admitted to [facility] on  . He was taken for EGD and was noted to have two duodenal ulcers  with necrotic base. Dr. José Borjas recommended addition of Prilosec, Pepcid,  and Carafate. The patient was on aspirin and Plavix, and these were  held. A decision will need to be made as an outpatient as to the  reinitiation of the aspirin or possibly the aspirin and Plavix. He was  not previously on a PPI. The patient became rather severely anemic and  required transfusion during the hospitalization. The patient did well  following EGD. Hematocrit was stable for over 24 hours following the  scope and the decision was made on , , to release the patient  home for further management in the outpatient setting. We held his  aspirin and Plavix. We added Pepcid, Prilosec, Carafate, and a small  dose of potassium for the mild hypokalemia and the patient will need to  have follow up with his PCP and his cardiologist in the outpatient  setting. On the day of discharge, the patient's condition was stable. He was examined. His vital signs were stable. Lungs were clear. Cardiovascular exam was regular. The abdomen soft, positive bowel  sound.   He was sitting up in bed bright

## 2023-12-26 ENCOUNTER — FOLLOWUP TELEPHONE ENCOUNTER (OUTPATIENT)
Dept: INPATIENT UNIT | Age: 68
End: 2023-12-26

## 2023-12-26 ENCOUNTER — HOSPITAL ENCOUNTER (OUTPATIENT)
Age: 68
Discharge: HOME | End: 2023-12-26
Payer: MEDICARE

## 2023-12-26 ENCOUNTER — CARE COORDINATION (OUTPATIENT)
Dept: CASE MANAGEMENT | Age: 68
End: 2023-12-26

## 2023-12-26 DIAGNOSIS — K26.9 DUODENAL ULCER: Primary | ICD-10-CM

## 2023-12-26 DIAGNOSIS — K26.9 DUODENAL ULCER: ICD-10-CM

## 2023-12-26 LAB
ANION GAP SERPL CALCULATED.3IONS-SCNC: 8 MMOL/L (ref 9–17)
BASOPHILS # BLD: 0.11 K/UL (ref 0–0.2)
BASOPHILS NFR BLD: 1 % (ref 0–2)
BUN SERPL-MCNC: 11 MG/DL (ref 8–23)
BUN/CREAT SERPL: 14 (ref 9–20)
CALCIUM SERPL-MCNC: 8.4 MG/DL (ref 8.6–10.4)
CHLORIDE SERPL-SCNC: 107 MMOL/L (ref 98–107)
CO2 SERPL-SCNC: 26 MMOL/L (ref 20–31)
CREAT SERPL-MCNC: 0.8 MG/DL (ref 0.7–1.2)
EOSINOPHIL # BLD: 0.26 K/UL (ref 0–0.44)
EOSINOPHILS RELATIVE PERCENT: 3 % (ref 1–4)
ERYTHROCYTE [DISTWIDTH] IN BLOOD BY AUTOMATED COUNT: 15.3 % (ref 11.8–14.4)
GFR SERPL CREATININE-BSD FRML MDRD: >60 ML/MIN/1.73M2
GLUCOSE SERPL-MCNC: 102 MG/DL (ref 70–99)
HCT VFR BLD AUTO: 27.9 % (ref 40.7–50.3)
HGB BLD-MCNC: 9.3 G/DL (ref 13–17)
IMM GRANULOCYTES # BLD AUTO: 0.44 K/UL (ref 0–0.3)
IMM GRANULOCYTES NFR BLD: 5 %
LYMPHOCYTES NFR BLD: 2.95 K/UL (ref 1.1–3.7)
LYMPHOCYTES RELATIVE PERCENT: 30 % (ref 24–43)
MCH RBC QN AUTO: 29.3 PG (ref 25.2–33.5)
MCHC RBC AUTO-ENTMCNC: 33.3 G/DL (ref 25.2–33.5)
MCV RBC AUTO: 88 FL (ref 82.6–102.9)
MONOCYTES NFR BLD: 0.61 K/UL (ref 0.1–1.2)
MONOCYTES NFR BLD: 6 % (ref 3–12)
NEUTROPHILS NFR BLD: 55 % (ref 36–65)
NEUTS SEG NFR BLD: 5.45 K/UL (ref 1.5–8.1)
NRBC BLD-RTO: 0.8 PER 100 WBC
PLATELET # BLD AUTO: 295 K/UL (ref 138–453)
PMV BLD AUTO: 9.9 FL (ref 8.1–13.5)
POTASSIUM SERPL-SCNC: 4.2 MMOL/L (ref 3.7–5.3)
RBC # BLD AUTO: 3.17 M/UL (ref 4.21–5.77)
RBC # BLD: ABNORMAL 10*6/UL
SODIUM SERPL-SCNC: 141 MMOL/L (ref 135–144)
WBC OTHER # BLD: 9.8 K/UL (ref 3.5–11.3)

## 2023-12-26 PROCEDURE — 36415 COLL VENOUS BLD VENIPUNCTURE: CPT

## 2023-12-26 PROCEDURE — 85025 COMPLETE CBC W/AUTO DIFF WBC: CPT

## 2023-12-26 PROCEDURE — 1111F DSCHRG MED/CURRENT MED MERGE: CPT

## 2023-12-26 PROCEDURE — 80048 BASIC METABOLIC PNL TOTAL CA: CPT

## 2023-12-26 NOTE — CARE COORDINATION
Care Transitions Initial Follow Up Call    Call within 2 business days of discharge: Yes    Patient Current Location:  Home: [address] [place] [address]    Care Transition Nurse contacted the patient by telephone to perform post hospital discharge assessment. Verified name and  with patient as identifiers. Provided introduction to self, and explanation of the Care Transition Nurse role. Patient: Connie Arroyo Patient : 1955   MRN: <[id number]>  Reason for Admission:   Discharge Date: 23 RARS: Readmission Risk Score: 13.1      Last Discharge Facility       Date Complaint Diagnosis Description Type Department Provider    23 Diarrhea; Fatigue Primary hypertension . .. ED to Hosp-Admission (Discharged) (ADMITTED) BIBI Jaffe MD; [facility] . .. Was this an external facility discharge? No Discharge Facility: [address] [place] [address] to be reviewed by the provider   Additional needs identified to be addressed with provider: No  none               Method of communication with provider: none. Writer spoke to patient, he is doing well, denied any c/o fever, chills, n/v/d, sob, chest pain or s/s of bleeding, he had bm when he got home that had a small amount of blood noted but today no blood in his stool, patient has called pcp office waiting to hear back for a HFU appt, patient eating and drinking, explained role ofCTN, provided contact information, will follow//JU    Care Transition Nurse reviewed discharge instructions, medical action plan, and red flags with patient who verbalized understanding. The patient was given an opportunity to ask questions and does not have any further questions or concerns at this time. Were discharge instructions available to patient? Yes. Reviewed appropriate site of care based on symptoms and resources available to patient including: PCP  Specialist  Urgent care clinics  When to call 911.  The patient agrees to contact the PCP

## 2023-12-28 ENCOUNTER — OFFICE VISIT (OUTPATIENT)
Dept: FAMILY MEDICINE CLINIC | Age: 68
End: 2023-12-28

## 2023-12-28 VITALS
HEIGHT: 69 IN | HEART RATE: 73 BPM | SYSTOLIC BLOOD PRESSURE: 116 MMHG | WEIGHT: 220 LBS | OXYGEN SATURATION: 98 % | BODY MASS INDEX: 32.58 KG/M2 | DIASTOLIC BLOOD PRESSURE: 60 MMHG

## 2023-12-28 DIAGNOSIS — K26.9 DUODENAL ULCER: ICD-10-CM

## 2023-12-28 DIAGNOSIS — Z09 HOSPITAL DISCHARGE FOLLOW-UP: Primary | ICD-10-CM

## 2023-12-28 DIAGNOSIS — D64.9 ANEMIA REQUIRING TRANSFUSIONS: ICD-10-CM

## 2023-12-28 DIAGNOSIS — Z87.19 H/O: UGI BLEED: ICD-10-CM

## 2023-12-28 NOTE — PROGRESS NOTES
Post-Discharge Transitional Care Follow Up      Markus Whitt   YOB: 1955    Date of Office Visit:  12/28/2023  Date of Hospital Admission: 12/21/23  Date of Hospital Discharge: 12/24/23  Readmission Risk Score (high >=14%. Medium >=10%):Readmission Risk Score: 13.1      Care management risk score Rising risk (score 2-5) and Complex Care (Scores >=6): No Risk Score On File     Non face to face  following discharge, date last encounter closed (first attempt may have been earlier): 12/26/2023     Call initiated 2 business days of discharge: Yes     Hospital discharge follow-up  -     [place] DISCHARGE MEDS RECONCILED W/ CURRENT OUTPATIENT MED LIST  -     Basic Metabolic Panel; Future  H/O: UGI bleed  -     CBC with Auto Differential; Future  -     Basic Metabolic Panel; Future  Duodenal ulcer  Assessment & Plan:   At goal, continue current medications and continue current treatment plan will continue on Carafate, PPI treatment, will recheck CBC to ensure anemia is resolving.  Orders:  -     CBC with Auto Differential; Future  -     Basic Metabolic Panel; Future  Anemia requiring transfusions  -     CBC with Auto Differential; Future  -     Basic Metabolic Panel; Future    Medical Decision Making: moderate complexity  Return in about 3 months (around 3/28/2024), or if symptoms worsen or fail to improve.           Subjective:   HPI-was hospitalized for upper GI bleed.  Had dark black tarry stools post prostate surgery.  He states he had 1 stool, followed by several days without any.  When he got to the emergency department he was incontinent of very dark tarry stool.  Noticed a significant drop in hemoglobin, general surgery was consulted.  Was given packed red blood cells, anemia was slowly resolving, no further dark stools.  Endoscopy did show duodenal ulcer    Inpatient course: Discharge summary reviewed- see chart.    Interval history/Current status: Currently doing well, states he feels much

## 2024-01-03 ASSESSMENT — ENCOUNTER SYMPTOMS
ABDOMINAL PAIN: 0
CONSTIPATION: 0
SINUS PAIN: 0
COUGH: 0
SHORTNESS OF BREATH: 0
CHEST TIGHTNESS: 0
BACK PAIN: 0
WHEEZING: 0
SINUS PRESSURE: 0
COLOR CHANGE: 0
NAUSEA: 0
RHINORRHEA: 0
EYE ITCHING: 0
DIARRHEA: 0
EYE DISCHARGE: 0

## 2024-01-03 NOTE — ASSESSMENT & PLAN NOTE
At goal, continue current medications and continue current treatment plan will continue on Carafate, PPI treatment, will recheck CBC to ensure anemia is resolving.

## 2024-01-04 ENCOUNTER — HOSPITAL ENCOUNTER (OUTPATIENT)
Age: 69
Discharge: HOME | End: 2024-01-04
Payer: MEDICARE

## 2024-01-04 DIAGNOSIS — K26.9 DUODENAL ULCER: ICD-10-CM

## 2024-01-04 DIAGNOSIS — Z09 HOSPITAL DISCHARGE FOLLOW-UP: ICD-10-CM

## 2024-01-04 DIAGNOSIS — D64.9 ANEMIA REQUIRING TRANSFUSIONS: ICD-10-CM

## 2024-01-04 DIAGNOSIS — Z87.19 H/O: UGI BLEED: ICD-10-CM

## 2024-01-04 LAB
ANION GAP SERPL CALCULATED.3IONS-SCNC: 7 MMOL/L (ref 9–17)
BASOPHILS # BLD: 0.1 K/UL (ref 0–0.2)
BASOPHILS NFR BLD: 2 % (ref 0–2)
BUN SERPL-MCNC: 12 MG/DL (ref 8–23)
BUN/CREAT SERPL: 15 (ref 9–20)
CALCIUM SERPL-MCNC: 8.7 MG/DL (ref 8.6–10.4)
CHLORIDE SERPL-SCNC: 107 MMOL/L (ref 98–107)
CO2 SERPL-SCNC: 27 MMOL/L (ref 20–31)
CREAT SERPL-MCNC: 0.8 MG/DL (ref 0.7–1.2)
EOSINOPHIL # BLD: 0.21 K/UL (ref 0–0.44)
EOSINOPHILS RELATIVE PERCENT: 3 % (ref 1–4)
ERYTHROCYTE [DISTWIDTH] IN BLOOD BY AUTOMATED COUNT: 15.2 % (ref 11.8–14.4)
GFR SERPL CREATININE-BSD FRML MDRD: >60 ML/MIN/1.73M2
GLUCOSE SERPL-MCNC: 97 MG/DL (ref 70–99)
HCT VFR BLD AUTO: 32.3 % (ref 40.7–50.3)
HGB BLD-MCNC: 10.2 G/DL (ref 13–17)
IMM GRANULOCYTES # BLD AUTO: 0.08 K/UL (ref 0–0.3)
IMM GRANULOCYTES NFR BLD: 1 %
LYMPHOCYTES NFR BLD: 2.26 K/UL (ref 1.1–3.7)
LYMPHOCYTES RELATIVE PERCENT: 37 % (ref 24–43)
MCH RBC QN AUTO: 28 PG (ref 25.2–33.5)
MCHC RBC AUTO-ENTMCNC: 31.6 G/DL (ref 25.2–33.5)
MCV RBC AUTO: 88.7 FL (ref 82.6–102.9)
MONOCYTES NFR BLD: 0.5 K/UL (ref 0.1–1.2)
MONOCYTES NFR BLD: 8 % (ref 3–12)
NEUTROPHILS NFR BLD: 49 % (ref 36–65)
NEUTS SEG NFR BLD: 3.04 K/UL (ref 1.5–8.1)
NRBC BLD-RTO: 0 PER 100 WBC
PLATELET # BLD AUTO: 334 K/UL (ref 138–453)
PMV BLD AUTO: 10 FL (ref 8.1–13.5)
POTASSIUM SERPL-SCNC: 4.3 MMOL/L (ref 3.7–5.3)
RBC # BLD AUTO: 3.64 M/UL (ref 4.21–5.77)
RBC # BLD: ABNORMAL 10*6/UL
SODIUM SERPL-SCNC: 141 MMOL/L (ref 135–144)
WBC OTHER # BLD: 6.2 K/UL (ref 3.5–11.3)

## 2024-01-04 PROCEDURE — 80048 BASIC METABOLIC PNL TOTAL CA: CPT

## 2024-01-04 PROCEDURE — 85025 COMPLETE CBC W/AUTO DIFF WBC: CPT

## 2024-01-04 PROCEDURE — 36415 COLL VENOUS BLD VENIPUNCTURE: CPT

## 2024-01-05 ENCOUNTER — CARE COORDINATION (OUTPATIENT)
Dept: CASE MANAGEMENT | Age: 69
End: 2024-01-05

## 2024-01-05 NOTE — CARE COORDINATION
Interventions  Other Interventions:             Care Transition Nurse provided contact information for future needs. Plan for follow-up call in 5-7 days based on severity of symptoms and risk factors.  Plan for next call: symptom management-monitor for s/s of infections, bleeding  follow-up appointment-f/u on urology and cardiology appts    Zonia Grayson RN

## 2024-01-08 ENCOUNTER — OFFICE VISIT (OUTPATIENT)
Dept: UROLOGY | Age: 69
End: 2024-01-08
Payer: MEDICARE

## 2024-01-08 VITALS
HEART RATE: 83 BPM | DIASTOLIC BLOOD PRESSURE: 62 MMHG | SYSTOLIC BLOOD PRESSURE: 122 MMHG | OXYGEN SATURATION: 98 % | HEIGHT: 69 IN | BODY MASS INDEX: 32.29 KG/M2 | WEIGHT: 218 LBS

## 2024-01-08 DIAGNOSIS — N40.1 BENIGN LOCALIZED PROSTATIC HYPERPLASIA WITH LOWER URINARY TRACT SYMPTOMS (LUTS): Primary | ICD-10-CM

## 2024-01-08 DIAGNOSIS — C67.9 UROTHELIAL CARCINOMA OF BLADDER (HCC): ICD-10-CM

## 2024-01-08 PROCEDURE — 99214 OFFICE O/P EST MOD 30 MIN: CPT | Performed by: UROLOGY

## 2024-01-08 PROCEDURE — 3074F SYST BP LT 130 MM HG: CPT | Performed by: UROLOGY

## 2024-01-08 PROCEDURE — 1036F TOBACCO NON-USER: CPT | Performed by: UROLOGY

## 2024-01-08 PROCEDURE — G8484 FLU IMMUNIZE NO ADMIN: HCPCS | Performed by: UROLOGY

## 2024-01-08 PROCEDURE — 1123F ACP DISCUSS/DSCN MKR DOCD: CPT | Performed by: UROLOGY

## 2024-01-08 PROCEDURE — 3078F DIAST BP <80 MM HG: CPT | Performed by: UROLOGY

## 2024-01-08 PROCEDURE — G8417 CALC BMI ABV UP PARAM F/U: HCPCS | Performed by: UROLOGY

## 2024-01-08 PROCEDURE — 3017F COLORECTAL CA SCREEN DOC REV: CPT | Performed by: UROLOGY

## 2024-01-08 PROCEDURE — G8427 DOCREV CUR MEDS BY ELIG CLIN: HCPCS | Performed by: UROLOGY

## 2024-01-08 PROCEDURE — 1111F DSCHRG MED/CURRENT MED MERGE: CPT | Performed by: UROLOGY

## 2024-01-08 NOTE — PROGRESS NOTES
f      MELITON PEREZ MD        [facility]  MDCX UROLOGY  [address] [facility] [address]  Dept: [phone]  Dept Fax: [phone]  Loc: [phone]      [facility] Urology Office Note - Follow up Patient    Patient:  Markus Whitt  YOB: 1955  Date: 1/8/2024    The patient is a 68 y.o. male who presents today for evaluation of the following problems:   Chief Complaint   Patient presents with    Bladder Cancer     Post TURBT     referred/consultation requested by Pilo Doss APRN - CNP.    HISTORY OF PRESENT ILLNESS:     Bladder cancer- hx of low grade noninvasive. Had two small tumors on dome recently resected with same path.    BPH- stable LUTS. Cont flomax      Requested/reviewed records from Pilo Doss APRN - CNP office and/or outside physician/EMR    (Patient's old records have been requested, reviewed and pertinent findings summarized in today's note.)    Procedures Today: N/A      Last several PSA's:  Lab Results   Component Value Date    PSA 0.38 11/03/2022    PSA 0.42 07/19/2022    PSA 0.28 10/11/2021       Last total testosterone:  No results found for: \"TESTOSTERONE\"    Urinalysis today:  No results found for this visit on 01/08/24.    Last BUN and creatinine:  Lab Results   Component Value Date    BUN 12 01/04/2024     Lab Results   Component Value Date    CREATININE 0.8 01/04/2024       Additional Lab/Culture results: none    Imaging Reviewed during this Office Visit:   MELITON PEREZ MD independently reviewed the images and verified the radiology reports from:    No results found.    PAST MEDICAL, FAMILY AND SOCIAL HISTORY:  Past Medical History:   Diagnosis Date    Abnormal cardiovascular stress test 12/09/2020    Large inferolateral infarction with significant vernon-infarct ischemia.    CAD (coronary artery disease)     CABG-2 vessel, followed by stents x 4    Cervical disc disease     Clostridium difficile diarrhea 10/2014    hospitalized in

## 2024-01-09 ENCOUNTER — TELEPHONE (OUTPATIENT)
Dept: ONCOLOGY | Age: 69
End: 2024-01-09

## 2024-01-09 NOTE — TELEPHONE ENCOUNTER
Name: Markus Whitt  : 1955  MRN: [id number]    Oncology Navigation Follow-Up Note    Contact Type:   chart review    Notes:   Writer received referral based on 23 pathology report.   Reviewing chart. Patient saw Dr. Graff 24.   Plan: surveillance.  ONN will not follow patient.      Electronically signed by Isabel Lopez RN on 2024 at 3:41 PM

## 2024-01-11 ENCOUNTER — OFFICE VISIT (OUTPATIENT)
Dept: CARDIOLOGY | Age: 69
End: 2024-01-11
Payer: MEDICARE

## 2024-01-11 VITALS
BODY MASS INDEX: 32.61 KG/M2 | OXYGEN SATURATION: 96 % | RESPIRATION RATE: 18 BRPM | HEIGHT: 69 IN | DIASTOLIC BLOOD PRESSURE: 68 MMHG | WEIGHT: 220.2 LBS | HEART RATE: 78 BPM | SYSTOLIC BLOOD PRESSURE: 116 MMHG

## 2024-01-11 DIAGNOSIS — I25.10 CORONARY ARTERY DISEASE INVOLVING NATIVE CORONARY ARTERY OF NATIVE HEART WITHOUT ANGINA PECTORIS: Primary | ICD-10-CM

## 2024-01-11 PROCEDURE — 1036F TOBACCO NON-USER: CPT | Performed by: INTERNAL MEDICINE

## 2024-01-11 PROCEDURE — 1111F DSCHRG MED/CURRENT MED MERGE: CPT | Performed by: INTERNAL MEDICINE

## 2024-01-11 PROCEDURE — 99214 OFFICE O/P EST MOD 30 MIN: CPT | Performed by: INTERNAL MEDICINE

## 2024-01-11 PROCEDURE — G8417 CALC BMI ABV UP PARAM F/U: HCPCS | Performed by: INTERNAL MEDICINE

## 2024-01-11 PROCEDURE — 3074F SYST BP LT 130 MM HG: CPT | Performed by: INTERNAL MEDICINE

## 2024-01-11 PROCEDURE — 3017F COLORECTAL CA SCREEN DOC REV: CPT | Performed by: INTERNAL MEDICINE

## 2024-01-11 PROCEDURE — 1123F ACP DISCUSS/DSCN MKR DOCD: CPT | Performed by: INTERNAL MEDICINE

## 2024-01-11 PROCEDURE — G8484 FLU IMMUNIZE NO ADMIN: HCPCS | Performed by: INTERNAL MEDICINE

## 2024-01-11 PROCEDURE — G8427 DOCREV CUR MEDS BY ELIG CLIN: HCPCS | Performed by: INTERNAL MEDICINE

## 2024-01-11 PROCEDURE — 3078F DIAST BP <80 MM HG: CPT | Performed by: INTERNAL MEDICINE

## 2024-01-11 PROCEDURE — 99212 OFFICE O/P EST SF 10 MIN: CPT | Performed by: INTERNAL MEDICINE

## 2024-01-11 NOTE — PROGRESS NOTES
di                       Today's Date: 1/11/2024  Patient Name: Markus Whitt  Patient's age: 68 y.o., 1955         CC: the patient is a 68 y.o.  male is in the office for CAD.    He was recently admitted to hospital with upper GI bleed.  Required 2 units of PRBCs.  Underwent EGD which showed 2 nonbleeding moderate size duodenal ulcers.  Started on PPI therapy.  Plan for repeat EGD per general surgery in 3 months.  He is off aspirin and Plavix since December.  He is fairly stable from cardiac standpoint otherwise.  Denies any episode of chest pain or angina.  Did report dyspnea on exertion along with palpitations likely related to severe anemia which is now improved.  No significant lower extremity edema or orthopnea      Past Medical History:   has a past medical history of Abnormal cardiovascular stress test, CAD (coronary artery disease), Cervical disc disease, Clostridium difficile diarrhea, Displacement of cervical intervertebral disc without myelopathy, HTN (hypertension), Hyperlipidemia, Impaired fasting blood sugar, Neck pain, Neuralgia, neuritis, and radiculitis, unspecified, Shoulder region pain, Sprain and strain of unspecified site of shoulder and upper arm, and Wears glasses.    Past Surgical History:   has a past surgical history that includes Pain management procedure (Left, 8/12/14, 1/09/15); Pain management procedure (Left, 09/23/2014); Pain management procedure (Left, 7/17/2015 , 1/22/16); Shoulder arthroscopy (Right, 04/27/2016); Biceps Tenodesis (Right, 11/23/2016); pr coronary artery byp w/vein & artery graft 1 vein (N/A, 07/06/2018); Rotator cuff repair (Left, 01/29/2019); Colonoscopy (N/A, 12/10/2019); Umbilical hernia repair (N/A, 12/18/2019); Cystoscopy (N/A, 03/09/2020); ventral hernia repair (N/A, 11/19/2020); Coronary angioplasty with stent (12/18/2020); Coronary angioplasty with stent (01/22/2021); transesophageal echocardiogram (11/15/2020); CT NEEDLE BIOPSY LUNG

## 2024-01-12 ENCOUNTER — CARE COORDINATION (OUTPATIENT)
Dept: CASE MANAGEMENT | Age: 69
End: 2024-01-12

## 2024-01-12 NOTE — CARE COORDINATION
Care Transitions Follow Up Call    Patient Current Location:  Home: [address]  [place] [address]    Care Transition Nurse contacted the patient by telephone to follow up after admission on 24.  Verified name and  with patient as identifiers.    Patient: Markus Whitt  Patient : 1955   MRN: <[id number]>  Reason for Admission:   Discharge Date: 23 RARS: Readmission Risk Score: 13.1      Needs to be reviewed by the provider   Additional needs identified to be addressed with provider: No  none             Method of communication with provider: none.    Writer spoke to patient, he is doing ok, had f/u with urology and cardiology, stated cardiologist has some concerns that he isn't on a blood thinner at this time,  cardiologist is going to f/u with Dr. Lara, denied any c/o bleeding, dizziness, sob , fever, chills, n/v/d, or chest pain no weakness but is still feeling fatigued at times, no new needs at this time, will continue to follow//JU    Addressed changes since last contact:  none  Discussed follow-up appointments. If no appointment was previously scheduled, appointment scheduling offered: Yes.   Is follow up appointment scheduled within 7 days of discharge? Yes.    Follow Up  Future Appointments   Date Time Provider Department Center   3/28/2024 10:15 AM Pilo Doss APRN - CNP [facility]   2024 10:00 AM [facility]   2024 10:20 AM Rajeev Graff MD DURO [facility]   2024 11:00 AM GEURRERO CT ROOM MD CT SCAN STV [place]   2024  1:45 PM Zoran Cabrera MD DPULM [facility]   2024 10:30 AM Jarod Baker MD DCARDIO [facility]   2024  9:30 AM [facility]        Care Transitions Subsequent and Final Call    Schedule Follow Up Appointment with PCP: Completed  Subsequent and Final Calls  Do you have any ongoing symptoms?: No  Have your medications changed?: No  Do you have any questions related to your medications?:

## 2024-01-18 ENCOUNTER — CARE COORDINATION (OUTPATIENT)
Dept: CASE MANAGEMENT | Age: 69
End: 2024-01-18

## 2024-01-18 NOTE — CARE COORDINATION
Care Transitions Follow Up Call    Patient Current Location:  Home: [address]  [place] [id number]    Care Transition Nurse contacted the patient by telephone to follow up after admission on 1/15/24.  Verified name and  with patient as identifiers.    Patient: Markus Whitt  Patient : 1955   MRN: <[id number]>  Reason for Admission:   Discharge Date: 23 RARS: Readmission Risk Score: 13.1      Needs to be reviewed by the provider   Additional needs identified to be addressed with provider: No  none             Method of communication with provider: none.    Writer spoke to patient, he is doing well, no new needs at this time, has procedure scheduled for 24, has had same procedure in past, denied any c/o fever, chills, n/v/d, sob or chest pain ta time of call, will continue to follow//JU    Addressed changes since last contact:  none  Discussed follow-up appointments. If no appointment was previously scheduled, appointment scheduling offered: Yes.   Is follow up appointment scheduled within 7 days of discharge? Yes.    Follow Up  Future Appointments   Date Time Provider Department Center   3/28/2024 10:15 AM [facility]   2024 10:00 AM [facility]   2024 10:20 AM Rajeev Graff MD DURO [facility]   2024 11:00 AM [facility] CT ROOM MD CT SCAN STV [place]   2024  1:45 PM Zoran Cabrera MD DPULM [facility]   2024 10:30 AM Jarod Baker MD DCARDIO [facility]   2024  9:30 AM [facility]        Care Transitions Subsequent and Final Call    Subsequent and Final Calls  Do you have any ongoing symptoms?: No  Have your medications changed?: Yes  Patient Reports: patient already contacted pcp office in regards to pota chloride  Do you have any questions related to your medications?: No  Do you currently have any active services?: No  Do you have any needs or concerns that I can assist you with?: No  Care

## 2024-01-22 ENCOUNTER — TELEPHONE (OUTPATIENT)
Dept: SURGERY | Age: 69
End: 2024-01-22

## 2024-01-22 NOTE — TELEPHONE ENCOUNTER
Letter created and mailed to patient with results from recent EGD at [facility] with Dr. Lara on 12/21/2023. Updated history and recall. Forwarded results to PCP.

## 2024-01-25 ENCOUNTER — CARE COORDINATION (OUTPATIENT)
Dept: CASE MANAGEMENT | Age: 69
End: 2024-01-25

## 2024-01-25 NOTE — CARE COORDINATION
Care Transitions Outreach Attempt #1      Attempt #1 to reach patient for transitions of care follow up. Unable to reach patient. Left  requesting call back.    Patient: Markus Whitt Patient : 1955 MRN: [id number]    Last Discharge Facility       Date Complaint Diagnosis Description Type Department Provider    23 Diarrhea; Fatigue Primary hypertension ... ED to Hosp-Admission (Discharged) (ADMITTED) Jered Mortensen MD; Duarte Chin ...              Noted following upcoming appointments from discharge chart review:   [facility] follow up appointment(s):   Future Appointments   Date Time Provider Department Center   3/28/2024 10:15 AM Pilo Doss APRN - CNP [facility]   2024 10:00 AM [facility]   2024 10:20 AM Rajeev Graff MD D[facility]   2024 11:00 AM [facility] CT ROOM BIBI CT SCAN STV [place]   2024  1:45 PM Zoran Cabrera MD DPULM [facility]   2024 10:30 AM Jarod Baker MD DCARDIO [facility]   2024  9:30 AM [facility]     Plan: Final call. Did he restart blood thinners? Any concerns?    Rosita Diaz RN BSN [facility]  Care Transitions Nurse  [phone]   [email or web address]

## 2024-01-26 ENCOUNTER — TELEPHONE (OUTPATIENT)
Dept: SURGERY | Age: 69
End: 2024-01-26

## 2024-01-26 ENCOUNTER — CARE COORDINATION (OUTPATIENT)
Dept: CASE MANAGEMENT | Age: 69
End: 2024-01-26

## 2024-01-26 NOTE — CARE COORDINATION
Care Transitions Follow Up Call    Patient Current Location:  Home: [address]  [place] [address]    [facility] Care Coordinator contacted the patient by telephone to follow up after admission on 2024.  Verified name and  with patient as identifiers.    Patient: Markus Whitt  Patient : 1955   MRN: [id number]  Reason for Admission: GI bleed  Discharge Date: 23 RARS: Readmission Risk Score: 13.1      Needs to be reviewed by the provider   Additional needs identified to be addressed with provider: No  none             Method of communication with provider: none.    Writer spoke with Deni for his final care transitions call. He states he is doing okay, denies any S/S of GI bleeding. He is calling today to get his repeat endoscopy scheduled and states he is not taking blood thinners at this time. No further questions or concerns at this time.    Addressed changes since last contact:  none  Discussed follow-up appointments. If no appointment was previously scheduled, appointment scheduling offered: No.   Is follow up appointment scheduled within 7 days of discharge? Yes.    Follow Up  Future Appointments   Date Time Provider Department Center   3/28/2024 10:15 AM Pilo Doss APRN - CNP [facility]   2024 10:00 AM Pilo Doss APRN - CNP [facility]   2024 10:20 AM Rajeev Graff MD [facility]   2024 11:00 AM [facility] CT ROOM MD CT SCAN [facility] [place]   2024  1:45 PM Zoran Cabrera MD DPULM [facility]   2024 10:30 AM Jarod Baker MD [facility]   2024  9:30 AM [facility]     External follow up appointment(s): N/A    [facility] Care Coordinator reviewed red flags with patient and discussed any barriers to care and/or understanding of plan of care after discharge. Discussed appropriate site of care based on symptoms and resources available to patient including: PCP  Specialist  [employer]. The patient agrees to contact the PCP office for

## 2024-01-29 RX ORDER — SUCRALFATE 1 G/1
1 TABLET ORAL 3 TIMES DAILY
Qty: 120 TABLET | Refills: 0 | Status: SHIPPED | OUTPATIENT
Start: 2024-01-29

## 2024-01-29 NOTE — TELEPHONE ENCOUNTER
Requested Prescriptions     Pending Prescriptions Disp Refills    sucralfate (CARAFATE) 1 GM tablet 120 tablet 0     Sig: Take 1 tablet by mouth 3 times daily

## 2024-01-30 ENCOUNTER — TELEPHONE (OUTPATIENT)
Dept: SURGERY | Age: 69
End: 2024-01-30

## 2024-02-06 ENCOUNTER — APPOINTMENT (OUTPATIENT)
Dept: GENERAL RADIOLOGY | Age: 69
End: 2024-02-06
Attending: PHYSICAL MEDICINE & REHABILITATION
Payer: MEDICARE

## 2024-02-06 ENCOUNTER — HOSPITAL ENCOUNTER (OUTPATIENT)
Age: 69
Setting detail: OUTPATIENT SURGERY
Discharge: HOME | End: 2024-02-06
Attending: PHYSICAL MEDICINE & REHABILITATION | Admitting: PHYSICAL MEDICINE & REHABILITATION
Payer: MEDICARE

## 2024-02-06 ENCOUNTER — ANESTHESIA (OUTPATIENT)
Dept: OPERATING ROOM | Age: 69
End: 2024-02-06
Payer: MEDICARE

## 2024-02-06 ENCOUNTER — ANESTHESIA EVENT (OUTPATIENT)
Dept: OPERATING ROOM | Age: 69
End: 2024-02-06
Payer: MEDICARE

## 2024-02-06 VITALS
OXYGEN SATURATION: 97 % | BODY MASS INDEX: 32.34 KG/M2 | DIASTOLIC BLOOD PRESSURE: 66 MMHG | TEMPERATURE: 97 F | HEART RATE: 61 BPM | WEIGHT: 219 LBS | SYSTOLIC BLOOD PRESSURE: 116 MMHG | RESPIRATION RATE: 16 BRPM

## 2024-02-06 PROBLEM — M47.812 CERVICAL SPONDYLOSIS: Status: ACTIVE | Noted: 2024-02-06

## 2024-02-06 PROCEDURE — 3700000001 HC ADD 15 MINUTES (ANESTHESIA): Performed by: PHYSICAL MEDICINE & REHABILITATION

## 2024-02-06 PROCEDURE — 3700000000 HC ANESTHESIA ATTENDED CARE: Performed by: PHYSICAL MEDICINE & REHABILITATION

## 2024-02-06 PROCEDURE — 64634 DESTROY C/TH FACET JNT ADDL: CPT | Performed by: PHYSICAL MEDICINE & REHABILITATION

## 2024-02-06 PROCEDURE — 2500000003 HC RX 250 WO HCPCS: Performed by: PHYSICAL MEDICINE & REHABILITATION

## 2024-02-06 PROCEDURE — 2709999900 HC NON-CHARGEABLE SUPPLY: Performed by: PHYSICAL MEDICINE & REHABILITATION

## 2024-02-06 PROCEDURE — 6360000002 HC RX W HCPCS: Performed by: NURSE ANESTHETIST, CERTIFIED REGISTERED

## 2024-02-06 PROCEDURE — 7100000011 HC PHASE II RECOVERY - ADDTL 15 MIN: Performed by: PHYSICAL MEDICINE & REHABILITATION

## 2024-02-06 PROCEDURE — 3600000056 HC PAIN LEVEL 4 BASE: Performed by: PHYSICAL MEDICINE & REHABILITATION

## 2024-02-06 PROCEDURE — 6360000002 HC RX W HCPCS: Performed by: PHYSICAL MEDICINE & REHABILITATION

## 2024-02-06 PROCEDURE — 3600000057 HC PAIN LEVEL 4 ADDL 15 MIN: Performed by: PHYSICAL MEDICINE & REHABILITATION

## 2024-02-06 PROCEDURE — 7100000010 HC PHASE II RECOVERY - FIRST 15 MIN: Performed by: PHYSICAL MEDICINE & REHABILITATION

## 2024-02-06 PROCEDURE — 2580000003 HC RX 258: Performed by: NURSE ANESTHETIST, CERTIFIED REGISTERED

## 2024-02-06 PROCEDURE — 64633 DESTROY CERV/THOR FACET JNT: CPT | Performed by: PHYSICAL MEDICINE & REHABILITATION

## 2024-02-06 RX ORDER — DEXAMETHASONE SODIUM PHOSPHATE 10 MG/ML
INJECTION INTRAMUSCULAR; INTRAVENOUS PRN
Status: DISCONTINUED | OUTPATIENT
Start: 2024-02-06 | End: 2024-02-06 | Stop reason: ALTCHOICE

## 2024-02-06 RX ORDER — SODIUM CHLORIDE, SODIUM LACTATE, POTASSIUM CHLORIDE, CALCIUM CHLORIDE 600; 310; 30; 20 MG/100ML; MG/100ML; MG/100ML; MG/100ML
INJECTION, SOLUTION INTRAVENOUS CONTINUOUS
Status: DISCONTINUED | OUTPATIENT
Start: 2024-02-06 | End: 2024-02-06 | Stop reason: HOSPADM

## 2024-02-06 RX ORDER — SODIUM CHLORIDE 9 MG/ML
INJECTION, SOLUTION INTRAVENOUS PRN
Status: DISCONTINUED | OUTPATIENT
Start: 2024-02-06 | End: 2024-02-06 | Stop reason: HOSPADM

## 2024-02-06 RX ORDER — SODIUM CHLORIDE 0.9 % (FLUSH) 0.9 %
5-40 SYRINGE (ML) INJECTION EVERY 12 HOURS SCHEDULED
Status: DISCONTINUED | OUTPATIENT
Start: 2024-02-06 | End: 2024-02-06 | Stop reason: HOSPADM

## 2024-02-06 RX ORDER — PROPOFOL 10 MG/ML
INJECTION, EMULSION INTRAVENOUS PRN
Status: DISCONTINUED | OUTPATIENT
Start: 2024-02-06 | End: 2024-02-06 | Stop reason: SDUPTHER

## 2024-02-06 RX ORDER — BUPIVACAINE HYDROCHLORIDE 5 MG/ML
INJECTION, SOLUTION EPIDURAL; INTRACAUDAL PRN
Status: DISCONTINUED | OUTPATIENT
Start: 2024-02-06 | End: 2024-02-06 | Stop reason: ALTCHOICE

## 2024-02-06 RX ORDER — LIDOCAINE HYDROCHLORIDE 40 MG/ML
INJECTION, SOLUTION RETROBULBAR; TOPICAL PRN
Status: DISCONTINUED | OUTPATIENT
Start: 2024-02-06 | End: 2024-02-06 | Stop reason: ALTCHOICE

## 2024-02-06 RX ORDER — SODIUM CHLORIDE 0.9 % (FLUSH) 0.9 %
5-40 SYRINGE (ML) INJECTION PRN
Status: DISCONTINUED | OUTPATIENT
Start: 2024-02-06 | End: 2024-02-06 | Stop reason: HOSPADM

## 2024-02-06 RX ORDER — SODIUM CHLORIDE, SODIUM LACTATE, POTASSIUM CHLORIDE, CALCIUM CHLORIDE 600; 310; 30; 20 MG/100ML; MG/100ML; MG/100ML; MG/100ML
INJECTION, SOLUTION INTRAVENOUS CONTINUOUS PRN
Status: DISCONTINUED | OUTPATIENT
Start: 2024-02-06 | End: 2024-02-06 | Stop reason: SDUPTHER

## 2024-02-06 RX ADMIN — SODIUM CHLORIDE, POTASSIUM CHLORIDE, SODIUM LACTATE AND CALCIUM CHLORIDE: 600; 310; 30; 20 INJECTION, SOLUTION INTRAVENOUS at 12:41

## 2024-02-06 RX ADMIN — PROPOFOL 150 MCG/KG/MIN: 10 INJECTION, EMULSION INTRAVENOUS at 12:45

## 2024-02-06 RX ADMIN — PROPOFOL 200 MG: 10 INJECTION, EMULSION INTRAVENOUS at 12:44

## 2024-02-06 ASSESSMENT — PAIN DESCRIPTION - LOCATION: LOCATION: NECK

## 2024-02-06 ASSESSMENT — PAIN DESCRIPTION - FREQUENCY: FREQUENCY: INTERMITTENT

## 2024-02-06 ASSESSMENT — ENCOUNTER SYMPTOMS
ALLERGIC/IMMUNOLOGIC NEGATIVE: 1
EYES NEGATIVE: 1
BACK PAIN: 1
NAUSEA: 0
CONSTIPATION: 0
RESPIRATORY NEGATIVE: 1

## 2024-02-06 ASSESSMENT — PAIN DESCRIPTION - DESCRIPTORS
DESCRIPTORS: SHARP
DESCRIPTORS: SHARP;GNAWING

## 2024-02-06 ASSESSMENT — PAIN DESCRIPTION - ORIENTATION: ORIENTATION: RIGHT

## 2024-02-06 ASSESSMENT — PAIN DESCRIPTION - PAIN TYPE: TYPE: SURGICAL PAIN

## 2024-02-06 ASSESSMENT — PAIN - FUNCTIONAL ASSESSMENT
PAIN_FUNCTIONAL_ASSESSMENT: PREVENTS OR INTERFERES SOME ACTIVE ACTIVITIES AND ADLS
PAIN_FUNCTIONAL_ASSESSMENT: 0-10

## 2024-02-06 ASSESSMENT — PAIN SCALES - GENERAL
PAINLEVEL_OUTOF10: 0
PAINLEVEL_OUTOF10: 0
PAINLEVEL_OUTOF10: 5
PAINLEVEL_OUTOF10: 0

## 2024-02-06 NOTE — DISCHARGE INSTRUCTIONS
Home Care after Radiofrequency Ablation    The doctor has done a radiofrequency procedure to disrupt the nerve’s ability to cause pain.You may feel sore at the injection site for the next 2-4 days. You may apply ice to the site for 20 minutes on and 20 minutes off to decrease pain and discomfort, if needed, for the first 24 hours. After 24 hours, you may use heat if needed.    Do not expect your pain to subside right away. Limit your activities for the first few days to those that you can do without pain. It may take up to 8 weeks before you have pain relief. Take your pain medicines as prescribed.    You may have some weakness for the next 3-5 hours. Plan to take it easy. No baths or soaking of the sites for one week. Do not shower for 48 hours.    You may resume taking your routine medicines after the procedure including pain medicines as prescribed. Resume any medications held for the procedure (blood thinners, aspirin, anti-inflammatories).    If you do not already have a follow-up appointment, call your referring doctor’s office to make one to discuss your results within 2-4 weeks after the treatment. Your doctor will have the report within 7-10 days.    You will be given a pain log to complete for the next 14 days. Complete this form and make a copy for your own records. Then, mail it back to us or drop it off at the pain management clinic. We will need this information to decide the next step in your treatment plan.    Signs of infection  Fever greater than 100.4°F by mouth for 2 readings taken 4 hours apart  Increased redness, swelling around the site  Any drainage from the site    If you have any new symptoms or any signs of infection, please call [phone] during business hours to notify us. You can also notify your primary care physician.After hours, nights and weekends, call [phone].

## 2024-02-06 NOTE — ANESTHESIA POSTPROCEDURE EVALUATION
Department of Anesthesiology  Postprocedure Note    Patient: Markus Whitt  MRN: [id number]  YOB: 1955  Date of evaluation: 2/6/2024    Procedure Summary       Date: 02/06/24 Room / Location: [address]    Anesthesia Start: 1241 Anesthesia Stop: 1302    Procedure: *Right Cervical 5-Cervical 6 Cervical 6-Cervical 7 Radiofrequency Ablation (Right: Spine Cervical) Diagnosis:       Cervical facet joint syndrome      (Cervical facet joint syndrome [M47.812])    Surgeons: Dwain Allen MD Responsible Provider: Keven Lynn APRN - CRNA    Anesthesia Type: General, TIVA ASA Status: 3            Anesthesia Type: General, TIVA    Jazlyn Phase I: Jazlyn Score: 10    Jazlyn Phase II:      Anesthesia Post Evaluation    Patient location during evaluation: bedside  Level of consciousness: sleepy but conscious  Airway patency: patent  Nausea & Vomiting: no nausea and no vomiting  Cardiovascular status: hemodynamically stable  Respiratory status: spontaneous ventilation  Hydration status: euvolemic  Pain management: satisfactory to patient    No notable events documented.

## 2024-02-06 NOTE — OP NOTE
RADIOFREQUENCY ABLATION OPERATIVE REPORT    2/6/24    Surgeon: Dwain Allen MD    Pre-operative Diagnosis: Principal Problem:    Cervical facet joint syndrome  Active Problems:    Cervical spondylosis  Resolved Problems:    * No resolved hospital problems. *      Post-operative Diagnosis: Same    INDICATION:  Right C5-66-7- radiofrequency neurotomy procedure is requested for therapeutic reasons. Please see H&P for details on previous treatments, examination findings, and work up. right R C5-66--7 medial branch blocks are requested for diagnostic reasons.    Conservative treatment was ineffective i.e.: ice, NSAIDS, rest,     Patient is unable to perform the following ADL's: ambulating, grooming, sitting, and standing     Pain Assessment: 0-10  Pain Level: 8     Pain Orientation: Right  Pain Location: Shoulder, Neck  Pain Descriptors: Sharp, Gnawing    Last Plain films: -    EXAMINATION:  Right C5-6-6-7 radiofrequency neurotomies.     CONSENT:  Written consent was obtained from the patient on the preprinted consent form after explaining the procedure, indications, potential complications and outcomes.  Alternative treatments were also discussed.    DISCUSSION:  The patient was sterilely prepped and draped in the usual fashion in the prone position.  “Time out” was verified for the correct patient, side, level and procedure.    SEDATION: per crna.    FACET RF  Under image-intensifier control, a 1850  mm Radiation Monitoring Devices RFK insulated radiofrequency probe with 5 mm active tips were successfully directed at the Right C5-6-6-7 medial branches of the dorsal rami at the target points described by EMILIA. Needle tip positions were confirmed in 3 views and sensory and motor test stimulation was performed.  The patient reported sensory stimulation at 0.0 to 0.0 volts at 50 Hz and motor stimulation at 0.0 to 0.9 volts at 2 Hz, which confirmed satisfactory sensory motor dissociation. 1 ml of 2% lidocaine was instilled  at each site

## 2024-02-06 NOTE — ANESTHESIA PRE PROCEDURE
Department of Anesthesiology  Preprocedure Note       Name:  Markus Whitt   Age:  68 y.o.  :  1955                                          MRN:  [id number]         Date:  2024      Surgeon: Surgeon(s):  Dwain Allen MD    Procedure: Procedure(s):  *Right Cervical 5-Cervical 6 Cervical 6-Cervical 7 Radiofrequency Ablation    Medications prior to admission:   Prior to Admission medications    Medication Sig Start Date End Date Taking? Authorizing Provider   sucralfate (CARAFATE) 1 GM tablet Take 1 tablet by mouth 3 times daily 24   Piol Doss APRN - CNP   potassium chloride (KLOR-CON M) 10 MEQ extended release tablet Take 1 tablet by mouth daily 24   Pilo Doss APRN - CNP   metoprolol tartrate (LOPRESSOR) 25 MG tablet TAKE 1/2 (ONE-HALF) TABLET BY MOUTH IN THE MORNING AND 1/2 (ONE-HALF) TABLET AT BEDTIME 23   Shauna Briseno MD   omeprazole (PRILOSEC) 20 MG delayed release capsule Take 1 capsule by mouth 2 times daily (before meals) 23   Trung Huerta MD   famotidine (PEPCID) 20 MG tablet Take 1 tablet by mouth nightly 23   Trung Huerta MD   Simethicone (GAS-X PO) Take 1 tablet by mouth    ProviderShauna MD   furosemide (LASIX) 20 MG tablet Take 1 tablet by mouth once daily 23   Faisal Copeland MD   gabapentin (NEURONTIN) 600 MG tablet Take 1 tablet by mouth 3 times daily for 30 days. 23  Dwain Allen MD   albuterol sulfate HFA (PROVENTIL;VENTOLIN;PROAIR) 108 (90 Base) MCG/ACT inhaler Inhale 2 puffs into the lungs every 6 hours as needed for Wheezing 23  Zoran Cabrera MD   atorvastatin (LIPITOR) 40 MG tablet Take 1 tablet by mouth nightly 23   Renato Gipson DO   tiZANidine (ZANAFLEX) 4 MG tablet Take 1 tablet by mouth 3 times daily as needed (neck pain) 23   Dwain Allen MD   clopidogrel (PLAVIX) 75 MG tablet Take 1 tablet by mouth once daily  Patient not taking: Reported on 2023 4/3/23

## 2024-02-06 NOTE — H&P
Subjective:       Patient is here today for a diagnostic/therapeutic injection.    2/6/24    Active Hospital Problems    Diagnosis Date Noted    Cervical facet joint syndrome [M47.812] 02/27/2023     Priority: Medium    Cervical spondylosis [M47.812] 02/06/2024       HPI: Patient is here today for adiagnostic/therapeutic injection. The most recent [facility] office visit notes have been reviewed and are unchanged.     Review of Systems   Constitutional:  Positive for fatigue.   HENT: Negative.     Eyes: Negative.    Respiratory: Negative.     Cardiovascular: Negative.    Gastrointestinal:  Negative for constipation and nausea.   Endocrine: Negative.    Genitourinary:  Negative for difficulty urinating.   Musculoskeletal:  Positive for arthralgias, back pain and myalgias.   Skin: Negative.    Allergic/Immunologic: Negative.    Neurological:  Positive for weakness and numbness.   Hematological: Negative.    Psychiatric/Behavioral:  Positive for sleep disturbance.    All other systems reviewed and are negative.      Allergies   Allergen Reactions    Vicodin [Hydrocodone-Acetaminophen] Nausea Only     Stomach upset          Prior to Admission medications    Medication Sig Start Date End Date Taking? Authorizing Provider   sucralfate (CARAFATE) 1 GM tablet Take 1 tablet by mouth 3 times daily 1/29/24   Pilo Doss APRN - CNP   potassium chloride (KLOR-CON M) 10 MEQ extended release tablet Take 1 tablet by mouth daily 1/18/24   Pilo Doss APRN - CNP   metoprolol tartrate (LOPRESSOR) 25 MG tablet TAKE 1/2 (ONE-HALF) TABLET BY MOUTH IN THE MORNING AND 1/2 (ONE-HALF) TABLET AT BEDTIME 12/27/23   Provider, MD Shauna   omeprazole (PRILOSEC) 20 MG delayed release capsule Take 1 capsule by mouth 2 times daily (before meals) 12/24/23   Trung Huerta MD   famotidine (PEPCID) 20 MG tablet Take 1 tablet by mouth nightly 12/24/23   Trung Huerta MD   Simethicone (GAS-X PO) Take 1

## 2024-02-06 NOTE — INTERVAL H&P NOTE
I have interviewed and examined the patient and reviewed the recent History and Physical.  There have been no changes to the recent H&P documentation. The surgical consent form has been signed.    Last anticoagulant medication use was:-    Premedication taken for contrast allergy?No    Valium taken for oral sedation? No    No outpatient medications have been marked as taking for the 2/6/24 encounter (Hospital Encounter).       The patient understands the planned operation and its associated risks and benefits and agrees to proceed.        Electronically signed by Dwain Allen MD on 2/6/2024 at 12:31 PM

## 2024-02-11 DIAGNOSIS — M54.2 NECK PAIN: ICD-10-CM

## 2024-02-11 DIAGNOSIS — M62.838 MUSCLE SPASM: ICD-10-CM

## 2024-02-12 RX ORDER — TIZANIDINE 4 MG/1
TABLET ORAL
Qty: 90 TABLET | Refills: 3 | Status: SHIPPED | OUTPATIENT
Start: 2024-02-12

## 2024-02-20 ENCOUNTER — PATIENT MESSAGE (OUTPATIENT)
Dept: FAMILY MEDICINE CLINIC | Age: 69
End: 2024-02-20

## 2024-02-20 RX ORDER — FAMOTIDINE 20 MG/1
20 TABLET, FILM COATED ORAL NIGHTLY
Qty: 60 TABLET | Refills: 0 | Status: SHIPPED | OUTPATIENT
Start: 2024-02-20

## 2024-02-20 NOTE — TELEPHONE ENCOUNTER
From: Markus Whitt  To: Pilo Doss  Sent: 2/20/2024 11:59 AM EST  Subject: famotidine    this script ran out, it was given to me in hospital by Dr. Huerta . not sure if i should keep taking it or not.

## 2024-02-20 NOTE — TELEPHONE ENCOUNTER
Markus Whitt is requesting a refill on the following medication(s):  Requested Prescriptions     Pending Prescriptions Disp Refills    famotidine (PEPCID) 20 MG tablet 60 tablet 0     Sig: Take 1 tablet by mouth nightly       Last Visit Date (If Applicable):  12/28/2023    Next Visit Date:    3/28/2024

## 2024-03-11 RX ORDER — OMEPRAZOLE 20 MG/1
20 CAPSULE, DELAYED RELEASE ORAL
Qty: 180 CAPSULE | Refills: 0 | Status: SHIPPED | OUTPATIENT
Start: 2024-03-11

## 2024-03-11 NOTE — TELEPHONE ENCOUNTER
Markus Whitt is requesting a refill on the following medication(s):  Requested Prescriptions     Pending Prescriptions Disp Refills    omeprazole (PRILOSEC) 20 MG delayed release capsule 180 capsule 0     Sig: Take 1 capsule by mouth 2 times daily (before meals)       Last Visit Date (If Applicable):  12/28/2023    Next Visit Date:    3/28/2024

## 2024-03-15 RX ORDER — SUCRALFATE 1 G/1
1 TABLET ORAL 3 TIMES DAILY
Qty: 120 TABLET | Refills: 0 | Status: SHIPPED | OUTPATIENT
Start: 2024-03-15

## 2024-03-15 RX ORDER — GABAPENTIN 600 MG/1
600 TABLET ORAL 3 TIMES DAILY
Qty: 90 TABLET | Refills: 0 | Status: SHIPPED | OUTPATIENT
Start: 2024-03-15 | End: 2024-04-14

## 2024-03-15 NOTE — TELEPHONE ENCOUNTER
Pt called to ask how he is supposed to get his medication. He has called [facility]. And they said the fax issue is on our side. Pt given refill line # and explained how he can use that for his medications and it will make it easier for him to get medications.    Med pended this time because pt is out and will need before weekend hits.    Last Appt:  12/6/2023  Next Appt:   3/21/2024  Med verified in [facility]    [phone]

## 2024-03-15 NOTE — TELEPHONE ENCOUNTER
Requested Prescriptions     Pending Prescriptions Disp Refills    sucralfate (CARAFATE) 1 GM tablet 120 tablet 0     Sig: Take 1 tablet by mouth 3 times daily      On call. Pt PCP is out of office, and pt stated that he only has enough for today.

## 2024-03-15 NOTE — TELEPHONE ENCOUNTER
Markus called requesting a refill of the below medication which has been pended for you:     Requested Prescriptions     Pending Prescriptions Disp Refills    gabapentin (NEURONTIN) 600 MG tablet 90 tablet 0     Sig: Take 1 tablet by mouth 3 times daily for 30 days.       Last Appointment Date: 12/6/2023  Next Appointment Date: 3/21/2024    Allergies   Allergen Reactions    Vicodin [Hydrocodone-Acetaminophen] Nausea Only     Stomach upset       Pharmacy:  [facility]

## 2024-03-21 ENCOUNTER — OFFICE VISIT (OUTPATIENT)
Dept: PAIN MANAGEMENT | Age: 69
End: 2024-03-21
Payer: MEDICARE

## 2024-03-21 VITALS
HEIGHT: 69 IN | HEART RATE: 68 BPM | WEIGHT: 213 LBS | OXYGEN SATURATION: 98 % | DIASTOLIC BLOOD PRESSURE: 70 MMHG | SYSTOLIC BLOOD PRESSURE: 120 MMHG | BODY MASS INDEX: 31.55 KG/M2 | RESPIRATION RATE: 17 BRPM

## 2024-03-21 DIAGNOSIS — M50.20 DISPLACEMENT OF CERVICAL INTERVERTEBRAL DISC: ICD-10-CM

## 2024-03-21 DIAGNOSIS — M50.20 DISPLACEMENT OF CERVICAL INTERVERTEBRAL DISC WITHOUT MYELOPATHY: Primary | ICD-10-CM

## 2024-03-21 DIAGNOSIS — M47.812 CERVICAL SPONDYLOSIS: ICD-10-CM

## 2024-03-21 DIAGNOSIS — M54.2 CERVICALGIA: ICD-10-CM

## 2024-03-21 DIAGNOSIS — M54.2 NECK PAIN: ICD-10-CM

## 2024-03-21 DIAGNOSIS — M62.838 MUSCLE SPASM: ICD-10-CM

## 2024-03-21 PROCEDURE — 3017F COLORECTAL CA SCREEN DOC REV: CPT | Performed by: NURSE PRACTITIONER

## 2024-03-21 PROCEDURE — 99214 OFFICE O/P EST MOD 30 MIN: CPT | Performed by: NURSE PRACTITIONER

## 2024-03-21 PROCEDURE — 1036F TOBACCO NON-USER: CPT | Performed by: NURSE PRACTITIONER

## 2024-03-21 PROCEDURE — G8417 CALC BMI ABV UP PARAM F/U: HCPCS | Performed by: NURSE PRACTITIONER

## 2024-03-21 PROCEDURE — 3074F SYST BP LT 130 MM HG: CPT | Performed by: NURSE PRACTITIONER

## 2024-03-21 PROCEDURE — G8484 FLU IMMUNIZE NO ADMIN: HCPCS | Performed by: NURSE PRACTITIONER

## 2024-03-21 PROCEDURE — G8427 DOCREV CUR MEDS BY ELIG CLIN: HCPCS | Performed by: NURSE PRACTITIONER

## 2024-03-21 PROCEDURE — 3078F DIAST BP <80 MM HG: CPT | Performed by: NURSE PRACTITIONER

## 2024-03-21 PROCEDURE — 1123F ACP DISCUSS/DSCN MKR DOCD: CPT | Performed by: NURSE PRACTITIONER

## 2024-03-21 RX ORDER — GABAPENTIN 600 MG/1
600 TABLET ORAL 3 TIMES DAILY
Qty: 90 TABLET | Refills: 11 | Status: SHIPPED | OUTPATIENT
Start: 2024-03-21 | End: 2025-03-16

## 2024-03-21 RX ORDER — TIZANIDINE 4 MG/1
TABLET ORAL
Qty: 90 TABLET | Refills: 11 | Status: SHIPPED | OUTPATIENT
Start: 2024-03-21

## 2024-03-21 ASSESSMENT — ENCOUNTER SYMPTOMS
EYES NEGATIVE: 1
GASTROINTESTINAL NEGATIVE: 1
BACK PAIN: 1
SHORTNESS OF BREATH: 0

## 2024-03-21 NOTE — PROGRESS NOTES
[facility]  [address] [place] [address]    Patient Name: Markus Whitt  MRN: [id number]  Encounter Date: 3/21/2024     SUBJECTIVE:  Markus Whitt is a 68 y.o., male being seen today regarding   Chief Complaint   Patient presents with    Neck Pain    Injections     RFA    and routine medication management.      Right sided neck pain-Post cervical radiofrequency ablation, no significant relief, alleviated spasm and tingling fingertips. Did not alleviate neck pain. Wishes he could still have facet injections.     Cervical facet injections 5/4/2023 with relief>80%, ROM improved -Insurance denied facets, but approved radiofrequency ablation, facet injections are more effective for him    Topical THC with relief and gummy, alleviates pain for several hours, but does not like to be where he can't function, remains alert. Tylenol prn, relief for one hour    Functionality Assessment & Goals:  On a scale of 0 (Does not Interfere) to 10 (Completely Interferes)  Which number describes how during the past week pain has interfered with the following:   A.  General Activity: 8    B.  Mood:  10   C.  Walking Ability:  5   D.  Normal Work (Includes both work outside the home and housework):  8   E.  Relations with Other People:  3   F.  Sleep:  8    G.  Enjoyment of Life:  8  2.  Patient prefers to Take their Pain Medications:   [x] On a regular basis   [x] Only when necessary   [] Does not take pain medications  3.  What are the Patient's Goals/ Expectations for Visiting Pain Management?   [] Learn about my pain   [x] Physical Therapy   [x] Receive Injections   [] Deal with Anxiety and Stress   [x] Receive Medication   [] Treat Depression    [] Treat Sleep   [] Treat Opioid Dependence/ Addiction    Current Pain Assessment:         3/21/2024    10:11 AM   AMB PAIN ASSESSMENT   Location of Pain Neck   Location Modifiers Left;Right;Medial   Severity of Pain 6   Quality of Pain

## 2024-03-27 ENCOUNTER — OFFICE VISIT (OUTPATIENT)
Dept: FAMILY MEDICINE CLINIC | Age: 69
End: 2024-03-27
Payer: MEDICARE

## 2024-03-27 VITALS
WEIGHT: 211 LBS | DIASTOLIC BLOOD PRESSURE: 76 MMHG | BODY MASS INDEX: 31.16 KG/M2 | OXYGEN SATURATION: 95 % | SYSTOLIC BLOOD PRESSURE: 120 MMHG | HEART RATE: 64 BPM

## 2024-03-27 DIAGNOSIS — Z87.19 HISTORY OF GI BLEED: ICD-10-CM

## 2024-03-27 DIAGNOSIS — R73.01 ELEVATED FASTING BLOOD SUGAR: ICD-10-CM

## 2024-03-27 DIAGNOSIS — E78.00 PURE HYPERCHOLESTEROLEMIA: Primary | ICD-10-CM

## 2024-03-27 DIAGNOSIS — J43.1 PANLOBULAR EMPHYSEMA (HCC): ICD-10-CM

## 2024-03-27 DIAGNOSIS — I10 PRIMARY HYPERTENSION: ICD-10-CM

## 2024-03-27 PROBLEM — K26.9 DUODENAL ULCER: Status: RESOLVED | Noted: 2023-12-23 | Resolved: 2024-03-27

## 2024-03-27 PROBLEM — K43.9 VENTRAL HERNIA WITHOUT OBSTRUCTION OR GANGRENE: Status: RESOLVED | Noted: 2022-02-15 | Resolved: 2024-03-27

## 2024-03-27 PROBLEM — R19.5 POSITIVE COLORECTAL CANCER SCREENING USING DNA-BASED STOOL TEST: Status: RESOLVED | Noted: 2019-12-10 | Resolved: 2024-03-27

## 2024-03-27 PROBLEM — K92.2 GI BLEED: Status: RESOLVED | Noted: 2023-12-21 | Resolved: 2024-03-27

## 2024-03-27 PROBLEM — K45.8 RECURRENT ABDOMINAL HERNIA WITHOUT OBSTRUCTION OR GANGRENE: Status: RESOLVED | Noted: 2022-02-01 | Resolved: 2024-03-27

## 2024-03-27 PROBLEM — C67.9 UROTHELIAL CARCINOMA OF BLADDER (HCC): Status: RESOLVED | Noted: 2020-04-14 | Resolved: 2024-03-27

## 2024-03-27 PROBLEM — M54.2 CERVICALGIA: Status: RESOLVED | Noted: 2023-02-27 | Resolved: 2024-03-27

## 2024-03-27 PROCEDURE — 3074F SYST BP LT 130 MM HG: CPT

## 2024-03-27 PROCEDURE — G8427 DOCREV CUR MEDS BY ELIG CLIN: HCPCS

## 2024-03-27 PROCEDURE — 3023F SPIROM DOC REV: CPT

## 2024-03-27 PROCEDURE — 3078F DIAST BP <80 MM HG: CPT

## 2024-03-27 PROCEDURE — 3017F COLORECTAL CA SCREEN DOC REV: CPT

## 2024-03-27 PROCEDURE — 1036F TOBACCO NON-USER: CPT

## 2024-03-27 PROCEDURE — G8417 CALC BMI ABV UP PARAM F/U: HCPCS

## 2024-03-27 PROCEDURE — G8484 FLU IMMUNIZE NO ADMIN: HCPCS

## 2024-03-27 PROCEDURE — 99212 OFFICE O/P EST SF 10 MIN: CPT

## 2024-03-27 PROCEDURE — 1123F ACP DISCUSS/DSCN MKR DOCD: CPT

## 2024-03-27 PROCEDURE — 99214 OFFICE O/P EST MOD 30 MIN: CPT

## 2024-03-27 ASSESSMENT — PATIENT HEALTH QUESTIONNAIRE - PHQ9
2. FEELING DOWN, DEPRESSED OR HOPELESS: NOT AT ALL
SUM OF ALL RESPONSES TO PHQ QUESTIONS 1-9: 0
1. LITTLE INTEREST OR PLEASURE IN DOING THINGS: NOT AT ALL
SUM OF ALL RESPONSES TO PHQ9 QUESTIONS 1 & 2: 0
SUM OF ALL RESPONSES TO PHQ QUESTIONS 1-9: 0
SUM OF ALL RESPONSES TO PHQ QUESTIONS 1-9: 0
1. LITTLE INTEREST OR PLEASURE IN DOING THINGS: NOT AT ALL
2. FEELING DOWN, DEPRESSED OR HOPELESS: NOT AT ALL
SUM OF ALL RESPONSES TO PHQ9 QUESTIONS 1 & 2: 0
SUM OF ALL RESPONSES TO PHQ QUESTIONS 1-9: 0

## 2024-03-27 NOTE — PROGRESS NOTES
Markus Whitt (:  1955) is a 68 y.o. male,Established patient, here for evaluation of the following chief complaint(s):  3 Month Follow-Up (Patient is here today for a three month follow up. He is having an egd also on .)         ASSESSMENT/PLAN:  1. Pure hypercholesterolemia  Assessment & Plan:   At goal, continue current medications and continue current treatment plan continue current medications and treatment plan.  Diet lower in saturated fats and carbohydrates.  Exercise 30 minutes a day with a goal under 50 minutes a week.  Will recheck lipid panel office will call with results.  Orders:  -     Hemoglobin A1C; Future  -     Lipid Panel; Future  2. History of GI bleed  Assessment & Plan:   At goal, continue current medications and continue current treatment plan does have follow-up with general surgery in the next several weeks for this.  Has not noticed any gross blood in stool, no dark black stools.  Will recheck CBC to ensure anemia is resolving.  Orders:  -     CBC with Auto Differential; Future  3. Panlobular emphysema (HCC)  Assessment & Plan:   At goal, continue current medications and continue current treatment plan uses albuterol inhaler Ehlinger very sparingly, doing well respiratory wise denies any increased dyspnea, shortness of breath, no fever or chills.  Orders:  -     Basic Metabolic Panel; Future  -     CBC with Auto Differential; Future  4. Elevated fasting blood sugar  -     Hemoglobin A1C; Future  5. Primary hypertension  Assessment & Plan:   At goal, continue current medications and continue current treatment plan recheck blood pressure shows normotensive, we will continue on current medications Lasix, Lopressor at current dose.  Denies any side effects with these.  Encouraged to monitor blood pressure at least daily and record these, report this to the office.  Orders:  -     Basic Metabolic Panel; Future  -     CBC with Auto Differential; Future      Return if symptoms

## 2024-03-28 ENCOUNTER — HOSPITAL ENCOUNTER (OUTPATIENT)
Age: 69
Discharge: HOME | End: 2024-03-28
Payer: MEDICARE

## 2024-03-28 DIAGNOSIS — R73.01 ELEVATED FASTING BLOOD SUGAR: ICD-10-CM

## 2024-03-28 DIAGNOSIS — J43.1 PANLOBULAR EMPHYSEMA (HCC): ICD-10-CM

## 2024-03-28 DIAGNOSIS — I10 PRIMARY HYPERTENSION: ICD-10-CM

## 2024-03-28 DIAGNOSIS — Z87.19 HISTORY OF GI BLEED: ICD-10-CM

## 2024-03-28 DIAGNOSIS — E78.00 PURE HYPERCHOLESTEROLEMIA: ICD-10-CM

## 2024-03-28 LAB
ANION GAP SERPL CALCULATED.3IONS-SCNC: 10 MMOL/L (ref 9–17)
BASOPHILS # BLD: 0.08 K/UL (ref 0–0.2)
BASOPHILS NFR BLD: 1 % (ref 0–2)
BUN SERPL-MCNC: 15 MG/DL (ref 8–23)
BUN/CREAT SERPL: 19 (ref 9–20)
CALCIUM SERPL-MCNC: 9.2 MG/DL (ref 8.6–10.4)
CHLORIDE SERPL-SCNC: 105 MMOL/L (ref 98–107)
CHOLEST SERPL-MCNC: 92 MG/DL (ref 0–199)
CHOLESTEROL/HDL RATIO: 2
CO2 SERPL-SCNC: 27 MMOL/L (ref 20–31)
CREAT SERPL-MCNC: 0.8 MG/DL (ref 0.7–1.2)
EOSINOPHIL # BLD: 0.21 K/UL (ref 0–0.44)
EOSINOPHILS RELATIVE PERCENT: 3 % (ref 1–4)
ERYTHROCYTE [DISTWIDTH] IN BLOOD BY AUTOMATED COUNT: 17.9 % (ref 11.8–14.4)
EST. AVERAGE GLUCOSE BLD GHB EST-MCNC: 123 MG/DL
GFR SERPL CREATININE-BSD FRML MDRD: >90 ML/MIN/1.73M2
GLUCOSE SERPL-MCNC: 96 MG/DL (ref 70–99)
HBA1C MFR BLD: 5.9 % (ref 4–6)
HCT VFR BLD AUTO: 41 % (ref 40.7–50.3)
HDLC SERPL-MCNC: 47 MG/DL
HGB BLD-MCNC: 12.7 G/DL (ref 13–17)
IMM GRANULOCYTES # BLD AUTO: <0.03 K/UL (ref 0–0.3)
IMM GRANULOCYTES NFR BLD: 0 %
LDLC SERPL CALC-MCNC: 26 MG/DL (ref 0–100)
LYMPHOCYTES NFR BLD: 2.58 K/UL (ref 1.1–3.7)
LYMPHOCYTES RELATIVE PERCENT: 40 % (ref 24–43)
MCH RBC QN AUTO: 22.5 PG (ref 25.2–33.5)
MCHC RBC AUTO-ENTMCNC: 31 G/DL (ref 25.2–33.5)
MCV RBC AUTO: 72.6 FL (ref 82.6–102.9)
MONOCYTES NFR BLD: 0.55 K/UL (ref 0.1–1.2)
MONOCYTES NFR BLD: 9 % (ref 3–12)
NEUTROPHILS NFR BLD: 47 % (ref 36–65)
NEUTS SEG NFR BLD: 3.02 K/UL (ref 1.5–8.1)
NRBC BLD-RTO: 0 PER 100 WBC
PLATELET # BLD AUTO: ABNORMAL K/UL (ref 138–453)
PLATELET, FLUORESCENCE: 215 K/UL (ref 138–453)
PLATELETS.RETICULATED NFR BLD AUTO: 7.7 % (ref 1.1–10.3)
POTASSIUM SERPL-SCNC: 4.8 MMOL/L (ref 3.7–5.3)
RBC # BLD AUTO: 5.65 M/UL (ref 4.21–5.77)
SODIUM SERPL-SCNC: 142 MMOL/L (ref 135–144)
TRIGL SERPL-MCNC: 95 MG/DL
VLDLC SERPL CALC-MCNC: 19 MG/DL
WBC OTHER # BLD: 6.5 K/UL (ref 3.5–11.3)

## 2024-03-28 PROCEDURE — 85025 COMPLETE CBC W/AUTO DIFF WBC: CPT

## 2024-03-28 PROCEDURE — 83036 HEMOGLOBIN GLYCOSYLATED A1C: CPT

## 2024-03-28 PROCEDURE — 36415 COLL VENOUS BLD VENIPUNCTURE: CPT

## 2024-03-28 PROCEDURE — 80048 BASIC METABOLIC PNL TOTAL CA: CPT

## 2024-03-28 PROCEDURE — 80061 LIPID PANEL: CPT

## 2024-04-02 ASSESSMENT — ENCOUNTER SYMPTOMS
SINUS PRESSURE: 0
EYE DISCHARGE: 0
EYE ITCHING: 0
DIARRHEA: 0
ABDOMINAL PAIN: 0
NAUSEA: 0
CONSTIPATION: 0
COLOR CHANGE: 0
COUGH: 0
RHINORRHEA: 0
WHEEZING: 0
CHEST TIGHTNESS: 0
SHORTNESS OF BREATH: 0

## 2024-04-02 NOTE — ASSESSMENT & PLAN NOTE
At goal, continue current medications and continue current treatment plan does have follow-up with general surgery in the next several weeks for this.  Has not noticed any gross blood in stool, no dark black stools.  Will recheck CBC to ensure anemia is resolving.

## 2024-04-02 NOTE — ASSESSMENT & PLAN NOTE
At goal, continue current medications and continue current treatment plan recheck blood pressure shows normotensive, we will continue on current medications Lasix, Lopressor at current dose.  Denies any side effects with these.  Encouraged to monitor blood pressure at least daily and record these, report this to the office.

## 2024-04-02 NOTE — ASSESSMENT & PLAN NOTE
At goal, continue current medications and continue current treatment plan uses albuterol inhaler Ehlinger very sparingly, doing well respiratory wise denies any increased dyspnea, shortness of breath, no fever or chills.

## 2024-04-02 NOTE — ASSESSMENT & PLAN NOTE
At goal, continue current medications and continue current treatment plan continue current medications and treatment plan.  Diet lower in saturated fats and carbohydrates.  Exercise 30 minutes a day with a goal under 50 minutes a week.  Will recheck lipid panel office will call with results.

## 2024-04-03 NOTE — H&P
cardiovascular stress test 12/09/2020     Large inferolateral infarction with significant vernon-infarct ischemia.    CAD (coronary artery disease)       CABG-2 vessel, followed by stents x 4    Cervical disc disease      Clostridium difficile diarrhea 10/2014     hospitalized in October 2014    Displacement of cervical intervertebral disc without myelopathy 06/10/2014     [facility], C6/C7     HTN (hypertension)      Hyperlipidemia      Impaired fasting blood sugar      Neck pain       chronic    Neuralgia, neuritis, and radiculitis, unspecified 06/10/2014     [facility], left C7     Shoulder region pain       Left   [facility] injury 01/08/2014    Sprain and strain of unspecified site of shoulder and upper arm 06/10/2014     [facility], left arm     Wears glasses                  Current Outpatient Medications on File Prior to Visit   Medication Sig Dispense Refill    sucralfate (CARAFATE) 1 GM tablet Take 1 tablet by mouth 3 times daily 120 tablet 0    potassium chloride (KLOR-CON M) 10 MEQ extended release tablet Take 1 tablet by mouth daily 90 tablet 0    metoprolol tartrate (LOPRESSOR) 25 MG tablet TAKE 1/2 (ONE-HALF) TABLET BY MOUTH IN THE MORNING AND 1/2 (ONE-HALF) TABLET AT BEDTIME        omeprazole (PRILOSEC) 20 MG delayed release capsule Take 1 capsule by mouth 2 times daily (before meals) 180 capsule 0    famotidine (PEPCID) 20 MG tablet Take 1 tablet by mouth nightly 60 tablet 0    Simethicone (GAS-X PO) Take 1 tablet by mouth        furosemide (LASIX) 20 MG tablet Take 1 tablet by mouth once daily 45 tablet 3    gabapentin (NEURONTIN) 600 MG tablet Take 1 tablet by mouth 3 times daily for 30 days. 90 tablet 0    albuterol sulfate HFA (PROVENTIL;VENTOLIN;PROAIR) 108 (90 Base) MCG/ACT inhaler Inhale 2 puffs into the lungs every 6 hours as needed for Wheezing 18 g 6    atorvastatin (LIPITOR) 40 MG tablet Take 1 tablet by mouth nightly 90 tablet 3    tiZANidine (ZANAFLEX) 4 MG tablet Take 1 tablet by mouth 3 times daily as needed (neck

## 2024-04-09 ENCOUNTER — ANESTHESIA (OUTPATIENT)
Dept: OPERATING ROOM | Age: 69
End: 2024-04-09
Payer: MEDICARE

## 2024-04-09 ENCOUNTER — HOSPITAL ENCOUNTER (OUTPATIENT)
Age: 69
Setting detail: OUTPATIENT SURGERY
Discharge: HOME | End: 2024-04-09
Attending: SURGERY | Admitting: SURGERY
Payer: MEDICARE

## 2024-04-09 ENCOUNTER — ANESTHESIA EVENT (OUTPATIENT)
Dept: OPERATING ROOM | Age: 69
End: 2024-04-09
Payer: MEDICARE

## 2024-04-09 VITALS
BODY MASS INDEX: 30.81 KG/M2 | SYSTOLIC BLOOD PRESSURE: 101 MMHG | HEART RATE: 52 BPM | TEMPERATURE: 97 F | DIASTOLIC BLOOD PRESSURE: 62 MMHG | OXYGEN SATURATION: 96 % | RESPIRATION RATE: 16 BRPM | WEIGHT: 208 LBS | HEIGHT: 69 IN

## 2024-04-09 DIAGNOSIS — D64.9 ANEMIA, UNSPECIFIED TYPE: ICD-10-CM

## 2024-04-09 DIAGNOSIS — Z87.11 HISTORY OF GASTRIC ULCER: ICD-10-CM

## 2024-04-09 PROCEDURE — 2580000003 HC RX 258: Performed by: PHYSICIAN ASSISTANT

## 2024-04-09 PROCEDURE — 6360000002 HC RX W HCPCS: Performed by: NURSE ANESTHETIST, CERTIFIED REGISTERED

## 2024-04-09 PROCEDURE — 7100000010 HC PHASE II RECOVERY - FIRST 15 MIN: Performed by: SURGERY

## 2024-04-09 PROCEDURE — 3700000000 HC ANESTHESIA ATTENDED CARE: Performed by: SURGERY

## 2024-04-09 PROCEDURE — 2580000003 HC RX 258: Performed by: NURSE ANESTHETIST, CERTIFIED REGISTERED

## 2024-04-09 PROCEDURE — 88305 TISSUE EXAM BY PATHOLOGIST: CPT

## 2024-04-09 PROCEDURE — 3609012400 HC EGD TRANSORAL BIOPSY SINGLE/MULTIPLE: Performed by: SURGERY

## 2024-04-09 PROCEDURE — 7100000011 HC PHASE II RECOVERY - ADDTL 15 MIN: Performed by: SURGERY

## 2024-04-09 PROCEDURE — 2709999900 HC NON-CHARGEABLE SUPPLY: Performed by: SURGERY

## 2024-04-09 RX ORDER — PROPOFOL 10 MG/ML
INJECTION, EMULSION INTRAVENOUS PRN
Status: DISCONTINUED | OUTPATIENT
Start: 2024-04-09 | End: 2024-04-09 | Stop reason: SDUPTHER

## 2024-04-09 RX ORDER — SODIUM CHLORIDE, SODIUM LACTATE, POTASSIUM CHLORIDE, CALCIUM CHLORIDE 600; 310; 30; 20 MG/100ML; MG/100ML; MG/100ML; MG/100ML
INJECTION, SOLUTION INTRAVENOUS CONTINUOUS
Status: DISCONTINUED | OUTPATIENT
Start: 2024-04-09 | End: 2024-04-09 | Stop reason: HOSPADM

## 2024-04-09 RX ORDER — SODIUM CHLORIDE, SODIUM LACTATE, POTASSIUM CHLORIDE, CALCIUM CHLORIDE 600; 310; 30; 20 MG/100ML; MG/100ML; MG/100ML; MG/100ML
INJECTION, SOLUTION INTRAVENOUS CONTINUOUS PRN
Status: DISCONTINUED | OUTPATIENT
Start: 2024-04-09 | End: 2024-04-09 | Stop reason: SDUPTHER

## 2024-04-09 RX ADMIN — PROPOFOL 150 MCG/KG/MIN: 10 INJECTION, EMULSION INTRAVENOUS at 10:09

## 2024-04-09 RX ADMIN — PROPOFOL 200 MG: 10 INJECTION, EMULSION INTRAVENOUS at 10:08

## 2024-04-09 RX ADMIN — SODIUM CHLORIDE, POTASSIUM CHLORIDE, SODIUM LACTATE AND CALCIUM CHLORIDE: 600; 310; 30; 20 INJECTION, SOLUTION INTRAVENOUS at 10:08

## 2024-04-09 RX ADMIN — SODIUM CHLORIDE, POTASSIUM CHLORIDE, SODIUM LACTATE AND CALCIUM CHLORIDE: 600; 310; 30; 20 INJECTION, SOLUTION INTRAVENOUS at 10:03

## 2024-04-09 ASSESSMENT — PAIN - FUNCTIONAL ASSESSMENT
PAIN_FUNCTIONAL_ASSESSMENT: NONE - DENIES PAIN
PAIN_FUNCTIONAL_ASSESSMENT: 0-10
PAIN_FUNCTIONAL_ASSESSMENT: NONE - DENIES PAIN
PAIN_FUNCTIONAL_ASSESSMENT: ADULT NONVERBAL PAIN SCALE (NPVS)

## 2024-04-09 NOTE — DISCHARGE INSTRUCTIONS
Await bx  Continue prilosec 20 mg bid, pepcid 20 mg qhs, and can stop the carafate      DISCHARGE INSTRUCTIONS FOLLOWING EGD    Activity  You have received sedation.  Your judgement and coordination may be impaired.  Do not drive or operate any machinery today.  Do not make personal, medical, legal or business decisions today.  Do not consume alcohol, tranquilizers or sleeping medications today unless otherwise instructed by your physician.  Rest today.  You may resume normal activity tomorrow.    Because air is put into your stomach during this procedure, it is normal to pass large amounts of air/belch.  Feelings of  bloating, gas or cramping may persist for 24 hours.    Diet  Begin with sips of clear liquids and if no nausea, you may progress to your normal diet.    Medications  You may resume your usual medications, unless otherwise instructed.    Follow-Up  As instructed.    CALL YOUR PHYSICIAN if you experience any of the following:  Chest pain not relieved by belching.  Abdominal pain/cramping and/or distention that is not relieved by passing gas.  Persistent nausea & vomiting.  Signs of infection including fever, chills, redness or swelling @ the IV site.    If you have questions/problems, call [phone] ([facility]) or after regular business hours call [phone] ([facility])

## 2024-04-09 NOTE — ANESTHESIA PRE PROCEDURE
Department of Anesthesiology  Preprocedure Note       Name:  Markus Whitt   Age:  68 y.o.  :  1955                                          MRN:  [id number]         Date:  2024      Surgeon: Surgeon(s):  Jered Lara MD    Procedure: Procedure(s):  EGD    Medications prior to admission:   Prior to Admission medications    Medication Sig Start Date End Date Taking? Authorizing Provider   gabapentin (NEURONTIN) 600 MG tablet Take 1 tablet by mouth 3 times daily for 360 days. 3/21/24 3/16/25  Humaira Spaulding APRN - CNP   tiZANidine (ZANAFLEX) 4 MG tablet TAKE 1 TABLET BY MOUTH THREE TIMES DAILY AS NEEDED FOR NECK PAIN 3/21/24   Humaira Spaulding APRN - CNP   sucralfate (CARAFATE) 1 GM tablet Take 1 tablet by mouth 3 times daily 3/15/24   Erika Luna APRN - NP   omeprazole (PRILOSEC) 20 MG delayed release capsule Take 1 capsule by mouth 2 times daily (before meals) 3/11/24   Pilo Doss APRN - CNP   famotidine (PEPCID) 20 MG tablet Take 1 tablet by mouth nightly 24   Pilo Doss APRN - CNP   potassium chloride (KLOR-CON M) 10 MEQ extended release tablet Take 1 tablet by mouth daily 24   Pilo Doss APRN - CNP   metoprolol tartrate (LOPRESSOR) 25 MG tablet TAKE 1/2 (ONE-HALF) TABLET BY MOUTH IN THE MORNING AND 1/2 (ONE-HALF) TABLET AT BEDTIME 23   Shauna Briseno MD   Simethicone (GAS-X PO) Take 1 tablet by mouth    Shauna Briseno MD   furosemide (LASIX) 20 MG tablet Take 1 tablet by mouth once daily 23   Faisal Copeland MD   albuterol sulfate HFA (PROVENTIL;VENTOLIN;PROAIR) 108 (90 Base) MCG/ACT inhaler Inhale 2 puffs into the lungs every 6 hours as needed for Wheezing 7/26/23 3/27/24  Zoran Cabrera MD   atorvastatin (LIPITOR) 40 MG tablet Take 1 tablet by mouth nightly 23   Renato Gipson DO   clopidogrel (PLAVIX) 75 MG tablet Take 1 tablet by mouth once daily  Patient not taking: Reported on 3/27/2024 4/3/23   Renato Gipson DO   tamsulosin (FLOMAX)

## 2024-04-09 NOTE — ANESTHESIA POSTPROCEDURE EVALUATION
Department of Anesthesiology  Postprocedure Note    Patient: Markus Whitt  MRN: [id number]  YOB: 1955  Date of evaluation: 4/9/2024    Procedure Summary       Date: 04/09/24 Room / Location: [facility]    Anesthesia Start: 1008 Anesthesia Stop: 1021    Procedure: ESOPHAGOGASTRODUODENOSCOPY BIOPSY (Esophagus) Diagnosis:       History of gastric ulcer      Anemia, unspecified type      (History of gastric ulcer [Z87.11])      (Anemia, unspecified type [D64.9])    Surgeons: Jered Lara MD Responsible Provider: Keven Lynn APRN - CRNA    Anesthesia Type: General, TIVA ASA Status: 3            Anesthesia Type: General, TIVA    Jazlyn Phase I: Jazlyn Score: 10    Jazlyn Phase II:      Anesthesia Post Evaluation    Patient location during evaluation: bedside  Level of consciousness: sleepy but conscious  Airway patency: patent  Nausea & Vomiting: no nausea and no vomiting  Cardiovascular status: hemodynamically stable  Respiratory status: spontaneous ventilation  Hydration status: euvolemic  Pain management: satisfactory to patient    No notable events documented.

## 2024-04-10 ENCOUNTER — TELEPHONE (OUTPATIENT)
Dept: SURGERY | Age: 69
End: 2024-04-10

## 2024-04-10 NOTE — TELEPHONE ENCOUNTER
That is okay just go ahead and do the Prilosec once a day in the morning and Pepcid at night.  His EGD showed healing of the ulcers.

## 2024-04-10 NOTE — TELEPHONE ENCOUNTER
Called patient back and he states that his insurance will not cover the Prilosec BID, only once daily.  He is still taking the Pepcid nightly and stopped the Carafate as ordered.  Patient had a EGD done yesterday, 4-9-24 with findings as:  Final Diagnosis:     GE junction at 40 cm  Duodenal normal, ulcers healed  Stomach and esophagus mostly normal     Plan:     Await bx  Continue prilosec 20 mg bid, pepcid 20 mg qhs, and can stop the carafate      Please advise- Pharm is [facility] in [place]

## 2024-04-10 NOTE — TELEPHONE ENCOUNTER
Patient called back and explained to him Dr Lara's recommendations.  Patient is okay with Prilosec daily and Pepcid at night.

## 2024-04-10 NOTE — TELEPHONE ENCOUNTER
Patient states he had an EGD yesterday with Dr Lara.  He states Dr Lara wanted him to stay on Pepcid and Prilosec   He needs to speak with nurse as originally Pilo Doss had written Rx of prilosec and he is having issues with his insurance about amount of prilosec.    Call him [phone]

## 2024-04-11 LAB — SURGICAL PATHOLOGY REPORT: NORMAL

## 2024-04-11 RX ORDER — OMEPRAZOLE 20 MG/1
20 CAPSULE, DELAYED RELEASE ORAL DAILY
Qty: 180 CAPSULE | Refills: 0 | Status: SHIPPED | OUTPATIENT
Start: 2024-04-11

## 2024-04-11 RX ORDER — FAMOTIDINE 20 MG/1
20 TABLET, FILM COATED ORAL NIGHTLY
Qty: 60 TABLET | Refills: 0 | Status: SHIPPED | OUTPATIENT
Start: 2024-04-11

## 2024-04-11 NOTE — TELEPHONE ENCOUNTER
Markus Whitt is requesting a refill on the following medication(s):  Requested Prescriptions     Pending Prescriptions Disp Refills    omeprazole (PRILOSEC) 20 MG delayed release capsule 180 capsule 0     Sig: Take 1 capsule by mouth Daily    famotidine (PEPCID) 20 MG tablet 60 tablet 0     Sig: Take 1 tablet by mouth nightly       Last Visit Date (If Applicable):  3/27/2024    Next Visit Date:    6/20/2024

## 2024-05-05 DIAGNOSIS — N40.0 BENIGN PROSTATIC HYPERPLASIA WITHOUT LOWER URINARY TRACT SYMPTOMS: ICD-10-CM

## 2024-05-06 ENCOUNTER — TELEPHONE (OUTPATIENT)
Dept: SURGERY | Age: 69
End: 2024-05-06

## 2024-05-06 RX ORDER — ATORVASTATIN CALCIUM 40 MG/1
40 TABLET, FILM COATED ORAL NIGHTLY
Qty: 90 TABLET | Refills: 3 | Status: SHIPPED | OUTPATIENT
Start: 2024-05-06

## 2024-05-06 RX ORDER — TAMSULOSIN HYDROCHLORIDE 0.4 MG/1
0.4 CAPSULE ORAL DAILY
Qty: 90 CAPSULE | Refills: 0 | Status: SHIPPED | OUTPATIENT
Start: 2024-05-06

## 2024-05-06 NOTE — TELEPHONE ENCOUNTER
Talked to patient with results from recent EGD at [facility] with Dr. Lara on 4-9-24. Updated history and forwarded results to PCP.     Explained to patient to continue Prilosec 20 every am and Pepcid 20 at HS. Patient verbalized understanding.  Follow up with Dr Lara on 8-7-24 @ 11

## 2024-05-22 ENCOUNTER — OFFICE VISIT (OUTPATIENT)
Dept: PAIN MANAGEMENT | Age: 69
End: 2024-05-22
Payer: MEDICARE

## 2024-05-22 VITALS
HEART RATE: 58 BPM | OXYGEN SATURATION: 97 % | SYSTOLIC BLOOD PRESSURE: 118 MMHG | HEIGHT: 69 IN | DIASTOLIC BLOOD PRESSURE: 60 MMHG | RESPIRATION RATE: 12 BRPM | BODY MASS INDEX: 28.58 KG/M2 | WEIGHT: 193 LBS

## 2024-05-22 DIAGNOSIS — M54.12 CERVICAL RADICULOPATHY: ICD-10-CM

## 2024-05-22 DIAGNOSIS — M47.812 CERVICAL FACET JOINT SYNDROME: Primary | ICD-10-CM

## 2024-05-22 DIAGNOSIS — M50.30 DDD (DEGENERATIVE DISC DISEASE), CERVICAL: ICD-10-CM

## 2024-05-22 PROCEDURE — 1123F ACP DISCUSS/DSCN MKR DOCD: CPT | Performed by: NURSE PRACTITIONER

## 2024-05-22 PROCEDURE — 3017F COLORECTAL CA SCREEN DOC REV: CPT | Performed by: NURSE PRACTITIONER

## 2024-05-22 PROCEDURE — G8427 DOCREV CUR MEDS BY ELIG CLIN: HCPCS | Performed by: NURSE PRACTITIONER

## 2024-05-22 PROCEDURE — 99214 OFFICE O/P EST MOD 30 MIN: CPT | Performed by: NURSE PRACTITIONER

## 2024-05-22 PROCEDURE — G8417 CALC BMI ABV UP PARAM F/U: HCPCS | Performed by: NURSE PRACTITIONER

## 2024-05-22 PROCEDURE — 1036F TOBACCO NON-USER: CPT | Performed by: NURSE PRACTITIONER

## 2024-05-22 PROCEDURE — 3078F DIAST BP <80 MM HG: CPT | Performed by: NURSE PRACTITIONER

## 2024-05-22 PROCEDURE — 99215 OFFICE O/P EST HI 40 MIN: CPT | Performed by: NURSE PRACTITIONER

## 2024-05-22 PROCEDURE — 3074F SYST BP LT 130 MM HG: CPT | Performed by: NURSE PRACTITIONER

## 2024-05-22 RX ORDER — SODIUM CHLORIDE, SODIUM LACTATE, POTASSIUM CHLORIDE, CALCIUM CHLORIDE 600; 310; 30; 20 MG/100ML; MG/100ML; MG/100ML; MG/100ML
INJECTION, SOLUTION INTRAVENOUS CONTINUOUS
OUTPATIENT
Start: 2024-05-22

## 2024-05-22 ASSESSMENT — ENCOUNTER SYMPTOMS
EYES NEGATIVE: 1
BACK PAIN: 1
GASTROINTESTINAL NEGATIVE: 1
SHORTNESS OF BREATH: 0

## 2024-05-22 NOTE — PROGRESS NOTES
[facility]  [address] [place]. [address]    Patient Name: Markus Whitt  MRN: [id number]  Encounter Date: 5/22/2024     SUBJECTIVE:  Markus Whitt is a 69 y.o., male being seen today regarding   Chief Complaint   Patient presents with    Follow-up     2 month   Displacement of cervical intervertebral disc without myelopathy    and routine medication management.      Right sided neck pain-Post cervical radiofrequency ablation, no significant relief, alleviated spasm and tingling fingertips. Did not alleviate neck pain. Wishes he could still have facet injections.     Cervical facet injections 5/4/2023 with relief>80%, ROM improved -Insurance denied facets, but approved radiofrequency ablation, facet injections are more effective for him. Recently completed physical therapy    Topical THC with relief and gummy, alleviates pain for several hours, but does not like to be where he can't function, remains alert. Tylenol prn, relief for one hour    Functionality Assessment & Goals:  On a scale of 0 (Does not Interfere) to 10 (Completely Interferes)  Which number describes how during the past week pain has interfered with the following:   A.  General Activity: 8    B.  Mood:  10   C.  Walking Ability:  5   D.  Normal Work (Includes both work outside the home and housework):  8   E.  Relations with Other People:  3   F.  Sleep:  8    G.  Enjoyment of Life:  8  2.  Patient prefers to Take their Pain Medications:   [x] On a regular basis   [x] Only when necessary   [] Does not take pain medications  3.  What are the Patient's Goals/ Expectations for Visiting Pain Management?   [] Learn about my pain   [x] Physical Therapy   [x] Receive Injections   [] Deal with Anxiety and Stress   [x] Receive Medication   [] Treat Depression    [] Treat Sleep   [] Treat Opioid Dependence/ Addiction    Current Pain Assessment:         5/22/2024     9:52 AM   AMB PAIN ASSESSMENT   Location of Pain

## 2024-05-28 ENCOUNTER — TELEPHONE (OUTPATIENT)
Dept: PAIN MANAGEMENT | Age: 69
End: 2024-05-28

## 2024-05-28 DIAGNOSIS — M47.812 CERVICAL FACET JOINT SYNDROME: ICD-10-CM

## 2024-05-28 RX ORDER — DIAZEPAM 5 MG/1
TABLET ORAL
Qty: 2 TABLET | Refills: 0 | Status: SHIPPED | OUTPATIENT
Start: 2024-05-28 | End: 2024-06-18

## 2024-05-28 NOTE — TELEPHONE ENCOUNTER
Rt C5-C6 C6-C7 Facet  with no sedation (valium only ) scheduled for 6/10/24 with surgery center notified.     Patient Educated to have .     Valium sent in to [facility].

## 2024-05-28 NOTE — TELEPHONE ENCOUNTER
Requested for up coming procedure.     Requested Prescriptions      No prescriptions requested or ordered in this encounter       Last Appointment Date: 5/22/2024  Next Appointment Date: 6/27/2024    Allergies   Allergen Reactions    Vicodin [Hydrocodone-Acetaminophen] Nausea Only     Stomach upset       Pharmacy:  [facility]

## 2024-06-10 RX ORDER — FUROSEMIDE 20 MG/1
20 TABLET ORAL DAILY
Qty: 45 TABLET | Refills: 3 | Status: SHIPPED | OUTPATIENT
Start: 2024-06-10

## 2024-06-10 RX ORDER — FAMOTIDINE 20 MG/1
20 TABLET, FILM COATED ORAL NIGHTLY
Qty: 60 TABLET | Refills: 0 | Status: SHIPPED | OUTPATIENT
Start: 2024-06-10

## 2024-06-10 NOTE — TELEPHONE ENCOUNTER
Markus Whitt is requesting a refill on the following medication(s):  Requested Prescriptions     Pending Prescriptions Disp Refills    famotidine (PEPCID) 20 MG tablet [Pharmacy Med Name: Famotidine 20 MG Oral Tablet] 60 tablet 0     Sig: Take 1 tablet by mouth nightly       Last Visit Date (If Applicable):  3/27/2024    Next Visit Date:    7/1/2024

## 2024-06-27 ENCOUNTER — OFFICE VISIT (OUTPATIENT)
Dept: PAIN MANAGEMENT | Age: 69
End: 2024-06-27
Payer: MEDICARE

## 2024-06-27 VITALS
OXYGEN SATURATION: 96 % | RESPIRATION RATE: 12 BRPM | WEIGHT: 183 LBS | DIASTOLIC BLOOD PRESSURE: 62 MMHG | HEIGHT: 69 IN | SYSTOLIC BLOOD PRESSURE: 104 MMHG | HEART RATE: 60 BPM | BODY MASS INDEX: 27.11 KG/M2

## 2024-06-27 DIAGNOSIS — M54.2 CERVICALGIA: ICD-10-CM

## 2024-06-27 DIAGNOSIS — M54.12 CERVICAL RADICULOPATHY: ICD-10-CM

## 2024-06-27 DIAGNOSIS — M47.812 CERVICAL FACET JOINT SYNDROME: ICD-10-CM

## 2024-06-27 DIAGNOSIS — M47.812 CERVICAL SPONDYLOSIS: Primary | ICD-10-CM

## 2024-06-27 PROCEDURE — G8427 DOCREV CUR MEDS BY ELIG CLIN: HCPCS | Performed by: NURSE PRACTITIONER

## 2024-06-27 PROCEDURE — 99214 OFFICE O/P EST MOD 30 MIN: CPT | Performed by: NURSE PRACTITIONER

## 2024-06-27 PROCEDURE — G8417 CALC BMI ABV UP PARAM F/U: HCPCS | Performed by: NURSE PRACTITIONER

## 2024-06-27 PROCEDURE — 1123F ACP DISCUSS/DSCN MKR DOCD: CPT | Performed by: NURSE PRACTITIONER

## 2024-06-27 PROCEDURE — 3017F COLORECTAL CA SCREEN DOC REV: CPT | Performed by: NURSE PRACTITIONER

## 2024-06-27 PROCEDURE — 3074F SYST BP LT 130 MM HG: CPT | Performed by: NURSE PRACTITIONER

## 2024-06-27 PROCEDURE — 1036F TOBACCO NON-USER: CPT | Performed by: NURSE PRACTITIONER

## 2024-06-27 PROCEDURE — 3078F DIAST BP <80 MM HG: CPT | Performed by: NURSE PRACTITIONER

## 2024-06-27 ASSESSMENT — ENCOUNTER SYMPTOMS
EYES NEGATIVE: 1
BACK PAIN: 1
SHORTNESS OF BREATH: 0
GASTROINTESTINAL NEGATIVE: 1

## 2024-06-27 NOTE — PROGRESS NOTES
[facility]  [address] [place]. [address]    Patient Name: Markus Whitt  MRN: [id number]  Encounter Date: 6/27/2024     SUBJECTIVE:  Markus Whitt is a 69 y.o., male being seen today regarding   Chief Complaint   Patient presents with    Follow-up     FACET was not done    Right Cervical    and routine medication management.    Right sided neck pain-Post cervical radiofrequency ablation, no significant relief, alleviated spasm and tingling fingertips. Did not alleviate neck pain. Wishes he could still have facet injections    Cervical facet injections 5/4/2023 with relief>80%, ROM improved -Insurance denied facets, but approved radiofrequency ablation, facet injections are more effective for him.facet injection approval pending insurance approval. Recently completed physical therapy    Topical THC with relief and gummy, alleviates pain for several hours, but does not like to be where he can't function, remains alert. Tylenol prn, relief for one hour    Functionality Assessment & Goals:  On a scale of 0 (Does not Interfere) to 10 (Completely Interferes)  Which number describes how during the past week pain has interfered with the following:   A.  General Activity: 8    B.  Mood:  10   C.  Walking Ability:  5   D.  Normal Work (Includes both work outside the home and housework):  8   E.  Relations with Other People:  3   F.  Sleep:  8    G.  Enjoyment of Life:  8  2.  Patient prefers to Take their Pain Medications:   [x] On a regular basis   [x] Only when necessary   [] Does not take pain medications  3.  What are the Patient's Goals/ Expectations for Visiting Pain Management?   [] Learn about my pain   [x] Physical Therapy   [x] Receive Injections   [] Deal with Anxiety and Stress   [x] Receive Medication   [] Treat Depression    [] Treat Sleep   [] Treat Opioid Dependence/ Addiction    Current Pain Assessment:         6/27/2024    10:28 AM   AMB PAIN ASSESSMENT   Location

## 2024-06-28 SDOH — ECONOMIC STABILITY: FOOD INSECURITY: WITHIN THE PAST 12 MONTHS, YOU WORRIED THAT YOUR FOOD WOULD RUN OUT BEFORE YOU GOT MONEY TO BUY MORE.: NEVER TRUE

## 2024-06-28 SDOH — ECONOMIC STABILITY: FOOD INSECURITY: WITHIN THE PAST 12 MONTHS, THE FOOD YOU BOUGHT JUST DIDN'T LAST AND YOU DIDN'T HAVE MONEY TO GET MORE.: NEVER TRUE

## 2024-06-28 SDOH — ECONOMIC STABILITY: INCOME INSECURITY: HOW HARD IS IT FOR YOU TO PAY FOR THE VERY BASICS LIKE FOOD, HOUSING, MEDICAL CARE, AND HEATING?: SOMEWHAT HARD

## 2024-07-01 ENCOUNTER — OFFICE VISIT (OUTPATIENT)
Dept: FAMILY MEDICINE CLINIC | Age: 69
End: 2024-07-01
Payer: MEDICARE

## 2024-07-01 VITALS
SYSTOLIC BLOOD PRESSURE: 115 MMHG | HEIGHT: 69 IN | WEIGHT: 180 LBS | OXYGEN SATURATION: 100 % | HEART RATE: 51 BPM | BODY MASS INDEX: 26.66 KG/M2 | DIASTOLIC BLOOD PRESSURE: 70 MMHG

## 2024-07-01 DIAGNOSIS — I10 PRIMARY HYPERTENSION: Primary | ICD-10-CM

## 2024-07-01 PROCEDURE — G8417 CALC BMI ABV UP PARAM F/U: HCPCS

## 2024-07-01 PROCEDURE — 3017F COLORECTAL CA SCREEN DOC REV: CPT

## 2024-07-01 PROCEDURE — G8427 DOCREV CUR MEDS BY ELIG CLIN: HCPCS

## 2024-07-01 PROCEDURE — 1036F TOBACCO NON-USER: CPT

## 2024-07-01 PROCEDURE — 99212 OFFICE O/P EST SF 10 MIN: CPT

## 2024-07-01 PROCEDURE — 3074F SYST BP LT 130 MM HG: CPT

## 2024-07-01 PROCEDURE — 3078F DIAST BP <80 MM HG: CPT

## 2024-07-01 PROCEDURE — 1123F ACP DISCUSS/DSCN MKR DOCD: CPT

## 2024-07-01 ASSESSMENT — ENCOUNTER SYMPTOMS
SINUS PAIN: 0
COLOR CHANGE: 0
SHORTNESS OF BREATH: 0
BACK PAIN: 1
NAUSEA: 0
WHEEZING: 0
COUGH: 0
DIARRHEA: 0
SINUS PRESSURE: 0
CHEST TIGHTNESS: 0
ABDOMINAL PAIN: 0
RHINORRHEA: 0
EYE DISCHARGE: 0
CONSTIPATION: 0
EYE ITCHING: 0

## 2024-07-01 NOTE — ASSESSMENT & PLAN NOTE
At goal, continue current medications and continue current treatment plan Did have x1 low bp at home. Likely caused by dehydration- weather and on lasix. Encourage hydration adequacy, same meds continue to monitor BP daily.

## 2024-07-01 NOTE — PROGRESS NOTES
Markus Whitt (:  1955) is a 69 y.o. male,Established patient, here for evaluation of the following chief complaint(s):  Hypertension (Pt is here for a 6 month f/u. Pt states is BP has been dropping. Pt states his lowest was 97/50)      Assessment & Plan   1. Primary hypertension  Assessment & Plan:   At goal, continue current medications and continue current treatment plan Did have x1 low bp at home. Likely caused by dehydration- weather and on lasix. Encourage hydration adequacy, same meds continue to monitor BP daily.       Return in about 6 months (around 2025), or if symptoms worsen or fail to improve.       Subjective   Hypertension  This is a chronic problem. The current episode started more than 1 year ago. The problem has been resolved since onset. Associated symptoms include neck pain. Pertinent negatives include no chest pain, headaches, palpitations or shortness of breath. Past treatments include beta blockers and diuretics. The current treatment provides significant improvement.       Review of Systems   Constitutional:  Negative for chills, fatigue, fever and unexpected weight change.   HENT:  Negative for congestion, ear pain, rhinorrhea, sinus pressure and sinus pain.    Eyes:  Negative for discharge, itching and visual disturbance.   Respiratory:  Negative for cough, chest tightness, shortness of breath and wheezing.    Cardiovascular:  Negative for chest pain, palpitations and leg swelling.   Gastrointestinal:  Negative for abdominal pain, constipation, diarrhea and nausea.   Endocrine: Negative for cold intolerance, heat intolerance, polydipsia and polyphagia.   Genitourinary:  Negative for dysuria, flank pain and frequency.   Musculoskeletal:  Positive for arthralgias, back pain and neck pain. Negative for myalgias and neck stiffness.   Skin:  Negative for color change.   Allergic/Immunologic: Negative for environmental allergies and food allergies.   Neurological:  Negative

## 2024-07-08 ENCOUNTER — PROCEDURE VISIT (OUTPATIENT)
Dept: UROLOGY | Age: 69
End: 2024-07-08
Payer: MEDICARE

## 2024-07-08 VITALS
DIASTOLIC BLOOD PRESSURE: 60 MMHG | BODY MASS INDEX: 26.97 KG/M2 | HEART RATE: 64 BPM | WEIGHT: 182.6 LBS | SYSTOLIC BLOOD PRESSURE: 120 MMHG

## 2024-07-08 DIAGNOSIS — C67.9 UROTHELIAL CARCINOMA OF BLADDER (HCC): ICD-10-CM

## 2024-07-08 DIAGNOSIS — N40.1 BENIGN LOCALIZED PROSTATIC HYPERPLASIA WITH LOWER URINARY TRACT SYMPTOMS (LUTS): ICD-10-CM

## 2024-07-08 DIAGNOSIS — N40.0 BENIGN PROSTATIC HYPERPLASIA WITHOUT LOWER URINARY TRACT SYMPTOMS: Primary | ICD-10-CM

## 2024-07-08 PROCEDURE — 52000 CYSTOURETHROSCOPY: CPT | Performed by: UROLOGY

## 2024-07-08 RX ORDER — LIDOCAINE HYDROCHLORIDE 20 MG/ML
JELLY TOPICAL ONCE
Status: SHIPPED | OUTPATIENT
Start: 2024-07-08

## 2024-07-08 NOTE — PROGRESS NOTES
Holden Storz Cystourethroscope Model Number [id number]; Serial Number [id number] scope#4\", used for procedure today, was removed from sterile container after visual inspection

## 2024-07-08 NOTE — PROGRESS NOTES
Cystoscopy    Operative Note    Patient:  Markus Whitt  MRN: [id number]  YOB: 1955    Date: 07/08/24  Surgeon: MELITON PEREZ MD  Anesthesia: Urojet Local  Indications:     Had recent GI bleed and hospitalization      BPH- cont flomax. Stable.    Bladder cancer- hx of low grade. Had two recurrences on dome of bladder that we resected (still low grade)      Cysto in office 6 months. If pt has another recurrence, will need bcg         Position: Supine  EBL: 0 ml    Findings:   The patient was prepped and draped in the usual sterile fashion.  The flexible cystoscope was advanced through the urethra and into the bladder.  The bladder was thoroughly inspected and the following was noted:    Residual Urine: moderate.  Urine clear, with no obvious infection  Urethra: No abnormalities of the urethra are noted. Urethral dilation was not performed.    Prostate: lateral lobe hypertrophy + present, prostate moderately obstructing, intravesical extension of prostate not present. There was no previous TURP defect.   Bladder: no tumor noted .    trabeculation noted.  no bladder diverticulum.  Ureters: Orifices with normal configuration and location.      The cystoscope was removed.  The patient tolerated the procedure well.  Six months with FISH prior

## 2024-07-22 ENCOUNTER — HOSPITAL ENCOUNTER (OUTPATIENT)
Dept: CT IMAGING | Age: 69
Discharge: HOME | End: 2024-07-24
Attending: INTERNAL MEDICINE
Payer: MEDICARE

## 2024-07-22 ENCOUNTER — PROCEDURE VISIT (OUTPATIENT)
Dept: PAIN MANAGEMENT | Age: 69
End: 2024-07-22
Payer: MEDICARE

## 2024-07-22 VITALS
DIASTOLIC BLOOD PRESSURE: 74 MMHG | SYSTOLIC BLOOD PRESSURE: 118 MMHG | OXYGEN SATURATION: 98 % | WEIGHT: 176 LBS | HEART RATE: 56 BPM | BODY MASS INDEX: 25.99 KG/M2

## 2024-07-22 DIAGNOSIS — R91.1 NODULE OF UPPER LOBE OF RIGHT LUNG: ICD-10-CM

## 2024-07-22 DIAGNOSIS — M79.18 MYOFASCIAL PAIN: ICD-10-CM

## 2024-07-22 DIAGNOSIS — M62.838 MUSCLE SPASM: Primary | ICD-10-CM

## 2024-07-22 PROCEDURE — PBSHW PBB SHADOW CHARGE: Performed by: NURSE PRACTITIONER

## 2024-07-22 PROCEDURE — 99214 OFFICE O/P EST MOD 30 MIN: CPT | Performed by: NURSE PRACTITIONER

## 2024-07-22 PROCEDURE — 20553 NJX 1/MLT TRIGGER POINTS 3/>: CPT | Performed by: NURSE PRACTITIONER

## 2024-07-22 PROCEDURE — 71250 CT THORAX DX C-: CPT

## 2024-07-22 RX ORDER — LIDOCAINE HYDROCHLORIDE 20 MG/ML
5 INJECTION, SOLUTION EPIDURAL; INFILTRATION; INTRACAUDAL; PERINEURAL ONCE
Status: COMPLETED | OUTPATIENT
Start: 2024-07-22 | End: 2024-07-22

## 2024-07-22 RX ADMIN — TRIAMCINOLONE ACETONIDE 10 MG: 10 INJECTION, SUSPENSION INTRA-ARTICULAR; INTRALESIONAL at 12:20

## 2024-07-22 RX ADMIN — LIDOCAINE HYDROCHLORIDE 5 ML: 20 INJECTION, SOLUTION EPIDURAL; INFILTRATION; INTRACAUDAL; PERINEURAL at 10:22

## 2024-07-22 NOTE — PROGRESS NOTES
Markus Whitt  1955 1/24/23      PAIN MANAGEMENT CLINIC PROCEDURE NOTE    CHIEF COMPLAINT: This is a 69 y.o. male patient who presents to the Pain Management Clinic with a history of pain in the right upper back.    PRE-PROCEDURE DIAGNOSIS: Right myofascial pain    POST-PROCEDURE DIAGNOSIS: same as pre-procedure diagnosis    Vitals:    07/22/24 1006   BP: 118/74   Pulse: 56   SpO2: 98%            Trigger points were found in the right:   1. UPPER TRAPEZIUS MUSCLE  2. LEVATOR SCAPULA MUSCLE  4. RHOMBOIDS      PROCEDURE:     The procedure was explained, including risks and benefits, and the patient has agreed to proceed. The injection site was marked on the patient’s skin. A large area around the injection site was cleaned with chlora-prep.    TRIGGER POINT INJECTIONS  A 25G 1.5 inch needle was inserted into each muscle. Depth and direction of the needle were monitored at all times. The needle was advanced until a muscle twitch response was felt and the patient reported concordant pain. The syringe was aspirated and was negative for heme or air. Then, a combination of 1ml of 2%lidocaine, and 10mg of kenalog (NDC# 5981-9528-14) was injected. The needle was then moved in and out of the muscle at the same depth to break up additional muscle spasms. A total of 6 trigger points were injected.     The injection site was cleaned and dressed with a spot band aid.  The patient tolerated the procedure well and with out complication The patient was instructed avoid heat and excessive activity for 24-48 hours.

## 2024-07-23 NOTE — OP NOTE
EGD PROCEDURE NOTE:      Pre op diagnosis:     1. H/o gastic ulcer x 2 moderate size with necrotic bases- on EGD 12/21/2023  2. Anemia - was 6.7 in December, 1/4/24 was 10.2 then , 3/28/24 was 12.7  3. No FH of esophageal cancer  4. No h/o barretts esophagus  5. AGE 68  6. Was on plavix and now off of it, currently on prilosec 20mg bid and pepcid 20 qhs, carafate 1gm TID        Operative Procedure:    1.  EGD with cold biopsy    Surgeon:    Dr. Jered Lara     Anesthesia:    IV sedation per CRNA    EBL:  minimal      Procedure:    Patient was taken to the endoscopy suite and placed in a left lateral decubitus position.  They were given IV sedation as mentioned above.  The gastric scope was passed through the mouth piece and down the esophagus, stomach and into the second portion of the duodenum.  It was withdrawn slowly and cold biopsies were taken in the antrum,body of the stomach and the distal esophagus.  The scope was retroflexed in the stomach and GE junction was examined.  The following findings were noted.      Final Diagnosis:    GE junction at 40 cm  Duodenal normal, ulcers healed  Stomach and esophagus mostly normal    Plan:    Await bx  Continue prilosec 20 mg bid, pepcid 20 mg qhs, and can stop the carafate   I reviewed the H&P, I examined the patient, and there are no changes in the patient's condition.

## 2024-07-31 ENCOUNTER — OFFICE VISIT (OUTPATIENT)
Dept: PULMONOLOGY | Age: 69
End: 2024-07-31
Payer: MEDICARE

## 2024-07-31 VITALS
DIASTOLIC BLOOD PRESSURE: 62 MMHG | HEART RATE: 60 BPM | OXYGEN SATURATION: 97 % | TEMPERATURE: 96.5 F | SYSTOLIC BLOOD PRESSURE: 126 MMHG | BODY MASS INDEX: 26.52 KG/M2 | WEIGHT: 179.6 LBS

## 2024-07-31 DIAGNOSIS — J43.1 PANLOBULAR EMPHYSEMA (HCC): Primary | ICD-10-CM

## 2024-07-31 DIAGNOSIS — J90 PLEURAL EFFUSION ON RIGHT: ICD-10-CM

## 2024-07-31 DIAGNOSIS — J44.9 CHRONIC OBSTRUCTIVE PULMONARY DISEASE, UNSPECIFIED COPD TYPE (HCC): ICD-10-CM

## 2024-07-31 PROCEDURE — 99213 OFFICE O/P EST LOW 20 MIN: CPT | Performed by: INTERNAL MEDICINE

## 2024-08-03 NOTE — PROGRESS NOTES
PARESH Mckeon CNP   tiZANidine (ZANAFLEX) 4 MG tablet TAKE 1 TABLET BY MOUTH THREE TIMES DAILY AS NEEDED FOR NECK PAIN 3/21/24  Yes Humaira Spaulding APRN - CNP   metoprolol tartrate (LOPRESSOR) 25 MG tablet TAKE 1/2 (ONE-HALF) TABLET BY MOUTH IN THE MORNING AND 1/2 (ONE-HALF) TABLET AT BEDTIME 12/27/23  Yes Shauna Briseno MD   Cholecalciferol (VITAMIN D) 50 MCG (2000 UT) CAPS capsule Take 2,000 Units by mouth daily   Yes Shauna Briseno MD   vitamin C (ASCORBIC ACID) 500 MG tablet Take 0.5 tablets by mouth 2 times daily   Yes Shauna Briseno MD   acetaminophen (TYLENOL) 325 MG tablet Take 2 tablets by mouth every 6 hours as needed for Pain 7/9/18  Yes Vashti Joseph APRN - CNP   Simethicone (GAS-X PO) Take 1 tablet by mouth  Patient not taking: Reported on 7/31/2024    Shauna Briseno MD   albuterol sulfate HFA (PROVENTIL;VENTOLIN;PROAIR) 108 (90 Base) MCG/ACT inhaler Inhale 2 puffs into the lungs every 6 hours as needed for Wheezing  Patient not taking: Reported on 7/31/2024 7/26/23 7/1/24  Zoran Cabrera MD            Review of Systems -  General ROS: negative for - chills, fatigue, fever or weight loss  ENT ROS: negative for - headaches, oral lesions or sore throat  Cardiovascular ROS: no chest pain , orthopnea or pnd   Gastrointestinal ROS: no abdominal pain, change in bowel habits, or black or bloody stools  Skin - no rash   Neuro - no blurry vision , no loc . No focal weakness   msk - no jt tenderness or swelling    Vascular - no claudication , rest completed and negative   Lymphatic - complete and negative   Hematology - oncology - complete and negative   Allergy immunology - complete and negative    no burning or hematuria    Vitals:  /62   Pulse 60   Temp (!) 96.5 °F (35.8 °C)   Wt 81.5 kg (179 lb 9.6 oz)   SpO2 97%   BMI 26.52 kg/m²     PHYSICAL EXAM:  Head and neck atraumatic, normocephalic    Lymph nodes-no cervical, supraclavicular lymphadenopathy    Neck-no JVP

## 2024-08-05 ENCOUNTER — OFFICE VISIT (OUTPATIENT)
Dept: PAIN MANAGEMENT | Age: 69
End: 2024-08-05
Payer: MEDICARE

## 2024-08-05 VITALS
SYSTOLIC BLOOD PRESSURE: 122 MMHG | WEIGHT: 179 LBS | OXYGEN SATURATION: 94 % | DIASTOLIC BLOOD PRESSURE: 80 MMHG | BODY MASS INDEX: 26.43 KG/M2 | HEART RATE: 52 BPM

## 2024-08-05 DIAGNOSIS — M54.12 CERVICAL RADICULOPATHY: ICD-10-CM

## 2024-08-05 DIAGNOSIS — M47.812 CERVICAL FACET JOINT SYNDROME: Primary | ICD-10-CM

## 2024-08-05 DIAGNOSIS — M54.2 CERVICALGIA: ICD-10-CM

## 2024-08-05 PROCEDURE — G8417 CALC BMI ABV UP PARAM F/U: HCPCS | Performed by: NURSE PRACTITIONER

## 2024-08-05 PROCEDURE — 1036F TOBACCO NON-USER: CPT | Performed by: NURSE PRACTITIONER

## 2024-08-05 PROCEDURE — 3079F DIAST BP 80-89 MM HG: CPT | Performed by: NURSE PRACTITIONER

## 2024-08-05 PROCEDURE — 3074F SYST BP LT 130 MM HG: CPT | Performed by: NURSE PRACTITIONER

## 2024-08-05 PROCEDURE — 3017F COLORECTAL CA SCREEN DOC REV: CPT | Performed by: NURSE PRACTITIONER

## 2024-08-05 PROCEDURE — 1123F ACP DISCUSS/DSCN MKR DOCD: CPT | Performed by: NURSE PRACTITIONER

## 2024-08-05 PROCEDURE — 99214 OFFICE O/P EST MOD 30 MIN: CPT | Performed by: NURSE PRACTITIONER

## 2024-08-05 PROCEDURE — 99213 OFFICE O/P EST LOW 20 MIN: CPT | Performed by: NURSE PRACTITIONER

## 2024-08-05 PROCEDURE — G8427 DOCREV CUR MEDS BY ELIG CLIN: HCPCS | Performed by: NURSE PRACTITIONER

## 2024-08-05 ASSESSMENT — ENCOUNTER SYMPTOMS
GASTROINTESTINAL NEGATIVE: 1
EYES NEGATIVE: 1
BACK PAIN: 1
SHORTNESS OF BREATH: 0

## 2024-08-05 NOTE — PROGRESS NOTES
[facility]  [address] [place]. [address]    Patient Name: Markus Whitt  MRN: [id number]  Encounter Date: 8/5/2024     SUBJECTIVE:  Markus Whitt is a 69 y.o., male being seen today regarding   Chief Complaint   Patient presents with    Neck Pain    and routine medication management.    Right sided neck pain-Post cervical radiofrequency ablation, no significant relief, alleviated spasm and tingling fingertips. Did not alleviate neck pain. Wishes he could still have facet injections    Cervical facet injections 5/4/2023 with relief>80%, ROM improved -Insurance denied facets, but approved radiofrequency ablation, facet injections are more effective for him.facet injection approval pending insurance approval. Recently completed physical therapy    Topical THC with relief and gummy, alleviates pain for several hours, but does not like to be where he can't function, remains alert. Tylenol prn, relief for one hour    Functionality Assessment & Goals:  On a scale of 0 (Does not Interfere) to 10 (Completely Interferes)  Which number describes how during the past week pain has interfered with the following:   A.  General Activity: 8    B.  Mood:  10   C.  Walking Ability:  5   D.  Normal Work (Includes both work outside the home and housework):  8   E.  Relations with Other People:  3   F.  Sleep:  8    G.  Enjoyment of Life:  8  2.  Patient prefers to Take their Pain Medications:   [x] On a regular basis   [x] Only when necessary   [] Does not take pain medications  3.  What are the Patient's Goals/ Expectations for Visiting Pain Management?   [] Learn about my pain   [x] Physical Therapy   [x] Receive Injections   [] Deal with Anxiety and Stress   [x] Receive Medication   [] Treat Depression    [] Treat Sleep   [] Treat Opioid Dependence/ Addiction    Current Pain Assessment:         8/5/2024    10:25 AM   AMB PAIN ASSESSMENT   Location of Pain Neck   Severity of Pain 6   Quality

## 2024-08-07 ENCOUNTER — HOSPITAL ENCOUNTER (OUTPATIENT)
Age: 69
Discharge: HOME | End: 2024-08-07
Payer: MEDICARE

## 2024-08-07 ENCOUNTER — OFFICE VISIT (OUTPATIENT)
Dept: SURGERY | Age: 69
End: 2024-08-07
Payer: MEDICARE

## 2024-08-07 VITALS
WEIGHT: 174.4 LBS | SYSTOLIC BLOOD PRESSURE: 122 MMHG | RESPIRATION RATE: 16 BRPM | BODY MASS INDEX: 25.83 KG/M2 | HEART RATE: 57 BPM | HEIGHT: 69 IN | TEMPERATURE: 96.8 F | DIASTOLIC BLOOD PRESSURE: 70 MMHG | OXYGEN SATURATION: 98 %

## 2024-08-07 DIAGNOSIS — D50.9 IRON DEFICIENCY ANEMIA, UNSPECIFIED IRON DEFICIENCY ANEMIA TYPE: ICD-10-CM

## 2024-08-07 DIAGNOSIS — D50.9 IRON DEFICIENCY ANEMIA, UNSPECIFIED IRON DEFICIENCY ANEMIA TYPE: Primary | ICD-10-CM

## 2024-08-07 LAB
ERYTHROCYTE [DISTWIDTH] IN BLOOD BY AUTOMATED COUNT: 18.5 % (ref 11.8–14.4)
HCT VFR BLD AUTO: 41.5 % (ref 40.7–50.3)
HGB BLD-MCNC: 13.3 G/DL (ref 13–17)
MCH RBC QN AUTO: 25.7 PG (ref 25.2–33.5)
MCHC RBC AUTO-ENTMCNC: 32 G/DL (ref 25.2–33.5)
MCV RBC AUTO: 80.1 FL (ref 82.6–102.9)
NRBC BLD-RTO: 0 PER 100 WBC
PLATELET # BLD AUTO: 179 K/UL (ref 138–453)
PMV BLD AUTO: 11 FL (ref 8.1–13.5)
RBC # BLD AUTO: 5.18 M/UL (ref 4.21–5.77)
WBC OTHER # BLD: 10.3 K/UL (ref 3.5–11.3)

## 2024-08-07 PROCEDURE — 36415 COLL VENOUS BLD VENIPUNCTURE: CPT

## 2024-08-07 PROCEDURE — 99213 OFFICE O/P EST LOW 20 MIN: CPT | Performed by: SURGERY

## 2024-08-07 PROCEDURE — 3017F COLORECTAL CA SCREEN DOC REV: CPT | Performed by: SURGERY

## 2024-08-07 PROCEDURE — 1036F TOBACCO NON-USER: CPT | Performed by: SURGERY

## 2024-08-07 PROCEDURE — 99212 OFFICE O/P EST SF 10 MIN: CPT | Performed by: SURGERY

## 2024-08-07 PROCEDURE — 85027 COMPLETE CBC AUTOMATED: CPT

## 2024-08-07 PROCEDURE — 3078F DIAST BP <80 MM HG: CPT | Performed by: SURGERY

## 2024-08-07 PROCEDURE — 3074F SYST BP LT 130 MM HG: CPT | Performed by: SURGERY

## 2024-08-07 PROCEDURE — G8417 CALC BMI ABV UP PARAM F/U: HCPCS | Performed by: SURGERY

## 2024-08-07 PROCEDURE — G8427 DOCREV CUR MEDS BY ELIG CLIN: HCPCS | Performed by: SURGERY

## 2024-08-07 PROCEDURE — 1123F ACP DISCUSS/DSCN MKR DOCD: CPT | Performed by: SURGERY

## 2024-08-11 DIAGNOSIS — N40.0 BENIGN PROSTATIC HYPERPLASIA WITHOUT LOWER URINARY TRACT SYMPTOMS: ICD-10-CM

## 2024-08-12 RX ORDER — FAMOTIDINE 20 MG/1
20 TABLET, FILM COATED ORAL NIGHTLY
Qty: 60 TABLET | Refills: 0 | Status: SHIPPED | OUTPATIENT
Start: 2024-08-12

## 2024-08-12 RX ORDER — TAMSULOSIN HYDROCHLORIDE 0.4 MG/1
0.4 CAPSULE ORAL DAILY
Qty: 90 CAPSULE | Refills: 0 | Status: SHIPPED | OUTPATIENT
Start: 2024-08-12

## 2024-08-12 NOTE — TELEPHONE ENCOUNTER
Markus Whitt is requesting a refill on the following medication(s):  Requested Prescriptions     Pending Prescriptions Disp Refills    famotidine (PEPCID) 20 MG tablet [Pharmacy Med Name: Famotidine 20 MG Oral Tablet] 60 tablet 0     Sig: Take 1 tablet by mouth nightly       Last Visit Date (If Applicable):  7/1/2024    Next Visit Date:    12/26/2024

## 2024-08-26 ENCOUNTER — TELEPHONE (OUTPATIENT)
Dept: PAIN MANAGEMENT | Age: 69
End: 2024-08-26

## 2024-08-26 NOTE — TELEPHONE ENCOUNTER
----- Message from PARESH Paula CNP sent at 8/5/2024 10:37 AM EDT -----  I updated my note, to try and get cervical facet injections approved

## 2024-08-27 DIAGNOSIS — M54.2 CERVICALGIA: ICD-10-CM

## 2024-08-27 DIAGNOSIS — M47.812 CERVICAL FACET JOINT SYNDROME: Primary | ICD-10-CM

## 2024-08-27 RX ORDER — DIAZEPAM 5 MG
TABLET ORAL
Qty: 2 TABLET | Refills: 0 | Status: SHIPPED | OUTPATIENT
Start: 2024-08-27 | End: 2024-09-26

## 2024-08-27 NOTE — TELEPHONE ENCOUNTER
Requested for upcoming procedure:     Requested Prescriptions     Pending Prescriptions Disp Refills    diazePAM (VALIUM) 5 MG tablet 2 tablet 0     Sig: Take one tablet by mouth 12 hours prior to procedure and take one tablet 2 hours prior to procedure.       Last Appointment Date: 8/5/2024  Next Appointment Date: 9/30/2024    Allergies   Allergen Reactions    Vicodin [Hydrocodone-Acetaminophen] Nausea Only     Stomach upset       Pharmacy:  [facility]

## 2024-09-05 ENCOUNTER — OFFICE VISIT (OUTPATIENT)
Age: 69
End: 2024-09-05
Payer: MEDICARE

## 2024-09-05 VITALS
BODY MASS INDEX: 26.03 KG/M2 | SYSTOLIC BLOOD PRESSURE: 148 MMHG | HEART RATE: 69 BPM | OXYGEN SATURATION: 98 % | DIASTOLIC BLOOD PRESSURE: 81 MMHG | WEIGHT: 176.3 LBS

## 2024-09-05 DIAGNOSIS — I25.10 CORONARY ARTERY DISEASE INVOLVING NATIVE CORONARY ARTERY OF NATIVE HEART WITHOUT ANGINA PECTORIS: Primary | ICD-10-CM

## 2024-09-05 PROCEDURE — 3079F DIAST BP 80-89 MM HG: CPT | Performed by: INTERNAL MEDICINE

## 2024-09-05 PROCEDURE — 3017F COLORECTAL CA SCREEN DOC REV: CPT | Performed by: INTERNAL MEDICINE

## 2024-09-05 PROCEDURE — 1036F TOBACCO NON-USER: CPT | Performed by: INTERNAL MEDICINE

## 2024-09-05 PROCEDURE — G8417 CALC BMI ABV UP PARAM F/U: HCPCS | Performed by: INTERNAL MEDICINE

## 2024-09-05 PROCEDURE — 99214 OFFICE O/P EST MOD 30 MIN: CPT | Performed by: INTERNAL MEDICINE

## 2024-09-05 PROCEDURE — 93000 ELECTROCARDIOGRAM COMPLETE: CPT | Performed by: INTERNAL MEDICINE

## 2024-09-05 PROCEDURE — G8427 DOCREV CUR MEDS BY ELIG CLIN: HCPCS | Performed by: INTERNAL MEDICINE

## 2024-09-05 PROCEDURE — 1123F ACP DISCUSS/DSCN MKR DOCD: CPT | Performed by: INTERNAL MEDICINE

## 2024-09-05 PROCEDURE — 3077F SYST BP >= 140 MM HG: CPT | Performed by: INTERNAL MEDICINE

## 2024-09-17 RX ORDER — ASPIRIN 81 MG/1
81 TABLET ORAL DAILY
Qty: 90 TABLET | Refills: 0 | Status: SHIPPED | OUTPATIENT
Start: 2024-09-17

## 2024-09-30 NOTE — TELEPHONE ENCOUNTER
Been a while since patient had refill, I put dose as 25mg daily - no other information given in doc note. Please double check dose and times a day?

## 2024-10-01 RX ORDER — METOPROLOL TARTRATE 25 MG/1
25 TABLET, FILM COATED ORAL DAILY
Qty: 90 TABLET | Refills: 3 | Status: SHIPPED | OUTPATIENT
Start: 2024-10-01

## 2024-10-07 RX ORDER — FAMOTIDINE 20 MG/1
20 TABLET, FILM COATED ORAL NIGHTLY
Qty: 60 TABLET | Refills: 0 | Status: SHIPPED | OUTPATIENT
Start: 2024-10-07

## 2024-10-21 NOTE — TELEPHONE ENCOUNTER
Markus Whitt is requesting a refill on the following medication(s):  Requested Prescriptions     Pending Prescriptions Disp Refills    omeprazole (PRILOSEC) 20 MG delayed release capsule [Pharmacy Med Name: OMEPRAZOLE 20MG CAP] 180 capsule 0     Sig: Take 1 capsule by mouth once daily       Last Visit Date (If Applicable):  7/1/2024    Next Visit Date:    12/26/2024

## 2024-11-03 DIAGNOSIS — N40.0 BENIGN PROSTATIC HYPERPLASIA WITHOUT LOWER URINARY TRACT SYMPTOMS: ICD-10-CM

## 2024-11-04 RX ORDER — TAMSULOSIN HYDROCHLORIDE 0.4 MG/1
0.4 CAPSULE ORAL DAILY
Qty: 90 CAPSULE | Refills: 0 | Status: SHIPPED | OUTPATIENT
Start: 2024-11-04

## 2024-11-14 ENCOUNTER — OFFICE VISIT (OUTPATIENT)
Dept: PAIN MANAGEMENT | Age: 69
End: 2024-11-14

## 2024-11-14 VITALS
SYSTOLIC BLOOD PRESSURE: 118 MMHG | DIASTOLIC BLOOD PRESSURE: 70 MMHG | HEART RATE: 72 BPM | RESPIRATION RATE: 16 BRPM | WEIGHT: 176 LBS | OXYGEN SATURATION: 98 % | BODY MASS INDEX: 25.99 KG/M2

## 2024-11-14 DIAGNOSIS — M54.12 CERVICAL RADICULOPATHY: Primary | ICD-10-CM

## 2024-11-14 DIAGNOSIS — M54.2 CERVICALGIA: ICD-10-CM

## 2024-11-14 DIAGNOSIS — M47.812 CERVICAL FACET JOINT SYNDROME: ICD-10-CM

## 2024-11-14 ASSESSMENT — ENCOUNTER SYMPTOMS
BACK PAIN: 1
SHORTNESS OF BREATH: 0
GASTROINTESTINAL NEGATIVE: 1
EYES NEGATIVE: 1

## 2024-11-14 NOTE — PROGRESS NOTES
[facility]  [address] [place]. [address]    Patient Name: Markus Whitt  MRN: [id number]  Encounter Date: 11/14/2024     SUBJECTIVE:  Markus Whitt is a 69 y.o., male being seen today regarding   Chief Complaint   Patient presents with    Neck Pain    Chronic Pain     S/p cervical facet.    and routine medication management.    Right sided neck pain-Post cervical radiofrequency ablation, no significant relief, alleviated spasm and tingling fingertips. Did not alleviate neck pain. Wishes he could still have facet injections    Cervical facet injections 10/30/24 with relief>80%, ROM improved -Facet injections are more effective for him    Topical THC with relief and gummy, alleviates pain for several hours, but does not like to be where he can't function, remains alert. Tylenol prn, relief for one hour    Functionality Assessment & Goals:  On a scale of 0 (Does not Interfere) to 10 (Completely Interferes)  Which number describes how during the past week pain has interfered with the following:   A.  General Activity: 8    B.  Mood:  10   C.  Walking Ability:  5   D.  Normal Work (Includes both work outside the home and housework):  8   E.  Relations with Other People:  3   F.  Sleep:  8    G.  Enjoyment of Life:  8  2.  Patient prefers to Take their Pain Medications:   [x] On a regular basis   [x] Only when necessary   [] Does not take pain medications  3.  What are the Patient's Goals/ Expectations for Visiting Pain Management?   [] Learn about my pain   [x] Physical Therapy   [x] Receive Injections   [] Deal with Anxiety and Stress   [x] Receive Medication   [] Treat Depression    [] Treat Sleep   [] Treat Opioid Dependence/ Addiction    Current Pain Assessment:         11/14/2024    10:22 AM   AMB PAIN ASSESSMENT   Location of Pain Neck   Location Modifiers Right;Anterior;Posterior;Superior;Medial;Lateral   Severity of Pain 7   Quality of Pain

## 2024-11-20 ENCOUNTER — OFFICE VISIT (OUTPATIENT)
Dept: FAMILY MEDICINE CLINIC | Age: 69
End: 2024-11-20
Payer: MEDICARE

## 2024-11-20 VITALS
HEART RATE: 60 BPM | BODY MASS INDEX: 26.43 KG/M2 | SYSTOLIC BLOOD PRESSURE: 108 MMHG | WEIGHT: 179 LBS | DIASTOLIC BLOOD PRESSURE: 60 MMHG | OXYGEN SATURATION: 97 %

## 2024-11-20 DIAGNOSIS — M25.50 PAIN IN JOINT INVOLVING MULTIPLE SITES: Primary | ICD-10-CM

## 2024-11-20 LAB
C-REACTIVE PROTEIN: < 0.5 MG/DL (ref 0–1)
SED RATE, AUTOMATED: 8 MM/HR (ref 0–15)
URIC ACID: 4.7 MG/DL (ref 3.5–8.5)

## 2024-11-20 PROCEDURE — 99212 OFFICE O/P EST SF 10 MIN: CPT

## 2024-11-20 PROCEDURE — 1123F ACP DISCUSS/DSCN MKR DOCD: CPT

## 2024-11-20 PROCEDURE — 3017F COLORECTAL CA SCREEN DOC REV: CPT

## 2024-11-20 PROCEDURE — 99214 OFFICE O/P EST MOD 30 MIN: CPT

## 2024-11-20 PROCEDURE — 3078F DIAST BP <80 MM HG: CPT

## 2024-11-20 PROCEDURE — 3074F SYST BP LT 130 MM HG: CPT

## 2024-11-20 PROCEDURE — 1159F MED LIST DOCD IN RCRD: CPT

## 2024-11-20 PROCEDURE — G8484 FLU IMMUNIZE NO ADMIN: HCPCS

## 2024-11-20 PROCEDURE — 1036F TOBACCO NON-USER: CPT

## 2024-11-20 PROCEDURE — G8417 CALC BMI ABV UP PARAM F/U: HCPCS

## 2024-11-20 PROCEDURE — G8427 DOCREV CUR MEDS BY ELIG CLIN: HCPCS

## 2024-11-20 PROCEDURE — 1160F RVW MEDS BY RX/DR IN RCRD: CPT

## 2024-11-20 RX ORDER — PREDNISONE 20 MG/1
20 TABLET ORAL DAILY
Qty: 5 TABLET | Refills: 0 | Status: SHIPPED | OUTPATIENT
Start: 2024-11-20 | End: 2024-11-25

## 2024-11-20 NOTE — PROGRESS NOTES
10-14 = Moderate depression, 15-19 = Moderately severe depression, 20-27 = Severe depression     Objective:     Vitals:    11/20/24 1053   BP: 108/60   Site: Right Upper Arm   Position: Sitting   Pulse: 60   SpO2: 97%   Weight: 81.2 kg (179 lb)        Physical Exam  Vitals and nursing note reviewed.   HENT:      Head: Normocephalic.   Cardiovascular:      Rate and Rhythm: Normal rate and regular rhythm.      Pulses: Normal pulses.      Heart sounds: Normal heart sounds. No murmur heard.  Pulmonary:      Effort: Pulmonary effort is normal.      Breath sounds: Normal breath sounds.   Musculoskeletal:         General: Tenderness present.   Skin:     General: Skin is warm and dry.      Capillary Refill: Capillary refill takes less than 2 seconds.   Neurological:      Mental Status: He is alert.           Please note that this chart was generated using voice recognition Dragon dictation software.  Although every effort was made to ensure the accuracy of this automated transcription, some errors in transcription may have occurred.    Electronically signed by PARESH Gonsalez CNP on 11/26/2024 at 3:01 PM.

## 2024-11-21 LAB
ANA SCREEN: NEGATIVE
ANTI DNA DOUBLE STRANDED: 1.7 IU/ML
CYCLIC CITRULLIN PEPTIDE AB: 1.9 U/ML (ref 0–7)
ENA: 0.2 U/ML
RHEUMATOID FACTOR: <10 IU/ML (ref 0–13)

## 2024-11-26 ASSESSMENT — ENCOUNTER SYMPTOMS
SHORTNESS OF BREATH: 0
COUGH: 0
COLOR CHANGE: 0
ABDOMINAL PAIN: 0
BACK PAIN: 1

## 2024-12-09 RX ORDER — FAMOTIDINE 20 MG/1
20 TABLET, FILM COATED ORAL NIGHTLY
Qty: 60 TABLET | Refills: 4 | Status: SHIPPED | OUTPATIENT
Start: 2024-12-09

## 2024-12-09 NOTE — TELEPHONE ENCOUNTER
Markus Whitt is requesting a refill on the following medication(s):  Requested Prescriptions     Pending Prescriptions Disp Refills    famotidine (PEPCID) 20 MG tablet [Pharmacy Med Name: Famotidine 20 MG Oral Tablet] 60 tablet 0     Sig: Take 1 tablet by mouth nightly       Last Visit Date (If Applicable):  11/20/2024    Next Visit Date:    1/6/2025

## 2025-01-03 ENCOUNTER — HOSPITAL ENCOUNTER (OUTPATIENT)
Age: 70
Discharge: HOME | End: 2025-01-03
Payer: MEDICARE

## 2025-01-03 DIAGNOSIS — M25.50 PAIN IN JOINT INVOLVING MULTIPLE SITES: ICD-10-CM

## 2025-01-03 DIAGNOSIS — C67.9 UROTHELIAL CARCINOMA OF BLADDER (HCC): ICD-10-CM

## 2025-01-03 LAB
CRP SERPL HS-MCNC: 10.1 MG/L (ref 0–5)
ERYTHROCYTE [SEDIMENTATION RATE] IN BLOOD BY PHOTOMETRIC METHOD: 21 MM/HR (ref 0–20)
RHEUMATOID FACT SER NEPH-ACNC: <10 IU/ML (ref 0–13)
URATE SERPL-MCNC: 4.6 MG/DL (ref 3.4–7)

## 2025-01-03 PROCEDURE — 84550 ASSAY OF BLOOD/URIC ACID: CPT

## 2025-01-03 PROCEDURE — 86225 DNA ANTIBODY NATIVE: CPT

## 2025-01-03 PROCEDURE — 86200 CCP ANTIBODY: CPT

## 2025-01-03 PROCEDURE — 86140 C-REACTIVE PROTEIN: CPT

## 2025-01-03 PROCEDURE — 85652 RBC SED RATE AUTOMATED: CPT

## 2025-01-03 PROCEDURE — 86431 RHEUMATOID FACTOR QUANT: CPT

## 2025-01-03 PROCEDURE — 36415 COLL VENOUS BLD VENIPUNCTURE: CPT

## 2025-01-03 PROCEDURE — 86038 ANTINUCLEAR ANTIBODIES: CPT

## 2025-01-04 SDOH — HEALTH STABILITY: PHYSICAL HEALTH: ON AVERAGE, HOW MANY DAYS PER WEEK DO YOU ENGAGE IN MODERATE TO STRENUOUS EXERCISE (LIKE A BRISK WALK)?: 7 DAYS

## 2025-01-04 SDOH — HEALTH STABILITY: PHYSICAL HEALTH: ON AVERAGE, HOW MANY MINUTES DO YOU ENGAGE IN EXERCISE AT THIS LEVEL?: 40 MIN

## 2025-01-04 ASSESSMENT — PATIENT HEALTH QUESTIONNAIRE - PHQ9
1. LITTLE INTEREST OR PLEASURE IN DOING THINGS: NOT AT ALL
SUM OF ALL RESPONSES TO PHQ QUESTIONS 1-9: 0
2. FEELING DOWN, DEPRESSED OR HOPELESS: NOT AT ALL
SUM OF ALL RESPONSES TO PHQ QUESTIONS 1-9: 0
SUM OF ALL RESPONSES TO PHQ QUESTIONS 1-9: 0
SUM OF ALL RESPONSES TO PHQ9 QUESTIONS 1 & 2: 0
SUM OF ALL RESPONSES TO PHQ QUESTIONS 1-9: 0

## 2025-01-04 ASSESSMENT — LIFESTYLE VARIABLES
HOW OFTEN DO YOU HAVE SIX OR MORE DRINKS ON ONE OCCASION: 1
HOW MANY STANDARD DRINKS CONTAINING ALCOHOL DO YOU HAVE ON A TYPICAL DAY: PATIENT DOES NOT DRINK
HOW OFTEN DO YOU HAVE A DRINK CONTAINING ALCOHOL: NEVER
HOW MANY STANDARD DRINKS CONTAINING ALCOHOL DO YOU HAVE ON A TYPICAL DAY: 0
HOW OFTEN DO YOU HAVE A DRINK CONTAINING ALCOHOL: 1

## 2025-01-06 ENCOUNTER — OFFICE VISIT (OUTPATIENT)
Dept: FAMILY MEDICINE CLINIC | Age: 70
End: 2025-01-06
Payer: MEDICARE

## 2025-01-06 VITALS
WEIGHT: 188 LBS | BODY MASS INDEX: 27.85 KG/M2 | DIASTOLIC BLOOD PRESSURE: 72 MMHG | HEIGHT: 69 IN | OXYGEN SATURATION: 98 % | HEART RATE: 68 BPM | SYSTOLIC BLOOD PRESSURE: 122 MMHG

## 2025-01-06 DIAGNOSIS — C67.9 UROTHELIAL CARCINOMA OF BLADDER (HCC): ICD-10-CM

## 2025-01-06 DIAGNOSIS — R09.81 COUGH WITH CONGESTION OF PARANASAL SINUS: Primary | ICD-10-CM

## 2025-01-06 DIAGNOSIS — R05.8 COUGH WITH CONGESTION OF PARANASAL SINUS: Primary | ICD-10-CM

## 2025-01-06 DIAGNOSIS — J43.1 PANLOBULAR EMPHYSEMA (HCC): ICD-10-CM

## 2025-01-06 DIAGNOSIS — C67.9 UROTHELIAL CARCINOMA OF BLADDER (HCC): Primary | ICD-10-CM

## 2025-01-06 DIAGNOSIS — M25.50 PAIN IN JOINT INVOLVING MULTIPLE SITES: ICD-10-CM

## 2025-01-06 DIAGNOSIS — R70.0 ELEVATED SED RATE: ICD-10-CM

## 2025-01-06 DIAGNOSIS — Z00.00 MEDICARE ANNUAL WELLNESS VISIT, SUBSEQUENT: Primary | ICD-10-CM

## 2025-01-06 LAB
ANA SER QL IA: NEGATIVE
CCP AB SER IA-ACNC: 1.3 U/ML (ref 0–7)
DSDNA IGG SER QL IA: 1.7 IU/ML
NUCLEAR IGG SER IA-RTO: 0.2 U/ML

## 2025-01-06 PROCEDURE — 3074F SYST BP LT 130 MM HG: CPT

## 2025-01-06 PROCEDURE — 1160F RVW MEDS BY RX/DR IN RCRD: CPT

## 2025-01-06 PROCEDURE — 99397 PER PM REEVAL EST PAT 65+ YR: CPT

## 2025-01-06 PROCEDURE — 1159F MED LIST DOCD IN RCRD: CPT

## 2025-01-06 PROCEDURE — 3017F COLORECTAL CA SCREEN DOC REV: CPT

## 2025-01-06 PROCEDURE — M1299 PR FLU IMMUNIZE ORDER/ADMIN: HCPCS

## 2025-01-06 PROCEDURE — 1123F ACP DISCUSS/DSCN MKR DOCD: CPT

## 2025-01-06 PROCEDURE — G0439 PPPS, SUBSEQ VISIT: HCPCS

## 2025-01-06 PROCEDURE — 3078F DIAST BP <80 MM HG: CPT

## 2025-01-06 RX ORDER — AZITHROMYCIN 250 MG/1
TABLET, FILM COATED ORAL
Qty: 6 TABLET | Refills: 0 | Status: SHIPPED | OUTPATIENT
Start: 2025-01-06 | End: 2025-01-16

## 2025-01-06 NOTE — PROGRESS NOTES
(188 lb)   Height: 1.753 m (5' 9\")      Body mass index is 27.76 kg/m².        General Appearance: alert and oriented to person, place and time, well developed and well- nourished, in no acute distress  Skin: warm and dry, no rash or erythema  Head: normocephalic and atraumatic  Eyes: pupils equal, round, and reactive to light, extraocular eye movements intact, conjunctivae normal  ENT: tympanic membrane, external ear and ear canal normal bilaterally, nose without deformity, nasal mucosa and turbinates normal without polyps  Neck: supple and non-tender without mass, no thyromegaly or thyroid nodules, no cervical lymphadenopathy  Pulmonary/Chest: clear to auscultation bilaterally- no wheezes, rales or rhonchi, normal air movement, no respiratory distress  Cardiovascular: normal rate, regular rhythm, normal S1 and S2, no murmurs, rubs, clicks, or gallops, distal pulses intact, no carotid bruits  Abdomen: soft, non-tender, non-distended, normal bowel sounds, no masses or organomegaly  Extremities: no cyanosis, clubbing or edema  Musculoskeletal: normal range of motion, no joint swelling, deformity or tenderness  Neurologic: reflexes normal and symmetric, no cranial nerve deficit, gait, coordination and speech normal            Allergies   Allergen Reactions    Hydrocodone Nausea And Vomiting    Vicodin [Hydrocodone-Acetaminophen] Nausea Only     Stomach upset     Prior to Visit Medications    Medication Sig Taking? Authorizing Provider   famotidine (PEPCID) 20 MG tablet Take 1 tablet by mouth nightly Yes Pilo Doss APRN - CNP   tamsulosin (FLOMAX) 0.4 MG capsule Take 1 capsule by mouth once daily Yes Rajeev Graff MD   omeprazole (PRILOSEC) 20 MG delayed release capsule Take 1 capsule by mouth once daily Yes Piol Doss APRN - CNP   metoprolol tartrate (LOPRESSOR) 25 MG tablet Take 1 tablet by mouth daily Yes Marybel Glover APRN - CNP   aspirin 81 MG EC tablet Take 1 tablet by mouth daily Yes Samuel

## 2025-01-06 NOTE — PATIENT INSTRUCTIONS

## 2025-01-09 LAB — UROTHELIAL CANCER DETECTION: NORMAL

## 2025-01-13 ENCOUNTER — PROCEDURE VISIT (OUTPATIENT)
Dept: UROLOGY | Age: 70
End: 2025-01-13
Payer: MEDICARE

## 2025-01-13 VITALS
HEIGHT: 69 IN | HEART RATE: 73 BPM | WEIGHT: 188 LBS | OXYGEN SATURATION: 98 % | BODY MASS INDEX: 27.85 KG/M2 | DIASTOLIC BLOOD PRESSURE: 74 MMHG | SYSTOLIC BLOOD PRESSURE: 130 MMHG | RESPIRATION RATE: 16 BRPM

## 2025-01-13 DIAGNOSIS — C67.9 UROTHELIAL CARCINOMA OF BLADDER (HCC): Primary | ICD-10-CM

## 2025-01-13 PROCEDURE — 52000 CYSTOURETHROSCOPY: CPT | Performed by: UROLOGY

## 2025-01-13 RX ORDER — LIDOCAINE HYDROCHLORIDE 20 MG/ML
JELLY TOPICAL ONCE
Status: COMPLETED | OUTPATIENT
Start: 2025-01-13 | End: 2025-01-13

## 2025-01-13 RX ADMIN — LIDOCAINE HYDROCHLORIDE: 20 JELLY TOPICAL at 10:15

## 2025-01-13 NOTE — PROGRESS NOTES
Cystoscopy    Operative Note    Patient:  Markus Whitt  MRN: [id number]  YOB: 1955    Date: 01/13/25  Surgeon: MELITON PEREZ MD  Anesthesia: Urojet Local  Indications:     Had recent GI bleed and hospitalization      BPH- cont flomax. Stable.    Bladder cancer- hx of low grade. Had two recurrences on dome of bladder that we resected (still low grade)      Cysto in office 6 months. If pt has another recurrence, will need bcg         Position: Supine  EBL: 0 ml    Findings:   The patient was prepped and draped in the usual sterile fashion.  The flexible cystoscope was advanced through the urethra and into the bladder.  The bladder was thoroughly inspected and the following was noted:    Residual Urine: moderate.  Urine clear, with no obvious infection  Urethra: No abnormalities of the urethra are noted. Urethral dilation was not performed.    Prostate: lateral lobe hypertrophy + present, prostate moderately obstructing, intravesical extension of prostate not present. There was no previous TURP defect.   Bladder: no tumor noted .    trabeculation noted.  no bladder diverticulum.  Ureters: Orifices with normal configuration and location.      The cystoscope was removed.  The patient tolerated the procedure well.  Abnormal FISH-- did not see any overtly obvious lesions     Cysto random bladder bx with fulguration and bl retrograde pyelogram (15) mac

## 2025-01-13 NOTE — PATIENT INSTRUCTIONS
Problem: Pain - Standard  Goal: Alleviation of pain or a reduction in pain to the patient’s comfort goal  Outcome: Progressing     Problem: Knowledge Deficit - Standard  Goal: Patient and family/care givers will demonstrate understanding of plan of care, disease process/condition, diagnostic tests and medications  Outcome: Progressing     Problem: Fall Risk  Goal: Patient will remain free from falls  Outcome: Progressing   The patient is Stable - Low risk of patient condition declining or worsening    Shift Goals  Clinical Goals: Pain Control  Patient Goals: Pain Control  Family Goals: N/A    Progress made toward(s) clinical / shift goals:  Patient reporting decreasing pain w current pain regiment     Patient is not progressing towards the following goals:       Drugs 5 days before surgery (marijuana, heroin, cocaine, LSD, etc.)  ?  You will NEED a  the DAY of SURGERY if you are having SEDATION.  ?  The day of Surgery/Procedure ONLY one (1) adult may come into the facility with you.  ?  Please make arrangements to have someone assist you at home for the first 24 hours   following surgery.  ?  If you develop a cold, fever, chills, stomach ache, diarrhea, or other illness, please   contact your doctor right away. For your safety, your surgery may be re-scheduled.  If you have any questions regarding your surgery, please call your physician [phone]  The [facility] will call THE AFTERNOON BEFORE AFTER 1PM with arrival time [phone].  **Please Bring** 1)insurance card(s) 2)’s license 3)also a form of payment as you may   need to pay on your account

## 2025-01-13 NOTE — PROGRESS NOTES
Holden Storz Cystourethroscope Model Number [id number]; Serial Number [id number] scope#2 used for procedure today, was removed from sterile container after visual inspection

## 2025-01-14 DIAGNOSIS — M25.50 PAIN IN JOINT INVOLVING MULTIPLE SITES: Primary | ICD-10-CM

## 2025-01-14 DIAGNOSIS — R70.0 ELEVATED SED RATE: ICD-10-CM

## 2025-01-15 ENCOUNTER — TELEPHONE (OUTPATIENT)
Dept: UROLOGY | Age: 70
End: 2025-01-15

## 2025-01-15 ENCOUNTER — PREP FOR PROCEDURE (OUTPATIENT)
Dept: UROLOGY | Age: 70
End: 2025-01-15

## 2025-01-15 PROBLEM — C67.9 BLADDER CANCER (HCC): Status: ACTIVE | Noted: 2025-01-15

## 2025-01-15 NOTE — TELEPHONE ENCOUNTER
[facility]         Patient:Markus Whitt           :1955           Surgical/Procedure Planned: Cystoscopy Bilateral Retrograde Pyelogram with Bladder Biopsy and Fulgaration    Date & Location: 25 at [facility]       Outpatient   Planned Length of OR: 15 min    Sedation: MAC    This is a request for cardiac clearance.    Estimated Cardiac Risk for Non-Cardiac Surgery/Procedure     Low______ Moderate______ High______    Medication Instructions - Clarification needed by this date:     -Other medications:    ASA 81 mg  Hold ___ Days    Resume medications:     Lovenox indicated: _____Yes _____NO    Provider: Dr. Rajeev Graff      Signature of Provider Giving Orders for Medication holds:    _____________________________________________

## 2025-01-16 DIAGNOSIS — M54.2 NECK PAIN: ICD-10-CM

## 2025-01-16 DIAGNOSIS — M62.838 MUSCLE SPASM: ICD-10-CM

## 2025-01-16 RX ORDER — GABAPENTIN 600 MG/1
600 TABLET ORAL 3 TIMES DAILY
Qty: 90 TABLET | Refills: 11 | Status: SHIPPED | OUTPATIENT
Start: 2025-01-16 | End: 2026-01-11

## 2025-01-16 NOTE — TELEPHONE ENCOUNTER
Gabapentin 600 mg  Tizanidine 4 mg  Last Appt:  11/14/2024  Next Appt:   2/14/2025  Med verified in Epic

## 2025-01-17 NOTE — TELEPHONE ENCOUNTER
[facility]     Pre-Operative Evaluation/Consultation    Name:  Markus Whitt                                         Age:  69 y.o.  MRN:  [id number]       :  1955   Date:  2025         Sex: male    Bladder Cancer    Surgeon:  Dr. Rajeev Graff  Procedure (Planned):  Cystoscopy Bilateral Retrograde Pyelogram with Bladder Biopsy and Fulguration  Date Scheduled surgery: 25    Attending : No att. providers found    Primary Physician: Pilo Doss  Cardiologist: Dr. Baker- to hold aspirin for 7 days    Type of Anesthesia Requested: Monitored Anesthesia Care    Patient Medical history:  Allergies   Allergen Reactions    Hydrocodone Nausea And Vomiting    Vicodin [Hydrocodone-Acetaminophen] Nausea Only     Stomach upset     Social History     Tobacco Use    Smoking status: Former     Current packs/day: 0.00     Average packs/day: 2.0 packs/day for 33.0 years (66.0 ttl pk-yrs)     Types: Cigarettes     Start date: 1975     Quit date: 2008     Years since quittin.0    Smokeless tobacco: Never    Tobacco comments:     RUSLAN Hobson RRT 16   Vaping Use    Vaping status: Never Used   Substance Use Topics    Alcohol use: No     Alcohol/week: 0.0 standard drinks of alcohol    Drug use: Yes     Comment: topical THC ointment for shoulder         Additional ordered pre-operative testing:  []CBC    []ABG      [] BMP   []URINALYSIS   []CMP    []HCG   []COAGS PT/INR  []T&C  []LFTs   []TYPE AND SCREEN    [] EKG  [] Chest X-Ray  [] Other Radiology    [] Sent to Hospitalist None  [] Sent to Anesthesia for your review: None   [] Additional Orders: None     Comments:None   Requests: No Special requests    Signed: Nisa Pickard LPN 2025 3:58 PM

## 2025-02-03 DIAGNOSIS — N40.0 BENIGN PROSTATIC HYPERPLASIA WITHOUT LOWER URINARY TRACT SYMPTOMS: ICD-10-CM

## 2025-02-03 RX ORDER — TAMSULOSIN HYDROCHLORIDE 0.4 MG/1
0.4 CAPSULE ORAL DAILY
Qty: 90 CAPSULE | Refills: 3 | Status: SHIPPED | OUTPATIENT
Start: 2025-02-03

## 2025-02-03 NOTE — TELEPHONE ENCOUNTER
Markus called requesting a refill of the below medication which has been pended for you:     Requested Prescriptions      No prescriptions requested or ordered in this encounter       Last Appointment Date: 1/13/2025  Next Appointment Date: (set up for procedure)    Allergies   Allergen Reactions    Hydrocodone Nausea And Vomiting    Vicodin [Hydrocodone-Acetaminophen] Nausea Only     Stomach upset

## 2025-02-14 ENCOUNTER — OFFICE VISIT (OUTPATIENT)
Dept: PAIN MANAGEMENT | Age: 70
End: 2025-02-14
Payer: MEDICARE

## 2025-02-14 VITALS
BODY MASS INDEX: 28.14 KG/M2 | HEIGHT: 69 IN | RESPIRATION RATE: 12 BRPM | DIASTOLIC BLOOD PRESSURE: 62 MMHG | SYSTOLIC BLOOD PRESSURE: 118 MMHG | HEART RATE: 59 BPM | WEIGHT: 190 LBS | OXYGEN SATURATION: 99 %

## 2025-02-14 DIAGNOSIS — M47.812 CERVICAL SPONDYLOSIS: ICD-10-CM

## 2025-02-14 DIAGNOSIS — M54.12 CERVICAL RADICULOPATHY: Primary | ICD-10-CM

## 2025-02-14 PROCEDURE — 1036F TOBACCO NON-USER: CPT | Performed by: NURSE PRACTITIONER

## 2025-02-14 PROCEDURE — G8417 CALC BMI ABV UP PARAM F/U: HCPCS | Performed by: NURSE PRACTITIONER

## 2025-02-14 PROCEDURE — 1160F RVW MEDS BY RX/DR IN RCRD: CPT | Performed by: NURSE PRACTITIONER

## 2025-02-14 PROCEDURE — 3074F SYST BP LT 130 MM HG: CPT | Performed by: NURSE PRACTITIONER

## 2025-02-14 PROCEDURE — 1159F MED LIST DOCD IN RCRD: CPT | Performed by: NURSE PRACTITIONER

## 2025-02-14 PROCEDURE — 1123F ACP DISCUSS/DSCN MKR DOCD: CPT | Performed by: NURSE PRACTITIONER

## 2025-02-14 PROCEDURE — G8427 DOCREV CUR MEDS BY ELIG CLIN: HCPCS | Performed by: NURSE PRACTITIONER

## 2025-02-14 PROCEDURE — 99213 OFFICE O/P EST LOW 20 MIN: CPT | Performed by: NURSE PRACTITIONER

## 2025-02-14 PROCEDURE — 3078F DIAST BP <80 MM HG: CPT | Performed by: NURSE PRACTITIONER

## 2025-02-14 PROCEDURE — 99214 OFFICE O/P EST MOD 30 MIN: CPT | Performed by: NURSE PRACTITIONER

## 2025-02-14 PROCEDURE — 3017F COLORECTAL CA SCREEN DOC REV: CPT | Performed by: NURSE PRACTITIONER

## 2025-02-14 ASSESSMENT — ENCOUNTER SYMPTOMS
BACK PAIN: 1
EYES NEGATIVE: 1
SHORTNESS OF BREATH: 0
GASTROINTESTINAL NEGATIVE: 1

## 2025-02-14 NOTE — PROGRESS NOTES
[facility]  [address] [place]. [address]    Patient Name: Markus Whitt  MRN: [id number]  Encounter Date: 2/14/2025     SUBJECTIVE:  Markus Whitt is a 69 y.o., male being seen today regarding   Chief Complaint   Patient presents with    Neck Pain     Cervical radiculopathy    and routine medication management.    Cervical facet injections 10/30/24 with relief>80%, ROM improved -Facet injections are more effective for him. Has not wore off yet    Topical THC with relief and gummy, alleviates pain for several hours, but does not like to be where he can't function, remains alert. Tylenol prn, relief for one hour    Functionality Assessment & Goals:  On a scale of 0 (Does not Interfere) to 10 (Completely Interferes)  Which number describes how during the past week pain has interfered with the following:   A.  General Activity: 8    B.  Mood:  10   C.  Walking Ability:  5   D.  Normal Work (Includes both work outside the home and housework):  8   E.  Relations with Other People:  3   F.  Sleep:  8    G.  Enjoyment of Life:  8  2.  Patient prefers to Take their Pain Medications:   [x] On a regular basis   [x] Only when necessary   [] Does not take pain medications  3.  What are the Patient's Goals/ Expectations for Visiting Pain Management?   [] Learn about my pain   [x] Physical Therapy   [x] Receive Injections   [] Deal with Anxiety and Stress   [x] Receive Medication   [] Treat Depression    [] Treat Sleep   [] Treat Opioid Dependence/ Addiction    Current Pain Assessment:         2/14/2025    10:32 AM   AMB PAIN ASSESSMENT   Location of Pain Neck   Location Modifiers Medial   Severity of Pain 3   Quality of Pain Throbbing;Sharp;Dull;Aching   Duration of Pain Persistent   Frequency of Pain Constant   Aggravating Factors Bending   Limiting Behavior Yes   Relieving Factors Rest;Heat   Result of Injury No   Work-Related Injury No   Are there other pain locations you wish to

## 2025-02-24 ENCOUNTER — ANESTHESIA EVENT (OUTPATIENT)
Dept: OPERATING ROOM | Age: 70
End: 2025-02-24
Payer: MEDICARE

## 2025-02-24 ENCOUNTER — ANESTHESIA (OUTPATIENT)
Dept: OPERATING ROOM | Age: 70
End: 2025-02-24
Payer: MEDICARE

## 2025-02-24 ENCOUNTER — HOSPITAL ENCOUNTER (OUTPATIENT)
Age: 70
Setting detail: OUTPATIENT SURGERY
Discharge: HOME | End: 2025-02-24
Attending: UROLOGY | Admitting: UROLOGY
Payer: MEDICARE

## 2025-02-24 ENCOUNTER — APPOINTMENT (OUTPATIENT)
Dept: GENERAL RADIOLOGY | Age: 70
End: 2025-02-24
Attending: UROLOGY
Payer: MEDICARE

## 2025-02-24 VITALS
TEMPERATURE: 97.2 F | HEIGHT: 69 IN | SYSTOLIC BLOOD PRESSURE: 145 MMHG | BODY MASS INDEX: 27.7 KG/M2 | WEIGHT: 187 LBS | OXYGEN SATURATION: 97 % | RESPIRATION RATE: 14 BRPM | HEART RATE: 59 BPM | DIASTOLIC BLOOD PRESSURE: 67 MMHG

## 2025-02-24 DIAGNOSIS — C67.9 BLADDER CANCER (HCC): ICD-10-CM

## 2025-02-24 PROCEDURE — 6360000002 HC RX W HCPCS: Performed by: UROLOGY

## 2025-02-24 PROCEDURE — 3600000002 HC SURGERY LEVEL 2 BASE: Performed by: UROLOGY

## 2025-02-24 PROCEDURE — 3600000012 HC SURGERY LEVEL 2 ADDTL 15MIN: Performed by: UROLOGY

## 2025-02-24 PROCEDURE — 88112 CYTOPATH CELL ENHANCE TECH: CPT

## 2025-02-24 PROCEDURE — 7100000011 HC PHASE II RECOVERY - ADDTL 15 MIN: Performed by: UROLOGY

## 2025-02-24 PROCEDURE — 6360000004 HC RX CONTRAST MEDICATION: Performed by: UROLOGY

## 2025-02-24 PROCEDURE — 2709999900 HC NON-CHARGEABLE SUPPLY: Performed by: UROLOGY

## 2025-02-24 PROCEDURE — 2580000003 HC RX 258: Performed by: NURSE ANESTHETIST, CERTIFIED REGISTERED

## 2025-02-24 PROCEDURE — 6360000002 HC RX W HCPCS: Performed by: NURSE ANESTHETIST, CERTIFIED REGISTERED

## 2025-02-24 PROCEDURE — 3700000001 HC ADD 15 MINUTES (ANESTHESIA): Performed by: UROLOGY

## 2025-02-24 PROCEDURE — 2580000003 HC RX 258: Performed by: UROLOGY

## 2025-02-24 PROCEDURE — 88305 TISSUE EXAM BY PATHOLOGIST: CPT

## 2025-02-24 PROCEDURE — C1769 GUIDE WIRE: HCPCS | Performed by: UROLOGY

## 2025-02-24 PROCEDURE — 7100000010 HC PHASE II RECOVERY - FIRST 15 MIN: Performed by: UROLOGY

## 2025-02-24 PROCEDURE — 3700000000 HC ANESTHESIA ATTENDED CARE: Performed by: UROLOGY

## 2025-02-24 PROCEDURE — 88342 IMHCHEM/IMCYTCHM 1ST ANTB: CPT

## 2025-02-24 RX ORDER — SODIUM CHLORIDE, SODIUM LACTATE, POTASSIUM CHLORIDE, CALCIUM CHLORIDE 600; 310; 30; 20 MG/100ML; MG/100ML; MG/100ML; MG/100ML
INJECTION, SOLUTION INTRAVENOUS
Status: DISCONTINUED | OUTPATIENT
Start: 2025-02-24 | End: 2025-02-24 | Stop reason: SDUPTHER

## 2025-02-24 RX ORDER — SODIUM CHLORIDE 9 MG/ML
INJECTION, SOLUTION INTRAVENOUS PRN
Status: DISCONTINUED | OUTPATIENT
Start: 2025-02-24 | End: 2025-02-24 | Stop reason: HOSPADM

## 2025-02-24 RX ORDER — SODIUM CHLORIDE 0.9 % (FLUSH) 0.9 %
5-40 SYRINGE (ML) INJECTION PRN
Status: DISCONTINUED | OUTPATIENT
Start: 2025-02-24 | End: 2025-02-24 | Stop reason: HOSPADM

## 2025-02-24 RX ORDER — ONDANSETRON 2 MG/ML
INJECTION INTRAMUSCULAR; INTRAVENOUS
Status: DISCONTINUED | OUTPATIENT
Start: 2025-02-24 | End: 2025-02-24 | Stop reason: SDUPTHER

## 2025-02-24 RX ORDER — FENTANYL CITRATE 50 UG/ML
INJECTION, SOLUTION INTRAMUSCULAR; INTRAVENOUS
Status: DISCONTINUED | OUTPATIENT
Start: 2025-02-24 | End: 2025-02-24 | Stop reason: SDUPTHER

## 2025-02-24 RX ORDER — SODIUM CHLORIDE, SODIUM LACTATE, POTASSIUM CHLORIDE, CALCIUM CHLORIDE 600; 310; 30; 20 MG/100ML; MG/100ML; MG/100ML; MG/100ML
INJECTION, SOLUTION INTRAVENOUS CONTINUOUS
Status: DISCONTINUED | OUTPATIENT
Start: 2025-02-24 | End: 2025-02-24 | Stop reason: HOSPADM

## 2025-02-24 RX ORDER — DEXAMETHASONE SODIUM PHOSPHATE 10 MG/ML
INJECTION, SOLUTION INTRA-ARTICULAR; INTRALESIONAL; INTRAMUSCULAR; INTRAVENOUS; SOFT TISSUE
Status: DISCONTINUED | OUTPATIENT
Start: 2025-02-24 | End: 2025-02-24 | Stop reason: SDUPTHER

## 2025-02-24 RX ORDER — SODIUM CHLORIDE 0.9 % (FLUSH) 0.9 %
5-40 SYRINGE (ML) INJECTION EVERY 12 HOURS SCHEDULED
Status: DISCONTINUED | OUTPATIENT
Start: 2025-02-24 | End: 2025-02-24 | Stop reason: HOSPADM

## 2025-02-24 RX ORDER — CEFDINIR 300 MG/1
600 CAPSULE ORAL DAILY
Qty: 20 CAPSULE | Refills: 0 | Status: SHIPPED | OUTPATIENT
Start: 2025-02-24 | End: 2025-03-06

## 2025-02-24 RX ORDER — PROPOFOL 10 MG/ML
INJECTION, EMULSION INTRAVENOUS
Status: DISCONTINUED | OUTPATIENT
Start: 2025-02-24 | End: 2025-02-24 | Stop reason: SDUPTHER

## 2025-02-24 RX ORDER — LIDOCAINE HYDROCHLORIDE 10 MG/ML
INJECTION, SOLUTION EPIDURAL; INFILTRATION; INTRACAUDAL; PERINEURAL
Status: DISCONTINUED | OUTPATIENT
Start: 2025-02-24 | End: 2025-02-24 | Stop reason: SDUPTHER

## 2025-02-24 RX ADMIN — SODIUM CHLORIDE, POTASSIUM CHLORIDE, SODIUM LACTATE AND CALCIUM CHLORIDE: 600; 310; 30; 20 INJECTION, SOLUTION INTRAVENOUS at 13:13

## 2025-02-24 RX ADMIN — DEXAMETHASONE SODIUM PHOSPHATE 10 MG: 10 INJECTION INTRAMUSCULAR; INTRAVENOUS at 13:15

## 2025-02-24 RX ADMIN — SODIUM CHLORIDE, POTASSIUM CHLORIDE, SODIUM LACTATE AND CALCIUM CHLORIDE: 600; 310; 30; 20 INJECTION, SOLUTION INTRAVENOUS at 12:19

## 2025-02-24 RX ADMIN — PROPOFOL 200 MG: 10 INJECTION, EMULSION INTRAVENOUS at 13:15

## 2025-02-24 RX ADMIN — PROPOFOL 200 MCG/KG/MIN: 10 INJECTION, EMULSION INTRAVENOUS at 13:16

## 2025-02-24 RX ADMIN — LIDOCAINE HYDROCHLORIDE 5 ML: 10 INJECTION, SOLUTION EPIDURAL; INFILTRATION; INTRACAUDAL; PERINEURAL at 13:15

## 2025-02-24 RX ADMIN — Medication 2000 MG: at 13:13

## 2025-02-24 RX ADMIN — ONDANSETRON 4 MG: 2 INJECTION INTRAMUSCULAR; INTRAVENOUS at 13:15

## 2025-02-24 RX ADMIN — FENTANYL CITRATE 50 MCG: 50 INJECTION, SOLUTION INTRAMUSCULAR; INTRAVENOUS at 13:21

## 2025-02-24 RX ADMIN — FENTANYL CITRATE 50 MCG: 50 INJECTION, SOLUTION INTRAMUSCULAR; INTRAVENOUS at 13:15

## 2025-02-24 ASSESSMENT — PAIN - FUNCTIONAL ASSESSMENT: PAIN_FUNCTIONAL_ASSESSMENT: 0-10

## 2025-02-24 NOTE — H&P
MELITON PEREZ MD  History and Physical    Patient:  Markus Whitt  MRN: [id number]  YOB: 1955    HISTORY OF PRESENT ILLNESS:     The patient is a 69 y.o. male who presents with bladder cancer. Here for procedure.    Patient's old records, notes and chart reviewed and summarized above.     MELITON PEREZ MD independently reviewed the images and verified the radiology reports from:    No results found.      Past Medical History:    Past Medical History:   Diagnosis Date    Abnormal cardiovascular stress test 12/09/2020    Large inferolateral infarction with significant vernon-infarct ischemia.    CAD (coronary artery disease)     CABG-2 vessel, followed by stents x 4    Cervical disc disease     Clostridium difficile diarrhea 10/2014    hospitalized in October 2014    Displacement of cervical intervertebral disc without myelopathy 06/10/2014    [facility], C6/C7     Duodenal ulcer 12/23/2023    GI bleed 12/21/2023    HTN (hypertension)     Hyperlipidemia     Impaired fasting blood sugar     Incisional hernia, without obstruction or gangrene     Neck pain     chronic    Neuralgia, neuritis, and radiculitis, unspecified 06/10/2014    [facility], left C7     Positive colorectal cancer screening using DNA-based stool test 12/10/2019    Rotator cuff tear 04/28/2016    Shoulder region pain     Left   [facility] injury 01/08/2014    Sprain and strain of unspecified site of shoulder and upper arm 06/10/2014    [facility], left arm     Urothelial carcinoma of bladder (HCC) 04/14/2020    Wears glasses        Past Surgical History:    Past Surgical History:   Procedure Laterality Date    BICEPS TENODESIS Right 11/23/2016    right proximal bicep tenodesis    COLONOSCOPY N/A 12/10/2019    COLONOSCOPY POLYPECTOMY SNARE/COLD BIOPSY performed by Lukas Huang MD at [facility] OR    CORONARY ANGIOPLASTY WITH STENT PLACEMENT  12/18/2020    Successful PTCA.AGUILA of mid LAD / Patent LIMA however distally occluded and not supplying LAD /Residual high

## 2025-02-24 NOTE — ADDENDUM NOTE
Addendum  created 02/24/25 1424 by Keven Lynn APRN - CRNA    Intraprocedure Meds edited, Orders acknowledged in Narrator

## 2025-02-24 NOTE — ANESTHESIA PRE PROCEDURE
Department of Anesthesiology  Preprocedure Note       Name:  Markus Whitt   Age:  69 y.o.  :  1955                                          MRN:  [id number]         Date:  2025      Surgeon: Surgeon(s):  Rajeev Graff MD    Procedure: Procedure(s):  Cystoscopy Bilateral Retrograde Pyelogram with Bladder Biopsy and Fulgaration    Medications prior to admission:   Prior to Admission medications    Medication Sig Start Date End Date Taking? Authorizing Provider   tamsulosin (FLOMAX) 0.4 MG capsule Take 1 capsule by mouth daily 2/3/25   Krunal Pena PA-C   tiZANidine (ZANAFLEX) 4 MG tablet TAKE 1 TABLET BY MOUTH THREE TIMES DAILY AS NEEDED FOR NECK PAIN 25   Dwain Allen MD   gabapentin (NEURONTIN) 600 MG tablet Take 1 tablet by mouth 3 times daily for 360 days. 25  Dwain Allen MD   famotidine (PEPCID) 20 MG tablet Take 1 tablet by mouth nightly 24   Pilo Doss APRN - CNP   omeprazole (PRILOSEC) 20 MG delayed release capsule Take 1 capsule by mouth once daily 10/21/24   Pilo Doss APRN - CNP   metoprolol tartrate (LOPRESSOR) 25 MG tablet Take 1 tablet by mouth daily 10/1/24   Marybel Glover APRN - CNP   aspirin 81 MG EC tablet Take 1 tablet by mouth daily 24   Jarod Baker MD   furosemide (LASIX) 20 MG tablet Take 1 tablet by mouth once daily  Patient taking differently: Take 1 tablet by mouth daily 20 mg every other day 6/10/24   Shruti Rodriguez APRN - CNP   atorvastatin (LIPITOR) 40 MG tablet Take 1 tablet by mouth nightly 24   Shruti Rodriguez APRN - CNP   Simethicone (GAS-X PO) Take 1 tablet by mouth as needed    Shauna Briseno MD   Cholecalciferol (VITAMIN D) 50 MCG (2000) CAPS capsule Take 1 capsule by mouth daily    Shauna Briseno MD   vitamin C (ASCORBIC ACID) 500 MG tablet Take 0.5 tablets by mouth 2 times daily    Shauna Briseno MD   acetaminophen (TYLENOL) 325 MG tablet Take 2 tablets by mouth every 6

## 2025-02-24 NOTE — BRIEF OP NOTE
Brief Postoperative Note      Patient: Markus Whitt  YOB: 1955  MRN: [id number]    Date of Procedure: 2/24/2025    Pre-Op Diagnosis Codes:      * Bladder cancer (HCC) [C67.9]    Post-Op Diagnosis: Same       Procedure(s):  Cystoscopy Bilateral Retrograde Pyelogram with Bladder Biopsy and Fulgaration    Surgeon(s):  Rajeev Graff MD    Assistant:  * No surgical staff found *    Anesthesia: General    Estimated Blood Loss (mL): Minimal    Complications: None    Specimens:   ID Type Source Tests Collected by Time Destination   A :  Urine Urine, Cystoscopic CYTOLOGY, NON-GYN Rajeev Graff MD 2/24/2025 1331    B : Bladder biopsy Tissue Bladder SURGICAL PATHOLOGY Rajeev Graff MD 2/24/2025 1335        Implants:  * No implants in log *      Drains: * No LDAs found *    Findings:  Path one week megan  Dajuan 3 mo cysto with FISH prior    Electronically signed by RAJEEV GRAFF MD on 2/24/2025 at 2:23 PM

## 2025-02-24 NOTE — DISCHARGE INSTRUCTIONS
Pt ok to discharge home in good condition  No heavy lifting, >10 lbs for today  Pt should avoid strenuous activity for today  Pt should walk moderately at home  Pt ok to shower   Pt may resume diet as tolerated  Pt should take Rx as directed  No driving while on narcotics  Please call attending physician or hospital  with questions  Call or Present to ED if fever (> 101F), intractable nausea vomiting or pain.  Rx in chart    Pt should follow up with MELITON PEREZ MD, in  1 weeks, call to confirm appointment

## 2025-02-24 NOTE — OP NOTE
pyelography was then completed.     The right collection system demonstrated no hydronephrosis, filling defect, or deviation of the normal ureteral course.     The left collection system demonstrated no hydronephrosis, filling defect, or deviation of the normal ureteral course.      We then performed our bladder biopsy. A few areas were biopsied that looked somewhat erythematous. Using a cold cup biopsy forceps random bladder biopsies were performed. and tissue was taken with biopsy graspers. The biopsied areas were then fulgurated with a Bugbee device for hemostasis. The patient's bladder was drained to reduce wall tension and check for evidence of bleeding. No bleeding was noted.                      The bladder was drained. All instrumentation was removed. The patient was awakened and discharged back to the PACU in good and stable condition.

## 2025-02-25 LAB
CASE NUMBER:: NORMAL
SPECIMEN DESCRIPTION: NORMAL

## 2025-02-27 LAB
SURGICAL PATHOLOGY REPORT: NORMAL
SURGICAL PATHOLOGY REPORT: NORMAL

## 2025-03-03 ENCOUNTER — OFFICE VISIT (OUTPATIENT)
Dept: UROLOGY | Age: 70
End: 2025-03-03
Payer: MEDICARE

## 2025-03-03 VITALS
DIASTOLIC BLOOD PRESSURE: 70 MMHG | HEART RATE: 61 BPM | BODY MASS INDEX: 27.7 KG/M2 | WEIGHT: 187 LBS | RESPIRATION RATE: 16 BRPM | SYSTOLIC BLOOD PRESSURE: 116 MMHG | HEIGHT: 69 IN | OXYGEN SATURATION: 97 %

## 2025-03-03 DIAGNOSIS — C67.9 UROTHELIAL CARCINOMA OF BLADDER (HCC): ICD-10-CM

## 2025-03-03 DIAGNOSIS — N40.0 BENIGN PROSTATIC HYPERPLASIA WITHOUT LOWER URINARY TRACT SYMPTOMS: Primary | ICD-10-CM

## 2025-03-03 PROCEDURE — 3078F DIAST BP <80 MM HG: CPT | Performed by: NURSE PRACTITIONER

## 2025-03-03 PROCEDURE — G8417 CALC BMI ABV UP PARAM F/U: HCPCS | Performed by: NURSE PRACTITIONER

## 2025-03-03 PROCEDURE — 99213 OFFICE O/P EST LOW 20 MIN: CPT | Performed by: NURSE PRACTITIONER

## 2025-03-03 PROCEDURE — 3017F COLORECTAL CA SCREEN DOC REV: CPT | Performed by: NURSE PRACTITIONER

## 2025-03-03 PROCEDURE — 1123F ACP DISCUSS/DSCN MKR DOCD: CPT | Performed by: NURSE PRACTITIONER

## 2025-03-03 PROCEDURE — 3074F SYST BP LT 130 MM HG: CPT | Performed by: NURSE PRACTITIONER

## 2025-03-03 PROCEDURE — 1036F TOBACCO NON-USER: CPT | Performed by: NURSE PRACTITIONER

## 2025-03-03 PROCEDURE — G8427 DOCREV CUR MEDS BY ELIG CLIN: HCPCS | Performed by: NURSE PRACTITIONER

## 2025-03-03 PROCEDURE — 1159F MED LIST DOCD IN RCRD: CPT | Performed by: NURSE PRACTITIONER

## 2025-03-03 NOTE — PROGRESS NOTES
07/19/2022    PSA 0.28 10/11/2021        Recent BUN/Creatinine:  Lab Results   Component Value Date/Time    BUN 15 03/28/2024 09:36 AM    CREATININE 0.8 03/28/2024 09:36 AM       Radiology  No updated urologic imaging for review       Assessment/Plan:   1. Benign prostatic hyperplasia without lower urinary tract symptoms  LUTS controlled, continue tamsulosin 0.4mg po qd.     2. Urothelial carcinoma of bladder (HCC)  - Recent biopsy benign  - Hx of low grade.   - Follow-up in 3 months with surveillance cystoscopy and urine FISH results.   - FISH, Urovysion; Future      -Patient has no other questions, comments, or concerns.   -They agree with and understand the plan of care.   -The patient was encouraged to call the office or seek emergency care should this change.      PARESH Manning - CNP

## 2025-03-04 ENCOUNTER — HOSPITAL ENCOUNTER (OUTPATIENT)
Age: 70
Discharge: HOME | End: 2025-03-04

## 2025-03-04 DIAGNOSIS — C67.9 UROTHELIAL CARCINOMA OF BLADDER (HCC): ICD-10-CM

## 2025-03-06 ENCOUNTER — OFFICE VISIT (OUTPATIENT)
Age: 70
End: 2025-03-06
Payer: MEDICARE

## 2025-03-06 VITALS
DIASTOLIC BLOOD PRESSURE: 82 MMHG | HEART RATE: 86 BPM | WEIGHT: 192 LBS | RESPIRATION RATE: 18 BRPM | BODY MASS INDEX: 28.44 KG/M2 | HEIGHT: 69 IN | SYSTOLIC BLOOD PRESSURE: 122 MMHG

## 2025-03-06 DIAGNOSIS — I25.10 CORONARY ARTERY DISEASE INVOLVING NATIVE CORONARY ARTERY OF NATIVE HEART WITHOUT ANGINA PECTORIS: Primary | ICD-10-CM

## 2025-03-06 PROCEDURE — 93000 ELECTROCARDIOGRAM COMPLETE: CPT | Performed by: INTERNAL MEDICINE

## 2025-03-06 PROCEDURE — 3079F DIAST BP 80-89 MM HG: CPT | Performed by: INTERNAL MEDICINE

## 2025-03-06 PROCEDURE — 1123F ACP DISCUSS/DSCN MKR DOCD: CPT | Performed by: INTERNAL MEDICINE

## 2025-03-06 PROCEDURE — 3074F SYST BP LT 130 MM HG: CPT | Performed by: INTERNAL MEDICINE

## 2025-03-06 PROCEDURE — 1036F TOBACCO NON-USER: CPT | Performed by: INTERNAL MEDICINE

## 2025-03-06 PROCEDURE — 3017F COLORECTAL CA SCREEN DOC REV: CPT | Performed by: INTERNAL MEDICINE

## 2025-03-06 PROCEDURE — G8417 CALC BMI ABV UP PARAM F/U: HCPCS | Performed by: INTERNAL MEDICINE

## 2025-03-06 PROCEDURE — G8428 CUR MEDS NOT DOCUMENT: HCPCS | Performed by: INTERNAL MEDICINE

## 2025-03-06 PROCEDURE — 99214 OFFICE O/P EST MOD 30 MIN: CPT | Performed by: INTERNAL MEDICINE

## 2025-03-06 NOTE — PROGRESS NOTES
showed distally occluded LIMA and SVG, post PCI-LAD and staged PCI-LCX/OM  Mild ischemic CMP, LVEF 45%, now recovered to normal   Hx of GI bleeding status post EGD which showed peptic ulcer disease, 12/23  HTN  HPL  Obesity  COVID-19 infection (07/2022)   Right lung apical mass, 1.8 cm, biopsy negative for malignancy       Patient Active Problem List   Diagnosis    Cervical radiculopathy    HTN (hypertension)    Impaired fasting blood sugar    Coronary artery disease involving native coronary artery of native heart without angina pectoris    S/P CABG (coronary artery bypass graft)    Hyperlipidemia    Panlobular emphysema (HCC)    Epigastric hernia    Urothelial carcinoma of bladder (HCC)    S/P PTCA (percutaneous transluminal coronary angioplasty)    S/P cardiac cath    Recurrent incisional hernia    Cervical facet joint syndrome    Cervicalgia    H/O heart artery stent    Cervical spondylosis    History of GI bleed    Bladder cancer (HCC)       Recommendations:  Continue asa, statin and bb for secondary prophylaxis.   Continue PPI   Lasix 20 mg every other day   Patient to continue low-salt diet and fluid restriction  Aggressive risk factor modification discussed with patient.      Routine follow-up in 6 months or earlier if needed.      Jarod Baker MD, FACC

## 2025-03-10 ENCOUNTER — HOSPITAL ENCOUNTER (OUTPATIENT)
Age: 70
Discharge: HOME | End: 2025-03-10
Payer: MEDICARE

## 2025-03-10 DIAGNOSIS — I25.10 CORONARY ARTERY DISEASE INVOLVING NATIVE CORONARY ARTERY OF NATIVE HEART WITHOUT ANGINA PECTORIS: ICD-10-CM

## 2025-03-10 LAB
ALT SERPL-CCNC: 18 U/L (ref 5–41)
AST SERPL-CCNC: 21 U/L
CHOLEST SERPL-MCNC: 123 MG/DL (ref 0–199)
CHOLESTEROL/HDL RATIO: 2.3
HDLC SERPL-MCNC: 54 MG/DL
LDLC SERPL CALC-MCNC: 47 MG/DL (ref 0–100)
TRIGL SERPL-MCNC: 109 MG/DL (ref 0–149)
UROTHELIAL CANCER DETECTION: NORMAL
VLDLC SERPL CALC-MCNC: 22 MG/DL (ref 1–30)

## 2025-03-10 PROCEDURE — 36415 COLL VENOUS BLD VENIPUNCTURE: CPT

## 2025-03-10 PROCEDURE — 84450 TRANSFERASE (AST) (SGOT): CPT

## 2025-03-10 PROCEDURE — 80061 LIPID PANEL: CPT

## 2025-03-10 PROCEDURE — 84460 ALANINE AMINO (ALT) (SGPT): CPT

## 2025-04-07 NOTE — TELEPHONE ENCOUNTER
Markus Whitt is requesting a refill on Furosemide.    Last Office  visit: 3/06/2025  Next Office visit:  9/10/2025

## 2025-04-07 NOTE — TELEPHONE ENCOUNTER
Last Appt:  1/11/2024  Next Appt:   Visit date not found  Med verified in [facility]    [facility] pt

## 2025-04-09 RX ORDER — FUROSEMIDE 20 MG/1
20 TABLET ORAL DAILY
Qty: 45 TABLET | Refills: 0 | Status: SHIPPED | OUTPATIENT
Start: 2025-04-09

## 2025-04-14 RX ORDER — OMEPRAZOLE 20 MG/1
20 CAPSULE, DELAYED RELEASE ORAL DAILY
Qty: 180 CAPSULE | Refills: 0 | Status: SHIPPED | OUTPATIENT
Start: 2025-04-14

## 2025-04-14 NOTE — TELEPHONE ENCOUNTER
Markus Whitt is requesting a refill on the following medication(s):  Requested Prescriptions     Pending Prescriptions Disp Refills    omeprazole (PRILOSEC) 20 MG delayed release capsule [Pharmacy Med Name: OMEPRAZOLE 20MG CAP] 180 capsule 0     Sig: Take 1 capsule by mouth once daily       Last Visit Date (If Applicable):  1/6/2025    Next Visit Date:    7/7/2025

## 2025-04-29 DIAGNOSIS — M62.838 MUSCLE SPASM: ICD-10-CM

## 2025-04-29 DIAGNOSIS — M54.2 NECK PAIN: ICD-10-CM

## 2025-05-06 RX ORDER — ATORVASTATIN CALCIUM 40 MG/1
40 TABLET, FILM COATED ORAL NIGHTLY
Qty: 90 TABLET | Refills: 0 | Status: SHIPPED | OUTPATIENT
Start: 2025-05-06

## 2025-05-06 NOTE — TELEPHONE ENCOUNTER
Last Appt:  1/11/2024  Next Appt:   Visit date not found  Med verified in [facility]    [facility] pt

## 2025-05-06 NOTE — TELEPHONE ENCOUNTER
Markus Whitt is requesting a refill on Lipitor .    Last Office  visit: 3/6/2025  Next Office visit:  9/10/2025  Last prescribing provider:  5/6/2024 Shruti Rodriguez, PARESH - CNP

## 2025-05-14 RX ORDER — ATORVASTATIN CALCIUM 40 MG/1
40 TABLET, FILM COATED ORAL NIGHTLY
Qty: 90 TABLET | Refills: 0 | OUTPATIENT
Start: 2025-05-14

## 2025-05-16 ENCOUNTER — OFFICE VISIT (OUTPATIENT)
Dept: FAMILY MEDICINE CLINIC | Age: 70
End: 2025-05-16
Payer: MEDICARE

## 2025-05-16 VITALS
SYSTOLIC BLOOD PRESSURE: 138 MMHG | DIASTOLIC BLOOD PRESSURE: 80 MMHG | HEIGHT: 69 IN | BODY MASS INDEX: 28.14 KG/M2 | WEIGHT: 190 LBS | OXYGEN SATURATION: 97 % | HEART RATE: 68 BPM

## 2025-05-16 DIAGNOSIS — J40 BRONCHITIS: Primary | ICD-10-CM

## 2025-05-16 DIAGNOSIS — J01.90 ACUTE NON-RECURRENT SINUSITIS, UNSPECIFIED LOCATION: ICD-10-CM

## 2025-05-16 PROCEDURE — 1036F TOBACCO NON-USER: CPT | Performed by: FAMILY MEDICINE

## 2025-05-16 PROCEDURE — 3079F DIAST BP 80-89 MM HG: CPT | Performed by: FAMILY MEDICINE

## 2025-05-16 PROCEDURE — 99211 OFF/OP EST MAY X REQ PHY/QHP: CPT | Performed by: FAMILY MEDICINE

## 2025-05-16 PROCEDURE — 1159F MED LIST DOCD IN RCRD: CPT | Performed by: FAMILY MEDICINE

## 2025-05-16 PROCEDURE — G8427 DOCREV CUR MEDS BY ELIG CLIN: HCPCS | Performed by: FAMILY MEDICINE

## 2025-05-16 PROCEDURE — 3075F SYST BP GE 130 - 139MM HG: CPT | Performed by: FAMILY MEDICINE

## 2025-05-16 PROCEDURE — G8417 CALC BMI ABV UP PARAM F/U: HCPCS | Performed by: FAMILY MEDICINE

## 2025-05-16 PROCEDURE — 1123F ACP DISCUSS/DSCN MKR DOCD: CPT | Performed by: FAMILY MEDICINE

## 2025-05-16 PROCEDURE — 3017F COLORECTAL CA SCREEN DOC REV: CPT | Performed by: FAMILY MEDICINE

## 2025-05-16 PROCEDURE — 99213 OFFICE O/P EST LOW 20 MIN: CPT | Performed by: FAMILY MEDICINE

## 2025-05-16 RX ORDER — AZITHROMYCIN 250 MG/1
TABLET, FILM COATED ORAL
Qty: 6 TABLET | Refills: 0 | Status: SHIPPED | OUTPATIENT
Start: 2025-05-16 | End: 2025-05-26

## 2025-05-16 RX ORDER — BENZONATATE 100 MG/1
100 CAPSULE ORAL 3 TIMES DAILY PRN
Qty: 30 CAPSULE | Refills: 0 | Status: SHIPPED | OUTPATIENT
Start: 2025-05-16 | End: 2025-05-26

## 2025-05-16 NOTE — PROGRESS NOTES
HPI:  Patient comes in today for   Chief Complaint   Patient presents with    Cough     Pt is here for a cough. Pt states it started 5 days ago.    Here with c/o cough and chest congestion also has some sinus drainage no fever or shortness of breath..Denies any chest pain or leg swelling     REVIEW OF SYSTEMS:  Cardio: No chest pain, palpitations, GARCIA, edema, PND  Pulmonary: Has cough, hemoptysis, SOB  GI: No nausea, vomiting, dysphagia, odynophagia, diarrhea,constipation  : No dysuria, hematuria, urgency, incontinence  Past Medical History:   Diagnosis Date    Abnormal cardiovascular stress test 12/09/2020    Large inferolateral infarction with significant vernon-infarct ischemia.    CAD (coronary artery disease)     CABG-2 vessel, followed by stents x 4    Cervical disc disease     Clostridium difficile diarrhea 10/2014    hospitalized in October 2014    Displacement of cervical intervertebral disc without myelopathy 06/10/2014    [facility], C6/C7     Duodenal ulcer 12/23/2023    GI bleed 12/21/2023    HTN (hypertension)     Hyperlipidemia     Impaired fasting blood sugar     Incisional hernia, without obstruction or gangrene     Neck pain     chronic    Neuralgia, neuritis, and radiculitis, unspecified 06/10/2014    [facility], left C7     Positive colorectal cancer screening using DNA-based stool test 12/10/2019    Rotator cuff tear 04/28/2016    Shoulder region pain     Left   [facility] injury 01/08/2014    Sprain and strain of unspecified site of shoulder and upper arm 06/10/2014    [facility], left arm     Urothelial carcinoma of bladder (HCC) 04/14/2020    Wears glasses        Current Outpatient Medications   Medication Sig Dispense Refill    atorvastatin (LIPITOR) 40 MG tablet Take 1 tablet by mouth nightly 90 tablet 0    tiZANidine (ZANAFLEX) 4 MG tablet TAKE 1 TABLET BY MOUTH THREE TIMES DAILY AS NEEDED FOR  NECK  PAIN 90 tablet 11    omeprazole (PRILOSEC) 20 MG delayed release capsule Take 1 capsule by mouth once daily 180

## 2025-06-09 ENCOUNTER — PROCEDURE VISIT (OUTPATIENT)
Dept: UROLOGY | Age: 70
End: 2025-06-09
Payer: MEDICARE

## 2025-06-09 VITALS
BODY MASS INDEX: 28.06 KG/M2 | HEIGHT: 69 IN | DIASTOLIC BLOOD PRESSURE: 60 MMHG | SYSTOLIC BLOOD PRESSURE: 120 MMHG | HEART RATE: 61 BPM

## 2025-06-09 DIAGNOSIS — C67.9 UROTHELIAL CARCINOMA OF BLADDER (HCC): ICD-10-CM

## 2025-06-09 DIAGNOSIS — N40.0 BENIGN PROSTATIC HYPERPLASIA WITHOUT LOWER URINARY TRACT SYMPTOMS: Primary | ICD-10-CM

## 2025-06-09 PROCEDURE — 52000 CYSTOURETHROSCOPY: CPT | Performed by: UROLOGY

## 2025-06-09 RX ORDER — LIDOCAINE HYDROCHLORIDE 20 MG/ML
JELLY TOPICAL ONCE
Status: COMPLETED | OUTPATIENT
Start: 2025-06-09 | End: 2025-06-09

## 2025-06-09 RX ADMIN — LIDOCAINE HYDROCHLORIDE: 20 JELLY TOPICAL at 11:17

## 2025-06-09 NOTE — PROGRESS NOTES
Cystoscopy    Operative Note    Patient:  Markus Whitt  MRN: [id number]  YOB: 1955    Date: 06/09/25  Surgeon: MELITON PEREZ MD  Anesthesia: Urojet Local  Indications:     Had recent GI bleed and hospitalization      BPH- cont flomax. Stable.    Bladder cancer- hx of low grade. Had two recurrences on dome of bladder that we resected (still low grade)            Position: Supine  EBL: 0 ml    Findings:   The patient was prepped and draped in the usual sterile fashion.  The flexible cystoscope was advanced through the urethra and into the bladder.  The bladder was thoroughly inspected and the following was noted:    Residual Urine: moderate.  Urine clear, with no obvious infection  Urethra: No abnormalities of the urethra are noted. Urethral dilation was not performed.    Prostate: lateral lobe hypertrophy + present, prostate moderately obstructing, intravesical extension of prostate not present. There was no previous TURP defect.   Bladder: no tumor noted .    trabeculation noted.  no bladder diverticulum.  Ureters: Orifices with normal configuration and location.      The cystoscope was removed.  The patient tolerated the procedure well.  Abnormal FISH again-- gets colonscopy routinely. did not see any overtly obvious lesions. Recent biopsy negative  Ct urogram and fish in three months -- OV with luca-- if any abnormality, will do bl urs

## 2025-06-09 NOTE — PROGRESS NOTES
Dornier Axix II Cystoscope Reference Number [id number] used for procedure was removed from sterile packaging after visual inspection.   LOT # [id number]  EXP DATE 10/06/27

## 2025-06-12 ENCOUNTER — OFFICE VISIT (OUTPATIENT)
Dept: FAMILY MEDICINE CLINIC | Age: 70
End: 2025-06-12
Payer: MEDICARE

## 2025-06-12 ENCOUNTER — OFFICE VISIT (OUTPATIENT)
Dept: PAIN MANAGEMENT | Age: 70
End: 2025-06-12
Payer: MEDICARE

## 2025-06-12 VITALS
WEIGHT: 188 LBS | BODY MASS INDEX: 27.76 KG/M2 | DIASTOLIC BLOOD PRESSURE: 66 MMHG | TEMPERATURE: 97.5 F | HEART RATE: 71 BPM | SYSTOLIC BLOOD PRESSURE: 120 MMHG | OXYGEN SATURATION: 95 %

## 2025-06-12 VITALS
HEIGHT: 69 IN | OXYGEN SATURATION: 97 % | DIASTOLIC BLOOD PRESSURE: 72 MMHG | RESPIRATION RATE: 12 BRPM | WEIGHT: 189 LBS | HEART RATE: 68 BPM | BODY MASS INDEX: 27.99 KG/M2 | SYSTOLIC BLOOD PRESSURE: 130 MMHG

## 2025-06-12 DIAGNOSIS — J30.1 NON-SEASONAL ALLERGIC RHINITIS DUE TO POLLEN: ICD-10-CM

## 2025-06-12 DIAGNOSIS — M47.812 CERVICAL SPONDYLOSIS: Primary | ICD-10-CM

## 2025-06-12 DIAGNOSIS — R05.1 ACUTE COUGH: Primary | ICD-10-CM

## 2025-06-12 DIAGNOSIS — M47.812 CERVICAL FACET JOINT SYNDROME: ICD-10-CM

## 2025-06-12 DIAGNOSIS — M54.2 CERVICALGIA: ICD-10-CM

## 2025-06-12 DIAGNOSIS — M54.12 CERVICAL RADICULOPATHY: ICD-10-CM

## 2025-06-12 PROCEDURE — 1159F MED LIST DOCD IN RCRD: CPT

## 2025-06-12 PROCEDURE — G8427 DOCREV CUR MEDS BY ELIG CLIN: HCPCS

## 2025-06-12 PROCEDURE — 3017F COLORECTAL CA SCREEN DOC REV: CPT

## 2025-06-12 PROCEDURE — G8417 CALC BMI ABV UP PARAM F/U: HCPCS

## 2025-06-12 PROCEDURE — 3074F SYST BP LT 130 MM HG: CPT

## 2025-06-12 PROCEDURE — 1160F RVW MEDS BY RX/DR IN RCRD: CPT

## 2025-06-12 PROCEDURE — 1160F RVW MEDS BY RX/DR IN RCRD: CPT | Performed by: NURSE PRACTITIONER

## 2025-06-12 PROCEDURE — G8427 DOCREV CUR MEDS BY ELIG CLIN: HCPCS | Performed by: NURSE PRACTITIONER

## 2025-06-12 PROCEDURE — 1123F ACP DISCUSS/DSCN MKR DOCD: CPT | Performed by: NURSE PRACTITIONER

## 2025-06-12 PROCEDURE — 99213 OFFICE O/P EST LOW 20 MIN: CPT

## 2025-06-12 PROCEDURE — G8417 CALC BMI ABV UP PARAM F/U: HCPCS | Performed by: NURSE PRACTITIONER

## 2025-06-12 PROCEDURE — 1036F TOBACCO NON-USER: CPT | Performed by: NURSE PRACTITIONER

## 2025-06-12 PROCEDURE — 3078F DIAST BP <80 MM HG: CPT

## 2025-06-12 PROCEDURE — 99215 OFFICE O/P EST HI 40 MIN: CPT | Performed by: NURSE PRACTITIONER

## 2025-06-12 PROCEDURE — 1123F ACP DISCUSS/DSCN MKR DOCD: CPT

## 2025-06-12 PROCEDURE — 99212 OFFICE O/P EST SF 10 MIN: CPT

## 2025-06-12 PROCEDURE — 3078F DIAST BP <80 MM HG: CPT | Performed by: NURSE PRACTITIONER

## 2025-06-12 PROCEDURE — 1159F MED LIST DOCD IN RCRD: CPT | Performed by: NURSE PRACTITIONER

## 2025-06-12 PROCEDURE — 3075F SYST BP GE 130 - 139MM HG: CPT | Performed by: NURSE PRACTITIONER

## 2025-06-12 PROCEDURE — 3017F COLORECTAL CA SCREEN DOC REV: CPT | Performed by: NURSE PRACTITIONER

## 2025-06-12 PROCEDURE — 1036F TOBACCO NON-USER: CPT

## 2025-06-12 RX ORDER — DOXYCYCLINE HYCLATE 100 MG
100 TABLET ORAL 2 TIMES DAILY
Qty: 20 TABLET | Refills: 0 | Status: SHIPPED | OUTPATIENT
Start: 2025-06-12 | End: 2025-06-22

## 2025-06-12 RX ORDER — CETIRIZINE HYDROCHLORIDE 10 MG/1
10 TABLET ORAL DAILY
Qty: 90 TABLET | Refills: 0 | Status: SHIPPED | OUTPATIENT
Start: 2025-06-12

## 2025-06-12 RX ORDER — FLUTICASONE PROPIONATE 50 MCG
2 SPRAY, SUSPENSION (ML) NASAL DAILY
Qty: 16 G | Refills: 0 | Status: SHIPPED | OUTPATIENT
Start: 2025-06-12

## 2025-06-12 SDOH — ECONOMIC STABILITY: FOOD INSECURITY: WITHIN THE PAST 12 MONTHS, YOU WORRIED THAT YOUR FOOD WOULD RUN OUT BEFORE YOU GOT MONEY TO BUY MORE.: NEVER TRUE

## 2025-06-12 SDOH — ECONOMIC STABILITY: FOOD INSECURITY: WITHIN THE PAST 12 MONTHS, THE FOOD YOU BOUGHT JUST DIDN'T LAST AND YOU DIDN'T HAVE MONEY TO GET MORE.: NEVER TRUE

## 2025-06-12 ASSESSMENT — ENCOUNTER SYMPTOMS
EYES NEGATIVE: 1
BACK PAIN: 1
GASTROINTESTINAL NEGATIVE: 1
SHORTNESS OF BREATH: 0

## 2025-06-12 NOTE — PROGRESS NOTES
[facility]  [address] [address]  Dept: [phone]    Date of Service:  6/12/2025    Markus Whitt is a 70 y.o. male who presents in office today with Self.    Chief Complaint   Patient presents with    Cough     Patient is here today for a cough and sinus drainage that started yesterday.        Diagnoses / Plan:   1. Acute cough  -     fluticasone (FLONASE) 50 MCG/ACT nasal spray; 2 sprays by Each Nostril route daily, Disp-16 g, R-0Normal  -     doxycycline hyclate (VIBRA-TABS) 100 MG tablet; Take 1 tablet by mouth 2 times daily for 10 days, Disp-20 tablet, R-0Normal  2. Non-seasonal allergic rhinitis due to pollen  -     cetirizine (ZYRTEC) 10 MG tablet; Take 1 tablet by mouth daily, Disp-90 tablet, R-0Normal  -     fluticasone (FLONASE) 50 MCG/ACT nasal spray; 2 sprays by Each Nostril route daily, Disp-16 g, R-0Normal   -Will start on doxy for sinus infection as well as Flonase and zyrtec at night. Side effects of these meds are discussed.     Medication sent to the pharmacy.  Discussed medication desired effects, potential side effects, and how to take the medication.  Encouraged symptomatic treatment, rest, increase oral fluid intake.  Follow-up for worsening or persistent symptoms.  Patient verbalizes understanding regarding plan of care and all questions answered.    Return in about 3 months (around 9/12/2025), or if symptoms worsen or fail to improve.     Subjective:   History of Present Illness:  -Deni is here for sinus congestion, sneezing, and a cough. This has been ongoing for over a week. The congestion is yellow, and he is having pain over his maxillary area into his teeth. The cough is productive of yellow mucous. Deni denies fever, positive for fatigue.       Current Outpatient Medications   Medication Sig Dispense Refill    cetirizine (ZYRTEC) 10 MG tablet Take 1 tablet by mouth daily 90 tablet 0    fluticasone (FLONASE) 50 MCG/ACT nasal spray 2

## 2025-06-12 NOTE — PROGRESS NOTES
[facility]  [address] [place]. [address]    Patient Name: Markus Whitt  MRN: [id number]  Encounter Date: 6/12/2025     SUBJECTIVE:  Markus Whitt is a 70 y.o., male being seen today regarding   Chief Complaint   Patient presents with    Neck Pain     Right side    and routine medication management.    History of Present Illness  The patient is a 70-year-old male who presents for evaluation of neck pain.    He was last seen in February 2025 and has been experiencing severe, daily pain in his neck, wrist, hands, and spine. He is uncertain if these symptoms are indicative of arthritis. Approximately a month ago, he began to experience increased discomfort when turning his head to the right. He is considering the possibility of returning to conventional medication. He believes he is due for another injection as his symptoms have been progressively worsening. He has been attempting to schedule an appointment with a rheumatologist, but the appointments have been repeatedly postponed until August 2025. Despite undergoing blood work that did not reveal any signs of rheumatoid arthritis, he was advised to consult with a rheumatologist. He received facet injections at the right C5-C6 and C6-C7 levels in October 2024, which provided significant relief for his neck and right side for a duration of over 3 months.    His lower back pain is so severe that it limits his ability to drive long distances.    Cervical facet injections 10/30/24 with relief>80%, ROM improved -Facet injections are more effective for him. Has not wore off yet    Topical THC with relief and gummy, alleviates pain for several hours, but does not like to be where he can't function, remains alert. Tylenol prn, relief for one hour    Functionality Assessment & Goals:  On a scale of 0 (Does not Interfere) to 10 (Completely Interferes)  Which number describes how during the past week pain has interfered with the

## 2025-06-13 ENCOUNTER — TELEPHONE (OUTPATIENT)
Dept: PAIN MANAGEMENT | Age: 70
End: 2025-06-13

## 2025-06-13 DIAGNOSIS — M54.2 CERVICALGIA: ICD-10-CM

## 2025-06-13 DIAGNOSIS — M47.812 CERVICAL FACET JOINT SYNDROME: ICD-10-CM

## 2025-06-13 RX ORDER — DIAZEPAM 5 MG/1
TABLET ORAL
Qty: 2 TABLET | Refills: 0 | Status: SHIPPED | OUTPATIENT
Start: 2025-06-13 | End: 2025-07-13

## 2025-06-13 NOTE — TELEPHONE ENCOUNTER
FC / RT C5-C6 C6-C7 Facet with valium only scheduled in [facility] for 7/9/25.    Patient Educated to have .     Valium sent to [facility]

## 2025-06-13 NOTE — TELEPHONE ENCOUNTER
Medication requested for upcoming procedure    Requested Prescriptions     Pending Prescriptions Disp Refills    diazePAM (VALIUM) 5 MG tablet 2 tablet 0     Sig: Take one tablet by mouth 12 hours prior to procedure and take one tablet 2 hours prior to procedure.       Last Appointment Date: 6/12/2025  Next Appointment Date: 7/23/2025    Allergies   Allergen Reactions    Hydrocodone Nausea And Vomiting    Vicodin [Hydrocodone-Acetaminophen] Nausea Only     Stomach upset       Pharmacy:  [facility]

## 2025-06-19 ASSESSMENT — ENCOUNTER SYMPTOMS
ABDOMINAL PAIN: 0
COUGH: 0
SHORTNESS OF BREATH: 0
WHEEZING: 0
COLOR CHANGE: 0

## 2025-07-09 ENCOUNTER — PROCEDURE VISIT (OUTPATIENT)
Dept: PAIN MANAGEMENT | Age: 70
End: 2025-07-09

## 2025-07-09 DIAGNOSIS — M47.812 CERVICAL SPONDYLOSIS: ICD-10-CM

## 2025-07-09 DIAGNOSIS — M47.812 CERVICAL FACET JOINT SYNDROME: Primary | ICD-10-CM

## 2025-07-23 ENCOUNTER — HOSPITAL ENCOUNTER (OUTPATIENT)
Dept: GENERAL RADIOLOGY | Age: 70
Discharge: HOME | End: 2025-07-25
Payer: MEDICARE

## 2025-07-23 ENCOUNTER — OFFICE VISIT (OUTPATIENT)
Dept: PAIN MANAGEMENT | Age: 70
End: 2025-07-23
Payer: MEDICARE

## 2025-07-23 VITALS
DIASTOLIC BLOOD PRESSURE: 86 MMHG | HEIGHT: 69 IN | OXYGEN SATURATION: 98 % | BODY MASS INDEX: 27.91 KG/M2 | SYSTOLIC BLOOD PRESSURE: 124 MMHG | RESPIRATION RATE: 12 BRPM | HEART RATE: 65 BPM

## 2025-07-23 DIAGNOSIS — M54.12 CERVICAL RADICULOPATHY: ICD-10-CM

## 2025-07-23 DIAGNOSIS — M54.2 CERVICALGIA: ICD-10-CM

## 2025-07-23 DIAGNOSIS — M54.50 LUMBAR SPINE PAIN: Primary | ICD-10-CM

## 2025-07-23 DIAGNOSIS — M54.50 LUMBAR SPINE PAIN: ICD-10-CM

## 2025-07-23 DIAGNOSIS — M47.812 CERVICAL FACET JOINT SYNDROME: ICD-10-CM

## 2025-07-23 PROCEDURE — G8417 CALC BMI ABV UP PARAM F/U: HCPCS | Performed by: NURSE PRACTITIONER

## 2025-07-23 PROCEDURE — 99214 OFFICE O/P EST MOD 30 MIN: CPT | Performed by: NURSE PRACTITIONER

## 2025-07-23 PROCEDURE — 3017F COLORECTAL CA SCREEN DOC REV: CPT | Performed by: NURSE PRACTITIONER

## 2025-07-23 PROCEDURE — 72100 X-RAY EXAM L-S SPINE 2/3 VWS: CPT

## 2025-07-23 PROCEDURE — G8427 DOCREV CUR MEDS BY ELIG CLIN: HCPCS | Performed by: NURSE PRACTITIONER

## 2025-07-23 PROCEDURE — 3079F DIAST BP 80-89 MM HG: CPT | Performed by: NURSE PRACTITIONER

## 2025-07-23 PROCEDURE — 3074F SYST BP LT 130 MM HG: CPT | Performed by: NURSE PRACTITIONER

## 2025-07-23 PROCEDURE — 1160F RVW MEDS BY RX/DR IN RCRD: CPT | Performed by: NURSE PRACTITIONER

## 2025-07-23 PROCEDURE — 1159F MED LIST DOCD IN RCRD: CPT | Performed by: NURSE PRACTITIONER

## 2025-07-23 PROCEDURE — 1123F ACP DISCUSS/DSCN MKR DOCD: CPT | Performed by: NURSE PRACTITIONER

## 2025-07-23 PROCEDURE — 1036F TOBACCO NON-USER: CPT | Performed by: NURSE PRACTITIONER

## 2025-07-23 ASSESSMENT — ENCOUNTER SYMPTOMS
SHORTNESS OF BREATH: 0
BACK PAIN: 1
EYES NEGATIVE: 1
GASTROINTESTINAL NEGATIVE: 1

## 2025-07-23 NOTE — PROGRESS NOTES
control, iterative reconstruction, and/or weight based  adjustment of the mA/kV was utilized to reduce the radiation dose to as low  as reasonably achievable.     COMPARISON:  None.     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 There is no evidence of fracture or subluxation. There is mild loss  of disc height at the C2-3,, C4-5, C5-6, C6-7 levels.  There are anterior and  posterior marginal osteophytes at multiple levels. The central canal is  grossly patent.. There is bilateral facet hypertrophy at multiple levels  throughout the cervical spine. The pedicles and posterior elements are  otherwise intact. The prevertebral and paravertebral soft tissues are  unremarkable. The atlanto-dens interval and dens are intact. The visualized  lung apices are clear.  Vascular calcifications are seen compatible with  atherosclerotic disease.     IMPRESSION:  Multilevel cervical facet arthropathy, spondylosis and degenerative disc  disease.     No acute bony abnormalities are noted in the cervical spine     RECOMMENDATIONS:  Further evaluation of the cervical spine should be obtained with MR imaging  if clinically indicated.  Cardiovascular:      Rate and Rhythm: Normal rate.   Pulmonary:      Effort: Pulmonary effort is normal. No tachypnea, bradypnea, accessory muscle usage, prolonged expiration, respiratory distress or retractions. He is not intubated.   Abdominal:      General: Abdomen is flat.      Palpations: Abdomen is soft.   Musculoskeletal:      Cervical back: Muscular tenderness (right upper trapezius) present.      Right foot: No foot drop.      Left foot:

## 2025-07-28 RX ORDER — ATORVASTATIN CALCIUM 40 MG/1
40 TABLET, FILM COATED ORAL NIGHTLY
Qty: 90 TABLET | Refills: 0 | Status: SHIPPED | OUTPATIENT
Start: 2025-07-28

## 2025-07-28 NOTE — TELEPHONE ENCOUNTER
Medication: Atorvastatin 40mg tabs  Last fill: 5/6/25 for 90 days  Refills: 0  Last office visit: 3/6/25  Next office visit: 9/10/25  Prescriber: Dr. Baker    Please advise. Thank you.

## 2025-07-28 NOTE — TELEPHONE ENCOUNTER
Last Appt:  1/11/2024  Next Appt:   Visit date not found  Med verified in Epic    [facility] cardio

## 2025-08-06 ENCOUNTER — HOSPITAL ENCOUNTER (OUTPATIENT)
Dept: GENERAL RADIOLOGY | Age: 70
Discharge: HOME | End: 2025-08-08
Attending: INTERNAL MEDICINE
Payer: MEDICARE

## 2025-08-06 ENCOUNTER — OFFICE VISIT (OUTPATIENT)
Dept: PULMONOLOGY | Age: 70
End: 2025-08-06
Payer: MEDICARE

## 2025-08-06 VITALS
DIASTOLIC BLOOD PRESSURE: 68 MMHG | HEART RATE: 62 BPM | OXYGEN SATURATION: 96 % | SYSTOLIC BLOOD PRESSURE: 118 MMHG | HEIGHT: 69 IN | WEIGHT: 189.6 LBS | BODY MASS INDEX: 28.08 KG/M2

## 2025-08-06 DIAGNOSIS — J43.1 PANLOBULAR EMPHYSEMA (HCC): ICD-10-CM

## 2025-08-06 DIAGNOSIS — J43.1 PANLOBULAR EMPHYSEMA (HCC): Primary | ICD-10-CM

## 2025-08-06 DIAGNOSIS — J44.9 CHRONIC OBSTRUCTIVE PULMONARY DISEASE, UNSPECIFIED COPD TYPE (HCC): ICD-10-CM

## 2025-08-06 DIAGNOSIS — J90 PLEURAL EFFUSION ON RIGHT: ICD-10-CM

## 2025-08-06 DIAGNOSIS — J44.9 CHRONIC OBSTRUCTIVE PULMONARY DISEASE, UNSPECIFIED COPD TYPE (HCC): Primary | ICD-10-CM

## 2025-08-06 PROCEDURE — 3017F COLORECTAL CA SCREEN DOC REV: CPT | Performed by: INTERNAL MEDICINE

## 2025-08-06 PROCEDURE — G8417 CALC BMI ABV UP PARAM F/U: HCPCS | Performed by: INTERNAL MEDICINE

## 2025-08-06 PROCEDURE — 1123F ACP DISCUSS/DSCN MKR DOCD: CPT | Performed by: INTERNAL MEDICINE

## 2025-08-06 PROCEDURE — 1036F TOBACCO NON-USER: CPT | Performed by: INTERNAL MEDICINE

## 2025-08-06 PROCEDURE — 1159F MED LIST DOCD IN RCRD: CPT | Performed by: INTERNAL MEDICINE

## 2025-08-06 PROCEDURE — 71046 X-RAY EXAM CHEST 2 VIEWS: CPT

## 2025-08-06 PROCEDURE — 99213 OFFICE O/P EST LOW 20 MIN: CPT | Performed by: INTERNAL MEDICINE

## 2025-08-06 PROCEDURE — G8427 DOCREV CUR MEDS BY ELIG CLIN: HCPCS | Performed by: INTERNAL MEDICINE

## 2025-08-06 PROCEDURE — 1160F RVW MEDS BY RX/DR IN RCRD: CPT | Performed by: INTERNAL MEDICINE

## 2025-08-06 PROCEDURE — 3074F SYST BP LT 130 MM HG: CPT | Performed by: INTERNAL MEDICINE

## 2025-08-06 PROCEDURE — 3078F DIAST BP <80 MM HG: CPT | Performed by: INTERNAL MEDICINE

## 2025-08-06 PROCEDURE — 3023F SPIROM DOC REV: CPT | Performed by: INTERNAL MEDICINE

## 2025-08-18 ENCOUNTER — HOSPITAL ENCOUNTER (OUTPATIENT)
Dept: LAB | Age: 70
Discharge: HOME | End: 2025-08-18
Payer: MEDICARE

## 2025-08-18 DIAGNOSIS — C67.9 UROTHELIAL CARCINOMA OF BLADDER (HCC): ICD-10-CM

## 2025-08-18 LAB
CREAT SERPL-MCNC: 0.9 MG/DL (ref 0.7–1.2)
GFR, ESTIMATED: >90 ML/MIN/1.73M2

## 2025-08-18 PROCEDURE — 82565 ASSAY OF CREATININE: CPT

## 2025-08-18 PROCEDURE — 36415 COLL VENOUS BLD VENIPUNCTURE: CPT

## 2025-08-21 ASSESSMENT — RHEUMATOLOGY NEW PATIENT QUESTIONNAIRE
DIFFICULTY STAYING ASLEEP: Y
ANXIETY: N
DIFFICULTY FALLING ASLEEP: N
SORES IN MOUTH OR NOSE: N
SWOLLEN OR TENDER GLANDS: N
MEMORY LOSS: N
PAIN OR BURNING ON URINATION: N
ABNORMAL URINE: N
FEVER: N
FAINTING: N
MORNING STIFFNESS IN LOWER BACK: Y
EASY BRUISING: Y
CHEST PAIN: N
COLOR CHANGES OF HANDS OR FEET IN THE COLD: N
DOUBLE OR BLURRED VISION: N
NODULES/BUMPS: N
UNUSUALLY RAPID OR SLOWED HEART RATE: N
STOMACH PAIN: N
AGITATION: N
EYE REDNESS: N
HEADACHES: N
NIGHT SWEATS: N
SUN SENSITIVE (SUN ALLERGY): N
DRYNESS OF MOUTH: N
SKIN REDNESS: N
MORNING STIFFNESS: Y
ANEMIA: N
INCREASED SUSCEPTIBILITY TO INFECTION: N
DIFFICULTY BREATHING LYING DOWN: N
SEIZURES: N
EXCESSIVE HAIR LOSS (MORE THAN YOUR NORM): N
UNEXPLAINED HEARING LOSS: N
EASILY LOSING TEMPER: N
HOARSE VOICE: Y
BLOOD IN STOOLS: N
JOINT SWELLING: N
COUGH: N
UNUSUAL FATIGUE: Y
HEARTBURN OR REFLUX: N
MUSCLE WEAKNESS: N
HOW WOULD YOU DESCRIBE YOUR STIFFNESS ON AVERAGE: MODERATE
SHORTNESS OF BREATH: N
NAUSEA: N
RASH: N
JOINT PAIN: Y
LOSS OF VISION: N
PERSISTENT DIARRHEA: N
DIFFICULTY SWALLOWING: N
RASH OR ULCERS: N
JAUNDICE: N
VOMITING OF BLOOD OR COFFEE GROUND CONSISTENCY MATERIAL: N
EYE DRYNESS: N
NUMBNESS OR TINGLING IN HANDS OR FEET: N
BLACK STOOLS: N
UNEXPLAINED WEIGHT CHANGE: N
LOSS OF CONSCIOUSNESS: N
DEPRESSION: N
BEHAVIORAL CHANGES: N
SKIN TIGHTNESS: N
UNUSUAL BLEEDING: N
SWOLLEN LEGS OR FEET: N
EYE PAIN: N

## 2025-08-27 ENCOUNTER — OFFICE VISIT (OUTPATIENT)
Age: 70
End: 2025-08-27
Payer: MEDICARE

## 2025-08-27 VITALS
HEIGHT: 69 IN | SYSTOLIC BLOOD PRESSURE: 138 MMHG | OXYGEN SATURATION: 98 % | DIASTOLIC BLOOD PRESSURE: 62 MMHG | WEIGHT: 189.6 LBS | BODY MASS INDEX: 28.08 KG/M2 | HEART RATE: 62 BPM

## 2025-08-27 DIAGNOSIS — M25.50 MULTIPLE JOINT PAIN: Primary | ICD-10-CM

## 2025-08-27 PROCEDURE — G8417 CALC BMI ABV UP PARAM F/U: HCPCS | Performed by: INTERNAL MEDICINE

## 2025-08-27 PROCEDURE — 3017F COLORECTAL CA SCREEN DOC REV: CPT | Performed by: INTERNAL MEDICINE

## 2025-08-27 PROCEDURE — 3078F DIAST BP <80 MM HG: CPT | Performed by: INTERNAL MEDICINE

## 2025-08-27 PROCEDURE — 99203 OFFICE O/P NEW LOW 30 MIN: CPT | Performed by: INTERNAL MEDICINE

## 2025-08-27 PROCEDURE — G8427 DOCREV CUR MEDS BY ELIG CLIN: HCPCS | Performed by: INTERNAL MEDICINE

## 2025-08-27 PROCEDURE — G2211 COMPLEX E/M VISIT ADD ON: HCPCS | Performed by: INTERNAL MEDICINE

## 2025-08-27 PROCEDURE — 1125F AMNT PAIN NOTED PAIN PRSNT: CPT | Performed by: INTERNAL MEDICINE

## 2025-08-27 PROCEDURE — 1036F TOBACCO NON-USER: CPT | Performed by: INTERNAL MEDICINE

## 2025-08-27 PROCEDURE — 3075F SYST BP GE 130 - 139MM HG: CPT | Performed by: INTERNAL MEDICINE

## 2025-08-27 PROCEDURE — 99204 OFFICE O/P NEW MOD 45 MIN: CPT | Performed by: INTERNAL MEDICINE

## 2025-08-27 PROCEDURE — 1123F ACP DISCUSS/DSCN MKR DOCD: CPT | Performed by: INTERNAL MEDICINE

## 2025-08-27 PROCEDURE — 1159F MED LIST DOCD IN RCRD: CPT | Performed by: INTERNAL MEDICINE

## 2025-08-27 ASSESSMENT — ENCOUNTER SYMPTOMS
BLOOD IN STOOL: 0
SHORTNESS OF BREATH: 0
VOMITING: 0
NAUSEA: 0
COUGH: 0
ABDOMINAL PAIN: 0

## 2025-08-28 ENCOUNTER — HOSPITAL ENCOUNTER (OUTPATIENT)
Dept: GENERAL RADIOLOGY | Age: 70
Discharge: HOME | End: 2025-08-30
Payer: MEDICARE

## 2025-08-28 ENCOUNTER — HOSPITAL ENCOUNTER (OUTPATIENT)
Dept: LAB | Age: 70
Discharge: HOME | End: 2025-08-28
Payer: MEDICARE

## 2025-08-28 DIAGNOSIS — M25.50 MULTIPLE JOINT PAIN: ICD-10-CM

## 2025-08-28 LAB
ANION GAP SERPL CALCULATED.3IONS-SCNC: 9 MMOL/L (ref 9–16)
BASOPHILS # BLD: 0.11 K/UL (ref 0–0.2)
BASOPHILS NFR BLD: 1 % (ref 0–2)
BUN SERPL-MCNC: 15 MG/DL (ref 8–23)
BUN/CREAT SERPL: 15 (ref 9–20)
CALCIUM SERPL-MCNC: 9.7 MG/DL (ref 8.6–10.4)
CHLORIDE SERPL-SCNC: 103 MMOL/L (ref 98–107)
CO2 SERPL-SCNC: 28 MMOL/L (ref 20–31)
CREAT SERPL-MCNC: 1 MG/DL (ref 0.7–1.2)
CRP SERPL HS-MCNC: 10.7 MG/L (ref 0–5)
EOSINOPHIL # BLD: 0.4 K/UL (ref 0–0.44)
EOSINOPHILS RELATIVE PERCENT: 5 % (ref 1–4)
ERYTHROCYTE [DISTWIDTH] IN BLOOD BY AUTOMATED COUNT: 14.3 % (ref 11.8–14.4)
GFR, ESTIMATED: 80 ML/MIN/1.73M2
GLUCOSE SERPL-MCNC: 110 MG/DL (ref 74–99)
HCT VFR BLD AUTO: 47.3 % (ref 40.7–50.3)
HGB BLD-MCNC: 15.2 G/DL (ref 13–17)
IMM GRANULOCYTES # BLD AUTO: 0.05 K/UL (ref 0–0.3)
IMM GRANULOCYTES NFR BLD: 1 %
LYMPHOCYTES NFR BLD: 2.68 K/UL (ref 1.1–3.7)
LYMPHOCYTES RELATIVE PERCENT: 31 % (ref 24–43)
MCH RBC QN AUTO: 27 PG (ref 25.2–33.5)
MCHC RBC AUTO-ENTMCNC: 32.1 G/DL (ref 25.2–33.5)
MCV RBC AUTO: 84.2 FL (ref 82.6–102.9)
MONOCYTES NFR BLD: 0.61 K/UL (ref 0.1–1.2)
MONOCYTES NFR BLD: 7 % (ref 3–12)
NEUTROPHILS NFR BLD: 55 % (ref 36–65)
NEUTS SEG NFR BLD: 4.77 K/UL (ref 1.5–8.1)
NRBC BLD-RTO: 0 PER 100 WBC
PLATELET # BLD AUTO: 244 K/UL (ref 138–453)
PMV BLD AUTO: 10.7 FL (ref 8.1–13.5)
POTASSIUM SERPL-SCNC: 4.8 MMOL/L (ref 3.7–5.3)
RBC # BLD AUTO: 5.62 M/UL (ref 4.21–5.77)
SODIUM SERPL-SCNC: 140 MMOL/L (ref 136–145)
WBC OTHER # BLD: 8.6 K/UL (ref 3.5–11.3)

## 2025-08-28 PROCEDURE — 86200 CCP ANTIBODY: CPT

## 2025-08-28 PROCEDURE — 73120 X-RAY EXAM OF HAND: CPT

## 2025-08-28 PROCEDURE — 80048 BASIC METABOLIC PNL TOTAL CA: CPT

## 2025-08-28 PROCEDURE — 36415 COLL VENOUS BLD VENIPUNCTURE: CPT

## 2025-08-28 PROCEDURE — 85025 COMPLETE CBC W/AUTO DIFF WBC: CPT

## 2025-08-28 PROCEDURE — 86140 C-REACTIVE PROTEIN: CPT

## 2025-08-29 LAB — CCP AB SER IA-ACNC: 1.3 U/ML (ref 0–7)

## 2025-09-02 RX ORDER — FAMOTIDINE 20 MG/1
20 TABLET, FILM COATED ORAL NIGHTLY
Qty: 60 TABLET | Refills: 4 | Status: SHIPPED | OUTPATIENT
Start: 2025-09-02

## 2025-09-03 ENCOUNTER — OFFICE VISIT (OUTPATIENT)
Dept: PAIN MANAGEMENT | Age: 70
End: 2025-09-03
Payer: MEDICARE

## 2025-09-03 VITALS
DIASTOLIC BLOOD PRESSURE: 68 MMHG | RESPIRATION RATE: 14 BRPM | HEIGHT: 69 IN | SYSTOLIC BLOOD PRESSURE: 132 MMHG | BODY MASS INDEX: 27.99 KG/M2 | OXYGEN SATURATION: 96 % | HEART RATE: 65 BPM | WEIGHT: 189 LBS

## 2025-09-03 DIAGNOSIS — M54.2 CERVICALGIA: ICD-10-CM

## 2025-09-03 DIAGNOSIS — M47.816 LUMBAR FACET ARTHROPATHY: ICD-10-CM

## 2025-09-03 DIAGNOSIS — M47.812 CERVICAL FACET JOINT SYNDROME: ICD-10-CM

## 2025-09-03 DIAGNOSIS — M51.360 DEGENERATION OF INTERVERTEBRAL DISC OF LUMBAR REGION WITH DISCOGENIC BACK PAIN: Primary | ICD-10-CM

## 2025-09-03 PROCEDURE — 3078F DIAST BP <80 MM HG: CPT | Performed by: NURSE PRACTITIONER

## 2025-09-03 PROCEDURE — G8417 CALC BMI ABV UP PARAM F/U: HCPCS | Performed by: NURSE PRACTITIONER

## 2025-09-03 PROCEDURE — 99215 OFFICE O/P EST HI 40 MIN: CPT | Performed by: NURSE PRACTITIONER

## 2025-09-03 PROCEDURE — 1159F MED LIST DOCD IN RCRD: CPT | Performed by: NURSE PRACTITIONER

## 2025-09-03 PROCEDURE — 1123F ACP DISCUSS/DSCN MKR DOCD: CPT | Performed by: NURSE PRACTITIONER

## 2025-09-03 PROCEDURE — 3017F COLORECTAL CA SCREEN DOC REV: CPT | Performed by: NURSE PRACTITIONER

## 2025-09-03 PROCEDURE — 3075F SYST BP GE 130 - 139MM HG: CPT | Performed by: NURSE PRACTITIONER

## 2025-09-03 PROCEDURE — G8427 DOCREV CUR MEDS BY ELIG CLIN: HCPCS | Performed by: NURSE PRACTITIONER

## 2025-09-03 PROCEDURE — 1160F RVW MEDS BY RX/DR IN RCRD: CPT | Performed by: NURSE PRACTITIONER

## 2025-09-03 PROCEDURE — 1036F TOBACCO NON-USER: CPT | Performed by: NURSE PRACTITIONER

## 2025-09-04 ENCOUNTER — HOSPITAL ENCOUNTER (OUTPATIENT)
Dept: LAB | Age: 70
Discharge: HOME | End: 2025-09-04

## 2025-09-04 DIAGNOSIS — C67.9 UROTHELIAL CARCINOMA OF BLADDER (HCC): ICD-10-CM

## 2025-09-05 ASSESSMENT — ENCOUNTER SYMPTOMS
EYES NEGATIVE: 1
BACK PAIN: 1
SHORTNESS OF BREATH: 0
GASTROINTESTINAL NEGATIVE: 1

## (undated) DEVICE — INSUFFLATION TUBING SET WITH FILTER, FUNNEL CONNECTOR AND LUER LOCK: Brand: JOSNOE MEDICAL INC

## (undated) DEVICE — Z DUP USE 2565107 PACK SURG PROC LEG CYSTO T-DRAPE REINF TBL CVR HND TWL

## (undated) DEVICE — GAUZE,SPONGE,2"X2",8PLY,STERILE,LF,2'S: Brand: MEDLINE

## (undated) DEVICE — SUTURE VCRL SZ 3-0 L27IN ABSRB VLT L26MM SH 1/2 CIR J316H

## (undated) DEVICE — CYSTO/BLADDER IRRIGATION SET, REGULATING CLAMP

## (undated) DEVICE — CONNECTOR TBNG Y 6IN 1 PLAS LTWT

## (undated) DEVICE — SUTURE PERMAHAND SZ 2-0 L18IN NONABSORBABLE BLK L26MM SH C012D

## (undated) DEVICE — LINE SAMP O2 6.5FT W/FEMALE CONN F/ADULT CAPNOLINE PLUS

## (undated) DEVICE — GLOVE ORANGE PI 7 1/2   MSG9075

## (undated) DEVICE — SOLUTION SCRB 4OZ 4% CHG CLN BASE FOR PT SKIN ANTISEPSIS

## (undated) DEVICE — BLANKET WRM W29.9XL79.1IN UP BODY FORC AIR MISTRAL-AIR

## (undated) DEVICE — TOWEL,OR,DSP,ST,BLUE,DLX,XR,4/PK,20PK/CS: Brand: MEDLINE

## (undated) DEVICE — BLADE OPHTH D5MM 15DEG GRN W/ RND KNURLED HNDL MICRO-SHARP

## (undated) DEVICE — FORCEPS BX L240CM JAW DIA2.4MM ORNG L CAP W/ NDL DISP RAD

## (undated) DEVICE — SPONGE LAP W18XL18IN WHT COT 4 PLY FLD STRUNG RADPQ DISP ST

## (undated) DEVICE — COLUMN DRAPE

## (undated) DEVICE — SYRINGE BLB 2 PC STER 50

## (undated) DEVICE — GOWN,SIRUS,POLYRNF,BRTHSLV,XL,30/CS: Brand: MEDLINE

## (undated) DEVICE — TUBING SET, SUCTION LAP, S-PILOT: Brand: N.A.

## (undated) DEVICE — AGENT HEMSTAT W2XL14IN OXIDIZED REGENERATED CELOS ABSRB FOR

## (undated) DEVICE — SUTURE VCRL + SZ 4-0 L18IN ABSRB UD L19MM PS-2 3/8 CIR PRIM VCP496H

## (undated) DEVICE — 35 ML SYRINGE LUER-LOCK TIP: Brand: MONOJECT

## (undated) DEVICE — SHEET, T, LAPAROTOMY, STERILE: Brand: MEDLINE

## (undated) DEVICE — Z INACTIVE USE 2635503 SOLUTION IRRIG 3000ML ST H2O USP UROMATIC PLAS CONT

## (undated) DEVICE — TRAY PAIN CUST

## (undated) DEVICE — SUTURE ETHIB EXCL BR GRN TAPR PT 2-0 30 X563H X563H

## (undated) DEVICE — MERCY DEFIANCE ENDO KIT: Brand: MEDLINE INDUSTRIES, INC.

## (undated) DEVICE — CANNULA PERF L2IN BLNT TIP 2MM VES CLR RADPQ BODY FEM LUER

## (undated) DEVICE — GLOVE SURG SZ 65 L12IN FNGR THK87MIL WHT LTX FREE

## (undated) DEVICE — SUTURE VCRL SZ 4-0 L18IN ABSRB UD L19MM PS-2 3/8 CIR PRIM J496H

## (undated) DEVICE — GLOVE SURG SZ 75 L12IN FNGR THK87MIL DK GRN LTX FREE ISOLEX

## (undated) DEVICE — SMOKE EVACUATION FILTER, SET WITH TUBE: Brand: N.A.

## (undated) DEVICE — 4-PORT MANIFOLD: Brand: NEPTUNE 2

## (undated) DEVICE — SCISSOR SURG CRV ENDOCUT TIP FOR LAP DISP

## (undated) DEVICE — DRAPE SLUSH DISC W44XL66IN ST FOR RND BSIN HUSH SLUSH SYS

## (undated) DEVICE — ARM DRAPE

## (undated) DEVICE — BLADELESS OBTURATOR: Brand: WECK VISTA

## (undated) DEVICE — TUBING, SUCTION, 3/16" X 20', STRAIGHT: Brand: MEDLINE

## (undated) DEVICE — TTL1LYR 16FR10ML 100%SIL TMPST TR: Brand: MEDLINE

## (undated) DEVICE — GARMENT,MEDLINE,DVT,INT,CALF,MED, GEN2: Brand: MEDLINE

## (undated) DEVICE — CONTAINER,SPECIMEN,OR STERILE,4OZ: Brand: MEDLINE

## (undated) DEVICE — GLOVE SURG SZ 6 THK91MIL LTX FREE SYN POLYISOPRENE ANTI

## (undated) DEVICE — ELECTRODE PT RET AD L9FT HI MOIST COND ADH HYDRGEL CORDED

## (undated) DEVICE — PROCEDURE PACK TRENGUARD 450

## (undated) DEVICE — SUTURE VCRL SZ 3-0 L27IN ABSRB UD L26MM SH 1/2 CIR J416H

## (undated) DEVICE — GLOVE ORANGE PI 8   MSG9080

## (undated) DEVICE — SUTURE PDS II SZ 0 L27IN ABSRB VLT L36MM CT-1 1/2 CIR Z340H

## (undated) DEVICE — Z DISCONTINUED USE 2275676 GLOVE SURG SZ 65 L12IN FNGR THK87MIL DK GRN LTX FREE ISOLEX

## (undated) DEVICE — Z DISCONTINUED NO SUB IDED DRAIN SURG 2 COLL PT TB FOR ATS BG OASIS

## (undated) DEVICE — Z DISCONTINUED USE 2624853 GLOVE SURG SZ 75 L12IN THK91MIL BRN LTX FREE

## (undated) DEVICE — AVANOS* QUINCKE NEEDLE: Brand: AVANOS

## (undated) DEVICE — GLOVE ORANGE PI 7   MSG9070

## (undated) DEVICE — GUIDEWIRE URO L150CM DIA .035IN STIFF NIT HYDRPHL STR TIP

## (undated) DEVICE — SOLUTION IRRIG 1000ML 09% SOD CHL USP PIC PLAS CONTAINER

## (undated) DEVICE — TUBE CARDIAC SUCTION 6FR SOFT TIP 10FR

## (undated) DEVICE — POUCH DRNGE FLX BND INTEGR RAIL CLMP DISP EZ CTCH

## (undated) DEVICE — CLIP SM RED INTERN HMOCLP TITAN LIGATING

## (undated) DEVICE — COVER LT HNDL BLU PLAS

## (undated) DEVICE — GOWN,AURORA,NONRNF,XL,30/CS: Brand: MEDLINE

## (undated) DEVICE — Z INACTIVE USE 2660664 SOLUTION IRRIG 3000ML 0.9% SOD CHL USP UROMATIC PLAS CONT

## (undated) DEVICE — STANDARD HYPODERMIC NEEDLE,POLYPROPYLENE HUB: Brand: MONOJECT

## (undated) DEVICE — PLEDGET SURG W3.5XL7MM THK1.5MM WHT PTFE RECT FIRM TFE

## (undated) DEVICE — GOWN,AURORA,NONREINFORCED,LARGE: Brand: MEDLINE

## (undated) DEVICE — SKIN AFFIX SURG ADHESIVE 72/CS 0.55ML: Brand: MEDLINE

## (undated) DEVICE — JELLY LUBRICATING 4OZ FLIP TOP TB E Z

## (undated) DEVICE — GOWN,SURGICAL,AURORA,SLEEVE: Brand: MEDLINE

## (undated) DEVICE — SUTURE STRATAFIX SYMMETRIC PDS + SZ 0 L9IN ABSRB VIO L36MM SXPP1A425

## (undated) DEVICE — Device: Brand: PERFECTCUT AORTOTOMY SYSTEM

## (undated) DEVICE — TIP COVER ACCESSORY

## (undated) DEVICE — DRAPE,UTILITY,TAPE,15X26,STERILE: Brand: MEDLINE

## (undated) DEVICE — GLOVE SURG SZ 8 L12IN THK91MIL BRN LTX FREE POLYCHLOROPRENE

## (undated) DEVICE — SS SUTURE, 3 PER SLEEVE: Brand: MYO/WIRE II

## (undated) DEVICE — PAD ELECSURG GRND DISP

## (undated) DEVICE — SNARE ENDOSCP L240CM SHTH DIA2.4MM LOOP W20MM MIN WRK CHN

## (undated) DEVICE — YANKAUER,POOLE TIP,STERILE,50/CS: Brand: MEDLINE

## (undated) DEVICE — PACK SURG PROC REMINDER N WVN DISPOSABLE BEAC TIME OUT

## (undated) DEVICE — CHLORAPREP 26ML ORANGE

## (undated) DEVICE — BITE BLOCK W/VELCRO STRAP

## (undated) DEVICE — SOLUTION IRRIG 3000ML STRL H2O USP UROMATIC PLAS CONT

## (undated) DEVICE — 1/4 FORCE SURGICAL SPRING CLIP: Brand: STEALTH® SPRING CLIP

## (undated) DEVICE — BLADE SAW W6.35XL32MM STRNM CUT STRNOTMY

## (undated) DEVICE — CATHETER,FOLEY,3-WAY,22FR,30ML,100%SILI: Brand: MEDLINE

## (undated) DEVICE — YANKAUER,BULB TIP,W/O VENT,RIGID,STERILE: Brand: MEDLINE

## (undated) DEVICE — SUTURE COAT VCRL SZ 4-0 L18IN ABSRB UD L16MM PC-3 3/8 CIR J845G

## (undated) DEVICE — Device

## (undated) DEVICE — SUTURE DEV SZ 2-0 WND CLSR ABSRB GS-22 VLOC COVIDIEN VLOCM2145

## (undated) DEVICE — Z DISCONTINUED BY MEDLINE USE 2711682 TRAY SKIN PREP DRY W/ PREM GLV

## (undated) DEVICE — SYRINGE MED 30ML STD CLR PLAS LUERLOCK TIP N CTRL DISP

## (undated) DEVICE — DRESSING TRNSPAR W2XL2.75IN FLM SHT SEMIPERMEABLE WIND

## (undated) DEVICE — TROCARS: Brand: KII® BALLOON BLUNT TIP SYSTEM

## (undated) DEVICE — SOLUTION IV IRRIG POUR BRL 0.9% SODIUM CHL 2F7124

## (undated) DEVICE — INSUFFLATION TUBING SET, ENDOFLATOR 50: Brand: N.A.

## (undated) DEVICE — WAX SURG 2.5GM HEMSTAT BNE BEESWAX PARAFFIN ISO PALMITATE

## (undated) DEVICE — SOLUTION IV IRRIG WATER 1000ML POUR BRL 2F7114

## (undated) DEVICE — 9165 UNIVERSAL PATIENT PLATE: Brand: 3M™

## (undated) DEVICE — PAD N ADH W3XL4IN POLY COT SFT PERF FLM EASILY CUT ABSRB

## (undated) DEVICE — TIDISHIELD BAND BAGS CLEAR POLYETHYLENE STERILE 20IN X 20IN 100 PER CASE: Brand: TIDISHIELD

## (undated) DEVICE — DRESSING TRNSPAR W4XL10IN FLM MIC POR SURESITE 123

## (undated) DEVICE — CANNULA SEAL

## (undated) DEVICE — GLOVE SURG SZ 7 L12IN FNGR THK87MIL WHT LTX FREE

## (undated) DEVICE — C-ARMOR C-ARM EQUIPMENT COVERS CLEAR STERILE UNIVERSAL FIT 12 PER CASE: Brand: C-ARMOR

## (undated) DEVICE — NEEDLE FLTR 18GA L1.5IN MEM THK5UM BLNT DISP

## (undated) DEVICE — PACK PROCEDURE SURG OPN HRT ADD ON

## (undated) DEVICE — SUTURE MCRYL SZ 4-0 L18IN ABSRB UD L19MM PS-2 3/8 CIR PRIM Y496G

## (undated) DEVICE — TOWEL,OR,DSP,ST,BLUE,STD,4/PK,20PK/CS: Brand: MEDLINE

## (undated) DEVICE — HEADREST NEURO DONUT 7 IN

## (undated) DEVICE — DRAPE,CVMAX,CARDIOVASCULAR: Brand: MEDLINE

## (undated) DEVICE — PROTECTOR ULN NRV PUR FOAM HK LOOP STRP ANATOMICALLY

## (undated) DEVICE — HYPODERMIC SAFETY NEEDLE: Brand: MAGELLAN

## (undated) DEVICE — BANDAGE ADH DIA7/8IN NAT FLEX-FABRIC WVN FOR WND PROTCT

## (undated) DEVICE — SUTURE V-LOC 90 3-0 L9IN ABSRB VLT L26MM V-20 1/2 CIR TAPR VLOCM0644

## (undated) DEVICE — BLADE ES L6IN ELASTOMERIC COAT EXT DURABLE BEND UPTO 90DEG

## (undated) DEVICE — CATHETER THOR 28FR SIL 6 EYE STR HI TEAR STRENGTH

## (undated) DEVICE — TROCAR: Brand: KII SHIELDED BLADED ACCESS SYSTEM

## (undated) DEVICE — 1200CC GUARDIAN II: Brand: GUARDIAN

## (undated) DEVICE — BAG ENDOSCP TRNSPRT CLR RECLOSABLE 24INX20IN

## (undated) DEVICE — SUTURE PROL SZ 6-0 L18IN NONABSORBABLE BLU RB-2 L13MM 1/2 8714H

## (undated) DEVICE — PAD,ARMBOARD,CONV,FOAM,2X8X20",12PR/CS: Brand: MEDLINE

## (undated) DEVICE — CO2 CANNULA,SSOFT,ADLT,7O2,4CO2,FEMALE: Brand: MEDLINE

## (undated) DEVICE — MEDI-VAC NON-CONDUCTIVE SUCTION TUBING 7MM X 6.1M (20 FT.) L: Brand: CARDINAL HEALTH

## (undated) DEVICE — SUTURE V-LOC 180 SZ 3-0 L12IN ABSRB GRN V-20 L26MM 1/2 CIR VLOCL0614

## (undated) DEVICE — SET EXTN SM 0.5ML L13IN BOR W/O INJ SITE

## (undated) DEVICE — SUTURE VCRL + SZ 3-0 L27IN ABSRB WHT CT-1 1/2 CIR VCP258H

## (undated) DEVICE — CUTTING LOOP, BIPOLAR, 24/26 FR.: Brand: N.A.

## (undated) DEVICE — COVER,LIGHT HANDLE,FLX,2/PK: Brand: MEDLINE INDUSTRIES, INC.

## (undated) DEVICE — SUTURE VCRL + SZ 2-0 L27IN ABSRB CLR CT-1 1/2 CIR TAPERCUT VCP259H

## (undated) DEVICE — INTENDED FOR TISSUE SEPARATION, AND OTHER PROCEDURES THAT REQUIRE A SHARP SURGICAL BLADE TO PUNCTURE OR CUT.: Brand: BARD-PARKER ® CARBON RIB-BACK BLADES

## (undated) DEVICE — COVER,TABLE,77X90,STERILE: Brand: MEDLINE

## (undated) DEVICE — SUTURE PROL SZ 8-0 L18IN NONABSORBABLE BLU L8MM BV175-6 3/8 8740H

## (undated) DEVICE — STERNUM BLADE, OFFSET (31.7 X 0.64 X 6.3MM)

## (undated) DEVICE — SOLUTION IRRIG 3000ML 0.9% SOD CHL USP UROMATIC PLAS CONT

## (undated) DEVICE — GAUZE,SPONGE,4"X4",16PLY,XRAY,STRL,LF: Brand: MEDLINE

## (undated) DEVICE — 60 ML SYRINGE LUER-LOCK TIP: Brand: MONOJECT

## (undated) DEVICE — SUTURE NRLN SZ 0 L18IN NONABSORBABLE BLK L36MM CT-1 1/2 CIR C521D

## (undated) DEVICE — CANNULA PERFUSION 5.5IN 9FR AORTIC ROOT

## (undated) DEVICE — GENERAL ROBOTIC PACK: Brand: MEDLINE INDUSTRIES, INC.

## (undated) DEVICE — FOGARTY - HYDRAGRIP SURGICAL - CLAMP INSERTS: Brand: FOGARTY HYDRAJAW

## (undated) DEVICE — 60 ML SYRINGE REGULAR TIP: Brand: MONOJECT

## (undated) DEVICE — STRIP SKIN CLSR W0.25XL4IN WHT SPUNBOUND FBR NYL HI ADH

## (undated) DEVICE — GOWN,AURORA,NON-REINFORCED,2XL: Brand: MEDLINE

## (undated) DEVICE — GLOVE SURG SZ 65 L12IN THK91MIL BRN LTX FREE

## (undated) DEVICE — Z INACTIVE USE 2540311 LEAD PACE L475MM CHN A OR V MYOCARDIAL STEROID ELUT SIL

## (undated) DEVICE — CATHETER,FOLEY,100%SILICONE,18FR,10ML,LF: Brand: MEDLINE

## (undated) DEVICE — MDHZ CYSTOSCOPY: Brand: MEDLINE INDUSTRIES, INC.

## (undated) DEVICE — INSUFFLATION NEEDLE TO ESTABLISH PNEUMOPERITONEUM.: Brand: INSUFFLATION NEEDLE

## (undated) DEVICE — CANNULA 15CM LGTH 10MM TIP 18 GA RADIOFREQUENCY CURV SILICON

## (undated) DEVICE — PACK PROCEDURE SURG OPN HRT

## (undated) DEVICE — SUTURE PROL SZ 4-0 L36IN NONABSORBABLE BLU L26MM SH 1/2 CIR 8521H

## (undated) DEVICE — GLOVE SURG SZ 75 L12IN FNGR THK87MIL WHT LTX FREE

## (undated) DEVICE — SHUNT SURG INTRALUMINAL 1.50 MM SIL STRL FLO-THRU LF DISP

## (undated) DEVICE — SUTURE VCRL SZ 0 L27IN ABSRB VLT L26MM CT-2 1/2 CIR J334H

## (undated) DEVICE — SUTURE PDS II SZ 2-0 L27IN ABSRB VLT L26MM CT-2 1/2 CIR Z333H

## (undated) DEVICE — 3M™ WARMING BLANKET, UPPER BODY, 10 PER CASE, 42268: Brand: BAIR HUGGER™

## (undated) DEVICE — BLADE ES ELASTOMERIC COAT INSUL DURABLE BEND UPTO 90DEG

## (undated) DEVICE — PLATELET CONCENTRATION PACK PROC 14-20 ML SMARTPREP 2

## (undated) DEVICE — MARKER W/PRE PRINTED CUSTOM LABEL

## (undated) DEVICE — Z DISCONTINUED APPLICATOR SURG PREP 0.35OZ 2% CHG 70% ISO ALC W/ HI LT

## (undated) DEVICE — Device: Brand: VIRTUOSAPH PLUS WITH RADIAL INDICATION

## (undated) DEVICE — NEEDLE HYPO 25GA L1.5IN BLU POLYPR HUB S STL REG BVL STR

## (undated) DEVICE — CONNECTOR TBNG AUX H2O JET DISP FOR OLY 160/180 SER

## (undated) DEVICE — BAG,DRAINAGE,4L,A/R TOWER,METAL CLAMP: Brand: MEDLINE

## (undated) DEVICE — BASIN SET: Brand: MEDLINE INDUSTRIES, INC.

## (undated) DEVICE — PACK,LAPAROTOMY,PK IV,AURORA: Brand: MEDLINE

## (undated) DEVICE — SUTURE PROL SZ 7-0 L24IN NONABSORBABLE BLU L8MM BV175-6 3/8 8735H

## (undated) DEVICE — SUTURE DEV SZ 0 L6IN N ABSORBABLE

## (undated) DEVICE — SUTURE SZ 0 27IN 5/8 CIR UR-6  TAPER PT VIOLET ABSRB VICRYL J603H

## (undated) DEVICE — CUTTING LOOP, BIPOLAR, 0.30MM, 24/26 FR.: Brand: N.A.